# Patient Record
Sex: FEMALE | Race: BLACK OR AFRICAN AMERICAN | NOT HISPANIC OR LATINO | Employment: OTHER | URBAN - METROPOLITAN AREA
[De-identification: names, ages, dates, MRNs, and addresses within clinical notes are randomized per-mention and may not be internally consistent; named-entity substitution may affect disease eponyms.]

---

## 2017-01-16 RX ORDER — METOPROLOL SUCCINATE 50 MG/1
50 TABLET, EXTENDED RELEASE ORAL DAILY
Qty: 90 TABLET | Refills: 1 | Status: SHIPPED | OUTPATIENT
Start: 2017-01-16 | End: 2017-07-07 | Stop reason: SDUPTHER

## 2017-01-27 ENCOUNTER — OFFICE VISIT (OUTPATIENT)
Dept: PSYCHIATRY | Facility: CLINIC | Age: 62
End: 2017-01-27
Payer: COMMERCIAL

## 2017-01-27 VITALS
DIASTOLIC BLOOD PRESSURE: 74 MMHG | SYSTOLIC BLOOD PRESSURE: 142 MMHG | HEART RATE: 84 BPM | HEIGHT: 67 IN | BODY MASS INDEX: 37.98 KG/M2 | WEIGHT: 242 LBS

## 2017-01-27 DIAGNOSIS — F41.1 GENERALIZED ANXIETY DISORDER: ICD-10-CM

## 2017-01-27 DIAGNOSIS — F43.21 COMPLICATED BEREAVEMENT: ICD-10-CM

## 2017-01-27 DIAGNOSIS — F33.1 MAJOR DEPRESSIVE DISORDER, RECURRENT, MODERATE: Primary | ICD-10-CM

## 2017-01-27 PROCEDURE — 99499 UNLISTED E&M SERVICE: CPT | Mod: S$GLB,,, | Performed by: PSYCHIATRY & NEUROLOGY

## 2017-01-27 PROCEDURE — 99999 PR PBB SHADOW E&M-EST. PATIENT-LVL III: CPT | Mod: PBBFAC,,, | Performed by: PSYCHIATRY & NEUROLOGY

## 2017-01-27 PROCEDURE — 3078F DIAST BP <80 MM HG: CPT | Mod: S$GLB,,, | Performed by: PSYCHIATRY & NEUROLOGY

## 2017-01-27 PROCEDURE — 99205 OFFICE O/P NEW HI 60 MIN: CPT | Mod: S$GLB,,, | Performed by: PSYCHIATRY & NEUROLOGY

## 2017-01-27 PROCEDURE — 3077F SYST BP >= 140 MM HG: CPT | Mod: S$GLB,,, | Performed by: PSYCHIATRY & NEUROLOGY

## 2017-01-27 PROCEDURE — 1159F MED LIST DOCD IN RCRD: CPT | Mod: S$GLB,,, | Performed by: PSYCHIATRY & NEUROLOGY

## 2017-01-27 RX ORDER — TRAZODONE HYDROCHLORIDE 100 MG/1
TABLET ORAL
Qty: 90 TABLET | Refills: 1 | OUTPATIENT
Start: 2017-01-27

## 2017-01-27 RX ORDER — TRAZODONE HYDROCHLORIDE 100 MG/1
50-100 TABLET ORAL NIGHTLY PRN
Qty: 30 TABLET | Refills: 1 | Status: SHIPPED | OUTPATIENT
Start: 2017-01-27 | End: 2017-02-24 | Stop reason: SDUPTHER

## 2017-01-27 RX ORDER — BUPROPION HYDROCHLORIDE 150 MG/1
150 TABLET, EXTENDED RELEASE ORAL DAILY
Qty: 30 TABLET | Refills: 1 | Status: SHIPPED | OUTPATIENT
Start: 2017-01-27 | End: 2017-02-23 | Stop reason: SDUPTHER

## 2017-01-27 RX ORDER — BUPROPION HYDROCHLORIDE 150 MG/1
TABLET, EXTENDED RELEASE ORAL
Qty: 90 TABLET | Refills: 1 | OUTPATIENT
Start: 2017-01-27

## 2017-01-27 RX ORDER — DULOXETIN HYDROCHLORIDE 60 MG/1
60 CAPSULE, DELAYED RELEASE ORAL DAILY
Qty: 90 CAPSULE | Refills: 1 | Status: SHIPPED | OUTPATIENT
Start: 2017-01-27 | End: 2017-03-16 | Stop reason: SDUPTHER

## 2017-01-27 NOTE — PATIENT INSTRUCTIONS
[facility] - DEPARTMENT OF PSYCHIATRY   NEW PATIENT ORIENTATION INFORMATION  OUTPATIENT SERVICES COUNSELING CONTRACT    We appreciate the opportunity to participate in your medical care and hope the following protocols will make it easier for you to receive quality treatment in our department.    1. PUNCTUALITY: Your appointment is scheduled for a fixed amount of time reserved especially for you.  To get the benefit of your appointment, please arrive early enough to allow time for parking and registration.  If you are late for your appointment, your clinician is not able to offer additional time.  Please make every effort to be on time.      2. PAYMENT FOR SERVICES:   Payments are expected at the time of service.  Please contact Lizzette Bernal at [phone] if you need to resolve issues involving your account at [facility] or to set up a payment plan.    3. CANCELLATION / MISSED APPOINTMENTS:   In order to receive quality care, all appointments must be kept.  Appointment may be cancelled, ONLY by talking with an  at phone number [phone], between 8:00 a.m. and 5:00 p.m., Monday through Friday, at least 24 hours before your appointment time.  Your clinician reserves this time specifically for you, and if you will be unable to use it, it is necessary that you cancel in a timely manner.  If you do not give at least 24-hour notice of cancellation a full fee will be assessed.  Please note that insurance does not cover no-show charges, so you will be billed directly.  If you are consistently late, cancel, or do not show for your appointments, our department reserves the right to terminate treatment    4. CALLING THE DEPARTMENT:   MESSAGES, SCHEDULE OR CANCEL APPOINTMENTS- In general you can reach the department by calling [phone], between 8:00 a.m. and 5:00 p.m., Monday through Friday, to schedule or cancel appointments or leave a message for your clinician.  It is advisable  to schedule your visits far in advance to obtain the most convenient times for your appointments.   AFTER HOURS, WEEKEND OR HOLIDAYS- For urgent questions after hours, weekends and holidays, calling the department number [phone] will connect you to the nursing staff on the Psychiatry Inpatient Unit.  The nursing staff will speak with you and contact the On Call psychiatrist if necessary.   EMERGENCY-  In case of a crisis when there is a concern of harm to self or others, call 911 or the office [phone] between 8:00 a.m. and 5:00 p.m., Monday through Friday.  After hours, weekends or holidays, please call 911 or go to the Emergency Department where you can be thoroughly evaluated by the On Call psychiatrist.  If possible, contact the psychiatrist on call at [phone] prior to leaving for the [facility] Department.    5. TEAM APPROACH:  Most patients receive therapy through our team system.  In the team system, your primary therapist will be a , psychologist, psychiatry resident or psychiatrist.  If your therapist is a , psychologist or psychiatry resident, please contact your primary therapist first in matters other than medications or acute medical problems.    6. PRESCRIPTION REFILLS:  Prescription refills must be done at your physician office visit.  You will be given a sufficient number of refills to last one extra month beyond your next appointment.  No additional refills will be approved beyond the original treatment plan.  After hours, sufficient medication may be approved to last until the next scheduled appointment. After hours requests for refills on controlled substances may be declined by the psychiatrist on call, as he or she may not be familiar with your case  Please work closely with your doctor so that you have sufficient medication until your next appointment.  Again, please note that no additional prescriptions will be approved per patient  request over the phone.   No additional refills will be approved beyond the original treatment plan.   In certain exceptional situations, a phone consult appointment may be arranged, with appropriate charge, for the review and approval of prescription refills to last until the next scheduled appointment.    7. FOLLOW UP APPOINTMENTS:  Follow-up appointments can be made in person at the Psychiatry Appointment Desk, [place] [address], or by calling [phone], from 8am to 5pm, between Monday and Friday.  It is advisable to schedule your office visits far enough in advance to obtain the most convenient times for your appointments.    8. PAYMENT FOR SERVICES:   Payments are expected at the time of service.  Please contact Lizzette Bernal at [phone] if you need to resolve issues involving your account at [facility] or to set up a payment plan.    Revised Feb. 23, 2010 - F/OA1/Newpatient                            You have been provided with a certain amount of medication with a specified number of refills.  Please follow up within an adequate time before you run out of medications.  If you run out of medication before your next appointment you may be charged a refill fee.  REFILLS FOR CONTROLLED SUBSTANCES WILL NOT BE GIVEN WITHOUT AN APPOINTMENT.  I will not honor or fill automated refill requests from pharmacies.    Please book your next appointment today by phone: [phone].  Call 8am and 10:30am to speak with my assistant.    PLEASE BE AT LEAST 15 MINUTES EARLY FOR YOUR NEXT APPOINTMENT.  PLEASE, DO NOT BE LATE OR YOU WILL BE TURNED AWAY AND ASKED TO RESCHEDULE.  YOU MUST ALLOW TIME FOR CHECK-IN AS WELL AS GET YOUR VITAL SIGNS AND GO OVER YOUR MEDICATIONS.  Tardiness can affect and is not fair to the patients who present after you and are on time for their appointments.  It causes a delay in the appointments for patients and staff.  IF YOU ARE LATE FOR YOUR APPOINTMENT TIME, YOU WILL BE SEEN FOR A SHORTENED  "AMOUNT OF TIME OR ASKED TO RESCHEDULE.  THERE IS A POSSIBILITY THAT YOU WILL BE CHARGED FOR THE APPOINTMENT TIME PERSONALLY AND IT WILL NOT GO TO YOUR INSURANCE.  YOU MAY ALSO BE DISCHARGED FROM CLINIC with multiple "No Show" appointments.     -----------------------------------------------------------------------------------------------------------------  IF YOU FEEL SUICIDAL OR HAVING THOUGHTS OR PLANS TO HURT YOURSELF OR OTHERS, CALL 911 OR REPORT TO THE NEAREST EMERGENCY ROOM.  YOU CAN ALSO ACCESS ONE OF THE FOLLOWING HOTLINES:    [facility]  [phone]    [facility] Crisis Line   [phone]     [place]  24 hours / 7 days   [phone]       "

## 2017-01-27 NOTE — MR AVS SNAPSHOT
[facility] Psychiatry  [address] [facility] [address]  [address]  [place] [address]  Phone: [phone]  Fax: [phone]                  Gwendolyn Fournier   2017 10:30 AM   Office Visit    Description:  Female : 1955   Provider:  Wei Jiménez MD   Department:  [facility]                To Do List           Goals (5 Years of Data)     None      Follow-Up and Disposition     Return in about 6 weeks (around 3/10/2017), or if symptoms worsen or fail to improve.       These Medications        Disp Refills Start End    duloxetine (CYMBALTA) 60 MG capsule 90 capsule 1 2017     Take 1 capsule (60 mg total) by mouth once daily. - Oral    Pharmacy: [facility] [address] [facility] Ph #: [phone]       buPROPion (WELLBUTRIN SR) 150 MG TBSR 12 hr tablet 30 tablet 2017     Take 1 tablet (150 mg total) by mouth once daily. - Oral    Pharmacy: [facility] [address] [facility] Ph #: [phone]       trazodone (DESYREL) 100 MG tablet 30 tablet 1 2017     Take 0.5-1 tablets ( mg total) by mouth nightly as needed for Insomnia. - Oral    Pharmacy: [facility] [address] [facility] Ph #: [phone]         [facility]     [facility] On Call Nurse Care Line -  Assistance  Registered nurses in the [facility] On Call Center provide clinical advisement, health education, appointment booking, and other advisory services.  Call for this free service at [phone].             Medications           Message regarding Medications     Verify the changes and/or additions to your medication regime listed below are the same as discussed with your clinician today.  If any of these changes or additions are incorrect, please notify your healthcare provider.        START taking these NEW medications         Refills    buPROPion (WELLBUTRIN SR) 150 MG TBSR 12 hr tablet 1    Sig: Take 1 tablet (150 mg total) by mouth once daily.    Class: Normal    Route: Oral    trazodone (DESYREL) 100 MG tablet 1    Sig: Take 0.5-1 tablets ( mg total) by mouth nightly as needed for Insomnia.    Class: Normal    Route: Oral      CHANGE how you are taking these medications     Start Taking Instead of    duloxetine (CYMBALTA) 60 MG capsule duloxetine (CYMBALTA) 60 MG capsule    Dosage:  Take 1 capsule (60 mg total) by mouth once daily. Dosage:  Take 2 capsules (120 mg total) by mouth once daily.    Reason for Change:  Reorder            Verify that the below list of medications is an accurate representation of the medications you are currently taking.  If none reported, the list may be blank. If incorrect, please contact your healthcare provider. Carry this list with you in case of emergency.           Current Medications     allopurinol (ZYLOPRIM) 100 MG tablet Take 1 tablet (100 mg total) by mouth 2 (two) times daily.    amlodipine (NORVASC) 5 MG tablet TAKE 1 TABLET(5 MG) BY MOUTH EVERY DAY    ammonium lactate (LAC-HYDRIN) 12 % lotion 2 (two) times daily as needed.     buPROPion (WELLBUTRIN SR) 150 MG TBSR 12 hr tablet Take 1 tablet (150 mg total) by mouth once daily.    colchicine 0.6 mg tablet Take 2 tabs PO at onset of gout pain, then 1 tab PO one hour later if not resolved.  Repeat in 24 hours until resolved.    diazePAM (VALIUM) 5 MG tablet Take 1 tablet (5 mg total) by mouth as needed.    duloxetine (CYMBALTA) 60 MG capsule Take 1 capsule (60 mg total) by mouth once daily.    fluticasone (FLONASE) 50 mcg/actuation nasal spray 1 spray by Each Nare route once daily.    hydrochlorothiazide (HYDRODIURIL) 25 MG tablet Take 25 mg by mouth once daily.    loratadine (CLARITIN) 10 mg tablet Take 1 tablet (10 mg total) by mouth once daily.    meloxicam (MOBIC) 15 MG tablet Take 1 tablet (15 mg total) by mouth as needed.    metoprolol  "succinate (TOPROL-XL) 50 MG 24 hr tablet Take 1 tablet (50 mg total) by mouth once daily.    oxycodone-acetaminophen (PERCOCET) 5-325 mg per tablet TK 1 T PO Q 6 TO 8 H PRN P    polyethylene glycol (COLYTE) 240-22.72-6.72 -5.84 gram SolR Take 4,000 mLs (4 L total) by mouth as directed.    tizanidine (ZANAFLEX) 4 MG tablet Take 4 mg by mouth as needed.     trazodone (DESYREL) 100 MG tablet Take 0.5-1 tablets ( mg total) by mouth nightly as needed for Insomnia.           Clinical Reference Information           Vital Signs - Last Recorded  Most recent update: 1/27/2017 10:48 AM by Eva Coronado MA    BP Pulse Ht Wt BMI    (!) 142/74 84 5' 7" (1.702 m) 109.8 kg (242 lb) 37.9 kg/m2      Blood Pressure          Most Recent Value    BP  (!)  142/74      Allergies as of 1/27/2017     No Known Allergies      Immunizations Administered on Date of Encounter - 1/27/2017     None      Instructions              [facility] - DEPARTMENT OF PSYCHIATRY   NEW PATIENT ORIENTATION INFORMATION  OUTPATIENT SERVICES COUNSELING CONTRACT    We appreciate the opportunity to participate in your medical care and hope the following protocols will make it easier for you to receive quality treatment in our department.    1. PUNCTUALITY: Your appointment is scheduled for a fixed amount of time reserved especially for you.  To get the benefit of your appointment, please arrive early enough to allow time for parking and registration.  If you are late for your appointment, your clinician is not able to offer additional time.  Please make every effort to be on time.      2. PAYMENT FOR SERVICES:   Payments are expected at the time of service.  Please contact Lizzette Bernal at [phone] if you need to resolve issues involving your account at [facility] or to set up a payment plan.    3. CANCELLATION / MISSED APPOINTMENTS:   In order to receive quality care, all appointments must be kept.  Appointment may be cancelled, ONLY by talking " with an  at phone number [phone], between 8:00 a.m. and 5:00 p.m., Monday through Friday, at least 24 hours before your appointment time.  Your clinician reserves this time specifically for you, and if you will be unable to use it, it is necessary that you cancel in a timely manner.  If you do not give at least 24-hour notice of cancellation a full fee will be assessed.  Please note that insurance does not cover no-show charges, so you will be billed directly.  If you are consistently late, cancel, or do not show for your appointments, our department reserves the right to terminate treatment    4. CALLING THE DEPARTMENT:   MESSAGES, SCHEDULE OR CANCEL APPOINTMENTS- In general you can reach the department by calling [phone], between 8:00 a.m. and 5:00 p.m., Monday through Friday, to schedule or cancel appointments or leave a message for your clinician.  It is advisable to schedule your visits far in advance to obtain the most convenient times for your appointments.   AFTER HOURS, WEEKEND OR HOLIDAYS- For urgent questions after hours, weekends and holidays, calling the department number [phone] will connect you to the nursing staff on the Psychiatry Inpatient Unit.  The nursing staff will speak with you and contact the On Call psychiatrist if necessary.   EMERGENCY-  In case of a crisis when there is a concern of harm to self or others, call 911 or the office [phone] between 8:00 a.m. and 5:00 p.m., Monday through Friday.  After hours, weekends or holidays, please call 911 or go to the Emergency Department where you can be thoroughly evaluated by the On Call psychiatrist.  If possible, contact the psychiatrist on call at [phone] prior to leaving for the [facility] Emergency Department.    5. TEAM APPROACH:  Most patients receive therapy through our team system.  In the team system, your primary therapist will be a , psychologist, psychiatry resident or  psychiatrist.  If your therapist is a , psychologist or psychiatry resident, please contact your primary therapist first in matters other than medications or acute medical problems.    6. PRESCRIPTION REFILLS:  Prescription refills must be done at your physician office visit.  You will be given a sufficient number of refills to last one extra month beyond your next appointment.  No additional refills will be approved beyond the original treatment plan.  After hours, sufficient medication may be approved to last until the next scheduled appointment. After hours requests for refills on controlled substances may be declined by the psychiatrist on call, as he or she may not be familiar with your case  Please work closely with your doctor so that you have sufficient medication until your next appointment.  Again, please note that no additional prescriptions will be approved per patient request over the phone.   No additional refills will be approved beyond the original treatment plan.   In certain exceptional situations, a phone consult appointment may be arranged, with appropriate charge, for the review and approval of prescription refills to last until the next scheduled appointment.    7. FOLLOW UP APPOINTMENTS:  Follow-up appointments can be made in person at the Psychiatry Appointment Desk, [place] [address], or by calling [phone], from 8am to 5pm, between Monday and Friday.  It is advisable to schedule your office visits far enough in advance to obtain the most convenient times for your appointments.    8. PAYMENT FOR SERVICES:   Payments are expected at the time of service.  Please contact Lizzette Bernal at [phone] if you need to resolve issues involving your account at [facility] or to set up a payment plan.    Revised Feb. 23, 2010 - F/OA1/Newpatient                            You have been provided with a certain amount of medication with a specified number of refills.  Please follow up within  "an adequate time before you run out of medications.  If you run out of medication before your next appointment you may be charged a refill fee.  REFILLS FOR CONTROLLED SUBSTANCES WILL NOT BE GIVEN WITHOUT AN APPOINTMENT.  I will not honor or fill automated refill requests from pharmacies.    Please book your next appointment today by phone: [phone].  Call 8am and 10:30am to speak with my assistant.    PLEASE BE AT LEAST 15 MINUTES EARLY FOR YOUR NEXT APPOINTMENT.  PLEASE, DO NOT BE LATE OR YOU WILL BE TURNED AWAY AND ASKED TO RESCHEDULE.  YOU MUST ALLOW TIME FOR CHECK-IN AS WELL AS GET YOUR VITAL SIGNS AND GO OVER YOUR MEDICATIONS.  Tardiness can affect and is not fair to the patients who present after you and are on time for their appointments.  It causes a delay in the appointments for patients and staff.  IF YOU ARE LATE FOR YOUR APPOINTMENT TIME, YOU WILL BE SEEN FOR A SHORTENED AMOUNT OF TIME OR ASKED TO RESCHEDULE.  THERE IS A POSSIBILITY THAT YOU WILL BE CHARGED FOR THE APPOINTMENT TIME PERSONALLY AND IT WILL NOT GO TO YOUR INSURANCE.  YOU MAY ALSO BE DISCHARGED FROM CLINIC with multiple "No Show" appointments.     -----------------------------------------------------------------------------------------------------------------  IF YOU FEEL SUICIDAL OR HAVING THOUGHTS OR PLANS TO HURT YOURSELF OR OTHERS, CALL 911 OR REPORT TO THE NEAREST EMERGENCY ROOM.  YOU CAN ALSO ACCESS ONE OF THE FOLLOWING HOTLINES:    [facility]  [facility]  [phone]    [facility]   [facility] Crisis Line   [phone]     [place]  24 hours / 7 days   [phone]            "

## 2017-01-27 NOTE — PROGRESS NOTES
Outpatient Psychiatry Initial Visit (MD/NP)    1/27/2017    Gwendolyn Fournier, a 62 y.o. female, presenting for initial evaluation visit. Met with patient and daughter.    Reason for Encounter: Referral from pcp. Patient complains of No chief complaint on file.  .    History of Present Illness: Patient Gwendolyn Fournier presents to clinic for her initial psychiatric evaluation.  She was evaluated for depression by her PCP a few months ago.  In November, she felt stress build up from the death of her daughter in Jan 2015.  She was anticipating the year anniversary of her death coming up.  Despite being on medications already, she had started taking her daughter's mirtazapine nightly for a week or so.  She stopped when she realized that she shouldn't take other's medications.  She has been depressed with sadness.  She has lost interest in things that she would like to do, especially her Religious ([employer]).  She has no motivation and is up/down at night with poor sleep.  She stays in bed all day.  Appetite is good.  No suicidality but didn't want to survive her back surgery.  No energy.  She is also a worry wart in that she has worried about everything since the death of her mother at age 6.  She has had 3 panic attacks in her life.  The last was in Nov and during this time, she takes a Valium, which helps.  She describes her panic attacks as shaky, increased heart rate, blood pressure fluctuations, and tightness in her throat.  She has no history of skyler or psychosis.  She is asking for help with depression and anxiety.    No history of seizures.  Currently taking Cymbalta 60mg daily.  Previously tried Prozac, trazodone, mirtazapine, and Ambien.      Review Of Systems:     GENERAL:  No weight gain or loss  HEAD:  No headaches  CHEST:  No shortness of breath, hyperventilation or cough  CARDIOVASCULAR:  No tachycardia or chest pain  ABDOMEN:  No nausea, vomiting, pain, constipation or  "diarrhea  MUSCULOSKELETAL:  No pain or stiffness of the joints  NEUROLOGIC:  No weakness, sensory changes, seizures, confusion, memory loss, tremor or other abnormal movements  Pertinent items are noted in HPI.    Current Evaluation:     Nutritional Screening: Considering the patient's height and weight, medications, medical history and preferences, should a referral be made to the dietitian? no    Constitutional  Vitals:  Most recent vital signs, dated less than 90 days prior to this appointment, were reviewed.    Vitals:    01/27/17 1047   BP: (!) 142/74   Pulse: 84   Weight: 109.8 kg (242 lb)   Height: 5' 7" (1.702 m)        General:  age appropriate, obese     Musculoskeletal  Muscle Strength/Tone:  no tremor, no tic   Gait & Station:  non-ataxic     Psychiatric  Speech:  no latency; no press   Mood & Affect:  depressed, dysthymic  blunted   Thought Process:  normal and logical   Associations:  intact   Thought Content:  normal, no suicidality, no homicidality, delusions, or paranoia   Insight:  has awareness of illness   Judgement: behavior is adequate to circumstances   Orientation:  person, place, situation   Memory: intact for content of interview   Language: able to name, able to repeat   Attention Span & Concentration:  able to focus   Fund of Knowledge:  intact and appropriate to age and level of education       Relevant Elements of Neurological Exam: normal gait    Functioning in Relationships:  Spouse/partner:   Peers: limited  Employers: N/A    Laboratory Data  No visits with results within 1 Month(s) from this visit.  Latest known visit with results is:    Lab Visit on 09/24/2015   Component Date Value Ref Range Status    Hepatitis C Ab 09/24/2015 Negative   Final    Cholesterol 09/24/2015 224* 120 - 199 mg/dL Final    Triglycerides 09/24/2015 176* 30 - 150 mg/dL Final    HDL 09/24/2015 46  40 - 75 mg/dL Final    LDL Cholesterol 09/24/2015 142.8  63.0 - 159.0 mg/dL Final    HDL/Chol Ratio " 09/24/2015 20.5  20.0 - 50.0 % Final    Total Cholesterol/HDL Ratio 09/24/2015 4.9  2.0 - 5.0 Final    Non-HDL Cholesterol 09/24/2015 178  mg/dL Final    WBC 09/24/2015 9.18  3.90 - 12.70 K/uL Final    RBC 09/24/2015 3.96* 4.00 - 5.40 M/uL Final    Hemoglobin 09/24/2015 11.5* 12.0 - 16.0 g/dL Final    Hematocrit 09/24/2015 34.7* 37.0 - 48.5 % Final    MCV 09/24/2015 88  82 - 98 fL Final    MCH 09/24/2015 29.0  27.0 - 31.0 pg Final    MCHC 09/24/2015 33.1  32.0 - 36.0 % Final    RDW 09/24/2015 13.1  11.5 - 14.5 % Final    Platelets 09/24/2015 282  150 - 350 K/uL Final    MPV 09/24/2015 10.3  9.2 - 12.9 fL Final    Gran # 09/24/2015 5.2  1.8 - 7.7 K/uL Final    Lymph # 09/24/2015 3.3  1.0 - 4.8 K/uL Final    Mono # 09/24/2015 0.5  0.3 - 1.0 K/uL Final    Eos # 09/24/2015 0.2  0.0 - 0.5 K/uL Final    Baso # 09/24/2015 0.03  0.00 - 0.20 K/uL Final    Gran% 09/24/2015 56.4  38.0 - 73.0 % Final    Lymph% 09/24/2015 35.5  18.0 - 48.0 % Final    Mono% 09/24/2015 5.4  4.0 - 15.0 % Final    Eosinophil% 09/24/2015 2.3  0.0 - 8.0 % Final    Basophil% 09/24/2015 0.3  0.0 - 1.9 % Final    Differential Method 09/24/2015 Automated   Final    Sodium 09/24/2015 143  136 - 145 mmol/L Final    Potassium 09/24/2015 3.8  3.5 - 5.1 mmol/L Final    Chloride 09/24/2015 103  95 - 110 mmol/L Final    CO2 09/24/2015 28  23 - 29 mmol/L Final    Glucose 09/24/2015 83  70 - 110 mg/dL Final    BUN, Bld 09/24/2015 15  8 - 23 mg/dL Final    Creatinine 09/24/2015 1.1  0.5 - 1.4 mg/dL Final    Calcium 09/24/2015 10.1  8.7 - 10.5 mg/dL Final    Total Protein 09/24/2015 7.0  6.0 - 8.4 g/dL Final    Albumin 09/24/2015 3.2* 3.5 - 5.2 g/dL Final    Total Bilirubin 09/24/2015 0.3  0.1 - 1.0 mg/dL Final    Alkaline Phosphatase 09/24/2015 100  55 - 135 U/L Final    AST 09/24/2015 17  10 - 40 U/L Final    ALT 09/24/2015 14  10 - 44 U/L Final    Anion Gap 09/24/2015 12  8 - 16 mmol/L Final    eGFR if   09/24/2015 >60.0  >60 mL/min/1.73 m^2 Final    eGFR if non African American 09/24/2015 54.3* >60 mL/min/1.73 m^2 Final    TSH 09/24/2015 2.038  0.400 - 4.000 uIU/mL Final    Free T4 09/24/2015 1.19  0.71 - 1.51 ng/dL Final         Medications  Outpatient Encounter Prescriptions as of 1/27/2017   Medication Sig Dispense Refill    allopurinol (ZYLOPRIM) 100 MG tablet Take 1 tablet (100 mg total) by mouth 2 (two) times daily. 180 tablet 1    amlodipine (NORVASC) 5 MG tablet TAKE 1 TABLET(5 MG) BY MOUTH EVERY DAY 90 tablet 0    ammonium lactate (LAC-HYDRIN) 12 % lotion 2 (two) times daily as needed.   5    colchicine 0.6 mg tablet Take 2 tabs PO at onset of gout pain, then 1 tab PO one hour later if not resolved.  Repeat in 24 hours until resolved. 90 tablet 0    diazePAM (VALIUM) 5 MG tablet Take 1 tablet (5 mg total) by mouth as needed. 20 tablet 0    duloxetine (CYMBALTA) 60 MG capsule Take 2 capsules (120 mg total) by mouth once daily. 180 capsule 0    fluticasone (FLONASE) 50 mcg/actuation nasal spray 1 spray by Each Nare route once daily. 16 g 0    hydrochlorothiazide (HYDRODIURIL) 25 MG tablet Take 25 mg by mouth once daily.  1    loratadine (CLARITIN) 10 mg tablet Take 1 tablet (10 mg total) by mouth once daily. 30 tablet 0    meloxicam (MOBIC) 15 MG tablet Take 1 tablet (15 mg total) by mouth as needed. 90 tablet 3    metoprolol succinate (TOPROL-XL) 50 MG 24 hr tablet Take 1 tablet (50 mg total) by mouth once daily. 90 tablet 1    oxycodone-acetaminophen (PERCOCET) 5-325 mg per tablet TK 1 T PO Q 6 TO 8 H PRN P  0    polyethylene glycol (COLYTE) 240-22.72-6.72 -5.84 gram SolR Take 4,000 mLs (4 L total) by mouth as directed. 1 Bottle 0    tizanidine (ZANAFLEX) 4 MG tablet Take 4 mg by mouth as needed.   0     Facility-Administered Encounter Medications as of 1/27/2017   Medication Dose Route Frequency Provider Last Rate Last Dose    lidocaine (PF) 10 mg/ml (1%) injection 40 mg  4 mL  Intramuscular 1 time in Clinic/HOD Azikiwe K. Lombard, MD        triamcinolone acetonide injection 40 mg  40 mg Intramuscular 1 time in Clinic/HOD Azikiwe K. Lombard, MD               Assessment - Diagnosis - Goals:     Impression: We will continue pharmacological intervention and adjunctive therapy.       ICD-10-CM ICD-9-CM   1. Major depressive disorder, recurrent, moderate F33.1 296.32   2. Generalized anxiety disorder F41.1 300.02   3. 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    Treatment Goals:  Specify outcomes written in observable, behavioral terms:   Anxiety: reducing negative automatic thoughts and reducing physical symptoms of anxiety  Depression: increasing energy, increasing interest in usual activities, increasing motivation, reducing excessive guilt, reducing fatigue and reducing negative automatic thoughts    Treatment Plan/Recommendations:   · Medication Management: Continue current medications. The risks and benefits of medication were discussed with the patient.  · The treatment plan and follow up plan were reviewed with the patient.  1.  Continue Cymbalta 60mg daily targeting depression/anxiety.  Warned of risk of skyler, suicidality, serotonin syndrome.  2.  Augment with Wellbutrin SR 150mg daily targeting depression.  Warned of risk of skyler, suicidality, serotonin syndrome.  3.  Trial of trazodone 50-100mg nightly for sleep and depression.  Warned of risk of skyler, suicidality, serotonin syndrome, oversedation, weight gain.  4.  Recommend to start with individual therapy for better coping skills and grief.  She has an appointment next month.    Return to Clinic: 6 weeks, as needed    Counseling time: 35 minutes  Total time: 70 minutes  Consulting clinician was informed of the encounter and consult note.

## 2017-02-08 DIAGNOSIS — M1A.0790 CHRONIC GOUT OF FOOT, UNSPECIFIED CAUSE, UNSPECIFIED LATERALITY: ICD-10-CM

## 2017-02-09 RX ORDER — ALLOPURINOL 100 MG/1
TABLET ORAL
Qty: 90 TABLET | Refills: 0 | Status: SHIPPED | OUTPATIENT
Start: 2017-02-09 | End: 2018-10-09 | Stop reason: SDUPTHER

## 2017-02-24 RX ORDER — TRAZODONE HYDROCHLORIDE 100 MG/1
TABLET ORAL
Qty: 90 TABLET | Refills: 0 | Status: SHIPPED | OUTPATIENT
Start: 2017-02-24 | End: 2017-08-24

## 2017-02-24 RX ORDER — BUPROPION HYDROCHLORIDE 150 MG/1
TABLET, EXTENDED RELEASE ORAL
Qty: 90 TABLET | Refills: 0 | Status: SHIPPED | OUTPATIENT
Start: 2017-02-24 | End: 2017-03-16 | Stop reason: SDUPTHER

## 2017-03-16 ENCOUNTER — OFFICE VISIT (OUTPATIENT)
Dept: PSYCHIATRY | Facility: CLINIC | Age: 62
End: 2017-03-16
Payer: MEDICARE

## 2017-03-16 VITALS
HEIGHT: 67 IN | HEART RATE: 78 BPM | SYSTOLIC BLOOD PRESSURE: 126 MMHG | BODY MASS INDEX: 38.37 KG/M2 | WEIGHT: 244.5 LBS | DIASTOLIC BLOOD PRESSURE: 72 MMHG

## 2017-03-16 DIAGNOSIS — F41.1 GENERALIZED ANXIETY DISORDER: ICD-10-CM

## 2017-03-16 DIAGNOSIS — F43.21 COMPLICATED BEREAVEMENT: ICD-10-CM

## 2017-03-16 DIAGNOSIS — F33.1 MAJOR DEPRESSIVE DISORDER, RECURRENT, MODERATE: Primary | ICD-10-CM

## 2017-03-16 PROCEDURE — 99999 PR PBB SHADOW E&M-EST. PATIENT-LVL III: CPT | Mod: PBBFAC,,, | Performed by: PSYCHIATRY & NEUROLOGY

## 2017-03-16 PROCEDURE — 3078F DIAST BP <80 MM HG: CPT | Mod: S$GLB,,, | Performed by: PSYCHIATRY & NEUROLOGY

## 2017-03-16 PROCEDURE — 1160F RVW MEDS BY RX/DR IN RCRD: CPT | Mod: S$GLB,,, | Performed by: PSYCHIATRY & NEUROLOGY

## 2017-03-16 PROCEDURE — 99499 UNLISTED E&M SERVICE: CPT | Mod: S$GLB,,, | Performed by: PSYCHIATRY & NEUROLOGY

## 2017-03-16 PROCEDURE — 99214 OFFICE O/P EST MOD 30 MIN: CPT | Mod: S$GLB,,, | Performed by: PSYCHIATRY & NEUROLOGY

## 2017-03-16 PROCEDURE — 3074F SYST BP LT 130 MM HG: CPT | Mod: S$GLB,,, | Performed by: PSYCHIATRY & NEUROLOGY

## 2017-03-16 RX ORDER — BUPROPION HYDROCHLORIDE 150 MG/1
150 TABLET, EXTENDED RELEASE ORAL 2 TIMES DAILY
Qty: 180 TABLET | Refills: 1 | Status: SHIPPED | OUTPATIENT
Start: 2017-03-16 | End: 2019-01-03

## 2017-03-16 RX ORDER — DULOXETIN HYDROCHLORIDE 60 MG/1
60 CAPSULE, DELAYED RELEASE ORAL DAILY
Qty: 90 CAPSULE | Refills: 1 | Status: SHIPPED | OUTPATIENT
Start: 2017-03-16 | End: 2017-11-07 | Stop reason: SDUPTHER

## 2017-03-16 NOTE — PROGRESS NOTES
Outpatient Psychiatry Follow-Up Visit (MD/NP)    3/16/2017    Clinical Status of Patient:  Outpatient (Ambulatory)    Chief Complaint:  Gwendolyn Fournier is a 62 y.o. female who presents today for follow-up of depression and anxiety.  Met with patient.      Interval History and Content of Current Session:  Interim Events/Subjective Report/Content of Current Session: Patient Gwendolyn Fournier presents to clinic for follow up after her initial psychiatric evaluation.  She appears on edge and anxious today.  She says that she tried to be early but could not get her daughter up and moving around.  Daughter has mental health issues and stays in bed all day.  Patient is worried because other daughter had similar symptoms and committed suicide last year.  Patient is afraid that this daughter will also.  Has to take daughter to her mental health appointment later today.  Patient tells me that trazodone made her feel sick and didn't like it.  She stopped taking it a while back.  She has been taking the Wellbutrin and feels that it has caused an increase in motivation.  She likes the way it makes her feel.  She is not sleeping well because she says that she wakes a few times a night worrying.  She is keeping busy bringing her daughter places but not doing things for herself.  Asking if medication changes are warranted.    Psychotherapy:  · Target symptoms: depression, anxiety , grief  · Why chosen therapy is appropriate versus another modality: relevant to diagnosis  · Outcome monitoring methods: self-report, observation  · Therapeutic intervention type: supportive psychotherapy  · Topics discussed/themes: building skills sets for symptom management, symptom recognition  · The patient's response to the intervention is accepting. The patient's progress toward treatment goals is fair.   · Duration of intervention: 15 minutes.    Review of Systems   · PSYCHIATRIC: Pertinant items are noted in the  "narrative.  · CONSTITUTIONAL: No weight gain or loss.   · MUSCULOSKELETAL: No pain or stiffness of the joints.  · NEUROLOGIC: No weakness, sensory changes, seizures, confusion, memory loss, tremor or other abnormal movements.  · RESPIRATORY: No shortness of breath.  · CARDIOVASCULAR: No tachycardia or chest pain.  · GASTROINTESTINAL: No nausea, vomiting, pain, constipation or diarrhea.    Past Medical, Family and Social History: The patient's past medical, family and social history have been reviewed and updated as appropriate within the electronic medical record - see encounter notes.    Compliance: yes    Side effects: None    Risk Parameters:  Patient reports no suicidal ideation  Patient reports no homicidal ideation  Patient reports no self-injurious behavior  Patient reports no violent behavior    Exam (detailed: at least 9 elements; comprehensive: all 15 elements)   Constitutional  Vitals:  Most recent vital signs, dated less than 90 days prior to this appointment, were reviewed.   Vitals:    03/16/17 0935   BP: 126/72   Pulse: 78   Weight: 110.9 kg (244 lb 7.8 oz)   Height: 5' 7" (1.702 m)        General:  unremarkable, age appropriate     Musculoskeletal  Muscle Strength/Tone:  no tremor, no tic   Gait & Station:  non-ataxic     Psychiatric  Speech:  no latency; no press   Mood & Affect:  anxious, dysthymic  blunted   Thought Process:  normal and logical   Associations:  intact   Thought Content:  normal, no suicidality, no homicidality, delusions, or paranoia   Insight:  has awareness of illness   Judgement: behavior is adequate to circumstances   Orientation:  person, place, situation, time/date   Memory: intact for content of interview   Language: able to name, able to repeat   Attention Span & Concentration:  able to focus   Fund of Knowledge:  intact and appropriate to age and level of education     Assessment and Diagnosis   Status/Progress: Based on the examination today, the patient's problem(s) " is/are adequately but not ideally controlled.  New problems have been presented today.   Co-morbidities are complicating management of the primary condition.  There are no active rule-out diagnoses for this patient at this time.     General Impression: We will continue pharmacological intervention and adjunctive therapy.       ICD-10-CM ICD-9-CM   1. Major depressive disorder, recurrent, moderate F33.1 296.32   2. Generalized anxiety disorder F41.1 300.02   3. 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 The risks and benefits of medication were discussed with the patient.  · Counseling provided with patient as follows: importance of compliance with chosen treatment options was emphasized, risks and benefits of treatment options, including medications, were discussed with the patient, risk factor reduction, prognosis, patient education, instructions for  management, treatment and follow-up were reviewed  1. Continue Cymbalta 60mg daily targeting depression/anxiety. Warned of risk of skyler, suicidality, serotonin syndrome.  2. Increase Wellbutrin SR 150mg to BID targeting depression. Warned of risk of skyler, suicidality, serotonin syndrome.  3. Stop trazodone as she has already done.  4. Recommend to start with individual therapy for better coping skills and grief outside of counseling with her daughter.    Return to Clinic: 3 months, as needed

## 2017-03-16 NOTE — PATIENT INSTRUCTIONS
"        You have been provided with a certain amount of medication with a specified number of refills.  Please follow up within an adequate time before you run out of medications.  If you run out of medication before your next appointment you may be charged a refill fee.  REFILLS FOR CONTROLLED SUBSTANCES WILL NOT BE GIVEN WITHOUT AN APPOINTMENT.  I will not honor or fill automated refill requests from pharmacies.    Please book your next appointment today by phone: [phone].  Call 8am and 10:30am to speak with my assistant.    PLEASE BE AT LEAST 15 MINUTES EARLY FOR YOUR NEXT APPOINTMENT.  PLEASE, DO NOT BE LATE OR YOU WILL BE TURNED AWAY AND ASKED TO RESCHEDULE.  YOU MUST ALLOW TIME FOR CHECK-IN AS WELL AS GET YOUR VITAL SIGNS AND GO OVER YOUR MEDICATIONS.  [employer]iness can affect and is not fair to the patients who present after you and are on time for their appointments.  It causes a delay in the appointments for patients and staff.  IF YOU ARE LATE FOR YOUR APPOINTMENT TIME, YOU WILL BE SEEN FOR A SHORTENED AMOUNT OF TIME OR ASKED TO RESCHEDULE.  THERE IS A POSSIBILITY THAT YOU WILL BE CHARGED FOR THE APPOINTMENT TIME PERSONALLY AND IT WILL NOT GO TO YOUR INSURANCE.  YOU MAY ALSO BE DISCHARGED FROM CLINIC with multiple "No Show" appointments.     -----------------------------------------------------------------------------------------------------------------  IF YOU FEEL SUICIDAL OR HAVING THOUGHTS OR PLANS TO HURT YOURSELF OR OTHERS, CALL 911 OR REPORT TO THE NEAREST EMERGENCY ROOM.  YOU CAN ALSO ACCESS ONE OF THE FOLLOWING HOTLINES:    [facility]  [facility]  [phone]    [facility]   [facility] Crisis Line   [phone]     [facility]  24 hours / 7 days   [phone])       "

## 2017-03-31 DIAGNOSIS — I10 ESSENTIAL HYPERTENSION: ICD-10-CM

## 2017-04-03 RX ORDER — AMLODIPINE BESYLATE 5 MG/1
TABLET ORAL
Qty: 90 TABLET | Refills: 0 | Status: SHIPPED | OUTPATIENT
Start: 2017-04-03 | End: 2017-07-07 | Stop reason: SDUPTHER

## 2017-04-06 ENCOUNTER — OFFICE VISIT (OUTPATIENT)
Dept: FAMILY MEDICINE | Facility: CLINIC | Age: 62
End: 2017-04-06
Payer: MEDICARE

## 2017-04-06 VITALS
HEIGHT: 67 IN | SYSTOLIC BLOOD PRESSURE: 120 MMHG | HEART RATE: 84 BPM | WEIGHT: 240.31 LBS | BODY MASS INDEX: 37.72 KG/M2 | TEMPERATURE: 99 F | OXYGEN SATURATION: 96 % | RESPIRATION RATE: 20 BRPM | DIASTOLIC BLOOD PRESSURE: 78 MMHG

## 2017-04-06 DIAGNOSIS — Z00.00 WELL ADULT EXAM: Primary | ICD-10-CM

## 2017-04-06 DIAGNOSIS — M15.9 PRIMARY OSTEOARTHRITIS INVOLVING MULTIPLE JOINTS: ICD-10-CM

## 2017-04-06 DIAGNOSIS — I10 ESSENTIAL HYPERTENSION: ICD-10-CM

## 2017-04-06 DIAGNOSIS — F33.1 MAJOR DEPRESSIVE DISORDER, RECURRENT EPISODE, MODERATE: ICD-10-CM

## 2017-04-06 DIAGNOSIS — M1A.09X0 IDIOPATHIC CHRONIC GOUT OF MULTIPLE SITES WITHOUT TOPHUS: ICD-10-CM

## 2017-04-06 PROCEDURE — 99396 PREV VISIT EST AGE 40-64: CPT | Mod: S$GLB,,, | Performed by: FAMILY MEDICINE

## 2017-04-06 PROCEDURE — 99999 PR PBB SHADOW E&M-EST. PATIENT-LVL III: CPT | Mod: PBBFAC,,, | Performed by: FAMILY MEDICINE

## 2017-04-06 PROCEDURE — 3074F SYST BP LT 130 MM HG: CPT | Mod: S$GLB,,, | Performed by: FAMILY MEDICINE

## 2017-04-06 PROCEDURE — 3078F DIAST BP <80 MM HG: CPT | Mod: S$GLB,,, | Performed by: FAMILY MEDICINE

## 2017-04-06 PROCEDURE — 99499 UNLISTED E&M SERVICE: CPT | Mod: S$GLB,,, | Performed by: FAMILY MEDICINE

## 2017-04-06 RX ORDER — DICLOFENAC SODIUM 75 MG/1
75 TABLET, DELAYED RELEASE ORAL 2 TIMES DAILY
Qty: 60 TABLET | Refills: 2 | Status: SHIPPED | OUTPATIENT
Start: 2017-04-06 | End: 2017-07-10 | Stop reason: SDUPTHER

## 2017-04-06 NOTE — PROGRESS NOTES
Chief Complaint   Patient presents with    Annual Exam     Well adult check-up        Gwendolyn Fournier is a 62 y.o. female who presents per the Chief Complaint.  Pt is known to me and was last seen by me on 9/27/2016.  All known chronic medical issues have been documented.       Hypertension   This is a chronic problem. The current episode started more than 1 year ago. The problem is unchanged. The problem is controlled. Associated symptoms include anxiety. Pertinent negatives include no blurred vision, chest pain, headaches, malaise/fatigue, neck pain, orthopnea, palpitations, peripheral edema, PND, shortness of breath or sweats. Agents associated with hypertension include NSAIDs. Risk factors for coronary artery disease include obesity and post-menopausal state. Past treatments include ACE inhibitors. The current treatment provides moderate improvement. There is no history of angina, kidney disease, CAD/MI, CVA, heart failure, left ventricular hypertrophy, PVD, renovascular disease, retinopathy or a thyroid problem. There is no history of chronic renal disease, coarctation of the aorta, hyperaldosteronism, hypercortisolism, hyperparathyroidism, a hypertension causing med, pheochromocytoma or sleep apnea.        ROS  Review of Systems   Constitutional: Negative.  Negative for activity change, appetite change, chills, fatigue, fever and malaise/fatigue.   HENT: Negative for congestion, ear pain, hearing loss, postnasal drip, rhinorrhea, sinus pressure, sore throat and trouble swallowing.    Eyes: Negative.  Negative for blurred vision, pain and visual disturbance.   Respiratory: Negative for cough, chest tightness and shortness of breath.    Cardiovascular: Negative for chest pain, palpitations, orthopnea, leg swelling and PND.   Gastrointestinal: Negative for abdominal pain, constipation, diarrhea, nausea and vomiting.   Endocrine: Negative.    Genitourinary: Negative.  Negative for difficulty urinating,  "dysuria, flank pain, menstrual problem, pelvic pain and urgency.   Musculoskeletal: Negative.  Negative for arthralgias, gait problem, myalgias, neck pain and neck stiffness.   Skin: Negative.  Negative for rash.   Allergic/Immunologic: Negative.  Negative for environmental allergies, food allergies and immunocompromised state.   Neurological: Negative.  Negative for weakness, light-headedness and headaches.   Hematological: Negative.    Psychiatric/Behavioral: Negative.  Negative for decreased concentration, dysphoric mood and sleep disturbance. The patient is not nervous/anxious.        Physical Exam  Vitals:    04/06/17 1307   BP: 120/78   Pulse: 84   Resp: 20   Temp: 98.9 °F (37.2 °C)    Body mass index is 37.64 kg/(m^2).  Weight: 109 kg (240 lb 4.8 oz)   Height: 5' 7" (170.2 cm)     Physical Exam   Constitutional: She is oriented to person, place, and time. She appears well-developed and well-nourished. She is active and cooperative.  Non-toxic appearance. She does not have a sickly appearance. She does not appear ill. No distress.   HENT:   Head: Normocephalic and atraumatic.   Right Ear: Hearing, tympanic membrane, external ear and ear canal normal. No tenderness. No foreign bodies. Tympanic membrane is not injected, not scarred, not perforated, not erythematous, not retracted and not bulging. No decreased hearing is noted.   Left Ear: Hearing, tympanic membrane, external ear and ear canal normal. No tenderness. No foreign bodies. Tympanic membrane is not injected, not scarred, not perforated, not erythematous, not retracted and not bulging. No decreased hearing is noted.   Nose: Nose normal. No rhinorrhea or nasal deformity.   Mouth/Throat: Uvula is midline, oropharynx is clear and moist and mucous membranes are normal. She does not have dentures. No dental caries.   Eyes: Conjunctivae, EOM and lids are normal. Pupils are equal, round, and reactive to light. Right eye exhibits no chemosis, no discharge and " no exudate. No foreign body present in the right eye. Left eye exhibits no chemosis, no discharge and no exudate. No foreign body present in the left eye. No scleral icterus.   Neck: Normal range of motion and full passive range of motion without pain. Neck supple. No thyroid mass and no thyromegaly present.   Cardiovascular: Normal rate, regular rhythm, S1 normal, S2 normal and normal heart sounds.  Exam reveals no gallop and no friction rub.    No murmur heard.  Pulmonary/Chest: Effort normal. She has no decreased breath sounds. She has no wheezes. She has no rhonchi. She has no rales. She exhibits no mass, no tenderness and no deformity.   Abdominal: Soft. Normal appearance and bowel sounds are normal. She exhibits no distension, no ascites and no mass. There is no hepatosplenomegaly. There is no tenderness. There is no rigidity, no rebound and no guarding.   Musculoskeletal: Normal range of motion.        Lumbar back: She exhibits tenderness, pain and spasm. She exhibits normal range of motion, no bony tenderness, no swelling, no edema, no deformity, no laceration and normal pulse.        Back:    Lymphadenopathy:        Head (right side): No submental, no submandibular, no tonsillar, no preauricular and no posterior auricular adenopathy present.        Head (left side): No submental, no submandibular, no tonsillar, no preauricular and no posterior auricular adenopathy present.     She has no cervical adenopathy.   Neurological: She is alert and oriented to person, place, and time. She has normal strength. No cranial nerve deficit or sensory deficit. She exhibits normal muscle tone. She displays no seizure activity.   Skin: Skin is warm, dry and intact. No rash noted. She is not diaphoretic. No pallor.   Psychiatric: She has a normal mood and affect. Her speech is normal and behavior is normal. Judgment and thought content normal. Cognition and memory are normal. She is attentive.   Vitals  reviewed.      Assessment & Plan    1. Well adult exam  Discussed age appropriate screenings at this visit and encouraged a healthy diet with low saturated fats, and increased physical activity.  Screening test will be ordered and once completed, patient will be notified of results when available.  If necessary, will follow up to discuss and manage further.     - LIPID PANEL; Future  - Comprehensive metabolic panel; Future  - CBC auto differential; Future  - TSH; Future  - T4, free; Future  - Hemoglobin A1c; Future    2. Essential hypertension  Patient was counseled and encouraged to maintain a low sodium diet, as well as increasing physical activity.  Recommend random BP checks at home on a regular basis.  Will continue medication at this time, and follow up as recommended, or sooner if blood pressure is not well controlled.     3. Major depressive disorder, recurrent episode, moderate  Managed by Psychiatry; symptoms have improved.    4. Primary osteoarthritis involving multiple joints  Patient is advised that arthritis is a result of regular daily use, and is caused by inflammation in the joint.  Patient was encouraged to exercise as tolerated, and attempt weight loss with sensible diet and lifestyle changes.  Patient was recommended to continue NSAID therapy to reduce inflammation, and to incorporate stretching into a daily routine.  Pain medication will be prescribed as necessary.  Patient should follow up if pain is not well controlled.   - diclofenac (VOLTAREN) 75 MG EC tablet; Take 1 tablet (75 mg total) by mouth 2 (two) times daily.  Dispense: 60 tablet; Refill: 2    5. Idiopathic chronic gout of multiple sites without tophus  Mild; no recent acute attacks.  Continue prophylactic therapy and use colchicine as needed.      Follow up documented    ACTIVE MEDICAL ISSUES:  Documented in Problem List    PAST MEDICAL HISTORY  Documented    PAST SURGICAL HISTORY:  Documented    SOCIAL  HISTORY:  Documented    FAMILY HISTORY:  Documented    ALLERGIES AND MEDICATIONS: updated and reviewed.  Documented    Health Maintenance       Date Due Completion Date    TETANUS VACCINE 1/24/1972 ---    Colonoscopy 1/24/2004 ---    Mammogram 9/15/2017 9/15/2015 (Done)    Override on 9/15/2015: Done (future)    Pap Smear 5/19/2018 5/19/2015 (Not Clinical)    Override on 5/19/2015: Not Clinically Appropriate (5/19/15 pt stated not request to get pap smear per patient)    Lipid Panel 9/24/2020 9/24/2015    Override on 9/15/2015: Done (future)

## 2017-04-07 ENCOUNTER — LAB VISIT (OUTPATIENT)
Dept: LAB | Facility: HOSPITAL | Age: 62
End: 2017-04-07
Attending: FAMILY MEDICINE
Payer: MEDICARE

## 2017-04-07 DIAGNOSIS — Z00.00 WELL ADULT EXAM: ICD-10-CM

## 2017-04-07 LAB
ALBUMIN SERPL BCP-MCNC: 3.4 G/DL
ALP SERPL-CCNC: 118 U/L
ALT SERPL W/O P-5'-P-CCNC: 8 U/L
ANION GAP SERPL CALC-SCNC: 9 MMOL/L
AST SERPL-CCNC: 13 U/L
BASOPHILS # BLD AUTO: 0.04 K/UL
BASOPHILS NFR BLD: 0.5 %
BILIRUB SERPL-MCNC: 0.3 MG/DL
BUN SERPL-MCNC: 17 MG/DL
CALCIUM SERPL-MCNC: 9.7 MG/DL
CHLORIDE SERPL-SCNC: 112 MMOL/L
CHOLEST/HDLC SERPL: 6.4 {RATIO}
CO2 SERPL-SCNC: 26 MMOL/L
CREAT SERPL-MCNC: 1.5 MG/DL
DIFFERENTIAL METHOD: ABNORMAL
EOSINOPHIL # BLD AUTO: 0.3 K/UL
EOSINOPHIL NFR BLD: 3.9 %
ERYTHROCYTE [DISTWIDTH] IN BLOOD BY AUTOMATED COUNT: 13.1 %
EST. GFR  (AFRICAN AMERICAN): 42.4 ML/MIN/1.73 M^2
EST. GFR  (NON AFRICAN AMERICAN): 36.8 ML/MIN/1.73 M^2
GLUCOSE SERPL-MCNC: 101 MG/DL
HCT VFR BLD AUTO: 36.6 %
HDL/CHOLESTEROL RATIO: 15.7 %
HDLC SERPL-MCNC: 249 MG/DL
HDLC SERPL-MCNC: 39 MG/DL
HGB BLD-MCNC: 11.8 G/DL
LDLC SERPL CALC-MCNC: 174.6 MG/DL
LYMPHOCYTES # BLD AUTO: 2.8 K/UL
LYMPHOCYTES NFR BLD: 36 %
MCH RBC QN AUTO: 29.2 PG
MCHC RBC AUTO-ENTMCNC: 32.2 %
MCV RBC AUTO: 91 FL
MONOCYTES # BLD AUTO: 0.6 K/UL
MONOCYTES NFR BLD: 7.1 %
NEUTROPHILS # BLD AUTO: 4.1 K/UL
NEUTROPHILS NFR BLD: 52.4 %
NONHDLC SERPL-MCNC: 210 MG/DL
PLATELET # BLD AUTO: 317 K/UL
PMV BLD AUTO: 10.4 FL
POTASSIUM SERPL-SCNC: 5.2 MMOL/L
PROT SERPL-MCNC: 7.3 G/DL
RBC # BLD AUTO: 4.04 M/UL
SODIUM SERPL-SCNC: 147 MMOL/L
T4 FREE SERPL-MCNC: 1.13 NG/DL
TRIGL SERPL-MCNC: 177 MG/DL
TSH SERPL DL<=0.005 MIU/L-ACNC: 2.09 UIU/ML
WBC # BLD AUTO: 7.75 K/UL

## 2017-04-07 PROCEDURE — 84439 ASSAY OF FREE THYROXINE: CPT

## 2017-04-07 PROCEDURE — 80061 LIPID PANEL: CPT

## 2017-04-07 PROCEDURE — 84443 ASSAY THYROID STIM HORMONE: CPT

## 2017-04-07 PROCEDURE — 80053 COMPREHEN METABOLIC PANEL: CPT

## 2017-04-07 PROCEDURE — 85025 COMPLETE CBC W/AUTO DIFF WBC: CPT

## 2017-04-07 PROCEDURE — 36415 COLL VENOUS BLD VENIPUNCTURE: CPT | Mod: PO

## 2017-04-07 PROCEDURE — 83036 HEMOGLOBIN GLYCOSYLATED A1C: CPT

## 2017-04-08 LAB
ESTIMATED AVG GLUCOSE: 105 MG/DL
HBA1C MFR BLD HPLC: 5.3 %

## 2017-05-16 ENCOUNTER — DOCUMENTATION ONLY (OUTPATIENT)
Dept: RESEARCH | Facility: HOSPITAL | Age: 62
End: 2017-05-16

## 2017-05-16 NOTE — RESEARCH
Documentation of Informed Consent Obtained for Research by Non-[facility] Personnel    Study: PROmoting Successful Weight Loss in Primary CarE in [place] (PROP)  IRB Number: #[id number]; [facility] IRB ID: [id number]  : Nixon Phelps, PhD.  Coordinating Site: [facility] Co-Investigators: Sarah Fuentes MD and Momo Mills MD  [facility] Approval Date: 11/6/15  This study is reviewed and acknowledged by the [facility] IRB. The IRB of Record is the [facility] ([facility]) IRB.    Is the volunteer currently enrolled in any other research trials (per Epic)? [ ] Yes  [X] No  [facility] staff administering informed consent: Valarie Branch  Date:   3/28/2017  Does the volunteer agree to participate in the trial? [X] Yes  [ ] No  If no, document the reason here:       [X] I acknowledge that I have reviewed the informed consent form and the volunteer signed and dated the signature section of the consent forms.    Name: ([facility] personnel reviewing consent form):           Paul Polo  Review and Scan Date (if different than date of note): 5/16/17  Comments: See  for Consent Forms    [facility]-trained recruiters will obtain informed consent form all participants as well as HIPAA authorization.  Study protocol states all questions and concerns are clarified before any forms are signed. The following elements of the informed consent document were to be discussed:  · What you should know about a research study (e.g. purpose of the consent form; right to refuse or agree and change your mind later; participation is voluntary)?  · Who is doing the study?    · Where is the study being conducted?    · What is the purpose of this study?  · Who is eligible to participate in the study?     · What will happen to you if you take part in the study (e.g. Screening, Measurement visits, Lifestyle change meetings activities)?  · What are the  possible risks and discomforts? Benefits?  · If you do not want to take part in the study, are there other choices?  · If you have any questions or problems, whom can you call?  · What information will be kept private?  · Can your taking part in the study end early?  · What if information becomes available that might affect your decision to stay in the study?  · What charges will you have to pay? What payment will you receive?  · Will you be compensated for a study-related injury or medical illness?

## 2017-07-07 DIAGNOSIS — I10 ESSENTIAL HYPERTENSION: ICD-10-CM

## 2017-07-10 DIAGNOSIS — M15.9 PRIMARY OSTEOARTHRITIS INVOLVING MULTIPLE JOINTS: ICD-10-CM

## 2017-07-10 RX ORDER — DICLOFENAC SODIUM 75 MG/1
TABLET, DELAYED RELEASE ORAL
Qty: 60 TABLET | Refills: 0 | Status: SHIPPED | OUTPATIENT
Start: 2017-07-10 | End: 2017-07-24

## 2017-07-10 RX ORDER — METOPROLOL SUCCINATE 50 MG/1
TABLET, EXTENDED RELEASE ORAL
Qty: 90 TABLET | Refills: 0 | Status: SHIPPED | OUTPATIENT
Start: 2017-07-10 | End: 2017-08-24

## 2017-07-10 RX ORDER — AMLODIPINE BESYLATE 5 MG/1
TABLET ORAL
Qty: 90 TABLET | Refills: 0 | Status: SHIPPED | OUTPATIENT
Start: 2017-07-10 | End: 2017-11-07 | Stop reason: SDUPTHER

## 2017-07-24 ENCOUNTER — OFFICE VISIT (OUTPATIENT)
Dept: FAMILY MEDICINE | Facility: CLINIC | Age: 62
End: 2017-07-24
Payer: MEDICARE

## 2017-07-24 ENCOUNTER — HOSPITAL ENCOUNTER (EMERGENCY)
Facility: OTHER | Age: 62
Discharge: HOME | End: 2017-07-24
Attending: EMERGENCY MEDICINE
Payer: MEDICARE

## 2017-07-24 VITALS
BODY MASS INDEX: 38.11 KG/M2 | WEIGHT: 242.81 LBS | HEART RATE: 112 BPM | OXYGEN SATURATION: 97 % | SYSTOLIC BLOOD PRESSURE: 137 MMHG | HEIGHT: 67 IN | DIASTOLIC BLOOD PRESSURE: 89 MMHG | TEMPERATURE: 98 F

## 2017-07-24 VITALS
WEIGHT: 240 LBS | BODY MASS INDEX: 37.67 KG/M2 | HEART RATE: 96 BPM | OXYGEN SATURATION: 97 % | DIASTOLIC BLOOD PRESSURE: 98 MMHG | RESPIRATION RATE: 18 BRPM | SYSTOLIC BLOOD PRESSURE: 191 MMHG | HEIGHT: 67 IN | TEMPERATURE: 98 F

## 2017-07-24 DIAGNOSIS — R00.0 ELEVATED PULSE RATE: ICD-10-CM

## 2017-07-24 DIAGNOSIS — R00.0 TACHYCARDIA: Primary | ICD-10-CM

## 2017-07-24 DIAGNOSIS — N28.9 KIDNEY DISEASE: ICD-10-CM

## 2017-07-24 DIAGNOSIS — K52.1 DIARRHEA DUE TO DRUG: Primary | ICD-10-CM

## 2017-07-24 DIAGNOSIS — E86.0 DEHYDRATION: ICD-10-CM

## 2017-07-24 LAB
ALBUMIN SERPL-MCNC: 3.8 G/DL (ref 3.3–5.5)
ALP SERPL-CCNC: 106 U/L (ref 42–141)
BILIRUB SERPL-MCNC: 0.6 MG/DL (ref 0.2–1.6)
BUN SERPL-MCNC: 15 MG/DL (ref 7–22)
CALCIUM SERPL-MCNC: 10.1 MG/DL (ref 8–10.3)
CHLORIDE SERPL-SCNC: 96 MMOL/L (ref 98–108)
CREAT SERPL-MCNC: 1.3 MG/DL (ref 0.6–1.2)
GLUCOSE SERPL-MCNC: 95 MG/DL (ref 73–118)
POC ALT (SGPT): 14 U/L (ref 10–47)
POC AST (SGOT): 21 U/L (ref 11–38)
POC TCO2: 30 MMOL/L (ref 18–33)
POTASSIUM BLD-SCNC: 3.4 MMOL/L (ref 3.6–5.1)
PROTEIN, POC: 7.6 G/DL (ref 6.4–8.1)
SODIUM BLD-SCNC: 137 MMOL/L (ref 128–145)

## 2017-07-24 PROCEDURE — 99213 OFFICE O/P EST LOW 20 MIN: CPT | Mod: S$GLB,,, | Performed by: NURSE PRACTITIONER

## 2017-07-24 PROCEDURE — 99999 PR PBB SHADOW E&M-EST. PATIENT-LVL IV: CPT | Mod: PBBFAC,,, | Performed by: NURSE PRACTITIONER

## 2017-07-24 PROCEDURE — 99284 EMERGENCY DEPT VISIT MOD MDM: CPT | Mod: 25

## 2017-07-24 PROCEDURE — 93005 ELECTROCARDIOGRAM TRACING: CPT

## 2017-07-24 PROCEDURE — 93005 ELECTROCARDIOGRAM TRACING: CPT | Mod: S$GLB,,, | Performed by: NURSE PRACTITIONER

## 2017-07-24 PROCEDURE — 96360 HYDRATION IV INFUSION INIT: CPT

## 2017-07-24 PROCEDURE — 99499 UNLISTED E&M SERVICE: CPT | Mod: S$GLB,,, | Performed by: NURSE PRACTITIONER

## 2017-07-24 PROCEDURE — 85025 COMPLETE CBC W/AUTO DIFF WBC: CPT

## 2017-07-24 PROCEDURE — 93010 ELECTROCARDIOGRAM REPORT: CPT | Mod: ,,, | Performed by: INTERNAL MEDICINE

## 2017-07-24 PROCEDURE — 25000003 PHARM REV CODE 250: Performed by: EMERGENCY MEDICINE

## 2017-07-24 PROCEDURE — 80053 COMPREHEN METABOLIC PANEL: CPT

## 2017-07-24 PROCEDURE — 93010 ELECTROCARDIOGRAM REPORT: CPT | Mod: S$GLB,,, | Performed by: INTERNAL MEDICINE

## 2017-07-24 RX ORDER — SODIUM CHLORIDE 9 MG/ML
1000 INJECTION, SOLUTION INTRAVENOUS
Status: COMPLETED | OUTPATIENT
Start: 2017-07-24 | End: 2017-07-24

## 2017-07-24 RX ORDER — INDOMETHACIN 25 MG/1
25 CAPSULE ORAL
Qty: 15 CAPSULE | Refills: 0 | Status: SHIPPED | OUTPATIENT
Start: 2017-07-24 | End: 2017-08-24

## 2017-07-24 RX ORDER — SUCRALFATE 1 G/1
1 TABLET ORAL 4 TIMES DAILY
Qty: 20 TABLET | Refills: 0 | Status: SHIPPED | OUTPATIENT
Start: 2017-07-24 | End: 2017-07-29

## 2017-07-24 RX ADMIN — SODIUM CHLORIDE 1000 ML: 0.9 INJECTION, SOLUTION INTRAVENOUS at 07:07

## 2017-07-24 NOTE — PROGRESS NOTES
Patient Name: Gwendolyn Fournier    : 1955  MRN: [id number]    Subjective:  Gwendolyn is a 62 y.o. female who presents today for     1. Sluggishness, palpitations, headache, right ankle pain secondary to gout flareup. She was given colchicine and developed diarrhea. Watery and frequent stool x 2 weeks but has improved. Drinking water, tolerating fluid, food but still feels dehydrated.      Past Medical History  Past Medical History:   Diagnosis Date    Arthritis     Gout attack     Hx of psychiatric care     Hypertension     Psychiatric problem     Renal disorder     Sciatic nerve pain     Therapy     used to follow with psychiatrist but doesn't recall whom,     Tuberculosis        Past Surgical History  Past Surgical History:   Procedure Laterality Date    KNEE SURGERY  2014    left knee surgery        Family History  Family History   Problem Relation Age of Onset    Tuberculosis Mother     Stroke Father     Bipolar disorder Daughter     Suicide Daughter     Depression Daughter     Bipolar disorder Daughter     Depression Daughter        Social History  Social History     Social History    Marital status:      Spouse name: N/A    Number of children: 5    Years of education: N/A     Occupational History    homemaker      Social History Main Topics    Smoking status: Former Smoker     Quit date: 1976    Smokeless tobacco: Never Used    Alcohol use 0.0 oz/week      Comment: red wine    Drug use: No    Sexual activity: Not Currently     Partners: Male     Other Topics Concern    Not on file     Social History Narrative    No narrative on file       Allergies  Review of patient's allergies indicates:  No Known Allergies -reviewed and updated      Medications  Reviewed and updated.   Current Outpatient Prescriptions   Medication Sig Dispense Refill    allopurinol (ZYLOPRIM) 100 MG tablet TAKE 1 TABLET(100 MG) BY MOUTH TWICE DAILY 90 tablet 0    amlodipine (NORVASC) 5 MG  tablet TAKE 1 TABLET(5 MG) BY MOUTH EVERY DAY 90 tablet 0    buPROPion (WELLBUTRIN SR) 150 MG TBSR 12 hr tablet Take 1 tablet (150 mg total) by mouth 2 (two) times daily. 180 tablet 1    colchicine 0.6 mg tablet Take 2 tabs PO at onset of gout pain, then 1 tab PO one hour later if not resolved.  Repeat in 24 hours until resolved. 90 tablet 0    duloxetine (CYMBALTA) 60 MG capsule Take 1 capsule (60 mg total) by mouth once daily. 90 capsule 1    metoprolol succinate (TOPROL-XL) 50 MG 24 hr tablet TAKE 1 TABLET(50 MG) BY MOUTH EVERY DAY 90 tablet 0    oxycodone-acetaminophen (PERCOCET) 5-325 mg per tablet TK 1 T PO Q 6 TO 8 H PRN P  0    polyethylene glycol (COLYTE) 240-22.72-6.72 -5.84 gram SolR Take 4,000 mLs (4 L total) by mouth as directed. 1 Bottle 0    trazodone (DESYREL) 100 MG tablet TAKE 1/2 TO 1 TABLET(50  MG) BY MOUTH EVERY NIGHT AS NEEDED FOR INSOMNIA 90 tablet 0    indomethacin (INDOCIN) 25 MG capsule Take 1 capsule (25 mg total) by mouth 3 (three) times daily with meals. 15 capsule 0    sucralfate (CARAFATE) 1 gram tablet Take 1 tablet (1 g total) by mouth 4 (four) times daily. 20 tablet 0     Current Facility-Administered Medications   Medication Dose Route Frequency Provider Last Rate Last Dose    lidocaine (PF) 10 mg/ml (1%) injection 40 mg  4 mL Intramuscular 1 time in Clinic/HOD Azikiwe K. Lombard, MD        triamcinolone acetonide injection 40 mg  40 mg Intramuscular 1 time in Clinic/HOD Azikiwe K. Lombard, MD             Review of Systems   Constitutional: Positive for malaise/fatigue.   Respiratory: Negative for shortness of breath.    Cardiovascular: Positive for palpitations. Negative for chest pain.   Gastrointestinal: Positive for diarrhea and nausea. Negative for abdominal pain and vomiting.        Watery 2 weeks ago several times a day, past week soft,     Neurological: Positive for dizziness, weakness and headaches.         Physical Exam  /89 (BP Location: Right arm,  "Patient Position: Sitting, BP Method: Manual)   Pulse (!) 112   Temp 98.2 °F (36.8 °C) (Oral)   Ht 5' 7" (1.702 m)   Wt 110.2 kg (242 lb 13.4 oz)   SpO2 97%   BMI 38.03 kg/m²   Physical Exam   Constitutional: She appears well-developed. No distress.   Cardiovascular:   tachycardic   Pulmonary/Chest: Effort normal and breath sounds normal.   Musculoskeletal:   Right ankle edematous and tender   Skin: She is not diaphoretic.         Assessment/Plan:  Gwendolyn Fournier is a 62 y.o. female who presents today for :    Tachycardia  EKG reveal sinus tachycardic possibly secondary to dehydration, given that pt has kidney disease GFR 42, advise pt to go to ER for workup as it is after hours concern acute kidney injury and will need fluids and treatment base on renal function  -     IN OFFICE EKG 12-LEAD (to [place])    Dehydration    Kidney disease        Return go to ER.      "

## 2017-07-25 ENCOUNTER — TELEPHONE (OUTPATIENT)
Dept: FAMILY MEDICINE | Facility: CLINIC | Age: 62
End: 2017-07-25

## 2017-07-25 NOTE — ED PROVIDER NOTES
Encounter Date: 7/24/2017       History     Chief Complaint   Patient presents with    Gout     pt presented to PCP office for side effects of gout medication including diarrhea, reports feeling dehydrated and heart rate elevated, EKG from [facility] shows sinus tach, pt denies any chest pain, reports SOB on exertion, still c/o pain to right heel and reports soft stools    Tachycardia     The patient has been on culture seen for a few days for a gout flareup.  She is now developed diarrhea and feels that she is dehydrated.  She was sent to the emergency department secondary to some sinus tachycardia.      The history is provided by the patient.   Diarrhea    This is a new problem. The current episode started today. The problem occurs 5 to 10 times per day. Progression since onset: The gout is getting better but the gastrointestinal discomfort has worsened. The stool consistency is described as watery. The patient states that diarrhea does not awaken her from sleep. Associated symptoms include bloating. Pertinent negatives include no abdominal pain or vomiting. Nothing aggravates the symptoms. Risk factors: Colchicine. She has tried nothing for the symptoms.     Review of patient's allergies indicates:  No Known Allergies  Past Medical History:   Diagnosis Date    Arthritis     Gout attack     Hx of psychiatric care     Hypertension     Psychiatric problem     Renal disorder     Sciatic nerve pain 2013    Therapy     used to follow with psychiatrist but doesn't recall whom, 2012    Tuberculosis      Past Surgical History:   Procedure Laterality Date    KNEE SURGERY  1/2014    left knee surgery      Family History   Problem Relation Age of Onset    Tuberculosis Mother     Stroke Father     Bipolar disorder Daughter     Suicide Daughter     Depression Daughter     Bipolar disorder Daughter     Depression Daughter      Social History   Substance Use Topics    Smoking status: Former Smoker      Quit date: 12/28/1976    Smokeless tobacco: Never Used    Alcohol use 0.0 oz/week      Comment: red wine     Review of Systems   Constitutional: Negative.    HENT: Negative.    Eyes: Negative.    Respiratory: Negative.    Cardiovascular: Negative.    Gastrointestinal: Positive for bloating and diarrhea. Negative for abdominal pain and vomiting.   Endocrine: Negative.    Genitourinary: Negative.    Musculoskeletal: Negative.    Skin: Negative.    Allergic/Immunologic: Negative.    Neurological: Negative.    Hematological: Negative.    Psychiatric/Behavioral: Negative.    All other systems reviewed and are negative.      Physical Exam     Initial Vitals   BP Pulse Resp Temp SpO2   07/24/17 1853 07/24/17 1846 07/24/17 1846 07/24/17 1846 07/24/17 1846   (!) 163/91 (!) 113 18 98.3 °F (36.8 °C) 95 %      MAP       07/24/17 1853       115         Physical Exam    Nursing note and vitals reviewed.  Constitutional: Vital signs are normal. She appears well-developed. She is active and cooperative.   HENT:   Head: Normocephalic and atraumatic.   Eyes: Conjunctivae, EOM and lids are normal. Pupils are equal, round, and reactive to light.   Neck: Trachea normal and full passive range of motion without pain. Neck supple. No thyroid mass present.   Cardiovascular: Normal rate, regular rhythm, S1 normal, S2 normal, normal heart sounds, intact distal pulses and normal pulses.   Abdominal: Soft. Normal appearance, normal aorta and bowel sounds are normal.   Musculoskeletal: Normal range of motion.   Lymphadenopathy:     She has no axillary adenopathy.   Neurological: She is alert and oriented to person, place, and time.   Skin: Skin is warm, dry and intact.   Psychiatric: She has a normal mood and affect. Her speech is normal and behavior is normal. Judgment and thought content normal. Cognition and memory are normal.         ED Course   Procedures  Labs Reviewed - No data to display          Medical Decision Making:   Clinical  Tests:   Lab Tests: Ordered and Reviewed  The following lab test(s) were unremarkable: CBC and CMP       <> Summary of Lab: CBC was normal  ED Management:  Patient feels markedly improved after IV fluids.           Results for orders placed or performed during the hospital encounter of 07/24/17   POCT CMP   Result Value Ref Range    Albumin, POC 3.8 3.3 - 5.5 g/dL    Alkaline Phosphatase,  42 - 141 U/L    ALT (SGPT), POC 14 10 - 47 U/L    AST (SGOT), POC 21 11 - 38 U/L    POC BUN 15 7 - 22 mg/dL    Calcium, POC 10.1 8.0 - 10.3 mg/dL    POC Chloride 96 (L) 98 - 108 mmol/L    POC Creatinine 1.3 (H) 0.6 - 1.2 mg/dL    POC Glucose 95 73 - 118 mg/dL    POC Potassium 3.4 (L) 3.6 - 5.1 mmol/L    POC Sodium 137 128 - 145 mmol/L    Bilirubin 0.6 0.2 - 1.6 mg/dL    POC TCO2 30 18 - 33 mmol/L    Protein 7.6 6.4 - 8.1 g/dL                ED Course     Clinical Impression:   The primary encounter diagnosis was Diarrhea due to drug. A diagnosis of Elevated pulse rate was also pertinent to this visit.                           Ben Machado MD  07/24/17 2030

## 2017-07-25 NOTE — ED TRIAGE NOTES
Pt presents to ER with c/o having diarrhea and tachycardia while at her pcp office.  Has been taking colchicine for gout and having diarrhea, no vomiting, fever or other symptoms.

## 2017-08-24 ENCOUNTER — OFFICE VISIT (OUTPATIENT)
Dept: FAMILY MEDICINE | Facility: CLINIC | Age: 62
End: 2017-08-24
Payer: MEDICARE

## 2017-08-24 ENCOUNTER — LAB VISIT (OUTPATIENT)
Dept: LAB | Facility: HOSPITAL | Age: 62
End: 2017-08-24
Attending: FAMILY MEDICINE
Payer: MEDICARE

## 2017-08-24 VITALS
WEIGHT: 246.06 LBS | HEART RATE: 111 BPM | SYSTOLIC BLOOD PRESSURE: 166 MMHG | DIASTOLIC BLOOD PRESSURE: 80 MMHG | OXYGEN SATURATION: 96 % | RESPIRATION RATE: 20 BRPM | HEIGHT: 67 IN | BODY MASS INDEX: 38.62 KG/M2 | TEMPERATURE: 99 F

## 2017-08-24 DIAGNOSIS — J06.9 UPPER RESPIRATORY TRACT INFECTION, UNSPECIFIED TYPE: Primary | ICD-10-CM

## 2017-08-24 DIAGNOSIS — R21 MALAR RASH: ICD-10-CM

## 2017-08-24 DIAGNOSIS — N18.30 CKD (CHRONIC KIDNEY DISEASE) STAGE 3, GFR 30-59 ML/MIN: ICD-10-CM

## 2017-08-24 DIAGNOSIS — Z12.31 ENCOUNTER FOR SCREENING MAMMOGRAM FOR BREAST CANCER: ICD-10-CM

## 2017-08-24 LAB
CRP SERPL-MCNC: 43.2 MG/L
ERYTHROCYTE [SEDIMENTATION RATE] IN BLOOD BY WESTERGREN METHOD: 46 MM/HR

## 2017-08-24 PROCEDURE — 3077F SYST BP >= 140 MM HG: CPT | Mod: S$GLB,,, | Performed by: PHYSICIAN ASSISTANT

## 2017-08-24 PROCEDURE — 86225 DNA ANTIBODY NATIVE: CPT

## 2017-08-24 PROCEDURE — 86038 ANTINUCLEAR ANTIBODIES: CPT

## 2017-08-24 PROCEDURE — 85651 RBC SED RATE NONAUTOMATED: CPT

## 2017-08-24 PROCEDURE — 86235 NUCLEAR ANTIGEN ANTIBODY: CPT

## 2017-08-24 PROCEDURE — 3079F DIAST BP 80-89 MM HG: CPT | Mod: S$GLB,,, | Performed by: PHYSICIAN ASSISTANT

## 2017-08-24 PROCEDURE — 99499 UNLISTED E&M SERVICE: CPT | Mod: S$GLB,,, | Performed by: PHYSICIAN ASSISTANT

## 2017-08-24 PROCEDURE — 99214 OFFICE O/P EST MOD 30 MIN: CPT | Mod: S$GLB,,, | Performed by: PHYSICIAN ASSISTANT

## 2017-08-24 PROCEDURE — 36415 COLL VENOUS BLD VENIPUNCTURE: CPT | Mod: PO

## 2017-08-24 PROCEDURE — 86140 C-REACTIVE PROTEIN: CPT

## 2017-08-24 PROCEDURE — 3008F BODY MASS INDEX DOCD: CPT | Mod: S$GLB,,, | Performed by: PHYSICIAN ASSISTANT

## 2017-08-24 PROCEDURE — 99999 PR PBB SHADOW E&M-EST. PATIENT-LVL IV: CPT | Mod: PBBFAC,,, | Performed by: PHYSICIAN ASSISTANT

## 2017-08-24 RX ORDER — FLUTICASONE PROPIONATE 50 MCG
2 SPRAY, SUSPENSION (ML) NASAL DAILY
Qty: 16 G | Refills: 0 | Status: SHIPPED | OUTPATIENT
Start: 2017-08-24 | End: 2017-09-14 | Stop reason: SDUPTHER

## 2017-08-24 RX ORDER — LEVOCETIRIZINE DIHYDROCHLORIDE 5 MG/1
5 TABLET, FILM COATED ORAL NIGHTLY
Qty: 30 TABLET | Refills: 0 | Status: SHIPPED | OUTPATIENT
Start: 2017-08-24 | End: 2017-09-14 | Stop reason: SDUPTHER

## 2017-08-24 RX ORDER — PROMETHAZINE HYDROCHLORIDE AND DEXTROMETHORPHAN HYDROBROMIDE 6.25; 15 MG/5ML; MG/5ML
5 SYRUP ORAL NIGHTLY PRN
Qty: 118 ML | Refills: 0 | Status: SHIPPED | OUTPATIENT
Start: 2017-08-24 | End: 2017-09-03

## 2017-08-24 RX ORDER — HYDROCHLOROTHIAZIDE 25 MG/1
25 TABLET ORAL DAILY
COMMUNITY
End: 2017-11-07 | Stop reason: SDUPTHER

## 2017-08-24 NOTE — PROGRESS NOTES
Subjective:       Patient ID: Gwendolyn Fournier is a 62 y.o. female.    Chief Complaint: Sinus Problem (x6 days nasal congestion, post nasal, cough ; otc alkaseltzer )    Sinus Problem   This is a new problem. The current episode started in the past 7 days (6 days). The problem has been gradually worsening since onset. There has been no fever. Associated symptoms include congestion, diaphoresis and headaches. Pertinent negatives include no ear pain, sinus pressure or sore throat. Treatments tried: alkaselzter plus. The treatment provided mild relief.   Malar rash: occurred over 1 year ago and intermittently. Occurs under the eyes and on the bridge of the nose. Erythematous, non itchy. Has a history of depression, multiple arthralgias, CKD. No FH of lupus.     Review of Systems   Constitutional: Positive for diaphoresis. Negative for fever.   HENT: Positive for congestion and postnasal drip. Negative for ear pain, rhinorrhea, sinus pressure and sore throat.    Neurological: Positive for headaches.       Objective:      Physical Exam   Constitutional: She appears well-developed and well-nourished. No distress.   HENT:   Head: Normocephalic and atraumatic.   Right Ear: Tympanic membrane and ear canal normal.   Left Ear: Tympanic membrane and ear canal normal.   Nose: Mucosal edema and rhinorrhea present. Right sinus exhibits no maxillary sinus tenderness and no frontal sinus tenderness. Left sinus exhibits no maxillary sinus tenderness and no frontal sinus tenderness.   Mouth/Throat: No oropharyngeal exudate, posterior oropharyngeal edema or posterior oropharyngeal erythema.   Skin: She is not diaphoretic.   Psychiatric: She has a normal mood and affect. Her behavior is normal.   Vitals reviewed.      Assessment:       1. Upper respiratory tract infection, unspecified type    2. Malar rash    3. Encounter for screening mammogram for breast cancer    4. CKD (chronic kidney disease) stage 3, GFR 30-59 ml/min        Plan:          Gwendolyn was seen today for sinus problem.    Diagnoses and all orders for this visit:    Upper respiratory tract infection, unspecified type  -     promethazine-dextromethorphan (PROMETHAZINE-DM) 6.25-15 mg/5 mL Syrp; Take 5 mLs by mouth nightly as needed.  -     levocetirizine (XYZAL) 5 MG tablet; Take 1 tablet (5 mg total) by mouth every evening.  -     fluticasone (FLONASE) 50 mcg/actuation nasal spray; 2 sprays by Each Nare route once daily.    Malar rash  -     C-reactive protein; Future  -     ANTI-SMITH ANTIBODY; Future  -     ANTI-DNA ANTIBODY, DOUBLE-STRANDED; Future  -     ADELA; Future  -     Sedimentation rate, manual; Future    Encounter for screening mammogram for breast cancer  -     Mammo Digital Screening Bilat with CAD; Future    CKD (chronic kidney disease) stage 3, GFR 30-59 ml/min  -     C-reactive protein; Future  -     ANTI-SMITH ANTIBODY; Future  -     ANTI-DNA ANTIBODY, DOUBLE-STRANDED; Future  -     ADELA; Future  -     Sedimentation rate, manual; Future

## 2017-08-25 LAB
ANA SER QL IF: NORMAL
DSDNA AB SER-ACNC: NORMAL [IU]/ML

## 2017-08-26 LAB
ANTI SM ANTIBODY: 1.15 EU
ANTI-SM INTERPRETATION: NEGATIVE

## 2017-08-30 ENCOUNTER — TELEPHONE (OUTPATIENT)
Dept: FAMILY MEDICINE | Facility: CLINIC | Age: 62
End: 2017-08-30

## 2017-08-30 DIAGNOSIS — M15.9 OSTEOARTHRITIS OF MULTIPLE JOINTS, UNSPECIFIED OSTEOARTHRITIS TYPE: Primary | ICD-10-CM

## 2017-08-30 NOTE — TELEPHONE ENCOUNTER
----- Message from Merari Carpenter PA-C sent at 8/30/2017  1:54 PM CDT -----  Two of the labs tests are elevated, Spoke with Dr. Lombard, recommends patient be referred to rheumatology

## 2017-09-14 DIAGNOSIS — J06.9 UPPER RESPIRATORY TRACT INFECTION, UNSPECIFIED TYPE: ICD-10-CM

## 2017-09-15 RX ORDER — LEVOCETIRIZINE DIHYDROCHLORIDE 5 MG/1
5 TABLET, FILM COATED ORAL NIGHTLY
Qty: 30 TABLET | Refills: 1 | Status: SHIPPED | OUTPATIENT
Start: 2017-09-15 | End: 2019-01-03

## 2017-09-15 RX ORDER — FLUTICASONE PROPIONATE 50 MCG
2 SPRAY, SUSPENSION (ML) NASAL DAILY
Qty: 16 G | Refills: 1 | Status: SHIPPED | OUTPATIENT
Start: 2017-09-15 | End: 2018-10-09 | Stop reason: SDUPTHER

## 2017-11-07 ENCOUNTER — OFFICE VISIT (OUTPATIENT)
Dept: FAMILY MEDICINE | Facility: CLINIC | Age: 62
End: 2017-11-07
Payer: MEDICARE

## 2017-11-07 VITALS
WEIGHT: 243.81 LBS | OXYGEN SATURATION: 97 % | BODY MASS INDEX: 38.27 KG/M2 | DIASTOLIC BLOOD PRESSURE: 94 MMHG | SYSTOLIC BLOOD PRESSURE: 172 MMHG | RESPIRATION RATE: 17 BRPM | TEMPERATURE: 99 F | HEART RATE: 106 BPM | HEIGHT: 67 IN

## 2017-11-07 DIAGNOSIS — I10 ESSENTIAL HYPERTENSION: Primary | ICD-10-CM

## 2017-11-07 DIAGNOSIS — Z63.4 GRIEF AT LOSS OF CHILD: ICD-10-CM

## 2017-11-07 DIAGNOSIS — F51.04 PSYCHOPHYSIOLOGICAL INSOMNIA: ICD-10-CM

## 2017-11-07 DIAGNOSIS — F43.21 GRIEF AT LOSS OF CHILD: ICD-10-CM

## 2017-11-07 PROCEDURE — 99499 UNLISTED E&M SERVICE: CPT | Mod: S$GLB,,, | Performed by: FAMILY MEDICINE

## 2017-11-07 PROCEDURE — 99214 OFFICE O/P EST MOD 30 MIN: CPT | Mod: S$GLB,,, | Performed by: FAMILY MEDICINE

## 2017-11-07 PROCEDURE — 99999 PR PBB SHADOW E&M-EST. PATIENT-LVL III: CPT | Mod: PBBFAC,,, | Performed by: FAMILY MEDICINE

## 2017-11-07 RX ORDER — HYDROCHLOROTHIAZIDE 25 MG/1
25 TABLET ORAL DAILY
Qty: 90 TABLET | Refills: 1 | Status: SHIPPED | OUTPATIENT
Start: 2017-11-07 | End: 2018-05-04 | Stop reason: SDUPTHER

## 2017-11-07 RX ORDER — AMLODIPINE BESYLATE 10 MG/1
TABLET ORAL
Qty: 90 TABLET | Refills: 1 | Status: SHIPPED | OUTPATIENT
Start: 2017-11-07 | End: 2019-01-03

## 2017-11-07 RX ORDER — DULOXETIN HYDROCHLORIDE 60 MG/1
60 CAPSULE, DELAYED RELEASE ORAL DAILY
Qty: 90 CAPSULE | Refills: 1 | Status: SHIPPED | OUTPATIENT
Start: 2017-11-07 | End: 2019-01-02 | Stop reason: SDUPTHER

## 2017-11-07 RX ORDER — PNEUMOCOCCAL 13-VALENT CONJUGATE VACCINE 2.2; 2.2; 2.2; 2.2; 2.2; 4.4; 2.2; 2.2; 2.2; 2.2; 2.2; 2.2; 2.2 UG/.5ML; UG/.5ML; UG/.5ML; UG/.5ML; UG/.5ML; UG/.5ML; UG/.5ML; UG/.5ML; UG/.5ML; UG/.5ML; UG/.5ML; UG/.5ML; UG/.5ML
INJECTION, SUSPENSION INTRAMUSCULAR
COMMUNITY
Start: 2017-11-06 | End: 2024-03-05

## 2017-11-07 RX ORDER — ESZOPICLONE 2 MG/1
2 TABLET, FILM COATED ORAL NIGHTLY
Qty: 30 TABLET | Refills: 1 | Status: SHIPPED | OUTPATIENT
Start: 2017-11-07 | End: 2017-12-14 | Stop reason: SDUPTHER

## 2017-11-07 SDOH — SOCIAL DETERMINANTS OF HEALTH (SDOH): DISSAPEARANCE AND DEATH OF FAMILY MEMBER: Z63.4

## 2017-11-07 NOTE — PROGRESS NOTES
Chief Complaint   Patient presents with    Back Pain    Hypertension     patient staid that she's stop taking Amlodipine long time ago     Medication Refill    Anxiety       Gwendolyn Fournier is a 62 y.o. female who presents per the Chief Complaint.  Pt is known to me and was last seen by me on 4/6/2017.  All known chronic medical issues have been documented.       Hypertension   This is a chronic problem. The current episode started more than 1 year ago. The problem is unchanged. The problem is controlled. Associated symptoms include anxiety. Pertinent negatives include no blurred vision, chest pain, headaches, malaise/fatigue, neck pain, orthopnea, palpitations, peripheral edema, PND, shortness of breath or sweats. Agents associated with hypertension include NSAIDs. Risk factors for coronary artery disease include obesity and post-menopausal state. Past treatments include ACE inhibitors. The current treatment provides moderate improvement. There is no history of angina, kidney disease, CAD/MI, CVA, heart failure, left ventricular hypertrophy, PVD, renovascular disease, retinopathy or a thyroid problem. There is no history of chronic renal disease, coarctation of the aorta, hyperaldosteronism, hypercortisolism, hyperparathyroidism, a hypertension causing med, pheochromocytoma or sleep apnea.   Anxiety   Presents for follow-up visit. Symptoms include depressed mood, excessive worry, insomnia, nervous/anxious behavior and restlessness. Patient reports no chest pain, compulsions, confusion, decreased concentration, dizziness, dry mouth, feeling of choking, hyperventilation, impotence, irritability, malaise, muscle tension, nausea, obsessions, palpitations, panic, shortness of breath or suicidal ideas. Symptoms occur most days. The severity of symptoms is causing significant distress. The quality of sleep is poor. Nighttime awakenings: occasional.            ROS  Review of Systems   Constitutional: Negative.   "Negative for activity change, appetite change, chills, diaphoresis, fatigue, fever, irritability, malaise/fatigue and unexpected weight change.   HENT: Negative.  Negative for congestion, ear pain, hearing loss, nosebleeds, postnasal drip, rhinorrhea, sinus pressure, sneezing, sore throat and trouble swallowing.    Eyes: Negative for blurred vision, pain and visual disturbance.   Respiratory: Negative for cough, choking and shortness of breath.    Cardiovascular: Negative for chest pain, palpitations, orthopnea, leg swelling and PND.   Gastrointestinal: Negative for abdominal pain, constipation, diarrhea, nausea and vomiting.   Genitourinary: Negative for difficulty urinating, dysuria, frequency, impotence and urgency.   Musculoskeletal: Positive for back pain. Negative for arthralgias, gait problem, joint swelling, myalgias and neck pain.   Skin: Negative.    Allergic/Immunologic: Negative for environmental allergies and food allergies.   Neurological: Negative.  Negative for dizziness, seizures, syncope, weakness, light-headedness and headaches.   Psychiatric/Behavioral: Positive for agitation and dysphoric mood. Negative for confusion, decreased concentration, sleep disturbance and suicidal ideas. The patient is nervous/anxious and has insomnia.        Physical Exam  Vitals:    11/07/17 1409   BP: (!) 172/94   Pulse: 106   Resp: 17   Temp: 98.5 °F (36.9 °C)    Body mass index is 38.19 kg/m².  Weight: 110.6 kg (243 lb 13.3 oz)   Height: 5' 7" (170.2 cm)     Physical Exam   Constitutional: She is oriented to person, place, and time. She appears well-developed and well-nourished. She is active and cooperative.  Non-toxic appearance. She does not have a sickly appearance. She does not appear ill. No distress.   HENT:   Head: Normocephalic and atraumatic.   Right Ear: Hearing and external ear normal. No decreased hearing is noted.   Left Ear: Hearing and external ear normal. No decreased hearing is noted.   Nose: Nose " normal. No rhinorrhea or nasal deformity.   Mouth/Throat: Uvula is midline and oropharynx is clear and moist. She does not have dentures. Normal dentition.   Eyes: Conjunctivae, EOM and lids are normal. Pupils are equal, round, and reactive to light. Right eye exhibits no chemosis, no discharge and no exudate. No foreign body present in the right eye. Left eye exhibits no chemosis, no discharge and no exudate. No foreign body present in the left eye. No scleral icterus.   Neck: Normal range of motion and full passive range of motion without pain. Neck supple.   Cardiovascular: Normal rate, regular rhythm, S1 normal, S2 normal and normal heart sounds.  Exam reveals no gallop and no friction rub.    No murmur heard.  Pulmonary/Chest: Effort normal and breath sounds normal. No accessory muscle usage. No respiratory distress. She has no decreased breath sounds. She has no wheezes. She has no rhonchi. She has no rales.   Abdominal: Soft. Normal appearance. She exhibits no distension. There is no hepatosplenomegaly. There is no tenderness. There is no rigidity, no rebound and no guarding.   Musculoskeletal: Normal range of motion.   Neurological: She is alert and oriented to person, place, and time. She has normal strength. No cranial nerve deficit or sensory deficit. She exhibits normal muscle tone. She displays no seizure activity. Coordination and gait normal.   Skin: Skin is warm, dry and intact. No rash noted. She is not diaphoretic.   Psychiatric: Her speech is normal and behavior is normal. Judgment and thought content normal. Her mood appears not anxious. Her affect is labile. Her affect is not angry, not blunt and not inappropriate. She is not actively hallucinating. Cognition and memory are normal. She exhibits a depressed mood. She is attentive.       Assessment & Plan    1. Essential hypertension  Patient was counseled and encouraged to maintain a low sodium diet, as well as increasing physical activity.   Recommend random BP checks at home on a regular basis. Will continue medication at this time, and follow up in 6 weeks, or sooner if blood pressure is not well controlled.     - hydroCHLOROthiazide (HYDRODIURIL) 25 MG tablet; Take 1 tablet (25 mg total) by mouth once daily.  Dispense: 90 tablet; Refill: 1  - amLODIPine (NORVASC) 10 MG tablet; TAKE 1 TABLET(5 MG) BY MOUTH EVERY DAY  Dispense: 90 tablet; Refill: 1    2. Grief at loss of child  Will continue SNRI; patient states she may schedule appointment with Psychologist to discuss possible PTSD related to suicide of her daughter in 2016.  Recommended she join a support group to discuss her feelings with; information given from website.  - DULoxetine (CYMBALTA) 60 MG capsule; Take 1 capsule (60 mg total) by mouth once daily.  Dispense: 90 capsule; Refill: 1    3. Psychophysiological insomnia  Patient was advised to practice good sleep hygiene, which includes decreasing stimulation at least one hour before bedtime.  This includes no television, no stimulation reading or games.  Natural sleep aids were recommended as well including lavender and chamomile.  The problem of recurrent insomnia is discussed. Avoidance of caffeine sources is strongly encouraged. Sleep hygiene issues are reviewed.  Medication given to assist in sleep onset.  - eszopiclone (LUNESTA) 2 MG Tab; Take 1 tablet (2 mg total) by mouth every evening.  Dispense: 30 tablet; Refill: 1      Follow up documented    ACTIVE MEDICAL ISSUES:  Documented in Problem List    PAST MEDICAL HISTORY  Documented    PAST SURGICAL HISTORY:  Documented    SOCIAL HISTORY:  Documented    FAMILY HISTORY:  Documented    ALLERGIES AND MEDICATIONS: updated and reviewed.  Documented    Health Maintenance       Date Due Completion Date    TETANUS VACCINE 01/24/1972 ---    Colonoscopy 01/24/2004 ---    Influenza Vaccine 08/01/2017 9/27/2016    Override on 9/15/2015: Done (today in [facility])    Mammogram 09/15/2017 9/15/2015 (Done)     Override on 9/15/2015: Done (future)    Lipid Panel 04/07/2022 4/7/2017    Override on 9/15/2015: Done (future)

## 2017-11-09 RX ORDER — DIAZEPAM 5 MG/1
TABLET ORAL
Qty: 20 TABLET | Refills: 0 | OUTPATIENT
Start: 2017-11-09

## 2017-11-16 NOTE — PROGRESS NOTES
Patient, Gwendolyn Fournier (MRN #[id number]), presented with a recorded BMI of 38.19 kg/m^2 and a documented comorbidity(s):  - Hypertension  to which the severe obesity is a contributing factor. This is consistent with the definition of severe obesity (BMI 35.0-35.9) with comorbidity (ICD-10 E66.01, Z68.35). The patient's severe obesity was monitored, evaluated, addressed and/or treated. This addendum to the medical record is made on 11/16/2017.

## 2017-12-12 ENCOUNTER — TELEPHONE (OUTPATIENT)
Dept: FAMILY MEDICINE | Facility: CLINIC | Age: 62
End: 2017-12-12

## 2017-12-12 DIAGNOSIS — F51.04 PSYCHOPHYSIOLOGICAL INSOMNIA: ICD-10-CM

## 2017-12-12 NOTE — TELEPHONE ENCOUNTER
Pt was on Lunesta 2mg in the past . PT is requesting Lunesta 4mg to be sent to [facility]    ----- Message from Suzie Arenas sent at 12/12/2017 10:50 AM CST -----  Contact: self  Requesting an increase on--- sleep meds--- [facility] [address]. Pt call back [phone].

## 2017-12-14 RX ORDER — ESZOPICLONE 2 MG/1
4 TABLET, FILM COATED ORAL NIGHTLY PRN
Qty: 60 TABLET | Refills: 1 | Status: SHIPPED | OUTPATIENT
Start: 2017-12-14 | End: 2018-02-26 | Stop reason: SDUPTHER

## 2018-01-18 ENCOUNTER — TELEPHONE (OUTPATIENT)
Dept: FAMILY MEDICINE | Facility: CLINIC | Age: 63
End: 2018-01-18

## 2018-01-18 NOTE — TELEPHONE ENCOUNTER
----- Message from Donis Hanson sent at 1/18/2018  2:40 PM CST -----  Contact: [phone]/PT  Calling to see if she can get some pain medication called in prior to appt next week

## 2018-01-18 NOTE — TELEPHONE ENCOUNTER
LOV 11/7/2017  Spoke with patient.Had an appointment tomorrow but she has a broken leg and due to family helping her she is unable to get transportation until Monday. States she is running out of pain medication. Would like a refill. Please advise.

## 2018-01-19 NOTE — TELEPHONE ENCOUNTER
Attempt to contact patient. No answer. Left message notifying patient waiting on provider's response.

## 2018-01-19 NOTE — TELEPHONE ENCOUNTER
----- Message from Carina Abebe sent at 1/19/2018  1:08 PM CST -----  Contact: self  Pt calling to get an update on getting something called in for pain for leg. Please call [phone].

## 2018-02-26 ENCOUNTER — HOSPITAL ENCOUNTER (OUTPATIENT)
Dept: RADIOLOGY | Facility: HOSPITAL | Age: 63
Discharge: HOME | End: 2018-02-26
Attending: FAMILY MEDICINE
Payer: MEDICARE

## 2018-02-26 ENCOUNTER — OFFICE VISIT (OUTPATIENT)
Dept: FAMILY MEDICINE | Facility: CLINIC | Age: 63
End: 2018-02-26
Payer: MEDICARE

## 2018-02-26 VITALS
OXYGEN SATURATION: 97 % | HEART RATE: 94 BPM | TEMPERATURE: 98 F | RESPIRATION RATE: 17 BRPM | SYSTOLIC BLOOD PRESSURE: 104 MMHG | HEIGHT: 67 IN | DIASTOLIC BLOOD PRESSURE: 76 MMHG

## 2018-02-26 DIAGNOSIS — Z98.890 S/P ORIF (OPEN REDUCTION INTERNAL FIXATION) FRACTURE: ICD-10-CM

## 2018-02-26 DIAGNOSIS — Z87.81 S/P ORIF (OPEN REDUCTION INTERNAL FIXATION) FRACTURE: ICD-10-CM

## 2018-02-26 DIAGNOSIS — F51.04 PSYCHOPHYSIOLOGICAL INSOMNIA: ICD-10-CM

## 2018-02-26 DIAGNOSIS — I10 ESSENTIAL HYPERTENSION: Primary | ICD-10-CM

## 2018-02-26 PROCEDURE — 73552 X-RAY EXAM OF FEMUR 2/>: CPT | Mod: TC,FY,PO,LT

## 2018-02-26 PROCEDURE — 73562 X-RAY EXAM OF KNEE 3: CPT | Mod: TC,FY,PO,LT

## 2018-02-26 PROCEDURE — 3008F BODY MASS INDEX DOCD: CPT | Mod: S$GLB,,, | Performed by: FAMILY MEDICINE

## 2018-02-26 PROCEDURE — 99214 OFFICE O/P EST MOD 30 MIN: CPT | Mod: S$GLB,,, | Performed by: FAMILY MEDICINE

## 2018-02-26 PROCEDURE — 73552 X-RAY EXAM OF FEMUR 2/>: CPT | Mod: 26,LT,, | Performed by: RADIOLOGY

## 2018-02-26 PROCEDURE — 99999 PR PBB SHADOW E&M-EST. PATIENT-LVL III: CPT | Mod: PBBFAC,,, | Performed by: FAMILY MEDICINE

## 2018-02-26 PROCEDURE — 73562 X-RAY EXAM OF KNEE 3: CPT | Mod: 26,LT,, | Performed by: RADIOLOGY

## 2018-02-26 RX ORDER — ESZOPICLONE 2 MG/1
4 TABLET, FILM COATED ORAL NIGHTLY PRN
Qty: 60 TABLET | Refills: 2 | Status: SHIPPED | OUTPATIENT
Start: 2018-02-26 | End: 2018-09-12 | Stop reason: SDUPTHER

## 2018-02-26 RX ORDER — HYDROCHLOROTHIAZIDE 12.5 MG/1
CAPSULE ORAL
Refills: 0 | COMMUNITY
Start: 2018-01-12 | End: 2018-02-26 | Stop reason: SDUPTHER

## 2018-02-26 RX ORDER — FERROUS SULFATE 325(65) MG
TABLET ORAL
Refills: 0 | COMMUNITY
Start: 2018-01-11 | End: 2020-07-30 | Stop reason: SDUPTHER

## 2018-02-26 RX ORDER — METOPROLOL SUCCINATE 50 MG/1
TABLET, EXTENDED RELEASE ORAL
Refills: 0 | COMMUNITY
Start: 2018-01-12 | End: 2018-05-28

## 2018-02-26 RX ORDER — OXYCODONE AND ACETAMINOPHEN 10; 325 MG/1; MG/1
TABLET ORAL
Refills: 0 | COMMUNITY
Start: 2018-01-12 | End: 2018-11-16

## 2018-02-26 RX ORDER — OXYCODONE AND ACETAMINOPHEN 7.5; 325 MG/1; MG/1
TABLET ORAL
COMMUNITY
Start: 2018-02-06 | End: 2018-11-16

## 2018-02-26 NOTE — PROGRESS NOTES
"Chief Complaint   Patient presents with    Leg Pain     lt leg had a fall last year 12/24/17 had Broken leg        Gwendolyn Fournier is a 63 y.o. female who presents per the Chief Complaint.  Pt is known to me and was last seen by me on 11/7/2017.  All known chronic medical issues have been documented.       HPI     ROS  Review of Systems   Constitutional: Negative.  Negative for activity change, appetite change, chills, diaphoresis, fatigue, fever and unexpected weight change.   HENT: Negative.  Negative for congestion, ear pain, hearing loss, nosebleeds, postnasal drip, rhinorrhea, sinus pressure, sneezing, sore throat and trouble swallowing.    Eyes: Negative for pain and visual disturbance.   Respiratory: Negative for cough, choking and shortness of breath.    Cardiovascular: Negative for chest pain, palpitations and leg swelling.   Gastrointestinal: Negative for abdominal pain, constipation, diarrhea, nausea and vomiting.   Genitourinary: Negative for difficulty urinating, dysuria, frequency and urgency.   Musculoskeletal: Positive for arthralgias (left knee; left thigh), back pain and gait problem (non-weight bearing). Negative for joint swelling, myalgias and neck pain.   Skin: Negative.    Allergic/Immunologic: Negative for environmental allergies and food allergies.   Neurological: Negative for dizziness, seizures, syncope, weakness, light-headedness and headaches.   Psychiatric/Behavioral: Positive for agitation, dysphoric mood and sleep disturbance. Negative for confusion, decreased concentration and suicidal ideas. The patient is nervous/anxious.        Physical Exam  Vitals:    02/26/18 1026   BP: 104/76   Pulse: 94   Resp: 17   Temp: 97.9 °F (36.6 °C)    There is no height or weight on file to calculate BMI.      Height: 5' 7" (170.2 cm)     Physical Exam   Constitutional: She is oriented to person, place, and time. She appears well-developed and well-nourished. She is active and cooperative.  " Non-toxic appearance. She does not have a sickly appearance. She does not appear ill. No distress.   HENT:   Head: Normocephalic and atraumatic.   Right Ear: Hearing and external ear normal. No decreased hearing is noted.   Left Ear: Hearing and external ear normal. No decreased hearing is noted.   Nose: Nose normal. No rhinorrhea or nasal deformity.   Mouth/Throat: Uvula is midline and oropharynx is clear and moist. She does not have dentures. Normal dentition.   Eyes: Conjunctivae, EOM and lids are normal. Pupils are equal, round, and reactive to light. Right eye exhibits no chemosis, no discharge and no exudate. No foreign body present in the right eye. Left eye exhibits no chemosis, no discharge and no exudate. No foreign body present in the left eye. No scleral icterus.   Neck: Normal range of motion and full passive range of motion without pain. Neck supple.   Cardiovascular: Normal rate, regular rhythm, S1 normal, S2 normal and normal heart sounds.  Exam reveals no gallop and no friction rub.    No murmur heard.  Pulmonary/Chest: Effort normal and breath sounds normal. No accessory muscle usage. No respiratory distress. She has no decreased breath sounds. She has no wheezes. She has no rhonchi. She has no rales.   Abdominal: Soft. Normal appearance. She exhibits no distension. There is no hepatosplenomegaly. There is no tenderness. There is no rigidity, no rebound and no guarding.   Musculoskeletal:        Left knee: She exhibits decreased range of motion and bony tenderness. She exhibits no swelling, no effusion, no ecchymosis, no deformity, no laceration, no erythema, normal alignment, no LCL laxity, normal meniscus and no MCL laxity. No tenderness found.        Left upper leg: She exhibits tenderness. She exhibits no bony tenderness, no swelling, no edema, no deformity and no laceration.        Legs:  Neurological: She is alert and oriented to person, place, and time. She has normal strength. No cranial  nerve deficit or sensory deficit. She exhibits normal muscle tone. She displays no seizure activity. Coordination and gait normal.   Skin: Skin is warm, dry and intact. No rash noted. She is not diaphoretic.   Psychiatric: Her speech is normal and behavior is normal. Judgment and thought content normal. Her mood appears not anxious. Her affect is not angry, not blunt, not labile and not inappropriate. She is not actively hallucinating. Cognition and memory are normal. She does not exhibit a depressed mood. She is attentive.       Assessment & Plan    1. Essential hypertension  Patient was counseled and encouraged to maintain a low sodium diet, as well as increasing physical activity.  Recommend random BP checks at home on a regular basis.  Repeat BP at end of visit was not necessary. Will continue medication at this time, and follow up in 3-6 months, or sooner if blood pressure begins to increase.  Patient is not taking CCB.  Will monitor BP in 6 weeks.    2. S/P ORIF (open reduction internal fixation) fracture  Surgery in [place] with rehabilitation [facility].  Will order imaging to further evaluate and manage accordingly.  Has appointment today for follow up; patient has not been weight bearing since surgery but denies pain.  - X-Ray Knee 3 View Left; Future  - X-Ray Femur 2 View Left; Future    3. Psychophysiological insomnia  The current medical regimen is effective at this time and no changes to present plan or medications will be made at this visit.     - eszopiclone (LUNESTA) 2 MG Tab; Take 2 tablets (4 mg total) by mouth nightly as needed.  Dispense: 60 tablet; Refill: 2      Follow up documented    ACTIVE MEDICAL ISSUES:  Documented in Problem List    PAST MEDICAL HISTORY  Documented    PAST SURGICAL HISTORY:  Documented    SOCIAL HISTORY:  Documented    FAMILY HISTORY:  Documented    ALLERGIES AND MEDICATIONS: updated and reviewed.  Documented    Health Maintenance       Date Due  Completion Date    TETANUS VACCINE 01/24/1972 ---    Colonoscopy 01/24/2004 ---    Mammogram 09/15/2017 9/15/2015 (Done)    Override on 9/15/2015: Done (future)    Lipid Panel 04/07/2022 4/7/2017    Override on 9/15/2015: Done (future)

## 2018-03-12 ENCOUNTER — OFFICE VISIT (OUTPATIENT)
Dept: FAMILY MEDICINE | Facility: CLINIC | Age: 63
End: 2018-03-12
Payer: MEDICARE

## 2018-03-12 VITALS
OXYGEN SATURATION: 99 % | BODY MASS INDEX: 38.24 KG/M2 | RESPIRATION RATE: 20 BRPM | DIASTOLIC BLOOD PRESSURE: 88 MMHG | SYSTOLIC BLOOD PRESSURE: 138 MMHG | WEIGHT: 243.63 LBS | HEART RATE: 95 BPM | TEMPERATURE: 99 F | HEIGHT: 67 IN

## 2018-03-12 DIAGNOSIS — M1A.09X0 IDIOPATHIC CHRONIC GOUT OF MULTIPLE SITES WITHOUT TOPHUS: ICD-10-CM

## 2018-03-12 DIAGNOSIS — I10 ESSENTIAL HYPERTENSION: Primary | ICD-10-CM

## 2018-03-12 DIAGNOSIS — Z98.890 S/P ORIF (OPEN REDUCTION INTERNAL FIXATION) FRACTURE: ICD-10-CM

## 2018-03-12 DIAGNOSIS — Z87.81 S/P ORIF (OPEN REDUCTION INTERNAL FIXATION) FRACTURE: ICD-10-CM

## 2018-03-12 DIAGNOSIS — F51.04 PSYCHOPHYSIOLOGICAL INSOMNIA: ICD-10-CM

## 2018-03-12 PROCEDURE — 3075F SYST BP GE 130 - 139MM HG: CPT | Mod: CPTII,S$GLB,, | Performed by: FAMILY MEDICINE

## 2018-03-12 PROCEDURE — 99999 PR PBB SHADOW E&M-EST. PATIENT-LVL III: CPT | Mod: PBBFAC,,, | Performed by: FAMILY MEDICINE

## 2018-03-12 PROCEDURE — 3079F DIAST BP 80-89 MM HG: CPT | Mod: CPTII,S$GLB,, | Performed by: FAMILY MEDICINE

## 2018-03-12 PROCEDURE — 99499 UNLISTED E&M SERVICE: CPT | Mod: S$GLB,,, | Performed by: FAMILY MEDICINE

## 2018-03-12 PROCEDURE — 99214 OFFICE O/P EST MOD 30 MIN: CPT | Mod: S$GLB,,, | Performed by: FAMILY MEDICINE

## 2018-03-12 RX ORDER — QUETIAPINE FUMARATE 100 MG/1
100 TABLET, FILM COATED ORAL NIGHTLY PRN
Qty: 90 TABLET | Refills: 1 | Status: SHIPPED | OUTPATIENT
Start: 2018-03-12 | End: 2018-09-04 | Stop reason: SDUPTHER

## 2018-03-12 NOTE — PROGRESS NOTES
Chief Complaint   Patient presents with    Follow-up    Hip Injury       Gwendolyn Fournier is a 63 y.o. female who presents per the Chief Complaint.  Pt is known to me and was last seen by me on 2/26/2018.  All known chronic medical issues have been documented.       Hypertension   This is a chronic problem. The current episode started more than 1 year ago. The problem is unchanged. The problem is controlled. Associated symptoms include anxiety. Pertinent negatives include no blurred vision, chest pain, headaches, malaise/fatigue, neck pain, orthopnea, palpitations, peripheral edema, PND, shortness of breath or sweats. Agents associated with hypertension include NSAIDs. Risk factors for coronary artery disease include obesity and post-menopausal state. Past treatments include ACE inhibitors. The current treatment provides moderate improvement. There is no history of angina, kidney disease, CAD/MI, CVA, heart failure, left ventricular hypertrophy, PVD, renovascular disease or retinopathy. There is no history of chronic renal disease, coarctation of the aorta, hyperaldosteronism, hypercortisolism, hyperparathyroidism, a hypertension causing med, pheochromocytoma, sleep apnea or a thyroid problem.        ROS  Review of Systems   Constitutional: Negative.  Negative for activity change, appetite change, chills, diaphoresis, fatigue, fever, malaise/fatigue and unexpected weight change.   HENT: Negative.  Negative for congestion, ear pain, hearing loss, nosebleeds, postnasal drip, rhinorrhea, sinus pressure, sneezing, sore throat and trouble swallowing.    Eyes: Negative for blurred vision, pain and visual disturbance.   Respiratory: Negative for cough, choking and shortness of breath.    Cardiovascular: Negative for chest pain, palpitations, orthopnea, leg swelling and PND.   Gastrointestinal: Negative for abdominal pain, constipation, diarrhea, nausea and vomiting.   Genitourinary: Negative for difficulty urinating,  "dysuria, frequency and urgency.   Musculoskeletal: Positive for arthralgias (left knee; left thigh), back pain and gait problem. Negative for joint swelling, myalgias and neck pain.   Skin: Negative.    Allergic/Immunologic: Negative for environmental allergies and food allergies.   Neurological: Negative for dizziness, seizures, syncope, weakness, light-headedness and headaches.   Psychiatric/Behavioral: Positive for dysphoric mood and sleep disturbance. Negative for agitation, confusion, decreased concentration, hallucinations and suicidal ideas. The patient is not nervous/anxious and is not hyperactive.        Physical Exam  Vitals:    03/12/18 0753   BP: 138/88   Pulse: 95   Resp: 20   Temp: 98.6 °F (37 °C)    Body mass index is 38.15 kg/m².  Weight: 110.5 kg (243 lb 9.7 oz)   Height: 5' 7" (170.2 cm)     Physical Exam   Constitutional: She is oriented to person, place, and time. She appears well-developed and well-nourished. She is active and cooperative.  Non-toxic appearance. She does not have a sickly appearance. She does not appear ill. No distress.   HENT:   Head: Normocephalic and atraumatic.   Right Ear: Hearing and external ear normal. No decreased hearing is noted.   Left Ear: Hearing and external ear normal. No decreased hearing is noted.   Nose: Nose normal. No rhinorrhea or nasal deformity.   Mouth/Throat: Uvula is midline and oropharynx is clear and moist. She does not have dentures. Normal dentition.   Eyes: Conjunctivae, EOM and lids are normal. Pupils are equal, round, and reactive to light. Right eye exhibits no chemosis, no discharge and no exudate. No foreign body present in the right eye. Left eye exhibits no chemosis, no discharge and no exudate. No foreign body present in the left eye. No scleral icterus.   Neck: Normal range of motion and full passive range of motion without pain. Neck supple.   Cardiovascular: Normal rate, regular rhythm, S1 normal, S2 normal and normal heart sounds.  " Exam reveals no gallop and no friction rub.    No murmur heard.  Pulmonary/Chest: Effort normal and breath sounds normal. No accessory muscle usage. No respiratory distress. She has no decreased breath sounds. She has no wheezes. She has no rhonchi. She has no rales.   Abdominal: Soft. Normal appearance. She exhibits no distension. There is no hepatosplenomegaly. There is no tenderness. There is no rigidity, no rebound and no guarding.   Musculoskeletal:        Left knee: She exhibits decreased range of motion and bony tenderness. She exhibits no swelling, no effusion, no ecchymosis, no deformity, no laceration, no erythema, normal alignment, no LCL laxity, normal meniscus and no MCL laxity. No tenderness found.        Left upper leg: She exhibits tenderness. She exhibits no bony tenderness, no swelling, no edema, no deformity and no laceration.        Legs:  Neurological: She is alert and oriented to person, place, and time. She has normal strength. No cranial nerve deficit or sensory deficit. She exhibits normal muscle tone. She displays no seizure activity. Coordination and gait normal.   Skin: Skin is warm, dry and intact. No rash noted. She is not diaphoretic.   Psychiatric: Her speech is normal and behavior is normal. Judgment and thought content normal. Her mood appears not anxious. Her affect is not angry, not blunt, not labile and not inappropriate. She is not actively hallucinating. Cognition and memory are normal. She does not exhibit a depressed mood. She is attentive.       Assessment & Plan    1. Essential hypertension  Patient was counseled and encouraged to maintain a low sodium diet, as well as increasing physical activity.  Recommend random BP checks at home on a regular basis.  Repeat BP at end of visit was not necessary. Will continue medication at this time, and follow up in 3-6 months, or sooner if blood pressure begins to increase.  Patient stopped HCTZ because of concern of gout flare up.         2. S/P ORIF (open reduction internal fixation) fracture  Managed by Orthopedic Surgery; scheduled for follow up tomorrow to discuss further treatment and physical therapy.  Discussed with patient that pain is expected with healing and use of opioid pain medication is best only for severe pain.    3. Psychophysiological insomnia  Patient was advised to practice good sleep hygiene, which includes decreasing stimulation at least one hour before bedtime.  This includes no television, no stimulation reading or games.  Natural sleep aids were recommended as well including lavender and chamomile.  The problem of recurrent insomnia is discussed. Avoidance of caffeine sources is strongly encouraged. Sleep hygiene issues are reviewed.  Will start alternate therapy based on insurance formulary.  - QUEtiapine (SEROQUEL) 100 MG Tab; Take 1 tablet (100 mg total) by mouth nightly as needed.  Dispense: 90 tablet; Refill: 1    4. Idiopathic chronic gout of multiple sites without tophus  Stable; no therapeutic changes at this time. Advised to hold diuretic at this time and will restart if BP begins to increase.      Follow up documented    ACTIVE MEDICAL ISSUES:  Documented in Problem List    PAST MEDICAL HISTORY  Documented    PAST SURGICAL HISTORY:  Documented    SOCIAL HISTORY:  Documented    FAMILY HISTORY:  Documented    ALLERGIES AND MEDICATIONS: updated and reviewed.  Documented    Health Maintenance       Date Due Completion Date    TETANUS VACCINE 01/24/1972 ---    Colonoscopy 01/24/2004 ---    Mammogram 09/15/2017 9/15/2015 (Done)    Override on 9/15/2015: Done (future)    Lipid Panel 04/07/2022 4/7/2017    Override on 9/15/2015: Done (future)

## 2018-03-13 ENCOUNTER — TELEPHONE (OUTPATIENT)
Dept: FAMILY MEDICINE | Facility: CLINIC | Age: 63
End: 2018-03-13

## 2018-03-13 NOTE — TELEPHONE ENCOUNTER
----- Message from Dominga Haskins sent at 3/13/2018  9:37 AM CDT -----  Contact: [facility] [phone]  Pharmacy is requesting a prescription change for the eszopiclone (LUNESTA) 2 MG Tab because the insurance is not covering it Please call  at your earliest convenience.  Thanks !

## 2018-03-13 NOTE — TELEPHONE ENCOUNTER
Spoke with pharmacy. States that Lunesta is not covered she does not know what alternative could be prescribed.Please advise

## 2018-04-05 ENCOUNTER — TELEPHONE (OUTPATIENT)
Dept: FAMILY MEDICINE | Facility: CLINIC | Age: 63
End: 2018-04-05

## 2018-04-05 DIAGNOSIS — M54.31 SCIATICA OF RIGHT SIDE: Primary | ICD-10-CM

## 2018-04-05 NOTE — TELEPHONE ENCOUNTER
Spoke with patient requesting to be referral to go to Physical therapy , patient complaint with back pain , lt thigh and gait problem , patient staid that still persistent with SX . Please reply .

## 2018-04-05 NOTE — TELEPHONE ENCOUNTER
----- Message from Donis Hanson sent at 4/5/2018  3:30 PM CDT -----  Contact: [phone]/PT  Calling TO set up/or get  clearance

## 2018-04-06 ENCOUNTER — TELEPHONE (OUTPATIENT)
Dept: FAMILY MEDICINE | Facility: CLINIC | Age: 63
End: 2018-04-06

## 2018-04-06 DIAGNOSIS — Z98.890 S/P ORIF (OPEN REDUCTION INTERNAL FIXATION) FRACTURE: Primary | ICD-10-CM

## 2018-04-06 DIAGNOSIS — Z87.81 S/P ORIF (OPEN REDUCTION INTERNAL FIXATION) FRACTURE: Primary | ICD-10-CM

## 2018-04-06 NOTE — TELEPHONE ENCOUNTER
Spoke with patient requesting physical therapy referral to be correct , patient need lt side , please reply .

## 2018-04-06 NOTE — TELEPHONE ENCOUNTER
Was notify patient referral is being place , they will call patient to schedule an appoint , patient verbalized understand .

## 2018-04-06 NOTE — PROGRESS NOTES
Patient, Gwendolyn Fournier (MRN #[id number]), presented with a recorded BMI of 38.15 kg/m^2 and a documented comorbidity(s):  - Hypertension  to which the severe obesity is a contributing factor. This is consistent with the definition of severe obesity (BMI 35.0-35.9) with comorbidity (ICD-10 E66.01, Z68.35). The patient's severe obesity was monitored, evaluated, addressed and/or treated. This addendum to the medical record is made on 04/06/2018.

## 2018-04-06 NOTE — TELEPHONE ENCOUNTER
----- Message from [facility] sent at 4/6/2018  3:31 PM CDT -----  Contact: self  Pt states referral for physical therapy is sciatica; should be be for broken left femur. Contact  Pt at [phone].    Thanks-

## 2018-05-04 DIAGNOSIS — I10 ESSENTIAL HYPERTENSION: ICD-10-CM

## 2018-05-10 RX ORDER — HYDROCHLOROTHIAZIDE 25 MG/1
TABLET ORAL
Qty: 90 TABLET | Refills: 1 | Status: SHIPPED | OUTPATIENT
Start: 2018-05-10 | End: 2018-09-21 | Stop reason: SDUPTHER

## 2018-05-28 RX ORDER — METOPROLOL SUCCINATE 50 MG/1
TABLET, EXTENDED RELEASE ORAL
Qty: 90 TABLET | Refills: 0 | Status: SHIPPED | OUTPATIENT
Start: 2018-05-28 | End: 2018-08-29 | Stop reason: SDUPTHER

## 2018-08-24 ENCOUNTER — TELEPHONE (OUTPATIENT)
Dept: FAMILY MEDICINE | Facility: CLINIC | Age: 63
End: 2018-08-24

## 2018-08-24 RX ORDER — METOPROLOL SUCCINATE 50 MG/1
TABLET, EXTENDED RELEASE ORAL
Qty: 90 TABLET | Refills: 0 | Status: CANCELLED | OUTPATIENT
Start: 2018-08-24

## 2018-08-29 RX ORDER — METOPROLOL SUCCINATE 50 MG/1
TABLET, EXTENDED RELEASE ORAL
Qty: 90 TABLET | Refills: 0 | Status: SHIPPED | OUTPATIENT
Start: 2018-08-29 | End: 2018-11-16 | Stop reason: SDUPTHER

## 2018-09-04 DIAGNOSIS — F51.04 PSYCHOPHYSIOLOGICAL INSOMNIA: ICD-10-CM

## 2018-09-06 RX ORDER — QUETIAPINE FUMARATE 100 MG/1
TABLET, FILM COATED ORAL
Qty: 90 TABLET | Refills: 0 | Status: SHIPPED | OUTPATIENT
Start: 2018-09-06 | End: 2018-11-01 | Stop reason: SDUPTHER

## 2018-09-12 DIAGNOSIS — F51.04 PSYCHOPHYSIOLOGICAL INSOMNIA: ICD-10-CM

## 2018-09-12 RX ORDER — ESZOPICLONE 2 MG/1
4 TABLET, FILM COATED ORAL NIGHTLY PRN
Qty: 60 TABLET | Refills: 2 | Status: SHIPPED | OUTPATIENT
Start: 2018-09-12 | End: 2019-01-03

## 2018-09-12 NOTE — TELEPHONE ENCOUNTER
Was trying to notify patient medication was approved , no one answer , left message to check with pharmacy .

## 2018-09-21 ENCOUNTER — CLINICAL SUPPORT (OUTPATIENT)
Dept: FAMILY MEDICINE | Facility: CLINIC | Age: 63
End: 2018-09-21
Payer: MEDICARE

## 2018-09-21 DIAGNOSIS — I10 ESSENTIAL HYPERTENSION: ICD-10-CM

## 2018-09-21 DIAGNOSIS — M1A.09X0 IDIOPATHIC CHRONIC GOUT OF MULTIPLE SITES WITHOUT TOPHUS: ICD-10-CM

## 2018-09-21 DIAGNOSIS — Z23 NEED FOR INFLUENZA VACCINATION: Primary | ICD-10-CM

## 2018-09-21 PROCEDURE — 90686 IIV4 VACC NO PRSV 0.5 ML IM: CPT | Mod: PBBFAC,PO

## 2018-09-21 PROCEDURE — 99211 OFF/OP EST MAY X REQ PHY/QHP: CPT | Mod: PBBFAC,PO,25

## 2018-09-21 PROCEDURE — 99999 PR PBB SHADOW E&M-EST. PATIENT-LVL I: CPT | Mod: PBBFAC,,,

## 2018-09-21 RX ORDER — HYDROCHLOROTHIAZIDE 25 MG/1
TABLET ORAL
Qty: 90 TABLET | Refills: 1 | Status: SHIPPED | OUTPATIENT
Start: 2018-09-21 | End: 2019-03-14 | Stop reason: SDUPTHER

## 2018-09-21 RX ORDER — COLCHICINE 0.6 MG/1
TABLET ORAL
Qty: 90 TABLET | Refills: 0 | Status: SHIPPED | OUTPATIENT
Start: 2018-09-21 | End: 2019-01-03

## 2018-09-21 NOTE — PROGRESS NOTES
Patient given influenza vaccine right deltoid, tolerated well, no complaints, no reaction noted  Offered patient to set up mammogram and discussed colonoscopy, patient declined both.

## 2018-10-09 DIAGNOSIS — M1A.0790 CHRONIC GOUT OF FOOT, UNSPECIFIED CAUSE, UNSPECIFIED LATERALITY: ICD-10-CM

## 2018-10-09 DIAGNOSIS — J06.9 UPPER RESPIRATORY TRACT INFECTION, UNSPECIFIED TYPE: ICD-10-CM

## 2018-10-09 RX ORDER — ALLOPURINOL 100 MG/1
TABLET ORAL
Qty: 90 TABLET | Refills: 0 | Status: CANCELLED | OUTPATIENT
Start: 2018-10-09

## 2018-10-10 ENCOUNTER — TELEPHONE (OUTPATIENT)
Dept: PSYCHIATRY | Facility: CLINIC | Age: 63
End: 2018-10-10

## 2018-10-11 DIAGNOSIS — M1A.0790 CHRONIC GOUT OF FOOT, UNSPECIFIED CAUSE, UNSPECIFIED LATERALITY: ICD-10-CM

## 2018-10-11 DIAGNOSIS — J06.9 UPPER RESPIRATORY TRACT INFECTION, UNSPECIFIED TYPE: ICD-10-CM

## 2018-10-11 RX ORDER — FLUTICASONE PROPIONATE 50 MCG
SPRAY, SUSPENSION (ML) NASAL
Qty: 48 ML | Refills: 1 | Status: SHIPPED | OUTPATIENT
Start: 2018-10-11 | End: 2024-03-05

## 2018-10-11 RX ORDER — FLUTICASONE PROPIONATE 50 MCG
2 SPRAY, SUSPENSION (ML) NASAL DAILY
Qty: 16 G | Refills: 1 | Status: SHIPPED | OUTPATIENT
Start: 2018-10-11 | End: 2018-10-11 | Stop reason: SDUPTHER

## 2018-10-11 RX ORDER — ALLOPURINOL 100 MG/1
TABLET ORAL
Qty: 90 TABLET | Refills: 0 | Status: SHIPPED | OUTPATIENT
Start: 2018-10-11 | End: 2018-10-11 | Stop reason: SDUPTHER

## 2018-10-11 RX ORDER — ALLOPURINOL 100 MG/1
TABLET ORAL
Qty: 180 TABLET | Refills: 0 | Status: SHIPPED | OUTPATIENT
Start: 2018-10-11 | End: 2019-01-10 | Stop reason: SDUPTHER

## 2018-10-31 DIAGNOSIS — Z12.39 BREAST CANCER SCREENING: ICD-10-CM

## 2018-11-01 ENCOUNTER — TELEPHONE (OUTPATIENT)
Dept: FAMILY MEDICINE | Facility: CLINIC | Age: 63
End: 2018-11-01

## 2018-11-01 DIAGNOSIS — F51.04 PSYCHOPHYSIOLOGICAL INSOMNIA: ICD-10-CM

## 2018-11-01 RX ORDER — ENOXAPARIN SODIUM 100 MG/ML
40 INJECTION SUBCUTANEOUS
COMMUNITY
Start: 2017-12-29 | End: 2019-01-03

## 2018-11-01 RX ORDER — DIPHENHYDRAMINE HCL 25 MG
25 CAPSULE ORAL EVERY 6 HOURS PRN
COMMUNITY

## 2018-11-01 NOTE — TELEPHONE ENCOUNTER
Spoke with patient requesting medication increase Seroquel to 200 mg patient is taking 100 mg , please reply , last refill was 09/06/18 90 tab no refill . Schedule an appoint at this time follow up 11/16/18 @2:20 pm

## 2018-11-01 NOTE — TELEPHONE ENCOUNTER
----- Message from Sophia Pathak sent at 11/1/2018  4:31 PM CDT -----  Contact: self  Pt called regarding QUEtiapine (SEROQUEL) 100 MG Tab. States medication should be increased to 200 mg. Pt [phone]

## 2018-11-05 ENCOUNTER — TELEPHONE (OUTPATIENT)
Dept: FAMILY MEDICINE | Facility: CLINIC | Age: 63
End: 2018-11-05

## 2018-11-05 DIAGNOSIS — M54.31 SCIATICA OF RIGHT SIDE: ICD-10-CM

## 2018-11-05 DIAGNOSIS — M1A.09X0 IDIOPATHIC CHRONIC GOUT OF MULTIPLE SITES WITHOUT TOPHUS: ICD-10-CM

## 2018-11-05 DIAGNOSIS — M15.9 PRIMARY OSTEOARTHRITIS INVOLVING MULTIPLE JOINTS: Primary | ICD-10-CM

## 2018-11-05 NOTE — TELEPHONE ENCOUNTER
Pt states her commode that sits over the toilet is rusting and PHN informed her to get new orders from PCP;    Pt also states seroquel 100mg is not working and she is needing to take 2 tablets to go to sleep; states she mentioned this to you when her daughter had appointment and she was in the room also; she would like new prescription sent to [facility]

## 2018-11-05 NOTE — TELEPHONE ENCOUNTER
----- Message from Suzie Arenas sent at 11/5/2018  3:22 PM CST -----  Contact: self - [phone]  Pt states her bathroom equip is rusting. She states PHN advised her to contact office regarding.       Pt is also asking for an increase on dosage on  --- QUEtiapine (SEROQUEL) 100 MG Tab

## 2018-11-07 NOTE — TELEPHONE ENCOUNTER
----- Message from Wei Lu sent at 11/7/2018  3:44 PM CST -----  Contact: Self/[phone]  The patient would like to speak to the staff.        Thank you

## 2018-11-07 NOTE — TELEPHONE ENCOUNTER
Spoke with patient was told that previous message  Was sent to provider as soon he's reply will let her know . Patient verbalized understand .

## 2018-11-08 RX ORDER — QUETIAPINE FUMARATE 200 MG/1
200 TABLET, FILM COATED ORAL NIGHTLY PRN
Qty: 30 TABLET | Refills: 2 | Status: SHIPPED | OUTPATIENT
Start: 2018-11-08 | End: 2019-01-03

## 2018-11-08 NOTE — TELEPHONE ENCOUNTER
Was notify patient medication was increase and ready to go to the pharmacy , patient verbalized understand .

## 2018-11-09 ENCOUNTER — TELEPHONE (OUTPATIENT)
Dept: FAMILY MEDICINE | Facility: CLINIC | Age: 63
End: 2018-11-09

## 2018-11-09 NOTE — TELEPHONE ENCOUNTER
Spoke with Mrs. Law was trying to find up reason why patient need a Bedside Commode according with previous nurse note patient staid  that  The one she had is Rusting and old , with Medicare guide line is ok for new one after 5 years . They will fax over decision on this case .

## 2018-11-09 NOTE — TELEPHONE ENCOUNTER
----- Message from Aide Fernandes sent at 11/9/2018  2:06 PM CST -----  [facility][phone]  [id number]  PH have received orders, but patient has had a bedside commode within the last 5 years so their wanting to know what's the reason for this one.  Thank you

## 2018-11-16 ENCOUNTER — OFFICE VISIT (OUTPATIENT)
Dept: FAMILY MEDICINE | Facility: CLINIC | Age: 63
End: 2018-11-16
Payer: MEDICARE

## 2018-11-16 VITALS
WEIGHT: 252.19 LBS | BODY MASS INDEX: 39.58 KG/M2 | OXYGEN SATURATION: 98 % | HEART RATE: 82 BPM | HEIGHT: 67 IN | DIASTOLIC BLOOD PRESSURE: 96 MMHG | RESPIRATION RATE: 17 BRPM | TEMPERATURE: 99 F | SYSTOLIC BLOOD PRESSURE: 144 MMHG

## 2018-11-16 DIAGNOSIS — R00.0 TACHYCARDIA WITH HYPERTENSION: ICD-10-CM

## 2018-11-16 DIAGNOSIS — N18.30 CKD (CHRONIC KIDNEY DISEASE) STAGE 3, GFR 30-59 ML/MIN: ICD-10-CM

## 2018-11-16 DIAGNOSIS — M1A.09X0 IDIOPATHIC CHRONIC GOUT OF MULTIPLE SITES WITHOUT TOPHUS: ICD-10-CM

## 2018-11-16 DIAGNOSIS — Z86.39 PERSONAL HISTORY OF OTHER ENDOCRINE, NUTRITIONAL AND METABOLIC DISEASE: ICD-10-CM

## 2018-11-16 DIAGNOSIS — Z12.11 COLON CANCER SCREENING: ICD-10-CM

## 2018-11-16 DIAGNOSIS — M15.9 PRIMARY OSTEOARTHRITIS INVOLVING MULTIPLE JOINTS: ICD-10-CM

## 2018-11-16 DIAGNOSIS — I10 ESSENTIAL HYPERTENSION: Primary | ICD-10-CM

## 2018-11-16 DIAGNOSIS — I10 TACHYCARDIA WITH HYPERTENSION: ICD-10-CM

## 2018-11-16 DIAGNOSIS — M54.31 SCIATICA OF RIGHT SIDE: ICD-10-CM

## 2018-11-16 DIAGNOSIS — Z98.890 HISTORY OF OPEN REDUCTION AND INTERNAL FIXATION (ORIF) PROCEDURE: ICD-10-CM

## 2018-11-16 PROCEDURE — 99215 OFFICE O/P EST HI 40 MIN: CPT | Mod: S$GLB,,, | Performed by: FAMILY MEDICINE

## 2018-11-16 PROCEDURE — 3080F DIAST BP >= 90 MM HG: CPT | Mod: CPTII,S$GLB,, | Performed by: FAMILY MEDICINE

## 2018-11-16 PROCEDURE — 99499 UNLISTED E&M SERVICE: CPT | Mod: S$GLB,,, | Performed by: FAMILY MEDICINE

## 2018-11-16 PROCEDURE — 3008F BODY MASS INDEX DOCD: CPT | Mod: CPTII,S$GLB,, | Performed by: FAMILY MEDICINE

## 2018-11-16 PROCEDURE — 3077F SYST BP >= 140 MM HG: CPT | Mod: CPTII,S$GLB,, | Performed by: FAMILY MEDICINE

## 2018-11-16 PROCEDURE — 99999 PR PBB SHADOW E&M-EST. PATIENT-LVL V: CPT | Mod: PBBFAC,,, | Performed by: FAMILY MEDICINE

## 2018-11-16 RX ORDER — AMLODIPINE BESYLATE 10 MG/1
10 TABLET ORAL
COMMUNITY
Start: 2017-12-30 | End: 2019-01-03

## 2018-11-16 RX ORDER — DOCUSATE SODIUM 100 MG/1
100 CAPSULE, LIQUID FILLED ORAL
COMMUNITY
Start: 2017-12-28 | End: 2021-09-09 | Stop reason: SDUPTHER

## 2018-11-16 RX ORDER — FERROUS SULFATE 325(65) MG
325 TABLET ORAL
COMMUNITY
Start: 2017-12-28

## 2018-11-16 RX ORDER — OXYCODONE AND ACETAMINOPHEN 7.5; 325 MG/1; MG/1
1 TABLET ORAL EVERY 8 HOURS PRN
Qty: 90 TABLET | Refills: 0 | Status: SHIPPED | OUTPATIENT
Start: 2018-11-16 | End: 2019-01-03

## 2018-11-16 RX ORDER — METOPROLOL SUCCINATE 50 MG/1
TABLET, EXTENDED RELEASE ORAL
Qty: 90 TABLET | Refills: 0 | Status: SHIPPED | OUTPATIENT
Start: 2018-11-16 | End: 2019-02-12 | Stop reason: SDUPTHER

## 2018-11-16 RX ORDER — ALLOPURINOL 100 MG/1
100 TABLET ORAL
COMMUNITY
End: 2018-11-16

## 2018-11-16 RX ORDER — METOPROLOL TARTRATE 50 MG/1
50 TABLET ORAL
COMMUNITY
End: 2018-11-16

## 2018-11-16 NOTE — PROGRESS NOTES
Chief Complaint   Patient presents with    Hypertension    discuss about medication       Gwendolyn Fournier is a 63 y.o. female who presents per the Chief Complaint.  Pt is known to me and was last seen by me on 3/12/2018.  All known chronic medical issues have been documented.       Hypertension   This is a chronic problem. The current episode started more than 1 year ago. The problem is unchanged. The problem is controlled. Associated symptoms include anxiety. Pertinent negatives include no blurred vision, chest pain, headaches, malaise/fatigue, neck pain, orthopnea, palpitations, peripheral edema, PND, shortness of breath or sweats. Agents associated with hypertension include NSAIDs. Risk factors for coronary artery disease include obesity and post-menopausal state. Past treatments include ACE inhibitors. The current treatment provides moderate improvement. There is no history of angina, kidney disease, CAD/MI, CVA, heart failure, left ventricular hypertrophy, PVD or retinopathy. There is no history of chronic renal disease, coarctation of the aorta, hyperaldosteronism, hypercortisolism, hyperparathyroidism, a hypertension causing med, pheochromocytoma, renovascular disease, sleep apnea or a thyroid problem.   Anxiety   Presents for follow-up visit. Symptoms include depressed mood, excessive worry, insomnia, nervous/anxious behavior and restlessness. Patient reports no chest pain, compulsions, confusion, decreased concentration, dizziness, dry mouth, feeling of choking, hyperventilation, impotence, irritability, malaise, muscle tension, nausea, obsessions, palpitations, panic, shortness of breath or suicidal ideas. Symptoms occur most days. The severity of symptoms is causing significant distress. The quality of sleep is poor. Nighttime awakenings: occasional.       ROS  Review of Systems   Constitutional: Negative.  Negative for activity change, appetite change, chills, diaphoresis, fatigue, fever,  "irritability, malaise/fatigue and unexpected weight change.   HENT: Negative.  Negative for congestion, ear pain, hearing loss, nosebleeds, postnasal drip, rhinorrhea, sinus pressure, sneezing, sore throat and trouble swallowing.    Eyes: Negative for blurred vision, pain and visual disturbance.   Respiratory: Negative for cough, choking and shortness of breath.    Cardiovascular: Negative for chest pain, palpitations, orthopnea, leg swelling and PND.   Gastrointestinal: Negative for abdominal pain, constipation, diarrhea, nausea and vomiting.   Genitourinary: Negative for difficulty urinating, dysuria, frequency, impotence and urgency.   Musculoskeletal: Positive for arthralgias (left knee; left thigh), back pain, gait problem and joint swelling. Negative for myalgias and neck pain.   Skin: Negative.    Allergic/Immunologic: Negative for environmental allergies and food allergies.   Neurological: Negative for dizziness, seizures, syncope, weakness, light-headedness and headaches.   Psychiatric/Behavioral: Positive for dysphoric mood and sleep disturbance. Negative for agitation, confusion, decreased concentration, hallucinations, self-injury and suicidal ideas. The patient is nervous/anxious and has insomnia. The patient is not hyperactive.        Physical Exam  Vitals:    11/16/18 1451   BP: (!) 144/96   Pulse: 82   Resp: 17   Temp: 98.6 °F (37 °C)    Body mass index is 39.5 kg/m².  Weight: 114.4 kg (252 lb 3.3 oz)   Height: 5' 7" (170.2 cm)     Physical Exam   Constitutional: She is oriented to person, place, and time. She appears well-developed and well-nourished. She is active and cooperative.  Non-toxic appearance. She does not have a sickly appearance. She does not appear ill. No distress.   HENT:   Head: Normocephalic and atraumatic.   Right Ear: Hearing and external ear normal. No decreased hearing is noted.   Left Ear: Hearing and external ear normal. No decreased hearing is noted.   Nose: Nose normal. No " rhinorrhea or nasal deformity.   Mouth/Throat: Uvula is midline and oropharynx is clear and moist. She does not have dentures. Normal dentition.   Eyes: Conjunctivae, EOM and lids are normal. Pupils are equal, round, and reactive to light. Right eye exhibits no chemosis, no discharge and no exudate. No foreign body present in the right eye. Left eye exhibits no chemosis, no discharge and no exudate. No foreign body present in the left eye. No scleral icterus.   Neck: Normal range of motion and full passive range of motion without pain. Neck supple.   Cardiovascular: Normal rate, regular rhythm, S1 normal, S2 normal and normal heart sounds. Exam reveals no gallop and no friction rub.   No murmur heard.  Pulmonary/Chest: Effort normal and breath sounds normal. No accessory muscle usage. No respiratory distress. She has no decreased breath sounds. She has no wheezes. She has no rhonchi. She has no rales.   Abdominal: Soft. Normal appearance. She exhibits no distension. There is no hepatosplenomegaly. There is no tenderness. There is no rigidity, no rebound and no guarding.   Musculoskeletal:        Left knee: She exhibits bony tenderness. She exhibits normal range of motion, no swelling, no effusion, no ecchymosis, no deformity, no laceration, no erythema, normal alignment, no LCL laxity, normal patellar mobility, normal meniscus and no MCL laxity. Tenderness found. Lateral joint line tenderness noted. No medial joint line, no MCL, no LCL and no patellar tendon tenderness noted.        Left upper leg: She exhibits tenderness. She exhibits no bony tenderness, no swelling, no edema, no deformity and no laceration.        Legs:  Neurological: She is alert and oriented to person, place, and time. She has normal strength. No cranial nerve deficit or sensory deficit. She exhibits normal muscle tone. She displays no seizure activity. Coordination and gait normal.   Skin: Skin is warm, dry and intact. No rash noted. She is not  diaphoretic.   Psychiatric: Her speech is normal and behavior is normal. Judgment and thought content normal. Her mood appears not anxious. Her affect is not angry, not blunt, not labile and not inappropriate. She is not actively hallucinating. Cognition and memory are normal. She does not exhibit a depressed mood. She is attentive.       Assessment & Plan    1. Essential hypertension  Patient was counseled and encouraged to maintain a low sodium diet, as well as increasing physical activity.  Recommend random BP checks at home on a regular basis.  Repeat BP at end of visit was not necessary. Will continue medication at this time, and follow up in 3-6 months, or sooner if blood pressure begins to increase.     - Lipid panel; Future  - Hemoglobin A1c; Future  - CBC auto differential; Future    2. Sciatica of right side  Will manage acute pain as needed; DME ordered to assist with ADL's.  - oxyCODONE-acetaminophen (PERCOCET) 7.5-325 mg per tablet; Take 1 tablet by mouth every 8 (eight) hours as needed for Pain.  Dispense: 90 tablet; Refill: 0  - Back/Cervical Brace For Home Use  - COMMODE FOR HOME USE    3. Primary osteoarthritis involving multiple joints  Will manage pain acutely.  - oxyCODONE-acetaminophen (PERCOCET) 7.5-325 mg per tablet; Take 1 tablet by mouth every 8 (eight) hours as needed for Pain.  Dispense: 90 tablet; Refill: 0  - Back/Cervical Brace For Home Use  - COMMODE FOR HOME USE    4. CKD (chronic kidney disease) stage 3, GFR 30-59 ml/min  Screening test will be ordered and once results available patient will be notified of results and managed accordingly.    - Comprehensive metabolic panel; Future    5. Tachycardia with hypertension  The current medical regimen is effective at this time and no changes to present plan or medications will be made at this visit.     - metoprolol succinate (TOPROL-XL) 50 MG 24 hr tablet; TAKE 1 TABLET(50 MG) BY MOUTH EVERY DAY  Dispense: 90 tablet; Refill: 0  - TSH;  Future  - T4, free; Future    6. History of open reduction and internal fixation (ORIF) procedure  Referral to appropriate specialist for evaluation and management placed in [facility].   - Ambulatory Referral to Sports Medicine  - oxyCODONE-acetaminophen (PERCOCET) 7.5-325 mg per tablet; Take 1 tablet by mouth every 8 (eight) hours as needed for Pain.  Dispense: 90 tablet; Refill: 0    7. Idiopathic chronic gout of multiple sites without tophus  Will review kidney function and consider changing to Uloric if necessary.    8. Colon cancer screening  Patient is due for colonoscopy.  Order placed in [facility] and patient will be contacted to schedule appointment.  I will notify patient of results once available.    - Case request GI: COLONOSCOPY    9. Personal history of other endocrine, nutritional and metabolic disease   Screening test will be ordered and once results available patient will be notified of results and managed accordingly.    - Hemoglobin A1c; Future      Follow up documented    ACTIVE MEDICAL ISSUES:  Documented in Problem List    PAST MEDICAL HISTORY  Documented    PAST SURGICAL HISTORY:  Documented    SOCIAL HISTORY:  Documented    FAMILY HISTORY:  Documented    ALLERGIES AND MEDICATIONS: updated and reviewed.  Documented    Health Maintenance       Date Due Completion Date    TETANUS VACCINE 01/24/1972 ---    Colonoscopy 01/24/2004 ---    Mammogram 09/15/2017 9/15/2015 (Done)    Override on 9/15/2015: Done (future)    Lipid Panel 04/07/2022 4/7/2017    Override on 9/15/2015: Done (future)

## 2018-11-19 ENCOUNTER — LAB VISIT (OUTPATIENT)
Dept: LAB | Facility: HOSPITAL | Age: 63
End: 2018-11-19
Payer: MEDICARE

## 2018-11-19 DIAGNOSIS — I10 ESSENTIAL HYPERTENSION: ICD-10-CM

## 2018-11-19 DIAGNOSIS — Z86.39 PERSONAL HISTORY OF OTHER ENDOCRINE, NUTRITIONAL AND METABOLIC DISEASE: ICD-10-CM

## 2018-11-19 DIAGNOSIS — R00.0 TACHYCARDIA WITH HYPERTENSION: ICD-10-CM

## 2018-11-19 DIAGNOSIS — N18.30 CKD (CHRONIC KIDNEY DISEASE) STAGE 3, GFR 30-59 ML/MIN: ICD-10-CM

## 2018-11-19 DIAGNOSIS — I10 TACHYCARDIA WITH HYPERTENSION: ICD-10-CM

## 2018-11-19 LAB
ALBUMIN SERPL BCP-MCNC: 3.3 G/DL
ALP SERPL-CCNC: 116 U/L
ALT SERPL W/O P-5'-P-CCNC: 9 U/L
ANION GAP SERPL CALC-SCNC: 9 MMOL/L
AST SERPL-CCNC: 12 U/L
BASOPHILS # BLD AUTO: 0.07 K/UL
BASOPHILS NFR BLD: 0.6 %
BILIRUB SERPL-MCNC: 0.3 MG/DL
BUN SERPL-MCNC: 18 MG/DL
CALCIUM SERPL-MCNC: 10.1 MG/DL
CHLORIDE SERPL-SCNC: 107 MMOL/L
CHOLEST SERPL-MCNC: 253 MG/DL
CHOLEST/HDLC SERPL: 6.3 {RATIO}
CO2 SERPL-SCNC: 26 MMOL/L
CREAT SERPL-MCNC: 1.3 MG/DL
DIFFERENTIAL METHOD: ABNORMAL
EOSINOPHIL # BLD AUTO: 0.4 K/UL
EOSINOPHIL NFR BLD: 3.8 %
ERYTHROCYTE [DISTWIDTH] IN BLOOD BY AUTOMATED COUNT: 13.4 %
EST. GFR  (AFRICAN AMERICAN): 50.1 ML/MIN/1.73 M^2
EST. GFR  (NON AFRICAN AMERICAN): 43.5 ML/MIN/1.73 M^2
ESTIMATED AVG GLUCOSE: 117 MG/DL
GLUCOSE SERPL-MCNC: 101 MG/DL
HBA1C MFR BLD HPLC: 5.7 %
HCT VFR BLD AUTO: 35.9 %
HDLC SERPL-MCNC: 40 MG/DL
HDLC SERPL: 15.8 %
HGB BLD-MCNC: 11.2 G/DL
IMM GRANULOCYTES # BLD AUTO: 0.03 K/UL
IMM GRANULOCYTES NFR BLD AUTO: 0.3 %
LDLC SERPL CALC-MCNC: 165.6 MG/DL
LYMPHOCYTES # BLD AUTO: 3.8 K/UL
LYMPHOCYTES NFR BLD: 34.3 %
MCH RBC QN AUTO: 28.4 PG
MCHC RBC AUTO-ENTMCNC: 31.2 G/DL
MCV RBC AUTO: 91 FL
MONOCYTES # BLD AUTO: 0.7 K/UL
MONOCYTES NFR BLD: 6.3 %
NEUTROPHILS # BLD AUTO: 6 K/UL
NEUTROPHILS NFR BLD: 54.7 %
NONHDLC SERPL-MCNC: 213 MG/DL
NRBC BLD-RTO: 0 /100 WBC
PLATELET # BLD AUTO: 353 K/UL
PMV BLD AUTO: 10.7 FL
POTASSIUM SERPL-SCNC: 3.9 MMOL/L
PROT SERPL-MCNC: 7.5 G/DL
RBC # BLD AUTO: 3.95 M/UL
SODIUM SERPL-SCNC: 142 MMOL/L
T4 FREE SERPL-MCNC: 1 NG/DL
TRIGL SERPL-MCNC: 237 MG/DL
TSH SERPL DL<=0.005 MIU/L-ACNC: 2.12 UIU/ML
WBC # BLD AUTO: 10.95 K/UL

## 2018-11-19 PROCEDURE — 84443 ASSAY THYROID STIM HORMONE: CPT

## 2018-11-19 PROCEDURE — 83036 HEMOGLOBIN GLYCOSYLATED A1C: CPT

## 2018-11-19 PROCEDURE — 84439 ASSAY OF FREE THYROXINE: CPT

## 2018-11-19 PROCEDURE — 36415 COLL VENOUS BLD VENIPUNCTURE: CPT | Mod: PO

## 2018-11-19 PROCEDURE — 80053 COMPREHEN METABOLIC PANEL: CPT

## 2018-11-19 PROCEDURE — 80061 LIPID PANEL: CPT

## 2018-11-19 PROCEDURE — 85025 COMPLETE CBC W/AUTO DIFF WBC: CPT

## 2018-11-23 ENCOUNTER — TELEPHONE (OUTPATIENT)
Dept: ORTHOPEDICS | Facility: CLINIC | Age: 63
End: 2018-11-23

## 2018-11-23 ENCOUNTER — PATIENT MESSAGE (OUTPATIENT)
Dept: ORTHOPEDICS | Facility: CLINIC | Age: 63
End: 2018-11-23

## 2018-11-26 DIAGNOSIS — Z63.4 GRIEF AT LOSS OF CHILD: ICD-10-CM

## 2018-11-26 DIAGNOSIS — F43.21 GRIEF AT LOSS OF CHILD: ICD-10-CM

## 2018-11-26 SDOH — SOCIAL DETERMINANTS OF HEALTH (SDOH): DISSAPEARANCE AND DEATH OF FAMILY MEMBER: Z63.4

## 2018-11-28 DIAGNOSIS — Z98.890 HISTORY OF OPEN REDUCTION AND INTERNAL FIXATION (ORIF) PROCEDURE: Primary | ICD-10-CM

## 2018-11-29 RX ORDER — DULOXETIN HYDROCHLORIDE 60 MG/1
CAPSULE, DELAYED RELEASE ORAL
Qty: 90 CAPSULE | Refills: 0 | OUTPATIENT
Start: 2018-11-29

## 2018-12-07 DIAGNOSIS — F51.04 PSYCHOPHYSIOLOGICAL INSOMNIA: ICD-10-CM

## 2018-12-13 RX ORDER — QUETIAPINE FUMARATE 100 MG/1
TABLET, FILM COATED ORAL
Qty: 90 TABLET | Refills: 0 | Status: SHIPPED | OUTPATIENT
Start: 2018-12-13 | End: 2019-03-01 | Stop reason: SDUPTHER

## 2018-12-20 ENCOUNTER — OFFICE VISIT (OUTPATIENT)
Dept: FAMILY MEDICINE | Facility: CLINIC | Age: 63
End: 2018-12-20
Payer: MEDICARE

## 2018-12-20 VITALS
SYSTOLIC BLOOD PRESSURE: 142 MMHG | HEIGHT: 67 IN | RESPIRATION RATE: 16 BRPM | DIASTOLIC BLOOD PRESSURE: 86 MMHG | HEART RATE: 82 BPM | TEMPERATURE: 99 F | OXYGEN SATURATION: 95 % | WEIGHT: 249.81 LBS | BODY MASS INDEX: 39.21 KG/M2

## 2018-12-20 DIAGNOSIS — M25.552 LEFT HIP PAIN: ICD-10-CM

## 2018-12-20 DIAGNOSIS — R10.9 LEFT FLANK PAIN: Primary | ICD-10-CM

## 2018-12-20 LAB
BILIRUB SERPL-MCNC: NEGATIVE MG/DL
BLOOD URINE, POC: NEGATIVE
COLOR, POC UA: YELLOW
GLUCOSE UR QL STRIP: NORMAL
KETONES UR QL STRIP: NEGATIVE
LEUKOCYTE ESTERASE URINE, POC: ABNORMAL
NITRITE, POC UA: NEGATIVE
PH, POC UA: 5
PROTEIN, POC: ABNORMAL
SPECIFIC GRAVITY, POC UA: 1.01
UROBILINOGEN, POC UA: NORMAL

## 2018-12-20 PROCEDURE — 3008F BODY MASS INDEX DOCD: CPT | Mod: CPTII,S$GLB,, | Performed by: PHYSICIAN ASSISTANT

## 2018-12-20 PROCEDURE — 99999 PR PBB SHADOW E&M-EST. PATIENT-LVL V: CPT | Mod: PBBFAC,,, | Performed by: PHYSICIAN ASSISTANT

## 2018-12-20 PROCEDURE — 96372 THER/PROPH/DIAG INJ SC/IM: CPT | Mod: S$GLB,,, | Performed by: PHYSICIAN ASSISTANT

## 2018-12-20 PROCEDURE — 87086 URINE CULTURE/COLONY COUNT: CPT

## 2018-12-20 PROCEDURE — 3079F DIAST BP 80-89 MM HG: CPT | Mod: CPTII,S$GLB,, | Performed by: PHYSICIAN ASSISTANT

## 2018-12-20 PROCEDURE — 3077F SYST BP >= 140 MM HG: CPT | Mod: CPTII,S$GLB,, | Performed by: PHYSICIAN ASSISTANT

## 2018-12-20 PROCEDURE — 99213 OFFICE O/P EST LOW 20 MIN: CPT | Mod: 25,S$GLB,, | Performed by: PHYSICIAN ASSISTANT

## 2018-12-20 PROCEDURE — 81002 URINALYSIS NONAUTO W/O SCOPE: CPT | Mod: S$GLB,,, | Performed by: PHYSICIAN ASSISTANT

## 2018-12-20 RX ORDER — METHYLPREDNISOLONE SODIUM SUCCINATE 125 MG/2ML
125 INJECTION INTRAMUSCULAR; INTRAVENOUS ONCE
Status: COMPLETED | OUTPATIENT
Start: 2018-12-20 | End: 2018-12-20

## 2018-12-20 RX ADMIN — METHYLPREDNISOLONE SODIUM SUCCINATE 125 MG: 125 INJECTION INTRAMUSCULAR; INTRAVENOUS at 10:12

## 2018-12-20 NOTE — PROGRESS NOTES
Subjective:       Patient ID: Gwendolyn Fournier is a 63 y.o. female.    Chief Complaint: Flank Pain (left lower side pain level 9)    Hip Pain    The incident occurred more than 1 week ago. There was no injury mechanism. The pain is present in the left leg (left flank). The quality of the pain is described as aching. The pain is moderate. The pain has been fluctuating since onset. Pertinent negatives include no inability to bear weight or numbness. The symptoms are aggravated by movement and weight bearing. Treatments tried: hydrocodone. The treatment provided mild relief.     Review of Systems   Constitutional: Negative for fever.   Gastrointestinal: Negative for nausea and vomiting.   Genitourinary: Negative for dysuria and frequency.   Musculoskeletal: Positive for arthralgias and myalgias.   Neurological: Negative for numbness.       Objective:      Physical Exam   Constitutional: She is oriented to person, place, and time. She appears well-developed and well-nourished.   HENT:   Head: Normocephalic and atraumatic.   Cardiovascular: Normal rate and regular rhythm.   Pulmonary/Chest: Effort normal and breath sounds normal.   Abdominal: Normal appearance and bowel sounds are normal. There is no tenderness. There is no rigidity and no guarding.   Musculoskeletal:        Left hip: She exhibits decreased range of motion and tenderness. She exhibits normal strength and no bony tenderness.   Mild pain with internal external rotation of left hip.    Neurological: She is alert and oriented to person, place, and time.   Skin: Skin is warm and dry.   Psychiatric: She has a normal mood and affect. Her behavior is normal.   Vitals reviewed.      Assessment:       1. Left flank pain    2. Left hip pain        Plan:         Gwendolyn was seen today for flank pain.    Diagnoses and all orders for this visit:    Left flank pain  -     POCT URINE DIPSTICK WITHOUT MICROSCOPE  -     Urine culture  -     methylPREDNISolone sodium  succinate injection 125 mg  -     No blood in urine. Injection today, continue meds PRN. F/u with PCP in 2 week    Left hip pain  -     methylPREDNISolone sodium succinate injection 125 mg

## 2018-12-20 NOTE — PROGRESS NOTES
Pt tolerated injection of solumedrol 125mg to left ventrogluteal without difficulty; no adverse reaction noted

## 2018-12-22 LAB — BACTERIA UR CULT: NORMAL

## 2019-01-02 ENCOUNTER — TELEPHONE (OUTPATIENT)
Dept: FAMILY MEDICINE | Facility: CLINIC | Age: 64
End: 2019-01-02

## 2019-01-02 ENCOUNTER — HOSPITAL ENCOUNTER (EMERGENCY)
Facility: HOSPITAL | Age: 64
Discharge: HOME | End: 2019-01-02
Attending: EMERGENCY MEDICINE
Payer: MEDICARE

## 2019-01-02 VITALS
DIASTOLIC BLOOD PRESSURE: 75 MMHG | TEMPERATURE: 98 F | RESPIRATION RATE: 16 BRPM | HEART RATE: 86 BPM | WEIGHT: 250 LBS | OXYGEN SATURATION: 97 % | HEIGHT: 67 IN | BODY MASS INDEX: 39.24 KG/M2 | SYSTOLIC BLOOD PRESSURE: 156 MMHG

## 2019-01-02 DIAGNOSIS — B97.89 VIRAL SORE THROAT: Primary | ICD-10-CM

## 2019-01-02 DIAGNOSIS — N30.00 ACUTE CYSTITIS WITHOUT HEMATURIA: ICD-10-CM

## 2019-01-02 DIAGNOSIS — J02.8 VIRAL SORE THROAT: Primary | ICD-10-CM

## 2019-01-02 DIAGNOSIS — N13.30 HYDRONEPHROSIS, UNSPECIFIED HYDRONEPHROSIS TYPE: ICD-10-CM

## 2019-01-02 DIAGNOSIS — Z63.4 GRIEF AT LOSS OF CHILD: ICD-10-CM

## 2019-01-02 DIAGNOSIS — F43.21 GRIEF AT LOSS OF CHILD: ICD-10-CM

## 2019-01-02 LAB
BILIRUBIN, POC UA: NEGATIVE
BLOOD, POC UA: NEGATIVE
CLARITY, POC UA: CLEAR
COLOR, POC UA: YELLOW
CTP QC/QA: YES
GLUCOSE, POC UA: NEGATIVE
KETONES, POC UA: NEGATIVE
LEUKOCYTE EST, POC UA: ABNORMAL
NITRITE, POC UA: NEGATIVE
PH UR STRIP: 6 [PH]
PROTEIN, POC UA: ABNORMAL
S PYO RRNA THROAT QL PROBE: NEGATIVE
SPECIFIC GRAVITY, POC UA: 1.01
UROBILINOGEN, POC UA: 1 E.U./DL

## 2019-01-02 PROCEDURE — 25000003 PHARM REV CODE 250: Mod: ER | Performed by: NURSE PRACTITIONER

## 2019-01-02 PROCEDURE — 87804 INFLUENZA ASSAY W/OPTIC: CPT | Mod: ER

## 2019-01-02 PROCEDURE — 87081 CULTURE SCREEN ONLY: CPT | Mod: 59

## 2019-01-02 PROCEDURE — 87086 URINE CULTURE/COLONY COUNT: CPT

## 2019-01-02 PROCEDURE — 81003 URINALYSIS AUTO W/O SCOPE: CPT | Mod: ER

## 2019-01-02 PROCEDURE — 99284 EMERGENCY DEPT VISIT MOD MDM: CPT | Mod: 25,ER

## 2019-01-02 RX ORDER — TRAMADOL HYDROCHLORIDE 50 MG/1
50 TABLET ORAL
Status: COMPLETED | OUTPATIENT
Start: 2019-01-02 | End: 2019-01-02

## 2019-01-02 RX ORDER — CIPROFLOXACIN 500 MG/1
500 TABLET ORAL EVERY 12 HOURS
Qty: 20 TABLET | Refills: 0 | Status: SHIPPED | OUTPATIENT
Start: 2019-01-02 | End: 2019-01-09

## 2019-01-02 RX ADMIN — TRAMADOL HYDROCHLORIDE 50 MG: 50 TABLET, FILM COATED ORAL at 06:01

## 2019-01-02 SDOH — SOCIAL DETERMINANTS OF HEALTH (SDOH): DISSAPEARANCE AND DEATH OF FAMILY MEMBER: Z63.4

## 2019-01-02 NOTE — TELEPHONE ENCOUNTER
Spoke with patient requesting to have an appoint today patient staid that she's having pain on her side is worse , advise to patient if getting worse to go to the ER or Urgent care , patient do have an appoint tomorrow @ 3 pm . Patient verbalized understand .

## 2019-01-02 NOTE — ED PROVIDER NOTES
Encounter Date: 1/2/2019    SCRIBE #1 NOTE: I, Pepper Angelo, am scribing for, and in the presence of,  Toussaint Battley, FNP. I have scribed the following portions of the note - Other sections scribed: HPI, ROS, PE.       History     Chief Complaint   Patient presents with    Flank Pain     Left flank pain; Pt reports Dec 20th having steriod shot but pain has worsen.  Pt reports taking oxycodone but no relief. Pt has appt with Dr. Cerda in morning    Sore Throat     onset x 1 day.  Denies taking anything for symptoms     Patient reports has an appointment with PCP tomorrow morning.       The history is provided by the patient. No  was used.   Back Pain    This is a new problem. The current episode started several weeks ago (12/10/18). The problem occurs constantly. The problem has been gradually worsening. The pain is associated with no known injury. Pain location: left flank. The pain radiates to the left thigh. Exacerbated by: movment. Pertinent negatives include no chest pain, no fever, no numbness, no abdominal pain, no bowel incontinence, no dysuria and no tingling. Treatments tried: steroids and oxycodone. The treatment provided no relief.   Sore Throat    This is a new problem. The current episode started yesterday. The problem has been unchanged. There has been no fever. Pertinent negatives include no abdominal pain, congestion, coughing, diarrhea, ear discharge, neck pain, shortness of breath, trouble swallowing or vomiting. She has tried nothing for the symptoms.     Review of patient's allergies indicates:   Allergen Reactions    Ondansetron hcl (pf) Hives and Itching    Ketorolac Itching     Past Medical History:   Diagnosis Date    Arthritis     Gout attack     Hx of psychiatric care     Hypertension     Psychiatric problem     Renal disorder     Sciatic nerve pain 2013    Therapy     used to follow with psychiatrist but doesn't recall whom, 2012    Tuberculosis       Past Surgical History:   Procedure Laterality Date    KNEE SURGERY  2014    left knee surgery      Family History   Problem Relation Age of Onset    Tuberculosis Mother     Stroke Father     Bipolar disorder Daughter     Suicide Daughter     Depression Daughter     Bipolar disorder Daughter     Depression Daughter      Social History     Tobacco Use    Smoking status: Former Smoker     Last attempt to quit: 1976     Years since quittin.0    Smokeless tobacco: Never Used   Substance Use Topics    Alcohol use: Yes     Alcohol/week: 0.0 oz     Comment: red wine    Drug use: No     Review of Systems   Constitutional: Negative.  Negative for appetite change and fever.   HENT: Positive for sore throat. Negative for congestion, dental problem, ear discharge, hearing loss, mouth sores, rhinorrhea and trouble swallowing.    Eyes: Negative.  Negative for pain and discharge.   Respiratory: Negative.  Negative for cough and shortness of breath.    Cardiovascular: Negative.  Negative for chest pain.   Gastrointestinal: Negative.  Negative for abdominal distention, abdominal pain, bowel incontinence, constipation, diarrhea, nausea, rectal pain and vomiting.   Endocrine: Negative.    Genitourinary: Negative.  Negative for dyspareunia, dysuria, hematuria, vaginal bleeding, vaginal discharge and vaginal pain.   Musculoskeletal: Positive for back pain. Negative for neck pain.   Skin: Negative.  Negative for rash.   Allergic/Immunologic: Negative.    Neurological: Negative.  Negative for tingling, facial asymmetry, speech difficulty, light-headedness and numbness.   Hematological: Negative.    Psychiatric/Behavioral: Negative.  Negative for agitation, dysphoric mood and sleep disturbance.   All other systems reviewed and are negative.      Physical Exam     Initial Vitals [19 1607]   BP Pulse Resp Temp SpO2   (!) 150/90 88 18 98 °F (36.7 °C) 96 %      MAP       --         Physical Exam    Nursing  note and vitals reviewed.  Constitutional: She appears well-developed and well-nourished.   HENT:   Head: Normocephalic and atraumatic.   Mouth/Throat: Oropharynx is clear and moist and mucous membranes are normal. No oropharyngeal exudate, posterior oropharyngeal edema or posterior oropharyngeal erythema.   Eyes: EOM are normal. Pupils are equal, round, and reactive to light.   Neck: Normal range of motion. Neck supple.   Cardiovascular: Normal rate, regular rhythm and normal heart sounds. Exam reveals no gallop and no friction rub.    No murmur heard.  Pulmonary/Chest: Breath sounds normal. No respiratory distress. She has no wheezes. She has no rhonchi. She has no rales.   Abdominal: Soft. Bowel sounds are normal. There is no tenderness.   Musculoskeletal: Normal range of motion. She exhibits no tenderness.   Left flank tenderness   Neurological: She is alert and oriented to person, place, and time. She has normal strength.   Skin: Skin is warm and dry. Capillary refill takes less than 2 seconds. No rash noted.   Psychiatric: She has a normal mood and affect. Her behavior is normal. Judgment and thought content normal.         ED Course   Procedures  Labs Reviewed   POCT URINALYSIS W/O SCOPE - Abnormal; Notable for the following components:       Result Value    Glucose, UA Negative (*)     Bilirubin, UA Negative (*)     Ketones, UA Negative (*)     Blood, UA Negative (*)     Protein, UA 2+ (*)     Nitrite, UA Negative (*)     Leukocytes, UA 1+ (*)     All other components within normal limits   CULTURE, STREP A,  THROAT   CULTURE, URINE   POCT URINALYSIS W/O SCOPE   POCT RAPID STREP A          Imaging Results          CT Renal Stone Study ABD Pelvis WO (Final result)  Result time 01/02/19 17:52:15    Final result by Hawa Boogie MD (01/02/19 17:52:15)                 Impression:      1. No acute intra-abdominal abnormalities identified.  2. Small nonobstructing left renal stone.  3. Bilateral renal  cysts.  4. Mild colonic diverticulosis without evidence of acute diverticulitis.  5. Additional findings as detailed above.      Electronically signed by: Hawa Boogie MD  Date:    01/02/2019  Time:    17:52             Narrative:    EXAMINATION:  CT RENAL STONE STUDY ABD PELVIS WO    CLINICAL HISTORY:  Flank pain, stone disease suspected;left flank pain;    TECHNIQUE:  Low dose axial images, sagittal and coronal reformations were obtained from the lung bases to the pubic symphysis.  Contrast was not administered.    COMPARISON:  None    FINDINGS:  The visualized portion of the heart is unremarkable.  The lung bases are clear.    No significant hepatic abnormality seen on this noncontrast exam.  Liver is enlarged measuring 21.5 cm.  There is no intra-or extrahepatic biliary ductal dilatation.  The gallbladder is unremarkable.  The stomach, pancreas, spleen, and adrenal glands are unremarkable.    Single small nonobstructing stone is seen within the left kidney, better appreciated on coronal images.  No additional stones or hydronephrosis seen kidneys are lobular in contour with multiple suspected hypodense renal cysts seen.  Ureters are unremarkable along their courses.  Several small pelvic phleboliths are identified along the mid to distal left ureteral course with no definite ureteral stones seen.  Urinary bladder is nondistended.  Uterus is small or has been removed.    Appendix is visualized and is unremarkable.  The visualized loops of small and large bowel show no evidence of obstruction or inflammation.  Mild diverticulosis is seen within the descending and sigmoid colon without evidence of acute diverticulitis.  No free air or free fluid.    Aorta tapers normally.    No acute osseous abnormality identified.  Postsurgical changes of posterior instrumented lumbar fusion are visualized at the L4-5 level with intervertebral disc spacer.  Degenerative changes seen involving the bilateral hips and SI joints.   Subcutaneous soft tissue structures are unremarkable.                                 Medical Decision Making:   Initial Assessment:   Viral sore throat, acute cystitis, hydronephrosis  Differential Diagnosis:   Pyelonephritis, back spasm  Clinical Tests:   Lab Tests: Reviewed and Ordered  The following lab test(s) were unremarkable: Urinalysis  Radiological Study: Ordered and Reviewed  ED Management:  The patient will be given tramadol p.o. in the ER.  Patient will be discharged home with instructions to take her prescription oxycodone as well as rest, refrain from strenuous activity and follow up with her primary care provider tomorrow as scheduled.  Patient is instructed to also drink 6-8 glasses of water daily and take over-the-counter sore throat lozenges as needed.  Patient is instructed that her sore throat is likely viral and should subside on its own.  Patient is instructed to return to the ER as needed if symptoms worsen or fail to improve.  Patient verbalized understanding of discharge instruction and treatment plan.                       Clinical Impression:     1. Viral sore throat    2. Acute cystitis without hematuria    3. Hydronephrosis, unspecified hydronephrosis type           This document was produced by a scribe under my direction and in my presence. I agree with the content of the note and have made any necessary edits.        Toussaint Battley III, DENNYS  01/02/19 2657

## 2019-01-02 NOTE — TELEPHONE ENCOUNTER
----- Message from Lucia Aguayo sent at 1/2/2019  2:21 PM CST -----  Contact: Gwendolyn [phone]  The patient is requesting to be seen today if possible. She reports that she is in excruciating pain. Please call at your earliest convenience.

## 2019-01-03 ENCOUNTER — OFFICE VISIT (OUTPATIENT)
Dept: FAMILY MEDICINE | Facility: CLINIC | Age: 64
End: 2019-01-03
Payer: MEDICARE

## 2019-01-03 VITALS
DIASTOLIC BLOOD PRESSURE: 80 MMHG | HEART RATE: 88 BPM | SYSTOLIC BLOOD PRESSURE: 128 MMHG | WEIGHT: 246.25 LBS | BODY MASS INDEX: 38.65 KG/M2 | RESPIRATION RATE: 17 BRPM | TEMPERATURE: 99 F | HEIGHT: 67 IN | OXYGEN SATURATION: 97 %

## 2019-01-03 DIAGNOSIS — M54.32 SCIATICA OF LEFT SIDE: ICD-10-CM

## 2019-01-03 DIAGNOSIS — Z12.11 COLON CANCER SCREENING: ICD-10-CM

## 2019-01-03 DIAGNOSIS — N18.30 STAGE 3 CHRONIC KIDNEY DISEASE: ICD-10-CM

## 2019-01-03 DIAGNOSIS — I10 ESSENTIAL HYPERTENSION: Primary | ICD-10-CM

## 2019-01-03 PROCEDURE — 99999 PR PBB SHADOW E&M-EST. PATIENT-LVL V: ICD-10-PCS | Mod: PBBFAC,,, | Performed by: FAMILY MEDICINE

## 2019-01-03 PROCEDURE — 3008F PR BODY MASS INDEX (BMI) DOCUMENTED: ICD-10-PCS | Mod: CPTII,S$GLB,, | Performed by: FAMILY MEDICINE

## 2019-01-03 PROCEDURE — 99215 OFFICE O/P EST HI 40 MIN: CPT | Mod: S$GLB,,, | Performed by: FAMILY MEDICINE

## 2019-01-03 PROCEDURE — 99999 PR PBB SHADOW E&M-EST. PATIENT-LVL V: CPT | Mod: PBBFAC,,, | Performed by: FAMILY MEDICINE

## 2019-01-03 PROCEDURE — 3074F SYST BP LT 130 MM HG: CPT | Mod: CPTII,S$GLB,, | Performed by: FAMILY MEDICINE

## 2019-01-03 PROCEDURE — 3079F DIAST BP 80-89 MM HG: CPT | Mod: CPTII,S$GLB,, | Performed by: FAMILY MEDICINE

## 2019-01-03 PROCEDURE — 3079F PR MOST RECENT DIASTOLIC BLOOD PRESSURE 80-89 MM HG: ICD-10-PCS | Mod: CPTII,S$GLB,, | Performed by: FAMILY MEDICINE

## 2019-01-03 PROCEDURE — 99499 RISK ADDL DX/OHS AUDIT: ICD-10-PCS | Mod: S$GLB,,, | Performed by: FAMILY MEDICINE

## 2019-01-03 PROCEDURE — 99499 UNLISTED E&M SERVICE: CPT | Mod: S$GLB,,, | Performed by: FAMILY MEDICINE

## 2019-01-03 PROCEDURE — 3074F PR MOST RECENT SYSTOLIC BLOOD PRESSURE < 130 MM HG: ICD-10-PCS | Mod: CPTII,S$GLB,, | Performed by: FAMILY MEDICINE

## 2019-01-03 PROCEDURE — 3008F BODY MASS INDEX DOCD: CPT | Mod: CPTII,S$GLB,, | Performed by: FAMILY MEDICINE

## 2019-01-03 PROCEDURE — 99215 PR OFFICE/OUTPT VISIT, EST, LEVL V, 40-54 MIN: ICD-10-PCS | Mod: S$GLB,,, | Performed by: FAMILY MEDICINE

## 2019-01-03 RX ORDER — DULOXETIN HYDROCHLORIDE 60 MG/1
60 CAPSULE, DELAYED RELEASE ORAL DAILY
Qty: 90 CAPSULE | Refills: 1 | OUTPATIENT
Start: 2019-01-03

## 2019-01-03 RX ORDER — DULOXETIN HYDROCHLORIDE 60 MG/1
CAPSULE, DELAYED RELEASE ORAL
Qty: 90 CAPSULE | Refills: 0 | Status: SHIPPED | OUTPATIENT
Start: 2019-01-03 | End: 2019-03-30 | Stop reason: SDUPTHER

## 2019-01-03 RX ORDER — TRAMADOL HYDROCHLORIDE 50 MG/1
50 TABLET ORAL EVERY 8 HOURS PRN
Qty: 90 TABLET | Refills: 1 | Status: SHIPPED | OUTPATIENT
Start: 2019-01-03 | End: 2019-03-19 | Stop reason: SDUPTHER

## 2019-01-03 NOTE — PROGRESS NOTES
Patient refused to schedule an appoint at this time for mammogram . Colonoscopy agree today for future .

## 2019-01-03 NOTE — PROGRESS NOTES
Chief Complaint   Patient presents with    lt side pain    ER follow up    Colon Cancer Screening       Gwendolyn Fournier is a 63 y.o. female who presents per the Chief Complaint.  Pt is known to me and was last seen by me on 11/16/2018.  All known chronic medical issues have been documented.       Hypertension   This is a chronic problem. The current episode started more than 1 year ago. The problem is unchanged. The problem is controlled. Associated symptoms include anxiety. Pertinent negatives include no blurred vision, chest pain, headaches, malaise/fatigue, neck pain, orthopnea, palpitations, peripheral edema, PND, shortness of breath or sweats. Agents associated with hypertension include NSAIDs. Risk factors for coronary artery disease include obesity and post-menopausal state. Past treatments include ACE inhibitors. The current treatment provides moderate improvement. There is no history of angina, kidney disease, CAD/MI, CVA, heart failure, left ventricular hypertrophy, PVD or retinopathy. There is no history of chronic renal disease, coarctation of the aorta, hyperaldosteronism, hypercortisolism, hyperparathyroidism, a hypertension causing med, pheochromocytoma, renovascular disease, sleep apnea or a thyroid problem.        ROS  Review of Systems   Constitutional: Negative.  Negative for activity change, appetite change, chills, diaphoresis, fatigue, fever, malaise/fatigue and unexpected weight change.   HENT: Negative.  Negative for congestion, ear pain, hearing loss, nosebleeds, postnasal drip, rhinorrhea, sinus pressure, sneezing, sore throat and trouble swallowing.    Eyes: Negative for blurred vision, pain and visual disturbance.   Respiratory: Negative for cough, choking and shortness of breath.    Cardiovascular: Negative for chest pain, palpitations, orthopnea, leg swelling and PND.   Gastrointestinal: Negative for abdominal pain, constipation, diarrhea, nausea and vomiting.   Genitourinary:  "Positive for flank pain. Negative for difficulty urinating, dysuria, frequency and urgency.   Musculoskeletal: Positive for arthralgias, back pain, gait problem and joint swelling. Negative for myalgias and neck pain.   Skin: Negative.    Allergic/Immunologic: Negative for environmental allergies and food allergies.   Neurological: Negative for dizziness, seizures, syncope, weakness, light-headedness and headaches.   Psychiatric/Behavioral: Positive for dysphoric mood and sleep disturbance. Negative for agitation, confusion, decreased concentration, hallucinations, self-injury and suicidal ideas. The patient is nervous/anxious. The patient is not hyperactive.        Physical Exam  Vitals:    01/03/19 1510   BP: 128/80   Pulse: 88   Resp: 17   Temp: 98.5 °F (36.9 °C)    Body mass index is 38.57 kg/m².  Weight: 111.7 kg (246 lb 4.1 oz)   Height: 5' 7" (170.2 cm)     Physical Exam   Constitutional: She is oriented to person, place, and time. She appears well-developed and well-nourished. She is active and cooperative.  Non-toxic appearance. She does not have a sickly appearance. She does not appear ill. No distress.   HENT:   Head: Normocephalic and atraumatic.   Right Ear: Hearing and external ear normal. No decreased hearing is noted.   Left Ear: Hearing and external ear normal. No decreased hearing is noted.   Nose: Nose normal. No rhinorrhea or nasal deformity.   Mouth/Throat: Uvula is midline and oropharynx is clear and moist. She does not have dentures. Normal dentition.   Eyes: Conjunctivae, EOM and lids are normal. Pupils are equal, round, and reactive to light. Right eye exhibits no chemosis, no discharge and no exudate. No foreign body present in the right eye. Left eye exhibits no chemosis, no discharge and no exudate. No foreign body present in the left eye. No scleral icterus.   Neck: Normal range of motion and full passive range of motion without pain. Neck supple.   Cardiovascular: Normal rate, regular " rhythm, S1 normal, S2 normal and normal heart sounds. Exam reveals no gallop and no friction rub.   No murmur heard.  Pulmonary/Chest: Effort normal and breath sounds normal. No accessory muscle usage. No respiratory distress. She has no decreased breath sounds. She has no wheezes. She has no rhonchi. She has no rales.   Abdominal: Soft. Normal appearance. She exhibits no distension. There is no hepatosplenomegaly. There is no tenderness. There is no rigidity, no rebound and no guarding.   Musculoskeletal:        Left knee: She exhibits bony tenderness. She exhibits normal range of motion, no swelling, no effusion, no ecchymosis, no deformity, no laceration, no erythema, normal alignment, no LCL laxity, normal patellar mobility, normal meniscus and no MCL laxity. Tenderness found. Lateral joint line tenderness noted. No medial joint line, no MCL, no LCL and no patellar tendon tenderness noted.        Left upper leg: She exhibits tenderness. She exhibits no bony tenderness, no swelling, no edema, no deformity and no laceration.        Legs:  Neurological: She is alert and oriented to person, place, and time. She has normal strength. No cranial nerve deficit or sensory deficit. She exhibits normal muscle tone. She displays no seizure activity. Coordination and gait normal.   Skin: Skin is warm, dry and intact. No rash noted. She is not diaphoretic.   Psychiatric: Her speech is normal and behavior is normal. Judgment and thought content normal. Her mood appears not anxious. Her affect is not angry, not blunt, not labile and not inappropriate. She is not actively hallucinating. Cognition and memory are normal. She does not exhibit a depressed mood. She is attentive.       Assessment & Plan    1. Essential hypertension  Patient was counseled and encouraged to maintain a low sodium diet, as well as increasing physical activity.  Recommend random BP checks at home on a regular basis.  Repeat BP at end of visit was not  necessary. Will continue medication at this time, and follow up in 3-6 months, or sooner if blood pressure begins to increase.       2. Sciatica of left side  Patient was advised that pain is most likely sciatica, which is caused by nerve entrapment in the lower back.  Advised to stretch the hip and leg as much as tolerated, and to continue NSAID therapy.  In addition, medication for neuropathic pain was ordered and patient was advised to use medication up to three times daily for relief.  Patient was advised that pain may last several days, but should resolve.  If pain persists, patient is encouraged to follow up for further evaluation.   - traMADol (ULTRAM) 50 mg tablet; Take 1 tablet (50 mg total) by mouth every 8 (eight) hours as needed for Pain.  Dispense: 90 tablet; Refill: 1    3. Stage 3 chronic kidney disease  Stable; encouraged patient to avoid NSAID medications and to increase water and clear liquid hydration.  Will continue to monitor for decline and refer as necessary.     4. Colon cancer screening  Patient is due for colonoscopy.  Order placed in [facility] and patient will be contacted to schedule appointment.  I will notify patient of results once available.    - Case request GI: COLONOSCOPY      Follow up documented    ACTIVE MEDICAL ISSUES:  Documented in Problem List    PAST MEDICAL HISTORY  Documented    PAST SURGICAL HISTORY:  Documented    SOCIAL HISTORY:  Documented    FAMILY HISTORY:  Documented    ALLERGIES AND MEDICATIONS: updated and reviewed.  Documented    Health Maintenance       Date Due Completion Date    TETANUS VACCINE 01/24/1972 ---    Colonoscopy 01/24/2004 ---    Mammogram 09/15/2017 9/15/2015 (Done)    Override on 9/15/2015: Done (future)    Lipid Panel 11/19/2023 11/19/2018    Override on 9/15/2015: Done (future)

## 2019-01-04 LAB
BACTERIA THROAT CULT: NORMAL
BACTERIA UR CULT: NORMAL

## 2019-01-09 ENCOUNTER — TELEPHONE (OUTPATIENT)
Dept: FAMILY MEDICINE | Facility: CLINIC | Age: 64
End: 2019-01-09

## 2019-01-09 NOTE — TELEPHONE ENCOUNTER
Patient given number to endoscopy to call to inquire on scheduling ordered colonoscopy, patient verbalized understanding

## 2019-01-09 NOTE — TELEPHONE ENCOUNTER
----- Message from Bel Tyler sent at 1/9/2019 12:27 PM CST -----  Contact: pt  Name of Who is Calling: pt      What is the request in detail: pt requesting to get a colonoscopy. Call pt      Can the clinic reply by [facility]: no      What Number to Call Back if not in [facility]: [phone]

## 2019-01-10 DIAGNOSIS — M1A.0790 CHRONIC GOUT OF FOOT, UNSPECIFIED CAUSE, UNSPECIFIED LATERALITY: ICD-10-CM

## 2019-01-13 NOTE — PROGRESS NOTES
Patient, Gwendolyn Fournier (MRN #[id number]), presented with a recorded BMI of 38.57 kg/m^2 and a documented comorbidity(s):  - Hypertension  to which the severe obesity is a contributing factor. This is consistent with the definition of severe obesity (BMI 35.0-39.9) with comorbidity (ICD-10 E66.01, Z68.35). The patient's severe obesity was monitored, evaluated, addressed and/or treated. This addendum to the medical record is made on 01/12/2019.

## 2019-01-16 DIAGNOSIS — M54.31 SCIATICA OF RIGHT SIDE: ICD-10-CM

## 2019-01-16 DIAGNOSIS — Z98.890 HISTORY OF OPEN REDUCTION AND INTERNAL FIXATION (ORIF) PROCEDURE: ICD-10-CM

## 2019-01-16 DIAGNOSIS — M15.9 PRIMARY OSTEOARTHRITIS INVOLVING MULTIPLE JOINTS: ICD-10-CM

## 2019-01-16 RX ORDER — OXYCODONE AND ACETAMINOPHEN 7.5; 325 MG/1; MG/1
1 TABLET ORAL EVERY 8 HOURS PRN
Qty: 90 TABLET | Refills: 0 | Status: CANCELLED | OUTPATIENT
Start: 2019-01-16

## 2019-01-16 NOTE — TELEPHONE ENCOUNTER
----- Message from Lucia Aguayo sent at 1/16/2019  4:26 PM CST -----  Contact: Gwendolyn [phone]  REFILL: OXYCODONE    PHARMACY: [id number] - [address]  [facility] [place] [facility]

## 2019-01-17 DIAGNOSIS — M1A.0790 CHRONIC GOUT OF FOOT, UNSPECIFIED CAUSE, UNSPECIFIED LATERALITY: ICD-10-CM

## 2019-01-17 RX ORDER — ALLOPURINOL 100 MG/1
TABLET ORAL
Qty: 180 TABLET | Refills: 0 | Status: SHIPPED | OUTPATIENT
Start: 2019-01-17 | End: 2019-01-18

## 2019-01-18 RX ORDER — ALLOPURINOL 100 MG/1
TABLET ORAL
Qty: 180 TABLET | Refills: 0 | Status: SHIPPED | OUTPATIENT
Start: 2019-01-18 | End: 2019-10-13 | Stop reason: SDUPTHER

## 2019-01-18 NOTE — TELEPHONE ENCOUNTER
I spoke to the pt and advised that her Norco had been discontinued by Dr. Lombard and she was given Tramadol to use for pain. Pt states that Tramadol doesn't help her pain. Requested appointment to be seen. Scheduled an appointment for 2/1/2019 and added pt to the wait list.

## 2019-01-18 NOTE — TELEPHONE ENCOUNTER
----- Message from Andie Ramachandran sent at 1/18/2019  8:43 AM CST -----  Contact: self [phone]  Pt is requesting an increase & a refill on her oxycodone-acetaminophen 5-325 mg per tablet 2 tablet    .  [facility] [address] [place] [address] [place] [address] [place] [address] [place] [address]  Phone: [phone] Fax: [phone]

## 2019-01-21 ENCOUNTER — ANESTHESIA (OUTPATIENT)
Dept: ENDOSCOPY | Facility: HOSPITAL | Age: 64
End: 2019-01-21
Payer: MEDICARE

## 2019-01-21 ENCOUNTER — HOSPITAL ENCOUNTER (OUTPATIENT)
Facility: HOSPITAL | Age: 64
Discharge: HOME | End: 2019-01-21
Attending: INTERNAL MEDICINE | Admitting: INTERNAL MEDICINE
Payer: MEDICARE

## 2019-01-21 ENCOUNTER — ANESTHESIA EVENT (OUTPATIENT)
Dept: ENDOSCOPY | Facility: HOSPITAL | Age: 64
End: 2019-01-21
Payer: MEDICARE

## 2019-01-21 VITALS
OXYGEN SATURATION: 98 % | HEART RATE: 68 BPM | BODY MASS INDEX: 39.24 KG/M2 | TEMPERATURE: 98 F | SYSTOLIC BLOOD PRESSURE: 156 MMHG | WEIGHT: 250 LBS | DIASTOLIC BLOOD PRESSURE: 85 MMHG | RESPIRATION RATE: 18 BRPM | HEIGHT: 67 IN

## 2019-01-21 DIAGNOSIS — Z12.11 COLON CANCER SCREENING: ICD-10-CM

## 2019-01-21 PROCEDURE — G0105 COLORECTAL SCRN; HI RISK IND: HCPCS | Mod: ,,, | Performed by: INTERNAL MEDICINE

## 2019-01-21 PROCEDURE — D9220A PRA ANESTHESIA: Mod: ANES,,, | Performed by: ANESTHESIOLOGY

## 2019-01-21 PROCEDURE — 37000009 HC ANESTHESIA EA ADD 15 MINS: Performed by: INTERNAL MEDICINE

## 2019-01-21 PROCEDURE — G0121 COLON CA SCRN NOT HI RSK IND: HCPCS | Performed by: INTERNAL MEDICINE

## 2019-01-21 PROCEDURE — 63600175 PHARM REV CODE 636 W HCPCS: Performed by: NURSE ANESTHETIST, CERTIFIED REGISTERED

## 2019-01-21 PROCEDURE — D9220A PRA ANESTHESIA: ICD-10-PCS | Mod: CRNA,,, | Performed by: NURSE ANESTHETIST, CERTIFIED REGISTERED

## 2019-01-21 PROCEDURE — G0105 COLORECTAL SCRN; HI RISK IND: ICD-10-PCS | Mod: ,,, | Performed by: INTERNAL MEDICINE

## 2019-01-21 PROCEDURE — G0105 COLORECTAL SCRN; HI RISK IND: HCPCS | Performed by: INTERNAL MEDICINE

## 2019-01-21 PROCEDURE — 37000008 HC ANESTHESIA 1ST 15 MINUTES: Performed by: INTERNAL MEDICINE

## 2019-01-21 PROCEDURE — D9220A PRA ANESTHESIA: Mod: CRNA,,, | Performed by: NURSE ANESTHETIST, CERTIFIED REGISTERED

## 2019-01-21 PROCEDURE — 25000003 PHARM REV CODE 250: Performed by: NURSE ANESTHETIST, CERTIFIED REGISTERED

## 2019-01-21 PROCEDURE — D9220A PRA ANESTHESIA: ICD-10-PCS | Mod: ANES,,, | Performed by: ANESTHESIOLOGY

## 2019-01-21 RX ORDER — PROPOFOL 10 MG/ML
VIAL (ML) INTRAVENOUS
Status: DISCONTINUED | OUTPATIENT
Start: 2019-01-21 | End: 2019-01-21

## 2019-01-21 RX ORDER — LIDOCAINE HYDROCHLORIDE 20 MG/ML
INJECTION, SOLUTION EPIDURAL; INFILTRATION; INTRACAUDAL; PERINEURAL
Status: DISCONTINUED
Start: 2019-01-21 | End: 2019-01-21 | Stop reason: HOSPADM

## 2019-01-21 RX ORDER — LIDOCAINE HCL/PF 100 MG/5ML
SYRINGE (ML) INTRAVENOUS
Status: DISCONTINUED | OUTPATIENT
Start: 2019-01-21 | End: 2019-01-21

## 2019-01-21 RX ORDER — SODIUM CHLORIDE 0.9 G/100ML
3000 IRRIGANT IRRIGATION CONTINUOUS
Status: DISCONTINUED | OUTPATIENT
Start: 2019-01-21 | End: 2019-01-21 | Stop reason: HOSPADM

## 2019-01-21 RX ORDER — PROPOFOL 10 MG/ML
VIAL (ML) INTRAVENOUS
Status: DISCONTINUED
Start: 2019-01-21 | End: 2019-01-21 | Stop reason: HOSPADM

## 2019-01-21 RX ORDER — SODIUM CHLORIDE 9 MG/ML
INJECTION, SOLUTION INTRAVENOUS CONTINUOUS PRN
Status: DISCONTINUED | OUTPATIENT
Start: 2019-01-21 | End: 2019-01-21

## 2019-01-21 RX ADMIN — LIDOCAINE HYDROCHLORIDE 100 MG: 20 INJECTION, SOLUTION INTRAVENOUS at 10:01

## 2019-01-21 RX ADMIN — PROPOFOL 20 MG: 10 INJECTION, EMULSION INTRAVENOUS at 10:01

## 2019-01-21 RX ADMIN — SODIUM CHLORIDE: 0.9 INJECTION, SOLUTION INTRAVENOUS at 10:01

## 2019-01-21 RX ADMIN — PROPOFOL 100 MG: 10 INJECTION, EMULSION INTRAVENOUS at 10:01

## 2019-01-21 RX ADMIN — PROPOFOL 40 MG: 10 INJECTION, EMULSION INTRAVENOUS at 10:01

## 2019-01-21 NOTE — [employer] PATIENT INSTRUCTIONS
Discharge Summary/Instructions after an Endoscopic Procedure  Patient Name: Gwendolyn Fournier  Patient MRN: [id number]  Patient YOB: 1955 Monday, January 21, 2019  Shanna Jose MD  RESTRICTIONS:  During your procedure today, you received medications for sedation.  These   medications may affect your judgment, balance and coordination.  Therefore,   for 24 hours, you have the following restrictions:   - DO NOT drive a car, operate machinery, make legal/financial decisions,   sign important papers or drink alcohol.    ACTIVITY:  Today: no heavy lifting, straining or running due to procedural   sedation/anesthesia.  The following day: return to full activity including work.  DIET:  Eat and drink normally unless instructed otherwise.     TREATMENT FOR COMMON SIDE EFFECTS:  - Mild abdominal pain, nausea, belching, bloating or excessive gas:  rest,   eat lightly and use a heating pad.  - Sore Throat: treat with throat lozenges and/or gargle with warm salt   water.  - Because air was used during the procedure, expelling large amounts of air   from your rectum or belching is normal.  - If a bowel prep was taken, you may not have a bowel movement for 1-3 days.    This is normal.  SYMPTOMS TO WATCH FOR AND REPORT TO YOUR PHYSICIAN:  1. Abdominal pain or bloating, other than gas cramps.  2. Chest pain.  3. Back pain.  4. Signs of infection such as: chills or fever occurring within 24 hours   after the procedure.  5. Rectal bleeding, which would show as bright red, maroon, or black stools.   (A tablespoon of blood from the rectum is not serious, especially if   hemorrhoids are present.)  6. Vomiting.  7. Weakness or dizziness.  GO DIRECTLY TO THE NEAREST EMERGENCY ROOM IF YOU HAVE ANY OF THE FOLLOWING:      Difficulty breathing              Chills and/or fever over 101 F   Persistent vomiting and/or vomiting blood   Severe abdominal pain   Severe chest pain   Black, tarry stools   Bleeding- more than one  tablespoon   Any other symptom or condition that you feel may need urgent attention  Your doctor recommends these additional instructions:  If any biopsies were taken, your doctors clinic will contact you in 1 to 2   weeks with any results.  - Patient has a contact number available for emergencies.  The signs and   symptoms of potential delayed complications were discussed with the   patient.  Return to normal activities tomorrow.  Written discharge   instructions were provided to the patient.   - Discharge patient to home.   - Resume previous diet.   - Continue present medications.   - Repeat colonoscopy in 10 years for surveillance.   - Return to primary care physician in 1 month.   - The findings and recommendations were discussed with the patient and their   family.  For questions, problems or results please call your physician - Shanna Jose MD at Work:  [phone].  [facility] can be reached at [phone]     IF A COMPLICATION OR EMERGENCY SITUATION ARISES AND YOU ARE UNABLE TO REACH   YOUR PHYSICIAN - GO DIRECTLY TO THE EMERGENCY ROOM.  Shanna Jose MD  1/21/2019 10:59:45 AM  This report has been verified and signed electronically.  [employer]

## 2019-01-21 NOTE — TRANSFER OF CARE
"Anesthesia Transfer of Care Note    Patient: Gwendolyn Fournier    Procedure(s) Performed: Procedure(s) (LRB):  COLONOSCOPY (N/A)    Patient location: GI    Anesthesia Type: MAC    Transport from OR: Transported from OR on room air with adequate spontaneous ventilation    Post pain: adequate analgesia    Post vital signs: stable    Level of consciousness: awake    Nausea/Vomiting: no nausea/vomiting    Complications: none    Transfer of care protocol was followed      Last vitals:   Visit Vitals  BP (!) 150/79 (BP Location: Left arm, Patient Position: Lying)   Pulse 82   Temp 36.7 °C (98 °F) (Oral)   Resp 16   Ht 5' 7" (1.702 m)   Wt 113.4 kg (250 lb)   SpO2 97%   BMI 39.16 kg/m²     "

## 2019-01-21 NOTE — DISCHARGE INSTRUCTIONS
Hemorrhoids    Hemorrhoids are swollen and inflamed veins inside the rectum and near the anus. The rectum is the last several inches of the colon. The anus is the passage between the rectum and the outside of the body.  Causes  The veins can become swollen due to increased pressure in them. This is most often caused by:  · Chronic constipation or diarrhea  · Straining when having a bowel movement  · Sitting too long on the toilet  · A low-fiber diet  · Pregnancy  Symptoms  · Bleeding from the rectum (this may be noticeable after bowel movements)  · Lump near the anus  · Itching around the anus  · Pain around the anus  There are different types of hemorrhoids. Depending on the type you have and the severity, you may be able to treat yourself at home. In some cases, a procedure may be the best treatment option. Your healthcare provider can tell you more about this, if needed.  Home care  General care  · To get relief from pain or itching, try:  ¨ Topical products. Your healthcare provider may prescribe or recommend creams, ointments, or pads that can be applied to the hemorrhoid. Use these exactly as directed.  ¨ Medicines. Your healthcare provider may recommend stool softeners, suppositories, or laxatives to help manage constipation. Use these exactly as directed.  ¨ Sitz baths. A sitz bath involves sitting in a few inches of warm bath water. Be careful not to make the water so hot that you burn yourself--test it before sitting in it. Soak for about 10 to 15 minutes a few times a day. This may help relieve pain.  Tips to help prevent hemorrhoids  · Eat more fiber. Fiber adds bulk to stool and absorbs water as it moves through your colon. This makes stool softer and easier to pass.  ¨ Increase the fiber in your diet with more fiber-rich foods. These include fresh fruit, vegetables, and whole grains.  ¨ Take a fiber supplement or bulking agent, if advised to by your provider. These include products such as psyllium  or methylcellulose.  · Drink plenty of water, if directed to by your provider. This can help keep stool soft.  · Be more active. Frequent exercise aids digestion and helps prevent constipation. It may also help make bowel movements more regular.  · Dont strain during bowel movements. This can make hemorrhoids more likely. Also, dont sit on the toilet for long periods of time.  Follow-up care  Follow up with your healthcare provider, or as advised. If a culture or imaging tests were done, you will be notified of the results when they are ready. This may take a few days or longer.  When to seek medical advice  Call your healthcare provider right away if any of these occur:  · Increased bleeding from the rectum  · Increased pain around the rectum or anus  · Weakness or dizziness  Call 911  Call 911 or return to the emergency department right away if any of these occur:  · Trouble breathing or swallowing  · Fainting or loss of consciousness  · Unusually fast heart rate  · Vomiting blood  · Large amounts of blood in stool  Date Last Reviewed: 6/22/2015 © 2000-2017 [employer]. [address] [place] [address]. All rights reserved. This information is not intended as a substitute for professional medical care. Always follow your healthcare professional's instructions.        Understanding Diverticulosis and Diverticulitis     Pouches or diverticula usually occur in the lower part of the colon called the sigmoid.     The colon (large intestine) is the last part of the digestive tract. It absorbs water from stool and changes it from a liquid to a solid. In certain cases, small pouches called diverticula can form in the colon wall. This condition is called diverticulosis. The pouches can become infected. If this happens, it becomes a more serious problem called diverticulitis. These problems can be painful. But they can be managed.  Managing your condition  Diet changes or medicines may be  prescribed.   If you have diverticulosis  Recommendations include:  · Diet changes are often enough to control symptoms. The main changes are adding fiber (roughage) and drinking more water. Fiber absorbs water as it travels through your colon. This helps your stool stay soft and move smoothly. Water helps this process.  · If needed, you may be told to take over-the-counter stool softeners.  · To help relieve pain, antispasmodic medicines may be prescribed.  · Watch for changes in your bowel movements. Tell the healthcare provider if you notice any changes.  · Begin an exercise program. Ask your healthcare provider how to get started.  · Get plenty of rest and sleep.   If you have diverticulitis  Treatment depends on how bad your symptoms are.  · For mild symptoms. You may be put on a liquid diet for a short time. Antibiotics are usually prescribed. If these two steps relieve your symptoms, you may then be prescribed a high-fiber diet. If you still have symptoms, your healthcare provider will discuss more treatment choices with you.  · For severe symptoms. You may need to be admitted to the hospital. There, you can be given IV antibiotics and fluids. You will also be put on a low-fiber or liquid diet. Although not common, surgery is needed in some people with severe symptoms.  [place] to colon health     Diverticulitis occurs when the pouches become infected or inflamed.     Help keep your colon healthy with a diet that includes plenty of high-fiber fruits, vegetables, and whole grains. Drink plenty of liquids like water and juice. Maintain a healthy lifestyle including regular exercise, stress management, and adequate rest and sleep.   Date Last Reviewed: 7/1/2016  © 6457-9362 [employer]. [address] [place] [address], [place] [address]. All rights reserved. This information is not intended as a substitute for professional medical care. Always follow your healthcare professional's instructions.

## 2019-01-21 NOTE — H&P
Endoscopy Pre-Procedure H&P    Reason for Procedure: surveillance colonoscopy    HPI:  Pt is a 63 y.o. female who presents for surveillance colonoscopy.  No family history and no GI symptoms.  Hx polyps on last colonoscopy 5 years ago.  Unknown histology.    Past Medical History:   Diagnosis Date    Arthritis     Gout attack     Hx of psychiatric care     Hypertension     Psychiatric problem     Renal disorder     Sciatic nerve pain     Therapy     used to follow with psychiatrist but doesn't recall whom,     Tuberculosis        Past Surgical History:   Procedure Laterality Date    KNEE SURGERY  2014    left knee surgery        Family History   Problem Relation Age of Onset    Tuberculosis Mother     Stroke Father     Bipolar disorder Daughter     Suicide Daughter     Depression Daughter     Bipolar disorder Daughter     Depression Daughter        Social History     Socioeconomic History    Marital status:      Spouse name: Not on file    Number of children: 5    Years of education: Not on file    Highest education level: Not on file   Social Needs    Financial resource strain: Not on file    Food insecurity - worry: Not on file    Food insecurity - inability: Not on file    Transportation needs - medical: Not on file    Transportation needs - non-medical: Not on file   Occupational History    Occupation: homemaker   Tobacco Use    Smoking status: Former Smoker     Last attempt to quit: 1976     Years since quittin.0    Smokeless tobacco: Never Used   Substance and Sexual Activity    Alcohol use: Yes     Alcohol/week: 0.0 oz     Comment: red wine    Drug use: No    Sexual activity: Not Currently     Partners: Male   Other Topics Concern    Patient feels they ought to cut down on drinking/drug use Not Asked    Patient annoyed by others criticizing their drinking/drug use Not Asked    Patient has felt bad or guilty about drinking/drug use Not Asked     "Patient has had a drink/used drugs as an eye opener in the AM Not Asked   Social History Narrative    Not on file       Review of patient's allergies indicates:   Allergen Reactions    Ondansetron hcl (pf) Hives and Itching    Ketorolac Itching       No current facility-administered medications on file prior to encounter.      Current Outpatient Medications on File Prior to Encounter   Medication Sig Dispense Refill    diphenhydrAMINE (BENADRYL) 25 mg capsule Take 25 mg by mouth every 6 (six) hours as needed.      docusate sodium (COLACE) 100 MG capsule Take 100 mg by mouth.      DULoxetine (CYMBALTA) 60 MG capsule TAKE 1 CAPSULE(60 MG) BY MOUTH EVERY DAY 90 capsule 0    ferrous sulfate (FEOSOL) 325 mg (65 mg iron) Tab tablet Take 325 mg by mouth.      ferrous sulfate 325 mg (65 mg iron) Tab tablet TK 1 T PO  BID WITH MEALS  0    FLUCELVAX QUAD 5771-4652, PF, 60 mcg (15 mcg x 4)/0.5 mL Syrg vaccine       fluticasone (FLONASE) 50 mcg/actuation nasal spray SHAKE LIQUID AND USE 2 SPRAYS(100 MCG) IN EACH NOSTRIL EVERY DAY 48 mL 1    hydroCHLOROthiazide (HYDRODIURIL) 25 MG tablet TAKE 1 TABLET(25 MG) BY MOUTH EVERY DAY 90 tablet 1    metoprolol succinate (TOPROL-XL) 50 MG 24 hr tablet TAKE 1 TABLET(50 MG) BY MOUTH EVERY DAY 90 tablet 0    polyethylene glycol (COLYTE) 240-22.72-6.72 -5.84 gram SolR Take 4,000 mLs (4 L total) by mouth as directed. 1 Bottle 0    PREVNAR 13, PF, 0.5 mL Syrg       QUEtiapine (SEROQUEL) 100 MG Tab TAKE 1 TABLET(100 MG) BY MOUTH EVERY NIGHT AS NEEDED 90 tablet 0    traMADol (ULTRAM) 50 mg tablet Take 1 tablet (50 mg total) by mouth every 8 (eight) hours as needed for Pain. 90 tablet 1       ROS: Negative x 10    Patient Vitals for the past 24 hrs:   BP Temp Pulse Resp SpO2 Height Weight   01/21/19 1014 (!) 187/90 98.7 °F (37.1 °C) 76 18 98 % 5' 7" (1.702 m) 113.4 kg (250 lb)     Gen: Well developed, well nourished, no apparent distress  HEENT: Anicteric, PERRL, MMM  CV: Regular " rate and rhythm, no murmurs   Lungs: CTAB, no increased work of breathing  Abd: Soft, NT, ND, normal BS, no HSM  Ext: No C/C/E  Neuro: Alert and oriented to person, place, time; no weakness  Skin: No rashes/lesions  Psych: Normal mood and affect    Assessment:  Gwendolyn Fournier is a 63 y.o. female who presents for surveillance colonoscopy for history of polyps, unknown histology.    Recommendations:  1. Colonoscopy  2. F/U with PCP     Shanna Jose MD

## 2019-01-23 NOTE — TELEPHONE ENCOUNTER
Medication was denial per provider , patient schedule an appoint follow up to discuss .02/01/2019 @ 2:40 pm .

## 2019-01-24 NOTE — ANESTHESIA POSTPROCEDURE EVALUATION
"Anesthesia Post Evaluation    Patient: Gwendolyn Fournier    Procedure(s) Performed: Procedure(s) (LRB):  COLONOSCOPY (N/A)    Final Anesthesia Type: MAC  Patient location during evaluation: GI PACU  Patient participation: Yes- Able to Participate  Level of consciousness: awake and alert, oriented and awake  Post-procedure vital signs: reviewed and stable  Pain management: adequate  Airway patency: patent  PONV status at discharge: No PONV  Anesthetic complications: no      Cardiovascular status: blood pressure returned to baseline and hemodynamically stable  Respiratory status: unassisted, spontaneous ventilation and room air  Hydration status: euvolemic  Follow-up not needed.        Visit Vitals  BP (!) 156/85   Pulse 68   Temp 36.7 °C (98 °F) (Oral)   Resp 18   Ht 5' 7" (1.702 m)   Wt 113.4 kg (250 lb)   SpO2 98%   BMI 39.16 kg/m²       Pain/William Score: No Data Recorded      "

## 2019-01-24 NOTE — ANESTHESIA PREPROCEDURE EVALUATION
01/24/2019  Gwendolyn Fournier is a 64 y.o., female.    Anesthesia Evaluation    I have reviewed the Patient Summary Reports.    I have reviewed the Nursing Notes.   I have reviewed the Medications.     Review of Systems  Anesthesia Hx:  No problems with previous Anesthesia  History of prior surgery of interest to airway management or planning: Denies Family Hx of Anesthesia complications.   Denies Personal Hx of Anesthesia complications.     Pre-operative evaluation for Procedure(s) (LRB):  COLONOSCOPY (N/A)    Gwendolyn Fournier is a 64 y.o. female     Patient Active Problem List   Diagnosis    Essential hypertension    Sciatica of left side    Primary osteoarthritis involving multiple joints    Idiopathic chronic gout of multiple sites without tophus    History of open reduction and internal fixation (ORIF) procedure    Stage 3 chronic kidney disease       Review of patient's allergies indicates:   Allergen Reactions    Ondansetron hcl (pf) Hives and Itching    Ketorolac Itching       Current Facility-Administered Medications on File Prior to Encounter   Medication Dose Route Frequency Provider Last Rate Last Dose    lidocaine (PF) 10 mg/ml (1%) injection 40 mg  4 mL Intramuscular 1 time in Clinic/HOD Azikiwe K. Lombard, MD         Current Outpatient Medications on File Prior to Encounter   Medication Sig Dispense Refill    diphenhydrAMINE (BENADRYL) 25 mg capsule Take 25 mg by mouth every 6 (six) hours as needed.      docusate sodium (COLACE) 100 MG capsule Take 100 mg by mouth.      DULoxetine (CYMBALTA) 60 MG capsule TAKE 1 CAPSULE(60 MG) BY MOUTH EVERY DAY 90 capsule 0    ferrous sulfate (FEOSOL) 325 mg (65 mg iron) Tab tablet Take 325 mg by mouth.      ferrous sulfate 325 mg (65 mg iron) Tab tablet TK 1 T PO  BID WITH MEALS  0    FLUCELVAX QUAD 2091-4938, PF, 60 mcg (15 mcg x 4)/0.5 mL Syrg  vaccine       fluticasone (FLONASE) 50 mcg/actuation nasal spray SHAKE LIQUID AND USE 2 SPRAYS(100 MCG) IN EACH NOSTRIL EVERY DAY 48 mL 1    hydroCHLOROthiazide (HYDRODIURIL) 25 MG tablet TAKE 1 TABLET(25 MG) BY MOUTH EVERY DAY 90 tablet 1    metoprolol succinate (TOPROL-XL) 50 MG 24 hr tablet TAKE 1 TABLET(50 MG) BY MOUTH EVERY DAY 90 tablet 0    PREVNAR 13, PF, 0.5 mL Syrg       QUEtiapine (SEROQUEL) 100 MG Tab TAKE 1 TABLET(100 MG) BY MOUTH EVERY NIGHT AS NEEDED 90 tablet 0    traMADol (ULTRAM) 50 mg tablet Take 1 tablet (50 mg total) by mouth every 8 (eight) hours as needed for Pain. 90 tablet 1       Past Surgical History:   Procedure Laterality Date    COLONOSCOPY N/A 2019    [facility]    KNEE SURGERY  2014    left knee surgery        Social History     Socioeconomic History    Marital status:      Spouse name: Not on file    Number of children: 5    Years of education: Not on file    Highest education level: Not on file   Social Needs    Financial resource strain: Not on file    Food insecurity - worry: Not on file    Food insecurity - inability: Not on file    Transportation needs - medical: Not on file    Transportation needs - non-medical: Not on file   Occupational History    Occupation: homemaker   Tobacco Use    Smoking status: Former Smoker     Last attempt to quit: 1976     Years since quittin.1    Smokeless tobacco: Never Used   Substance and Sexual Activity    Alcohol use: Yes     Alcohol/week: 0.0 oz     Comment: red wine    Drug use: No    Sexual activity: Not Currently     Partners: Male   Other Topics Concern    Patient feels they ought to cut down on drinking/drug use Not Asked    Patient annoyed by others criticizing their drinking/drug use Not Asked    Patient has felt bad or guilty about drinking/drug use Not Asked    Patient has had a drink/used drugs as an eye opener in the AM Not Asked   Social History  Narrative    Not on file           Physical Exam  General:  Well nourished    Airway/Jaw/Neck:  Airway Findings: Mouth Opening: Normal Tongue: Normal  General Airway Assessment: Adult  Mallampati: II     Eyes/Ears/Nose:  Eyes/Ears/Nose Findings:          Mental Status:  Mental Status Findings:  Cooperative, Alert and Oriented         Anesthesia Plan  Type of Anesthesia, risks & benefits discussed:  Anesthesia Type:  MAC, general  Patient's Preference:   Intra-op Monitoring Plan: standard ASA monitors  Intra-op Monitoring Plan Comments:   Post Op Pain Control Plan: multimodal analgesia, IV/PO Opioids PRN and per primary service following discharge from PACU  Post Op Pain Control Plan Comments:   Induction:   IV  Beta Blocker:  Patient is on a Beta-Blocker and has received one dose within the past 24 hours (No further documentation required).       Informed Consent: Patient understands risks and agrees with Anesthesia plan.  Questions answered. Anesthesia consent signed with patient.  ASA Score: 2     Day of Surgery Review of History & Physical:     H&P completed by Anesthesiologist.       Ready For Surgery From Anesthesia Perspective.

## 2019-01-30 DIAGNOSIS — F51.04 PSYCHOPHYSIOLOGICAL INSOMNIA: ICD-10-CM

## 2019-02-01 RX ORDER — QUETIAPINE FUMARATE 200 MG/1
TABLET, FILM COATED ORAL
Qty: 30 TABLET | Refills: 0 | OUTPATIENT
Start: 2019-02-01

## 2019-02-08 ENCOUNTER — TELEPHONE (OUTPATIENT)
Dept: PAIN MEDICINE | Facility: CLINIC | Age: 64
End: 2019-02-08

## 2019-02-08 NOTE — TELEPHONE ENCOUNTER
Staff contacted the patient to confirm her New Patient appointment on 2/11/19 for 3 pm with Dr. Coreas. Staff reminded the patient of IPM and to bring in any necessary records in to the appointment with her. Patient can call our office at [phone] if she needs to cancel or reschedule.     Patient verbalized understanding and expressed thanks.

## 2019-02-11 ENCOUNTER — OFFICE VISIT (OUTPATIENT)
Dept: SPINE | Facility: CLINIC | Age: 64
End: 2019-02-11
Attending: ANESTHESIOLOGY
Payer: MEDICARE

## 2019-02-11 VITALS
SYSTOLIC BLOOD PRESSURE: 142 MMHG | HEIGHT: 67 IN | TEMPERATURE: 99 F | DIASTOLIC BLOOD PRESSURE: 77 MMHG | WEIGHT: 250 LBS | BODY MASS INDEX: 39.24 KG/M2 | HEART RATE: 86 BPM

## 2019-02-11 DIAGNOSIS — M25.561 CHRONIC PAIN OF BOTH KNEES: ICD-10-CM

## 2019-02-11 DIAGNOSIS — M25.562 CHRONIC PAIN OF BOTH KNEES: ICD-10-CM

## 2019-02-11 DIAGNOSIS — G89.4 CHRONIC PAIN SYNDROME: Primary | ICD-10-CM

## 2019-02-11 DIAGNOSIS — M96.1 LUMBAR POST-LAMINECTOMY SYNDROME: ICD-10-CM

## 2019-02-11 DIAGNOSIS — M47.819 SPONDYLOSIS WITHOUT MYELOPATHY: ICD-10-CM

## 2019-02-11 DIAGNOSIS — M25.551 PAIN OF RIGHT HIP JOINT: ICD-10-CM

## 2019-02-11 DIAGNOSIS — G89.29 CHRONIC PAIN OF BOTH KNEES: ICD-10-CM

## 2019-02-11 PROCEDURE — 3077F SYST BP >= 140 MM HG: CPT | Mod: CPTII,S$GLB,, | Performed by: ANESTHESIOLOGY

## 2019-02-11 PROCEDURE — 99204 PR OFFICE/OUTPT VISIT, NEW, LEVL IV, 45-59 MIN: ICD-10-PCS | Mod: GC,S$GLB,, | Performed by: ANESTHESIOLOGY

## 2019-02-11 PROCEDURE — 3008F BODY MASS INDEX DOCD: CPT | Mod: CPTII,S$GLB,, | Performed by: ANESTHESIOLOGY

## 2019-02-11 PROCEDURE — 3078F PR MOST RECENT DIASTOLIC BLOOD PRESSURE < 80 MM HG: ICD-10-PCS | Mod: CPTII,S$GLB,, | Performed by: ANESTHESIOLOGY

## 2019-02-11 PROCEDURE — 3078F DIAST BP <80 MM HG: CPT | Mod: CPTII,S$GLB,, | Performed by: ANESTHESIOLOGY

## 2019-02-11 PROCEDURE — 1101F PR PT FALLS ASSESS DOC 0-1 FALLS W/OUT INJ PAST YR: ICD-10-PCS | Mod: CPTII,S$GLB,, | Performed by: ANESTHESIOLOGY

## 2019-02-11 PROCEDURE — 99204 OFFICE O/P NEW MOD 45 MIN: CPT | Mod: GC,S$GLB,, | Performed by: ANESTHESIOLOGY

## 2019-02-11 PROCEDURE — 99999 PR PBB SHADOW E&M-EST. PATIENT-LVL IV: ICD-10-PCS | Mod: PBBFAC,,, | Performed by: ANESTHESIOLOGY

## 2019-02-11 PROCEDURE — 1101F PT FALLS ASSESS-DOCD LE1/YR: CPT | Mod: CPTII,S$GLB,, | Performed by: ANESTHESIOLOGY

## 2019-02-11 PROCEDURE — 3077F PR MOST RECENT SYSTOLIC BLOOD PRESSURE >= 140 MM HG: ICD-10-PCS | Mod: CPTII,S$GLB,, | Performed by: ANESTHESIOLOGY

## 2019-02-11 PROCEDURE — 99999 PR PBB SHADOW E&M-EST. PATIENT-LVL IV: CPT | Mod: PBBFAC,,, | Performed by: ANESTHESIOLOGY

## 2019-02-11 PROCEDURE — 3008F PR BODY MASS INDEX (BMI) DOCUMENTED: ICD-10-PCS | Mod: CPTII,S$GLB,, | Performed by: ANESTHESIOLOGY

## 2019-02-11 NOTE — PROGRESS NOTES
Chronic Pain - New Consult    Referring Physician: Jabier, Uvaldo    Chief Complaint:   Chief Complaint   Patient presents with    Leg Pain     left leg     Groin Pain     right         SUBJECTIVE:    Gwendolyn Fournier presents to the clinic for the evaluation of left leg and right groin pain. The pain started 2008 following unknown reason and symptoms have been worsening.The pain is located in the right leg area and does not radiate.  The pain is described as numbing, shooting, throbbing and constant and is rated as 9/10. The pain is rated with a score of  9/10 on the BEST day and a score of 10/10 on the WORST day.  Symptoms interfere with daily activity and sleeping. The pain is exacerbated by Sitting, Bending, Coughing/Sneezing, Walking, Night Time and Getting out of bed/chair.  The pain is mitigated by laying down, medications, physical therapy and rest. She reports spending 8 hours per day reclining. The patient reports 3 hours of uninterrupted sleep per night.    Patient is a 64 year old female who presents as new patient to clinic complaining of primarily right groin and left knee pain. She has long history of chronic pain dating back to around 2008 when she began having knee pain. She had Left TKR in 2014. She then had back surgery (unsure what type) in 2016 for sciatica. Then she had a distal femur fracture in 2017 and had surgery to repair that. She says the pain in her groin is always there. It's a dull pain at rest and becomes worse with movement. It does not shoot down her leg at all. It's focused in her groin. Her knee pain is a sharp pain localized around her knee which she thinks is due to all the hardware that has been surgically placed in her knee. She takes tramadol once or twice a day for her pain. She also takes Cymbalta 60 mg once per day. Her pain keeps her up at night. She last did PT after surgery for her femur but has not gone back since. She's also had injections in her knees and  shoulders in the past. And she's been told she needs a TKR on right knee. CT and XRay lumbar spine ordered. XRay bilateral knees and XRay bilateral hips ordered. Since patient's hip/groin pain is the worst, we will focus on that. Plan is to f/u in 2 weeks to look at imaging and come up with plan for her hip pain.     Physical Therapy/Home Exercise: yes, limited benefit did not complete      Pain Disability Index Review:  Last 3 PDI Scores 2019   Pain Disability Index (PDI) 45       Pain Medications:    - Opioids: Ultram (Tramadol HCL)  - Adjuvant Medications: Cymbalta ( Duloxetine)  - Anti-Coagulants: n/a  - Others: see medication list     report:  Reviewed and consistent with medication use as prescribed.    Pain Procedures:  Steroid injections in both knees  Steroid injection in Left shoulder     Imagin18 xray Femur  Narrative     Left femur radiographs    Comparison: None    Results: 2 views PA and status post open reduction and internal fixation of a comminuted distal femur fracture with a long metallic plate and several screws, some fracture lines are still visible.  Total left knee replacement.  The hardware appears intact and in the soft tissues appear normal.      Impression      Healing comminuted distal femur fracture.         18 Left knee xray    Narrative     Left knee radiographs    Results: AP, and lateral view.  Status post total left knee replacement, the hardware appears intact.  Partially seen open reduction and internal fixation of a comminuted distal femur fracture with metallic plate and screws, some fracture lines are still visible.  The alignment appears normal. No osseous lesions.  Small joint effusion.  The soft tissues appear normal.            3/4/13 Xray lumbar spine  Narrative     The alignment is normal , the vertebral body heights are well maintained as well as the disk space.  No fracture or osseous lesions.  The soft tissues appear normal.      Impression       No definite abnormality seen.         Past Medical History:   Diagnosis Date    Arthritis     Gout attack     Hx of psychiatric care     Hypertension     Psychiatric problem     Renal disorder     Sciatic nerve pain     Therapy     used to follow with psychiatrist but doesn't recall whom,     Tuberculosis      Past Surgical History:   Procedure Laterality Date    COLONOSCOPY N/A 2019    [facility]    KNEE SURGERY  2014    left knee surgery      Social History     Socioeconomic History    Marital status:      Spouse name: Not on file    Number of children: 5    Years of education: Not on file    Highest education level: Not on file   Social Needs    Financial resource strain: Not on file    Food insecurity - worry: Not on file    Food insecurity - inability: Not on file    Transportation needs - medical: Not on file    Transportation needs - non-medical: Not on file   Occupational History    Occupation: homemaker   Tobacco Use    Smoking status: Former Smoker     Last attempt to quit: 1976     Years since quittin.1    Smokeless tobacco: Never Used   Substance and Sexual Activity    Alcohol use: Yes     Alcohol/week: 0.0 oz     Comment: red wine    Drug use: No    Sexual activity: Not Currently     Partners: Male   Other Topics Concern    Patient feels they ought to cut down on drinking/drug use Not Asked    Patient annoyed by others criticizing their drinking/drug use Not Asked    Patient has felt bad or guilty about drinking/drug use Not Asked    Patient has had a drink/used drugs as an eye opener in the AM Not Asked   Social History Narrative    Not on file     Family History   Problem Relation Age of Onset    Tuberculosis Mother     Stroke Father     Bipolar disorder Daughter     Suicide Daughter     Depression Daughter     Bipolar disorder Daughter     Depression Daughter        Review of patient's allergies  indicates:   Allergen Reactions    Ondansetron hcl (pf) Hives and Itching    Ketorolac Itching       Current Outpatient Medications   Medication Sig    allopurinol (ZYLOPRIM) 100 MG tablet TAKE 1 TABLET(100 MG) BY MOUTH TWICE DAILY    diphenhydrAMINE (BENADRYL) 25 mg capsule Take 25 mg by mouth every 6 (six) hours as needed.    docusate sodium (COLACE) 100 MG capsule Take 100 mg by mouth.    DULoxetine (CYMBALTA) 60 MG capsule TAKE 1 CAPSULE(60 MG) BY MOUTH EVERY DAY    ferrous sulfate (FEOSOL) 325 mg (65 mg iron) Tab tablet Take 325 mg by mouth.    ferrous sulfate 325 mg (65 mg iron) Tab tablet TK 1 T PO  BID WITH MEALS    FLUCELVAX QUAD 2572-2865, PF, 60 mcg (15 mcg x 4)/0.5 mL Syrg vaccine     fluticasone (FLONASE) 50 mcg/actuation nasal spray SHAKE LIQUID AND USE 2 SPRAYS(100 MCG) IN EACH NOSTRIL EVERY DAY    hydroCHLOROthiazide (HYDRODIURIL) 25 MG tablet TAKE 1 TABLET(25 MG) BY MOUTH EVERY DAY    metoprolol succinate (TOPROL-XL) 50 MG 24 hr tablet TAKE 1 TABLET(50 MG) BY MOUTH EVERY DAY    PREVNAR 13, PF, 0.5 mL Syrg     QUEtiapine (SEROQUEL) 100 MG Tab TAKE 1 TABLET(100 MG) BY MOUTH EVERY NIGHT AS NEEDED    traMADol (ULTRAM) 50 mg tablet Take 1 tablet (50 mg total) by mouth every 8 (eight) hours as needed for Pain.     Current Facility-Administered Medications   Medication    lidocaine (PF) 10 mg/ml (1%) injection 40 mg       REVIEW OF SYSTEMS:    GENERAL:  No weight loss, malaise or fevers.  HEENT:  Negative for frequent or significant headaches.  NECK:  Negative for lumps, goiter, pain and significant neck swelling.  RESPIRATORY:  Negative for cough, wheezing or shortness of breath.  CARDIOVASCULAR:  Negative for chest pain, leg swelling or palpitations.  GI:  Negative for abdominal discomfort, blood in stools or black stools or change in bowel habits.  MUSCULOSKELETAL:  See HPI.  SKIN:  Negative for lesions, rash, and itching.  PSYCH:  + for sleep disturbance, mood disorder and recent  "psychosocial stressors.  HEMATOLOGY/LYMPHOLOGY:  Negative for prolonged bleeding, bruising easily or swollen nodes.  NEURO:   No history of headaches, syncope, paralysis, seizures or tremors.  All other reviewed and negative other than HPI.    OBJECTIVE:    BP (!) 142/77   Pulse 86   Temp 98.9 °F (37.2 °C) (Oral)   Ht 5' 7" (1.702 m)   Wt 113.4 kg (250 lb)   BMI 39.16 kg/m²     PHYSICAL EXAMINATION:    General appearance: Well appearing, in no acute distress, alert and oriented x3.  Psych:  Mood and affect appropriate.  Skin: Skin color, texture, turgor normal, no rashes or lesions, in both upper and lower body.  Head/face:  Normocephalic, atraumatic. No palpable lymph nodes.  Neck: No pain to palpation over the cervical paraspinous muscles. Spurling Negative. No pain with neck flexion, extension, or lateral flexion.   Cor: RRR  Pulm: CTA  GI:  Soft and non-tender.  Back: Straight leg raising in the sitting and supine positions is negative to radicular pain. mild pain to palpation over the spine or costovertebral angles. Normal range of motion without pain reproduction.Positive axial loading test bilateral. -ve FABERE,Ganselin and Yeoman's test on the both side.negative FADIR  Extremities:positive for bilateral knee limited ROM with tenderness on palpation and crepitus on movement, negative ant and post drawer sign. No deformities, edema, or skin discoloration. Good capillary refill.  Musculoskeletal: Positive for tenderness to palpation of right hip,  Bilateral upper and lower extremity strength is normal and symmetric.  No atrophy or tone abnormalities are noted.  Neuro: Bilateral upper and lower extremity coordination and muscle stretch reflexes are physiologic and symmetric.  Plantar response are downgoing. No loss of sensation is noted.  Gait: normal.    ASSESSMENT:Patient is a 64 year old female who presents as new patient to clinic complaining of primarily right groin and left knee pain. She has long " history of chronic pain dating back to around 2008 when she began having knee pain. She had Left TKR in 2014. She then had back surgery (unsure what type) in 2016 for sciatica. Then she had a distal femur fracture in 2017 and had surgery to repair that. She says the pain in her groin is always there. It's a dull pain at rest and becomes worse with movement. It does not shoot down her leg at all. It's focused in her groin. Her knee pain is a sharp pain localized around her knee which she thinks is due to all the hardware that has been surgically placed in her knee. She takes tramadol once or twice a day for her pain. She also takes Cymbalta 60 mg once per day. Her pain keeps her up at night. She last did PT after surgery for her femur but has not gone back since. She's also had injections in her knees and shoulders in the past. And she's been told she needs a TKR on right knee. CT and XRay lumbar spine ordered. XRay bilateral knees and XRay bilateral hips ordered. Since patient's hip/groin pain is the worst, we will focus on that. Plan is to f/u in 2 weeks to look at imaging and come up with plan for her hip pain.     1. Chronic pain syndrome  X-Ray Hips Bilateral 2 View Incl AP Pelvis    X-Ray Knee 3 View Bilateral    CT Lumbar Spine Without Contrast    X-Ray Lumbar Spine Complete 5 View   2. Lumbar post-laminectomy syndrome  X-Ray Hips Bilateral 2 View Incl AP Pelvis    X-Ray Knee 3 View Bilateral    CT Lumbar Spine Without Contrast    X-Ray Lumbar Spine Complete 5 View   3. Spondylosis without myelopathy  X-Ray Hips Bilateral 2 View Incl AP Pelvis    X-Ray Knee 3 View Bilateral    CT Lumbar Spine Without Contrast    X-Ray Lumbar Spine Complete 5 View   4. Pain of right hip joint  X-Ray Hips Bilateral 2 View Incl AP Pelvis    X-Ray Knee 3 View Bilateral    CT Lumbar Spine Without Contrast    X-Ray Lumbar Spine Complete 5 View   5. Chronic pain of both knees  X-Ray Hips Bilateral 2 View Incl AP Pelvis    X-Ray Knee  3 View Bilateral    CT Lumbar Spine Without Contrast    X-Ray Lumbar Spine Complete 5 View         PLAN:     - I have stressed the importance of physical activity and a home exercise plan to help with pain and improve health.  - Patient can continue with medications for now per her providers since they are providing benefits, using them appropriately, and without side effects.. I explained to the patient that I do not recommend long term opioid therapy. Pt counseled about the side effects of long term use of opioids including dependence, tolerance, addiction, respiratory depression, somnelence , immune and endocrine dysfunction.  - Xray lumbar spine, bilateral knees, bilateral hips; CT scan lumbar spine  - RTC 2 weeks to f/u imaging  - Counseled patient regarding the importance of physical therapy.    The above plan and management options were discussed at length with patient. Patient is in agreement with the above and verbalized understanding. It will be communicated with the referring physician via electronic record, fax, or mail.    Humza Mendes MD  [facility]   I have personally reviewed the history and exam of this patient and agree with the resident/fellow/NPs note as stated above.    Evan Coreas MD

## 2019-02-12 ENCOUNTER — OFFICE VISIT (OUTPATIENT)
Dept: FAMILY MEDICINE | Facility: CLINIC | Age: 64
End: 2019-02-12
Payer: MEDICARE

## 2019-02-12 VITALS
WEIGHT: 241.19 LBS | HEIGHT: 67 IN | RESPIRATION RATE: 18 BRPM | TEMPERATURE: 99 F | SYSTOLIC BLOOD PRESSURE: 136 MMHG | HEART RATE: 88 BPM | DIASTOLIC BLOOD PRESSURE: 90 MMHG | BODY MASS INDEX: 37.85 KG/M2 | OXYGEN SATURATION: 97 %

## 2019-02-12 DIAGNOSIS — M15.9 PRIMARY OSTEOARTHRITIS INVOLVING MULTIPLE JOINTS: ICD-10-CM

## 2019-02-12 DIAGNOSIS — R00.0 TACHYCARDIA WITH HYPERTENSION: ICD-10-CM

## 2019-02-12 DIAGNOSIS — Z23 NEED FOR PNEUMOCOCCAL VACCINATION: ICD-10-CM

## 2019-02-12 DIAGNOSIS — M94.9 DISORDER OF BONE AND ARTICULAR CARTILAGE: ICD-10-CM

## 2019-02-12 DIAGNOSIS — N20.0 RENAL STONE: ICD-10-CM

## 2019-02-12 DIAGNOSIS — I10 ESSENTIAL HYPERTENSION: Primary | ICD-10-CM

## 2019-02-12 DIAGNOSIS — N18.30 STAGE 3 CHRONIC KIDNEY DISEASE: ICD-10-CM

## 2019-02-12 DIAGNOSIS — M89.9 DISORDER OF BONE AND ARTICULAR CARTILAGE: ICD-10-CM

## 2019-02-12 DIAGNOSIS — I10 TACHYCARDIA WITH HYPERTENSION: ICD-10-CM

## 2019-02-12 PROCEDURE — 3080F DIAST BP >= 90 MM HG: CPT | Mod: CPTII,S$GLB,, | Performed by: FAMILY MEDICINE

## 2019-02-12 PROCEDURE — 3008F PR BODY MASS INDEX (BMI) DOCUMENTED: ICD-10-PCS | Mod: CPTII,S$GLB,, | Performed by: FAMILY MEDICINE

## 2019-02-12 PROCEDURE — 90732 PPSV23 VACC 2 YRS+ SUBQ/IM: CPT | Mod: S$GLB,,, | Performed by: FAMILY MEDICINE

## 2019-02-12 PROCEDURE — 3008F BODY MASS INDEX DOCD: CPT | Mod: CPTII,S$GLB,, | Performed by: FAMILY MEDICINE

## 2019-02-12 PROCEDURE — 99214 PR OFFICE/OUTPT VISIT, EST, LEVL IV, 30-39 MIN: ICD-10-PCS | Mod: S$GLB,,, | Performed by: FAMILY MEDICINE

## 2019-02-12 PROCEDURE — 1101F PT FALLS ASSESS-DOCD LE1/YR: CPT | Mod: CPTII,S$GLB,, | Performed by: FAMILY MEDICINE

## 2019-02-12 PROCEDURE — 99999 PR PBB SHADOW E&M-EST. PATIENT-LVL III: CPT | Mod: PBBFAC,,, | Performed by: FAMILY MEDICINE

## 2019-02-12 PROCEDURE — 1101F PR PT FALLS ASSESS DOC 0-1 FALLS W/OUT INJ PAST YR: ICD-10-PCS | Mod: CPTII,S$GLB,, | Performed by: FAMILY MEDICINE

## 2019-02-12 PROCEDURE — 3075F SYST BP GE 130 - 139MM HG: CPT | Mod: CPTII,S$GLB,, | Performed by: FAMILY MEDICINE

## 2019-02-12 PROCEDURE — G0009 PNEUMOCOCCAL POLYSACCHARIDE VACCINE 23-VALENT =>2YO SQ IM: ICD-10-PCS | Mod: S$GLB,,, | Performed by: FAMILY MEDICINE

## 2019-02-12 PROCEDURE — 90732 PNEUMOCOCCAL POLYSACCHARIDE VACCINE 23-VALENT =>2YO SQ IM: ICD-10-PCS | Mod: S$GLB,,, | Performed by: FAMILY MEDICINE

## 2019-02-12 PROCEDURE — 3080F PR MOST RECENT DIASTOLIC BLOOD PRESSURE >= 90 MM HG: ICD-10-PCS | Mod: CPTII,S$GLB,, | Performed by: FAMILY MEDICINE

## 2019-02-12 PROCEDURE — 3075F PR MOST RECENT SYSTOLIC BLOOD PRESS GE 130-139MM HG: ICD-10-PCS | Mod: CPTII,S$GLB,, | Performed by: FAMILY MEDICINE

## 2019-02-12 PROCEDURE — G0009 ADMIN PNEUMOCOCCAL VACCINE: HCPCS | Mod: S$GLB,,, | Performed by: FAMILY MEDICINE

## 2019-02-12 PROCEDURE — 99214 OFFICE O/P EST MOD 30 MIN: CPT | Mod: S$GLB,,, | Performed by: FAMILY MEDICINE

## 2019-02-12 PROCEDURE — 99999 PR PBB SHADOW E&M-EST. PATIENT-LVL III: ICD-10-PCS | Mod: PBBFAC,,, | Performed by: FAMILY MEDICINE

## 2019-02-12 NOTE — PROGRESS NOTES
Chief Complaint   Patient presents with    lt side pain    medication discuss       Gwendolyn Fournier is a 64 y.o. female who presents per the Chief Complaint.  Pt is known to me and was last seen by me on 1/3/2019.  All known chronic medical issues have been documented.       Hypertension   This is a chronic problem. The current episode started more than 1 year ago. The problem is unchanged. The problem is controlled. Associated symptoms include anxiety. Pertinent negatives include no blurred vision, chest pain, headaches, malaise/fatigue, neck pain, orthopnea, palpitations, peripheral edema, PND, shortness of breath or sweats. Agents associated with hypertension include NSAIDs. Risk factors for coronary artery disease include obesity and post-menopausal state. Past treatments include ACE inhibitors. The current treatment provides moderate improvement. There is no history of angina, kidney disease, CAD/MI, CVA, heart failure, left ventricular hypertrophy, PVD or retinopathy. There is no history of chronic renal disease, coarctation of the aorta, hyperaldosteronism, hypercortisolism, hyperparathyroidism, a hypertension causing med, pheochromocytoma, renovascular disease, sleep apnea or a thyroid problem.        ROS  Review of Systems   Constitutional: Negative.  Negative for activity change, appetite change, chills, diaphoresis, fatigue, fever, malaise/fatigue and unexpected weight change.   HENT: Negative.  Negative for congestion, ear pain, hearing loss, nosebleeds, postnasal drip, rhinorrhea, sinus pressure, sneezing, sore throat and trouble swallowing.    Eyes: Negative for blurred vision, pain and visual disturbance.   Respiratory: Negative for cough, choking and shortness of breath.    Cardiovascular: Negative for chest pain, palpitations, orthopnea, leg swelling and PND.   Gastrointestinal: Negative for abdominal pain, constipation, diarrhea, nausea and vomiting.   Genitourinary: Positive for flank pain.  "Negative for difficulty urinating, dysuria, frequency and urgency.   Musculoskeletal: Positive for arthralgias, back pain, gait problem and joint swelling. Negative for myalgias and neck pain.   Skin: Negative.    Allergic/Immunologic: Negative for environmental allergies and food allergies.   Neurological: Negative for dizziness, seizures, syncope, weakness, light-headedness and headaches.   Psychiatric/Behavioral: Positive for dysphoric mood and sleep disturbance. Negative for agitation, confusion, decreased concentration, hallucinations, self-injury and suicidal ideas. The patient is nervous/anxious. The patient is not hyperactive.        Physical Exam  Vitals:    02/12/19 1452   BP: (!) 136/90   Pulse: 88   Resp: 18   Temp: 98.7 °F (37.1 °C)    Body mass index is 37.77 kg/m².  Weight: 109.4 kg (241 lb 2.9 oz)   Height: 5' 7" (170.2 cm)     Physical Exam   Constitutional: She is oriented to person, place, and time. She appears well-developed and well-nourished. She is active and cooperative.  Non-toxic appearance. She does not have a sickly appearance. She does not appear ill. No distress.   HENT:   Head: Normocephalic and atraumatic.   Right Ear: Hearing and external ear normal. No decreased hearing is noted.   Left Ear: Hearing and external ear normal. No decreased hearing is noted.   Nose: Nose normal. No rhinorrhea or nasal deformity.   Mouth/Throat: Uvula is midline and oropharynx is clear and moist. She does not have dentures. Normal dentition.   Eyes: Conjunctivae, EOM and lids are normal. Pupils are equal, round, and reactive to light. Right eye exhibits no chemosis, no discharge and no exudate. No foreign body present in the right eye. Left eye exhibits no chemosis, no discharge and no exudate. No foreign body present in the left eye. No scleral icterus.   Neck: Normal range of motion and full passive range of motion without pain. Neck supple.   Cardiovascular: Normal rate, regular rhythm, S1 normal, S2 " normal and normal heart sounds. Exam reveals no gallop and no friction rub.   No murmur heard.  Pulmonary/Chest: Effort normal and breath sounds normal. No accessory muscle usage. No respiratory distress. She has no decreased breath sounds. She has no wheezes. She has no rhonchi. She has no rales.   Abdominal: Soft. Normal appearance. She exhibits no distension. There is no hepatosplenomegaly. There is no tenderness. There is no rigidity, no rebound and no guarding.   Musculoskeletal:        Left knee: She exhibits bony tenderness. She exhibits normal range of motion, no swelling, no effusion, no ecchymosis, no deformity, no laceration, no erythema, normal alignment, no LCL laxity, normal patellar mobility, normal meniscus and no MCL laxity. Tenderness found. Lateral joint line tenderness noted. No medial joint line, no MCL, no LCL and no patellar tendon tenderness noted.        Left upper leg: She exhibits tenderness. She exhibits no bony tenderness, no swelling, no edema, no deformity and no laceration.        Legs:  Neurological: She is alert and oriented to person, place, and time. She has normal strength. No cranial nerve deficit or sensory deficit. She exhibits normal muscle tone. She displays no seizure activity. Coordination and gait normal.   Skin: Skin is warm, dry and intact. No rash noted. She is not diaphoretic.   Psychiatric: Her speech is normal and behavior is normal. Judgment and thought content normal. Her mood appears not anxious. Her affect is not angry, not blunt, not labile and not inappropriate. She is not actively hallucinating. Cognition and memory are normal. She does not exhibit a depressed mood. She is attentive.       Assessment & Plan    1. Essential hypertension  Patient was counseled and encouraged to maintain a low sodium diet, as well as increasing physical activity.  Recommend random BP checks at home on a regular basis.  Repeat BP at end of visit was not necessary. Will continue  medication at this time, and follow up in 3-6 months, or sooner if blood pressure begins to increase.       2. Primary osteoarthritis involving multiple joints  Patient evaluated by Pain Specialist; imaging ordered to evaluate.    3. Renal stone  Stable; no therapeutic changes at this time.     4. Stage 3 chronic kidney disease  Referral to appropriate specialist for evaluation and management placed in Epic.   - Ambulatory referral to Nephrology    5. Disorder of bone and articular cartilage  Screening test will be ordered and once results available patient will be notified of results and managed accordingly.    - DXA Bone Density Spine And Hip; Future    6. Need for pneumococcal vaccination  Patient due for pneumonia vaccine; Patient agreed and vaccine was administered in clinic today without complications.   - Pneumococcal Polysaccharide Vaccine (23 Valent) (SQ/IM)      Follow up documented    ACTIVE MEDICAL ISSUES:  Documented in Problem List    PAST MEDICAL HISTORY  Documented    PAST SURGICAL HISTORY:  Documented    SOCIAL HISTORY:  Documented    FAMILY HISTORY:  Documented    ALLERGIES AND MEDICATIONS: updated and reviewed.  Documented    Health Maintenance       Date Due Completion Date    TETANUS VACCINE 01/24/1972 ---    DEXA SCAN 01/24/1994 ---    Mammogram 09/15/2017 9/15/2015 (Done)    Override on 9/15/2015: Done (future)    Pneumococcal Vaccine (65+ Low/Medium Risk) (2 of 2 - PPSV23) 01/24/2019 11/6/2017    Lipid Panel 11/19/2023 11/19/2018    Override on 9/15/2015: Done (future)    Colonoscopy 01/21/2029 1/21/2019

## 2019-02-12 NOTE — PROGRESS NOTES
Pneumococcal 23 vaccine administered IM to right deltoid.VIS form notified. Tolerated well. No adverse reaction noted.

## 2019-02-13 RX ORDER — METOPROLOL SUCCINATE 50 MG/1
TABLET, EXTENDED RELEASE ORAL
Qty: 90 TABLET | Refills: 0 | Status: SHIPPED | OUTPATIENT
Start: 2019-02-13 | End: 2019-03-26 | Stop reason: SDUPTHER

## 2019-02-20 ENCOUNTER — HOSPITAL ENCOUNTER (OUTPATIENT)
Dept: RADIOLOGY | Facility: HOSPITAL | Age: 64
Discharge: HOME | End: 2019-02-20
Attending: ANESTHESIOLOGY
Payer: MEDICARE

## 2019-02-20 ENCOUNTER — HOSPITAL ENCOUNTER (OUTPATIENT)
Dept: RADIOLOGY | Facility: HOSPITAL | Age: 64
Discharge: HOME | End: 2019-02-20
Attending: FAMILY MEDICINE
Payer: MEDICARE

## 2019-02-20 DIAGNOSIS — G89.4 CHRONIC PAIN SYNDROME: ICD-10-CM

## 2019-02-20 DIAGNOSIS — M25.561 CHRONIC PAIN OF BOTH KNEES: ICD-10-CM

## 2019-02-20 DIAGNOSIS — M25.562 CHRONIC PAIN OF BOTH KNEES: ICD-10-CM

## 2019-02-20 DIAGNOSIS — G89.29 CHRONIC PAIN OF BOTH KNEES: ICD-10-CM

## 2019-02-20 DIAGNOSIS — M96.1 LUMBAR POST-LAMINECTOMY SYNDROME: ICD-10-CM

## 2019-02-20 DIAGNOSIS — M89.9 DISORDER OF BONE AND ARTICULAR CARTILAGE: ICD-10-CM

## 2019-02-20 DIAGNOSIS — M25.551 PAIN OF RIGHT HIP JOINT: ICD-10-CM

## 2019-02-20 DIAGNOSIS — F51.04 PSYCHOPHYSIOLOGICAL INSOMNIA: ICD-10-CM

## 2019-02-20 DIAGNOSIS — M47.819 SPONDYLOSIS WITHOUT MYELOPATHY: ICD-10-CM

## 2019-02-20 DIAGNOSIS — M94.9 DISORDER OF BONE AND ARTICULAR CARTILAGE: ICD-10-CM

## 2019-02-20 DIAGNOSIS — Z12.39 BREAST CANCER SCREENING: ICD-10-CM

## 2019-02-20 PROCEDURE — 72131 CT LUMBAR SPINE W/O DYE: CPT | Mod: TC

## 2019-02-20 PROCEDURE — 72131 CT LUMBAR SPINE W/O DYE: CPT | Mod: 26,,, | Performed by: RADIOLOGY

## 2019-02-20 PROCEDURE — 77080 DXA BONE DENSITY AXIAL: CPT | Mod: 26,,, | Performed by: RADIOLOGY

## 2019-02-20 PROCEDURE — 73521 X-RAY EXAM HIPS BI 2 VIEWS: CPT | Mod: TC,FY

## 2019-02-20 PROCEDURE — 77080 DEXA BONE DENSITY SPINE HIP: ICD-10-PCS | Mod: 26,,, | Performed by: RADIOLOGY

## 2019-02-20 PROCEDURE — 73521 X-RAY EXAM HIPS BI 2 VIEWS: CPT | Mod: 26,,, | Performed by: RADIOLOGY

## 2019-02-20 PROCEDURE — 73562 X-RAY EXAM OF KNEE 3: CPT | Mod: 26,59,LT, | Performed by: RADIOLOGY

## 2019-02-20 PROCEDURE — 73521 XR HIPS BILATERAL 2 VIEW INCL AP PELVIS: ICD-10-PCS | Mod: 26,,, | Performed by: RADIOLOGY

## 2019-02-20 PROCEDURE — 72110 X-RAY EXAM L-2 SPINE 4/>VWS: CPT | Mod: 26,,, | Performed by: RADIOLOGY

## 2019-02-20 PROCEDURE — 73562 X-RAY EXAM OF KNEE 3: CPT | Mod: 26,RT,, | Performed by: RADIOLOGY

## 2019-02-20 PROCEDURE — 72131 CT LUMBAR SPINE WITHOUT CONTRAST: ICD-10-PCS | Mod: 26,,, | Performed by: RADIOLOGY

## 2019-02-20 PROCEDURE — 72110 XR LUMBAR SPINE COMPLETE 5 VIEW: ICD-10-PCS | Mod: 26,,, | Performed by: RADIOLOGY

## 2019-02-20 PROCEDURE — 72110 X-RAY EXAM L-2 SPINE 4/>VWS: CPT | Mod: TC,FY

## 2019-02-20 PROCEDURE — 73562 PR  X-RAY KNEE 3 VIEW: ICD-10-PCS | Mod: 26,59,LT, | Performed by: RADIOLOGY

## 2019-02-20 PROCEDURE — 77080 DXA BONE DENSITY AXIAL: CPT | Mod: TC

## 2019-02-20 PROCEDURE — 73562 X-RAY EXAM OF KNEE 3: CPT | Mod: 50,TC,FY

## 2019-02-23 DIAGNOSIS — Z12.31 ENCOUNTER FOR SCREENING MAMMOGRAM FOR MALIGNANT NEOPLASM OF BREAST: Primary | ICD-10-CM

## 2019-02-25 ENCOUNTER — OFFICE VISIT (OUTPATIENT)
Dept: PAIN MEDICINE | Facility: CLINIC | Age: 64
End: 2019-02-25
Payer: MEDICARE

## 2019-02-25 ENCOUNTER — TELEPHONE (OUTPATIENT)
Dept: FAMILY MEDICINE | Facility: CLINIC | Age: 64
End: 2019-02-25

## 2019-02-25 VITALS
HEIGHT: 67 IN | WEIGHT: 243.19 LBS | BODY MASS INDEX: 38.17 KG/M2 | DIASTOLIC BLOOD PRESSURE: 81 MMHG | HEART RATE: 81 BPM | TEMPERATURE: 98 F | SYSTOLIC BLOOD PRESSURE: 156 MMHG

## 2019-02-25 DIAGNOSIS — M53.3 SACROILIAC JOINT PAIN: Primary | ICD-10-CM

## 2019-02-25 DIAGNOSIS — M16.0 PRIMARY OSTEOARTHRITIS OF BOTH HIPS: ICD-10-CM

## 2019-02-25 DIAGNOSIS — M47.816 LUMBAR SPONDYLOSIS: ICD-10-CM

## 2019-02-25 DIAGNOSIS — M47.816 FACET ARTHRITIS OF LUMBAR REGION: ICD-10-CM

## 2019-02-25 DIAGNOSIS — M51.36 DDD (DEGENERATIVE DISC DISEASE), LUMBAR: ICD-10-CM

## 2019-02-25 PROCEDURE — 3008F PR BODY MASS INDEX (BMI) DOCUMENTED: ICD-10-PCS | Mod: CPTII,S$GLB,, | Performed by: NURSE PRACTITIONER

## 2019-02-25 PROCEDURE — 1101F PT FALLS ASSESS-DOCD LE1/YR: CPT | Mod: CPTII,S$GLB,, | Performed by: NURSE PRACTITIONER

## 2019-02-25 PROCEDURE — 1101F PR PT FALLS ASSESS DOC 0-1 FALLS W/OUT INJ PAST YR: ICD-10-PCS | Mod: CPTII,S$GLB,, | Performed by: NURSE PRACTITIONER

## 2019-02-25 PROCEDURE — 3077F PR MOST RECENT SYSTOLIC BLOOD PRESSURE >= 140 MM HG: ICD-10-PCS | Mod: CPTII,S$GLB,, | Performed by: NURSE PRACTITIONER

## 2019-02-25 PROCEDURE — 3079F PR MOST RECENT DIASTOLIC BLOOD PRESSURE 80-89 MM HG: ICD-10-PCS | Mod: CPTII,S$GLB,, | Performed by: NURSE PRACTITIONER

## 2019-02-25 PROCEDURE — 99999 PR PBB SHADOW E&M-EST. PATIENT-LVL III: CPT | Mod: PBBFAC,,, | Performed by: NURSE PRACTITIONER

## 2019-02-25 PROCEDURE — 3079F DIAST BP 80-89 MM HG: CPT | Mod: CPTII,S$GLB,, | Performed by: NURSE PRACTITIONER

## 2019-02-25 PROCEDURE — 3077F SYST BP >= 140 MM HG: CPT | Mod: CPTII,S$GLB,, | Performed by: NURSE PRACTITIONER

## 2019-02-25 PROCEDURE — 99213 PR OFFICE/OUTPT VISIT, EST, LEVL III, 20-29 MIN: ICD-10-PCS | Mod: S$GLB,,, | Performed by: NURSE PRACTITIONER

## 2019-02-25 PROCEDURE — 99213 OFFICE O/P EST LOW 20 MIN: CPT | Mod: S$GLB,,, | Performed by: NURSE PRACTITIONER

## 2019-02-25 PROCEDURE — 3008F BODY MASS INDEX DOCD: CPT | Mod: CPTII,S$GLB,, | Performed by: NURSE PRACTITIONER

## 2019-02-25 PROCEDURE — 99999 PR PBB SHADOW E&M-EST. PATIENT-LVL III: ICD-10-PCS | Mod: PBBFAC,,, | Performed by: NURSE PRACTITIONER

## 2019-02-25 NOTE — LETTER
February 25, 2019    Gwendolyn Fournier  [address] [place] [address]             [facility]  [address] [place] [address]  Phone: [phone]  Fax: [phone] Dear Mrs. Fournier:    Rey we were unable to contact you to schedule your Nephrology appointment. Please give the referral department a call at [phone].        If you have any questions or concerns, please don't hesitate to call.    Sincerely,        Nola Brooks MA

## 2019-02-25 NOTE — PROGRESS NOTES
"Chronic patient Established Note (Follow up visit)      SUBJECTIVE:    Gwendolyn Fournier presents to the clinic for a follow-up appointment for right groin pain. She continues to report right groin pain that is tight, pulling, and aching in nature. She describes this feeling as a "jose horse". She does report low back and bilateral buttock pain. She denies any radiating leg pain. Her pain is worse with prolonged sitting. She continues to take Cymbalta with benefit. She also takes Tramadol per her PCP with benefit. She denies any other health changes. Her pain today is 8/10.     Pain Disability Index Review:  Last 3 PDI Scores 2/25/2019 2/11/2019   Pain Disability Index (PDI) 8 45       Pain Medications:  Cymbalta   Tramadol     Opioid Contract: no     report:  Reviewed and consistent with medication use as prescribed.    Pain Procedures:   none    Physical Therapy/Home Exercise: No    Imaging:   CT Lumbar Spine 2/20/2019:  FINDINGS:  Postop posterior fusion L4-5 and disc implant at the L4-5 level. Hardware appears intact.  There is some osseous fusion the posterior elements at L4-5.  Vertebral body heights fairly well maintained. There is disc space narrowing at multiple levels. Disc osteophyte complex T12-L1 and L1-L2 noted. No significant subluxation.  Facet arthropathy at the lumbosacral level. No fractures.  There is some vacuum phenomena at the lumbosacral level and degenerative change at the SI joints.  There are multiple scattered punctate lucencies in the osseous structures though no convincing lytic lesion.  This may be related to osteopenia.    T12-L1: No significant neural foraminal narrowing.  Disc osteophyte complex though no canal narrowing.    L1-2: There may be mild bilateral neural foraminal narrowing.  Mild facet arthropathy noted.  Disc osteophyte complex though again no significant canal narrowing seen.    L2-3: Facet arthropathy noted.  There is moderate bilateral neural foraminal " narrowing.  Mild broad-based disc bulge ligamentous hypertrophy and facet arthropathy creates a mild degree of canal stenosis.    L3-4: There is some facet arthropathy.  There is mild bilateral neural foraminal narrowing.  Mild broad-based disc bulge and ligamentous hypertrophy creates at most mild canal narrowing.    L4-5: Significant beam hardening artifact.  Mild bilateral neural foraminal narrowing.  Significant facet arthropathy and ligamentous hypertrophy creates at least moderate canal stenosis though again there is significant beam hardening artifact.    L5-S1 mild bilateral neural foraminal narrowing.  Facet arthropathy and ligamentous hypertrophy though no significant canal narrowing.    Elsewhere partially visualized hypodensities lateral aspect of the right kidney as well as inferiorly.  No adrenal masses.      Impression       Postop change L4-5 as above.  There is multilevel canal and neural foraminal narrowing as discussed in detail above.  Correlation with clinical findings would be helpful.    There are multiple punctate lucencies in the osseous structures though no convincing cortical lytic lesion.  This may be related to osteopenia.  Correlation with clinical findings and additional evaluation if indicated.     Xray Hips Bilateral 2/20/2019:  FINDINGS:  There are hypertrophic degenerative changes right greater than left.  Degenerative change at the SI joints.  No acute fracture.  Partially visualized postop change lower lumbar spine and left femur.      Impression       Significant hypertrophic degenerative change about the right hip.     Xray Knees 2/20/2019:  FINDINGS:  Postop change left knee replacement and ORIF of the left femur.  Components are in satisfactory position.  Advanced degenerative change about the right knee.  Degenerative changes at the right patellofemoral joint.  No significant effusions.      Impression       Postop change left femur and left knee replacement.  Advanced  degenerative change right knee.         Allergies:   Review of patient's allergies indicates:   Allergen Reactions    Ondansetron hcl (pf) Hives and Itching    Ketorolac Itching       Current Medications:   Current Outpatient Medications   Medication Sig Dispense Refill    allopurinol (ZYLOPRIM) 100 MG tablet TAKE 1 TABLET(100 MG) BY MOUTH TWICE DAILY 180 tablet 0    diphenhydrAMINE (BENADRYL) 25 mg capsule Take 25 mg by mouth every 6 (six) hours as needed.      docusate sodium (COLACE) 100 MG capsule Take 100 mg by mouth.      DULoxetine (CYMBALTA) 60 MG capsule TAKE 1 CAPSULE(60 MG) BY MOUTH EVERY DAY 90 capsule 0    ferrous sulfate (FEOSOL) 325 mg (65 mg iron) Tab tablet Take 325 mg by mouth.      ferrous sulfate 325 mg (65 mg iron) Tab tablet TK 1 T PO  BID WITH MEALS  0    FLUCELVAX QUAD 8409-3539, PF, 60 mcg (15 mcg x 4)/0.5 mL Syrg vaccine       fluticasone (FLONASE) 50 mcg/actuation nasal spray SHAKE LIQUID AND USE 2 SPRAYS(100 MCG) IN EACH NOSTRIL EVERY DAY 48 mL 1    hydroCHLOROthiazide (HYDRODIURIL) 25 MG tablet TAKE 1 TABLET(25 MG) BY MOUTH EVERY DAY 90 tablet 1    metoprolol succinate (TOPROL-XL) 50 MG 24 hr tablet TAKE 1 TABLET(50 MG) BY MOUTH EVERY DAY 90 tablet 0    PREVNAR 13, PF, 0.5 mL Syrg       QUEtiapine (SEROQUEL) 100 MG Tab TAKE 1 TABLET(100 MG) BY MOUTH EVERY NIGHT AS NEEDED 90 tablet 0    traMADol (ULTRAM) 50 mg tablet Take 1 tablet (50 mg total) by mouth every 8 (eight) hours as needed for Pain. 90 tablet 1     Current Facility-Administered Medications   Medication Dose Route Frequency Provider Last Rate Last Dose    lidocaine (PF) 10 mg/ml (1%) injection 40 mg  4 mL Intramuscular 1 time in Clinic/HOD Azikiwe K. Lombard, MD           REVIEW OF SYSTEMS:    GENERAL:  No weight loss, malaise or fevers.  HEENT:  Negative for frequent or significant headaches.  NECK:  Negative for lumps, goiter, pain and significant neck swelling.  RESPIRATORY:  Negative for cough, wheezing or  shortness of breath.  CARDIOVASCULAR:  Negative for chest pain, leg swelling or palpitations. HTN.  GI:  Negative for abdominal discomfort, blood in stools or black stools or change in bowel habits.  MUSCULOSKELETAL:  See HPI.  SKIN:  Negative for lesions, rash, and itching.  PSYCH:  Negative for sleep disturbance, mood disorder and recent psychosocial stressors.  HEMATOLOGY/LYMPHOLOGY:  Negative for prolonged bleeding, bruising easily or swollen nodes.  NEURO:   No history of headaches, syncope, paralysis, seizures or tremors.  All other reviewed and negative other than HPI.    Past Medical History:  Past Medical History:   Diagnosis Date    Arthritis     Gout attack     Hx of psychiatric care     Hypertension     Psychiatric problem     Renal disorder     Sciatic nerve pain     Therapy     used to follow with psychiatrist but doesn't recall whom,     Tuberculosis        Past Surgical History:  Past Surgical History:   Procedure Laterality Date    COLONOSCOPY N/A 2019    [facility]    KNEE SURGERY  2014    left knee surgery        Family History:  Family History   Problem Relation Age of Onset    Tuberculosis Mother     Stroke Father     Bipolar disorder Daughter     Suicide Daughter     Depression Daughter     Bipolar disorder Daughter     Depression Daughter        Social History:  Social History     Socioeconomic History    Marital status:      Spouse name: None    Number of children: 5    Years of education: None    Highest education level: None   Social Needs    Financial resource strain: None    Food insecurity - worry: None    Food insecurity - inability: None    Transportation needs - medical: None    Transportation needs - non-medical: None   Occupational History    Occupation: homemaker   Tobacco Use    Smoking status: Former Smoker     Last attempt to quit: 1976     Years since quittin.1    Smokeless tobacco:  "Never Used   Substance and Sexual Activity    Alcohol use: Yes     Alcohol/week: 0.0 oz     Comment: red wine    Drug use: No    Sexual activity: Not Currently     Partners: Male   Other Topics Concern    Patient feels they ought to cut down on drinking/drug use Not Asked    Patient annoyed by others criticizing their drinking/drug use Not Asked    Patient has felt bad or guilty about drinking/drug use Not Asked    Patient has had a drink/used drugs as an eye opener in the AM Not Asked   Social History Narrative    None       OBJECTIVE:    BP (!) 156/81   Pulse 81   Temp 98.3 °F (36.8 °C)   Ht 5' 7" (1.702 m)   Wt 110.3 kg (243 lb 2.7 oz)   BMI 38.09 kg/m²     PHYSICAL EXAMINATION:    General appearance: Well appearing, in no acute distress, alert and oriented x3.  Psych:  Mood and affect appropriate.  Skin: Skin color, texture, turgor normal, no rashes or lesions, in both upper and lower body.  Head/face:  Atraumatic, normocephalic. No palpable lymph nodes  Cor: RRR  Pulm: CTA  GI: Abdomen soft and non-tender.  Back: Straight leg raising in the sitting position is negative to radicular pain bilaterally. There is pain with palpation over lumbar spine. Limited ROM with pain on extension. Positive facet loading bilaterally.   Extremities: Peripheral joint ROM is full and pain free without obvious instability or laxity in all four extremities. No deformities, edema, or skin discoloration. Good capillary refill.  Musculoskeletal: Shoulder provocative maneuvers are negative. There is pain with palpation over bilateral SI joints. FABERs is positive bilaterally. There is pain with internal rotation of both hips. Bilateral lower extremity strength is normal and symmetric.  No atrophy or tone abnormalities are noted.  Neuro: Bilateral lower extremity coordination and muscle stretch reflexes are physiologic and symmetric.  Plantar response are downgoing. No loss of sensation is noted.  Gait: Antalgic- ambulates " without assistance.     ASSESSMENT: 64 y.o. year old female with low back and hip pain, consistent with the followin. Sacroiliac joint pain     2. Lumbar spondylosis     3. Facet arthritis of lumbar region     4. DDD (degenerative disc disease), lumbar     5. Primary osteoarthritis of both hips           PLAN:     - Previous imaging was reviewed and discussed with the patient today.    - Schedule for bilateral SI joint injections.   The procedure, risks, benefits and options were discussed with patient. There are no contraindications to the procedure. The patient expressed understanding and agreed to proceed.      - If limited relief, we can consider bilateral hip joint injections.     - She may also benefit from lumbar MBB in the future.     - I have stressed the importance of physical activity and a home exercise plan to help with pain and improve health.    - Patient can continue with medications for now since they are providing benefits, using them appropriately, and without side effects.    - RTC 2 weeks after above procedure.     - Counseled patient regarding the importance of activity modification and constant sleeping habits.    - Dr. Coreas was consulted on the patient and agrees with this plan.    The above plan and management options were discussed at length with patient. Patient is in agreement with the above and verbalized understanding.    Merari Tony NP  2019

## 2019-02-28 RX ORDER — QUETIAPINE FUMARATE 200 MG/1
TABLET, FILM COATED ORAL
Qty: 30 TABLET | Refills: 0 | OUTPATIENT
Start: 2019-02-28

## 2019-03-01 DIAGNOSIS — F51.04 PSYCHOPHYSIOLOGICAL INSOMNIA: ICD-10-CM

## 2019-03-01 RX ORDER — QUETIAPINE FUMARATE 100 MG/1
TABLET, FILM COATED ORAL
Qty: 90 TABLET | Refills: 1 | Status: SHIPPED | OUTPATIENT
Start: 2019-03-01 | End: 2019-08-27 | Stop reason: SDUPTHER

## 2019-03-01 RX ORDER — QUETIAPINE FUMARATE 100 MG/1
TABLET, FILM COATED ORAL
Qty: 90 TABLET | Refills: 0 | OUTPATIENT
Start: 2019-03-01

## 2019-03-13 ENCOUNTER — HOSPITAL ENCOUNTER (OUTPATIENT)
Facility: OTHER | Age: 64
Discharge: HOME | End: 2019-03-13
Attending: ANESTHESIOLOGY | Admitting: ANESTHESIOLOGY
Payer: MEDICARE

## 2019-03-13 VITALS
HEIGHT: 67 IN | OXYGEN SATURATION: 97 % | WEIGHT: 240 LBS | RESPIRATION RATE: 18 BRPM | SYSTOLIC BLOOD PRESSURE: 146 MMHG | DIASTOLIC BLOOD PRESSURE: 70 MMHG | TEMPERATURE: 99 F | HEART RATE: 83 BPM | BODY MASS INDEX: 37.67 KG/M2

## 2019-03-13 DIAGNOSIS — G89.29 CHRONIC PAIN: ICD-10-CM

## 2019-03-13 DIAGNOSIS — M46.1 SACROILIITIS, NOT ELSEWHERE CLASSIFIED: Primary | ICD-10-CM

## 2019-03-13 PROCEDURE — 63600175 PHARM REV CODE 636 W HCPCS: Performed by: ANESTHESIOLOGY

## 2019-03-13 PROCEDURE — 27096 INJECT SACROILIAC JOINT: CPT | Mod: 50 | Performed by: ANESTHESIOLOGY

## 2019-03-13 PROCEDURE — 27096 INJECT SACROILIAC JOINT: CPT | Mod: 50,,, | Performed by: ANESTHESIOLOGY

## 2019-03-13 PROCEDURE — 27096 PR INJECTION,SACROILIAC JOINT: ICD-10-PCS | Mod: 50,,, | Performed by: ANESTHESIOLOGY

## 2019-03-13 PROCEDURE — 25000003 PHARM REV CODE 250: Performed by: ANESTHESIOLOGY

## 2019-03-13 PROCEDURE — 25500020 PHARM REV CODE 255: Performed by: ANESTHESIOLOGY

## 2019-03-13 RX ORDER — BUPIVACAINE HYDROCHLORIDE 2.5 MG/ML
INJECTION, SOLUTION EPIDURAL; INFILTRATION; INTRACAUDAL
Status: DISCONTINUED | OUTPATIENT
Start: 2019-03-13 | End: 2019-03-13 | Stop reason: HOSPADM

## 2019-03-13 RX ORDER — TRIAMCINOLONE ACETONIDE 40 MG/ML
INJECTION, SUSPENSION INTRA-ARTICULAR; INTRAMUSCULAR
Status: DISCONTINUED | OUTPATIENT
Start: 2019-03-13 | End: 2019-03-13 | Stop reason: HOSPADM

## 2019-03-13 RX ORDER — LIDOCAINE HYDROCHLORIDE 10 MG/ML
INJECTION INFILTRATION; PERINEURAL
Status: DISCONTINUED | OUTPATIENT
Start: 2019-03-13 | End: 2019-03-13 | Stop reason: HOSPADM

## 2019-03-13 RX ORDER — SODIUM CHLORIDE 9 MG/ML
500 INJECTION, SOLUTION INTRAVENOUS CONTINUOUS
Status: DISCONTINUED | OUTPATIENT
Start: 2019-03-13 | End: 2019-03-13 | Stop reason: HOSPADM

## 2019-03-13 RX ORDER — ALPRAZOLAM 0.5 MG/1
1 TABLET, ORALLY DISINTEGRATING ORAL ONCE
Status: COMPLETED | OUTPATIENT
Start: 2019-03-13 | End: 2019-03-13

## 2019-03-13 NOTE — DISCHARGE INSTRUCTIONS
Thank you for allowing us to care for you today. You may receive a survey about the care we provided. Your feedback is valuable and helps us provide excellent care throughout the community.     Home Care Instructions for Pain Management:    1. DIET:   You may resume your normal diet today.   2. BATHING:   You may shower with luke warm water. No tub baths or anything that will soak injection sites under water for the next 24 hours.  3. DRESSING:   You may remove your bandage today.   4. ACTIVITY LEVEL:   You may resume your normal activities 24 hrs after your procedure. Nothing strenuous today.  5. MEDICATIONS:   You may resume your normal medications today. To restart blood thinners, ask your doctor.  6. DRIVING    If you have received any sedatives by mouth today, you may not drive for 12 hours.    If you have received any sedation through your IV, you may not drive for 24 hrs.   7. SPECIAL INSTRUCTIONS:   No heat to the injection site for 24 hrs including, hot bath or shower, heating pad, moist heat, or hot tubs.    Use ice pack to injection site for any pain or discomfort.  Apply ice packs for 20 minute intervals as needed.    IF you have diabetes, be sure to monitor your blood sugar more closely. IF your injection contained steroids your blood sugar levels may become higher than normal.    If you are still having pain upon discharge:  Your pain may improve over the next 48 hours. The anesthetic (numbing medication) works immediately to 48 hours. IF your injection contained a steroid (anti-inflammatory medication), it takes approximately 3 days to start feeling relief and 7-10 days to see your greatest results from the medication. It is possible you may need subsequent injections. This would be discussed at your follow up appointment with pain management or your referring doctor.      PLEASE CALL YOUR DOCTOR IF:  1. Redness or swelling around the injection site.  2. Fever of 101 degrees or more  3. Drainage  (pus) from the injection site.  4. For any continuous bleeding (some dried blood over the incision is normal.)    FOR EMERGENCIES:   If any unusual problems or difficulties occur during clinic hours, call [phone] or 529.

## 2019-03-13 NOTE — OP NOTE
Patient Name: Gwendolyn Fournier  MRN: [id number]    INFORMED CONSENT: The procedure, risks, benefits and options were discussed with patient. There are no contraindications to the procedure. The patient expressed understanding and agreed to proceed. The personnel performing the procedure was discussed. I verify that I personally obtained Gwendolyn's consent prior to the start of the procedure and the signed consent can be found on the patient's chart.    Procedure Date: 03/13/2019    Anesthesia: Topical    Pre Procedure diagnosis: Bilateral sacroiliitis [M46.1]  1. Sacroiliitis, not elsewhere classified    2. Chronic pain      Post-Procedure diagnosis: SAME      Sedation: None    PROCEDURE: BILATERAL SACROILIAC JOINT INJECTION  DESCRIPTION OF PROCEDURE: The patient was brought to the fluoroscopy suite. After performing time out  IV access was obtained prior to the procedure. The patient was positioned prone on the fluoroscopy table. Continuous hemodynamic monitoring was initiated including blood pressure, EKG, and pulse oximetry.  The lumbosacral area was prepped with chlorhexidine three times and draped into a sterile field. The skin overlying the RIGHT side sacroiliac joint was anesthetized using 3cc of lidocaine 1%. The inferior pole of the sacroiliac joint was identified by cephalo-oblique fluoroscopy. A 22 gauge, 3 1/2 inch spinal needle was slowly advanced through the sacroiliac joint capsule under fluoroscopic guidance. The needle position was confirmed using oblique, AP and lateral fluoroscopic imaging.  Negative aspiration was confirmed. 0.5 cc of Omnipaque 300 was injected confirming intra-articular contrast spread. A combination of 2 cc of Bupivacaine 0.25% and 40 mg kenalog was easily injected. The needle was removed and bleeding was nil.  A sterile dressing was applied. Procedure was repeated on the contralateral side. PATIENT was taken to the Post-block Recovery Area for further observation.      Blood Loss:  Nill  Specimen: None    Brigid Levin MD     Staff Physician was present during the critical and key portions of the procedure.

## 2019-03-14 DIAGNOSIS — I10 ESSENTIAL HYPERTENSION: ICD-10-CM

## 2019-03-15 RX ORDER — HYDROCHLOROTHIAZIDE 25 MG/1
TABLET ORAL
Qty: 90 TABLET | Refills: 0 | Status: SHIPPED | OUTPATIENT
Start: 2019-03-15 | End: 2019-06-11 | Stop reason: SDUPTHER

## 2019-03-19 DIAGNOSIS — R00.0 TACHYCARDIA WITH HYPERTENSION: ICD-10-CM

## 2019-03-19 DIAGNOSIS — M54.32 SCIATICA OF LEFT SIDE: ICD-10-CM

## 2019-03-19 DIAGNOSIS — I10 TACHYCARDIA WITH HYPERTENSION: ICD-10-CM

## 2019-03-28 RX ORDER — METOPROLOL SUCCINATE 50 MG/1
TABLET, EXTENDED RELEASE ORAL
Qty: 90 TABLET | Refills: 0 | Status: SHIPPED | OUTPATIENT
Start: 2019-03-28 | End: 2019-06-27 | Stop reason: SDUPTHER

## 2019-03-28 RX ORDER — TRAMADOL HYDROCHLORIDE 50 MG/1
50 TABLET ORAL EVERY 8 HOURS PRN
Qty: 90 TABLET | Refills: 1 | Status: SHIPPED | OUTPATIENT
Start: 2019-03-28 | End: 2019-05-03

## 2019-03-30 DIAGNOSIS — Z63.4 GRIEF AT LOSS OF CHILD: ICD-10-CM

## 2019-03-30 DIAGNOSIS — F43.21 GRIEF AT LOSS OF CHILD: ICD-10-CM

## 2019-03-30 SDOH — SOCIAL DETERMINANTS OF HEALTH (SDOH): DISSAPEARANCE AND DEATH OF FAMILY MEMBER: Z63.4

## 2019-03-31 RX ORDER — DULOXETIN HYDROCHLORIDE 60 MG/1
CAPSULE, DELAYED RELEASE ORAL
Qty: 90 CAPSULE | Refills: 0 | Status: SHIPPED | OUTPATIENT
Start: 2019-03-31 | End: 2019-06-27 | Stop reason: SDUPTHER

## 2019-04-01 ENCOUNTER — TELEPHONE (OUTPATIENT)
Dept: NEPHROLOGY | Facility: CLINIC | Age: 64
End: 2019-04-01

## 2019-04-01 NOTE — TELEPHONE ENCOUNTER
----- Message from Lizette Yeaddiss sent at 4/1/2019  9:39 AM CDT -----  Contact: Self  Patient Requesting Sooner Appointment.     Reason for sooner appt.:     Currently has stage 3 chronic kidney disease.  Ms. Lu mentioned that she currently feels dehydrated with L flank pain. She was dx 12/2018 with kidney stones and she also mentioned that she has a headache in the back of her head near the base of her neck she feels is related to the kidney stone pain.    When is the first available appointment? She currently is scheduled for the 1st available appointment on 04/12/18 with Dr. Bailon.    Ms. Lu is not on dialysis, nor is she diabetic, but she is hypertensive.    Communication Preference: [phone] (c)     Additional Information:    I reached out to FST Ms. Fournier as she stated that she felt the need to be seen today.  It was suggested that I send this message to see if she could be worked in sooner.    Please advise as soon as possible.      Spoke to pt complaint of feeling bad ,headache, and L  sided pain/informed her to go to urgent care or PCP in order to have eval of these sx due to Dr. Bailon not having any appts until 4/12/2019 which she is already scheduled /pt verbalized understanding

## 2019-04-12 ENCOUNTER — OFFICE VISIT (OUTPATIENT)
Dept: NEPHROLOGY | Facility: CLINIC | Age: 64
End: 2019-04-12
Payer: MEDICARE

## 2019-04-12 VITALS
HEART RATE: 82 BPM | DIASTOLIC BLOOD PRESSURE: 98 MMHG | BODY MASS INDEX: 38.2 KG/M2 | WEIGHT: 243.38 LBS | HEIGHT: 67 IN | OXYGEN SATURATION: 96 % | SYSTOLIC BLOOD PRESSURE: 170 MMHG

## 2019-04-12 DIAGNOSIS — N18.2 CHRONIC KIDNEY DISEASE, STAGE II (MILD): ICD-10-CM

## 2019-04-12 DIAGNOSIS — N20.0 NEPHROLITHIASIS: Primary | ICD-10-CM

## 2019-04-12 DIAGNOSIS — N18.30 CHRONIC KIDNEY DISEASE, STAGE III (MODERATE): Primary | ICD-10-CM

## 2019-04-12 PROCEDURE — 99999 PR PBB SHADOW E&M-EST. PATIENT-LVL III: ICD-10-PCS | Mod: PBBFAC,,, | Performed by: INTERNAL MEDICINE

## 2019-04-12 PROCEDURE — 99499 RISK ADDL DX/OHS AUDIT: ICD-10-PCS | Mod: S$GLB,,, | Performed by: INTERNAL MEDICINE

## 2019-04-12 PROCEDURE — 99204 PR OFFICE/OUTPT VISIT, NEW, LEVL IV, 45-59 MIN: ICD-10-PCS | Mod: S$GLB,,, | Performed by: INTERNAL MEDICINE

## 2019-04-12 PROCEDURE — 99499 UNLISTED E&M SERVICE: CPT | Mod: S$GLB,,, | Performed by: INTERNAL MEDICINE

## 2019-04-12 PROCEDURE — 99204 OFFICE O/P NEW MOD 45 MIN: CPT | Mod: S$GLB,,, | Performed by: INTERNAL MEDICINE

## 2019-04-12 PROCEDURE — 99999 PR PBB SHADOW E&M-EST. PATIENT-LVL III: CPT | Mod: PBBFAC,,, | Performed by: INTERNAL MEDICINE

## 2019-04-12 NOTE — LETTER
April 24, 2019      Azikiwe K. Lombard, MD  [address] [facility] [place] [address]           [facility] - Nephrology  [address]  [facility] [address]  Phone: [phone]  Fax: [phone]          Patient: Gwendolyn Fournier   MR Number: [id number]   YOB: 1955   Date of Visit: 4/12/2019       Dear Dr. Azikiwe K. Lombard:    Thank you for referring Gwendolyn Fournier to me for evaluation. Attached you will find relevant portions of my assessment and plan of care.    If you have questions, please do not hesitate to call me. I look forward to following Gwendolyn Fournier along with you.    Sincerely,    Olivier Bailon MD    Enclosure  CC:  No Recipients    If you would like to receive this communication electronically, please contact [email or web address] or [phone] to request more information on [employer] [employer] access.    For providers and/or their staff who would like to refer a patient to [facility], please contact us through our one-stop-shop provider referral line, [facility], at [phone].    If you feel you have received this communication in error or would no longer like to receive these types of communications, please e-mail [email or web address]

## 2019-04-24 NOTE — PROGRESS NOTES
Subjective:       Patient ID: Gwendolyn Fournier is a 64 y.o. Black or  female who presents for new evaluation of Nephrolithiasis    HPI  Ms. Fournier is a 64 year old woman with medical history of hypertension presenting for evaluation of nephrolithiasis and chronic kidney disease.  Patient noted to have single small left kidney stone during workup for lower back/flank pain.  Patient denies prior history of stone passage.  She reports moderate daily fluid intake, reports sodium restriction.  She reports blood pressure usually well-controlled, elevated today due to anxiety.  She otherwise denies any fever, chest pain, shortness of breath, abdominal pain, diarrhea, dysuria/hematuria.     Review of Systems   Constitutional: Negative for appetite change, fatigue and fever.   Respiratory: Negative for chest tightness and shortness of breath.    Cardiovascular: Negative for chest pain and leg swelling.   Gastrointestinal: Negative for abdominal pain, constipation, diarrhea, nausea and vomiting.   Genitourinary: Negative for difficulty urinating, dysuria, flank pain, frequency, hematuria and urgency.   Musculoskeletal: Positive for arthralgias and back pain. Negative for joint swelling and myalgias.   Skin: Negative for rash and wound.   Neurological: Negative for dizziness, weakness and light-headedness.   All other systems reviewed and are negative.      Objective:      Physical Exam   Constitutional: She appears well-developed and well-nourished.   Cardiovascular: Normal rate, regular rhythm and normal heart sounds. Exam reveals no gallop and no friction rub.   No murmur heard.  Pulmonary/Chest: Effort normal and breath sounds normal. No respiratory distress. She has no wheezes. She has no rales.   Abdominal: Soft. Bowel sounds are normal. There is no tenderness.   Musculoskeletal: She exhibits no edema.   Neurological: She is alert.   Skin: Skin is warm and dry. No rash noted. No erythema.   Psychiatric:  She has a normal mood and affect.   Vitals reviewed.      Assessment:       1. Nephrolithiasis    2. Chronic kidney disease, stage II (mild)        Plan:      Ms. Fournier is a 64 year old woman with medical history of hypertension presenting for evaluation of nephrolithiasis and chronic kidney disease.  Patient with small left stone, advised to drink at least 2L/day, along with sodium restriction; do not suspect pain symptoms due to stone disease, will continue to monitor for now.  Patient creatinine has been variable the past few years, consistent with CKD stage II/IIIa, will repeat renal panel to trend.  Will obtain urine studies to rule out infectious/glomerular etiology, will consider renal US with doppler to rule out obstructive/vascular etiology.  Stressed importance of blood pressure control, along with weight loss and avoidance of NSAID's, to prevent any further progression of kidney disease, patient voiced understanding.  Further recommendations pending results of above.      Return to clinic in 6 months pending urine studies and renal panel, then with renal/heme panel, iron/TIBC/ferritin, urinalysis/culture, urine protein/creatinine ratio prior to next visit

## 2019-05-01 DIAGNOSIS — M54.32 SCIATICA OF LEFT SIDE: ICD-10-CM

## 2019-05-03 RX ORDER — TRAMADOL HYDROCHLORIDE 50 MG/1
TABLET ORAL
Qty: 90 TABLET | Refills: 0 | Status: SHIPPED | OUTPATIENT
Start: 2019-05-03 | End: 2020-04-23 | Stop reason: SDUPTHER

## 2019-05-12 NOTE — ADDENDUM NOTE
Addended by: WANDY MICHAUD on: 5/12/2019 01:22 PM     Modules accepted: Orders, Level of Service

## 2019-06-11 DIAGNOSIS — I10 ESSENTIAL HYPERTENSION: ICD-10-CM

## 2019-06-11 RX ORDER — HYDROCHLOROTHIAZIDE 25 MG/1
TABLET ORAL
Qty: 90 TABLET | Refills: 0 | Status: SHIPPED | OUTPATIENT
Start: 2019-06-11 | End: 2020-01-12

## 2019-06-27 DIAGNOSIS — R00.0 TACHYCARDIA WITH HYPERTENSION: ICD-10-CM

## 2019-06-27 DIAGNOSIS — F43.21 GRIEF AT LOSS OF CHILD: ICD-10-CM

## 2019-06-27 DIAGNOSIS — Z63.4 GRIEF AT LOSS OF CHILD: ICD-10-CM

## 2019-06-27 DIAGNOSIS — I10 TACHYCARDIA WITH HYPERTENSION: ICD-10-CM

## 2019-06-27 SDOH — SOCIAL DETERMINANTS OF HEALTH (SDOH): DISSAPEARANCE AND DEATH OF FAMILY MEMBER: Z63.4

## 2019-07-02 ENCOUNTER — TELEPHONE (OUTPATIENT)
Dept: FAMILY MEDICINE | Facility: CLINIC | Age: 64
End: 2019-07-02

## 2019-07-02 RX ORDER — DULOXETIN HYDROCHLORIDE 60 MG/1
CAPSULE, DELAYED RELEASE ORAL
Qty: 90 CAPSULE | Refills: 0 | Status: SHIPPED | OUTPATIENT
Start: 2019-07-02 | End: 2019-10-13 | Stop reason: SDUPTHER

## 2019-07-02 RX ORDER — METOPROLOL SUCCINATE 50 MG/1
TABLET, EXTENDED RELEASE ORAL
Qty: 90 TABLET | Refills: 0 | Status: SHIPPED | OUTPATIENT
Start: 2019-07-02 | End: 2020-01-12

## 2019-07-02 NOTE — TELEPHONE ENCOUNTER
Spoke with patient was told medication still pending another request will sent to provider , patient verbalized understand .

## 2019-07-02 NOTE — TELEPHONE ENCOUNTER
----- Message from Andie aMdie sent at 7/2/2019  3:01 PM CDT -----  Contact: self [phone]  .Type: RX Refill Request    Who Called:  Self     Refill or New Rx: refill    RX Name and Strength: metoprolol succinate (TOPROL-XL) 50 MG 24 hr tablet, DULoxetine (CYMBALTA) 60 MG capsule    Is this a 30 day or 90 day RX: 90 day     Preferred Pharmacy with phone number: .  [facility] [address] [place] [address] AT [address] [address]  Phone: [phone] Fax: [phone]    Local or Mail Order: local     Would the patient rather a call back or a response via [facility]?  Call back     Best Call Back Number: [phone]

## 2019-07-17 DIAGNOSIS — I10 ESSENTIAL HYPERTENSION: ICD-10-CM

## 2019-07-17 RX ORDER — HYDROCHLOROTHIAZIDE 25 MG/1
TABLET ORAL
Qty: 90 TABLET | Refills: 0 | Status: SHIPPED | OUTPATIENT
Start: 2019-07-17 | End: 2020-03-16

## 2019-08-27 DIAGNOSIS — F51.04 PSYCHOPHYSIOLOGICAL INSOMNIA: ICD-10-CM

## 2019-08-29 RX ORDER — QUETIAPINE FUMARATE 100 MG/1
TABLET, FILM COATED ORAL
Qty: 90 TABLET | Refills: 0 | Status: SHIPPED | OUTPATIENT
Start: 2019-08-29 | End: 2019-11-21 | Stop reason: SDUPTHER

## 2019-09-23 ENCOUNTER — TELEPHONE (OUTPATIENT)
Dept: FAMILY MEDICINE | Facility: CLINIC | Age: 64
End: 2019-09-23

## 2019-09-23 NOTE — TELEPHONE ENCOUNTER
----- Message from Demetra Willson sent at 9/23/2019  3:35 PM CDT -----  Contact: KAMALA DELA CRUZ   Name of Who is Calling: KAMALA DELA CRUZ       What is the request in detail: Patient is requesting a call back. She states that she would like to know when she had her las flu and Pneumonia shot. Please advise.       Can the clinic reply by [facility]: No      What Number to Call Back if not in [facility]:  [phone]

## 2019-09-25 NOTE — TELEPHONE ENCOUNTER
Called patient notified due for mammogram. Patient denied to schedule. Note to call back to schedule. Patient requesting to get immunization schedule. Schedule for flu. Notified not due for pneumonia shot yet.

## 2019-10-02 ENCOUNTER — TELEPHONE (OUTPATIENT)
Dept: FAMILY MEDICINE | Facility: CLINIC | Age: 64
End: 2019-10-02

## 2019-10-02 NOTE — TELEPHONE ENCOUNTER
----- Message from Lisa Keller sent at 10/1/2019  5:16 PM CDT -----  Contact: [phone]/self  Patient would like to be seen sooner than the next available appointment for a rash on her neck. Please advise.

## 2019-10-13 DIAGNOSIS — F43.21 GRIEF AT LOSS OF CHILD: ICD-10-CM

## 2019-10-13 DIAGNOSIS — M1A.0790 CHRONIC GOUT OF FOOT, UNSPECIFIED CAUSE, UNSPECIFIED LATERALITY: ICD-10-CM

## 2019-10-13 DIAGNOSIS — Z63.4 GRIEF AT LOSS OF CHILD: ICD-10-CM

## 2019-10-13 RX ORDER — DULOXETIN HYDROCHLORIDE 60 MG/1
CAPSULE, DELAYED RELEASE ORAL
Qty: 90 CAPSULE | Refills: 0 | Status: SHIPPED | OUTPATIENT
Start: 2019-10-13 | End: 2020-01-12

## 2019-10-13 SDOH — SOCIAL DETERMINANTS OF HEALTH (SDOH): DISSAPEARANCE AND DEATH OF FAMILY MEMBER: Z63.4

## 2019-10-14 RX ORDER — ALLOPURINOL 100 MG/1
TABLET ORAL
Qty: 180 TABLET | Refills: 0 | Status: SHIPPED | OUTPATIENT
Start: 2019-10-14 | End: 2020-01-13

## 2019-11-19 ENCOUNTER — PATIENT OUTREACH (OUTPATIENT)
Dept: ADMINISTRATIVE | Facility: HOSPITAL | Age: 64
End: 2019-11-19

## 2019-11-19 DIAGNOSIS — Z12.31 ENCOUNTER FOR SCREENING MAMMOGRAM FOR BREAST CANCER: Primary | ICD-10-CM

## 2019-11-21 DIAGNOSIS — F51.04 PSYCHOPHYSIOLOGICAL INSOMNIA: ICD-10-CM

## 2019-11-21 RX ORDER — QUETIAPINE FUMARATE 100 MG/1
TABLET, FILM COATED ORAL
Qty: 90 TABLET | Refills: 0 | Status: SHIPPED | OUTPATIENT
Start: 2019-11-21 | End: 2020-01-13

## 2019-12-03 ENCOUNTER — OFFICE VISIT (OUTPATIENT)
Dept: FAMILY MEDICINE | Facility: CLINIC | Age: 64
End: 2019-12-03
Payer: MEDICARE

## 2019-12-03 VITALS
HEIGHT: 67 IN | RESPIRATION RATE: 20 BRPM | DIASTOLIC BLOOD PRESSURE: 78 MMHG | TEMPERATURE: 99 F | HEART RATE: 84 BPM | SYSTOLIC BLOOD PRESSURE: 140 MMHG | BODY MASS INDEX: 40.31 KG/M2 | WEIGHT: 256.81 LBS | OXYGEN SATURATION: 96 %

## 2019-12-03 DIAGNOSIS — E66.01 MORBID OBESITY WITH BMI OF 40.0-44.9, ADULT: ICD-10-CM

## 2019-12-03 DIAGNOSIS — M15.9 PRIMARY OSTEOARTHRITIS INVOLVING MULTIPLE JOINTS: ICD-10-CM

## 2019-12-03 DIAGNOSIS — M54.50 ACUTE LEFT-SIDED LOW BACK PAIN WITHOUT SCIATICA: Primary | ICD-10-CM

## 2019-12-03 DIAGNOSIS — M96.1 LUMBAR POST-LAMINECTOMY SYNDROME: ICD-10-CM

## 2019-12-03 PROCEDURE — 3077F PR MOST RECENT SYSTOLIC BLOOD PRESSURE >= 140 MM HG: ICD-10-PCS | Mod: CPTII,S$GLB,, | Performed by: PHYSICIAN ASSISTANT

## 2019-12-03 PROCEDURE — 3078F PR MOST RECENT DIASTOLIC BLOOD PRESSURE < 80 MM HG: ICD-10-PCS | Mod: CPTII,S$GLB,, | Performed by: PHYSICIAN ASSISTANT

## 2019-12-03 PROCEDURE — 99999 PR PBB SHADOW E&M-EST. PATIENT-LVL V: ICD-10-PCS | Mod: PBBFAC,,, | Performed by: PHYSICIAN ASSISTANT

## 2019-12-03 PROCEDURE — 1101F PT FALLS ASSESS-DOCD LE1/YR: CPT | Mod: CPTII,S$GLB,, | Performed by: PHYSICIAN ASSISTANT

## 2019-12-03 PROCEDURE — 3008F PR BODY MASS INDEX (BMI) DOCUMENTED: ICD-10-PCS | Mod: CPTII,S$GLB,, | Performed by: PHYSICIAN ASSISTANT

## 2019-12-03 PROCEDURE — 99214 PR OFFICE/OUTPT VISIT, EST, LEVL IV, 30-39 MIN: ICD-10-PCS | Mod: S$GLB,,, | Performed by: PHYSICIAN ASSISTANT

## 2019-12-03 PROCEDURE — 3077F SYST BP >= 140 MM HG: CPT | Mod: CPTII,S$GLB,, | Performed by: PHYSICIAN ASSISTANT

## 2019-12-03 PROCEDURE — 99499 RISK ADDL DX/OHS AUDIT: ICD-10-PCS | Mod: S$GLB,,, | Performed by: PHYSICIAN ASSISTANT

## 2019-12-03 PROCEDURE — 99999 PR PBB SHADOW E&M-EST. PATIENT-LVL V: CPT | Mod: PBBFAC,,, | Performed by: PHYSICIAN ASSISTANT

## 2019-12-03 PROCEDURE — 3008F BODY MASS INDEX DOCD: CPT | Mod: CPTII,S$GLB,, | Performed by: PHYSICIAN ASSISTANT

## 2019-12-03 PROCEDURE — 99499 UNLISTED E&M SERVICE: CPT | Mod: S$GLB,,, | Performed by: PHYSICIAN ASSISTANT

## 2019-12-03 PROCEDURE — 99214 OFFICE O/P EST MOD 30 MIN: CPT | Mod: S$GLB,,, | Performed by: PHYSICIAN ASSISTANT

## 2019-12-03 PROCEDURE — 3078F DIAST BP <80 MM HG: CPT | Mod: CPTII,S$GLB,, | Performed by: PHYSICIAN ASSISTANT

## 2019-12-03 PROCEDURE — 1101F PR PT FALLS ASSESS DOC 0-1 FALLS W/OUT INJ PAST YR: ICD-10-PCS | Mod: CPTII,S$GLB,, | Performed by: PHYSICIAN ASSISTANT

## 2019-12-03 RX ORDER — TIZANIDINE 4 MG/1
4 TABLET ORAL 3 TIMES DAILY PRN
Qty: 60 TABLET | Refills: 1 | Status: SHIPPED | OUTPATIENT
Start: 2019-12-03 | End: 2023-11-08 | Stop reason: SDUPTHER

## 2019-12-03 NOTE — PROGRESS NOTES
Health Maintenance Due   Topic Date Due    Mammogram  Pt decline    Lipid Panel  Need orders

## 2019-12-04 NOTE — PROGRESS NOTES
Subjective:       Patient ID: Gwendolyn Fournier is a 64 y.o. female.    Chief Complaint: Back Pain    HPI:  64-year-old female with chronic back pain presents for back pain. Patient has lumbar postlaminectomy syndrome from previous back surgery.  In March of this year she had MARY BETH injections with no relief.  Patient states nothing she takes helps with her back pain.  She has tried tramadol with no relief she current lead takes goodies with minimal relief however she states she has been told not to take it due to her kidney function.  She has not done physical therapy in many years.  She states her pain is affecting her depression and causing her to be more depressed.  She states the Cymbalta does help as she try getting off of it and felt that her pain and depression worsened.  She also suffers with arthritis in her hips and knees confirmed on x-rays done this year.  She states over the past couple of weeks she has noted new pain on the left side of her low back that is tender to palpation and causing spasms.  She denies new injuries.  Social History     Socioeconomic History    Marital status:      Spouse name: Not on file    Number of children: 5    Years of education: Not on file    Highest education level: Not on file   Occupational History    Occupation: homemaker   Social Needs    Financial resource strain: Not on file    Food insecurity:     Worry: Not on file     Inability: Not on file    Transportation needs:     Medical: Not on file     Non-medical: Not on file   Tobacco Use    Smoking status: Former Smoker     Last attempt to quit: 1976     Years since quittin.9    Smokeless tobacco: Never Used   Substance and Sexual Activity    Alcohol use: Yes     Alcohol/week: 0.0 standard drinks     Comment: red wine    Drug use: No    Sexual activity: Not Currently     Partners: Male   Lifestyle    Physical activity:     Days per week: Not on file     Minutes per session: Not on file     Stress: Not on file   Relationships    Social connections:     Talks on phone: Not on file     Gets together: Not on file     Attends Hindu service: Not on file     Active member of club or organization: Not on file     Attends meetings of clubs or organizations: Not on file     Relationship status: Not on file   Other Topics Concern    Patient feels they ought to cut down on drinking/drug use Not Asked    Patient annoyed by others criticizing their drinking/drug use Not Asked    Patient has felt bad or guilty about drinking/drug use Not Asked    Patient has had a drink/used drugs as an eye opener in the AM Not Asked   Social History Narrative    Not on file       Review of Systems   Musculoskeletal: Positive for arthralgias, back pain and myalgias. Negative for joint swelling.   Psychiatric/Behavioral: Positive for dysphoric mood.       Objective:      Physical Exam   Constitutional: She appears well-developed and well-nourished.   HENT:   Head: Normocephalic and atraumatic.   Musculoskeletal:        Lumbar back: She exhibits decreased range of motion, tenderness and bony tenderness.        Back:    Negative straight leg raise   Vitals reviewed.      Assessment:       1. Acute left-sided low back pain without sciatica    2. Morbid obesity with BMI of 40.0-44.9, adult    3. Primary osteoarthritis involving multiple joints    4. Other chronic pain        Plan:         Gwendolyn was seen today for back pain.    Diagnoses and all orders for this visit:    Acute left-sided low back pain without sciatica  -     tiZANidine (ZANAFLEX) 4 MG tablet; Take 1 tablet (4 mg total) by mouth 3 (three) times daily as needed (for pain).  -     will do trial with muscle relaxers.  Patient was also advised to take Tylenol extra-strength.  And she was advised to contact the [facility] to discuss her next options.    Lumbar post-laminectomy syndrome  -   patient will call back and [facility] to discuss her options.    Morbid  obesity with BMI of 40.0-44.9, adult  -   increase exercise and dash diet    Primary osteoarthritis involving multiple joints  -   follow up with pain clinic

## 2019-12-27 ENCOUNTER — TELEPHONE (OUTPATIENT)
Dept: FAMILY MEDICINE | Facility: CLINIC | Age: 64
End: 2019-12-27

## 2019-12-27 DIAGNOSIS — G89.29 OTHER CHRONIC PAIN: ICD-10-CM

## 2019-12-27 DIAGNOSIS — M54.32 SCIATICA OF LEFT SIDE: ICD-10-CM

## 2019-12-27 DIAGNOSIS — M15.9 PRIMARY OSTEOARTHRITIS INVOLVING MULTIPLE JOINTS: Primary | ICD-10-CM

## 2019-12-27 NOTE — TELEPHONE ENCOUNTER
----- Message from Alba Parnell sent at 12/27/2019 12:24 PM CST -----  Contact: NADINE @ [facility]  Type: Patient Call Back    Who calledNadine    What is the request in detail:pt is requesting a order for a rolator    Can the clinic reply by [facility]?no    Would the patient rather a call back or a response via [facility]?call    Best call back number:  [phone]    Additional Information: should be faxed over with last office note and a diagnosis code

## 2020-01-11 DIAGNOSIS — R00.0 TACHYCARDIA WITH HYPERTENSION: ICD-10-CM

## 2020-01-11 DIAGNOSIS — M1A.0790 CHRONIC GOUT OF FOOT, UNSPECIFIED CAUSE, UNSPECIFIED LATERALITY: ICD-10-CM

## 2020-01-11 DIAGNOSIS — I10 TACHYCARDIA WITH HYPERTENSION: ICD-10-CM

## 2020-01-11 DIAGNOSIS — I10 ESSENTIAL HYPERTENSION: ICD-10-CM

## 2020-01-11 DIAGNOSIS — F51.04 PSYCHOPHYSIOLOGICAL INSOMNIA: ICD-10-CM

## 2020-01-11 DIAGNOSIS — Z63.4 GRIEF AT LOSS OF CHILD: ICD-10-CM

## 2020-01-11 DIAGNOSIS — F43.21 GRIEF AT LOSS OF CHILD: ICD-10-CM

## 2020-01-11 SDOH — SOCIAL DETERMINANTS OF HEALTH (SDOH): DISSAPEARANCE AND DEATH OF FAMILY MEMBER: Z63.4

## 2020-01-12 RX ORDER — HYDROCHLOROTHIAZIDE 25 MG/1
TABLET ORAL
Qty: 90 TABLET | Refills: 0 | Status: SHIPPED | OUTPATIENT
Start: 2020-01-12 | End: 2020-04-13

## 2020-01-12 RX ORDER — DULOXETIN HYDROCHLORIDE 60 MG/1
CAPSULE, DELAYED RELEASE ORAL
Qty: 90 CAPSULE | Refills: 0 | Status: SHIPPED | OUTPATIENT
Start: 2020-01-12 | End: 2020-04-13

## 2020-01-12 RX ORDER — METOPROLOL SUCCINATE 50 MG/1
TABLET, EXTENDED RELEASE ORAL
Qty: 90 TABLET | Refills: 0 | Status: SHIPPED | OUTPATIENT
Start: 2020-01-12 | End: 2020-03-13

## 2020-01-13 RX ORDER — ALLOPURINOL 100 MG/1
TABLET ORAL
Qty: 180 TABLET | Refills: 0 | Status: SHIPPED | OUTPATIENT
Start: 2020-01-13 | End: 2020-03-16

## 2020-01-13 RX ORDER — QUETIAPINE FUMARATE 100 MG/1
TABLET, FILM COATED ORAL
Qty: 90 TABLET | Refills: 0 | Status: SHIPPED | OUTPATIENT
Start: 2020-01-13 | End: 2020-05-22

## 2020-01-20 ENCOUNTER — TELEPHONE (OUTPATIENT)
Dept: FAMILY MEDICINE | Facility: CLINIC | Age: 65
End: 2020-01-20

## 2020-02-04 ENCOUNTER — PATIENT OUTREACH (OUTPATIENT)
Dept: ADMINISTRATIVE | Facility: OTHER | Age: 65
End: 2020-02-04

## 2020-02-07 ENCOUNTER — OFFICE VISIT (OUTPATIENT)
Dept: NEPHROLOGY | Facility: CLINIC | Age: 65
End: 2020-02-07
Payer: MEDICARE

## 2020-02-07 VITALS
DIASTOLIC BLOOD PRESSURE: 76 MMHG | HEART RATE: 101 BPM | BODY MASS INDEX: 40.48 KG/M2 | OXYGEN SATURATION: 96 % | WEIGHT: 257.94 LBS | HEIGHT: 67 IN | SYSTOLIC BLOOD PRESSURE: 144 MMHG

## 2020-02-07 DIAGNOSIS — N18.2 CHRONIC KIDNEY DISEASE, STAGE II (MILD): ICD-10-CM

## 2020-02-07 DIAGNOSIS — N20.0 NEPHROLITHIASIS: Primary | ICD-10-CM

## 2020-02-07 PROCEDURE — 99214 OFFICE O/P EST MOD 30 MIN: CPT | Mod: S$GLB,,, | Performed by: INTERNAL MEDICINE

## 2020-02-07 PROCEDURE — 1125F PR PAIN SEVERITY QUANTIFIED, PAIN PRESENT: ICD-10-PCS | Mod: S$GLB,,, | Performed by: INTERNAL MEDICINE

## 2020-02-07 PROCEDURE — 3078F PR MOST RECENT DIASTOLIC BLOOD PRESSURE < 80 MM HG: ICD-10-PCS | Mod: CPTII,S$GLB,, | Performed by: INTERNAL MEDICINE

## 2020-02-07 PROCEDURE — 1101F PT FALLS ASSESS-DOCD LE1/YR: CPT | Mod: CPTII,S$GLB,, | Performed by: INTERNAL MEDICINE

## 2020-02-07 PROCEDURE — 1159F PR MEDICATION LIST DOCUMENTED IN MEDICAL RECORD: ICD-10-PCS | Mod: S$GLB,,, | Performed by: INTERNAL MEDICINE

## 2020-02-07 PROCEDURE — 1101F PR PT FALLS ASSESS DOC 0-1 FALLS W/OUT INJ PAST YR: ICD-10-PCS | Mod: CPTII,S$GLB,, | Performed by: INTERNAL MEDICINE

## 2020-02-07 PROCEDURE — 99214 PR OFFICE/OUTPT VISIT, EST, LEVL IV, 30-39 MIN: ICD-10-PCS | Mod: S$GLB,,, | Performed by: INTERNAL MEDICINE

## 2020-02-07 PROCEDURE — 3077F SYST BP >= 140 MM HG: CPT | Mod: CPTII,S$GLB,, | Performed by: INTERNAL MEDICINE

## 2020-02-07 PROCEDURE — 1125F AMNT PAIN NOTED PAIN PRSNT: CPT | Mod: S$GLB,,, | Performed by: INTERNAL MEDICINE

## 2020-02-07 PROCEDURE — 1159F MED LIST DOCD IN RCRD: CPT | Mod: S$GLB,,, | Performed by: INTERNAL MEDICINE

## 2020-02-07 PROCEDURE — 3077F PR MOST RECENT SYSTOLIC BLOOD PRESSURE >= 140 MM HG: ICD-10-PCS | Mod: CPTII,S$GLB,, | Performed by: INTERNAL MEDICINE

## 2020-02-07 PROCEDURE — 99999 PR PBB SHADOW E&M-EST. PATIENT-LVL III: CPT | Mod: PBBFAC,,, | Performed by: INTERNAL MEDICINE

## 2020-02-07 PROCEDURE — 3078F DIAST BP <80 MM HG: CPT | Mod: CPTII,S$GLB,, | Performed by: INTERNAL MEDICINE

## 2020-02-07 PROCEDURE — 99999 PR PBB SHADOW E&M-EST. PATIENT-LVL III: ICD-10-PCS | Mod: PBBFAC,,, | Performed by: INTERNAL MEDICINE

## 2020-02-07 RX ORDER — AMOXICILLIN AND CLAVULANATE POTASSIUM 500; 125 MG/1; MG/1
1 TABLET, FILM COATED ORAL 2 TIMES DAILY
Qty: 14 TABLET | Refills: 1 | Status: SHIPPED | OUTPATIENT
Start: 2020-02-07 | End: 2020-02-14

## 2020-02-07 NOTE — PROGRESS NOTES
Subjective:       Patient ID: Gwendolyn Fournier is a 65 y.o. Black or  female who presents for follow up of Nephrolithiasis    HPI    Ms. Fournier is a 65 year old woman with medical history of hypertension presenting for follow up of nephrolithiasis and chronic kidney disease.  Patient noted to have single small left kidney stone during workup for lower back/flank pain.  Patient denies prior history of stone passage.  She reports moderate daily fluid intake, reports sodium restriction.  She reports blood pressure usually well-controlled.  She reports foul smelling urine, along with occasional dysuria, otherwise denies any fever, chest pain, shortness of breath, abdominal pain, diarrhea, hematuria.     Review of Systems   Constitutional: Negative for appetite change, fatigue and fever.   Respiratory: Negative for chest tightness and shortness of breath.    Cardiovascular: Negative for chest pain and leg swelling.   Gastrointestinal: Negative for abdominal pain, constipation, diarrhea, nausea and vomiting.   Genitourinary: Positive for dysuria. Negative for difficulty urinating, flank pain, frequency, hematuria and urgency.   Musculoskeletal: Positive for arthralgias and back pain. Negative for joint swelling and myalgias.   Skin: Negative for rash and wound.   Neurological: Negative for dizziness, weakness and light-headedness.   All other systems reviewed and are negative.      Objective:      Physical Exam   Constitutional: She appears well-developed and well-nourished.   Cardiovascular: Normal rate, regular rhythm and normal heart sounds. Exam reveals no gallop and no friction rub.   No murmur heard.  Pulmonary/Chest: Effort normal and breath sounds normal. No respiratory distress. She has no wheezes. She has no rales.   Abdominal: Soft. Bowel sounds are normal. There is no tenderness.   Musculoskeletal: She exhibits no edema.   Neurological: She is alert.   Skin: Skin is warm and dry. No rash  noted. No erythema.   Psychiatric: She has a normal mood and affect.   Vitals reviewed.      Assessment:       1. Nephrolithiasis    2. Chronic kidney disease, stage II (mild)        Plan:      Ms. Fournier is a 65 year old woman with medical history of hypertension presenting for follow up of nephrolithiasis and chronic kidney disease.  Patient with small left stone, advised to drink at least 2L/day, along with sodium restriction; do not suspect pain symptoms due to stone disease, will continue to monitor for now.  Patient creatinine has been variable the past few years, consistent with CKD stage II/IIIa, will repeat renal panel to trend.  Will obtain urine studies to rule out infectious/glomerular etiology, will consider renal US with doppler to rule out obstructive/vascular etiology.  Stressed importance of blood pressure control, along with weight loss and avoidance of NSAID's, to prevent any further progression of kidney disease, patient voiced understanding.  Further recommendations pending results of above.    - Dysuria: suspect UTI, will give Augmentin pending urine studies    Return to clinic in 6 months pending urine studies, then with renal/heme panel, iron/TIBC/ferritin, urinalysis/culture, urine protein/creatinine ratio prior to next visit

## 2020-02-08 ENCOUNTER — LAB VISIT (OUTPATIENT)
Dept: LAB | Facility: HOSPITAL | Age: 65
End: 2020-02-08
Attending: INTERNAL MEDICINE
Payer: MEDICARE

## 2020-02-08 DIAGNOSIS — N20.0 NEPHROLITHIASIS: ICD-10-CM

## 2020-02-08 LAB
BACTERIA #/AREA URNS AUTO: ABNORMAL /HPF
BILIRUB UR QL STRIP: NEGATIVE
CLARITY UR REFRACT.AUTO: ABNORMAL
COLOR UR AUTO: YELLOW
CREAT UR-MCNC: 213 MG/DL (ref 15–325)
GLUCOSE UR QL STRIP: NEGATIVE
HGB UR QL STRIP: NEGATIVE
HYALINE CASTS UR QL AUTO: 1 /LPF
KETONES UR QL STRIP: NEGATIVE
LEUKOCYTE ESTERASE UR QL STRIP: ABNORMAL
MICROSCOPIC COMMENT: ABNORMAL
NITRITE UR QL STRIP: NEGATIVE
NON-SQ EPI CELLS #/AREA URNS AUTO: 1 /HPF
PH UR STRIP: 5 [PH] (ref 5–8)
PROT UR QL STRIP: ABNORMAL
PROT UR-MCNC: 130 MG/DL (ref 0–15)
PROT/CREAT UR: 0.61 MG/G{CREAT} (ref 0–0.2)
RBC #/AREA URNS AUTO: 2 /HPF (ref 0–4)
SP GR UR STRIP: 1.01 (ref 1–1.03)
SQUAMOUS #/AREA URNS AUTO: 37 /HPF
URN SPEC COLLECT METH UR: ABNORMAL
WBC #/AREA URNS AUTO: 37 /HPF (ref 0–5)

## 2020-02-08 PROCEDURE — 82570 ASSAY OF URINE CREATININE: CPT

## 2020-02-08 PROCEDURE — 81001 URINALYSIS AUTO W/SCOPE: CPT

## 2020-02-14 DIAGNOSIS — I10 ESSENTIAL HYPERTENSION: ICD-10-CM

## 2020-02-26 ENCOUNTER — PATIENT MESSAGE (OUTPATIENT)
Dept: ADMINISTRATIVE | Facility: HOSPITAL | Age: 65
End: 2020-02-26

## 2020-02-26 ENCOUNTER — PATIENT OUTREACH (OUTPATIENT)
Dept: ADMINISTRATIVE | Facility: HOSPITAL | Age: 65
End: 2020-02-26

## 2020-02-28 ENCOUNTER — TELEPHONE (OUTPATIENT)
Dept: FAMILY MEDICINE | Facility: CLINIC | Age: 65
End: 2020-02-28

## 2020-02-28 DIAGNOSIS — Z12.31 ENCOUNTER FOR SCREENING MAMMOGRAM FOR BREAST CANCER: Primary | ICD-10-CM

## 2020-03-05 NOTE — TELEPHONE ENCOUNTER
Due for mammogram. Left message for patient to return call to schedule. Sent letter and [facility] message.

## 2020-03-12 DIAGNOSIS — I10 ESSENTIAL HYPERTENSION: ICD-10-CM

## 2020-03-12 DIAGNOSIS — R00.0 TACHYCARDIA WITH HYPERTENSION: ICD-10-CM

## 2020-03-12 DIAGNOSIS — I10 TACHYCARDIA WITH HYPERTENSION: ICD-10-CM

## 2020-03-12 DIAGNOSIS — M1A.0790 CHRONIC GOUT OF FOOT, UNSPECIFIED CAUSE, UNSPECIFIED LATERALITY: ICD-10-CM

## 2020-03-13 RX ORDER — METOPROLOL SUCCINATE 50 MG/1
TABLET, EXTENDED RELEASE ORAL
Qty: 90 TABLET | Refills: 0 | Status: SHIPPED | OUTPATIENT
Start: 2020-03-13 | End: 2020-07-09

## 2020-03-16 RX ORDER — ALLOPURINOL 100 MG/1
TABLET ORAL
Qty: 180 TABLET | Refills: 0 | Status: SHIPPED | OUTPATIENT
Start: 2020-03-16 | End: 2020-04-13

## 2020-03-16 RX ORDER — HYDROCHLOROTHIAZIDE 25 MG/1
TABLET ORAL
Qty: 90 TABLET | Refills: 0 | Status: SHIPPED | OUTPATIENT
Start: 2020-03-16 | End: 2020-04-13

## 2020-04-10 DIAGNOSIS — Z63.4 GRIEF AT LOSS OF CHILD: ICD-10-CM

## 2020-04-10 DIAGNOSIS — F43.21 GRIEF AT LOSS OF CHILD: ICD-10-CM

## 2020-04-10 DIAGNOSIS — I10 ESSENTIAL HYPERTENSION: ICD-10-CM

## 2020-04-10 DIAGNOSIS — M1A.0790 CHRONIC GOUT OF FOOT, UNSPECIFIED CAUSE, UNSPECIFIED LATERALITY: ICD-10-CM

## 2020-04-10 SDOH — SOCIAL DETERMINANTS OF HEALTH (SDOH): DISSAPEARANCE AND DEATH OF FAMILY MEMBER: Z63.4

## 2020-04-13 RX ORDER — HYDROCHLOROTHIAZIDE 25 MG/1
TABLET ORAL
Qty: 90 TABLET | Refills: 0 | Status: SHIPPED | OUTPATIENT
Start: 2020-04-13 | End: 2020-07-09

## 2020-04-13 RX ORDER — DULOXETIN HYDROCHLORIDE 60 MG/1
CAPSULE, DELAYED RELEASE ORAL
Qty: 90 CAPSULE | Refills: 0 | Status: SHIPPED | OUTPATIENT
Start: 2020-04-13 | End: 2020-07-09

## 2020-04-13 RX ORDER — ALLOPURINOL 100 MG/1
TABLET ORAL
Qty: 180 TABLET | Refills: 0 | Status: SHIPPED | OUTPATIENT
Start: 2020-04-13 | End: 2020-07-09

## 2020-04-23 ENCOUNTER — OFFICE VISIT (OUTPATIENT)
Dept: FAMILY MEDICINE | Facility: CLINIC | Age: 65
End: 2020-04-23
Payer: MEDICARE

## 2020-04-23 DIAGNOSIS — I10 ESSENTIAL HYPERTENSION: Primary | ICD-10-CM

## 2020-04-23 DIAGNOSIS — M96.1 LUMBAR POST-LAMINECTOMY SYNDROME: ICD-10-CM

## 2020-04-23 DIAGNOSIS — M15.9 PRIMARY OSTEOARTHRITIS INVOLVING MULTIPLE JOINTS: ICD-10-CM

## 2020-04-23 PROCEDURE — 99499 UNLISTED E&M SERVICE: CPT | Mod: 95,,, | Performed by: FAMILY MEDICINE

## 2020-04-23 PROCEDURE — 1101F PT FALLS ASSESS-DOCD LE1/YR: CPT | Mod: CPTII,95,, | Performed by: FAMILY MEDICINE

## 2020-04-23 PROCEDURE — 1159F MED LIST DOCD IN RCRD: CPT | Mod: 95,,, | Performed by: FAMILY MEDICINE

## 2020-04-23 PROCEDURE — 99214 OFFICE O/P EST MOD 30 MIN: CPT | Mod: 95,,, | Performed by: FAMILY MEDICINE

## 2020-04-23 PROCEDURE — 1101F PR PT FALLS ASSESS DOC 0-1 FALLS W/OUT INJ PAST YR: ICD-10-PCS | Mod: CPTII,95,, | Performed by: FAMILY MEDICINE

## 2020-04-23 PROCEDURE — 1159F PR MEDICATION LIST DOCUMENTED IN MEDICAL RECORD: ICD-10-PCS | Mod: 95,,, | Performed by: FAMILY MEDICINE

## 2020-04-23 PROCEDURE — 99214 PR OFFICE/OUTPT VISIT, EST, LEVL IV, 30-39 MIN: ICD-10-PCS | Mod: 95,,, | Performed by: FAMILY MEDICINE

## 2020-04-23 PROCEDURE — 99499 RISK ADDL DX/OHS AUDIT: ICD-10-PCS | Mod: 95,,, | Performed by: FAMILY MEDICINE

## 2020-04-23 RX ORDER — TRAMADOL HYDROCHLORIDE 50 MG/1
TABLET ORAL
Qty: 90 TABLET | Refills: 2 | Status: SHIPPED | OUTPATIENT
Start: 2020-04-23

## 2020-04-23 NOTE — PROGRESS NOTES
Gwendolyn Fournier is a 65 y.o. female.      Visit type: Virtual visit with synchronous audio and video  The patient is located outside of this physical clinic but within the State of [place], and a thorough physical exam was not performed.  The chief complaint was presented by patient and discussed during visit and is documented below.    100% of visit was face to face counseling, and total visit lasted 20 minutes.     Each patient provided medical services by telemedicine is:  (1) informed of the relationship between the physician and patient and the respective role of any other health care provider with respect to management of the patient; and (2) notified that he or she may decline to receive medical services by telemedicine and may withdraw from such care at any time.    Notes:    Back Pain   This is a chronic problem. The current episode started more than 1 year ago. The problem occurs constantly. The problem has been rapidly worsening since onset. The pain is present in the gluteal and lumbar spine. The quality of the pain is described as stabbing. The pain radiates to the right knee and right thigh. The pain is at a severity of 10/10. The pain is severe. The pain is the same all the time. The symptoms are aggravated by standing. Stiffness is present all day. Associated symptoms include bladder incontinence, headaches, leg pain, paresthesias and tingling. Pertinent negatives include no abdominal pain, bowel incontinence, chest pain, dysuria, fever, numbness, paresis, pelvic pain, perianal numbness, weakness or weight loss. Risk factors include obesity and sedentary lifestyle. She has tried analgesics for the symptoms. The treatment provided moderate relief.   Hypertension   This is a chronic problem. The current episode started more than 1 year ago. The problem has been gradually worsening since onset. The problem is uncontrolled. Associated symptoms include anxiety and headaches. Pertinent negatives include  no blurred vision, chest pain, malaise/fatigue, neck pain, orthopnea, palpitations, peripheral edema, PND, shortness of breath or sweats. Agents associated with hypertension include NSAIDs. Risk factors for coronary artery disease include obesity and post-menopausal state. Past treatments include ACE inhibitors. The current treatment provides moderate improvement. Compliance problems include exercise and diet.  There is no history of angina, kidney disease, CAD/MI, CVA, heart failure, left ventricular hypertrophy, PVD or retinopathy. There is no history of chronic renal disease, coarctation of the aorta, hyperaldosteronism, hypercortisolism, hyperparathyroidism, a hypertension causing med, pheochromocytoma, renovascular disease, sleep apnea or a thyroid problem.        ROS  Review of Systems   Constitutional: Negative.  Negative for activity change, appetite change, chills, diaphoresis, fatigue, fever, malaise/fatigue, unexpected weight change and weight loss.   HENT: Positive for congestion, postnasal drip and sinus pressure. Negative for ear pain, hearing loss, nosebleeds, rhinorrhea, sneezing, sore throat and trouble swallowing.    Eyes: Negative for blurred vision, pain and visual disturbance.   Respiratory: Negative for cough, choking and shortness of breath.    Cardiovascular: Negative for chest pain, palpitations, orthopnea, leg swelling and PND.   Gastrointestinal: Negative for abdominal pain, bowel incontinence, constipation, diarrhea, nausea and vomiting.   Genitourinary: Positive for bladder incontinence. Negative for difficulty urinating, dysuria, flank pain, frequency, hematuria, pelvic pain and urgency.   Musculoskeletal: Positive for arthralgias, back pain, gait problem and joint swelling. Negative for myalgias and neck pain.   Skin: Negative.    Allergic/Immunologic: Negative for environmental allergies and food allergies.   Neurological: Positive for tingling, headaches and paresthesias. Negative  for dizziness, seizures, syncope, weakness, light-headedness and numbness.   Psychiatric/Behavioral: Positive for sleep disturbance. Negative for agitation, confusion, decreased concentration, dysphoric mood, hallucinations, self-injury and suicidal ideas. The patient is nervous/anxious. The patient is not hyperactive.        Assessment & Plan    Discussion of plan of care including treatment options regarding health and wellness were reviewed and discussed with patient.  Any changes to medication or treatment plan, as well as any screening blood test, imaging, or referrals to specialist, are documented.  Follow up as indicated.     1. Essential hypertension  Patient was counseled and encouraged to maintain a low sodium diet, as well as increasing physical activity.  Recommend random BP checks at home on a regular basis.  Will continue medication at this time; patient states her pressure started increasing following daily use of Brynn-Breckenridge for sinus congestion.  Advised to discontinue use and report if BP does not improve.    2. Lumbar post-laminectomy syndrome  Will manage pain acutely.    - traMADoL (ULTRAM) 50 mg tablet; TAKE 1 TABLET(50 MG) BY MOUTH EVERY 8 HOURS AS NEEDED FOR PAIN  Dispense: 90 tablet; Refill: 2    3. Primary osteoarthritis involving multiple joints  Patient is advised that arthritis is a result of regular daily use, and is caused by inflammation in the joint.  Patient was encouraged to exercise as tolerated, and attempt weight loss with sensible diet and lifestyle changes.  Patient was recommended to continue NSAID therapy to reduce inflammation, and to incorporate stretching into a daily routine.  Pain medication will be prescribed as necessary.  Patient should follow up if pain is not well controlled.   - traMADoL (ULTRAM) 50 mg tablet; TAKE 1 TABLET(50 MG) BY MOUTH EVERY 8 HOURS AS NEEDED FOR PAIN  Dispense: 90 tablet; Refill: 2      Follow up in about 2 months (around 6/23/2020), or if  symptoms worsen or fail to improve.        ACTIVE MEDICAL ISSUES:  Documented in Problem List    PAST MEDICAL HISTORY  Documented    PAST SURGICAL HISTORY:  Documented    SOCIAL HISTORY:  Documented    FAMILY HISTORY:  Documented    ALLERGIES AND MEDICATIONS: updated and reviewed.  Documented    Health Maintenance       Date Due Completion Date    TETANUS VACCINE 01/24/1972 ---    Shingles Vaccine (1 of 2) 01/24/2004 ---    Mammogram 09/15/2017 9/15/2015 (Done)    Override on 9/15/2015: Done (future)    Lipid Panel 11/19/2019 11/19/2018    Override on 9/15/2015: Done (future)    DEXA SCAN 02/20/2022 2/20/2019    Colonoscopy 01/21/2029 1/21/2019        Answers for HPI/ROS submitted by the patient on 4/22/2020   Back pain  genital pain: Yes

## 2020-05-22 DIAGNOSIS — F51.04 PSYCHOPHYSIOLOGICAL INSOMNIA: ICD-10-CM

## 2020-05-22 RX ORDER — QUETIAPINE FUMARATE 100 MG/1
TABLET, FILM COATED ORAL
Qty: 90 TABLET | Refills: 0 | Status: SHIPPED | OUTPATIENT
Start: 2020-05-22 | End: 2020-07-09

## 2020-06-25 ENCOUNTER — TELEPHONE (OUTPATIENT)
Dept: FAMILY MEDICINE | Facility: CLINIC | Age: 65
End: 2020-06-25

## 2020-07-16 ENCOUNTER — PATIENT OUTREACH (OUTPATIENT)
Dept: ADMINISTRATIVE | Facility: HOSPITAL | Age: 65
End: 2020-07-16

## 2020-07-21 ENCOUNTER — HOSPITAL ENCOUNTER (EMERGENCY)
Facility: HOSPITAL | Age: 65
Discharge: HOME | End: 2020-07-21
Attending: EMERGENCY MEDICINE
Payer: MEDICARE

## 2020-07-21 VITALS
HEART RATE: 97 BPM | DIASTOLIC BLOOD PRESSURE: 80 MMHG | WEIGHT: 250 LBS | BODY MASS INDEX: 39.24 KG/M2 | OXYGEN SATURATION: 96 % | SYSTOLIC BLOOD PRESSURE: 136 MMHG | HEIGHT: 67 IN | TEMPERATURE: 99 F | RESPIRATION RATE: 17 BRPM

## 2020-07-21 DIAGNOSIS — R04.0 EPISTAXIS: Primary | ICD-10-CM

## 2020-07-21 PROCEDURE — 99283 EMERGENCY DEPT VISIT LOW MDM: CPT

## 2020-07-21 PROCEDURE — 25000003 PHARM REV CODE 250: Performed by: EMERGENCY MEDICINE

## 2020-07-21 RX ADMIN — Medication 2 SPRAY: at 01:07

## 2020-07-21 NOTE — ED TRIAGE NOTES
Pt presents to the ED with c/o nose bleed starting at noon, pt denies dizziness, nausea and vomiting. Pt denies taking meds PTA. NAD noted

## 2020-07-21 NOTE — ED PROVIDER NOTES
Encounter Date: 7/21/2020    SCRIBE #1 NOTE: I, Ananth Dennis, am scribing for, and in the presence of,  Alessandro Garcias MD. I have scribed the entire note.       History     Chief Complaint   Patient presents with    Epistaxis     EMS reports nose bleed starting at 12pm today. unable to stop it at home. emesis x 1 with large clots.       1:28 PM  This is a 65 year old female with a history of hypertension who presents to the ED via EMS with complaints of epistaxis in her right nare for approximately 1.5 hours.  The patient reports she woke up this morning and felt what she thought was an unusual amount of spit in her throat.  She then realized it was blood, so she blew her nose. EMS states bleeding stopped briefly in route, approximately 15 minutes prior to arrival to the ED.  Patient states she usually uses CPAP at night, but claims she stopped using it approximately one month ago because she fears not cleaning it properly.       The history is provided by the patient.     Review of patient's allergies indicates:   Allergen Reactions    Ondansetron hcl (pf) Hives and Itching    Ketorolac Itching     Past Medical History:   Diagnosis Date    Arthritis     Gout attack     Hx of psychiatric care     Hypertension     Psychiatric problem     Renal disorder     Sciatic nerve pain 2013    Therapy     used to follow with psychiatrist but doesn't recall whom, 2012    Tuberculosis      Past Surgical History:   Procedure Laterality Date    COLONOSCOPY N/A 1/21/2019    Procedure: COLONOSCOPY;  Surgeon: Shanna Jose MD;  Location: [facility] ENDO;  Service: Endoscopy;  Laterality: N/A;    INJECTION OF JOINT Bilateral 3/13/2019    Procedure: INJECTION, JOINT BILATERAL SI JOINT;  Surgeon: Evan Coreas MD;  Location: [facility] PAIN MGT;  Service: Pain Management;  Laterality: Bilateral;  BILATERAL SI JOINT INJECTION    KNEE SURGERY  1/2014    left knee surgery      Family History   Problem Relation Age of Onset     Tuberculosis Mother     Stroke Father     Bipolar disorder Daughter     Suicide Daughter     Depression Daughter     Bipolar disorder Daughter     Depression Daughter      Social History     Tobacco Use    Smoking status: Former Smoker     Quit date: 1976     Years since quittin.5    Smokeless tobacco: Never Used   Substance Use Topics    Alcohol use: Not Currently     Alcohol/week: 0.0 standard drinks     Frequency: Never     Binge frequency: Never     Comment: red wine    Drug use: No     Review of Systems   Constitutional: Negative for fever.   HENT: Positive for nosebleeds. Negative for sore throat.    Eyes: Negative for visual disturbance.   Respiratory: Negative for shortness of breath.    Gastrointestinal: Negative for abdominal pain.   Genitourinary: Negative for dysuria.   Musculoskeletal: Negative for back pain.   Skin: Negative for rash.   Neurological: Negative for headaches.       Physical Exam     Initial Vitals   BP Pulse Resp Temp SpO2   20 1324 20 1324 20 1324 20 1408 20 1324   (!) 190/90 95 20 98.5 °F (36.9 °C) 95 %      MAP       --                Physical Exam    Nursing note and vitals reviewed.  Constitutional: She appears well-developed and well-nourished.   HENT:   Head: Atraumatic.   Nose: Epistaxis is observed.   Right anterior epistaxis. No evidence of infection.    Eyes: EOM are normal. Pupils are equal, round, and reactive to light.   Neck: Normal range of motion. Neck supple. No thyromegaly present. No JVD present.   Cardiovascular: Normal rate and regular rhythm. Exam reveals no gallop and no friction rub.    No murmur heard.  Pulmonary/Chest: Breath sounds normal. No respiratory distress.   Abdominal: Soft. Bowel sounds are normal. There is no abdominal tenderness.   Musculoskeletal: Normal range of motion. No tenderness or edema.   Neurological: She is alert and oriented to person, place, and time. She has normal strength. GCS score  is 15. GCS eye subscore is 4. GCS verbal subscore is 5. GCS motor subscore is 6.   Skin: Skin is warm and dry. Capillary refill takes less than 2 seconds.         ED Course   Procedures  Labs Reviewed - No data to display       Imaging Results    None          Medical Decision Making:   History:   Old Medical Records: I decided to obtain old medical records.  Initial Assessment:   65 year old female presents to the ED complaining of epistaxis in right nare. The patient was seen and examined. The history and physical exam was obtained. The nursing notes and vital signs were reviewed. Secondary to symptoms and exam findings, I did not order any labs or tests.       bleeding controlled with 2 sprays of Anatoly-Synephrine and pressure.  No packing needed this time.  Stable for discharge.  Vital signs are stable other than elevated blood pressure.  Patient has an appointment with her primary care doctor in 9 days.           Scribe Attestation:   Scribe #1: I performed the above scribed service and the documentation accurately describes the services I performed. I attest to the accuracy of the note.                          Clinical Impression:       ICD-10-CM ICD-9-CM   1. Epistaxis  R04.0 784.7             ED Disposition Condition    Discharge Stable        ED Prescriptions     None        Follow-up Information     Follow up With Specialties Details Why Contact Info    Azikiwe K. Lombard, MD Family Medicine  in 9 days as scheduled for bp recheck. [address] [place] [address] [phone]      [facility] Emergency Medicine  As needed, If symptoms return or worsen or unable to stop the bleeding. [address] [facility] [address] [phone]    Jimenze Jaeger II, MD Otolaryngology Schedule an appointment as soon as possible for a visit  if nose bleeds are recurrent. [address] [place] [address] [address]  [id number]  [facility] [address]  [phone]                        Alessandro LOU,  personally performed the services described in this documentation. All medical record entries made by the scribe were at my direction and in my presence. I have reviewed the chart and agree that the record reflects my personal performance and is accurate and complete.                 Alessandro Garcias MD  07/21/20 [id number]

## 2020-07-21 NOTE — ED PROVIDER NOTES
Encounter Date: 2020       History     Chief Complaint   Patient presents with    Epistaxis     EMS reports nose bleed starting at 12pm today. unable to stop it at home. emesis x 1 with large clots.       HPI  Review of patient's allergies indicates:   Allergen Reactions    Ondansetron hcl (pf) Hives and Itching    Ketorolac Itching     Past Medical History:   Diagnosis Date    Arthritis     Gout attack     Hx of psychiatric care     Hypertension     Psychiatric problem     Renal disorder     Sciatic nerve pain     Therapy     used to follow with psychiatrist but doesn't recall whom,     Tuberculosis      Past Surgical History:   Procedure Laterality Date    COLONOSCOPY N/A 2019    Procedure: COLONOSCOPY;  Surgeon: Shanna Jose MD;  Location: [facility] ENDO;  Service: Endoscopy;  Laterality: N/A;    INJECTION OF JOINT Bilateral 3/13/2019    Procedure: INJECTION, JOINT BILATERAL SI JOINT;  Surgeon: Evan Coreas MD;  Location: [facility] PAIN MGT;  Service: Pain Management;  Laterality: Bilateral;  BILATERAL SI JOINT INJECTION    KNEE SURGERY  2014    left knee surgery      Family History   Problem Relation Age of Onset    Tuberculosis Mother     Stroke Father     Bipolar disorder Daughter     Suicide Daughter     Depression Daughter     Bipolar disorder Daughter     Depression Daughter      Social History     Tobacco Use    Smoking status: Former Smoker     Quit date: 1976     Years since quittin.5    Smokeless tobacco: Never Used   Substance Use Topics    Alcohol use: Not Currently     Alcohol/week: 0.0 standard drinks     Frequency: Never     Binge frequency: Never     Comment: red wine    Drug use: No     Review of Systems    Physical Exam     Initial Vitals   BP Pulse Resp Temp SpO2   20 1324 20 1324 20 1324 20 1408 20 1324   (!) 190/90 95 20 98.5 °F (36.9 °C) 95 %      MAP       --                Physical Exam    ED Course    Procedures  Labs Reviewed - No data to display       Imaging Results    None                                          Clinical Impression:   {Add your Clinical Impression here. If you haven't documented one yet, please pend the note, finalize a Clinical Impression, and refresh your note before signing.:[id number]}      Disposition:   Disposition: Discharged  Condition: Stable     ED Disposition Condition    Discharge Stable        ED Prescriptions     None        Follow-up Information     Follow up With Specialties Details Why Contact Info    Azikiwe K. Lombard, MD Family Medicine  in 9 days as scheduled for bp recheck. [address] [place] [id number] [phone]      [facility]  As needed, If symptoms return or worsen or unable to stop the bleeding. [address] [facility] [address] [phone]    Jimenez Jaeger II, MD Otolaryngology Schedule an appointment as soon as possible for a visit  if nose bleeds are recurrent. [address] [place] [address]  [id number]  [facility] [address]  [phone]

## 2020-07-23 ENCOUNTER — OFFICE VISIT (OUTPATIENT)
Dept: FAMILY MEDICINE | Facility: CLINIC | Age: 65
End: 2020-07-23
Payer: MEDICARE

## 2020-07-23 ENCOUNTER — LAB VISIT (OUTPATIENT)
Dept: LAB | Facility: HOSPITAL | Age: 65
End: 2020-07-23
Attending: INTERNAL MEDICINE
Payer: MEDICARE

## 2020-07-23 VITALS
WEIGHT: 264.31 LBS | HEART RATE: 89 BPM | TEMPERATURE: 98 F | OXYGEN SATURATION: 98 % | BODY MASS INDEX: 41.48 KG/M2 | HEIGHT: 67 IN | RESPIRATION RATE: 16 BRPM | SYSTOLIC BLOOD PRESSURE: 150 MMHG | DIASTOLIC BLOOD PRESSURE: 88 MMHG

## 2020-07-23 DIAGNOSIS — R35.1 NOCTURIA: ICD-10-CM

## 2020-07-23 DIAGNOSIS — N18.30 CKD (CHRONIC KIDNEY DISEASE) STAGE 3, GFR 30-59 ML/MIN: ICD-10-CM

## 2020-07-23 DIAGNOSIS — I10 ESSENTIAL HYPERTENSION: ICD-10-CM

## 2020-07-23 DIAGNOSIS — N18.30 TYPE 2 DIABETES MELLITUS WITH STAGE 3 CHRONIC KIDNEY DISEASE, WITHOUT LONG-TERM CURRENT USE OF INSULIN: ICD-10-CM

## 2020-07-23 DIAGNOSIS — E11.22 TYPE 2 DIABETES MELLITUS WITH STAGE 3 CHRONIC KIDNEY DISEASE, WITHOUT LONG-TERM CURRENT USE OF INSULIN: ICD-10-CM

## 2020-07-23 DIAGNOSIS — R04.0 EPISTAXIS: Primary | ICD-10-CM

## 2020-07-23 DIAGNOSIS — E78.2 MIXED HYPERLIPIDEMIA: ICD-10-CM

## 2020-07-23 DIAGNOSIS — E66.01 MORBID OBESITY WITH BMI OF 40.0-44.9, ADULT: ICD-10-CM

## 2020-07-23 DIAGNOSIS — R73.03 PREDIABETES: ICD-10-CM

## 2020-07-23 LAB
BASOPHILS # BLD AUTO: 0.05 K/UL (ref 0–0.2)
BASOPHILS NFR BLD: 0.4 % (ref 0–1.9)
BILIRUB SERPL-MCNC: NEGATIVE MG/DL
BLOOD URINE, POC: NORMAL
CLARITY, POC UA: NORMAL
COLOR, POC UA: YELLOW
DIFFERENTIAL METHOD: ABNORMAL
EOSINOPHIL # BLD AUTO: 0.3 K/UL (ref 0–0.5)
EOSINOPHIL NFR BLD: 2.2 % (ref 0–8)
ERYTHROCYTE [DISTWIDTH] IN BLOOD BY AUTOMATED COUNT: 13.2 % (ref 11.5–14.5)
GLUCOSE UR QL STRIP: NORMAL
HCT VFR BLD AUTO: 34.7 % (ref 37–48.5)
HGB BLD-MCNC: 11.2 G/DL (ref 12–16)
IMM GRANULOCYTES # BLD AUTO: 0.06 K/UL (ref 0–0.04)
IMM GRANULOCYTES NFR BLD AUTO: 0.5 % (ref 0–0.5)
KETONES UR QL STRIP: NEGATIVE
LEUKOCYTE ESTERASE URINE, POC: NEGATIVE
LYMPHOCYTES # BLD AUTO: 3.9 K/UL (ref 1–4.8)
LYMPHOCYTES NFR BLD: 31.6 % (ref 18–48)
MCH RBC QN AUTO: 29.9 PG (ref 27–31)
MCHC RBC AUTO-ENTMCNC: 32.3 G/DL (ref 32–36)
MCV RBC AUTO: 93 FL (ref 82–98)
MONOCYTES # BLD AUTO: 0.8 K/UL (ref 0.3–1)
MONOCYTES NFR BLD: 6.1 % (ref 4–15)
NEUTROPHILS # BLD AUTO: 7.3 K/UL (ref 1.8–7.7)
NEUTROPHILS NFR BLD: 59.2 % (ref 38–73)
NITRITE, POC UA: NEGATIVE
NRBC BLD-RTO: 0 /100 WBC
PH, POC UA: 5
PLATELET # BLD AUTO: 344 K/UL (ref 150–350)
PMV BLD AUTO: 11 FL (ref 9.2–12.9)
PROTEIN, POC: NORMAL
RBC # BLD AUTO: 3.75 M/UL (ref 4–5.4)
SPECIFIC GRAVITY, POC UA: 1.01
UROBILINOGEN, POC UA: NORMAL
WBC # BLD AUTO: 12.36 K/UL (ref 3.9–12.7)

## 2020-07-23 PROCEDURE — 3051F HG A1C>EQUAL 7.0%<8.0%: CPT | Mod: CPTII,S$GLB,, | Performed by: INTERNAL MEDICINE

## 2020-07-23 PROCEDURE — 3008F PR BODY MASS INDEX (BMI) DOCUMENTED: ICD-10-PCS | Mod: CPTII,S$GLB,, | Performed by: INTERNAL MEDICINE

## 2020-07-23 PROCEDURE — 99499 RISK ADDL DX/OHS AUDIT: ICD-10-PCS | Mod: S$GLB,,, | Performed by: INTERNAL MEDICINE

## 2020-07-23 PROCEDURE — 81002 URINALYSIS NONAUTO W/O SCOPE: CPT | Mod: S$GLB,,, | Performed by: INTERNAL MEDICINE

## 2020-07-23 PROCEDURE — 85025 COMPLETE CBC W/AUTO DIFF WBC: CPT

## 2020-07-23 PROCEDURE — 83036 HEMOGLOBIN GLYCOSYLATED A1C: CPT

## 2020-07-23 PROCEDURE — 3077F SYST BP >= 140 MM HG: CPT | Mod: CPTII,S$GLB,, | Performed by: INTERNAL MEDICINE

## 2020-07-23 PROCEDURE — 99999 PR PBB SHADOW E&M-EST. PATIENT-LVL IV: CPT | Mod: PBBFAC,,, | Performed by: INTERNAL MEDICINE

## 2020-07-23 PROCEDURE — 81002 POCT URINE DIPSTICK WITHOUT MICROSCOPE: ICD-10-PCS | Mod: S$GLB,,, | Performed by: INTERNAL MEDICINE

## 2020-07-23 PROCEDURE — 80053 COMPREHEN METABOLIC PANEL: CPT

## 2020-07-23 PROCEDURE — 3008F BODY MASS INDEX DOCD: CPT | Mod: CPTII,S$GLB,, | Performed by: INTERNAL MEDICINE

## 2020-07-23 PROCEDURE — 3079F DIAST BP 80-89 MM HG: CPT | Mod: CPTII,S$GLB,, | Performed by: INTERNAL MEDICINE

## 2020-07-23 PROCEDURE — 3079F PR MOST RECENT DIASTOLIC BLOOD PRESSURE 80-89 MM HG: ICD-10-PCS | Mod: CPTII,S$GLB,, | Performed by: INTERNAL MEDICINE

## 2020-07-23 PROCEDURE — 80061 LIPID PANEL: CPT

## 2020-07-23 PROCEDURE — 1101F PT FALLS ASSESS-DOCD LE1/YR: CPT | Mod: CPTII,S$GLB,, | Performed by: INTERNAL MEDICINE

## 2020-07-23 PROCEDURE — 1125F PR PAIN SEVERITY QUANTIFIED, PAIN PRESENT: ICD-10-PCS | Mod: S$GLB,,, | Performed by: INTERNAL MEDICINE

## 2020-07-23 PROCEDURE — 36415 COLL VENOUS BLD VENIPUNCTURE: CPT | Mod: PO

## 2020-07-23 PROCEDURE — 84443 ASSAY THYROID STIM HORMONE: CPT

## 2020-07-23 PROCEDURE — 1101F PR PT FALLS ASSESS DOC 0-1 FALLS W/OUT INJ PAST YR: ICD-10-PCS | Mod: CPTII,S$GLB,, | Performed by: INTERNAL MEDICINE

## 2020-07-23 PROCEDURE — 1125F AMNT PAIN NOTED PAIN PRSNT: CPT | Mod: S$GLB,,, | Performed by: INTERNAL MEDICINE

## 2020-07-23 PROCEDURE — 99214 PR OFFICE/OUTPT VISIT, EST, LEVL IV, 30-39 MIN: ICD-10-PCS | Mod: 25,S$GLB,, | Performed by: INTERNAL MEDICINE

## 2020-07-23 PROCEDURE — 82570 ASSAY OF URINE CREATININE: CPT

## 2020-07-23 PROCEDURE — 3051F PR MOST RECENT HEMOGLOBIN A1C LEVEL 7.0 - < 8.0%: ICD-10-PCS | Mod: CPTII,S$GLB,, | Performed by: INTERNAL MEDICINE

## 2020-07-23 PROCEDURE — 99499 UNLISTED E&M SERVICE: CPT | Mod: S$GLB,,, | Performed by: INTERNAL MEDICINE

## 2020-07-23 PROCEDURE — 1159F MED LIST DOCD IN RCRD: CPT | Mod: S$GLB,,, | Performed by: INTERNAL MEDICINE

## 2020-07-23 PROCEDURE — 99999 PR PBB SHADOW E&M-EST. PATIENT-LVL IV: ICD-10-PCS | Mod: PBBFAC,,, | Performed by: INTERNAL MEDICINE

## 2020-07-23 PROCEDURE — 99214 OFFICE O/P EST MOD 30 MIN: CPT | Mod: 25,S$GLB,, | Performed by: INTERNAL MEDICINE

## 2020-07-23 PROCEDURE — 3077F PR MOST RECENT SYSTOLIC BLOOD PRESSURE >= 140 MM HG: ICD-10-PCS | Mod: CPTII,S$GLB,, | Performed by: INTERNAL MEDICINE

## 2020-07-23 PROCEDURE — 1159F PR MEDICATION LIST DOCUMENTED IN MEDICAL RECORD: ICD-10-PCS | Mod: S$GLB,,, | Performed by: INTERNAL MEDICINE

## 2020-07-23 NOTE — PROGRESS NOTES
Health Maintenance Due   Topic     Shingles Vaccine (1 of 2) No hx chickenpox ; inform pt can get vaccine at pharmacy.    Mammogram  Decline    Lipid Panel  Consult with Dr Bruner

## 2020-07-24 ENCOUNTER — TELEPHONE (OUTPATIENT)
Dept: FAMILY MEDICINE | Facility: CLINIC | Age: 65
End: 2020-07-24

## 2020-07-24 LAB
ALBUMIN SERPL BCP-MCNC: 3.3 G/DL (ref 3.5–5.2)
ALP SERPL-CCNC: 96 U/L (ref 55–135)
ALT SERPL W/O P-5'-P-CCNC: 17 U/L (ref 10–44)
ANION GAP SERPL CALC-SCNC: 10 MMOL/L (ref 8–16)
AST SERPL-CCNC: 28 U/L (ref 10–40)
BILIRUB SERPL-MCNC: 0.3 MG/DL (ref 0.1–1)
BUN SERPL-MCNC: 20 MG/DL (ref 8–23)
CALCIUM SERPL-MCNC: 10.1 MG/DL (ref 8.7–10.5)
CHLORIDE SERPL-SCNC: 104 MMOL/L (ref 95–110)
CHOLEST SERPL-MCNC: 265 MG/DL (ref 120–199)
CHOLEST/HDLC SERPL: 7 {RATIO} (ref 2–5)
CO2 SERPL-SCNC: 27 MMOL/L (ref 23–29)
CREAT SERPL-MCNC: 1.4 MG/DL (ref 0.5–1.4)
CREAT UR-MCNC: 152 MG/DL (ref 15–325)
EST. GFR  (AFRICAN AMERICAN): 45.2 ML/MIN/1.73 M^2
EST. GFR  (NON AFRICAN AMERICAN): 39.2 ML/MIN/1.73 M^2
ESTIMATED AVG GLUCOSE: 157 MG/DL (ref 68–131)
GLUCOSE SERPL-MCNC: 141 MG/DL (ref 70–110)
HBA1C MFR BLD HPLC: 7.1 % (ref 4–5.6)
HDLC SERPL-MCNC: 38 MG/DL (ref 40–75)
HDLC SERPL: 14.3 % (ref 20–50)
LDLC SERPL CALC-MCNC: 175.8 MG/DL (ref 63–159)
NONHDLC SERPL-MCNC: 227 MG/DL
POTASSIUM SERPL-SCNC: 3.9 MMOL/L (ref 3.5–5.1)
PROT SERPL-MCNC: 7.3 G/DL (ref 6–8.4)
PROT UR-MCNC: 216 MG/DL (ref 0–15)
PROT/CREAT UR: 1.42 MG/G{CREAT} (ref 0–0.2)
SODIUM SERPL-SCNC: 141 MMOL/L (ref 136–145)
TRIGL SERPL-MCNC: 256 MG/DL (ref 30–150)
TSH SERPL DL<=0.005 MIU/L-ACNC: 2.37 UIU/ML (ref 0.4–4)

## 2020-07-24 NOTE — TELEPHONE ENCOUNTER
Patient informed of lab results and Dr. Bruner's recommendations.  Patient verbalized understanding.

## 2020-07-24 NOTE — TELEPHONE ENCOUNTER
----- Message from Merlyn Bruner MD sent at 7/24/2020 12:01 PM CDT -----  Regarding: lab work results  Can you tell Ms. Fournier that her blood work does not show that she has worsening kidney issues, however she has proteins in her urine and needs to discuss this with Dr. Lombard when she sees him next week,  Also, she has now mild diabetes and high cholesterol, and will need to address this with Dr. Lombard too. Meanwhile, I suggest for her to work on her lifestyle.Thanks!

## 2020-07-30 ENCOUNTER — OFFICE VISIT (OUTPATIENT)
Dept: FAMILY MEDICINE | Facility: CLINIC | Age: 65
End: 2020-07-30
Payer: MEDICARE

## 2020-07-30 VITALS
HEART RATE: 96 BPM | BODY MASS INDEX: 38.62 KG/M2 | HEIGHT: 67 IN | RESPIRATION RATE: 20 BRPM | DIASTOLIC BLOOD PRESSURE: 84 MMHG | WEIGHT: 246.06 LBS | TEMPERATURE: 100 F | SYSTOLIC BLOOD PRESSURE: 136 MMHG | OXYGEN SATURATION: 95 %

## 2020-07-30 DIAGNOSIS — I10 ESSENTIAL HYPERTENSION: ICD-10-CM

## 2020-07-30 DIAGNOSIS — I10 TACHYCARDIA WITH HYPERTENSION: ICD-10-CM

## 2020-07-30 DIAGNOSIS — E11.22 TYPE 2 DIABETES MELLITUS WITH STAGE 3 CHRONIC KIDNEY DISEASE, WITHOUT LONG-TERM CURRENT USE OF INSULIN: Primary | ICD-10-CM

## 2020-07-30 DIAGNOSIS — E78.5 HYPERLIPIDEMIA, ACQUIRED: ICD-10-CM

## 2020-07-30 DIAGNOSIS — R00.0 TACHYCARDIA WITH HYPERTENSION: ICD-10-CM

## 2020-07-30 DIAGNOSIS — N18.30 TYPE 2 DIABETES MELLITUS WITH STAGE 3 CHRONIC KIDNEY DISEASE, WITHOUT LONG-TERM CURRENT USE OF INSULIN: Primary | ICD-10-CM

## 2020-07-30 LAB — HM FOOT EXAM: NORMAL

## 2020-07-30 PROCEDURE — 3079F DIAST BP 80-89 MM HG: CPT | Mod: CPTII,S$GLB,, | Performed by: FAMILY MEDICINE

## 2020-07-30 PROCEDURE — 3008F BODY MASS INDEX DOCD: CPT | Mod: CPTII,S$GLB,, | Performed by: FAMILY MEDICINE

## 2020-07-30 PROCEDURE — 3075F SYST BP GE 130 - 139MM HG: CPT | Mod: CPTII,S$GLB,, | Performed by: FAMILY MEDICINE

## 2020-07-30 PROCEDURE — 1126F PR PAIN SEVERITY QUANTIFIED, NO PAIN PRESENT: ICD-10-PCS | Mod: S$GLB,,, | Performed by: FAMILY MEDICINE

## 2020-07-30 PROCEDURE — 3051F HG A1C>EQUAL 7.0%<8.0%: CPT | Mod: CPTII,S$GLB,, | Performed by: FAMILY MEDICINE

## 2020-07-30 PROCEDURE — 1126F AMNT PAIN NOTED NONE PRSNT: CPT | Mod: S$GLB,,, | Performed by: FAMILY MEDICINE

## 2020-07-30 PROCEDURE — 99214 OFFICE O/P EST MOD 30 MIN: CPT | Mod: S$GLB,,, | Performed by: FAMILY MEDICINE

## 2020-07-30 PROCEDURE — 3075F PR MOST RECENT SYSTOLIC BLOOD PRESS GE 130-139MM HG: ICD-10-PCS | Mod: CPTII,S$GLB,, | Performed by: FAMILY MEDICINE

## 2020-07-30 PROCEDURE — 99999 PR PBB SHADOW E&M-EST. PATIENT-LVL IV: CPT | Mod: PBBFAC,,, | Performed by: FAMILY MEDICINE

## 2020-07-30 PROCEDURE — 3079F PR MOST RECENT DIASTOLIC BLOOD PRESSURE 80-89 MM HG: ICD-10-PCS | Mod: CPTII,S$GLB,, | Performed by: FAMILY MEDICINE

## 2020-07-30 PROCEDURE — 3051F PR MOST RECENT HEMOGLOBIN A1C LEVEL 7.0 - < 8.0%: ICD-10-PCS | Mod: CPTII,S$GLB,, | Performed by: FAMILY MEDICINE

## 2020-07-30 PROCEDURE — 1159F MED LIST DOCD IN RCRD: CPT | Mod: S$GLB,,, | Performed by: FAMILY MEDICINE

## 2020-07-30 PROCEDURE — 1101F PR PT FALLS ASSESS DOC 0-1 FALLS W/OUT INJ PAST YR: ICD-10-PCS | Mod: CPTII,S$GLB,, | Performed by: FAMILY MEDICINE

## 2020-07-30 PROCEDURE — 99214 PR OFFICE/OUTPT VISIT, EST, LEVL IV, 30-39 MIN: ICD-10-PCS | Mod: S$GLB,,, | Performed by: FAMILY MEDICINE

## 2020-07-30 PROCEDURE — 99999 PR PBB SHADOW E&M-EST. PATIENT-LVL IV: ICD-10-PCS | Mod: PBBFAC,,, | Performed by: FAMILY MEDICINE

## 2020-07-30 PROCEDURE — 1101F PT FALLS ASSESS-DOCD LE1/YR: CPT | Mod: CPTII,S$GLB,, | Performed by: FAMILY MEDICINE

## 2020-07-30 PROCEDURE — 1159F PR MEDICATION LIST DOCUMENTED IN MEDICAL RECORD: ICD-10-PCS | Mod: S$GLB,,, | Performed by: FAMILY MEDICINE

## 2020-07-30 PROCEDURE — 3008F PR BODY MASS INDEX (BMI) DOCUMENTED: ICD-10-PCS | Mod: CPTII,S$GLB,, | Performed by: FAMILY MEDICINE

## 2020-07-30 RX ORDER — HYDROCHLOROTHIAZIDE 25 MG/1
TABLET ORAL
Qty: 90 TABLET | Refills: 1 | Status: SHIPPED | OUTPATIENT
Start: 2020-07-30 | End: 2020-12-18 | Stop reason: SDUPTHER

## 2020-07-30 RX ORDER — GLIPIZIDE 10 MG/1
10 TABLET, FILM COATED, EXTENDED RELEASE ORAL
Qty: 90 TABLET | Refills: 1 | Status: SHIPPED | OUTPATIENT
Start: 2020-07-30 | End: 2021-02-22 | Stop reason: SDUPTHER

## 2020-07-30 RX ORDER — METOPROLOL SUCCINATE 50 MG/1
50 TABLET, EXTENDED RELEASE ORAL 2 TIMES DAILY
Qty: 180 TABLET | Refills: 1 | Status: SHIPPED | OUTPATIENT
Start: 2020-07-30 | End: 2021-01-25 | Stop reason: SDUPTHER

## 2020-07-30 NOTE — PROGRESS NOTES
Health Maintenance Due   Topic     Foot Exam  Foot prep to be examine today if PCP have time       Low Dose Statin  Consult with pcp    Shingles Vaccine (1 of 2) No hx chickenpox ; inform pt can get vaccine at pharmacy.      Mammogram  Scheduled

## 2020-07-30 NOTE — PROGRESS NOTES
Chief Complaint   Patient presents with    Hypertension     follow up     Diabetic Foot Exam       Gwendolyn Fournier is a 65 y.o. female who presents per the Chief Complaint.  Pt is known to me and was last seen by me on 4/23/2020.  All known chronic medical issues have been documented.    Hypertension  This is a chronic problem. The current episode started more than 1 year ago. The problem has been gradually improving since onset. The problem is controlled. Associated symptoms include anxiety and headaches. Pertinent negatives include no blurred vision, chest pain, malaise/fatigue, neck pain, orthopnea, palpitations, peripheral edema, PND, shortness of breath or sweats. Agents associated with hypertension include NSAIDs. Risk factors for coronary artery disease include obesity and post-menopausal state. Past treatments include ACE inhibitors. The current treatment provides moderate improvement. Compliance problems include exercise and diet.  There is no history of angina, kidney disease, CAD/MI, CVA, heart failure, left ventricular hypertrophy, PVD or retinopathy. There is no history of chronic renal disease, coarctation of the aorta, hyperaldosteronism, hypercortisolism, hyperparathyroidism, a hypertension causing med, pheochromocytoma, renovascular disease, sleep apnea or a thyroid problem.   Back Pain  This is a chronic problem. The current episode started more than 1 year ago. The problem occurs constantly. The problem has been rapidly worsening since onset. The pain is present in the gluteal and lumbar spine. The quality of the pain is described as stabbing. The pain radiates to the right knee and right thigh. The pain is at a severity of 10/10. The pain is severe. The pain is the same all the time. The symptoms are aggravated by standing. Stiffness is present all day. Associated symptoms include bladder incontinence, headaches, leg pain, paresthesias and tingling. Pertinent negatives include no abdominal  pain, bowel incontinence, chest pain, dysuria, fever, numbness, paresis, pelvic pain, perianal numbness, weakness or weight loss. Risk factors include obesity and sedentary lifestyle. She has tried analgesics for the symptoms. The treatment provided moderate relief.     ROS  Review of Systems   Constitutional: Negative.  Negative for activity change, appetite change, chills, diaphoresis, fatigue, fever, malaise/fatigue, unexpected weight change and weight loss.   HENT: Positive for nosebleeds (resolved). Negative for congestion, ear pain, hearing loss, postnasal drip, rhinorrhea, sinus pressure, sneezing, sore throat and trouble swallowing.    Eyes: Negative for blurred vision, pain, discharge, redness and visual disturbance.   Respiratory: Negative for cough, choking, chest tightness and shortness of breath.    Cardiovascular: Negative for chest pain, palpitations, orthopnea, leg swelling and PND.   Gastrointestinal: Negative for abdominal pain, bowel incontinence, constipation, diarrhea, nausea and vomiting.   Endocrine: Negative for cold intolerance and heat intolerance.   Genitourinary: Positive for bladder incontinence. Negative for difficulty urinating, dysuria, flank pain, frequency, menstrual problem, pelvic pain, urgency and vaginal discharge.   Musculoskeletal: Positive for back pain. Negative for arthralgias, gait problem, joint swelling, myalgias and neck pain.   Skin: Negative.  Negative for color change and rash.   Allergic/Immunologic: Negative for environmental allergies and food allergies.   Neurological: Positive for tingling, headaches and paresthesias. Negative for dizziness, seizures, syncope, weakness, light-headedness and numbness.   Hematological: Negative for adenopathy.   Psychiatric/Behavioral: Negative.  Negative for behavioral problems, confusion, decreased concentration, dysphoric mood, hallucinations, sleep disturbance and suicidal ideas. The patient is not nervous/anxious.   "      Physical Exam  Vitals:    07/30/20 0817   BP: 136/84   Pulse: 96   Resp: 20   Temp: 99.6 °F (37.6 °C)    Body mass index is 38.53 kg/m².  Weight: 111.6 kg (246 lb 0.5 oz)   Height: 5' 7" (170.2 cm)     Physical Exam  Constitutional:       General: She is not in acute distress.     Appearance: Normal appearance. She is well-developed. She is not ill-appearing, toxic-appearing or diaphoretic.   HENT:      Head: Normocephalic and atraumatic.      Right Ear: Hearing and external ear normal. No decreased hearing noted.      Left Ear: Hearing and external ear normal. No decreased hearing noted.      Nose: Nose normal. No nasal deformity or rhinorrhea.      Mouth/Throat:      Dentition: Normal dentition. Does not have dentures.      Pharynx: Uvula midline.   Eyes:      General: Lids are normal. No scleral icterus.        Right eye: No foreign body or discharge.         Left eye: No foreign body or discharge.      Conjunctiva/sclera: Conjunctivae normal.      Right eye: No chemosis or exudate.     Left eye: No chemosis or exudate.     Pupils: Pupils are equal, round, and reactive to light.   Neck:      Musculoskeletal: Full passive range of motion without pain, normal range of motion and neck supple.   Cardiovascular:      Rate and Rhythm: Normal rate and regular rhythm.      Pulses:           Dorsalis pedis pulses are 1+ on the right side and 1+ on the left side.      Heart sounds: Normal heart sounds, S1 normal and S2 normal. No murmur. No friction rub. No gallop.    Pulmonary:      Effort: Pulmonary effort is normal. No accessory muscle usage or respiratory distress.      Breath sounds: Normal breath sounds. No decreased breath sounds, wheezing, rhonchi or rales.   Abdominal:      General: There is no distension.      Palpations: Abdomen is soft. Abdomen is not rigid.      Tenderness: There is no abdominal tenderness. There is no guarding or rebound.   Musculoskeletal: Normal range of motion.      Right foot: " Normal range of motion. No deformity, bunion, Charcot foot, foot drop or prominent metatarsal heads.      Left foot: Normal range of motion. No deformity, bunion, Charcot foot, foot drop or prominent metatarsal heads.        Feet:    Feet:      Right foot:      Protective Sensation: 5 sites tested. 4 sites sensed.      Skin integrity: Dry skin present. No ulcer, blister, skin breakdown, erythema, warmth, callus or fissure.      Toenail Condition: Right toenails are normal.      Left foot:      Protective Sensation: 5 sites tested. 4 sites sensed.      Skin integrity: Callus and dry skin present. No ulcer, blister, skin breakdown, erythema, warmth or fissure.      Toenail Condition: Left toenails are normal.   Skin:     General: Skin is warm and dry.      Findings: No rash.   Neurological:      Mental Status: She is alert and oriented to person, place, and time.      Cranial Nerves: No cranial nerve deficit.      Sensory: No sensory deficit.      Motor: No abnormal muscle tone or seizure activity.      Coordination: Coordination normal.      Gait: Gait normal.   Psychiatric:         Attention and Perception: She is attentive.         Speech: Speech normal.         Behavior: Behavior normal. Behavior is cooperative.         Thought Content: Thought content normal.         Judgment: Judgment normal.       Assessment & Plan    Discussion of plan of care including treatment options regarding health and wellness were reviewed and discussed with patient.  Any changes to medication or treatment plan, as well as any screening blood test, imaging, or referrals to specialist, are documented.  Follow up as indicated.     1. Type 2 diabetes mellitus with stage 3 chronic kidney disease, without long-term current use of insulin  Patient is encouraged to follow a diet low in carbohydrates and simple sugars.  Discussed simple vs. complex carbohydrates as well as eating times of certain meals. Advised to focus on good food choices  and increased physical activity and encouraged to adhere to medication regimen and/or lifestyle adjustments, and to check glucose level as recommended.  Contact office if glucose levels are not improving over time.  Will monitor HbA1c appropriately.  Will start oral medication at this visit.  Sensory exam of the foot is normal, tested with the monofilament. Good pulses, no lesions or ulcers, good peripheral pulses.   - glipiZIDE (GLUCOTROL) 10 MG TR24; Take 1 tablet (10 mg total) by mouth daily with breakfast.  Dispense: 90 tablet; Refill: 1  - Ambulatory referral/consult to Podiatry; Future  - Hemoglobin A1C; Future  - Basic metabolic panel; Future    2. Essential hypertension  Patient was counseled and encouraged to maintain a low sodium diet, as well as increasing physical activity.  Recommend random BP checks at home on a regular basis.  Repeat BP at end of visit was not necessary. Will continue medication at this time, and follow up in 3-6 months, or sooner if blood pressure begins to increase.     - hydroCHLOROthiazide (HYDRODIURIL) 25 MG tablet; TAKE 1 TABLET(25 MG) BY MOUTH EVERY DAY  Dispense: 90 tablet; Refill: 1    3. Hyperlipidemia, acquired  We discussed ways to manage cholesterol levels, including increasing whole grain foods and decreasing fried and fatty foods.  I also recommended OTC Omega 3 and Omega 6 supplements to improve overall cholesterol levels.  Will continue current therapy at this visit and will monitor lipids appropriately.   - Lipid Panel; Future    4. Tachycardia with hypertension  The current medical regimen will be continued at this time as discussed.  Will increase dose to twice daily.  - metoprolol succinate (TOPROL-XL) 50 MG 24 hr tablet; Take 1 tablet (50 mg total) by mouth 2 (two) times daily.  Dispense: 180 tablet; Refill: 1       Follow up in about 3 months (around 10/30/2020) for Chronic Disease Management.      ACTIVE MEDICAL ISSUES:  Documented in Problem List    PAST  MEDICAL HISTORY  Documented  .  PAST SURGICAL HISTORY:  Documented    SOCIAL HISTORY:  Documented    FAMILY HISTORY:  Documented    ALLERGIES AND MEDICATIONS: updated and reviewed.  Documented    Health Maintenance       Date Due Completion Date    Foot Exam 01/24/1964 ---    TETANUS VACCINE 01/24/1972 ---    Low Dose Statin 01/24/1975 ---    Shingles Vaccine (1 of 2) 01/24/2004 ---    Mammogram 04/30/2015 4/30/2013    Influenza Vaccine (1) 09/01/2020 11/25/2019 (Done)    Override on 11/25/2019: Done    Eye Exam 12/09/2020 12/9/2019    Hemoglobin A1c 01/23/2021 7/23/2020    Lipid Panel 07/23/2021 7/23/2020    Override on 9/15/2015: Done (future)    DEXA SCAN 02/20/2022 2/20/2019    Colorectal Cancer Screening 01/21/2029 1/21/2019

## 2020-08-06 DIAGNOSIS — N18.30 TYPE 2 DIABETES MELLITUS WITH STAGE 3 CHRONIC KIDNEY DISEASE, WITHOUT LONG-TERM CURRENT USE OF INSULIN: Primary | ICD-10-CM

## 2020-08-06 DIAGNOSIS — E11.22 TYPE 2 DIABETES MELLITUS WITH STAGE 3 CHRONIC KIDNEY DISEASE, WITHOUT LONG-TERM CURRENT USE OF INSULIN: Primary | ICD-10-CM

## 2020-08-06 NOTE — TELEPHONE ENCOUNTER
----- Message from Negar Jarad sent at 8/5/2020 11:27 AM CDT -----  Regarding: [facility]  Contact: Leoncio  Type: Patient Call Back    Who called:Leoncio    What is the request in detail:Leoncio called because patient sated that she was diagnosed with diabetes and is requesting orders for a glucose meter , lancets, and strips to be faxed to [phone]. Please call to discuss    Can the clinic reply by [facility]?    Would the patient rather a call back or a response via [facility]? Call    Best call back number:[phone]

## 2020-08-07 ENCOUNTER — TELEPHONE (OUTPATIENT)
Dept: FAMILY MEDICINE | Facility: CLINIC | Age: 65
End: 2020-08-07

## 2020-08-07 RX ORDER — LANCETS
EACH MISCELLANEOUS
Qty: 200 EACH | Refills: 3 | Status: SHIPPED | OUTPATIENT
Start: 2020-08-07

## 2020-08-07 RX ORDER — INSULIN PUMP SYRINGE, 3 ML
EACH MISCELLANEOUS
Qty: 1 EACH | Refills: 0 | Status: SHIPPED | OUTPATIENT
Start: 2020-08-07 | End: 2020-09-03 | Stop reason: SDUPTHER

## 2020-08-07 NOTE — TELEPHONE ENCOUNTER
----- Message from Chirag Mar sent at 8/7/2020 10:19 AM CDT -----  Regarding: [facility]  Type: Patient Call Back    Who calledHaleyLincoln with [facility]    What is the request in detail:Patient requesting diabetic pen, [facility] needs medical notes    Can the clinic reply by [facility]? no    Would the patient rather a call back or a response via [facility]? Callback    Best call back number:[phone]

## 2020-08-07 NOTE — TELEPHONE ENCOUNTER
----- Message from Brodie Mcfarland sent at 8/7/2020  3:30 PM CDT -----  Regarding: Pre Auth  Contact: KAMALA DELA CRUZ [[id number]]  Name of Who is Calling: KAMALA DELA CRUZ [[id number]]      What is the request in detail: Would like to speak with staff in regards to needing a pre authorization for diabetic supplies.       Can the clinic reply by [facility]: no      What Number to Call Back if not in [facility]: [phone]

## 2020-08-07 NOTE — TELEPHONE ENCOUNTER
----- Message from Stacy Garcia sent at 8/7/2020  2:09 PM CDT -----  Type: Patient Call Back    Who called: Self     What is the request in detail: calling in regards to status on prior authorization for diabetic machine    Can the clinic reply by [facility]? Call back     Would the patient rather a call back or a response via [facility]? Call back     Best call back number: [phone]

## 2020-08-07 NOTE — TELEPHONE ENCOUNTER
Informed pt that everything that was requested from [facility] was faxed over to them today. Verbalizes understanding.

## 2020-08-10 ENCOUNTER — TELEPHONE (OUTPATIENT)
Dept: FAMILY MEDICINE | Facility: CLINIC | Age: 65
End: 2020-08-10

## 2020-08-10 NOTE — TELEPHONE ENCOUNTER
----- Message from Suzie chanceangel luis sent at 8/7/2020  3:24 PM CDT -----  Type: Patient Call Back    Who called: Lamar with [facility]    What is the request in detail: received fax of MNF and clinical notes but still needs orders for diabetic meter and supplies. Please fax over signed orders to  fax# [phone]    Can the clinic reply by [facility]? NO    Would the patient rather a call back or a response via [facility]? Call back     Best call back number: [phone]    Additional Information:

## 2020-08-27 DIAGNOSIS — N18.30 TYPE 2 DIABETES MELLITUS WITH STAGE 3 CHRONIC KIDNEY DISEASE, WITHOUT LONG-TERM CURRENT USE OF INSULIN: Primary | ICD-10-CM

## 2020-08-27 DIAGNOSIS — E11.22 TYPE 2 DIABETES MELLITUS WITH STAGE 3 CHRONIC KIDNEY DISEASE, WITHOUT LONG-TERM CURRENT USE OF INSULIN: Primary | ICD-10-CM

## 2020-08-27 NOTE — TELEPHONE ENCOUNTER
----- Message from Jaylene Dhillon sent at 8/27/2020  2:26 PM CDT -----  Contact: [facility] [phone]  Type: Patient Call Back    Who called:[facility]    What is the request in detail: Need signed orders and clinical notes sent to [facility] for a Freestyle Margaret with supplies, which the pt is requesting. Please fax to [phone]    Would the patient rather a call back or a response via [facility]? Call back    Best call back number: [phone]

## 2020-08-27 NOTE — TELEPHONE ENCOUNTER
----- Message from Jaylene Dhillon sent at 8/27/2020  2:26 PM CDT -----  Contact: [facility] [phone]  Type: Patient Call Back    Who called:[facility]    What is the request in detail: Need signed orders and clinical notes sent to [facility] for a Freestyle Margaret with supplies, which the pt is requesting. Please fax to [phone]    Would the patient rather a call back or a response via [facility]? Call back    Best call back number: [phone]

## 2020-08-28 ENCOUNTER — TELEPHONE (OUTPATIENT)
Dept: FAMILY MEDICINE | Facility: CLINIC | Age: 65
End: 2020-08-28

## 2020-08-28 DIAGNOSIS — E11.22 TYPE 2 DIABETES MELLITUS WITH STAGE 3 CHRONIC KIDNEY DISEASE, WITHOUT LONG-TERM CURRENT USE OF INSULIN: ICD-10-CM

## 2020-08-28 DIAGNOSIS — N18.30 TYPE 2 DIABETES MELLITUS WITH STAGE 3 CHRONIC KIDNEY DISEASE, WITHOUT LONG-TERM CURRENT USE OF INSULIN: ICD-10-CM

## 2020-08-28 NOTE — TELEPHONE ENCOUNTER
----- Message from Tyson Gorman sent at 8/28/2020 11:51 AM CDT -----  Name of Who is Calling: pt    What is the request in detail: states per loida the rx order for the free style meter is needing to be sent to [facility].  Please contact to further discuss and advise      Can the clinic reply by [facility]: no    What Number to Call Back [facility]: [phone]

## 2020-08-31 ENCOUNTER — TELEPHONE (OUTPATIENT)
Dept: FAMILY MEDICINE | Facility: CLINIC | Age: 65
End: 2020-08-31

## 2020-08-31 NOTE — TELEPHONE ENCOUNTER
----- Message from Alba Parnell sent at 8/31/2020  4:35 PM CDT -----  Type: Patient Call Back    Who called:Janis Navagis    What is the request in detail:pt need a new Diabetic meter. Her's is broken    Can the clinic reply by [facility]?no    Would the patient rather a call back or a response via [facility]?call    Best call back number:

## 2020-08-31 NOTE — TELEPHONE ENCOUNTER
Returned [facility] ph call spoke with Mrs Hoffman was told per patient request was sent to provider new Glucose meter ans sensor , orders still pending , we will faxed over to [facility] as soon provider sign . Verbalized understand .

## 2020-09-03 DIAGNOSIS — E11.22 TYPE 2 DIABETES MELLITUS WITH STAGE 3 CHRONIC KIDNEY DISEASE, WITHOUT LONG-TERM CURRENT USE OF INSULIN: ICD-10-CM

## 2020-09-03 DIAGNOSIS — N18.30 TYPE 2 DIABETES MELLITUS WITH STAGE 3 CHRONIC KIDNEY DISEASE, WITHOUT LONG-TERM CURRENT USE OF INSULIN: ICD-10-CM

## 2020-09-03 RX ORDER — INSULIN PUMP SYRINGE, 3 ML
EACH MISCELLANEOUS
Qty: 1 EACH | Refills: 0 | Status: SHIPPED | OUTPATIENT
Start: 2020-09-03 | End: 2020-09-04 | Stop reason: SDUPTHER

## 2020-09-03 NOTE — TELEPHONE ENCOUNTER
----- Message from Andie Madie sent at 9/2/2020 12:58 PM CDT -----  Regarding: self [phone]  .Type: Patient Call Back    Who called: self     What is the request in detail: Pt stated that she needs the glucose meter to be call in to .  [id number] - [place]  [place] [address]  Phone: [phone] Fax: [phone]    Can the clinic reply by [facility]? Call back     Would the patient rather a call back or a response via [facility]?  Call back     Best call back number: [phone]

## 2020-09-04 ENCOUNTER — TELEPHONE (OUTPATIENT)
Dept: FAMILY MEDICINE | Facility: CLINIC | Age: 65
End: 2020-09-04

## 2020-09-04 DIAGNOSIS — N18.30 TYPE 2 DIABETES MELLITUS WITH STAGE 3 CHRONIC KIDNEY DISEASE, WITHOUT LONG-TERM CURRENT USE OF INSULIN: ICD-10-CM

## 2020-09-04 DIAGNOSIS — E11.22 TYPE 2 DIABETES MELLITUS WITH STAGE 3 CHRONIC KIDNEY DISEASE, WITHOUT LONG-TERM CURRENT USE OF INSULIN: ICD-10-CM

## 2020-09-04 NOTE — TELEPHONE ENCOUNTER
----- Message from Amalia Aamir sent at 9/4/2020  9:02 AM CDT -----  Contact: Self [phone]  Type: Patient Call Back    Who called: Self     What is the request in detail: Pt is calling because she states that the incorrect blood-glucose meter kit was sent to the pharmacy. True Style Lite was supposed to be sent    Can the clinic reply by [facility]? Call back    Would the patient rather a call back or a response via [facility]? Call back    Best call back number: [phone]

## 2020-09-04 NOTE — TELEPHONE ENCOUNTER
----- Message from Dana Branch sent at 9/3/2020 10:44 AM CDT -----  Type: Patient Call Back    Who called:Self     What is the request in detail: patient states she need a order sent to [facility] for her freestyle Meter. Please call     Can the clinic reply by [facility]? No     Would the patient rather a call back or a response via [facility]? Call     Best call back number:[phone] (home)

## 2020-09-04 NOTE — TELEPHONE ENCOUNTER
Pt states that her true metrix meter is off by about 50 points because she compared it using a friends machine; she states she spoke to [facility] and was told we can send orders for a new machine since hers is not working properly; orders pended

## 2020-09-07 RX ORDER — INSULIN PUMP SYRINGE, 3 ML
EACH MISCELLANEOUS
Qty: 1 EACH | Refills: 0 | OUTPATIENT
Start: 2020-09-07 | End: 2020-09-09

## 2020-09-09 ENCOUNTER — TELEPHONE (OUTPATIENT)
Dept: FAMILY MEDICINE | Facility: CLINIC | Age: 65
End: 2020-09-09

## 2020-09-09 ENCOUNTER — PATIENT OUTREACH (OUTPATIENT)
Dept: ADMINISTRATIVE | Facility: OTHER | Age: 65
End: 2020-09-09

## 2020-09-09 RX ORDER — INSULIN PUMP SYRINGE, 3 ML
EACH MISCELLANEOUS
Qty: 1 EACH | Refills: 0 | Status: SHIPPED | OUTPATIENT
Start: 2020-09-09 | End: 2024-02-29

## 2020-09-09 NOTE — TELEPHONE ENCOUNTER
----- Message from Dana Branch sent at 9/8/2020  2:29 PM CDT -----  Type: Patient Call Back    Who called: self    What is the request in detail: patient states [facility] never received her order for Glucose Meter . Please resend . She says she was told they received the Freestyle Margaret but she does not qualify for that one she need the True Style Lite.     Can the clinic reply by [facility]? No     Would the patient rather a call back or a response via [facility]?  Call     Best call back number:[phone] (home)

## 2020-09-09 NOTE — PROGRESS NOTES
Care Everywhere:   Immunization: updated  Health Maintenance: updated  Media Review: review for outside mammogram report  Legacy Review:   Order placed:   Upcoming appts:  Mammogram scheduling ticket sent to patient's portal

## 2020-09-09 NOTE — TELEPHONE ENCOUNTER
Pt scheduled to see dr torres today. Will discuss when she gets here. Order was placed just need to fax

## 2020-09-15 ENCOUNTER — OFFICE VISIT (OUTPATIENT)
Dept: FAMILY MEDICINE | Facility: CLINIC | Age: 65
End: 2020-09-15
Payer: MEDICARE

## 2020-09-15 VITALS
BODY MASS INDEX: 39.1 KG/M2 | HEART RATE: 71 BPM | TEMPERATURE: 99 F | HEIGHT: 67 IN | WEIGHT: 249.13 LBS | SYSTOLIC BLOOD PRESSURE: 148 MMHG | DIASTOLIC BLOOD PRESSURE: 86 MMHG | RESPIRATION RATE: 16 BRPM | OXYGEN SATURATION: 96 %

## 2020-09-15 DIAGNOSIS — I10 ESSENTIAL HYPERTENSION: Primary | ICD-10-CM

## 2020-09-15 DIAGNOSIS — M54.32 SCIATICA OF LEFT SIDE: ICD-10-CM

## 2020-09-15 PROCEDURE — 1101F PT FALLS ASSESS-DOCD LE1/YR: CPT | Mod: CPTII,S$GLB,, | Performed by: FAMILY MEDICINE

## 2020-09-15 PROCEDURE — 1125F AMNT PAIN NOTED PAIN PRSNT: CPT | Mod: S$GLB,,, | Performed by: FAMILY MEDICINE

## 2020-09-15 PROCEDURE — 1125F PR PAIN SEVERITY QUANTIFIED, PAIN PRESENT: ICD-10-PCS | Mod: S$GLB,,, | Performed by: FAMILY MEDICINE

## 2020-09-15 PROCEDURE — 1159F MED LIST DOCD IN RCRD: CPT | Mod: S$GLB,,, | Performed by: FAMILY MEDICINE

## 2020-09-15 PROCEDURE — 3079F DIAST BP 80-89 MM HG: CPT | Mod: CPTII,S$GLB,, | Performed by: FAMILY MEDICINE

## 2020-09-15 PROCEDURE — 3079F PR MOST RECENT DIASTOLIC BLOOD PRESSURE 80-89 MM HG: ICD-10-PCS | Mod: CPTII,S$GLB,, | Performed by: FAMILY MEDICINE

## 2020-09-15 PROCEDURE — 1101F PR PT FALLS ASSESS DOC 0-1 FALLS W/OUT INJ PAST YR: ICD-10-PCS | Mod: CPTII,S$GLB,, | Performed by: FAMILY MEDICINE

## 2020-09-15 PROCEDURE — 1159F PR MEDICATION LIST DOCUMENTED IN MEDICAL RECORD: ICD-10-PCS | Mod: S$GLB,,, | Performed by: FAMILY MEDICINE

## 2020-09-15 PROCEDURE — 3008F PR BODY MASS INDEX (BMI) DOCUMENTED: ICD-10-PCS | Mod: CPTII,S$GLB,, | Performed by: FAMILY MEDICINE

## 2020-09-15 PROCEDURE — 3077F SYST BP >= 140 MM HG: CPT | Mod: CPTII,S$GLB,, | Performed by: FAMILY MEDICINE

## 2020-09-15 PROCEDURE — 3077F PR MOST RECENT SYSTOLIC BLOOD PRESSURE >= 140 MM HG: ICD-10-PCS | Mod: CPTII,S$GLB,, | Performed by: FAMILY MEDICINE

## 2020-09-15 PROCEDURE — 3008F BODY MASS INDEX DOCD: CPT | Mod: CPTII,S$GLB,, | Performed by: FAMILY MEDICINE

## 2020-09-15 PROCEDURE — 99999 PR PBB SHADOW E&M-EST. PATIENT-LVL IV: ICD-10-PCS | Mod: PBBFAC,,, | Performed by: FAMILY MEDICINE

## 2020-09-15 PROCEDURE — 99214 PR OFFICE/OUTPT VISIT, EST, LEVL IV, 30-39 MIN: ICD-10-PCS | Mod: S$GLB,,, | Performed by: FAMILY MEDICINE

## 2020-09-15 PROCEDURE — 99214 OFFICE O/P EST MOD 30 MIN: CPT | Mod: S$GLB,,, | Performed by: FAMILY MEDICINE

## 2020-09-15 PROCEDURE — 99999 PR PBB SHADOW E&M-EST. PATIENT-LVL IV: CPT | Mod: PBBFAC,,, | Performed by: FAMILY MEDICINE

## 2020-09-15 RX ORDER — METOPROLOL SUCCINATE 100 MG/1
100 TABLET, EXTENDED RELEASE ORAL 2 TIMES DAILY
COMMUNITY
End: 2020-09-15 | Stop reason: DRUGHIGH

## 2020-09-15 RX ORDER — GABAPENTIN 300 MG/1
300 CAPSULE ORAL 3 TIMES DAILY
Qty: 90 CAPSULE | Refills: 2 | Status: SHIPPED | OUTPATIENT
Start: 2020-09-15 | End: 2021-02-22 | Stop reason: SDUPTHER

## 2020-09-15 RX ORDER — TIZANIDINE 4 MG/1
TABLET ORAL
COMMUNITY
Start: 2020-09-09 | End: 2020-11-17 | Stop reason: SDUPTHER

## 2020-09-15 RX ORDER — ALBUTEROL SULFATE 0.63 MG/3ML
1 SOLUTION RESPIRATORY (INHALATION)
COMMUNITY

## 2020-09-15 NOTE — PROGRESS NOTES
Health Maintenance Due   Topic     Low Dose Statin  Consult with PCP     Shingles Vaccine (1 of 2) No hx chickenpox ; inform pt can get vaccine at pharmacy.    Mammogram  Pt will call back when ready to schedule

## 2020-09-15 NOTE — PROGRESS NOTES
Chief Complaint   Patient presents with    Flank Pain     left flank pain radiate to left leg and buttock past 2 weeks        Gwendolyn Fournier is a 65 y.o. female who presents per the Chief Complaint.  Pt is known to me and was last seen by me on 7/30/2020.  All known chronic medical issues have been documented.    Hypertension  This is a chronic problem. The current episode started more than 1 year ago. The problem has been gradually improving since onset. The problem is controlled. Associated symptoms include anxiety and headaches. Pertinent negatives include no blurred vision, chest pain, malaise/fatigue, neck pain, orthopnea, palpitations, peripheral edema, PND, shortness of breath or sweats. Agents associated with hypertension include NSAIDs. Risk factors for coronary artery disease include obesity and post-menopausal state. Past treatments include ACE inhibitors. The current treatment provides moderate improvement. Compliance problems include exercise and diet.  There is no history of angina, kidney disease, CAD/MI, CVA, heart failure, left ventricular hypertrophy, PVD or retinopathy. There is no history of chronic renal disease, coarctation of the aorta, hyperaldosteronism, hypercortisolism, hyperparathyroidism, a hypertension causing med, pheochromocytoma, renovascular disease, sleep apnea or a thyroid problem.   Back Pain  This is a chronic problem. The current episode started more than 1 year ago. The problem occurs constantly. The problem has been rapidly worsening since onset. The pain is present in the gluteal and lumbar spine. The quality of the pain is described as stabbing. The pain radiates to the right knee and right thigh. The pain is at a severity of 10/10. The pain is severe. The pain is the same all the time. The symptoms are aggravated by standing. Stiffness is present all day. Associated symptoms include bladder incontinence, headaches, leg pain, paresthesias and tingling. Pertinent  negatives include no abdominal pain, bowel incontinence, chest pain, dysuria, fever, numbness, paresis, pelvic pain, perianal numbness, weakness or weight loss. Risk factors include obesity and sedentary lifestyle. She has tried analgesics for the symptoms. The treatment provided moderate relief.       ROS  Review of Systems   Constitutional: Negative.  Negative for activity change, appetite change, chills, diaphoresis, fatigue, fever, malaise/fatigue, unexpected weight change and weight loss.   HENT: Negative.  Negative for congestion, ear pain, hearing loss, nosebleeds, postnasal drip, rhinorrhea, sinus pressure, sneezing, sore throat and trouble swallowing.    Eyes: Negative for blurred vision, pain, discharge, redness and visual disturbance.   Respiratory: Negative for cough, choking, chest tightness and shortness of breath.    Cardiovascular: Negative for chest pain, palpitations, orthopnea, leg swelling and PND.   Gastrointestinal: Negative for abdominal pain, bowel incontinence, constipation, diarrhea, nausea and vomiting.   Endocrine: Negative for cold intolerance and heat intolerance.   Genitourinary: Positive for bladder incontinence and flank pain. Negative for difficulty urinating, dysuria, frequency, menstrual problem, pelvic pain, urgency and vaginal discharge.   Musculoskeletal: Positive for back pain and myalgias (left buttock, left thigh). Negative for arthralgias, gait problem, joint swelling and neck pain.   Skin: Negative.  Negative for color change and rash.   Allergic/Immunologic: Negative for environmental allergies and food allergies.   Neurological: Positive for tingling, headaches and paresthesias. Negative for dizziness, seizures, syncope, weakness, light-headedness and numbness.   Hematological: Negative for adenopathy.   Psychiatric/Behavioral: Negative.  Negative for behavioral problems, confusion, decreased concentration, dysphoric mood, hallucinations, sleep disturbance and suicidal  "ideas. The patient is not nervous/anxious.        Physical Exam  Vitals:    09/15/20 1027   BP: (!) 148/86   Pulse: 71   Resp: 16   Temp: 99 °F (37.2 °C)    Body mass index is 39.02 kg/m².  Weight: 113 kg (249 lb 1.9 oz)   Height: 5' 7" (170.2 cm)     Physical Exam  Constitutional:       General: She is not in acute distress.     Appearance: Normal appearance. She is well-developed. She is not ill-appearing, toxic-appearing or diaphoretic.   HENT:      Head: Normocephalic and atraumatic.      Right Ear: Hearing and external ear normal. No decreased hearing noted.      Left Ear: Hearing and external ear normal. No decreased hearing noted.      Nose: Nose normal. No nasal deformity or rhinorrhea.      Mouth/Throat:      Dentition: Normal dentition. Does not have dentures.      Pharynx: Uvula midline.   Eyes:      General: Lids are normal. No scleral icterus.        Right eye: No foreign body or discharge.         Left eye: No foreign body or discharge.      Conjunctiva/sclera: Conjunctivae normal.      Right eye: No chemosis or exudate.     Left eye: No chemosis or exudate.     Pupils: Pupils are equal, round, and reactive to light.   Neck:      Musculoskeletal: Full passive range of motion without pain, normal range of motion and neck supple.   Cardiovascular:      Rate and Rhythm: Normal rate and regular rhythm.      Heart sounds: Normal heart sounds, S1 normal and S2 normal. No murmur. No friction rub. No gallop.    Pulmonary:      Effort: Pulmonary effort is normal. No accessory muscle usage or respiratory distress.      Breath sounds: Normal breath sounds. No decreased breath sounds, wheezing, rhonchi or rales.   Abdominal:      General: There is no distension.      Palpations: Abdomen is soft. Abdomen is not rigid.      Tenderness: There is no abdominal tenderness. There is no guarding or rebound.   Musculoskeletal: Normal range of motion.      Lumbar back: She exhibits tenderness and pain. She exhibits normal " range of motion, no bony tenderness, no swelling, no edema, no deformity, no laceration, no spasm and normal pulse.      Left upper leg: She exhibits tenderness. She exhibits no bony tenderness, no swelling, no edema, no deformity and no laceration.        Legs:    Skin:     General: Skin is warm and dry.      Findings: No rash.   Neurological:      Mental Status: She is alert and oriented to person, place, and time.      Cranial Nerves: No cranial nerve deficit.      Sensory: No sensory deficit.      Motor: No abnormal muscle tone or seizure activity.      Coordination: Coordination normal.      Gait: Gait normal.   Psychiatric:         Attention and Perception: She is attentive.         Speech: Speech normal.         Behavior: Behavior normal. Behavior is cooperative.         Thought Content: Thought content normal.         Judgment: Judgment normal.         Assessment & Plan    Discussion of plan of care including treatment options regarding health and wellness were reviewed and discussed with patient.  Any changes to medication or treatment plan, as well as any screening blood test, imaging, or referrals to specialist, are documented.  Follow up as indicated.     1. Essential hypertension  Patient was counseled and encouraged to maintain a low sodium diet, as well as increasing physical activity.  Recommend random BP checks at home on a regular basis.  Repeat BP at end of visit was not improved. Will continue medication at this time, and follow up in 4-6 weeks, or sooner if blood pressure does not improve over time.  Patient advised to return for nurse BP check in two weeks.     2. Sciatica of left side  Patient was advised that pain is most likely sciatica, which is caused by nerve entrapment in the lower back.  Advised to stretch the hip and leg as much as tolerated, and to continue NSAID therapy.  In addition, medication for neuropathic pain was ordered and patient was advised to use medication up to three  times daily for relief.  Patient was advised that pain may last several days, but should resolve.  If pain persists, patient is encouraged to follow up for further evaluation.   - gabapentin (NEURONTIN) 300 MG capsule; Take 1 capsule (300 mg total) by mouth 3 (three) times daily.  Dispense: 90 capsule; Refill: 2       Follow up in about 3 months (around 12/15/2020) for Chronic Disease Management.      ACTIVE MEDICAL ISSUES:  Documented in Problem List    PAST MEDICAL HISTORY  Documented  .  PAST SURGICAL HISTORY:  Documented    SOCIAL HISTORY:  Documented    FAMILY HISTORY:  Documented    ALLERGIES AND MEDICATIONS: updated and reviewed.  Documented    Health Maintenance       Date Due Completion Date    TETANUS VACCINE 01/24/1972 ---    Low Dose Statin 01/24/1975 ---    Shingles Vaccine (1 of 2) 01/24/2004 ---    Mammogram 04/30/2015 4/30/2013    Eye Exam 12/09/2020 12/9/2019    Hemoglobin A1c 01/23/2021 7/23/2020    Lipid Panel 07/23/2021 7/23/2020    Override on 9/15/2015: Done (future)    Foot Exam 07/30/2021 7/30/2020    DEXA SCAN 02/20/2022 2/20/2019    Colorectal Cancer Screening 01/21/2029 1/21/2019

## 2020-09-22 DIAGNOSIS — M51.26 HERNIATED NUCLEUS PULPOSUS, LUMBAR: Primary | ICD-10-CM

## 2020-09-24 ENCOUNTER — HOSPITAL ENCOUNTER (OUTPATIENT)
Dept: RADIOLOGY | Facility: HOSPITAL | Age: 65
Discharge: HOME | End: 2020-09-24
Attending: PHYSICAL MEDICINE & REHABILITATION
Payer: MEDICARE

## 2020-09-24 DIAGNOSIS — M51.26 HERNIATED NUCLEUS PULPOSUS, LUMBAR: ICD-10-CM

## 2020-09-24 PROCEDURE — 72148 MRI LUMBAR SPINE W/O DYE: CPT | Mod: 26,,, | Performed by: RADIOLOGY

## 2020-09-24 PROCEDURE — 72148 MRI LUMBAR SPINE W/O DYE: CPT | Mod: TC

## 2020-09-24 PROCEDURE — 72148 MRI LUMBAR SPINE WITHOUT CONTRAST: ICD-10-PCS | Mod: 26,,, | Performed by: RADIOLOGY

## 2020-10-05 ENCOUNTER — PATIENT MESSAGE (OUTPATIENT)
Dept: ADMINISTRATIVE | Facility: HOSPITAL | Age: 65
End: 2020-10-05

## 2020-10-06 ENCOUNTER — TELEPHONE (OUTPATIENT)
Dept: FAMILY MEDICINE | Facility: CLINIC | Age: 65
End: 2020-10-06

## 2020-10-06 NOTE — TELEPHONE ENCOUNTER
----- Message from Brodie Mcfarland sent at 10/6/2020  9:55 AM CDT -----  Regarding: Appointment/Results  Contact: KAMALA DELA CRUZ [[id number]]  Name of Who is Calling: KAMALA DELA CRUZ [[id number]]      What is the request in detail: Would like to speak with staff in regards to confirming MRI results were received. Patient would like to be seen sooner then 10/26, states her reason for coming in is getting worse, no other information provided. Please advise      Can the clinic reply by [facility]: no      [facility]: [phone]

## 2020-10-06 NOTE — TELEPHONE ENCOUNTER
----- Message from Bel Scott sent at 10/5/2020 12:46 PM CDT -----  Regarding: mri  Type: Patient Call Back    Who called:pt    What is the request in detail:pt wants to discuss mri results. Call pt    Can the clinic reply by [facility]?    Would the patient rather a call back or a response via [facility]? call    Best call back number:[phone] (home)       Additional Information:

## 2020-10-06 NOTE — TELEPHONE ENCOUNTER
Notified we have results but to keep appointment for tomorrow with Mer[facility]. Patient put on wait list with Dr Lombard.  Pt wanted to persistent because she is worry it might burst. Notified if sx worsen to report to ED. Pt verbalized understanding.

## 2020-10-13 NOTE — PROGRESS NOTES
"  Subjective:       Gwendolyn Fournier is a 65 y.o. female who is a new patient who was referred by Dr Lombard for evaluation of renal cyst.      She had MRI done 9/20 for back pain that showed incidental finding of numerous b/l renal cysts, largest on R 2.9cm in RMP and on L 3cm LMP. Cysts were also seen on CT RSS in 1/19. Also punctate L renal stone noted on CT 1/19. No further recent imaging.     Reports LLQ pain that she was concerned was related to kidney. Known CKD 3/4.       The following portions of the patient's history were reviewed and updated as appropriate: allergies, current medications, past family history, past medical history, past social history, past surgical history and problem list.    Review of Systems  Constitutional: no fever or chills  ENT: no nasal congestion or sore throat  Respiratory: no cough or shortness of breath  Cardiovascular: no chest pain or palpitations  Gastrointestinal: no nausea or vomiting, tolerating diet  Genitourinary: as per HPI  Hematologic/Lymphatic: no easy bruising or lymphadenopathy  Musculoskeletal: no arthralgias or myalgias  Skin: no rashes or lesions  Neurological: no seizures or tremors  Behavioral/Psych: no auditory or visual hallucinations        Objective:    Vitals: Ht 5' 7" (1.702 m)   Wt 112.9 kg (249 lb)   BMI 39.00 kg/m²     Physical Exam   General: well developed, well nourished in no acute distress  Head: normocephalic, atraumatic  Neck: supple, trachea midline, no obvious enlargement of thyroid  HEENT: EOMI, mucus membranes moist, sclera anicteric, no hearing impairment  Lungs: symmetric expansion, non-labored breathing  Cardiovascular: regular rate and rhythm, normal pulses  Abdomen: soft, non tender, non distended, no palpable masses, no hepatosplenomegaly, no hernias, no CVA tenderness  Musculoskeletal: no peripheral edema, normal ROM in bilateral upper and lower extremities  Lymphatics: no cervical or inguinal lymphadenopathy  Skin: no rashes or " lesions  Neuro: alert and oriented x 3, no gross deficits  Psych: normal judgment and insight, normal mood/affect and non-anxious  Genitourinary:   patient declined exam      Lab Review   Urine analysis today in clinic shows positive for protein 500    Lab Results   Component Value Date    WBC 12.36 07/23/2020    HGB 11.2 (L) 07/23/2020    HCT 34.7 (L) 07/23/2020    MCV 93 07/23/2020     07/23/2020     Lab Results   Component Value Date    CREATININE 1.4 07/23/2020    BUN 20 07/23/2020       Imaging  Images and reports were personally reviewed by me and discussed with patient       Assessment/Plan:      1. Renal cysts, acquired, bilateral    - Discussed benign nature of simple cysts   - Unlikely need any intervention/surveillance of cysts   - Will get CHLOE to fully evaluate cystic nature of lesions   - Reassurance provided     2. Nephrolithiasis    - Tiny, punctate stone noted on CT 1/19   - CHLOE now     Discussed importance of DM2 and HTN control for kidney health.       Follow up in 6 weeks

## 2020-10-14 ENCOUNTER — PATIENT OUTREACH (OUTPATIENT)
Dept: ADMINISTRATIVE | Facility: OTHER | Age: 65
End: 2020-10-14

## 2020-10-14 NOTE — PROGRESS NOTES
Health Maintenance Due   Topic Date Due    TETANUS VACCINE  01/24/1972    Low Dose Statin  01/24/1975    Shingles Vaccine (1 of 2) 01/24/2004    Mammogram  04/30/2015     Updates were requested from care everywhere.  Chart was reviewed for overdue Proactive [facility] Encounters (LONG) topics (CRS, Breast Cancer Screening, Eye exam)  Health Maintenance has been updated.  LINKS immunization registry triggered.  Immunizations were reconciled.    Need NICOLAS last eye exam.

## 2020-10-15 ENCOUNTER — OFFICE VISIT (OUTPATIENT)
Dept: UROLOGY | Facility: CLINIC | Age: 65
End: 2020-10-15
Payer: MEDICARE

## 2020-10-15 VITALS — HEIGHT: 67 IN | WEIGHT: 249 LBS | BODY MASS INDEX: 39.08 KG/M2

## 2020-10-15 DIAGNOSIS — N28.1 RENAL CYSTS, ACQUIRED, BILATERAL: Primary | ICD-10-CM

## 2020-10-15 DIAGNOSIS — N20.0 NEPHROLITHIASIS: ICD-10-CM

## 2020-10-15 PROCEDURE — 81002 POCT URINE DIPSTICK WITHOUT MICROSCOPE: ICD-10-PCS | Mod: S$GLB,,, | Performed by: UROLOGY

## 2020-10-15 PROCEDURE — 1126F AMNT PAIN NOTED NONE PRSNT: CPT | Mod: S$GLB,,, | Performed by: UROLOGY

## 2020-10-15 PROCEDURE — 3008F PR BODY MASS INDEX (BMI) DOCUMENTED: ICD-10-PCS | Mod: CPTII,S$GLB,, | Performed by: UROLOGY

## 2020-10-15 PROCEDURE — 3008F BODY MASS INDEX DOCD: CPT | Mod: CPTII,S$GLB,, | Performed by: UROLOGY

## 2020-10-15 PROCEDURE — 1126F PR PAIN SEVERITY QUANTIFIED, NO PAIN PRESENT: ICD-10-PCS | Mod: S$GLB,,, | Performed by: UROLOGY

## 2020-10-15 PROCEDURE — 99999 PR PBB SHADOW E&M-EST. PATIENT-LVL IV: ICD-10-PCS | Mod: PBBFAC,,, | Performed by: UROLOGY

## 2020-10-15 PROCEDURE — 99204 PR OFFICE/OUTPT VISIT, NEW, LEVL IV, 45-59 MIN: ICD-10-PCS | Mod: S$GLB,,, | Performed by: UROLOGY

## 2020-10-15 PROCEDURE — 1159F MED LIST DOCD IN RCRD: CPT | Mod: S$GLB,,, | Performed by: UROLOGY

## 2020-10-15 PROCEDURE — 99999 PR PBB SHADOW E&M-EST. PATIENT-LVL IV: CPT | Mod: PBBFAC,,, | Performed by: UROLOGY

## 2020-10-15 PROCEDURE — 99499 RISK ADDL DX/OHS AUDIT: ICD-10-PCS | Mod: S$GLB,,, | Performed by: UROLOGY

## 2020-10-15 PROCEDURE — 1101F PT FALLS ASSESS-DOCD LE1/YR: CPT | Mod: CPTII,S$GLB,, | Performed by: UROLOGY

## 2020-10-15 PROCEDURE — 81002 URINALYSIS NONAUTO W/O SCOPE: CPT | Mod: S$GLB,,, | Performed by: UROLOGY

## 2020-10-15 PROCEDURE — 99499 UNLISTED E&M SERVICE: CPT | Mod: S$GLB,,, | Performed by: UROLOGY

## 2020-10-15 PROCEDURE — 99204 OFFICE O/P NEW MOD 45 MIN: CPT | Mod: S$GLB,,, | Performed by: UROLOGY

## 2020-10-15 PROCEDURE — 1159F PR MEDICATION LIST DOCUMENTED IN MEDICAL RECORD: ICD-10-PCS | Mod: S$GLB,,, | Performed by: UROLOGY

## 2020-10-15 PROCEDURE — 1101F PR PT FALLS ASSESS DOC 0-1 FALLS W/OUT INJ PAST YR: ICD-10-PCS | Mod: CPTII,S$GLB,, | Performed by: UROLOGY

## 2020-10-19 ENCOUNTER — PATIENT OUTREACH (OUTPATIENT)
Dept: ADMINISTRATIVE | Facility: HOSPITAL | Age: 65
End: 2020-10-19

## 2020-10-19 LAB
BILIRUB SERPL-MCNC: ABNORMAL MG/DL
BLOOD URINE, POC: ABNORMAL
CLARITY, POC UA: ABNORMAL
COLOR, POC UA: YELLOW
GLUCOSE UR QL STRIP: ABNORMAL
KETONES UR QL STRIP: ABNORMAL
LEUKOCYTE ESTERASE URINE, POC: ABNORMAL
NITRITE, POC UA: ABNORMAL
PH, POC UA: 5
PROTEIN, POC: 500
SPECIFIC GRAVITY, POC UA: 1020
UROBILINOGEN, POC UA: ABNORMAL

## 2020-10-22 ENCOUNTER — LAB VISIT (OUTPATIENT)
Dept: LAB | Facility: HOSPITAL | Age: 65
End: 2020-10-22
Attending: FAMILY MEDICINE
Payer: MEDICARE

## 2020-10-22 DIAGNOSIS — E78.5 HYPERLIPIDEMIA, ACQUIRED: ICD-10-CM

## 2020-10-22 DIAGNOSIS — E11.22 TYPE 2 DIABETES MELLITUS WITH STAGE 3 CHRONIC KIDNEY DISEASE, WITHOUT LONG-TERM CURRENT USE OF INSULIN: ICD-10-CM

## 2020-10-22 DIAGNOSIS — N18.30 TYPE 2 DIABETES MELLITUS WITH STAGE 3 CHRONIC KIDNEY DISEASE, WITHOUT LONG-TERM CURRENT USE OF INSULIN: ICD-10-CM

## 2020-10-22 LAB
ANION GAP SERPL CALC-SCNC: 9 MMOL/L (ref 8–16)
BUN SERPL-MCNC: 33 MG/DL (ref 8–23)
CALCIUM SERPL-MCNC: 10.1 MG/DL (ref 8.7–10.5)
CHLORIDE SERPL-SCNC: 103 MMOL/L (ref 95–110)
CHOLEST SERPL-MCNC: 269 MG/DL (ref 120–199)
CHOLEST/HDLC SERPL: 5.3 {RATIO} (ref 2–5)
CO2 SERPL-SCNC: 32 MMOL/L (ref 23–29)
CREAT SERPL-MCNC: 1.5 MG/DL (ref 0.5–1.4)
EST. GFR  (AFRICAN AMERICAN): 41.6 ML/MIN/1.73 M^2
EST. GFR  (NON AFRICAN AMERICAN): 36 ML/MIN/1.73 M^2
ESTIMATED AVG GLUCOSE: 120 MG/DL (ref 68–131)
GLUCOSE SERPL-MCNC: 92 MG/DL (ref 70–110)
HBA1C MFR BLD HPLC: 5.8 % (ref 4–5.6)
HDLC SERPL-MCNC: 51 MG/DL (ref 40–75)
HDLC SERPL: 19 % (ref 20–50)
LDLC SERPL CALC-MCNC: 184.2 MG/DL (ref 63–159)
NONHDLC SERPL-MCNC: 218 MG/DL
POTASSIUM SERPL-SCNC: 4.3 MMOL/L (ref 3.5–5.1)
SODIUM SERPL-SCNC: 144 MMOL/L (ref 136–145)
TRIGL SERPL-MCNC: 169 MG/DL (ref 30–150)

## 2020-10-22 PROCEDURE — 80048 BASIC METABOLIC PNL TOTAL CA: CPT

## 2020-10-22 PROCEDURE — 83036 HEMOGLOBIN GLYCOSYLATED A1C: CPT

## 2020-10-22 PROCEDURE — 80061 LIPID PANEL: CPT

## 2020-10-22 PROCEDURE — 36415 COLL VENOUS BLD VENIPUNCTURE: CPT | Mod: PO

## 2020-10-23 ENCOUNTER — OFFICE VISIT (OUTPATIENT)
Dept: FAMILY MEDICINE | Facility: CLINIC | Age: 65
End: 2020-10-23
Payer: MEDICARE

## 2020-10-23 VITALS
WEIGHT: 250.69 LBS | TEMPERATURE: 98 F | RESPIRATION RATE: 20 BRPM | SYSTOLIC BLOOD PRESSURE: 136 MMHG | BODY MASS INDEX: 39.35 KG/M2 | HEART RATE: 76 BPM | HEIGHT: 67 IN | OXYGEN SATURATION: 96 % | DIASTOLIC BLOOD PRESSURE: 88 MMHG

## 2020-10-23 DIAGNOSIS — E66.01 SEVERE OBESITY (BMI 35.0-39.9) WITH COMORBIDITY: ICD-10-CM

## 2020-10-23 DIAGNOSIS — G47.33 OBSTRUCTIVE SLEEP APNEA: ICD-10-CM

## 2020-10-23 DIAGNOSIS — D57.3 SICKLE CELL TRAIT: ICD-10-CM

## 2020-10-23 DIAGNOSIS — I10 ESSENTIAL HYPERTENSION: ICD-10-CM

## 2020-10-23 DIAGNOSIS — N18.32 TYPE 2 DIABETES MELLITUS WITH STAGE 3B CHRONIC KIDNEY DISEASE, WITHOUT LONG-TERM CURRENT USE OF INSULIN: Primary | ICD-10-CM

## 2020-10-23 DIAGNOSIS — E11.22 TYPE 2 DIABETES MELLITUS WITH STAGE 3B CHRONIC KIDNEY DISEASE, WITHOUT LONG-TERM CURRENT USE OF INSULIN: Primary | ICD-10-CM

## 2020-10-23 PROCEDURE — 99214 OFFICE O/P EST MOD 30 MIN: CPT | Mod: S$GLB,,, | Performed by: FAMILY MEDICINE

## 2020-10-23 PROCEDURE — 3008F BODY MASS INDEX DOCD: CPT | Mod: CPTII,S$GLB,, | Performed by: FAMILY MEDICINE

## 2020-10-23 PROCEDURE — 99999 PR PBB SHADOW E&M-EST. PATIENT-LVL V: CPT | Mod: PBBFAC,,, | Performed by: FAMILY MEDICINE

## 2020-10-23 PROCEDURE — 1126F PR PAIN SEVERITY QUANTIFIED, NO PAIN PRESENT: ICD-10-PCS | Mod: S$GLB,,, | Performed by: FAMILY MEDICINE

## 2020-10-23 PROCEDURE — 1101F PT FALLS ASSESS-DOCD LE1/YR: CPT | Mod: CPTII,S$GLB,, | Performed by: FAMILY MEDICINE

## 2020-10-23 PROCEDURE — 1159F MED LIST DOCD IN RCRD: CPT | Mod: S$GLB,,, | Performed by: FAMILY MEDICINE

## 2020-10-23 PROCEDURE — 3075F PR MOST RECENT SYSTOLIC BLOOD PRESS GE 130-139MM HG: ICD-10-PCS | Mod: CPTII,S$GLB,, | Performed by: FAMILY MEDICINE

## 2020-10-23 PROCEDURE — 3044F PR MOST RECENT HEMOGLOBIN A1C LEVEL <7.0%: ICD-10-PCS | Mod: CPTII,S$GLB,, | Performed by: FAMILY MEDICINE

## 2020-10-23 PROCEDURE — 99499 RISK ADDL DX/OHS AUDIT: ICD-10-PCS | Mod: S$GLB,,, | Performed by: FAMILY MEDICINE

## 2020-10-23 PROCEDURE — 3079F PR MOST RECENT DIASTOLIC BLOOD PRESSURE 80-89 MM HG: ICD-10-PCS | Mod: CPTII,S$GLB,, | Performed by: FAMILY MEDICINE

## 2020-10-23 PROCEDURE — 99999 PR PBB SHADOW E&M-EST. PATIENT-LVL V: ICD-10-PCS | Mod: PBBFAC,,, | Performed by: FAMILY MEDICINE

## 2020-10-23 PROCEDURE — 1159F PR MEDICATION LIST DOCUMENTED IN MEDICAL RECORD: ICD-10-PCS | Mod: S$GLB,,, | Performed by: FAMILY MEDICINE

## 2020-10-23 PROCEDURE — 3008F PR BODY MASS INDEX (BMI) DOCUMENTED: ICD-10-PCS | Mod: CPTII,S$GLB,, | Performed by: FAMILY MEDICINE

## 2020-10-23 PROCEDURE — 3044F HG A1C LEVEL LT 7.0%: CPT | Mod: CPTII,S$GLB,, | Performed by: FAMILY MEDICINE

## 2020-10-23 PROCEDURE — 1126F AMNT PAIN NOTED NONE PRSNT: CPT | Mod: S$GLB,,, | Performed by: FAMILY MEDICINE

## 2020-10-23 PROCEDURE — 99214 PR OFFICE/OUTPT VISIT, EST, LEVL IV, 30-39 MIN: ICD-10-PCS | Mod: S$GLB,,, | Performed by: FAMILY MEDICINE

## 2020-10-23 PROCEDURE — 3075F SYST BP GE 130 - 139MM HG: CPT | Mod: CPTII,S$GLB,, | Performed by: FAMILY MEDICINE

## 2020-10-23 PROCEDURE — 99499 UNLISTED E&M SERVICE: CPT | Mod: S$GLB,,, | Performed by: FAMILY MEDICINE

## 2020-10-23 PROCEDURE — 1101F PR PT FALLS ASSESS DOC 0-1 FALLS W/OUT INJ PAST YR: ICD-10-PCS | Mod: CPTII,S$GLB,, | Performed by: FAMILY MEDICINE

## 2020-10-23 PROCEDURE — 3079F DIAST BP 80-89 MM HG: CPT | Mod: CPTII,S$GLB,, | Performed by: FAMILY MEDICINE

## 2020-10-23 RX ORDER — OLMESARTAN MEDOXOMIL 20 MG/1
20 TABLET ORAL DAILY
Qty: 90 TABLET | Refills: 0 | Status: SHIPPED | OUTPATIENT
Start: 2020-10-23 | End: 2020-12-18

## 2020-10-23 NOTE — PROGRESS NOTES
Chief Complaint   Patient presents with    Hypertension    Diabetes       Gwendolyn Fournier is a 65 y.o. female who presents per the Chief Complaint.  Pt is known to me and was last seen by me on 9/15/2020.  All known chronic medical issues have been documented.    Hypertension  This is a chronic problem. The current episode started more than 1 year ago. The problem has been gradually improving since onset. The problem is controlled. Associated symptoms include anxiety and headaches. Pertinent negatives include no blurred vision, chest pain, malaise/fatigue, neck pain, orthopnea, palpitations, peripheral edema, PND, shortness of breath or sweats. Agents associated with hypertension include NSAIDs. Risk factors for coronary artery disease include obesity and post-menopausal state. Past treatments include ACE inhibitors. The current treatment provides moderate improvement. Compliance problems include exercise and diet.  There is no history of angina, kidney disease, CAD/MI, CVA, heart failure, left ventricular hypertrophy, PVD or retinopathy. There is no history of chronic renal disease, coarctation of the aorta, hyperaldosteronism, hypercortisolism, hyperparathyroidism, a hypertension causing med, pheochromocytoma, renovascular disease, sleep apnea or a thyroid problem.   Diabetes  Hypoglycemia symptoms include headaches. Pertinent negatives for hypoglycemia include no confusion, dizziness, nervousness/anxiousness, seizures or sweats. Pertinent negatives for diabetes include no blurred vision, no chest pain, no fatigue, no weakness and no weight loss. Pertinent negatives for diabetic complications include no CVA, PVD or retinopathy.   Back Pain  This is a chronic problem. The current episode started more than 1 year ago. The problem occurs constantly. The problem has been rapidly worsening since onset. The pain is present in the gluteal and lumbar spine. The quality of the pain is described as stabbing. The pain  radiates to the right knee and right thigh. The pain is at a severity of 10/10. The pain is severe. The pain is the same all the time. The symptoms are aggravated by standing. Stiffness is present all day. Associated symptoms include bladder incontinence, headaches, leg pain, paresthesias and tingling. Pertinent negatives include no abdominal pain, bowel incontinence, chest pain, dysuria, fever, numbness, paresis, pelvic pain, perianal numbness, weakness or weight loss. Risk factors include obesity and sedentary lifestyle. She has tried analgesics for the symptoms. The treatment provided moderate relief.       ROS  Review of Systems   Constitutional: Negative.  Negative for activity change, appetite change, chills, diaphoresis, fatigue, fever, malaise/fatigue, unexpected weight change and weight loss.   HENT: Negative.  Negative for congestion, ear pain, hearing loss, nosebleeds, postnasal drip, rhinorrhea, sinus pressure, sneezing, sore throat and trouble swallowing.    Eyes: Negative for blurred vision, pain, discharge, redness and visual disturbance.   Respiratory: Negative for cough, choking, chest tightness and shortness of breath.    Cardiovascular: Negative for chest pain, palpitations, orthopnea, leg swelling and PND.   Gastrointestinal: Negative for abdominal pain, bowel incontinence, constipation, diarrhea, nausea and vomiting.   Endocrine: Negative for cold intolerance and heat intolerance.   Genitourinary: Positive for bladder incontinence and flank pain. Negative for difficulty urinating, dysuria, frequency, menstrual problem, pelvic pain, urgency and vaginal discharge.   Musculoskeletal: Positive for back pain and myalgias (left buttock, left thigh). Negative for arthralgias, gait problem, joint swelling and neck pain.   Skin: Negative.  Negative for color change and rash.   Allergic/Immunologic: Negative for environmental allergies and food allergies.   Neurological: Positive for tingling, headaches  "and paresthesias. Negative for dizziness, seizures, syncope, weakness, light-headedness and numbness.   Hematological: Negative for adenopathy.   Psychiatric/Behavioral: Negative.  Negative for behavioral problems, confusion, decreased concentration, dysphoric mood, hallucinations, sleep disturbance and suicidal ideas. The patient is not nervous/anxious.        Physical Exam  Vitals:    10/23/20 1030   BP: 136/88   Pulse: 76   Resp: 20   Temp: 98.4 °F (36.9 °C)    Body mass index is 39.26 kg/m².  Weight: 113.7 kg (250 lb 10.6 oz)   Height: 5' 7" (170.2 cm)     Physical Exam  Constitutional:       General: She is not in acute distress.     Appearance: Normal appearance. She is well-developed. She is not ill-appearing, toxic-appearing or diaphoretic.   HENT:      Head: Normocephalic and atraumatic.      Right Ear: Hearing and external ear normal. No decreased hearing noted.      Left Ear: Hearing and external ear normal. No decreased hearing noted.      Nose: Nose normal. No nasal deformity or rhinorrhea.      Mouth/Throat:      Dentition: Normal dentition. Does not have dentures.      Pharynx: Uvula midline.   Eyes:      General: Lids are normal. No scleral icterus.        Right eye: No foreign body or discharge.         Left eye: No foreign body or discharge.      Conjunctiva/sclera: Conjunctivae normal.      Right eye: No chemosis or exudate.     Left eye: No chemosis or exudate.     Pupils: Pupils are equal, round, and reactive to light.   Neck:      Musculoskeletal: Full passive range of motion without pain, normal range of motion and neck supple.   Cardiovascular:      Rate and Rhythm: Normal rate and regular rhythm.      Heart sounds: Normal heart sounds, S1 normal and S2 normal. No murmur. No friction rub. No gallop.    Pulmonary:      Effort: Pulmonary effort is normal. No accessory muscle usage or respiratory distress.      Breath sounds: Normal breath sounds. No decreased breath sounds, wheezing, rhonchi or " rales.   Abdominal:      General: There is no distension.      Palpations: Abdomen is soft. Abdomen is not rigid.      Tenderness: There is no abdominal tenderness. There is no guarding or rebound.   Musculoskeletal: Normal range of motion.      Lumbar back: She exhibits tenderness and pain. She exhibits normal range of motion, no bony tenderness, no swelling, no edema, no deformity, no laceration, no spasm and normal pulse.      Left upper leg: She exhibits tenderness. She exhibits no bony tenderness, no swelling, no edema, no deformity and no laceration.        Legs:    Skin:     General: Skin is warm and dry.      Findings: No rash.   Neurological:      Mental Status: She is alert and oriented to person, place, and time.      Cranial Nerves: No cranial nerve deficit.      Sensory: No sensory deficit.      Motor: No abnormal muscle tone or seizure activity.      Coordination: Coordination normal.      Gait: Gait normal.   Psychiatric:         Attention and Perception: She is attentive.         Speech: Speech normal.         Behavior: Behavior normal. Behavior is cooperative.         Thought Content: Thought content normal.         Judgment: Judgment normal.       Assessment & Plan    Discussion of plan of care including treatment options regarding health and wellness were reviewed and discussed with patient.  Any changes to medication or treatment plan, as well as any screening blood test, imaging, or referrals to specialist, are documented.  Follow up as indicated.     1. Type 2 diabetes mellitus with stage 3b chronic kidney disease, without long-term current use of insulin  Patient is encouraged to follow a diet low in carbohydrates and simple sugars.  Discussed simple vs. complex carbohydrates as well as eating times of certain meals. Advised to focus on good food choices and increased physical activity and encouraged to adhere to medication regimen and/or lifestyle adjustments, and to check glucose level as  recommended.  Contact office if glucose levels are not improving over time.  Will monitor HbA1c appropriately.   - Hemoglobin A1C; Future    2. Essential hypertension  Patient was counseled and encouraged to maintain a low sodium diet, as well as increasing physical activity.  Recommend random BP checks at home on a regular basis.  Repeat BP at end of visit was not necessary. Will continue medication at this time, and follow up in 3-6 months, or sooner if blood pressure begins to increase.  Start ARB at this time.  - olmesartan (BENICAR) 20 MG tablet; Take 1 tablet (20 mg total) by mouth once daily.  Dispense: 90 tablet; Refill: 0    3. Obstructive sleep apnea  Patient interested in Inspire sleep device.  Referral to appropriate specialist for evaluation and management placed in Epic.   - Ambulatory referral/consult to Pulmonology; Future    4. Severe obesity (BMI 35.0-39.9) with comorbidity  We discussed weight and safe, effective ways of losing pounds, including low carbohydrate, low fat diet and increasing physical activity to improve cardiovascular performance and increase metabolism.  Patient was encouraged to set realistic attainable goals for weight loss, and we will follow up periodically.     5. Sickle cell trait  Stable; no therapeutic changes at this time.        Follow up in about 3 months (around 1/23/2021).      ACTIVE MEDICAL ISSUES:  Documented in Problem List    PAST MEDICAL HISTORY  Documented  .  PAST SURGICAL HISTORY:  Documented    SOCIAL HISTORY:  Documented    FAMILY HISTORY:  Documented    ALLERGIES AND MEDICATIONS: updated and reviewed.  Documented    Health Maintenance       Date Due Completion Date    TETANUS VACCINE 01/24/1972 ---    Low Dose Statin 01/24/1975 ---    Shingles Vaccine (1 of 2) 01/24/2004 ---    Mammogram 04/30/2015 4/30/2013    Eye Exam 12/09/2020 12/9/2019    Hemoglobin A1c 04/22/2021 10/22/2020    Foot Exam 07/30/2021 7/30/2020    Lipid Panel 10/22/2021 10/22/2020     Override on 9/15/2015: Done (future)    DEXA SCAN 02/20/2022 2/20/2019    Colorectal Cancer Screening 01/21/2029 1/21/2019

## 2020-10-23 NOTE — PROGRESS NOTES
Health Maintenance Due   Topic     Low Dose Statin  Consult with PCP     Shingles Vaccine (1 of 2) hx chickenpox ; inform pt can get vaccine at pharmacy.    Mammogram  Scheduled

## 2020-10-26 ENCOUNTER — NURSE TRIAGE (OUTPATIENT)
Dept: ADMINISTRATIVE | Facility: CLINIC | Age: 65
End: 2020-10-26

## 2020-10-26 NOTE — TELEPHONE ENCOUNTER
"Ate breakfast and took medicine, pt then felt "tingly and dizzy" with lightheadedness, checked BP and it was 97/60 (@ 1200), pt then drank sports drink, Dr. Pepper and ate some salty chili cheese chips and repeat BP was 125/66 (@1300). Pt still feels dizzy and faint but symptoms are improving. Pt recently prescribed (10/23) olmesartan 20mg daily. Pt also takes metoprolol 50mg BID, hctz, and glipizide. Current repeat BG is 303 but she just drank powerade and soft drink, but BG prior to eating was 137 (@1211). Repeat BG with nurse on the line was 171. Pt states BP had been trending high (160/90), prior to starting olmesartan. Pt's daughter is currently with pt but she is not edie to drive pt to ED/C.     @ 1325 Unable to reach Dr. Lombard's office. Updated pt. Pt states feeling 80% better but continues to have some numbness/tingling to toes and feeling lightheadedness. Advised pt to call 911 and go to closest ED due to continued symptoms. VU bu unsure what pt will do.     Reason for Disposition   Fall in systolic BP > 20 mm Hg from normal and feeling dizzy, lightheaded, or weak    Additional Information   Negative: Systolic BP < 90 and feeling dizzy, lightheaded, or weak   Negative: Started suddenly after an allergic medicine, an allergic food, or bee sting   Negative: Shock suspected (e.g., cold/pale/clammy skin, too weak to stand, low BP, rapid pulse)   Negative: Difficult to awaken or acting confused  (e.g., disoriented, slurred speech)   Negative: Fainted   Negative: Chest pain   Negative: Bleeding (e.g., vomiting blood, rectal bleeding or tarry stools, severe vaginal bleeding)   Negative: Extra heart beats or heart is beating fast  (i.e., "palpitations")   Negative: Sounds like a life-threatening emergency to the triager   Negative: Systolic BP < 80 and NOT dizzy, lightheaded or weak (feels normal)   Negative: Abdominal pain   Negative: Major surgery in the past month   Negative: Fever > 100.4 F " (38.0 C)   Negative: Drinking very little and has signs of dehydration (e.g., no urine > 12 hours, very dry mouth, very lightheaded)    Protocols used: LOW BLOOD PRESSURE-A-OH

## 2020-10-26 NOTE — TELEPHONE ENCOUNTER
"Called patient regarding information below. Patient stated she is 99% better denied of "dizziness and tingly." Last BP chest was 132/62 with . Notified if sx worsen to report to ED or call 911. Verbalized understanding. Pt scheduled for next available and placed on wait list. Patient want to talk to PCP regarding recent medication.   "

## 2020-11-02 ENCOUNTER — OFFICE VISIT (OUTPATIENT)
Dept: FAMILY MEDICINE | Facility: CLINIC | Age: 65
End: 2020-11-02
Payer: MEDICARE

## 2020-11-02 VITALS
BODY MASS INDEX: 39.79 KG/M2 | HEIGHT: 67 IN | SYSTOLIC BLOOD PRESSURE: 126 MMHG | TEMPERATURE: 99 F | OXYGEN SATURATION: 96 % | DIASTOLIC BLOOD PRESSURE: 86 MMHG | WEIGHT: 253.5 LBS | HEART RATE: 79 BPM | RESPIRATION RATE: 16 BRPM

## 2020-11-02 DIAGNOSIS — I10 ESSENTIAL HYPERTENSION: Primary | ICD-10-CM

## 2020-11-02 DIAGNOSIS — N18.32 TYPE 2 DIABETES MELLITUS WITH STAGE 3B CHRONIC KIDNEY DISEASE, WITHOUT LONG-TERM CURRENT USE OF INSULIN: ICD-10-CM

## 2020-11-02 DIAGNOSIS — E11.22 TYPE 2 DIABETES MELLITUS WITH STAGE 3B CHRONIC KIDNEY DISEASE, WITHOUT LONG-TERM CURRENT USE OF INSULIN: ICD-10-CM

## 2020-11-02 PROCEDURE — 3008F PR BODY MASS INDEX (BMI) DOCUMENTED: ICD-10-PCS | Mod: CPTII,S$GLB,, | Performed by: FAMILY MEDICINE

## 2020-11-02 PROCEDURE — 1126F AMNT PAIN NOTED NONE PRSNT: CPT | Mod: S$GLB,,, | Performed by: FAMILY MEDICINE

## 2020-11-02 PROCEDURE — 3044F HG A1C LEVEL LT 7.0%: CPT | Mod: CPTII,S$GLB,, | Performed by: FAMILY MEDICINE

## 2020-11-02 PROCEDURE — 1159F MED LIST DOCD IN RCRD: CPT | Mod: S$GLB,,, | Performed by: FAMILY MEDICINE

## 2020-11-02 PROCEDURE — 3079F DIAST BP 80-89 MM HG: CPT | Mod: CPTII,S$GLB,, | Performed by: FAMILY MEDICINE

## 2020-11-02 PROCEDURE — 1101F PR PT FALLS ASSESS DOC 0-1 FALLS W/OUT INJ PAST YR: ICD-10-PCS | Mod: CPTII,S$GLB,, | Performed by: FAMILY MEDICINE

## 2020-11-02 PROCEDURE — 1101F PT FALLS ASSESS-DOCD LE1/YR: CPT | Mod: CPTII,S$GLB,, | Performed by: FAMILY MEDICINE

## 2020-11-02 PROCEDURE — 99214 PR OFFICE/OUTPT VISIT, EST, LEVL IV, 30-39 MIN: ICD-10-PCS | Mod: S$GLB,,, | Performed by: FAMILY MEDICINE

## 2020-11-02 PROCEDURE — 3074F SYST BP LT 130 MM HG: CPT | Mod: CPTII,S$GLB,, | Performed by: FAMILY MEDICINE

## 2020-11-02 PROCEDURE — 3044F PR MOST RECENT HEMOGLOBIN A1C LEVEL <7.0%: ICD-10-PCS | Mod: CPTII,S$GLB,, | Performed by: FAMILY MEDICINE

## 2020-11-02 PROCEDURE — 99214 OFFICE O/P EST MOD 30 MIN: CPT | Mod: S$GLB,,, | Performed by: FAMILY MEDICINE

## 2020-11-02 PROCEDURE — 1126F PR PAIN SEVERITY QUANTIFIED, NO PAIN PRESENT: ICD-10-PCS | Mod: S$GLB,,, | Performed by: FAMILY MEDICINE

## 2020-11-02 PROCEDURE — 1159F PR MEDICATION LIST DOCUMENTED IN MEDICAL RECORD: ICD-10-PCS | Mod: S$GLB,,, | Performed by: FAMILY MEDICINE

## 2020-11-02 PROCEDURE — 99999 PR PBB SHADOW E&M-EST. PATIENT-LVL V: ICD-10-PCS | Mod: PBBFAC,,, | Performed by: FAMILY MEDICINE

## 2020-11-02 PROCEDURE — 3074F PR MOST RECENT SYSTOLIC BLOOD PRESSURE < 130 MM HG: ICD-10-PCS | Mod: CPTII,S$GLB,, | Performed by: FAMILY MEDICINE

## 2020-11-02 PROCEDURE — 3008F BODY MASS INDEX DOCD: CPT | Mod: CPTII,S$GLB,, | Performed by: FAMILY MEDICINE

## 2020-11-02 PROCEDURE — 3079F PR MOST RECENT DIASTOLIC BLOOD PRESSURE 80-89 MM HG: ICD-10-PCS | Mod: CPTII,S$GLB,, | Performed by: FAMILY MEDICINE

## 2020-11-02 PROCEDURE — 99999 PR PBB SHADOW E&M-EST. PATIENT-LVL V: CPT | Mod: PBBFAC,,, | Performed by: FAMILY MEDICINE

## 2020-11-02 NOTE — PROGRESS NOTES
Chief Complaint   Patient presents with    Diabetes     medication management        Gwendolyn Fournier is a 65 y.o. female who presents per the Chief Complaint.  Pt is known to me and was last seen by me on 10/23/2020.  All known chronic medical issues have been documented.    Diabetes  She presents for her follow-up diabetic visit. She has type 2 diabetes mellitus. Her disease course has been stable. Hypoglycemia symptoms include dizziness (resolved; related to hypotension) and headaches. Pertinent negatives for hypoglycemia include no confusion, hunger, mood changes, nervousness/anxiousness, pallor, seizures, sleepiness, speech difficulty, sweats or tremors. There are no diabetic associated symptoms. Pertinent negatives for diabetes include no blurred vision, no chest pain, no fatigue, no weakness and no weight loss. There are no hypoglycemic complications. There are no diabetic complications. Pertinent negatives for diabetic complications include no CVA, PVD or retinopathy. Risk factors for coronary artery disease include hypertension, post-menopausal, sedentary lifestyle and diabetes mellitus.   Hypertension  This is a chronic problem. The current episode started more than 1 year ago. The problem has been gradually improving since onset. The problem is controlled. Associated symptoms include anxiety and headaches. Pertinent negatives include no blurred vision, chest pain, malaise/fatigue, neck pain, orthopnea, palpitations, peripheral edema, PND, shortness of breath or sweats. Agents associated with hypertension include NSAIDs. Risk factors for coronary artery disease include obesity and post-menopausal state. Past treatments include ACE inhibitors. The current treatment provides moderate improvement. Compliance problems include exercise and diet.  There is no history of angina, kidney disease, CAD/MI, CVA, heart failure, left ventricular hypertrophy, PVD or retinopathy. There is no history of chronic renal  "disease, coarctation of the aorta, hyperaldosteronism, hypercortisolism, hyperparathyroidism, a hypertension causing med, pheochromocytoma, renovascular disease, sleep apnea or a thyroid problem.     ROS  Review of Systems   Constitutional: Negative.  Negative for activity change, appetite change, chills, diaphoresis, fatigue, fever, malaise/fatigue, unexpected weight change and weight loss.   HENT: Negative.  Negative for congestion, ear pain, hearing loss, nosebleeds, postnasal drip, rhinorrhea, sinus pressure, sneezing, sore throat and trouble swallowing.    Eyes: Negative for blurred vision, pain and visual disturbance.   Respiratory: Negative for cough, choking and shortness of breath.    Cardiovascular: Negative for chest pain, palpitations, orthopnea, leg swelling and PND.   Gastrointestinal: Negative for abdominal pain, bowel incontinence, constipation, diarrhea, nausea and vomiting.   Genitourinary: Positive for bladder incontinence. Negative for difficulty urinating, dysuria, frequency, pelvic pain and urgency.   Musculoskeletal: Positive for back pain. Negative for arthralgias, gait problem, joint swelling, myalgias and neck pain.   Skin: Negative.  Negative for pallor.   Allergic/Immunologic: Negative for environmental allergies and food allergies.   Neurological: Positive for dizziness (resolved; related to hypotension), tingling, headaches and paresthesias. Negative for tremors, seizures, syncope, facial asymmetry, speech difficulty, weakness, light-headedness and numbness.   Psychiatric/Behavioral: Negative.  Negative for confusion, decreased concentration, dysphoric mood and sleep disturbance. The patient is not nervous/anxious.        Physical Exam  Vitals:    11/02/20 0950   BP: 126/86   Pulse: 79   Resp: 16   Temp: 98.8 °F (37.1 °C)    Body mass index is 39.71 kg/m².  Weight: 115 kg (253 lb 8.5 oz)   Height: 5' 7" (170.2 cm)     Physical Exam  Constitutional:       General: She is not in acute " distress.     Appearance: Normal appearance. She is well-developed. She is not ill-appearing, toxic-appearing or diaphoretic.   HENT:      Head: Normocephalic and atraumatic.      Right Ear: Hearing and external ear normal. No decreased hearing noted.      Left Ear: Hearing and external ear normal. No decreased hearing noted.      Nose: Nose normal. No nasal deformity or rhinorrhea.      Mouth/Throat:      Dentition: Normal dentition. Does not have dentures.      Pharynx: Uvula midline.   Eyes:      General: Lids are normal. No scleral icterus.        Right eye: No foreign body or discharge.         Left eye: No foreign body or discharge.      Conjunctiva/sclera: Conjunctivae normal.      Right eye: No chemosis or exudate.     Left eye: No chemosis or exudate.     Pupils: Pupils are equal, round, and reactive to light.   Neck:      Musculoskeletal: Full passive range of motion without pain, normal range of motion and neck supple.   Cardiovascular:      Rate and Rhythm: Normal rate and regular rhythm.      Heart sounds: Normal heart sounds, S1 normal and S2 normal. No murmur. No friction rub. No gallop.    Pulmonary:      Effort: Pulmonary effort is normal. No accessory muscle usage or respiratory distress.      Breath sounds: Normal breath sounds. No decreased breath sounds, wheezing, rhonchi or rales.   Abdominal:      General: There is no distension.      Palpations: Abdomen is soft. Abdomen is not rigid.      Tenderness: There is no abdominal tenderness. There is no guarding or rebound.   Musculoskeletal: Normal range of motion.   Skin:     General: Skin is warm and dry.      Findings: No rash.   Neurological:      Mental Status: She is alert and oriented to person, place, and time.      Cranial Nerves: No cranial nerve deficit.      Sensory: No sensory deficit.      Motor: No abnormal muscle tone or seizure activity.      Coordination: Coordination normal.      Gait: Gait normal.   Psychiatric:         Attention  and Perception: She is attentive.         Speech: Speech normal.         Behavior: Behavior normal. Behavior is cooperative.         Thought Content: Thought content normal.         Judgment: Judgment normal.         Assessment & Plan    Discussion of plan of care including treatment options regarding health and wellness were reviewed and discussed with patient.  Any changes to medication or treatment plan, as well as any screening blood test, imaging, or referrals to specialist, are documented.  Follow up as indicated.     1. Essential hypertension  Patient was counseled and encouraged to maintain a low sodium diet, as well as increasing physical activity.  Recommend random BP checks at home on a regular basis.  Repeat BP at end of visit was not necessary. Will continue medication at this time, and follow up in 3-6 months, or sooner if blood pressure begins to increase.  Patient advised to hold olmesartan if BP less than 150/90; patient with much lower BP at home.  Also recommended to take medication in the evening as BP seems to be lowest in the AM.    2. Type 2 diabetes mellitus with stage 3b chronic kidney disease, without long-term current use of insulin  Patient is encouraged to follow a diet low in carbohydrates and simple sugars.  Discussed simple vs. complex carbohydrates as well as eating times of certain meals. Advised to focus on good food choices and increased physical activity and encouraged to adhere to medication regimen and/or lifestyle adjustments, and to check glucose level as recommended.  Contact office if glucose levels are not improving over time.  Will monitor HbA1c appropriately.        Follow up in about 12 weeks (around 1/25/2021).      ACTIVE MEDICAL ISSUES:  Documented in Problem List    PAST MEDICAL HISTORY  Documented  .  PAST SURGICAL HISTORY:  Documented    SOCIAL HISTORY:  Documented    FAMILY HISTORY:  Documented    ALLERGIES AND MEDICATIONS: updated and  reviewed.  Documented    Health Maintenance       Date Due Completion Date    TETANUS VACCINE 01/24/1972 ---    Low Dose Statin 01/24/1975 ---    Shingles Vaccine (1 of 2) 01/24/2004 ---    Mammogram 04/30/2015 4/30/2013    Eye Exam 12/09/2020 12/9/2019    Hemoglobin A1c 04/22/2021 10/22/2020    Foot Exam 07/30/2021 7/30/2020    Lipid Panel 10/22/2021 10/22/2020    Override on 9/15/2015: Done (future)    DEXA SCAN 02/20/2022 2/20/2019    Colorectal Cancer Screening 01/21/2029 1/21/2019

## 2020-11-02 NOTE — PROGRESS NOTES
Health Maintenance Due   Topic     Low Dose Statin  Consult with PCP     Shingles Vaccine (1 of 2) No hx chickenpox ; inform pt can get vaccine at pharmacy.      Mammogram  Scheduled

## 2020-11-04 NOTE — TELEPHONE ENCOUNTER
Catheter removed intact.   Order for commode was faxed over to [facility] also was notify to patient , patient verbalized understand .

## 2020-11-17 RX ORDER — TIZANIDINE 4 MG/1
TABLET ORAL
Qty: 60 TABLET | Refills: 2 | Status: SHIPPED | OUTPATIENT
Start: 2020-11-17 | End: 2021-12-29 | Stop reason: SDUPTHER

## 2020-11-17 NOTE — TELEPHONE ENCOUNTER
----- Message from Aide Fernandes sent at 11/17/2020  2:35 PM CST -----  Regarding: Zehra/ Daughter/  [phone]  Type: RX Refill Request    Who Called:  Daughter    Refill or New Rx:  Refill    RX Name and Strength:   tiZANidine (ZANAFLEX) 4 MG tablet     Preferred Pharm[facility] with phone number:  [id number] - [address] [facility]    Local or Mail Order:   Local    Ordering Provider:  Lombard, A    Would the patient rather a call back or a response via My [facility]?  Call back    Best Call Back Number:   [phone]

## 2020-11-17 NOTE — TELEPHONE ENCOUNTER
----- Message from Aide Fernandes sent at 11/17/2020  2:35 PM CST -----  Regarding: Zehra/ Daughter/  [phone]  Type: RX Refill Request    Who Called:  Daughter    Refill or New Rx:  Refill    RX Name and Strength:   tiZANidine (ZANAFLEX) 4 MG tablet     Preferred Pharmacy with phone number:  [id number] - [address] AT [facility]    Local or Mail Order:   Local    Ordering Provider:  Lombard, A    Would the patient rather a call back or a response via My [facility]?  Call back    Best Call Back Number:   [phone]

## 2020-12-14 ENCOUNTER — TELEPHONE (OUTPATIENT)
Dept: PULMONOLOGY | Facility: CLINIC | Age: 65
End: 2020-12-14

## 2020-12-18 ENCOUNTER — OFFICE VISIT (OUTPATIENT)
Dept: FAMILY MEDICINE | Facility: CLINIC | Age: 65
End: 2020-12-18
Payer: MEDICARE

## 2020-12-18 VITALS
DIASTOLIC BLOOD PRESSURE: 86 MMHG | HEIGHT: 67 IN | TEMPERATURE: 98 F | WEIGHT: 255.75 LBS | RESPIRATION RATE: 16 BRPM | SYSTOLIC BLOOD PRESSURE: 138 MMHG | BODY MASS INDEX: 40.14 KG/M2

## 2020-12-18 DIAGNOSIS — E11.9 TYPE 2 DIABETES MELLITUS WITHOUT COMPLICATION, UNSPECIFIED WHETHER LONG TERM INSULIN USE: ICD-10-CM

## 2020-12-18 DIAGNOSIS — I10 ESSENTIAL HYPERTENSION: ICD-10-CM

## 2020-12-18 DIAGNOSIS — M46.1 SACROILIITIS, NOT ELSEWHERE CLASSIFIED: ICD-10-CM

## 2020-12-18 PROCEDURE — 99999 PR PBB SHADOW E&M-EST. PATIENT-LVL IV: ICD-10-PCS | Mod: PBBFAC,,, | Performed by: FAMILY MEDICINE

## 2020-12-18 PROCEDURE — 1126F PR PAIN SEVERITY QUANTIFIED, NO PAIN PRESENT: ICD-10-PCS | Mod: S$GLB,,, | Performed by: FAMILY MEDICINE

## 2020-12-18 PROCEDURE — 3288F FALL RISK ASSESSMENT DOCD: CPT | Mod: CPTII,S$GLB,, | Performed by: FAMILY MEDICINE

## 2020-12-18 PROCEDURE — 1159F PR MEDICATION LIST DOCUMENTED IN MEDICAL RECORD: ICD-10-PCS | Mod: S$GLB,,, | Performed by: FAMILY MEDICINE

## 2020-12-18 PROCEDURE — 1157F PR ADVANCE CARE PLAN OR EQUIV PRESENT IN MEDICAL RECORD: ICD-10-PCS | Mod: S$GLB,,, | Performed by: FAMILY MEDICINE

## 2020-12-18 PROCEDURE — 3008F BODY MASS INDEX DOCD: CPT | Mod: CPTII,S$GLB,, | Performed by: FAMILY MEDICINE

## 2020-12-18 PROCEDURE — 1159F MED LIST DOCD IN RCRD: CPT | Mod: S$GLB,,, | Performed by: FAMILY MEDICINE

## 2020-12-18 PROCEDURE — 1101F PT FALLS ASSESS-DOCD LE1/YR: CPT | Mod: CPTII,S$GLB,, | Performed by: FAMILY MEDICINE

## 2020-12-18 PROCEDURE — 99214 PR OFFICE/OUTPT VISIT, EST, LEVL IV, 30-39 MIN: ICD-10-PCS | Mod: S$GLB,,, | Performed by: FAMILY MEDICINE

## 2020-12-18 PROCEDURE — 99499 RISK ADDL DX/OHS AUDIT: ICD-10-PCS | Mod: S$GLB,,, | Performed by: FAMILY MEDICINE

## 2020-12-18 PROCEDURE — 3079F DIAST BP 80-89 MM HG: CPT | Mod: CPTII,S$GLB,, | Performed by: FAMILY MEDICINE

## 2020-12-18 PROCEDURE — 1101F PR PT FALLS ASSESS DOC 0-1 FALLS W/OUT INJ PAST YR: ICD-10-PCS | Mod: CPTII,S$GLB,, | Performed by: FAMILY MEDICINE

## 2020-12-18 PROCEDURE — 3288F PR FALLS RISK ASSESSMENT DOCUMENTED: ICD-10-PCS | Mod: CPTII,S$GLB,, | Performed by: FAMILY MEDICINE

## 2020-12-18 PROCEDURE — 3075F SYST BP GE 130 - 139MM HG: CPT | Mod: CPTII,S$GLB,, | Performed by: FAMILY MEDICINE

## 2020-12-18 PROCEDURE — 1157F ADVNC CARE PLAN IN RCRD: CPT | Mod: S$GLB,,, | Performed by: FAMILY MEDICINE

## 2020-12-18 PROCEDURE — 3008F PR BODY MASS INDEX (BMI) DOCUMENTED: ICD-10-PCS | Mod: CPTII,S$GLB,, | Performed by: FAMILY MEDICINE

## 2020-12-18 PROCEDURE — 1126F AMNT PAIN NOTED NONE PRSNT: CPT | Mod: S$GLB,,, | Performed by: FAMILY MEDICINE

## 2020-12-18 PROCEDURE — 3075F PR MOST RECENT SYSTOLIC BLOOD PRESS GE 130-139MM HG: ICD-10-PCS | Mod: CPTII,S$GLB,, | Performed by: FAMILY MEDICINE

## 2020-12-18 PROCEDURE — 99999 PR PBB SHADOW E&M-EST. PATIENT-LVL IV: CPT | Mod: PBBFAC,,, | Performed by: FAMILY MEDICINE

## 2020-12-18 PROCEDURE — 3079F PR MOST RECENT DIASTOLIC BLOOD PRESSURE 80-89 MM HG: ICD-10-PCS | Mod: CPTII,S$GLB,, | Performed by: FAMILY MEDICINE

## 2020-12-18 PROCEDURE — 99499 UNLISTED E&M SERVICE: CPT | Mod: S$GLB,,, | Performed by: FAMILY MEDICINE

## 2020-12-18 PROCEDURE — 99214 OFFICE O/P EST MOD 30 MIN: CPT | Mod: S$GLB,,, | Performed by: FAMILY MEDICINE

## 2020-12-18 RX ORDER — HYDROCHLOROTHIAZIDE 25 MG/1
TABLET ORAL
Qty: 90 TABLET | Refills: 1 | Status: SHIPPED | OUTPATIENT
Start: 2020-12-18 | End: 2021-02-22 | Stop reason: SDUPTHER

## 2020-12-18 RX ORDER — HYDROCODONE BITARTRATE AND ACETAMINOPHEN 5; 325 MG/1; MG/1
TABLET ORAL
COMMUNITY
Start: 2020-11-24

## 2020-12-18 RX ORDER — AMLODIPINE BESYLATE 10 MG/1
10 TABLET ORAL DAILY
Qty: 90 TABLET | Refills: 1 | Status: SHIPPED | OUTPATIENT
Start: 2020-12-18 | End: 2021-02-22 | Stop reason: SDUPTHER

## 2020-12-18 NOTE — PROGRESS NOTES
Health Maintenance Due   Topic     Low Dose Statin  Consult with pcp     Mammogram  Pt will call when ready     Eye Exam  Pt will call when ready

## 2020-12-18 NOTE — PROGRESS NOTES
Chief Complaint   Patient presents with    Hypertension     elevated bp - medication management        Gwendolyn Fournier is a 65 y.o. female who presents per the Chief Complaint.  Pt is known to me and was last seen by me on 11/2/2020.  All known chronic medical issues have been documented.    Hypertension  This is a chronic problem. The current episode started more than 1 year ago. The problem has been gradually improving since onset. The problem is controlled. Associated symptoms include anxiety and headaches. Pertinent negatives include no blurred vision, chest pain, neck pain, palpitations, peripheral edema, shortness of breath or sweats. Agents associated with hypertension include NSAIDs. Risk factors for coronary artery disease include obesity and post-menopausal state. Past treatments include ACE inhibitors. The current treatment provides moderate improvement. Compliance problems include exercise and diet.  There is no history of angina, kidney disease, CAD/MI, CVA, heart failure, left ventricular hypertrophy, PVD or retinopathy. There is no history of chronic renal disease, coarctation of the aorta, hyperaldosteronism, hypercortisolism, hyperparathyroidism, a hypertension causing med, pheochromocytoma, renovascular disease, sleep apnea or a thyroid problem.   Diabetes  She presents for her follow-up diabetic visit. She has type 2 diabetes mellitus. Her disease course has been stable. Hypoglycemia symptoms include dizziness (resolved; related to hypotension) and headaches. Pertinent negatives for hypoglycemia include no confusion, hunger, mood changes, nervousness/anxiousness, pallor, seizures, sleepiness, speech difficulty, sweats or tremors. There are no diabetic associated symptoms. Pertinent negatives for diabetes include no blurred vision, no chest pain, no fatigue and no weakness. There are no hypoglycemic complications. There are no diabetic complications. Pertinent negatives for diabetic complications  "include no CVA, PVD or retinopathy. Risk factors for coronary artery disease include hypertension, post-menopausal, sedentary lifestyle and diabetes mellitus.       ROS  Review of Systems   Constitutional: Negative.  Negative for activity change, appetite change, chills, diaphoresis, fatigue, fever and unexpected weight change.   HENT: Negative.  Negative for congestion, ear pain, hearing loss, nosebleeds, postnasal drip, rhinorrhea, sinus pressure, sneezing, sore throat and trouble swallowing.    Eyes: Negative for blurred vision, pain and visual disturbance.   Respiratory: Negative for cough, choking and shortness of breath.    Cardiovascular: Negative for chest pain, palpitations and leg swelling.   Gastrointestinal: Negative for abdominal pain, constipation, diarrhea, nausea and vomiting.   Genitourinary: Negative for difficulty urinating, dysuria, frequency, pelvic pain and urgency.   Musculoskeletal: Positive for back pain and gait problem. Negative for arthralgias, joint swelling, myalgias and neck pain.   Skin: Negative.  Negative for pallor.   Allergic/Immunologic: Negative for environmental allergies and food allergies.   Neurological: Positive for dizziness (resolved; related to hypotension) and headaches. Negative for tremors, seizures, syncope, facial asymmetry, speech difficulty, weakness, light-headedness and numbness.   Psychiatric/Behavioral: Negative.  Negative for confusion, decreased concentration, dysphoric mood and sleep disturbance. The patient is not nervous/anxious.      Physical Exam  Vitals:    12/18/20 0929   BP: 138/86   Resp: 16   Temp: 98.3 °F (36.8 °C)    Body mass index is 40.05 kg/m².  Weight: 116 kg (255 lb 11.7 oz)   Height: 5' 7" (170.2 cm)     Physical Exam  Constitutional:       General: She is not in acute distress.     Appearance: Normal appearance. She is well-developed. She is not ill-appearing, toxic-appearing or diaphoretic.   HENT:      Head: Normocephalic and " atraumatic.      Right Ear: Hearing and external ear normal. No decreased hearing noted.      Left Ear: Hearing and external ear normal. No decreased hearing noted.      Nose: Nose normal. No nasal deformity or rhinorrhea.      Mouth/Throat:      Dentition: Normal dentition. Does not have dentures.      Pharynx: Uvula midline.   Eyes:      General: Lids are normal. No scleral icterus.        Right eye: No foreign body or discharge.         Left eye: No foreign body or discharge.      Conjunctiva/sclera: Conjunctivae normal.      Right eye: No chemosis or exudate.     Left eye: No chemosis or exudate.     Pupils: Pupils are equal, round, and reactive to light.   Neck:      Musculoskeletal: Full passive range of motion without pain, normal range of motion and neck supple.   Cardiovascular:      Rate and Rhythm: Normal rate and regular rhythm.      Heart sounds: Normal heart sounds, S1 normal and S2 normal. No murmur. No friction rub. No gallop.    Pulmonary:      Effort: Pulmonary effort is normal. No accessory muscle usage or respiratory distress.      Breath sounds: Normal breath sounds. No decreased breath sounds, wheezing, rhonchi or rales.   Abdominal:      General: There is no distension.      Palpations: Abdomen is soft. Abdomen is not rigid.      Tenderness: There is no abdominal tenderness. There is no guarding or rebound.   Musculoskeletal: Normal range of motion.      Lumbar back: She exhibits tenderness and pain. She exhibits normal range of motion, no bony tenderness, no swelling, no edema, no deformity, no laceration, no spasm and normal pulse.   Skin:     General: Skin is warm and dry.      Findings: No rash.   Neurological:      Mental Status: She is alert and oriented to person, place, and time.      Cranial Nerves: No cranial nerve deficit.      Sensory: No sensory deficit.      Motor: No abnormal muscle tone or seizure activity.      Coordination: Coordination normal.      Gait: Gait normal.    Psychiatric:         Attention and Perception: She is attentive.         Speech: Speech normal.         Behavior: Behavior normal. Behavior is cooperative.         Thought Content: Thought content normal.         Judgment: Judgment normal.     Assessment & Plan    Discussion of plan of care including treatment options regarding health and wellness were reviewed and discussed with patient.  Any changes to medication or treatment plan, as well as any screening blood test, imaging, or referrals to specialist, are documented.  Follow up as indicated.     1. Essential hypertension  Patient was counseled and encouraged to maintain a low sodium diet, as well as increasing physical activity.  Recommend random BP checks at home on a regular basis.  Repeat BP at end of visit was not necessary. Will continue medication at this time, and follow up in 3-6 months, or sooner if blood pressure begins to increase.     - hydroCHLOROthiazide (HYDRODIURIL) 25 MG tablet; TAKE 1 TABLET(25 MG) BY MOUTH EVERY DAY  Dispense: 90 tablet; Refill: 1  - amLODIPine (NORVASC) 10 MG tablet; Take 1 tablet (10 mg total) by mouth once daily.  Dispense: 90 tablet; Refill: 1  - Basic Metabolic Panel; Future    2. Sacroiliitis, not elsewhere classified  Managed by Pain Specialist.  Received epidural injection with some relief.       Follow up in about 3 months (around 3/18/2021) for Chronic Disease Management.      ACTIVE MEDICAL ISSUES:  Documented in Problem List    PAST MEDICAL HISTORY  Documented    PAST SURGICAL HISTORY:  Documented    SOCIAL HISTORY:  Documented    FAMILY HISTORY:  Documented    ALLERGIES AND MEDICATIONS: updated and reviewed.  Documented    Health Maintenance       Date Due Completion Date    TETANUS VACCINE 01/24/1972 ---    Low Dose Statin 01/24/1975 ---    Mammogram 04/30/2015 4/30/2013    Eye Exam 12/09/2020 12/9/2019    Hemoglobin A1c 04/22/2021 10/22/2020    Foot Exam 07/30/2021 7/30/2020    Lipid Panel 10/22/2021  10/22/2020    Override on 9/15/2015: Done (future)    DEXA SCAN 02/20/2022 2/20/2019    Colorectal Cancer Screening 01/21/2029 1/21/2019

## 2021-01-04 ENCOUNTER — PATIENT MESSAGE (OUTPATIENT)
Dept: ADMINISTRATIVE | Facility: HOSPITAL | Age: 66
End: 2021-01-04

## 2021-01-11 ENCOUNTER — PATIENT OUTREACH (OUTPATIENT)
Dept: ADMINISTRATIVE | Facility: HOSPITAL | Age: 66
End: 2021-01-11

## 2021-01-22 ENCOUNTER — LAB VISIT (OUTPATIENT)
Dept: LAB | Facility: HOSPITAL | Age: 66
End: 2021-01-22
Attending: FAMILY MEDICINE
Payer: MEDICARE

## 2021-01-22 DIAGNOSIS — E11.22 TYPE 2 DIABETES MELLITUS WITH STAGE 3B CHRONIC KIDNEY DISEASE, WITHOUT LONG-TERM CURRENT USE OF INSULIN: ICD-10-CM

## 2021-01-22 DIAGNOSIS — N18.32 TYPE 2 DIABETES MELLITUS WITH STAGE 3B CHRONIC KIDNEY DISEASE, WITHOUT LONG-TERM CURRENT USE OF INSULIN: ICD-10-CM

## 2021-01-22 LAB
ESTIMATED AVG GLUCOSE: 120 MG/DL (ref 68–131)
HBA1C MFR BLD HPLC: 5.8 % (ref 4–5.6)

## 2021-01-22 PROCEDURE — 36415 COLL VENOUS BLD VENIPUNCTURE: CPT | Mod: PO

## 2021-01-22 PROCEDURE — 83036 HEMOGLOBIN GLYCOSYLATED A1C: CPT

## 2021-01-25 ENCOUNTER — OFFICE VISIT (OUTPATIENT)
Dept: FAMILY MEDICINE | Facility: CLINIC | Age: 66
End: 2021-01-25
Payer: MEDICARE

## 2021-01-25 VITALS
TEMPERATURE: 98 F | SYSTOLIC BLOOD PRESSURE: 128 MMHG | WEIGHT: 261 LBS | HEIGHT: 67 IN | OXYGEN SATURATION: 96 % | BODY MASS INDEX: 40.97 KG/M2 | RESPIRATION RATE: 16 BRPM | HEART RATE: 97 BPM | DIASTOLIC BLOOD PRESSURE: 80 MMHG

## 2021-01-25 DIAGNOSIS — M1A.09X0 IDIOPATHIC CHRONIC GOUT OF MULTIPLE SITES WITHOUT TOPHUS: ICD-10-CM

## 2021-01-25 DIAGNOSIS — I10 ESSENTIAL HYPERTENSION: ICD-10-CM

## 2021-01-25 DIAGNOSIS — N18.32 TYPE 2 DIABETES MELLITUS WITH STAGE 3B CHRONIC KIDNEY DISEASE, WITHOUT LONG-TERM CURRENT USE OF INSULIN: Primary | ICD-10-CM

## 2021-01-25 DIAGNOSIS — D57.3 SICKLE CELL TRAIT: ICD-10-CM

## 2021-01-25 DIAGNOSIS — E11.22 TYPE 2 DIABETES MELLITUS WITH STAGE 3B CHRONIC KIDNEY DISEASE, WITHOUT LONG-TERM CURRENT USE OF INSULIN: Primary | ICD-10-CM

## 2021-01-25 DIAGNOSIS — I10 TACHYCARDIA WITH HYPERTENSION: ICD-10-CM

## 2021-01-25 DIAGNOSIS — R00.0 TACHYCARDIA WITH HYPERTENSION: ICD-10-CM

## 2021-01-25 DIAGNOSIS — M46.1 SACROILIITIS, NOT ELSEWHERE CLASSIFIED: ICD-10-CM

## 2021-01-25 DIAGNOSIS — E66.01 SEVERE OBESITY (BMI 35.0-39.9) WITH COMORBIDITY: ICD-10-CM

## 2021-01-25 PROCEDURE — 99214 OFFICE O/P EST MOD 30 MIN: CPT | Mod: S$GLB,,, | Performed by: FAMILY MEDICINE

## 2021-01-25 PROCEDURE — 3008F PR BODY MASS INDEX (BMI) DOCUMENTED: ICD-10-PCS | Mod: CPTII,S$GLB,, | Performed by: FAMILY MEDICINE

## 2021-01-25 PROCEDURE — 1157F ADVNC CARE PLAN IN RCRD: CPT | Mod: S$GLB,,, | Performed by: FAMILY MEDICINE

## 2021-01-25 PROCEDURE — 3079F PR MOST RECENT DIASTOLIC BLOOD PRESSURE 80-89 MM HG: ICD-10-PCS | Mod: CPTII,S$GLB,, | Performed by: FAMILY MEDICINE

## 2021-01-25 PROCEDURE — 3008F BODY MASS INDEX DOCD: CPT | Mod: CPTII,S$GLB,, | Performed by: FAMILY MEDICINE

## 2021-01-25 PROCEDURE — 99214 PR OFFICE/OUTPT VISIT, EST, LEVL IV, 30-39 MIN: ICD-10-PCS | Mod: S$GLB,,, | Performed by: FAMILY MEDICINE

## 2021-01-25 PROCEDURE — 1157F PR ADVANCE CARE PLAN OR EQUIV PRESENT IN MEDICAL RECORD: ICD-10-PCS | Mod: S$GLB,,, | Performed by: FAMILY MEDICINE

## 2021-01-25 PROCEDURE — 1101F PR PT FALLS ASSESS DOC 0-1 FALLS W/OUT INJ PAST YR: ICD-10-PCS | Mod: CPTII,S$GLB,, | Performed by: FAMILY MEDICINE

## 2021-01-25 PROCEDURE — 1125F AMNT PAIN NOTED PAIN PRSNT: CPT | Mod: S$GLB,,, | Performed by: FAMILY MEDICINE

## 2021-01-25 PROCEDURE — 3044F PR MOST RECENT HEMOGLOBIN A1C LEVEL <7.0%: ICD-10-PCS | Mod: CPTII,S$GLB,, | Performed by: FAMILY MEDICINE

## 2021-01-25 PROCEDURE — 1159F PR MEDICATION LIST DOCUMENTED IN MEDICAL RECORD: ICD-10-PCS | Mod: S$GLB,,, | Performed by: FAMILY MEDICINE

## 2021-01-25 PROCEDURE — 3074F SYST BP LT 130 MM HG: CPT | Mod: CPTII,S$GLB,, | Performed by: FAMILY MEDICINE

## 2021-01-25 PROCEDURE — 3079F DIAST BP 80-89 MM HG: CPT | Mod: CPTII,S$GLB,, | Performed by: FAMILY MEDICINE

## 2021-01-25 PROCEDURE — 1125F PR PAIN SEVERITY QUANTIFIED, PAIN PRESENT: ICD-10-PCS | Mod: S$GLB,,, | Performed by: FAMILY MEDICINE

## 2021-01-25 PROCEDURE — 1159F MED LIST DOCD IN RCRD: CPT | Mod: S$GLB,,, | Performed by: FAMILY MEDICINE

## 2021-01-25 PROCEDURE — 3288F FALL RISK ASSESSMENT DOCD: CPT | Mod: CPTII,S$GLB,, | Performed by: FAMILY MEDICINE

## 2021-01-25 PROCEDURE — 3288F PR FALLS RISK ASSESSMENT DOCUMENTED: ICD-10-PCS | Mod: CPTII,S$GLB,, | Performed by: FAMILY MEDICINE

## 2021-01-25 PROCEDURE — 99499 RISK ADDL DX/OHS AUDIT: ICD-10-PCS | Mod: S$GLB,,, | Performed by: FAMILY MEDICINE

## 2021-01-25 PROCEDURE — 3074F PR MOST RECENT SYSTOLIC BLOOD PRESSURE < 130 MM HG: ICD-10-PCS | Mod: CPTII,S$GLB,, | Performed by: FAMILY MEDICINE

## 2021-01-25 PROCEDURE — 3044F HG A1C LEVEL LT 7.0%: CPT | Mod: CPTII,S$GLB,, | Performed by: FAMILY MEDICINE

## 2021-01-25 PROCEDURE — 99999 PR PBB SHADOW E&M-EST. PATIENT-LVL V: CPT | Mod: PBBFAC,,, | Performed by: FAMILY MEDICINE

## 2021-01-25 PROCEDURE — 99499 UNLISTED E&M SERVICE: CPT | Mod: S$GLB,,, | Performed by: FAMILY MEDICINE

## 2021-01-25 PROCEDURE — 99999 PR PBB SHADOW E&M-EST. PATIENT-LVL V: ICD-10-PCS | Mod: PBBFAC,,, | Performed by: FAMILY MEDICINE

## 2021-01-25 PROCEDURE — 1101F PT FALLS ASSESS-DOCD LE1/YR: CPT | Mod: CPTII,S$GLB,, | Performed by: FAMILY MEDICINE

## 2021-01-25 RX ORDER — METOPROLOL SUCCINATE 50 MG/1
50 TABLET, EXTENDED RELEASE ORAL 2 TIMES DAILY
Qty: 180 TABLET | Refills: 1 | Status: SHIPPED | OUTPATIENT
Start: 2021-01-25 | End: 2021-02-22 | Stop reason: SDUPTHER

## 2021-01-25 RX ORDER — DULOXETIN HYDROCHLORIDE 60 MG/1
CAPSULE, DELAYED RELEASE ORAL
Qty: 90 CAPSULE | Refills: 0 | Status: SHIPPED | OUTPATIENT
Start: 2021-01-25 | End: 2021-02-22 | Stop reason: SDUPTHER

## 2021-01-25 RX ORDER — INDOMETHACIN 50 MG/1
50 CAPSULE ORAL 3 TIMES DAILY PRN
Qty: 30 CAPSULE | Refills: 2 | Status: SHIPPED | OUTPATIENT
Start: 2021-01-25 | End: 2021-03-17

## 2021-02-09 ENCOUNTER — PATIENT OUTREACH (OUTPATIENT)
Dept: ADMINISTRATIVE | Facility: OTHER | Age: 66
End: 2021-02-09

## 2021-02-22 ENCOUNTER — TELEPHONE (OUTPATIENT)
Dept: FAMILY MEDICINE | Facility: CLINIC | Age: 66
End: 2021-02-22

## 2021-02-22 DIAGNOSIS — E11.22 TYPE 2 DIABETES MELLITUS WITH STAGE 3 CHRONIC KIDNEY DISEASE, WITHOUT LONG-TERM CURRENT USE OF INSULIN: ICD-10-CM

## 2021-02-22 DIAGNOSIS — R00.0 TACHYCARDIA WITH HYPERTENSION: ICD-10-CM

## 2021-02-22 DIAGNOSIS — I10 ESSENTIAL HYPERTENSION: ICD-10-CM

## 2021-02-22 DIAGNOSIS — N18.30 TYPE 2 DIABETES MELLITUS WITH STAGE 3 CHRONIC KIDNEY DISEASE, WITHOUT LONG-TERM CURRENT USE OF INSULIN: ICD-10-CM

## 2021-02-22 DIAGNOSIS — M54.32 SCIATICA OF LEFT SIDE: ICD-10-CM

## 2021-02-22 DIAGNOSIS — M46.1 SACROILIITIS, NOT ELSEWHERE CLASSIFIED: ICD-10-CM

## 2021-02-22 DIAGNOSIS — I10 TACHYCARDIA WITH HYPERTENSION: ICD-10-CM

## 2021-02-22 DIAGNOSIS — F51.04 PSYCHOPHYSIOLOGICAL INSOMNIA: ICD-10-CM

## 2021-02-22 RX ORDER — METOPROLOL SUCCINATE 50 MG/1
50 TABLET, EXTENDED RELEASE ORAL 2 TIMES DAILY
Qty: 180 TABLET | Refills: 1 | Status: SHIPPED | OUTPATIENT
Start: 2021-02-22 | End: 2021-03-19 | Stop reason: SDUPTHER

## 2021-02-22 RX ORDER — HYDROCHLOROTHIAZIDE 25 MG/1
TABLET ORAL
Qty: 90 TABLET | Refills: 1 | Status: SHIPPED | OUTPATIENT
Start: 2021-02-22 | End: 2021-03-19 | Stop reason: SDUPTHER

## 2021-02-22 RX ORDER — GABAPENTIN 300 MG/1
300 CAPSULE ORAL 3 TIMES DAILY
Qty: 270 CAPSULE | Refills: 1 | Status: SHIPPED | OUTPATIENT
Start: 2021-02-22 | End: 2021-03-19 | Stop reason: SDUPTHER

## 2021-02-22 RX ORDER — DULOXETIN HYDROCHLORIDE 60 MG/1
CAPSULE, DELAYED RELEASE ORAL
Qty: 90 CAPSULE | Refills: 1 | Status: SHIPPED | OUTPATIENT
Start: 2021-02-22 | End: 2021-03-19 | Stop reason: SDUPTHER

## 2021-02-22 RX ORDER — AMLODIPINE BESYLATE 10 MG/1
10 TABLET ORAL DAILY
Qty: 90 TABLET | Refills: 1 | Status: SHIPPED | OUTPATIENT
Start: 2021-02-22 | End: 2021-03-19 | Stop reason: SDUPTHER

## 2021-02-22 RX ORDER — QUETIAPINE FUMARATE 100 MG/1
TABLET, FILM COATED ORAL
Qty: 90 TABLET | Refills: 1 | Status: SHIPPED | OUTPATIENT
Start: 2021-02-22 | End: 2021-03-19 | Stop reason: SDUPTHER

## 2021-02-22 RX ORDER — GLIPIZIDE 10 MG/1
10 TABLET, FILM COATED, EXTENDED RELEASE ORAL
Qty: 90 TABLET | Refills: 1 | Status: SHIPPED | OUTPATIENT
Start: 2021-02-22 | End: 2021-03-19 | Stop reason: SDUPTHER

## 2021-03-05 ENCOUNTER — PATIENT OUTREACH (OUTPATIENT)
Dept: ADMINISTRATIVE | Facility: HOSPITAL | Age: 66
End: 2021-03-05

## 2021-03-17 ENCOUNTER — TELEPHONE (OUTPATIENT)
Dept: FAMILY MEDICINE | Facility: CLINIC | Age: 66
End: 2021-03-17

## 2021-03-19 ENCOUNTER — OFFICE VISIT (OUTPATIENT)
Dept: FAMILY MEDICINE | Facility: CLINIC | Age: 66
End: 2021-03-19
Payer: MEDICARE

## 2021-03-19 VITALS
BODY MASS INDEX: 38.65 KG/M2 | SYSTOLIC BLOOD PRESSURE: 124 MMHG | TEMPERATURE: 99 F | HEART RATE: 96 BPM | HEIGHT: 67 IN | DIASTOLIC BLOOD PRESSURE: 78 MMHG | RESPIRATION RATE: 20 BRPM | OXYGEN SATURATION: 96 % | WEIGHT: 246.25 LBS

## 2021-03-19 DIAGNOSIS — F33.1 MAJOR DEPRESSIVE DISORDER, RECURRENT, MODERATE: ICD-10-CM

## 2021-03-19 DIAGNOSIS — R06.02 SHORTNESS OF BREATH: ICD-10-CM

## 2021-03-19 DIAGNOSIS — F51.04 PSYCHOPHYSIOLOGICAL INSOMNIA: ICD-10-CM

## 2021-03-19 DIAGNOSIS — R00.0 TACHYCARDIA WITH HYPERTENSION: ICD-10-CM

## 2021-03-19 DIAGNOSIS — M46.1 SACROILIITIS, NOT ELSEWHERE CLASSIFIED: ICD-10-CM

## 2021-03-19 DIAGNOSIS — E11.22 TYPE 2 DIABETES MELLITUS WITH STAGE 3B CHRONIC KIDNEY DISEASE, WITHOUT LONG-TERM CURRENT USE OF INSULIN: ICD-10-CM

## 2021-03-19 DIAGNOSIS — N18.32 TYPE 2 DIABETES MELLITUS WITH STAGE 3B CHRONIC KIDNEY DISEASE, WITHOUT LONG-TERM CURRENT USE OF INSULIN: ICD-10-CM

## 2021-03-19 DIAGNOSIS — I10 ESSENTIAL HYPERTENSION: Primary | ICD-10-CM

## 2021-03-19 DIAGNOSIS — M1A.0790 CHRONIC GOUT OF FOOT, UNSPECIFIED CAUSE, UNSPECIFIED LATERALITY: ICD-10-CM

## 2021-03-19 DIAGNOSIS — M54.32 SCIATICA OF LEFT SIDE: ICD-10-CM

## 2021-03-19 DIAGNOSIS — I10 TACHYCARDIA WITH HYPERTENSION: ICD-10-CM

## 2021-03-19 PROCEDURE — 1159F MED LIST DOCD IN RCRD: CPT | Mod: S$GLB,,, | Performed by: FAMILY MEDICINE

## 2021-03-19 PROCEDURE — 3008F BODY MASS INDEX DOCD: CPT | Mod: CPTII,S$GLB,, | Performed by: FAMILY MEDICINE

## 2021-03-19 PROCEDURE — 1157F PR ADVANCE CARE PLAN OR EQUIV PRESENT IN MEDICAL RECORD: ICD-10-PCS | Mod: S$GLB,,, | Performed by: FAMILY MEDICINE

## 2021-03-19 PROCEDURE — 3044F PR MOST RECENT HEMOGLOBIN A1C LEVEL <7.0%: ICD-10-PCS | Mod: CPTII,S$GLB,, | Performed by: FAMILY MEDICINE

## 2021-03-19 PROCEDURE — 99214 OFFICE O/P EST MOD 30 MIN: CPT | Mod: S$GLB,,, | Performed by: FAMILY MEDICINE

## 2021-03-19 PROCEDURE — 99499 RISK ADDL DX/OHS AUDIT: ICD-10-PCS | Mod: S$GLB,,, | Performed by: FAMILY MEDICINE

## 2021-03-19 PROCEDURE — 3074F PR MOST RECENT SYSTOLIC BLOOD PRESSURE < 130 MM HG: ICD-10-PCS | Mod: CPTII,S$GLB,, | Performed by: FAMILY MEDICINE

## 2021-03-19 PROCEDURE — 99214 PR OFFICE/OUTPT VISIT, EST, LEVL IV, 30-39 MIN: ICD-10-PCS | Mod: S$GLB,,, | Performed by: FAMILY MEDICINE

## 2021-03-19 PROCEDURE — 3078F DIAST BP <80 MM HG: CPT | Mod: CPTII,S$GLB,, | Performed by: FAMILY MEDICINE

## 2021-03-19 PROCEDURE — 3044F HG A1C LEVEL LT 7.0%: CPT | Mod: CPTII,S$GLB,, | Performed by: FAMILY MEDICINE

## 2021-03-19 PROCEDURE — 3288F FALL RISK ASSESSMENT DOCD: CPT | Mod: CPTII,S$GLB,, | Performed by: FAMILY MEDICINE

## 2021-03-19 PROCEDURE — 99999 PR PBB SHADOW E&M-EST. PATIENT-LVL IV: ICD-10-PCS | Mod: PBBFAC,,, | Performed by: FAMILY MEDICINE

## 2021-03-19 PROCEDURE — 3008F PR BODY MASS INDEX (BMI) DOCUMENTED: ICD-10-PCS | Mod: CPTII,S$GLB,, | Performed by: FAMILY MEDICINE

## 2021-03-19 PROCEDURE — 99999 PR PBB SHADOW E&M-EST. PATIENT-LVL IV: CPT | Mod: PBBFAC,,, | Performed by: FAMILY MEDICINE

## 2021-03-19 PROCEDURE — 3288F PR FALLS RISK ASSESSMENT DOCUMENTED: ICD-10-PCS | Mod: CPTII,S$GLB,, | Performed by: FAMILY MEDICINE

## 2021-03-19 PROCEDURE — 3078F PR MOST RECENT DIASTOLIC BLOOD PRESSURE < 80 MM HG: ICD-10-PCS | Mod: CPTII,S$GLB,, | Performed by: FAMILY MEDICINE

## 2021-03-19 PROCEDURE — 1101F PT FALLS ASSESS-DOCD LE1/YR: CPT | Mod: CPTII,S$GLB,, | Performed by: FAMILY MEDICINE

## 2021-03-19 PROCEDURE — 99499 UNLISTED E&M SERVICE: CPT | Mod: S$GLB,,, | Performed by: FAMILY MEDICINE

## 2021-03-19 PROCEDURE — 1126F PR PAIN SEVERITY QUANTIFIED, NO PAIN PRESENT: ICD-10-PCS | Mod: S$GLB,,, | Performed by: FAMILY MEDICINE

## 2021-03-19 PROCEDURE — 1159F PR MEDICATION LIST DOCUMENTED IN MEDICAL RECORD: ICD-10-PCS | Mod: S$GLB,,, | Performed by: FAMILY MEDICINE

## 2021-03-19 PROCEDURE — 1157F ADVNC CARE PLAN IN RCRD: CPT | Mod: S$GLB,,, | Performed by: FAMILY MEDICINE

## 2021-03-19 PROCEDURE — 1101F PR PT FALLS ASSESS DOC 0-1 FALLS W/OUT INJ PAST YR: ICD-10-PCS | Mod: CPTII,S$GLB,, | Performed by: FAMILY MEDICINE

## 2021-03-19 PROCEDURE — 3074F SYST BP LT 130 MM HG: CPT | Mod: CPTII,S$GLB,, | Performed by: FAMILY MEDICINE

## 2021-03-19 PROCEDURE — 1126F AMNT PAIN NOTED NONE PRSNT: CPT | Mod: S$GLB,,, | Performed by: FAMILY MEDICINE

## 2021-03-19 RX ORDER — GABAPENTIN 300 MG/1
300 CAPSULE ORAL 3 TIMES DAILY
Qty: 270 CAPSULE | Refills: 1 | Status: SHIPPED | OUTPATIENT
Start: 2021-03-19 | End: 2021-12-29 | Stop reason: SDUPTHER

## 2021-03-19 RX ORDER — ALLOPURINOL 100 MG/1
100 TABLET ORAL 2 TIMES DAILY
Qty: 180 TABLET | Refills: 1 | Status: SHIPPED | OUTPATIENT
Start: 2021-03-19 | End: 2021-09-20

## 2021-03-19 RX ORDER — QUETIAPINE FUMARATE 100 MG/1
TABLET, FILM COATED ORAL
Qty: 90 TABLET | Refills: 1 | Status: SHIPPED | OUTPATIENT
Start: 2021-03-19 | End: 2021-05-14 | Stop reason: SDUPTHER

## 2021-03-19 RX ORDER — DULOXETIN HYDROCHLORIDE 60 MG/1
CAPSULE, DELAYED RELEASE ORAL
Qty: 90 CAPSULE | Refills: 1 | Status: SHIPPED | OUTPATIENT
Start: 2021-03-19 | End: 2021-05-21 | Stop reason: SDUPTHER

## 2021-03-19 RX ORDER — GLIPIZIDE 10 MG/1
10 TABLET, FILM COATED, EXTENDED RELEASE ORAL
Qty: 90 TABLET | Refills: 1 | Status: SHIPPED | OUTPATIENT
Start: 2021-03-19 | End: 2021-09-09 | Stop reason: SDUPTHER

## 2021-03-19 RX ORDER — HYDROCHLOROTHIAZIDE 25 MG/1
TABLET ORAL
Qty: 90 TABLET | Refills: 1 | Status: SHIPPED | OUTPATIENT
Start: 2021-03-19 | End: 2021-09-09 | Stop reason: SDUPTHER

## 2021-03-19 RX ORDER — METOPROLOL SUCCINATE 50 MG/1
50 TABLET, EXTENDED RELEASE ORAL 2 TIMES DAILY
Qty: 180 TABLET | Refills: 1 | Status: SHIPPED | OUTPATIENT
Start: 2021-03-19 | End: 2023-10-09

## 2021-03-19 RX ORDER — AMLODIPINE BESYLATE 10 MG/1
10 TABLET ORAL DAILY
Qty: 90 TABLET | Refills: 1 | Status: SHIPPED | OUTPATIENT
Start: 2021-03-19 | End: 2021-12-29 | Stop reason: SDUPTHER

## 2021-03-22 ENCOUNTER — TELEPHONE (OUTPATIENT)
Dept: FAMILY MEDICINE | Facility: CLINIC | Age: 66
End: 2021-03-22

## 2021-03-22 RX ORDER — ORAL SEMAGLUTIDE 3 MG/1
3 TABLET ORAL DAILY
Qty: 90 TABLET | Refills: 0 | Status: SHIPPED | OUTPATIENT
Start: 2021-03-22 | End: 2021-06-27

## 2021-03-25 ENCOUNTER — TELEPHONE (OUTPATIENT)
Dept: FAMILY MEDICINE | Facility: CLINIC | Age: 66
End: 2021-03-25

## 2021-03-29 ENCOUNTER — TELEPHONE (OUTPATIENT)
Dept: FAMILY MEDICINE | Facility: CLINIC | Age: 66
End: 2021-03-29

## 2021-03-31 ENCOUNTER — TELEPHONE (OUTPATIENT)
Dept: FAMILY MEDICINE | Facility: CLINIC | Age: 66
End: 2021-03-31

## 2021-04-01 ENCOUNTER — TELEPHONE (OUTPATIENT)
Dept: FAMILY MEDICINE | Facility: CLINIC | Age: 66
End: 2021-04-01

## 2021-04-05 ENCOUNTER — PATIENT MESSAGE (OUTPATIENT)
Dept: ADMINISTRATIVE | Facility: HOSPITAL | Age: 66
End: 2021-04-05

## 2021-04-06 ENCOUNTER — TELEPHONE (OUTPATIENT)
Dept: FAMILY MEDICINE | Facility: CLINIC | Age: 66
End: 2021-04-06

## 2021-04-14 ENCOUNTER — PATIENT OUTREACH (OUTPATIENT)
Dept: ADMINISTRATIVE | Facility: HOSPITAL | Age: 66
End: 2021-04-14

## 2021-04-28 ENCOUNTER — OFFICE VISIT (OUTPATIENT)
Dept: FAMILY MEDICINE | Facility: CLINIC | Age: 66
End: 2021-04-28
Payer: MEDICARE

## 2021-04-28 ENCOUNTER — LAB VISIT (OUTPATIENT)
Dept: LAB | Facility: HOSPITAL | Age: 66
End: 2021-04-28
Attending: FAMILY MEDICINE
Payer: MEDICARE

## 2021-04-28 VITALS
OXYGEN SATURATION: 97 % | TEMPERATURE: 98 F | BODY MASS INDEX: 41.52 KG/M2 | RESPIRATION RATE: 20 BRPM | SYSTOLIC BLOOD PRESSURE: 138 MMHG | DIASTOLIC BLOOD PRESSURE: 74 MMHG | HEIGHT: 67 IN | WEIGHT: 264.56 LBS | HEART RATE: 94 BPM

## 2021-04-28 DIAGNOSIS — R06.02 SHORTNESS OF BREATH: ICD-10-CM

## 2021-04-28 DIAGNOSIS — E11.22 TYPE 2 DIABETES MELLITUS WITH STAGE 3B CHRONIC KIDNEY DISEASE, WITHOUT LONG-TERM CURRENT USE OF INSULIN: ICD-10-CM

## 2021-04-28 DIAGNOSIS — D57.3 SICKLE CELL TRAIT: ICD-10-CM

## 2021-04-28 DIAGNOSIS — I10 ESSENTIAL HYPERTENSION: ICD-10-CM

## 2021-04-28 DIAGNOSIS — N18.32 TYPE 2 DIABETES MELLITUS WITH STAGE 3B CHRONIC KIDNEY DISEASE, WITHOUT LONG-TERM CURRENT USE OF INSULIN: ICD-10-CM

## 2021-04-28 DIAGNOSIS — N18.32 TYPE 2 DIABETES MELLITUS WITH STAGE 3B CHRONIC KIDNEY DISEASE, WITHOUT LONG-TERM CURRENT USE OF INSULIN: Primary | ICD-10-CM

## 2021-04-28 DIAGNOSIS — E11.22 TYPE 2 DIABETES MELLITUS WITH STAGE 3B CHRONIC KIDNEY DISEASE, WITHOUT LONG-TERM CURRENT USE OF INSULIN: Primary | ICD-10-CM

## 2021-04-28 PROCEDURE — 99499 RISK ADDL DX/OHS AUDIT: ICD-10-PCS | Mod: S$GLB,,, | Performed by: FAMILY MEDICINE

## 2021-04-28 PROCEDURE — 1126F PR PAIN SEVERITY QUANTIFIED, NO PAIN PRESENT: ICD-10-PCS | Mod: S$GLB,,, | Performed by: FAMILY MEDICINE

## 2021-04-28 PROCEDURE — 1101F PT FALLS ASSESS-DOCD LE1/YR: CPT | Mod: CPTII,S$GLB,, | Performed by: FAMILY MEDICINE

## 2021-04-28 PROCEDURE — 3051F PR MOST RECENT HEMOGLOBIN A1C LEVEL 7.0 - < 8.0%: ICD-10-PCS | Mod: CPTII,S$GLB,, | Performed by: FAMILY MEDICINE

## 2021-04-28 PROCEDURE — 3288F FALL RISK ASSESSMENT DOCD: CPT | Mod: CPTII,S$GLB,, | Performed by: FAMILY MEDICINE

## 2021-04-28 PROCEDURE — 3051F HG A1C>EQUAL 7.0%<8.0%: CPT | Mod: CPTII,S$GLB,, | Performed by: FAMILY MEDICINE

## 2021-04-28 PROCEDURE — 3008F BODY MASS INDEX DOCD: CPT | Mod: CPTII,S$GLB,, | Performed by: FAMILY MEDICINE

## 2021-04-28 PROCEDURE — 99999 PR PBB SHADOW E&M-EST. PATIENT-LVL V: ICD-10-PCS | Mod: PBBFAC,,, | Performed by: FAMILY MEDICINE

## 2021-04-28 PROCEDURE — 3288F PR FALLS RISK ASSESSMENT DOCUMENTED: ICD-10-PCS | Mod: CPTII,S$GLB,, | Performed by: FAMILY MEDICINE

## 2021-04-28 PROCEDURE — 1101F PR PT FALLS ASSESS DOC 0-1 FALLS W/OUT INJ PAST YR: ICD-10-PCS | Mod: CPTII,S$GLB,, | Performed by: FAMILY MEDICINE

## 2021-04-28 PROCEDURE — 1159F PR MEDICATION LIST DOCUMENTED IN MEDICAL RECORD: ICD-10-PCS | Mod: S$GLB,,, | Performed by: FAMILY MEDICINE

## 2021-04-28 PROCEDURE — 3008F PR BODY MASS INDEX (BMI) DOCUMENTED: ICD-10-PCS | Mod: CPTII,S$GLB,, | Performed by: FAMILY MEDICINE

## 2021-04-28 PROCEDURE — 1157F ADVNC CARE PLAN IN RCRD: CPT | Mod: S$GLB,,, | Performed by: FAMILY MEDICINE

## 2021-04-28 PROCEDURE — 99499 UNLISTED E&M SERVICE: CPT | Mod: S$GLB,,, | Performed by: FAMILY MEDICINE

## 2021-04-28 PROCEDURE — 85660 RBC SICKLE CELL TEST: CPT | Performed by: FAMILY MEDICINE

## 2021-04-28 PROCEDURE — 99999 PR PBB SHADOW E&M-EST. PATIENT-LVL V: CPT | Mod: PBBFAC,,, | Performed by: FAMILY MEDICINE

## 2021-04-28 PROCEDURE — 1157F PR ADVANCE CARE PLAN OR EQUIV PRESENT IN MEDICAL RECORD: ICD-10-PCS | Mod: S$GLB,,, | Performed by: FAMILY MEDICINE

## 2021-04-28 PROCEDURE — 83036 HEMOGLOBIN GLYCOSYLATED A1C: CPT | Performed by: FAMILY MEDICINE

## 2021-04-28 PROCEDURE — 80048 BASIC METABOLIC PNL TOTAL CA: CPT | Performed by: FAMILY MEDICINE

## 2021-04-28 PROCEDURE — 99214 PR OFFICE/OUTPT VISIT, EST, LEVL IV, 30-39 MIN: ICD-10-PCS | Mod: S$GLB,,, | Performed by: FAMILY MEDICINE

## 2021-04-28 PROCEDURE — 1159F MED LIST DOCD IN RCRD: CPT | Mod: S$GLB,,, | Performed by: FAMILY MEDICINE

## 2021-04-28 PROCEDURE — 99214 OFFICE O/P EST MOD 30 MIN: CPT | Mod: S$GLB,,, | Performed by: FAMILY MEDICINE

## 2021-04-28 PROCEDURE — 36415 COLL VENOUS BLD VENIPUNCTURE: CPT | Mod: PO | Performed by: FAMILY MEDICINE

## 2021-04-28 PROCEDURE — 1126F AMNT PAIN NOTED NONE PRSNT: CPT | Mod: S$GLB,,, | Performed by: FAMILY MEDICINE

## 2021-04-28 PROCEDURE — 85025 COMPLETE CBC W/AUTO DIFF WBC: CPT | Performed by: FAMILY MEDICINE

## 2021-04-29 LAB
ANION GAP SERPL CALC-SCNC: 9 MMOL/L (ref 8–16)
BASOPHILS # BLD AUTO: 0.04 K/UL (ref 0–0.2)
BASOPHILS NFR BLD: 0.4 % (ref 0–1.9)
BUN SERPL-MCNC: 13 MG/DL (ref 8–23)
CALCIUM SERPL-MCNC: 9.9 MG/DL (ref 8.7–10.5)
CHLORIDE SERPL-SCNC: 107 MMOL/L (ref 95–110)
CO2 SERPL-SCNC: 27 MMOL/L (ref 23–29)
CREAT SERPL-MCNC: 1.5 MG/DL (ref 0.5–1.4)
DIFFERENTIAL METHOD: ABNORMAL
EOSINOPHIL # BLD AUTO: 0.4 K/UL (ref 0–0.5)
EOSINOPHIL NFR BLD: 3.3 % (ref 0–8)
ERYTHROCYTE [DISTWIDTH] IN BLOOD BY AUTOMATED COUNT: 13.9 % (ref 11.5–14.5)
EST. GFR  (AFRICAN AMERICAN): 41.3 ML/MIN/1.73 M^2
EST. GFR  (NON AFRICAN AMERICAN): 35.8 ML/MIN/1.73 M^2
ESTIMATED AVG GLUCOSE: 157 MG/DL (ref 68–131)
GLUCOSE SERPL-MCNC: 168 MG/DL (ref 70–110)
HBA1C MFR BLD: 7.1 % (ref 4–5.6)
HCT VFR BLD AUTO: 36.2 % (ref 37–48.5)
HGB BLD-MCNC: 11.3 G/DL (ref 12–16)
HGB S BLD QL SOLY: POSITIVE
IMM GRANULOCYTES # BLD AUTO: 0.04 K/UL (ref 0–0.04)
IMM GRANULOCYTES NFR BLD AUTO: 0.4 % (ref 0–0.5)
LYMPHOCYTES # BLD AUTO: 3.6 K/UL (ref 1–4.8)
LYMPHOCYTES NFR BLD: 32.2 % (ref 18–48)
MCH RBC QN AUTO: 28.7 PG (ref 27–31)
MCHC RBC AUTO-ENTMCNC: 31.2 G/DL (ref 32–36)
MCV RBC AUTO: 92 FL (ref 82–98)
MONOCYTES # BLD AUTO: 0.7 K/UL (ref 0.3–1)
MONOCYTES NFR BLD: 5.8 % (ref 4–15)
NEUTROPHILS # BLD AUTO: 6.5 K/UL (ref 1.8–7.7)
NEUTROPHILS NFR BLD: 57.9 % (ref 38–73)
NRBC BLD-RTO: 0 /100 WBC
PLATELET # BLD AUTO: 372 K/UL (ref 150–450)
PMV BLD AUTO: 10.2 FL (ref 9.2–12.9)
POTASSIUM SERPL-SCNC: 4.1 MMOL/L (ref 3.5–5.1)
RBC # BLD AUTO: 3.94 M/UL (ref 4–5.4)
SODIUM SERPL-SCNC: 143 MMOL/L (ref 136–145)
WBC # BLD AUTO: 11.27 K/UL (ref 3.9–12.7)

## 2021-05-07 ENCOUNTER — TELEPHONE (OUTPATIENT)
Dept: FAMILY MEDICINE | Facility: CLINIC | Age: 66
End: 2021-05-07

## 2021-05-07 DIAGNOSIS — E78.5 HYPERLIPIDEMIA, ACQUIRED: Primary | ICD-10-CM

## 2021-05-07 DIAGNOSIS — E11.22 TYPE 2 DIABETES MELLITUS WITH STAGE 3B CHRONIC KIDNEY DISEASE, WITHOUT LONG-TERM CURRENT USE OF INSULIN: ICD-10-CM

## 2021-05-07 DIAGNOSIS — N18.32 TYPE 2 DIABETES MELLITUS WITH STAGE 3B CHRONIC KIDNEY DISEASE, WITHOUT LONG-TERM CURRENT USE OF INSULIN: ICD-10-CM

## 2021-05-10 RX ORDER — ATORVASTATIN CALCIUM 40 MG/1
40 TABLET, FILM COATED ORAL DAILY
Qty: 90 TABLET | Refills: 1 | Status: SHIPPED | OUTPATIENT
Start: 2021-05-10 | End: 2021-10-07

## 2021-05-14 DIAGNOSIS — F51.04 PSYCHOPHYSIOLOGICAL INSOMNIA: ICD-10-CM

## 2021-05-17 DIAGNOSIS — M1A.09X0 IDIOPATHIC CHRONIC GOUT OF MULTIPLE SITES WITHOUT TOPHUS: ICD-10-CM

## 2021-05-17 DIAGNOSIS — M46.1 SACROILIITIS, NOT ELSEWHERE CLASSIFIED: ICD-10-CM

## 2021-05-17 DIAGNOSIS — I10 ESSENTIAL HYPERTENSION: ICD-10-CM

## 2021-05-18 RX ORDER — QUETIAPINE FUMARATE 100 MG/1
TABLET, FILM COATED ORAL
Qty: 90 TABLET | Refills: 1 | Status: SHIPPED | OUTPATIENT
Start: 2021-05-18 | End: 2021-09-09 | Stop reason: SDUPTHER

## 2021-05-19 RX ORDER — AMLODIPINE BESYLATE 10 MG/1
TABLET ORAL
Qty: 30 TABLET | Refills: 0 | OUTPATIENT
Start: 2021-05-19

## 2021-05-19 RX ORDER — DULOXETIN HYDROCHLORIDE 60 MG/1
CAPSULE, DELAYED RELEASE ORAL
Qty: 30 CAPSULE | Refills: 0 | OUTPATIENT
Start: 2021-05-19

## 2021-05-19 RX ORDER — INDOMETHACIN 50 MG/1
CAPSULE ORAL
Qty: 30 CAPSULE | Refills: 1 | OUTPATIENT
Start: 2021-05-19

## 2021-05-21 DIAGNOSIS — M46.1 SACROILIITIS, NOT ELSEWHERE CLASSIFIED: ICD-10-CM

## 2021-05-21 DIAGNOSIS — M1A.09X0 IDIOPATHIC CHRONIC GOUT OF MULTIPLE SITES WITHOUT TOPHUS: ICD-10-CM

## 2021-05-22 RX ORDER — INDOMETHACIN 50 MG/1
50 CAPSULE ORAL 3 TIMES DAILY PRN
Qty: 30 CAPSULE | Refills: 11 | Status: SHIPPED | OUTPATIENT
Start: 2021-05-22 | End: 2024-02-29

## 2021-05-22 RX ORDER — DULOXETIN HYDROCHLORIDE 60 MG/1
CAPSULE, DELAYED RELEASE ORAL
Qty: 90 CAPSULE | Refills: 3 | Status: SHIPPED | OUTPATIENT
Start: 2021-05-22 | End: 2022-03-24

## 2021-08-19 DIAGNOSIS — N18.30 TYPE 2 DIABETES MELLITUS WITH STAGE 3 CHRONIC KIDNEY DISEASE, WITHOUT LONG-TERM CURRENT USE OF INSULIN: ICD-10-CM

## 2021-08-19 DIAGNOSIS — E11.22 TYPE 2 DIABETES MELLITUS WITH STAGE 3 CHRONIC KIDNEY DISEASE, WITHOUT LONG-TERM CURRENT USE OF INSULIN: ICD-10-CM

## 2021-08-19 RX ORDER — FLASH GLUCOSE SENSOR
1 KIT MISCELLANEOUS
Qty: 1 KIT | Refills: 11 | Status: SHIPPED | OUTPATIENT
Start: 2021-08-19 | End: 2021-10-22 | Stop reason: SDUPTHER

## 2021-08-19 RX ORDER — FLASH GLUCOSE SCANNING READER
1 EACH MISCELLANEOUS
Qty: 1 EACH | Refills: 0 | Status: SHIPPED | OUTPATIENT
Start: 2021-08-19 | End: 2021-10-22 | Stop reason: SDUPTHER

## 2021-08-19 RX ORDER — DULAGLUTIDE 0.75 MG/.5ML
0.75 INJECTION, SOLUTION SUBCUTANEOUS
Qty: 4 PEN | Refills: 2 | Status: SHIPPED | OUTPATIENT
Start: 2021-08-19 | End: 2021-09-09 | Stop reason: SDUPTHER

## 2021-08-23 ENCOUNTER — TELEPHONE (OUTPATIENT)
Dept: FAMILY MEDICINE | Facility: CLINIC | Age: 66
End: 2021-08-23

## 2021-09-09 ENCOUNTER — OFFICE VISIT (OUTPATIENT)
Dept: FAMILY MEDICINE | Facility: CLINIC | Age: 66
End: 2021-09-09
Payer: MEDICARE

## 2021-09-09 VITALS
SYSTOLIC BLOOD PRESSURE: 130 MMHG | HEART RATE: 108 BPM | RESPIRATION RATE: 16 BRPM | BODY MASS INDEX: 41.43 KG/M2 | TEMPERATURE: 99 F | HEIGHT: 67 IN | DIASTOLIC BLOOD PRESSURE: 88 MMHG | OXYGEN SATURATION: 97 %

## 2021-09-09 DIAGNOSIS — K59.04 CHRONIC IDIOPATHIC CONSTIPATION: ICD-10-CM

## 2021-09-09 DIAGNOSIS — F51.04 PSYCHOPHYSIOLOGICAL INSOMNIA: ICD-10-CM

## 2021-09-09 DIAGNOSIS — F33.1 MAJOR DEPRESSIVE DISORDER, RECURRENT, MODERATE: ICD-10-CM

## 2021-09-09 DIAGNOSIS — E11.22 TYPE 2 DIABETES MELLITUS WITH STAGE 3B CHRONIC KIDNEY DISEASE, WITHOUT LONG-TERM CURRENT USE OF INSULIN: Primary | ICD-10-CM

## 2021-09-09 DIAGNOSIS — N18.32 TYPE 2 DIABETES MELLITUS WITH STAGE 3B CHRONIC KIDNEY DISEASE, WITHOUT LONG-TERM CURRENT USE OF INSULIN: Primary | ICD-10-CM

## 2021-09-09 DIAGNOSIS — R06.02 SHORTNESS OF BREATH: ICD-10-CM

## 2021-09-09 DIAGNOSIS — I10 ESSENTIAL HYPERTENSION: ICD-10-CM

## 2021-09-09 DIAGNOSIS — D57.3 SICKLE CELL TRAIT: ICD-10-CM

## 2021-09-09 PROCEDURE — 1101F PT FALLS ASSESS-DOCD LE1/YR: CPT | Mod: CPTII,S$GLB,, | Performed by: FAMILY MEDICINE

## 2021-09-09 PROCEDURE — 1160F RVW MEDS BY RX/DR IN RCRD: CPT | Mod: CPTII,S$GLB,, | Performed by: FAMILY MEDICINE

## 2021-09-09 PROCEDURE — 1157F ADVNC CARE PLAN IN RCRD: CPT | Mod: CPTII,S$GLB,, | Performed by: FAMILY MEDICINE

## 2021-09-09 PROCEDURE — 3008F PR BODY MASS INDEX (BMI) DOCUMENTED: ICD-10-PCS | Mod: CPTII,S$GLB,, | Performed by: FAMILY MEDICINE

## 2021-09-09 PROCEDURE — 3044F PR MOST RECENT HEMOGLOBIN A1C LEVEL <7.0%: ICD-10-PCS | Mod: CPTII,S$GLB,, | Performed by: FAMILY MEDICINE

## 2021-09-09 PROCEDURE — 99499 UNLISTED E&M SERVICE: CPT | Mod: S$GLB,,, | Performed by: FAMILY MEDICINE

## 2021-09-09 PROCEDURE — 3044F HG A1C LEVEL LT 7.0%: CPT | Mod: CPTII,S$GLB,, | Performed by: FAMILY MEDICINE

## 2021-09-09 PROCEDURE — 1125F AMNT PAIN NOTED PAIN PRSNT: CPT | Mod: CPTII,S$GLB,, | Performed by: FAMILY MEDICINE

## 2021-09-09 PROCEDURE — 1159F MED LIST DOCD IN RCRD: CPT | Mod: CPTII,S$GLB,, | Performed by: FAMILY MEDICINE

## 2021-09-09 PROCEDURE — 99499 RISK ADDL DX/OHS AUDIT: ICD-10-PCS | Mod: S$GLB,,, | Performed by: FAMILY MEDICINE

## 2021-09-09 PROCEDURE — 1125F PR PAIN SEVERITY QUANTIFIED, PAIN PRESENT: ICD-10-PCS | Mod: CPTII,S$GLB,, | Performed by: FAMILY MEDICINE

## 2021-09-09 PROCEDURE — 99999 PR PBB SHADOW E&M-EST. PATIENT-LVL V: ICD-10-PCS | Mod: PBBFAC,,, | Performed by: FAMILY MEDICINE

## 2021-09-09 PROCEDURE — 99214 PR OFFICE/OUTPT VISIT, EST, LEVL IV, 30-39 MIN: ICD-10-PCS | Mod: S$GLB,,, | Performed by: FAMILY MEDICINE

## 2021-09-09 PROCEDURE — 1159F PR MEDICATION LIST DOCUMENTED IN MEDICAL RECORD: ICD-10-PCS | Mod: CPTII,S$GLB,, | Performed by: FAMILY MEDICINE

## 2021-09-09 PROCEDURE — 3079F DIAST BP 80-89 MM HG: CPT | Mod: CPTII,S$GLB,, | Performed by: FAMILY MEDICINE

## 2021-09-09 PROCEDURE — 99214 OFFICE O/P EST MOD 30 MIN: CPT | Mod: S$GLB,,, | Performed by: FAMILY MEDICINE

## 2021-09-09 PROCEDURE — 3008F BODY MASS INDEX DOCD: CPT | Mod: CPTII,S$GLB,, | Performed by: FAMILY MEDICINE

## 2021-09-09 PROCEDURE — 3075F PR MOST RECENT SYSTOLIC BLOOD PRESS GE 130-139MM HG: ICD-10-PCS | Mod: CPTII,S$GLB,, | Performed by: FAMILY MEDICINE

## 2021-09-09 PROCEDURE — 3051F HG A1C>EQUAL 7.0%<8.0%: CPT | Mod: CPTII,S$GLB,, | Performed by: FAMILY MEDICINE

## 2021-09-09 PROCEDURE — 1160F PR REVIEW ALL MEDS BY PRESCRIBER/CLIN PHARMACIST DOCUMENTED: ICD-10-PCS | Mod: CPTII,S$GLB,, | Performed by: FAMILY MEDICINE

## 2021-09-09 PROCEDURE — 3288F FALL RISK ASSESSMENT DOCD: CPT | Mod: CPTII,S$GLB,, | Performed by: FAMILY MEDICINE

## 2021-09-09 PROCEDURE — 3288F PR FALLS RISK ASSESSMENT DOCUMENTED: ICD-10-PCS | Mod: CPTII,S$GLB,, | Performed by: FAMILY MEDICINE

## 2021-09-09 PROCEDURE — 3051F PR MOST RECENT HEMOGLOBIN A1C LEVEL 7.0 - < 8.0%: ICD-10-PCS | Mod: CPTII,S$GLB,, | Performed by: FAMILY MEDICINE

## 2021-09-09 PROCEDURE — 1157F PR ADVANCE CARE PLAN OR EQUIV PRESENT IN MEDICAL RECORD: ICD-10-PCS | Mod: CPTII,S$GLB,, | Performed by: FAMILY MEDICINE

## 2021-09-09 PROCEDURE — 1101F PR PT FALLS ASSESS DOC 0-1 FALLS W/OUT INJ PAST YR: ICD-10-PCS | Mod: CPTII,S$GLB,, | Performed by: FAMILY MEDICINE

## 2021-09-09 PROCEDURE — 99999 PR PBB SHADOW E&M-EST. PATIENT-LVL V: CPT | Mod: PBBFAC,,, | Performed by: FAMILY MEDICINE

## 2021-09-09 PROCEDURE — 3075F SYST BP GE 130 - 139MM HG: CPT | Mod: CPTII,S$GLB,, | Performed by: FAMILY MEDICINE

## 2021-09-09 PROCEDURE — 3079F PR MOST RECENT DIASTOLIC BLOOD PRESSURE 80-89 MM HG: ICD-10-PCS | Mod: CPTII,S$GLB,, | Performed by: FAMILY MEDICINE

## 2021-09-09 RX ORDER — DOCUSATE SODIUM 100 MG/1
100 CAPSULE, LIQUID FILLED ORAL
Qty: 90 CAPSULE | Refills: 1 | Status: SHIPPED | OUTPATIENT
Start: 2021-09-09 | End: 2021-10-22 | Stop reason: SDUPTHER

## 2021-09-09 RX ORDER — GLIPIZIDE 10 MG/1
10 TABLET, FILM COATED, EXTENDED RELEASE ORAL
Qty: 90 TABLET | Refills: 1 | Status: SHIPPED | OUTPATIENT
Start: 2021-09-09 | End: 2021-12-29 | Stop reason: SDUPTHER

## 2021-09-09 RX ORDER — QUETIAPINE FUMARATE 100 MG/1
TABLET, FILM COATED ORAL
Qty: 90 TABLET | Refills: 1 | Status: SHIPPED | OUTPATIENT
Start: 2021-09-09 | End: 2022-03-24 | Stop reason: SDUPTHER

## 2021-09-09 RX ORDER — DULAGLUTIDE 0.75 MG/.5ML
0.75 INJECTION, SOLUTION SUBCUTANEOUS
Qty: 4 PEN | Refills: 2 | Status: SHIPPED | OUTPATIENT
Start: 2021-09-09 | End: 2021-12-07

## 2021-09-09 RX ORDER — HYDROCHLOROTHIAZIDE 25 MG/1
TABLET ORAL
Qty: 90 TABLET | Refills: 1 | Status: SHIPPED | OUTPATIENT
Start: 2021-09-09 | End: 2021-12-29 | Stop reason: SDUPTHER

## 2021-09-09 RX ORDER — ESCITALOPRAM OXALATE 10 MG/1
10 TABLET ORAL DAILY
Qty: 90 TABLET | Refills: 3 | Status: SHIPPED | OUTPATIENT
Start: 2021-09-09 | End: 2022-11-14 | Stop reason: SDUPTHER

## 2021-09-15 ENCOUNTER — LAB VISIT (OUTPATIENT)
Dept: LAB | Facility: HOSPITAL | Age: 66
End: 2021-09-15
Attending: FAMILY MEDICINE
Payer: MEDICARE

## 2021-09-15 DIAGNOSIS — N18.32 TYPE 2 DIABETES MELLITUS WITH STAGE 3B CHRONIC KIDNEY DISEASE, WITHOUT LONG-TERM CURRENT USE OF INSULIN: ICD-10-CM

## 2021-09-15 DIAGNOSIS — E11.22 TYPE 2 DIABETES MELLITUS WITH STAGE 3B CHRONIC KIDNEY DISEASE, WITHOUT LONG-TERM CURRENT USE OF INSULIN: ICD-10-CM

## 2021-09-15 DIAGNOSIS — R06.02 SHORTNESS OF BREATH: ICD-10-CM

## 2021-09-15 LAB
ANION GAP SERPL CALC-SCNC: 12 MMOL/L (ref 8–16)
BASOPHILS # BLD AUTO: 0.06 K/UL (ref 0–0.2)
BASOPHILS NFR BLD: 0.5 % (ref 0–1.9)
BNP SERPL-MCNC: 182 PG/ML (ref 0–99)
BUN SERPL-MCNC: 18 MG/DL (ref 8–23)
CALCIUM SERPL-MCNC: 10.5 MG/DL (ref 8.7–10.5)
CHLORIDE SERPL-SCNC: 102 MMOL/L (ref 95–110)
CO2 SERPL-SCNC: 26 MMOL/L (ref 23–29)
CREAT SERPL-MCNC: 1.6 MG/DL (ref 0.5–1.4)
DIFFERENTIAL METHOD: NORMAL
EOSINOPHIL # BLD AUTO: 0.3 K/UL (ref 0–0.5)
EOSINOPHIL NFR BLD: 2.1 % (ref 0–8)
ERYTHROCYTE [DISTWIDTH] IN BLOOD BY AUTOMATED COUNT: 13.7 % (ref 11.5–14.5)
EST. GFR  (AFRICAN AMERICAN): 38.2 ML/MIN/1.73 M^2
EST. GFR  (NON AFRICAN AMERICAN): 33.1 ML/MIN/1.73 M^2
ESTIMATED AVG GLUCOSE: 146 MG/DL (ref 68–131)
GLUCOSE SERPL-MCNC: 151 MG/DL (ref 70–110)
HBA1C MFR BLD: 6.7 % (ref 4–5.6)
HCT VFR BLD AUTO: 40.8 % (ref 37–48.5)
HGB BLD-MCNC: 13.1 G/DL (ref 12–16)
IMM GRANULOCYTES # BLD AUTO: 0.04 K/UL (ref 0–0.04)
IMM GRANULOCYTES NFR BLD AUTO: 0.3 % (ref 0–0.5)
LYMPHOCYTES # BLD AUTO: 4.7 K/UL (ref 1–4.8)
LYMPHOCYTES NFR BLD: 37.3 % (ref 18–48)
MCH RBC QN AUTO: 29.2 PG (ref 27–31)
MCHC RBC AUTO-ENTMCNC: 32.1 G/DL (ref 32–36)
MCV RBC AUTO: 91 FL (ref 82–98)
MONOCYTES # BLD AUTO: 0.8 K/UL (ref 0.3–1)
MONOCYTES NFR BLD: 6.5 % (ref 4–15)
NEUTROPHILS # BLD AUTO: 6.8 K/UL (ref 1.8–7.7)
NEUTROPHILS NFR BLD: 53.3 % (ref 38–73)
NRBC BLD-RTO: 0 /100 WBC
PLATELET # BLD AUTO: 420 K/UL (ref 150–450)
PMV BLD AUTO: 10.7 FL (ref 9.2–12.9)
POTASSIUM SERPL-SCNC: 3.6 MMOL/L (ref 3.5–5.1)
RBC # BLD AUTO: 4.48 M/UL (ref 4–5.4)
SODIUM SERPL-SCNC: 140 MMOL/L (ref 136–145)
WBC # BLD AUTO: 12.64 K/UL (ref 3.9–12.7)

## 2021-09-15 PROCEDURE — 83036 HEMOGLOBIN GLYCOSYLATED A1C: CPT | Performed by: FAMILY MEDICINE

## 2021-09-15 PROCEDURE — 85025 COMPLETE CBC W/AUTO DIFF WBC: CPT | Performed by: FAMILY MEDICINE

## 2021-09-15 PROCEDURE — 80048 BASIC METABOLIC PNL TOTAL CA: CPT | Performed by: FAMILY MEDICINE

## 2021-09-15 PROCEDURE — 36415 COLL VENOUS BLD VENIPUNCTURE: CPT | Mod: PO | Performed by: FAMILY MEDICINE

## 2021-09-15 PROCEDURE — 83880 ASSAY OF NATRIURETIC PEPTIDE: CPT | Performed by: FAMILY MEDICINE

## 2021-09-20 ENCOUNTER — TELEPHONE (OUTPATIENT)
Dept: FAMILY MEDICINE | Facility: CLINIC | Age: 66
End: 2021-09-20

## 2021-09-29 ENCOUNTER — TELEPHONE (OUTPATIENT)
Dept: FAMILY MEDICINE | Facility: CLINIC | Age: 66
End: 2021-09-29

## 2021-09-29 DIAGNOSIS — R79.89 ELEVATED BRAIN NATRIURETIC PEPTIDE (BNP) LEVEL: Primary | ICD-10-CM

## 2021-09-29 DIAGNOSIS — R06.02 SHORTNESS OF BREATH: ICD-10-CM

## 2021-09-30 ENCOUNTER — TELEPHONE (OUTPATIENT)
Dept: FAMILY MEDICINE | Facility: CLINIC | Age: 66
End: 2021-09-30

## 2021-10-01 ENCOUNTER — TELEPHONE (OUTPATIENT)
Dept: FAMILY MEDICINE | Facility: CLINIC | Age: 66
End: 2021-10-01

## 2021-10-02 DIAGNOSIS — M46.1 SACROILIITIS, NOT ELSEWHERE CLASSIFIED: ICD-10-CM

## 2021-10-04 ENCOUNTER — PATIENT MESSAGE (OUTPATIENT)
Dept: ADMINISTRATIVE | Facility: HOSPITAL | Age: 66
End: 2021-10-04

## 2021-10-04 RX ORDER — DULOXETIN HYDROCHLORIDE 60 MG/1
CAPSULE, DELAYED RELEASE ORAL
Qty: 90 CAPSULE | Refills: 3 | OUTPATIENT
Start: 2021-10-04

## 2021-10-07 ENCOUNTER — OFFICE VISIT (OUTPATIENT)
Dept: CARDIOLOGY | Facility: CLINIC | Age: 66
End: 2021-10-07
Payer: MEDICARE

## 2021-10-07 ENCOUNTER — HOSPITAL ENCOUNTER (OUTPATIENT)
Dept: CARDIOLOGY | Facility: HOSPITAL | Age: 66
Discharge: HOME | End: 2021-10-07
Attending: FAMILY MEDICINE
Payer: MEDICARE

## 2021-10-07 VITALS
HEIGHT: 67 IN | OXYGEN SATURATION: 96 % | BODY MASS INDEX: 41.44 KG/M2 | SYSTOLIC BLOOD PRESSURE: 146 MMHG | WEIGHT: 264 LBS | DIASTOLIC BLOOD PRESSURE: 84 MMHG | HEIGHT: 67 IN | BODY MASS INDEX: 41.44 KG/M2 | HEART RATE: 104 BPM | WEIGHT: 264 LBS

## 2021-10-07 DIAGNOSIS — I10 ESSENTIAL HYPERTENSION: ICD-10-CM

## 2021-10-07 DIAGNOSIS — N18.32 TYPE 2 DIABETES MELLITUS WITH STAGE 3B CHRONIC KIDNEY DISEASE, WITHOUT LONG-TERM CURRENT USE OF INSULIN: ICD-10-CM

## 2021-10-07 DIAGNOSIS — E11.22 TYPE 2 DIABETES MELLITUS WITH STAGE 3B CHRONIC KIDNEY DISEASE, WITHOUT LONG-TERM CURRENT USE OF INSULIN: ICD-10-CM

## 2021-10-07 DIAGNOSIS — E66.01 SEVERE OBESITY (BMI 35.0-39.9) WITH COMORBIDITY: ICD-10-CM

## 2021-10-07 DIAGNOSIS — R06.02 SHORTNESS OF BREATH: ICD-10-CM

## 2021-10-07 DIAGNOSIS — M1A.09X0 IDIOPATHIC CHRONIC GOUT OF MULTIPLE SITES WITHOUT TOPHUS: ICD-10-CM

## 2021-10-07 DIAGNOSIS — G47.33 OBSTRUCTIVE SLEEP APNEA: ICD-10-CM

## 2021-10-07 DIAGNOSIS — R79.89 ELEVATED BRAIN NATRIURETIC PEPTIDE (BNP) LEVEL: ICD-10-CM

## 2021-10-07 DIAGNOSIS — F33.1 MAJOR DEPRESSIVE DISORDER, RECURRENT, MODERATE: ICD-10-CM

## 2021-10-07 DIAGNOSIS — R06.02 SOB (SHORTNESS OF BREATH): Primary | ICD-10-CM

## 2021-10-07 DIAGNOSIS — M15.9 PRIMARY OSTEOARTHRITIS INVOLVING MULTIPLE JOINTS: ICD-10-CM

## 2021-10-07 LAB
AV INDEX (PROSTH): 0.63
AV MEAN GRADIENT: 8 MMHG
AV PEAK GRADIENT: 11 MMHG
AV VALVE AREA: 2.24 CM2
AV VELOCITY RATIO: 0.72
BSA FOR ECHO PROCEDURE: 2.38 M2
CV ECHO LV RWT: 0.42 CM
DOP CALC AO PEAK VEL: 1.63 M/S
DOP CALC AO VTI: 29.59 CM
DOP CALC LVOT AREA: 3.6 CM2
DOP CALC LVOT DIAMETER: 2.13 CM
DOP CALC LVOT PEAK VEL: 1.17 M/S
DOP CALC LVOT STROKE VOLUME: 66.24 CM3
DOP CALCLVOT PEAK VEL VTI: 18.6 CM
E WAVE DECELERATION TIME: 184.52 MSEC
E/A RATIO: 0.82
E/E' RATIO: 12.86 M/S
ECHO LV POSTERIOR WALL: 1.01 CM (ref 0.6–1.1)
EJECTION FRACTION: 75 %
FRACTIONAL SHORTENING: 35 % (ref 28–44)
INTERVENTRICULAR SEPTUM: 1.33 CM (ref 0.6–1.1)
IVRT: 124.57 MSEC
LEFT INTERNAL DIMENSION IN SYSTOLE: 3.13 CM (ref 2.1–4)
LEFT VENTRICLE DIASTOLIC VOLUME INDEX: 48.02 ML/M2
LEFT VENTRICLE DIASTOLIC VOLUME: 109.49 ML
LEFT VENTRICLE MASS INDEX: 94 G/M2
LEFT VENTRICLE SYSTOLIC VOLUME INDEX: 17 ML/M2
LEFT VENTRICLE SYSTOLIC VOLUME: 38.68 ML
LEFT VENTRICULAR INTERNAL DIMENSION IN DIASTOLE: 4.84 CM (ref 3.5–6)
LEFT VENTRICULAR MASS: 214.23 G
LV LATERAL E/E' RATIO: 12.86 M/S
LV SEPTAL E/E' RATIO: 12.86 M/S
MV PEAK A VEL: 1.1 M/S
MV PEAK E VEL: 0.9 M/S
MV STENOSIS PRESSURE HALF TIME: 53.51 MS
MV VALVE AREA P 1/2 METHOD: 4.11 CM2
PV PEAK VELOCITY: 1.21 CM/S
RA PRESSURE: 3 MMHG
RV TISSUE DOPPLER FREE WALL SYSTOLIC VELOCITY 1 (APICAL 4 CHAMBER VIEW): 18.93 CM/S
SINUS: 3.46 CM
STJ: 2.88 CM
TDI LATERAL: 0.07 M/S
TDI SEPTAL: 0.07 M/S
TDI: 0.07 M/S
TRICUSPID ANNULAR PLANE SYSTOLIC EXCURSION: 2.51 CM

## 2021-10-07 PROCEDURE — 93306 TTE W/DOPPLER COMPLETE: CPT | Mod: 26,,, | Performed by: INTERNAL MEDICINE

## 2021-10-07 PROCEDURE — 99499 RISK ADDL DX/OHS AUDIT: ICD-10-PCS | Mod: S$GLB,,, | Performed by: INTERNAL MEDICINE

## 2021-10-07 PROCEDURE — 3288F FALL RISK ASSESSMENT DOCD: CPT | Mod: CPTII,S$GLB,, | Performed by: INTERNAL MEDICINE

## 2021-10-07 PROCEDURE — 93306 TTE W/DOPPLER COMPLETE: CPT

## 2021-10-07 PROCEDURE — 99204 PR OFFICE/OUTPT VISIT, NEW, LEVL IV, 45-59 MIN: ICD-10-PCS | Mod: S$GLB,,, | Performed by: INTERNAL MEDICINE

## 2021-10-07 PROCEDURE — 1125F AMNT PAIN NOTED PAIN PRSNT: CPT | Mod: CPTII,S$GLB,, | Performed by: INTERNAL MEDICINE

## 2021-10-07 PROCEDURE — 1101F PR PT FALLS ASSESS DOC 0-1 FALLS W/OUT INJ PAST YR: ICD-10-PCS | Mod: CPTII,S$GLB,, | Performed by: INTERNAL MEDICINE

## 2021-10-07 PROCEDURE — 1160F RVW MEDS BY RX/DR IN RCRD: CPT | Mod: CPTII,S$GLB,, | Performed by: INTERNAL MEDICINE

## 2021-10-07 PROCEDURE — 1160F PR REVIEW ALL MEDS BY PRESCRIBER/CLIN PHARMACIST DOCUMENTED: ICD-10-PCS | Mod: CPTII,S$GLB,, | Performed by: INTERNAL MEDICINE

## 2021-10-07 PROCEDURE — 3079F DIAST BP 80-89 MM HG: CPT | Mod: CPTII,S$GLB,, | Performed by: INTERNAL MEDICINE

## 2021-10-07 PROCEDURE — 99204 OFFICE O/P NEW MOD 45 MIN: CPT | Mod: S$GLB,,, | Performed by: INTERNAL MEDICINE

## 2021-10-07 PROCEDURE — 3008F PR BODY MASS INDEX (BMI) DOCUMENTED: ICD-10-PCS | Mod: CPTII,S$GLB,, | Performed by: INTERNAL MEDICINE

## 2021-10-07 PROCEDURE — 1157F PR ADVANCE CARE PLAN OR EQUIV PRESENT IN MEDICAL RECORD: ICD-10-PCS | Mod: CPTII,S$GLB,, | Performed by: INTERNAL MEDICINE

## 2021-10-07 PROCEDURE — 3077F PR MOST RECENT SYSTOLIC BLOOD PRESSURE >= 140 MM HG: ICD-10-PCS | Mod: CPTII,S$GLB,, | Performed by: INTERNAL MEDICINE

## 2021-10-07 PROCEDURE — 3044F PR MOST RECENT HEMOGLOBIN A1C LEVEL <7.0%: ICD-10-PCS | Mod: CPTII,S$GLB,, | Performed by: INTERNAL MEDICINE

## 2021-10-07 PROCEDURE — 3288F PR FALLS RISK ASSESSMENT DOCUMENTED: ICD-10-PCS | Mod: CPTII,S$GLB,, | Performed by: INTERNAL MEDICINE

## 2021-10-07 PROCEDURE — 93306 ECHO (CUPID ONLY): ICD-10-PCS | Mod: 26,,, | Performed by: INTERNAL MEDICINE

## 2021-10-07 PROCEDURE — 3077F SYST BP >= 140 MM HG: CPT | Mod: CPTII,S$GLB,, | Performed by: INTERNAL MEDICINE

## 2021-10-07 PROCEDURE — 1125F PR PAIN SEVERITY QUANTIFIED, PAIN PRESENT: ICD-10-PCS | Mod: CPTII,S$GLB,, | Performed by: INTERNAL MEDICINE

## 2021-10-07 PROCEDURE — 99999 PR PBB SHADOW E&M-EST. PATIENT-LVL V: ICD-10-PCS | Mod: PBBFAC,,, | Performed by: INTERNAL MEDICINE

## 2021-10-07 PROCEDURE — 3044F HG A1C LEVEL LT 7.0%: CPT | Mod: CPTII,S$GLB,, | Performed by: INTERNAL MEDICINE

## 2021-10-07 PROCEDURE — 1159F PR MEDICATION LIST DOCUMENTED IN MEDICAL RECORD: ICD-10-PCS | Mod: CPTII,S$GLB,, | Performed by: INTERNAL MEDICINE

## 2021-10-07 PROCEDURE — 1101F PT FALLS ASSESS-DOCD LE1/YR: CPT | Mod: CPTII,S$GLB,, | Performed by: INTERNAL MEDICINE

## 2021-10-07 PROCEDURE — 99499 UNLISTED E&M SERVICE: CPT | Mod: S$GLB,,, | Performed by: INTERNAL MEDICINE

## 2021-10-07 PROCEDURE — 99999 PR PBB SHADOW E&M-EST. PATIENT-LVL V: CPT | Mod: PBBFAC,,, | Performed by: INTERNAL MEDICINE

## 2021-10-07 PROCEDURE — 3008F BODY MASS INDEX DOCD: CPT | Mod: CPTII,S$GLB,, | Performed by: INTERNAL MEDICINE

## 2021-10-07 PROCEDURE — 3079F PR MOST RECENT DIASTOLIC BLOOD PRESSURE 80-89 MM HG: ICD-10-PCS | Mod: CPTII,S$GLB,, | Performed by: INTERNAL MEDICINE

## 2021-10-07 PROCEDURE — 93000 ELECTROCARDIOGRAM COMPLETE: CPT | Mod: S$GLB,,, | Performed by: INTERNAL MEDICINE

## 2021-10-07 PROCEDURE — 93000 EKG 12-LEAD: ICD-10-PCS | Mod: S$GLB,,, | Performed by: INTERNAL MEDICINE

## 2021-10-07 PROCEDURE — 1159F MED LIST DOCD IN RCRD: CPT | Mod: CPTII,S$GLB,, | Performed by: INTERNAL MEDICINE

## 2021-10-07 PROCEDURE — 1157F ADVNC CARE PLAN IN RCRD: CPT | Mod: CPTII,S$GLB,, | Performed by: INTERNAL MEDICINE

## 2021-10-07 RX ORDER — ATORVASTATIN CALCIUM 40 MG/1
40 TABLET, FILM COATED ORAL NIGHTLY
Qty: 90 TABLET | Refills: 3 | Status: SHIPPED | OUTPATIENT
Start: 2021-10-07 | End: 2022-10-17 | Stop reason: SDUPTHER

## 2021-10-07 RX ORDER — FUROSEMIDE 40 MG/1
40 TABLET ORAL DAILY
Qty: 90 TABLET | Refills: 0 | Status: SHIPPED | OUTPATIENT
Start: 2021-10-07 | End: 2021-12-20

## 2021-10-20 ENCOUNTER — HOSPITAL ENCOUNTER (OUTPATIENT)
Dept: RADIOLOGY | Facility: HOSPITAL | Age: 66
Discharge: HOME | End: 2021-10-20
Attending: INTERNAL MEDICINE
Payer: MEDICARE

## 2021-10-20 ENCOUNTER — HOSPITAL ENCOUNTER (OUTPATIENT)
Dept: CARDIOLOGY | Facility: HOSPITAL | Age: 66
Discharge: HOME | End: 2021-10-20
Attending: INTERNAL MEDICINE
Payer: MEDICARE

## 2021-10-20 DIAGNOSIS — R79.89 ELEVATED BRAIN NATRIURETIC PEPTIDE (BNP) LEVEL: ICD-10-CM

## 2021-10-20 DIAGNOSIS — R06.02 SHORTNESS OF BREATH: ICD-10-CM

## 2021-10-20 DIAGNOSIS — R06.02 SOB (SHORTNESS OF BREATH): ICD-10-CM

## 2021-10-20 LAB
CV STRESS BASE HR: 96 BPM
DIASTOLIC BLOOD PRESSURE: 82 MMHG
NUC REST EJECTION FRACTION: 84
OHS CV CPX 85 PERCENT MAX PREDICTED HEART RATE MALE: 125
OHS CV CPX MAX PREDICTED HEART RATE: 147
OHS CV CPX PATIENT IS FEMALE: 1
OHS CV CPX PATIENT IS MALE: 0
OHS CV CPX PEAK DIASTOLIC BLOOD PRESSURE: 50 MMHG
OHS CV CPX PEAK HEAR RATE: 104 BPM
OHS CV CPX PEAK RATE PRESSURE PRODUCT: 9880
OHS CV CPX PEAK SYSTOLIC BLOOD PRESSURE: 95 MMHG
OHS CV CPX PERCENT MAX PREDICTED HEART RATE ACHIEVED: 71
OHS CV CPX RATE PRESSURE PRODUCT PRESENTING: NORMAL
SYSTOLIC BLOOD PRESSURE: 128 MMHG

## 2021-10-20 PROCEDURE — 78452 HT MUSCLE IMAGE SPECT MULT: CPT

## 2021-10-20 PROCEDURE — 93017 CV STRESS TEST TRACING ONLY: CPT

## 2021-10-20 PROCEDURE — 93018 STRESS TEST WITH MYOCARDIAL PERFUSION (CUPID ONLY): ICD-10-PCS | Mod: ,,, | Performed by: INTERNAL MEDICINE

## 2021-10-20 PROCEDURE — 78452 STRESS TEST WITH MYOCARDIAL PERFUSION (CUPID ONLY): ICD-10-PCS | Mod: 26,,, | Performed by: INTERNAL MEDICINE

## 2021-10-20 PROCEDURE — 78452 HT MUSCLE IMAGE SPECT MULT: CPT | Mod: 26,,, | Performed by: INTERNAL MEDICINE

## 2021-10-20 PROCEDURE — 63600175 PHARM REV CODE 636 W HCPCS: Performed by: INTERNAL MEDICINE

## 2021-10-20 PROCEDURE — 93016 CV STRESS TEST SUPVJ ONLY: CPT | Mod: ,,, | Performed by: INTERNAL MEDICINE

## 2021-10-20 PROCEDURE — 93225 XTRNL ECG REC<48 HRS REC: CPT

## 2021-10-20 PROCEDURE — A9502 TC99M TETROFOSMIN: HCPCS

## 2021-10-20 PROCEDURE — 93018 CV STRESS TEST I&R ONLY: CPT | Mod: ,,, | Performed by: INTERNAL MEDICINE

## 2021-10-20 PROCEDURE — 93227 XTRNL ECG REC<48 HR R&I: CPT | Mod: ,,, | Performed by: INTERNAL MEDICINE

## 2021-10-20 PROCEDURE — 93016 STRESS TEST WITH MYOCARDIAL PERFUSION (CUPID ONLY): ICD-10-PCS | Mod: ,,, | Performed by: INTERNAL MEDICINE

## 2021-10-20 PROCEDURE — 93227 HOLTER MONITOR - 48 HOUR (CUPID ONLY): ICD-10-PCS | Mod: ,,, | Performed by: INTERNAL MEDICINE

## 2021-10-20 RX ORDER — REGADENOSON 0.08 MG/ML
0.4 INJECTION, SOLUTION INTRAVENOUS ONCE
Status: COMPLETED | OUTPATIENT
Start: 2021-10-20 | End: 2021-10-20

## 2021-10-20 RX ADMIN — REGADENOSON 0.4 MG: 0.08 INJECTION, SOLUTION INTRAVENOUS at 10:10

## 2021-10-22 ENCOUNTER — OFFICE VISIT (OUTPATIENT)
Dept: FAMILY MEDICINE | Facility: CLINIC | Age: 66
End: 2021-10-22
Payer: MEDICARE

## 2021-10-22 ENCOUNTER — TELEPHONE (OUTPATIENT)
Dept: FAMILY MEDICINE | Facility: CLINIC | Age: 66
End: 2021-10-22

## 2021-10-22 VITALS
SYSTOLIC BLOOD PRESSURE: 130 MMHG | BODY MASS INDEX: 41.03 KG/M2 | DIASTOLIC BLOOD PRESSURE: 86 MMHG | RESPIRATION RATE: 20 BRPM | OXYGEN SATURATION: 96 % | HEIGHT: 67 IN | TEMPERATURE: 98 F | WEIGHT: 261.44 LBS | HEART RATE: 108 BPM

## 2021-10-22 DIAGNOSIS — I51.7 LVH (LEFT VENTRICULAR HYPERTROPHY): Primary | ICD-10-CM

## 2021-10-22 DIAGNOSIS — Z12.31 ENCOUNTER FOR SCREENING MAMMOGRAM FOR BREAST CANCER: ICD-10-CM

## 2021-10-22 DIAGNOSIS — E11.22 TYPE 2 DIABETES MELLITUS WITH STAGE 3B CHRONIC KIDNEY DISEASE, WITHOUT LONG-TERM CURRENT USE OF INSULIN: ICD-10-CM

## 2021-10-22 DIAGNOSIS — I10 ESSENTIAL HYPERTENSION: ICD-10-CM

## 2021-10-22 DIAGNOSIS — E78.5 HYPERLIPIDEMIA, ACQUIRED: ICD-10-CM

## 2021-10-22 DIAGNOSIS — K59.04 CHRONIC IDIOPATHIC CONSTIPATION: ICD-10-CM

## 2021-10-22 DIAGNOSIS — N18.32 TYPE 2 DIABETES MELLITUS WITH STAGE 3B CHRONIC KIDNEY DISEASE, WITHOUT LONG-TERM CURRENT USE OF INSULIN: ICD-10-CM

## 2021-10-22 LAB
OHS CV EVENT MONITOR DAY: 0
OHS CV HOLTER LENGTH DECIMAL HOURS: 47.98
OHS CV HOLTER LENGTH HOURS: 47
OHS CV HOLTER LENGTH MINUTES: 59
OHS CV HOLTER SINUS AVERAGE HR: 101
OHS CV HOLTER SINUS MAX HR: 197
OHS CV HOLTER SINUS MIN HR: 82

## 2021-10-22 PROCEDURE — 3288F FALL RISK ASSESSMENT DOCD: CPT | Mod: CPTII,S$GLB,, | Performed by: FAMILY MEDICINE

## 2021-10-22 PROCEDURE — 3044F HG A1C LEVEL LT 7.0%: CPT | Mod: CPTII,S$GLB,, | Performed by: FAMILY MEDICINE

## 2021-10-22 PROCEDURE — 3079F PR MOST RECENT DIASTOLIC BLOOD PRESSURE 80-89 MM HG: ICD-10-PCS | Mod: CPTII,S$GLB,, | Performed by: FAMILY MEDICINE

## 2021-10-22 PROCEDURE — 3079F DIAST BP 80-89 MM HG: CPT | Mod: CPTII,S$GLB,, | Performed by: FAMILY MEDICINE

## 2021-10-22 PROCEDURE — 1125F PR PAIN SEVERITY QUANTIFIED, PAIN PRESENT: ICD-10-PCS | Mod: CPTII,S$GLB,, | Performed by: FAMILY MEDICINE

## 2021-10-22 PROCEDURE — 99214 PR OFFICE/OUTPT VISIT, EST, LEVL IV, 30-39 MIN: ICD-10-PCS | Mod: S$GLB,,, | Performed by: FAMILY MEDICINE

## 2021-10-22 PROCEDURE — 99999 PR PBB SHADOW E&M-EST. PATIENT-LVL V: CPT | Mod: PBBFAC,,, | Performed by: FAMILY MEDICINE

## 2021-10-22 PROCEDURE — 1157F ADVNC CARE PLAN IN RCRD: CPT | Mod: CPTII,S$GLB,, | Performed by: FAMILY MEDICINE

## 2021-10-22 PROCEDURE — 1157F PR ADVANCE CARE PLAN OR EQUIV PRESENT IN MEDICAL RECORD: ICD-10-PCS | Mod: CPTII,S$GLB,, | Performed by: FAMILY MEDICINE

## 2021-10-22 PROCEDURE — 3075F PR MOST RECENT SYSTOLIC BLOOD PRESS GE 130-139MM HG: ICD-10-PCS | Mod: CPTII,S$GLB,, | Performed by: FAMILY MEDICINE

## 2021-10-22 PROCEDURE — 99499 RISK ADDL DX/OHS AUDIT: ICD-10-PCS | Mod: S$GLB,,, | Performed by: FAMILY MEDICINE

## 2021-10-22 PROCEDURE — 99214 OFFICE O/P EST MOD 30 MIN: CPT | Mod: S$GLB,,, | Performed by: FAMILY MEDICINE

## 2021-10-22 PROCEDURE — 3288F PR FALLS RISK ASSESSMENT DOCUMENTED: ICD-10-PCS | Mod: CPTII,S$GLB,, | Performed by: FAMILY MEDICINE

## 2021-10-22 PROCEDURE — 1101F PT FALLS ASSESS-DOCD LE1/YR: CPT | Mod: CPTII,S$GLB,, | Performed by: FAMILY MEDICINE

## 2021-10-22 PROCEDURE — 1101F PR PT FALLS ASSESS DOC 0-1 FALLS W/OUT INJ PAST YR: ICD-10-PCS | Mod: CPTII,S$GLB,, | Performed by: FAMILY MEDICINE

## 2021-10-22 PROCEDURE — 3008F BODY MASS INDEX DOCD: CPT | Mod: CPTII,S$GLB,, | Performed by: FAMILY MEDICINE

## 2021-10-22 PROCEDURE — 99499 UNLISTED E&M SERVICE: CPT | Mod: S$GLB,,, | Performed by: FAMILY MEDICINE

## 2021-10-22 PROCEDURE — 3044F PR MOST RECENT HEMOGLOBIN A1C LEVEL <7.0%: ICD-10-PCS | Mod: CPTII,S$GLB,, | Performed by: FAMILY MEDICINE

## 2021-10-22 PROCEDURE — 3075F SYST BP GE 130 - 139MM HG: CPT | Mod: CPTII,S$GLB,, | Performed by: FAMILY MEDICINE

## 2021-10-22 PROCEDURE — 99999 PR PBB SHADOW E&M-EST. PATIENT-LVL V: ICD-10-PCS | Mod: PBBFAC,,, | Performed by: FAMILY MEDICINE

## 2021-10-22 PROCEDURE — 1125F AMNT PAIN NOTED PAIN PRSNT: CPT | Mod: CPTII,S$GLB,, | Performed by: FAMILY MEDICINE

## 2021-10-22 PROCEDURE — 3008F PR BODY MASS INDEX (BMI) DOCUMENTED: ICD-10-PCS | Mod: CPTII,S$GLB,, | Performed by: FAMILY MEDICINE

## 2021-10-22 RX ORDER — DOCUSATE SODIUM 100 MG/1
100 CAPSULE, LIQUID FILLED ORAL
Qty: 90 CAPSULE | Refills: 3 | Status: SHIPPED | OUTPATIENT
Start: 2021-10-22

## 2021-10-22 RX ORDER — FLASH GLUCOSE SENSOR
1 KIT MISCELLANEOUS
Qty: 1 KIT | Refills: 11 | Status: SHIPPED | OUTPATIENT
Start: 2021-10-22 | End: 2022-11-07 | Stop reason: SDUPTHER

## 2021-10-22 RX ORDER — FLASH GLUCOSE SCANNING READER
1 EACH MISCELLANEOUS
Qty: 1 EACH | Refills: 0 | Status: SHIPPED | OUTPATIENT
Start: 2021-10-22 | End: 2022-10-20 | Stop reason: SDUPTHER

## 2021-10-27 NOTE — PROGRESS NOTES
COLONOSCOPY- will pend order            MAMMOGRAM - victor manuel pend order        Recently sent to local pharmacy. Needs resent to mail order. Pt. States she does not have any swelling in her legs     Please review lab recommendations from 10-.

## 2021-10-29 ENCOUNTER — LAB VISIT (OUTPATIENT)
Dept: LAB | Facility: HOSPITAL | Age: 66
End: 2021-10-29
Attending: INTERNAL MEDICINE
Payer: MEDICARE

## 2021-10-29 DIAGNOSIS — R06.02 SOB (SHORTNESS OF BREATH): ICD-10-CM

## 2021-10-29 DIAGNOSIS — E78.5 HYPERLIPIDEMIA, ACQUIRED: ICD-10-CM

## 2021-10-29 DIAGNOSIS — R79.89 ELEVATED BRAIN NATRIURETIC PEPTIDE (BNP) LEVEL: ICD-10-CM

## 2021-10-29 DIAGNOSIS — R06.02 SHORTNESS OF BREATH: ICD-10-CM

## 2021-10-29 LAB
ALBUMIN SERPL BCP-MCNC: 3.1 G/DL (ref 3.5–5.2)
ALP SERPL-CCNC: 106 U/L (ref 55–135)
ALT SERPL W/O P-5'-P-CCNC: 13 U/L (ref 10–44)
ANION GAP SERPL CALC-SCNC: 11 MMOL/L (ref 8–16)
AST SERPL-CCNC: 12 U/L (ref 10–40)
BILIRUB DIRECT SERPL-MCNC: 0.1 MG/DL (ref 0.1–0.3)
BILIRUB SERPL-MCNC: 0.3 MG/DL (ref 0.1–1)
BUN SERPL-MCNC: 24 MG/DL (ref 8–23)
CALCIUM SERPL-MCNC: 10.9 MG/DL (ref 8.7–10.5)
CHLORIDE SERPL-SCNC: 97 MMOL/L (ref 95–110)
CHOLEST SERPL-MCNC: 160 MG/DL (ref 120–199)
CHOLEST/HDLC SERPL: 4 {RATIO} (ref 2–5)
CO2 SERPL-SCNC: 34 MMOL/L (ref 23–29)
CREAT SERPL-MCNC: 1.8 MG/DL (ref 0.5–1.4)
EST. GFR  (AFRICAN AMERICAN): 33.1 ML/MIN/1.73 M^2
EST. GFR  (NON AFRICAN AMERICAN): 28.7 ML/MIN/1.73 M^2
GLUCOSE SERPL-MCNC: 120 MG/DL (ref 70–110)
HDLC SERPL-MCNC: 40 MG/DL (ref 40–75)
HDLC SERPL: 25 % (ref 20–50)
LDLC SERPL CALC-MCNC: 87.4 MG/DL (ref 63–159)
NONHDLC SERPL-MCNC: 120 MG/DL
POTASSIUM SERPL-SCNC: 3.2 MMOL/L (ref 3.5–5.1)
PROT SERPL-MCNC: 7 G/DL (ref 6–8.4)
SODIUM SERPL-SCNC: 142 MMOL/L (ref 136–145)
TRIGL SERPL-MCNC: 163 MG/DL (ref 30–150)

## 2021-10-29 PROCEDURE — 80048 BASIC METABOLIC PNL TOTAL CA: CPT | Performed by: INTERNAL MEDICINE

## 2021-10-29 PROCEDURE — 80076 HEPATIC FUNCTION PANEL: CPT | Performed by: INTERNAL MEDICINE

## 2021-10-29 PROCEDURE — 36415 COLL VENOUS BLD VENIPUNCTURE: CPT | Mod: PO | Performed by: INTERNAL MEDICINE

## 2021-10-29 PROCEDURE — 80061 LIPID PANEL: CPT | Performed by: FAMILY MEDICINE

## 2021-11-05 ENCOUNTER — OFFICE VISIT (OUTPATIENT)
Dept: CARDIOLOGY | Facility: CLINIC | Age: 66
End: 2021-11-05
Payer: MEDICARE

## 2021-11-05 VITALS
HEART RATE: 107 BPM | BODY MASS INDEX: 40.97 KG/M2 | HEIGHT: 67 IN | OXYGEN SATURATION: 95 % | WEIGHT: 261 LBS | DIASTOLIC BLOOD PRESSURE: 65 MMHG | SYSTOLIC BLOOD PRESSURE: 138 MMHG

## 2021-11-05 DIAGNOSIS — F33.1 MAJOR DEPRESSIVE DISORDER, RECURRENT, MODERATE: ICD-10-CM

## 2021-11-05 DIAGNOSIS — M54.32 SCIATICA OF LEFT SIDE: ICD-10-CM

## 2021-11-05 DIAGNOSIS — G47.33 OBSTRUCTIVE SLEEP APNEA: ICD-10-CM

## 2021-11-05 DIAGNOSIS — N18.30 STAGE 3 CHRONIC KIDNEY DISEASE, UNSPECIFIED WHETHER STAGE 3A OR 3B CKD: ICD-10-CM

## 2021-11-05 DIAGNOSIS — E66.01 SEVERE OBESITY (BMI 35.0-39.9) WITH COMORBIDITY: ICD-10-CM

## 2021-11-05 DIAGNOSIS — N18.32 TYPE 2 DIABETES MELLITUS WITH STAGE 3B CHRONIC KIDNEY DISEASE, WITHOUT LONG-TERM CURRENT USE OF INSULIN: ICD-10-CM

## 2021-11-05 DIAGNOSIS — I47.10 SVT (SUPRAVENTRICULAR TACHYCARDIA): Primary | ICD-10-CM

## 2021-11-05 DIAGNOSIS — I47.10 SVT (SUPRAVENTRICULAR TACHYCARDIA): ICD-10-CM

## 2021-11-05 DIAGNOSIS — Z98.890 HISTORY OF OPEN REDUCTION AND INTERNAL FIXATION (ORIF) PROCEDURE: ICD-10-CM

## 2021-11-05 DIAGNOSIS — I10 ESSENTIAL HYPERTENSION: ICD-10-CM

## 2021-11-05 DIAGNOSIS — I10 HYPERTENSION, UNSPECIFIED TYPE: Primary | ICD-10-CM

## 2021-11-05 DIAGNOSIS — E11.22 TYPE 2 DIABETES MELLITUS WITH STAGE 3B CHRONIC KIDNEY DISEASE, WITHOUT LONG-TERM CURRENT USE OF INSULIN: ICD-10-CM

## 2021-11-05 PROCEDURE — 99214 PR OFFICE/OUTPT VISIT, EST, LEVL IV, 30-39 MIN: ICD-10-PCS | Mod: S$GLB,,, | Performed by: INTERNAL MEDICINE

## 2021-11-05 PROCEDURE — 3288F PR FALLS RISK ASSESSMENT DOCUMENTED: ICD-10-PCS | Mod: CPTII,S$GLB,, | Performed by: INTERNAL MEDICINE

## 2021-11-05 PROCEDURE — 99999 PR PBB SHADOW E&M-EST. PATIENT-LVL III: CPT | Mod: PBBFAC,,, | Performed by: INTERNAL MEDICINE

## 2021-11-05 PROCEDURE — 99499 UNLISTED E&M SERVICE: CPT | Mod: S$GLB,,, | Performed by: INTERNAL MEDICINE

## 2021-11-05 PROCEDURE — 3078F PR MOST RECENT DIASTOLIC BLOOD PRESSURE < 80 MM HG: ICD-10-PCS | Mod: CPTII,S$GLB,, | Performed by: INTERNAL MEDICINE

## 2021-11-05 PROCEDURE — 1125F AMNT PAIN NOTED PAIN PRSNT: CPT | Mod: CPTII,S$GLB,, | Performed by: INTERNAL MEDICINE

## 2021-11-05 PROCEDURE — 99214 OFFICE O/P EST MOD 30 MIN: CPT | Mod: S$GLB,,, | Performed by: INTERNAL MEDICINE

## 2021-11-05 PROCEDURE — 1125F PR PAIN SEVERITY QUANTIFIED, PAIN PRESENT: ICD-10-PCS | Mod: CPTII,S$GLB,, | Performed by: INTERNAL MEDICINE

## 2021-11-05 PROCEDURE — 3008F BODY MASS INDEX DOCD: CPT | Mod: CPTII,S$GLB,, | Performed by: INTERNAL MEDICINE

## 2021-11-05 PROCEDURE — 1159F MED LIST DOCD IN RCRD: CPT | Mod: CPTII,S$GLB,, | Performed by: INTERNAL MEDICINE

## 2021-11-05 PROCEDURE — 3075F PR MOST RECENT SYSTOLIC BLOOD PRESS GE 130-139MM HG: ICD-10-PCS | Mod: CPTII,S$GLB,, | Performed by: INTERNAL MEDICINE

## 2021-11-05 PROCEDURE — 3008F PR BODY MASS INDEX (BMI) DOCUMENTED: ICD-10-PCS | Mod: CPTII,S$GLB,, | Performed by: INTERNAL MEDICINE

## 2021-11-05 PROCEDURE — 99499 RISK ADDL DX/OHS AUDIT: ICD-10-PCS | Mod: S$GLB,,, | Performed by: INTERNAL MEDICINE

## 2021-11-05 PROCEDURE — 1101F PR PT FALLS ASSESS DOC 0-1 FALLS W/OUT INJ PAST YR: ICD-10-PCS | Mod: CPTII,S$GLB,, | Performed by: INTERNAL MEDICINE

## 2021-11-05 PROCEDURE — 1157F PR ADVANCE CARE PLAN OR EQUIV PRESENT IN MEDICAL RECORD: ICD-10-PCS | Mod: CPTII,S$GLB,, | Performed by: INTERNAL MEDICINE

## 2021-11-05 PROCEDURE — 3075F SYST BP GE 130 - 139MM HG: CPT | Mod: CPTII,S$GLB,, | Performed by: INTERNAL MEDICINE

## 2021-11-05 PROCEDURE — 1159F PR MEDICATION LIST DOCUMENTED IN MEDICAL RECORD: ICD-10-PCS | Mod: CPTII,S$GLB,, | Performed by: INTERNAL MEDICINE

## 2021-11-05 PROCEDURE — 1101F PT FALLS ASSESS-DOCD LE1/YR: CPT | Mod: CPTII,S$GLB,, | Performed by: INTERNAL MEDICINE

## 2021-11-05 PROCEDURE — 3288F FALL RISK ASSESSMENT DOCD: CPT | Mod: CPTII,S$GLB,, | Performed by: INTERNAL MEDICINE

## 2021-11-05 PROCEDURE — 3078F DIAST BP <80 MM HG: CPT | Mod: CPTII,S$GLB,, | Performed by: INTERNAL MEDICINE

## 2021-11-05 PROCEDURE — 3044F PR MOST RECENT HEMOGLOBIN A1C LEVEL <7.0%: ICD-10-PCS | Mod: CPTII,S$GLB,, | Performed by: INTERNAL MEDICINE

## 2021-11-05 PROCEDURE — 3044F HG A1C LEVEL LT 7.0%: CPT | Mod: CPTII,S$GLB,, | Performed by: INTERNAL MEDICINE

## 2021-11-05 PROCEDURE — 1157F ADVNC CARE PLAN IN RCRD: CPT | Mod: CPTII,S$GLB,, | Performed by: INTERNAL MEDICINE

## 2021-11-05 PROCEDURE — 99999 PR PBB SHADOW E&M-EST. PATIENT-LVL III: ICD-10-PCS | Mod: PBBFAC,,, | Performed by: INTERNAL MEDICINE

## 2021-11-10 ENCOUNTER — PATIENT MESSAGE (OUTPATIENT)
Dept: ELECTROPHYSIOLOGY | Facility: CLINIC | Age: 66
End: 2021-11-10
Payer: MEDICARE

## 2021-11-10 ENCOUNTER — TELEPHONE (OUTPATIENT)
Dept: ELECTROPHYSIOLOGY | Facility: CLINIC | Age: 66
End: 2021-11-10
Payer: MEDICARE

## 2021-11-23 ENCOUNTER — PATIENT OUTREACH (OUTPATIENT)
Dept: ADMINISTRATIVE | Facility: OTHER | Age: 66
End: 2021-11-23
Payer: MEDICARE

## 2021-11-29 ENCOUNTER — PES CALL (OUTPATIENT)
Dept: ADMINISTRATIVE | Facility: CLINIC | Age: 66
End: 2021-11-29
Payer: MEDICARE

## 2021-11-30 ENCOUNTER — TELEPHONE (OUTPATIENT)
Dept: FAMILY MEDICINE | Facility: CLINIC | Age: 66
End: 2021-11-30
Payer: MEDICARE

## 2021-11-30 ENCOUNTER — TELEPHONE (OUTPATIENT)
Dept: ADMINISTRATIVE | Facility: CLINIC | Age: 66
End: 2021-11-30
Payer: MEDICARE

## 2021-12-08 ENCOUNTER — PATIENT MESSAGE (OUTPATIENT)
Dept: ADMINISTRATIVE | Facility: HOSPITAL | Age: 66
End: 2021-12-08
Payer: MEDICARE

## 2021-12-15 ENCOUNTER — HOSPITAL ENCOUNTER (EMERGENCY)
Facility: HOSPITAL | Age: 66
Discharge: HOME | End: 2021-12-15
Attending: EMERGENCY MEDICINE
Payer: MEDICARE

## 2021-12-15 VITALS
HEART RATE: 90 BPM | WEIGHT: 246 LBS | TEMPERATURE: 98 F | BODY MASS INDEX: 38.53 KG/M2 | DIASTOLIC BLOOD PRESSURE: 66 MMHG | OXYGEN SATURATION: 94 % | SYSTOLIC BLOOD PRESSURE: 145 MMHG | RESPIRATION RATE: 20 BRPM

## 2021-12-15 DIAGNOSIS — R06.02 SHORTNESS OF BREATH: ICD-10-CM

## 2021-12-15 DIAGNOSIS — M54.32 SCIATICA OF LEFT SIDE: ICD-10-CM

## 2021-12-15 DIAGNOSIS — R06.09 DYSPNEA ON EXERTION: ICD-10-CM

## 2021-12-15 DIAGNOSIS — R06.02 SOB (SHORTNESS OF BREATH): Primary | ICD-10-CM

## 2021-12-15 LAB
ALBUMIN SERPL BCP-MCNC: 3.2 G/DL (ref 3.5–5.2)
ALP SERPL-CCNC: 115 U/L (ref 55–135)
ALT SERPL W/O P-5'-P-CCNC: 15 U/L (ref 10–44)
ANION GAP SERPL CALC-SCNC: 14 MMOL/L (ref 8–16)
AST SERPL-CCNC: 13 U/L (ref 10–40)
BASOPHILS # BLD AUTO: 0.05 K/UL (ref 0–0.2)
BASOPHILS NFR BLD: 0.4 % (ref 0–1.9)
BILIRUB SERPL-MCNC: 0.4 MG/DL (ref 0.1–1)
BNP SERPL-MCNC: 25 PG/ML (ref 0–99)
BUN SERPL-MCNC: 23 MG/DL (ref 8–23)
CALCIUM SERPL-MCNC: 10.5 MG/DL (ref 8.7–10.5)
CHLORIDE SERPL-SCNC: 98 MMOL/L (ref 95–110)
CO2 SERPL-SCNC: 29 MMOL/L (ref 23–29)
CREAT SERPL-MCNC: 1.7 MG/DL (ref 0.5–1.4)
CTP QC/QA: YES
D DIMER PPP IA.FEU-MCNC: 0.59 MG/L FEU
DIFFERENTIAL METHOD: ABNORMAL
EOSINOPHIL # BLD AUTO: 0.2 K/UL (ref 0–0.5)
EOSINOPHIL NFR BLD: 2 % (ref 0–8)
ERYTHROCYTE [DISTWIDTH] IN BLOOD BY AUTOMATED COUNT: 13.1 % (ref 11.5–14.5)
EST. GFR  (AFRICAN AMERICAN): 35 ML/MIN/1.73 M^2
EST. GFR  (NON AFRICAN AMERICAN): 31 ML/MIN/1.73 M^2
GLUCOSE SERPL-MCNC: 138 MG/DL (ref 70–110)
HCT VFR BLD AUTO: 34.8 % (ref 37–48.5)
HGB BLD-MCNC: 11.4 G/DL (ref 12–16)
IMM GRANULOCYTES # BLD AUTO: 0.05 K/UL (ref 0–0.04)
IMM GRANULOCYTES NFR BLD AUTO: 0.4 % (ref 0–0.5)
LYMPHOCYTES # BLD AUTO: 3.3 K/UL (ref 1–4.8)
LYMPHOCYTES NFR BLD: 27.1 % (ref 18–48)
MCH RBC QN AUTO: 29.2 PG (ref 27–31)
MCHC RBC AUTO-ENTMCNC: 32.8 G/DL (ref 32–36)
MCV RBC AUTO: 89 FL (ref 82–98)
MONOCYTES # BLD AUTO: 0.7 K/UL (ref 0.3–1)
MONOCYTES NFR BLD: 5.9 % (ref 4–15)
NEUTROPHILS # BLD AUTO: 7.8 K/UL (ref 1.8–7.7)
NEUTROPHILS NFR BLD: 64.2 % (ref 38–73)
NRBC BLD-RTO: 0 /100 WBC
PLATELET # BLD AUTO: 338 K/UL (ref 150–450)
PMV BLD AUTO: 9.7 FL (ref 9.2–12.9)
POTASSIUM SERPL-SCNC: 3 MMOL/L (ref 3.5–5.1)
PROT SERPL-MCNC: 7.1 G/DL (ref 6–8.4)
RBC # BLD AUTO: 3.91 M/UL (ref 4–5.4)
SARS-COV-2 RDRP RESP QL NAA+PROBE: NEGATIVE
SODIUM SERPL-SCNC: 141 MMOL/L (ref 136–145)
TROPONIN I SERPL DL<=0.01 NG/ML-MCNC: 0.01 NG/ML (ref 0–0.03)
WBC # BLD AUTO: 12.16 K/UL (ref 3.9–12.7)

## 2021-12-15 PROCEDURE — 93005 ELECTROCARDIOGRAM TRACING: CPT

## 2021-12-15 PROCEDURE — U0002 COVID-19 LAB TEST NON-CDC: HCPCS | Performed by: EMERGENCY MEDICINE

## 2021-12-15 PROCEDURE — 83880 ASSAY OF NATRIURETIC PEPTIDE: CPT | Performed by: EMERGENCY MEDICINE

## 2021-12-15 PROCEDURE — 63600175 PHARM REV CODE 636 W HCPCS: Performed by: EMERGENCY MEDICINE

## 2021-12-15 PROCEDURE — 94640 AIRWAY INHALATION TREATMENT: CPT

## 2021-12-15 PROCEDURE — 80053 COMPREHEN METABOLIC PANEL: CPT | Performed by: EMERGENCY MEDICINE

## 2021-12-15 PROCEDURE — 96374 THER/PROPH/DIAG INJ IV PUSH: CPT

## 2021-12-15 PROCEDURE — 93010 ELECTROCARDIOGRAM REPORT: CPT | Mod: 76,,, | Performed by: INTERNAL MEDICINE

## 2021-12-15 PROCEDURE — 25500020 PHARM REV CODE 255: Performed by: EMERGENCY MEDICINE

## 2021-12-15 PROCEDURE — 99285 EMERGENCY DEPT VISIT HI MDM: CPT | Mod: 25

## 2021-12-15 PROCEDURE — 25000242 PHARM REV CODE 250 ALT 637 W/ HCPCS: Performed by: EMERGENCY MEDICINE

## 2021-12-15 PROCEDURE — 93010 EKG 12-LEAD: ICD-10-PCS | Mod: ,,, | Performed by: INTERNAL MEDICINE

## 2021-12-15 PROCEDURE — 25000003 PHARM REV CODE 250: Performed by: EMERGENCY MEDICINE

## 2021-12-15 PROCEDURE — 85379 FIBRIN DEGRADATION QUANT: CPT | Performed by: EMERGENCY MEDICINE

## 2021-12-15 PROCEDURE — 84484 ASSAY OF TROPONIN QUANT: CPT | Performed by: EMERGENCY MEDICINE

## 2021-12-15 PROCEDURE — 85025 COMPLETE CBC W/AUTO DIFF WBC: CPT | Performed by: EMERGENCY MEDICINE

## 2021-12-15 PROCEDURE — 93010 ELECTROCARDIOGRAM REPORT: CPT | Mod: ,,, | Performed by: INTERNAL MEDICINE

## 2021-12-15 RX ORDER — FUROSEMIDE 10 MG/ML
40 INJECTION INTRAMUSCULAR; INTRAVENOUS
Status: COMPLETED | OUTPATIENT
Start: 2021-12-15 | End: 2021-12-15

## 2021-12-15 RX ORDER — IPRATROPIUM BROMIDE AND ALBUTEROL SULFATE 2.5; .5 MG/3ML; MG/3ML
3 SOLUTION RESPIRATORY (INHALATION)
Status: COMPLETED | OUTPATIENT
Start: 2021-12-15 | End: 2021-12-15

## 2021-12-15 RX ORDER — POTASSIUM CHLORIDE 20 MEQ/1
40 TABLET, EXTENDED RELEASE ORAL
Status: COMPLETED | OUTPATIENT
Start: 2021-12-15 | End: 2021-12-15

## 2021-12-15 RX ADMIN — IPRATROPIUM BROMIDE AND ALBUTEROL SULFATE 3 ML: .5; 3 SOLUTION RESPIRATORY (INHALATION) at 08:12

## 2021-12-15 RX ADMIN — IOHEXOL 70 ML: 350 INJECTION, SOLUTION INTRAVENOUS at 08:12

## 2021-12-15 RX ADMIN — FUROSEMIDE 40 MG: 10 INJECTION, SOLUTION INTRAMUSCULAR; INTRAVENOUS at 09:12

## 2021-12-15 RX ADMIN — POTASSIUM CHLORIDE 40 MEQ: 1500 TABLET, EXTENDED RELEASE ORAL at 09:12

## 2021-12-29 ENCOUNTER — OFFICE VISIT (OUTPATIENT)
Dept: FAMILY MEDICINE | Facility: CLINIC | Age: 66
End: 2021-12-29
Payer: MEDICARE

## 2021-12-29 VITALS
RESPIRATION RATE: 20 BRPM | BODY MASS INDEX: 39.1 KG/M2 | HEIGHT: 67 IN | OXYGEN SATURATION: 98 % | WEIGHT: 249.13 LBS | SYSTOLIC BLOOD PRESSURE: 138 MMHG | DIASTOLIC BLOOD PRESSURE: 84 MMHG | TEMPERATURE: 98 F | HEART RATE: 103 BPM

## 2021-12-29 DIAGNOSIS — F33.1 MAJOR DEPRESSIVE DISORDER, RECURRENT, MODERATE: ICD-10-CM

## 2021-12-29 DIAGNOSIS — M54.32 SCIATICA OF LEFT SIDE: ICD-10-CM

## 2021-12-29 DIAGNOSIS — Z20.1 HISTORY OF TUBERCULOSIS EXPOSURE: ICD-10-CM

## 2021-12-29 DIAGNOSIS — E11.22 TYPE 2 DIABETES MELLITUS WITH STAGE 3B CHRONIC KIDNEY DISEASE, WITHOUT LONG-TERM CURRENT USE OF INSULIN: Primary | ICD-10-CM

## 2021-12-29 DIAGNOSIS — N18.32 TYPE 2 DIABETES MELLITUS WITH STAGE 3B CHRONIC KIDNEY DISEASE, WITHOUT LONG-TERM CURRENT USE OF INSULIN: Primary | ICD-10-CM

## 2021-12-29 DIAGNOSIS — M96.1 LUMBAR POST-LAMINECTOMY SYNDROME: ICD-10-CM

## 2021-12-29 DIAGNOSIS — I10 ESSENTIAL HYPERTENSION: ICD-10-CM

## 2021-12-29 PROCEDURE — 99999 PR PBB SHADOW E&M-EST. PATIENT-LVL III: ICD-10-PCS | Mod: PBBFAC,,, | Performed by: FAMILY MEDICINE

## 2021-12-29 PROCEDURE — 3288F FALL RISK ASSESSMENT DOCD: CPT | Mod: CPTII,S$GLB,, | Performed by: FAMILY MEDICINE

## 2021-12-29 PROCEDURE — 3079F DIAST BP 80-89 MM HG: CPT | Mod: CPTII,S$GLB,, | Performed by: FAMILY MEDICINE

## 2021-12-29 PROCEDURE — 3008F BODY MASS INDEX DOCD: CPT | Mod: CPTII,S$GLB,, | Performed by: FAMILY MEDICINE

## 2021-12-29 PROCEDURE — 3075F PR MOST RECENT SYSTOLIC BLOOD PRESS GE 130-139MM HG: ICD-10-PCS | Mod: CPTII,S$GLB,, | Performed by: FAMILY MEDICINE

## 2021-12-29 PROCEDURE — 1125F PR PAIN SEVERITY QUANTIFIED, PAIN PRESENT: ICD-10-PCS | Mod: CPTII,S$GLB,, | Performed by: FAMILY MEDICINE

## 2021-12-29 PROCEDURE — 99999 PR PBB SHADOW E&M-EST. PATIENT-LVL III: CPT | Mod: PBBFAC,,, | Performed by: FAMILY MEDICINE

## 2021-12-29 PROCEDURE — 99499 RISK ADDL DX/OHS AUDIT: ICD-10-PCS | Mod: S$GLB,,, | Performed by: FAMILY MEDICINE

## 2021-12-29 PROCEDURE — 99499 UNLISTED E&M SERVICE: CPT | Mod: S$GLB,,, | Performed by: FAMILY MEDICINE

## 2021-12-29 PROCEDURE — 3008F PR BODY MASS INDEX (BMI) DOCUMENTED: ICD-10-PCS | Mod: CPTII,S$GLB,, | Performed by: FAMILY MEDICINE

## 2021-12-29 PROCEDURE — 99214 PR OFFICE/OUTPT VISIT, EST, LEVL IV, 30-39 MIN: ICD-10-PCS | Mod: S$GLB,,, | Performed by: FAMILY MEDICINE

## 2021-12-29 PROCEDURE — 3079F PR MOST RECENT DIASTOLIC BLOOD PRESSURE 80-89 MM HG: ICD-10-PCS | Mod: CPTII,S$GLB,, | Performed by: FAMILY MEDICINE

## 2021-12-29 PROCEDURE — 3044F PR MOST RECENT HEMOGLOBIN A1C LEVEL <7.0%: ICD-10-PCS | Mod: CPTII,S$GLB,, | Performed by: FAMILY MEDICINE

## 2021-12-29 PROCEDURE — 1157F ADVNC CARE PLAN IN RCRD: CPT | Mod: CPTII,S$GLB,, | Performed by: FAMILY MEDICINE

## 2021-12-29 PROCEDURE — 1101F PR PT FALLS ASSESS DOC 0-1 FALLS W/OUT INJ PAST YR: ICD-10-PCS | Mod: CPTII,S$GLB,, | Performed by: FAMILY MEDICINE

## 2021-12-29 PROCEDURE — 1159F PR MEDICATION LIST DOCUMENTED IN MEDICAL RECORD: ICD-10-PCS | Mod: CPTII,S$GLB,, | Performed by: FAMILY MEDICINE

## 2021-12-29 PROCEDURE — 1160F RVW MEDS BY RX/DR IN RCRD: CPT | Mod: CPTII,S$GLB,, | Performed by: FAMILY MEDICINE

## 2021-12-29 PROCEDURE — 1125F AMNT PAIN NOTED PAIN PRSNT: CPT | Mod: CPTII,S$GLB,, | Performed by: FAMILY MEDICINE

## 2021-12-29 PROCEDURE — 1157F PR ADVANCE CARE PLAN OR EQUIV PRESENT IN MEDICAL RECORD: ICD-10-PCS | Mod: CPTII,S$GLB,, | Performed by: FAMILY MEDICINE

## 2021-12-29 PROCEDURE — 1159F MED LIST DOCD IN RCRD: CPT | Mod: CPTII,S$GLB,, | Performed by: FAMILY MEDICINE

## 2021-12-29 PROCEDURE — 3044F HG A1C LEVEL LT 7.0%: CPT | Mod: CPTII,S$GLB,, | Performed by: FAMILY MEDICINE

## 2021-12-29 PROCEDURE — 99214 OFFICE O/P EST MOD 30 MIN: CPT | Mod: S$GLB,,, | Performed by: FAMILY MEDICINE

## 2021-12-29 PROCEDURE — 1101F PT FALLS ASSESS-DOCD LE1/YR: CPT | Mod: CPTII,S$GLB,, | Performed by: FAMILY MEDICINE

## 2021-12-29 PROCEDURE — 3075F SYST BP GE 130 - 139MM HG: CPT | Mod: CPTII,S$GLB,, | Performed by: FAMILY MEDICINE

## 2021-12-29 PROCEDURE — 3288F PR FALLS RISK ASSESSMENT DOCUMENTED: ICD-10-PCS | Mod: CPTII,S$GLB,, | Performed by: FAMILY MEDICINE

## 2021-12-29 PROCEDURE — 1160F PR REVIEW ALL MEDS BY PRESCRIBER/CLIN PHARMACIST DOCUMENTED: ICD-10-PCS | Mod: CPTII,S$GLB,, | Performed by: FAMILY MEDICINE

## 2021-12-29 RX ORDER — HYDROCHLOROTHIAZIDE 25 MG/1
TABLET ORAL
Qty: 90 TABLET | Refills: 1 | Status: SHIPPED | OUTPATIENT
Start: 2021-12-29 | End: 2022-03-24 | Stop reason: SDUPTHER

## 2021-12-29 RX ORDER — DIAZEPAM 5 MG/1
5 TABLET ORAL EVERY 12 HOURS PRN
Qty: 60 TABLET | Refills: 2 | Status: SHIPPED | OUTPATIENT
Start: 2021-12-29 | End: 2022-07-11

## 2021-12-29 RX ORDER — GABAPENTIN 300 MG/1
300 CAPSULE ORAL 3 TIMES DAILY
Qty: 90 CAPSULE | Refills: 5 | Status: SHIPPED | OUTPATIENT
Start: 2021-12-29 | End: 2023-01-27 | Stop reason: SDUPTHER

## 2021-12-29 RX ORDER — GLIPIZIDE 10 MG/1
10 TABLET, FILM COATED, EXTENDED RELEASE ORAL
Qty: 90 TABLET | Refills: 1 | Status: SHIPPED | OUTPATIENT
Start: 2021-12-29 | End: 2022-03-24 | Stop reason: SDUPTHER

## 2021-12-29 RX ORDER — AMLODIPINE BESYLATE 10 MG/1
10 TABLET ORAL DAILY
Qty: 90 TABLET | Refills: 1 | Status: SHIPPED | OUTPATIENT
Start: 2021-12-29 | End: 2022-03-24 | Stop reason: SDUPTHER

## 2021-12-29 RX ORDER — TIZANIDINE 4 MG/1
TABLET ORAL
Qty: 60 TABLET | Refills: 5 | Status: SHIPPED | OUTPATIENT
Start: 2021-12-29 | End: 2022-06-20

## 2022-02-09 ENCOUNTER — PATIENT OUTREACH (OUTPATIENT)
Dept: ADMINISTRATIVE | Facility: HOSPITAL | Age: 67
End: 2022-02-09
Payer: MEDICARE

## 2022-02-09 NOTE — LETTER
AUTHORIZATION FOR RELEASE OF   CONFIDENTIAL INFORMATION    Dear Dr Cruz,    We are seeing Gwendolyn Fournier, date of birth 1955, in the clinic at [facility]. Azikiwe K Lombard, MD is the patient's PCP. Gwendolyn Fournier has an outstanding lab/procedure at the time we reviewed her chart. In order to help keep her health information updated, she has authorized us to request the following medical record(s):        (  )  MAMMOGRAM                                      (  )  COLONOSCOPY      (  )  PAP SMEAR                                          (  )  OUTSIDE LAB RESULTS     (  )  DEXA SCAN                                          ( X )  EYE EXAM            (  )  FOOT EXAM                                          (  )  ENTIRE RECORD     (  )  OUTSIDE IMMUNIZATIONS                 (  )  _______________         Please fax records to [facility], Azikiwe K Lombard, MD at [phone]    Thanks so much and have a great day!!    Jaci Miranda LPN [address] [address]  - [phone]   [phone]           Patient Name: Gwendolyn Fournier  : 1955  Patient Phone #: [phone]

## 2022-03-16 ENCOUNTER — PATIENT MESSAGE (OUTPATIENT)
Dept: ADMINISTRATIVE | Facility: OTHER | Age: 67
End: 2022-03-16
Payer: MEDICARE

## 2022-03-16 ENCOUNTER — PATIENT OUTREACH (OUTPATIENT)
Dept: ADMINISTRATIVE | Facility: OTHER | Age: 67
End: 2022-03-16
Payer: MEDICARE

## 2022-03-16 DIAGNOSIS — E11.9 TYPE 2 DIABETES MELLITUS WITHOUT COMPLICATION, UNSPECIFIED WHETHER LONG TERM INSULIN USE: Primary | ICD-10-CM

## 2022-03-16 NOTE — PROGRESS NOTES
Health Maintenance Due   Topic Date Due    Diabetes Urine Screening  Never done    TETANUS VACCINE  Never done    Mammogram  04/30/2014    Foot Exam  07/30/2021    DEXA Scan  02/20/2022    Hemoglobin A1c  03/15/2022     Updates were requested from care everywhere.  Chart was reviewed for overdue Proactive [facility] Encounters (LONG) topics (CRS, Breast Cancer Screening, Eye exam)  Health Maintenance has been updated.  LINKS immunization registry triggered.  Immunizations were reconciled.  Mammogram task ticket sent   A1c ordered.

## 2022-03-24 ENCOUNTER — OFFICE VISIT (OUTPATIENT)
Dept: FAMILY MEDICINE | Facility: CLINIC | Age: 67
End: 2022-03-24
Payer: MEDICARE

## 2022-03-24 ENCOUNTER — LAB VISIT (OUTPATIENT)
Dept: LAB | Facility: HOSPITAL | Age: 67
End: 2022-03-24
Attending: FAMILY MEDICINE
Payer: MEDICARE

## 2022-03-24 VITALS
OXYGEN SATURATION: 96 % | SYSTOLIC BLOOD PRESSURE: 138 MMHG | RESPIRATION RATE: 16 BRPM | BODY MASS INDEX: 39.55 KG/M2 | TEMPERATURE: 99 F | WEIGHT: 252 LBS | DIASTOLIC BLOOD PRESSURE: 64 MMHG | HEART RATE: 89 BPM | HEIGHT: 67 IN

## 2022-03-24 DIAGNOSIS — Z12.31 ENCOUNTER FOR SCREENING MAMMOGRAM FOR BREAST CANCER: ICD-10-CM

## 2022-03-24 DIAGNOSIS — L60.3 DYSTROPHIC NAIL: ICD-10-CM

## 2022-03-24 DIAGNOSIS — N18.32 TYPE 2 DIABETES MELLITUS WITH STAGE 3B CHRONIC KIDNEY DISEASE, WITHOUT LONG-TERM CURRENT USE OF INSULIN: ICD-10-CM

## 2022-03-24 DIAGNOSIS — E11.22 TYPE 2 DIABETES MELLITUS WITH STAGE 3B CHRONIC KIDNEY DISEASE, WITHOUT LONG-TERM CURRENT USE OF INSULIN: Primary | ICD-10-CM

## 2022-03-24 DIAGNOSIS — E11.22 TYPE 2 DIABETES MELLITUS WITH STAGE 3B CHRONIC KIDNEY DISEASE, WITHOUT LONG-TERM CURRENT USE OF INSULIN: ICD-10-CM

## 2022-03-24 DIAGNOSIS — N18.32 TYPE 2 DIABETES MELLITUS WITH STAGE 3B CHRONIC KIDNEY DISEASE, WITHOUT LONG-TERM CURRENT USE OF INSULIN: Primary | ICD-10-CM

## 2022-03-24 DIAGNOSIS — I10 ESSENTIAL HYPERTENSION: ICD-10-CM

## 2022-03-24 DIAGNOSIS — D57.3 SICKLE CELL TRAIT: ICD-10-CM

## 2022-03-24 DIAGNOSIS — M46.1 SACROILIITIS, NOT ELSEWHERE CLASSIFIED: ICD-10-CM

## 2022-03-24 DIAGNOSIS — F51.04 PSYCHOPHYSIOLOGICAL INSOMNIA: ICD-10-CM

## 2022-03-24 DIAGNOSIS — M15.9 PRIMARY OSTEOARTHRITIS INVOLVING MULTIPLE JOINTS: ICD-10-CM

## 2022-03-24 DIAGNOSIS — I47.10 SVT (SUPRAVENTRICULAR TACHYCARDIA): ICD-10-CM

## 2022-03-24 DIAGNOSIS — Z78.0 POST-MENOPAUSE: ICD-10-CM

## 2022-03-24 LAB
ANION GAP SERPL CALC-SCNC: 11 MMOL/L (ref 8–16)
BUN SERPL-MCNC: 34 MG/DL (ref 8–23)
CALCIUM SERPL-MCNC: 10.9 MG/DL (ref 8.7–10.5)
CHLORIDE SERPL-SCNC: 97 MMOL/L (ref 95–110)
CO2 SERPL-SCNC: 36 MMOL/L (ref 23–29)
CREAT SERPL-MCNC: 2 MG/DL (ref 0.5–1.4)
EST. GFR  (AFRICAN AMERICAN): 28.9 ML/MIN/1.73 M^2
EST. GFR  (NON AFRICAN AMERICAN): 25.1 ML/MIN/1.73 M^2
GLUCOSE SERPL-MCNC: 164 MG/DL (ref 70–110)
POTASSIUM SERPL-SCNC: 3 MMOL/L (ref 3.5–5.1)
SODIUM SERPL-SCNC: 144 MMOL/L (ref 136–145)

## 2022-03-24 PROCEDURE — 3008F PR BODY MASS INDEX (BMI) DOCUMENTED: ICD-10-PCS | Mod: CPTII,S$GLB,, | Performed by: FAMILY MEDICINE

## 2022-03-24 PROCEDURE — 1125F PR PAIN SEVERITY QUANTIFIED, PAIN PRESENT: ICD-10-PCS | Mod: CPTII,S$GLB,, | Performed by: FAMILY MEDICINE

## 2022-03-24 PROCEDURE — 99214 OFFICE O/P EST MOD 30 MIN: CPT | Mod: S$GLB,,, | Performed by: FAMILY MEDICINE

## 2022-03-24 PROCEDURE — 1100F PR PT FALLS ASSESS DOC 2+ FALLS/FALL W/INJURY/YR: ICD-10-PCS | Mod: CPTII,S$GLB,, | Performed by: FAMILY MEDICINE

## 2022-03-24 PROCEDURE — 3075F PR MOST RECENT SYSTOLIC BLOOD PRESS GE 130-139MM HG: ICD-10-PCS | Mod: CPTII,S$GLB,, | Performed by: FAMILY MEDICINE

## 2022-03-24 PROCEDURE — 36415 COLL VENOUS BLD VENIPUNCTURE: CPT | Mod: PO | Performed by: FAMILY MEDICINE

## 2022-03-24 PROCEDURE — 1160F RVW MEDS BY RX/DR IN RCRD: CPT | Mod: CPTII,S$GLB,, | Performed by: FAMILY MEDICINE

## 2022-03-24 PROCEDURE — 1100F PTFALLS ASSESS-DOCD GE2>/YR: CPT | Mod: CPTII,S$GLB,, | Performed by: FAMILY MEDICINE

## 2022-03-24 PROCEDURE — 1125F AMNT PAIN NOTED PAIN PRSNT: CPT | Mod: CPTII,S$GLB,, | Performed by: FAMILY MEDICINE

## 2022-03-24 PROCEDURE — 3078F DIAST BP <80 MM HG: CPT | Mod: CPTII,S$GLB,, | Performed by: FAMILY MEDICINE

## 2022-03-24 PROCEDURE — 3008F BODY MASS INDEX DOCD: CPT | Mod: CPTII,S$GLB,, | Performed by: FAMILY MEDICINE

## 2022-03-24 PROCEDURE — 3075F SYST BP GE 130 - 139MM HG: CPT | Mod: CPTII,S$GLB,, | Performed by: FAMILY MEDICINE

## 2022-03-24 PROCEDURE — 99499 RISK ADDL DX/OHS AUDIT: ICD-10-PCS | Mod: S$GLB,,, | Performed by: FAMILY MEDICINE

## 2022-03-24 PROCEDURE — 1159F PR MEDICATION LIST DOCUMENTED IN MEDICAL RECORD: ICD-10-PCS | Mod: CPTII,S$GLB,, | Performed by: FAMILY MEDICINE

## 2022-03-24 PROCEDURE — 99999 PR PBB SHADOW E&M-EST. PATIENT-LVL V: ICD-10-PCS | Mod: PBBFAC,,, | Performed by: FAMILY MEDICINE

## 2022-03-24 PROCEDURE — 99499 UNLISTED E&M SERVICE: CPT | Mod: S$GLB,,, | Performed by: FAMILY MEDICINE

## 2022-03-24 PROCEDURE — 99999 PR PBB SHADOW E&M-EST. PATIENT-LVL V: CPT | Mod: PBBFAC,,, | Performed by: FAMILY MEDICINE

## 2022-03-24 PROCEDURE — 99214 PR OFFICE/OUTPT VISIT, EST, LEVL IV, 30-39 MIN: ICD-10-PCS | Mod: S$GLB,,, | Performed by: FAMILY MEDICINE

## 2022-03-24 PROCEDURE — 80048 BASIC METABOLIC PNL TOTAL CA: CPT | Performed by: FAMILY MEDICINE

## 2022-03-24 PROCEDURE — 1159F MED LIST DOCD IN RCRD: CPT | Mod: CPTII,S$GLB,, | Performed by: FAMILY MEDICINE

## 2022-03-24 PROCEDURE — 1157F ADVNC CARE PLAN IN RCRD: CPT | Mod: CPTII,S$GLB,, | Performed by: FAMILY MEDICINE

## 2022-03-24 PROCEDURE — 1157F PR ADVANCE CARE PLAN OR EQUIV PRESENT IN MEDICAL RECORD: ICD-10-PCS | Mod: CPTII,S$GLB,, | Performed by: FAMILY MEDICINE

## 2022-03-24 PROCEDURE — 1160F PR REVIEW ALL MEDS BY PRESCRIBER/CLIN PHARMACIST DOCUMENTED: ICD-10-PCS | Mod: CPTII,S$GLB,, | Performed by: FAMILY MEDICINE

## 2022-03-24 PROCEDURE — 3078F PR MOST RECENT DIASTOLIC BLOOD PRESSURE < 80 MM HG: ICD-10-PCS | Mod: CPTII,S$GLB,, | Performed by: FAMILY MEDICINE

## 2022-03-24 PROCEDURE — 3288F FALL RISK ASSESSMENT DOCD: CPT | Mod: CPTII,S$GLB,, | Performed by: FAMILY MEDICINE

## 2022-03-24 PROCEDURE — 3288F PR FALLS RISK ASSESSMENT DOCUMENTED: ICD-10-PCS | Mod: CPTII,S$GLB,, | Performed by: FAMILY MEDICINE

## 2022-03-24 RX ORDER — AMLODIPINE BESYLATE 10 MG/1
10 TABLET ORAL DAILY
Qty: 90 TABLET | Refills: 1 | Status: SHIPPED | OUTPATIENT
Start: 2022-03-24 | End: 2022-09-19

## 2022-03-24 RX ORDER — HYDROCHLOROTHIAZIDE 25 MG/1
TABLET ORAL
Qty: 90 TABLET | Refills: 1 | Status: SHIPPED | OUTPATIENT
Start: 2022-03-24 | End: 2022-09-19

## 2022-03-24 RX ORDER — QUETIAPINE FUMARATE 100 MG/1
TABLET, FILM COATED ORAL
Qty: 90 TABLET | Refills: 1 | Status: SHIPPED | OUTPATIENT
Start: 2022-03-24 | End: 2022-11-14

## 2022-03-24 RX ORDER — GLIPIZIDE 10 MG/1
10 TABLET, FILM COATED, EXTENDED RELEASE ORAL
Qty: 90 TABLET | Refills: 1 | Status: SHIPPED | OUTPATIENT
Start: 2022-03-24 | End: 2022-12-22

## 2022-03-24 NOTE — PROGRESS NOTES
Health Maintenance Due   Topic     Diabetes Urine Screening  Consult pcp    TETANUS VACCINE      Mammogram  Will schedule    Foot Exam  Pt agree if pcp have time    DEXA Scan  Will schedule    Hemoglobin A1c  Consult pcp

## 2022-03-24 NOTE — PROGRESS NOTES
Chief Complaint   Patient presents with    Diabetes     Follow up    Tremors     One month       Gwendolyn Fournier is a 67 y.o. female who presents per chief complain.  Chronic medical issues, if present, have been documented.  Acute medical issues, if present have been documented in the Chief Complaint.     Diabetes  She presents for her follow-up diabetic visit. She has type 2 diabetes mellitus. Her disease course has been stable. Hypoglycemia symptoms include nervousness/anxiousness and tremors (both hands with prolonged exertion). Pertinent negatives for hypoglycemia include no confusion, dizziness, headaches, hunger, mood changes, pallor, seizures, sleepiness, speech difficulty or sweats. Associated symptoms include fatigue and weakness. Pertinent negatives for diabetes include no blurred vision, no chest pain and no visual change. There are no hypoglycemic complications. There are no diabetic complications. Pertinent negatives for diabetic complications include no CVA, PVD or retinopathy. Risk factors for coronary artery disease include hypertension, post-menopausal, sedentary lifestyle and diabetes mellitus.   Shortness of Breath  This is a recurrent problem. The current episode started more than 1 month ago. The problem occurs daily. The problem has been waxing and waning. Pertinent negatives include no abdominal pain, chest pain, claudication, coryza, ear pain, fever, headaches, hemoptysis, leg pain, leg swelling, neck pain, orthopnea, PND, rash, rhinorrhea, sore throat, sputum production, swollen glands, syncope, vomiting or wheezing. There is no history of a heart failure.   Hypertension  This is a chronic problem. The current episode started more than 1 year ago. The problem has been gradually improving since onset. The problem is controlled. Associated symptoms include anxiety and shortness of breath (with exertion). Pertinent negatives include no blurred vision, chest pain, headaches, malaise/fatigue,  neck pain, orthopnea, palpitations, peripheral edema, PND or sweats. Agents associated with hypertension include NSAIDs. Risk factors for coronary artery disease include obesity and post-menopausal state. Past treatments include ACE inhibitors. The current treatment provides moderate improvement. Compliance problems include exercise and diet.  There is no history of angina, kidney disease, CAD/MI, CVA, heart failure, left ventricular hypertrophy, PVD or retinopathy. There is no history of chronic renal disease, coarctation of the aorta, hyperaldosteronism, hypercortisolism, hyperparathyroidism, a hypertension causing med, pheochromocytoma, renovascular disease, sleep apnea or a thyroid problem.     ROS  Review of Systems   Constitutional: Positive for fatigue. Negative for activity change, appetite change, chills, diaphoresis, fever, malaise/fatigue and unexpected weight change.   HENT: Negative.  Negative for congestion, ear pain, hearing loss, nosebleeds, postnasal drip, rhinorrhea, sinus pressure, sneezing, sore throat and trouble swallowing.    Eyes: Negative for blurred vision, pain and visual disturbance.   Respiratory: Positive for shortness of breath (with exertion). Negative for cough, hemoptysis, sputum production, choking and wheezing.    Cardiovascular: Negative for chest pain, palpitations, orthopnea, claudication, leg swelling, syncope and PND.   Gastrointestinal: Negative for abdominal pain, constipation, diarrhea, nausea and vomiting.   Genitourinary: Negative for difficulty urinating, dysuria, frequency and urgency.   Musculoskeletal: Negative.  Negative for arthralgias, back pain, gait problem, joint swelling, myalgias and neck pain.   Skin: Negative.  Negative for pallor and rash.   Allergic/Immunologic: Negative for environmental allergies and food allergies.   Neurological: Positive for tremors (both hands with prolonged exertion) and weakness. Negative for dizziness, seizures, syncope, speech  "difficulty, light-headedness and headaches.   Psychiatric/Behavioral: Negative for confusion, decreased concentration, dysphoric mood and sleep disturbance. The patient is nervous/anxious.      Physical Exam  Vitals:    03/24/22 1000   BP: 138/64   Pulse: 89   Resp: 16   Temp: 98.5 °F (36.9 °C)    Body mass index is 39.47 kg/m².  Weight: 114.3 kg (251 lb 15.8 oz)   Height: 5' 7" (170.2 cm)     Physical Exam  Constitutional:       General: She is not in acute distress.     Appearance: Normal appearance. She is well-developed. She is not ill-appearing, toxic-appearing or diaphoretic.   HENT:      Head: Normocephalic and atraumatic.      Right Ear: Hearing and external ear normal. No decreased hearing noted.      Left Ear: Hearing and external ear normal. No decreased hearing noted.      Nose: Nose normal. No nasal deformity or rhinorrhea.      Mouth/Throat:      Dentition: Normal dentition. Does not have dentures.      Pharynx: Uvula midline.   Eyes:      General: Lids are normal. No scleral icterus.        Right eye: No foreign body or discharge.         Left eye: No foreign body or discharge.      Conjunctiva/sclera: Conjunctivae normal.      Right eye: No chemosis or exudate.     Left eye: No chemosis or exudate.     Pupils: Pupils are equal, round, and reactive to light.   Cardiovascular:      Rate and Rhythm: Normal rate and regular rhythm.      Heart sounds: Normal heart sounds, S1 normal and S2 normal. No murmur heard.    No friction rub. No gallop.   Pulmonary:      Effort: Pulmonary effort is normal. No accessory muscle usage or respiratory distress.      Breath sounds: Normal breath sounds. No decreased breath sounds, wheezing, rhonchi or rales.   Abdominal:      General: There is no distension.      Palpations: Abdomen is soft. Abdomen is not rigid.      Tenderness: There is no abdominal tenderness. There is no guarding or rebound.   Musculoskeletal:         General: Normal range of motion.      Cervical " back: Full passive range of motion without pain, normal range of motion and neck supple.      Lumbar back: Tenderness present. No swelling, edema, deformity, lacerations, spasms or bony tenderness. Normal range of motion.      Left lower leg: Tenderness present. No swelling, deformity, lacerations or bony tenderness. No edema.        Legs:    Skin:     General: Skin is warm and dry.      Findings: No rash.   Neurological:      Mental Status: She is alert and oriented to person, place, and time.      Cranial Nerves: No cranial nerve deficit.      Sensory: No sensory deficit.      Motor: No abnormal muscle tone or seizure activity.      Coordination: Coordination normal.      Gait: Gait normal.   Psychiatric:         Attention and Perception: She is attentive.         Speech: Speech normal.         Behavior: Behavior normal. Behavior is cooperative.         Thought Content: Thought content normal.         Judgment: Judgment normal.       Assessment & Plan    Discussion of plan of care including treatment options regarding health and wellness were reviewed and discussed with patient.  Any changes to medication or treatment plan, as well as any screening blood test, imaging, or referrals to specialist, are documented.  Follow up as indicated.     1. Type 2 diabetes mellitus with stage 3b chronic kidney disease, without long-term current use of insulin  Patient is encouraged to follow a diet low in carbohydrates and simple sugars.  Discussed simple vs. complex carbohydrates as well as eating times of certain meals. Advised to focus on good food choices and increased physical activity and encouraged to adhere to medication regimen and/or lifestyle adjustments, and to check glucose level as recommended.  Contact office if glucose levels are not improving over time.  Will monitor HbA1c appropriately.   - Ambulatory referral/consult to Podiatry; Future  - Basic Metabolic Panel; Future  - glipiZIDE (GLUCOTROL) 10 MG TR24;  Take 1 tablet (10 mg total) by mouth daily with breakfast.  Dispense: 90 tablet; Refill: 1    2. SVT (supraventricular tachycardia)  Stable; no therapeutic changes at this time.     3. Essential hypertension  Patient was counseled and encouraged to maintain a low sodium diet, as well as increasing physical activity.  Recommend random BP checks at home on a regular basis.  Repeat BP at end of visit was not necessary. Will continue medication at this time, and follow up in 3-6 months, or sooner if blood pressure begins to increase.     - amLODIPine (NORVASC) 10 MG tablet; Take 1 tablet (10 mg total) by mouth once daily.  Dispense: 90 tablet; Refill: 1  - hydroCHLOROthiazide (HYDRODIURIL) 25 MG tablet; TAKE 1 TABLET(25 MG) BY MOUTH EVERY DAY  Dispense: 90 tablet; Refill: 1    4. Dystrophic nail  Referral to appropriate specialist for evaluation and management placed in [facility].   - Ambulatory referral/consult to Podiatry; Future    5. Primary osteoarthritis involving multiple joints  Patient is advised that arthritis is a result of regular daily use, and is caused by inflammation in the joint.  Patient was encouraged to exercise as tolerated, and attempt weight loss with sensible diet and lifestyle changes.  Patient was recommended to continue NSAID therapy to reduce inflammation, and to incorporate stretching into a daily routine.  Pain medication will be prescribed as necessary.  Patient should follow up if pain is not well controlled.     6. Psychophysiological insomnia  The current medical regimen will be continued at this time as discussed.   - QUEtiapine (SEROQUEL) 100 MG Tab; TAKE 1 TABLET(100 MG) BY MOUTH EVERY NIGHT AS NEEDED  Dispense: 90 tablet; Refill: 1    7. Encounter for screening mammogram for breast cancer  Patient is due for her annual mammogram.  Order placed in [facility] and patient will be notified of results once available.   - Mammo Digital Screening Bilat w/ Davi; Future    8. Post-menopause  Screening  test will be ordered and once results available patient will be notified of results and managed accordingly.   - DXA Bone Density Spine And Hip; Future    9. Sickle cell trait  Chronic condition; will continue to document and monitor.      10. Sacroiliitis, not elsewhere classified  Chronic condition; will continue to document and monitor.         Follow up in about 3 months (around 6/24/2022).      ACTIVE MEDICAL ISSUES:  Documented in Problem List    PAST MEDICAL HISTORY  Documented    PAST SURGICAL HISTORY:  Documented    SOCIAL HISTORY:  Documented    FAMILY HISTORY:  Documented    ALLERGIES AND MEDICATIONS: updated and reviewed.  Documented    Health Maintenance       Date Due Completion Date    Diabetes Urine Screening Never done ---    TETANUS VACCINE Never done ---    Mammogram 04/30/2014 4/30/2013    Foot Exam 07/30/2021 7/30/2020    DEXA Scan 02/20/2022 2/20/2019    Hemoglobin A1c 03/15/2022 9/15/2021    Lipid Panel 10/29/2022 10/29/2021    Override on 9/15/2015: Done (future)    Eye Exam 12/20/2022 12/20/2021    Low Dose Statin 12/29/2022 12/29/2021    Colorectal Cancer Screening 01/21/2029 1/21/2019

## 2022-04-25 ENCOUNTER — PATIENT OUTREACH (OUTPATIENT)
Dept: ADMINISTRATIVE | Facility: OTHER | Age: 67
End: 2022-04-25
Payer: MEDICARE

## 2022-04-25 NOTE — PROGRESS NOTES
Patient, Gwendolyn Fournier (MRN #[id number]), presented with a recorded BMI of 39.47 kg/m^2 and a documented comorbidity(s):  - Diabetes Mellitus Type 2  - Hypertension  to which the severe obesity is a contributing factor. This is consistent with the definition of severe obesity (BMI 35.0-39.9) with comorbidity (ICD-10 E66.01, Z68.35). The patient's severe obesity was monitored, evaluated, addressed and/or treated. This addendum to the medical record is made on 04/24/2022.

## 2022-04-25 NOTE — LETTER
AUTHORIZATION FOR RELEASE OF   CONFIDENTIAL INFORMATION    Dear Dr Cruz,    We are seeing Gwendolyn Fournier, date of birth 1955, in the clinic at [facility]. Azikiwe K Lombard, MD is the patient's PCP. Gwendolyn Fournier has an outstanding lab/procedure at the time we reviewed her chart. In order to help keep her health information updated, she has authorized us to request the following medical record(s):        (  )  MAMMOGRAM                                      (  )  COLONOSCOPY      (  )  PAP SMEAR                                          (  )  OUTSIDE LAB RESULTS     (  )  DEXA SCAN                                          ( x )  DM EYE EXAM            (  )  FOOT EXAM                                          (  )  ENTIRE RECORD     (  )  OUTSIDE IMMUNIZATIONS                 (  )  _______________         Please fax records to [facility], Azikiwe K Lombard, MD, FAX # [phone]  Any questions please contact Va Espinoza at [phone]        Patient Name: Gwendolyn Fournier  : 1955  Patient Phone #: [phone]

## 2022-04-25 NOTE — PROGRESS NOTES
Health Maintenance Due   Topic Date Due    Diabetes Urine Screening  Never done    TETANUS VACCINE  Never done    Mammogram  04/30/2014    Foot Exam  07/30/2021    DEXA Scan  02/20/2022    Hemoglobin A1c  03/15/2022     Updates were requested from care everywhere.  Chart was reviewed for overdue Proactive [facility] Encounters (LONG) topics (CRS, Breast Cancer Screening, Eye exam)  Health Maintenance has been updated.  LINKS immunization registry triggered.  Immunizations were reconciled.

## 2022-05-02 ENCOUNTER — TELEPHONE (OUTPATIENT)
Dept: PAIN MEDICINE | Facility: CLINIC | Age: 67
End: 2022-05-02
Payer: MEDICARE

## 2022-05-02 NOTE — TELEPHONE ENCOUNTER
LVM informing pt that the appt scheduled for this afternoon does need to be r/s to a consult appt slot so that she can be fully evaluated by Dr. Alamo.  Phone number included for pt to return call and msg sent via [employer].

## 2022-05-02 NOTE — TELEPHONE ENCOUNTER
I left a message for the pt. Marcella and I were looking into her chart to scrub the schedule and it seems like the pt needs to be seen as a new pt rather than an established pt. I left her a message asking her to return our office call.

## 2022-05-11 DIAGNOSIS — E11.9 TYPE 2 DIABETES MELLITUS WITHOUT COMPLICATION: ICD-10-CM

## 2022-05-30 ENCOUNTER — PATIENT MESSAGE (OUTPATIENT)
Dept: ADMINISTRATIVE | Facility: HOSPITAL | Age: 67
End: 2022-05-30
Payer: MEDICARE

## 2022-06-08 DIAGNOSIS — M54.32 SCIATICA OF LEFT SIDE: ICD-10-CM

## 2022-06-09 DIAGNOSIS — M1A.09X0 IDIOPATHIC CHRONIC GOUT OF MULTIPLE SITES WITHOUT TOPHUS: ICD-10-CM

## 2022-06-09 NOTE — TELEPHONE ENCOUNTER
No new care gaps identified.  [facility] Embedded Care Gaps. Reference number: [id number]. 6/08/2022   7:00:56 PM CDT

## 2022-06-10 RX ORDER — TIZANIDINE 4 MG/1
TABLET ORAL
Qty: 60 TABLET | Refills: 5 | OUTPATIENT
Start: 2022-06-10

## 2022-06-10 RX ORDER — INDOMETHACIN 50 MG/1
CAPSULE ORAL
Qty: 30 CAPSULE | Refills: 5 | OUTPATIENT
Start: 2022-06-10

## 2022-07-01 ENCOUNTER — PATIENT MESSAGE (OUTPATIENT)
Dept: ADMINISTRATIVE | Facility: HOSPITAL | Age: 67
End: 2022-07-01
Payer: MEDICARE

## 2022-07-07 DIAGNOSIS — F33.1 MAJOR DEPRESSIVE DISORDER, RECURRENT, MODERATE: ICD-10-CM

## 2022-07-07 NOTE — TELEPHONE ENCOUNTER
No new care gaps identified.  [facility] Embedded Care Gaps. Reference number: [id number]. 7/07/2022   6:07:04 PM CDT

## 2022-07-08 NOTE — TELEPHONE ENCOUNTER
Refill Routing Note   Medication(s) are not appropriate for processing by [facility] for the following reason(s):      - Outside of protocol  - Required laboratory values are abnormal    [facility] action(s):  Defer  Route          Medication reconciliation completed: No     Appointments  past 12m or future 3m with PCP    Date Provider   Last Visit   3/24/2022 Azikiwe K. Lombard, MD   Next Visit   10/24/2022 Azikiwe K. Lombard, MD   ED visits in past 90 days: 0        Note composed:9:09 PM 07/07/2022

## 2022-07-11 RX ORDER — DIAZEPAM 5 MG/1
TABLET ORAL
Qty: 60 TABLET | Refills: 2 | Status: SHIPPED | OUTPATIENT
Start: 2022-07-11

## 2022-07-11 RX ORDER — DULOXETIN HYDROCHLORIDE 60 MG/1
CAPSULE, DELAYED RELEASE ORAL
Qty: 90 CAPSULE | Refills: 3 | Status: SHIPPED | OUTPATIENT
Start: 2022-07-11 | End: 2023-01-27 | Stop reason: SDUPTHER

## 2022-07-13 ENCOUNTER — TELEPHONE (OUTPATIENT)
Dept: FAMILY MEDICINE | Facility: CLINIC | Age: 67
End: 2022-07-13
Payer: MEDICARE

## 2022-07-13 NOTE — TELEPHONE ENCOUNTER
Cancel 10/24- sent [facility] message. Left message for patient to return call if needed to reschedule.

## 2022-07-18 ENCOUNTER — PATIENT OUTREACH (OUTPATIENT)
Dept: ADMINISTRATIVE | Facility: HOSPITAL | Age: 67
End: 2022-07-18
Payer: MEDICARE

## 2022-07-25 ENCOUNTER — PATIENT MESSAGE (OUTPATIENT)
Dept: ADMINISTRATIVE | Facility: HOSPITAL | Age: 67
End: 2022-07-25
Payer: MEDICARE

## 2022-07-27 DIAGNOSIS — N18.32 TYPE 2 DIABETES MELLITUS WITH STAGE 3B CHRONIC KIDNEY DISEASE, WITHOUT LONG-TERM CURRENT USE OF INSULIN: ICD-10-CM

## 2022-07-27 DIAGNOSIS — E11.22 TYPE 2 DIABETES MELLITUS WITH STAGE 3B CHRONIC KIDNEY DISEASE, WITHOUT LONG-TERM CURRENT USE OF INSULIN: ICD-10-CM

## 2022-07-28 RX ORDER — DULAGLUTIDE 0.75 MG/.5ML
INJECTION, SOLUTION SUBCUTANEOUS
Qty: 2 PEN | Refills: 0 | Status: SHIPPED | OUTPATIENT
Start: 2022-07-28 | End: 2022-08-26

## 2022-08-03 ENCOUNTER — PATIENT OUTREACH (OUTPATIENT)
Dept: ADMINISTRATIVE | Facility: HOSPITAL | Age: 67
End: 2022-08-03
Payer: MEDICARE

## 2022-08-22 ENCOUNTER — OFFICE VISIT (OUTPATIENT)
Dept: FAMILY MEDICINE | Facility: CLINIC | Age: 67
End: 2022-08-22
Payer: MEDICARE

## 2022-08-22 VITALS
WEIGHT: 242.06 LBS | HEART RATE: 96 BPM | DIASTOLIC BLOOD PRESSURE: 86 MMHG | HEIGHT: 67 IN | SYSTOLIC BLOOD PRESSURE: 130 MMHG | RESPIRATION RATE: 16 BRPM | OXYGEN SATURATION: 96 % | TEMPERATURE: 98 F | BODY MASS INDEX: 37.99 KG/M2

## 2022-08-22 DIAGNOSIS — R25.1 TREMOR OF BOTH HANDS: Primary | ICD-10-CM

## 2022-08-22 DIAGNOSIS — R41.3 MEMORY DEFICIT: ICD-10-CM

## 2022-08-22 PROCEDURE — 99213 OFFICE O/P EST LOW 20 MIN: CPT | Mod: S$GLB,,, | Performed by: PHYSICIAN ASSISTANT

## 2022-08-22 PROCEDURE — 3079F PR MOST RECENT DIASTOLIC BLOOD PRESSURE 80-89 MM HG: ICD-10-PCS | Mod: CPTII,S$GLB,, | Performed by: PHYSICIAN ASSISTANT

## 2022-08-22 PROCEDURE — 3008F PR BODY MASS INDEX (BMI) DOCUMENTED: ICD-10-PCS | Mod: CPTII,S$GLB,, | Performed by: PHYSICIAN ASSISTANT

## 2022-08-22 PROCEDURE — 3075F SYST BP GE 130 - 139MM HG: CPT | Mod: CPTII,S$GLB,, | Performed by: PHYSICIAN ASSISTANT

## 2022-08-22 PROCEDURE — 1157F PR ADVANCE CARE PLAN OR EQUIV PRESENT IN MEDICAL RECORD: ICD-10-PCS | Mod: CPTII,S$GLB,, | Performed by: PHYSICIAN ASSISTANT

## 2022-08-22 PROCEDURE — 1159F MED LIST DOCD IN RCRD: CPT | Mod: CPTII,S$GLB,, | Performed by: PHYSICIAN ASSISTANT

## 2022-08-22 PROCEDURE — 1126F PR PAIN SEVERITY QUANTIFIED, NO PAIN PRESENT: ICD-10-PCS | Mod: CPTII,S$GLB,, | Performed by: PHYSICIAN ASSISTANT

## 2022-08-22 PROCEDURE — 3079F DIAST BP 80-89 MM HG: CPT | Mod: CPTII,S$GLB,, | Performed by: PHYSICIAN ASSISTANT

## 2022-08-22 PROCEDURE — 99999 PR PBB SHADOW E&M-EST. PATIENT-LVL V: ICD-10-PCS | Mod: PBBFAC,,, | Performed by: PHYSICIAN ASSISTANT

## 2022-08-22 PROCEDURE — 1159F PR MEDICATION LIST DOCUMENTED IN MEDICAL RECORD: ICD-10-PCS | Mod: CPTII,S$GLB,, | Performed by: PHYSICIAN ASSISTANT

## 2022-08-22 PROCEDURE — 1126F AMNT PAIN NOTED NONE PRSNT: CPT | Mod: CPTII,S$GLB,, | Performed by: PHYSICIAN ASSISTANT

## 2022-08-22 PROCEDURE — 1160F RVW MEDS BY RX/DR IN RCRD: CPT | Mod: CPTII,S$GLB,, | Performed by: PHYSICIAN ASSISTANT

## 2022-08-22 PROCEDURE — 1160F PR REVIEW ALL MEDS BY PRESCRIBER/CLIN PHARMACIST DOCUMENTED: ICD-10-PCS | Mod: CPTII,S$GLB,, | Performed by: PHYSICIAN ASSISTANT

## 2022-08-22 PROCEDURE — 99999 PR PBB SHADOW E&M-EST. PATIENT-LVL V: CPT | Mod: PBBFAC,,, | Performed by: PHYSICIAN ASSISTANT

## 2022-08-22 PROCEDURE — 3075F PR MOST RECENT SYSTOLIC BLOOD PRESS GE 130-139MM HG: ICD-10-PCS | Mod: CPTII,S$GLB,, | Performed by: PHYSICIAN ASSISTANT

## 2022-08-22 PROCEDURE — 3008F BODY MASS INDEX DOCD: CPT | Mod: CPTII,S$GLB,, | Performed by: PHYSICIAN ASSISTANT

## 2022-08-22 PROCEDURE — 99213 PR OFFICE/OUTPT VISIT, EST, LEVL III, 20-29 MIN: ICD-10-PCS | Mod: S$GLB,,, | Performed by: PHYSICIAN ASSISTANT

## 2022-08-22 PROCEDURE — 1157F ADVNC CARE PLAN IN RCRD: CPT | Mod: CPTII,S$GLB,, | Performed by: PHYSICIAN ASSISTANT

## 2022-08-22 NOTE — PROGRESS NOTES
Subjective:       Patient ID: Gwendolyn Fournier is a 67 y.o. female.    Chief Complaint: Shaking (Hand shaking - unable to use pencil and phone for past 2 months )    HPI: 66 yo female presents for bilateral hand tremor. Occurring past 2 months. Getting worse. Worse with using cell phone and writing her name. Has chronic issues with memory, contributes that to her daughter comitting suicide in 2016 and mental toll it has taken on her. Daughter denies any concerns about pts memory. She denies issues with gripping and grabbing.  Denies HA, slurred speech, gait changes.    Review of Systems   Neurological: Positive for tremors and memory loss. Negative for speech difficulty, weakness, light-headedness and headaches.         Objective:      Physical Exam  Constitutional:       Appearance: Normal appearance.   HENT:      Head: Normocephalic and atraumatic.   Neurological:      General: No focal deficit present.      Mental Status: She is alert and oriented to person, place, and time.      Comments: Slowed finger to nose and rapid hand movements. Tongue midline.  Tremor noted when hands outstretched and with writing   Psychiatric:         Mood and Affect: Mood normal.         Behavior: Behavior normal.           3 word recall: 2/3 words recalled   Clock test 1/2  Mini score 3/5  Assessment:       Problem List Items Addressed This Visit    None     Visit Diagnoses     Tremor of both hands    -  Primary    Relevant Orders    Ambulatory referral/consult to Neurology    TSH    Memory deficit        Relevant Orders    Ambulatory referral/consult to Neurology    Vitamin B12    RPR    TSH          Plan:         Gwendolyn was seen today for shaking.    Diagnoses and all orders for this visit:    Tremor of both hands  -     Ambulatory referral/consult to Neurology; Future  -     TSH; Future    Memory deficit  -     Ambulatory referral/consult to Neurology; Future  -     Vitamin B12; Future  -     RPR; Future  -     TSH;  Future

## 2022-08-24 ENCOUNTER — PATIENT MESSAGE (OUTPATIENT)
Dept: FAMILY MEDICINE | Facility: CLINIC | Age: 67
End: 2022-08-24
Payer: MEDICARE

## 2022-08-24 ENCOUNTER — OFFICE VISIT (OUTPATIENT)
Dept: NEUROLOGY | Facility: CLINIC | Age: 67
End: 2022-08-24
Payer: MEDICARE

## 2022-08-24 VITALS
HEART RATE: 93 BPM | DIASTOLIC BLOOD PRESSURE: 80 MMHG | WEIGHT: 243.38 LBS | BODY MASS INDEX: 38.2 KG/M2 | HEIGHT: 67 IN | OXYGEN SATURATION: 97 % | SYSTOLIC BLOOD PRESSURE: 147 MMHG

## 2022-08-24 DIAGNOSIS — R25.1 TREMOR OF BOTH HANDS: Primary | ICD-10-CM

## 2022-08-24 DIAGNOSIS — R41.3 MEMORY DEFICIT: ICD-10-CM

## 2022-08-24 PROCEDURE — 3079F DIAST BP 80-89 MM HG: CPT | Mod: CPTII,S$GLB,,

## 2022-08-24 PROCEDURE — 1157F ADVNC CARE PLAN IN RCRD: CPT | Mod: CPTII,S$GLB,,

## 2022-08-24 PROCEDURE — 1159F PR MEDICATION LIST DOCUMENTED IN MEDICAL RECORD: ICD-10-PCS | Mod: CPTII,S$GLB,,

## 2022-08-24 PROCEDURE — 1125F PR PAIN SEVERITY QUANTIFIED, PAIN PRESENT: ICD-10-PCS | Mod: CPTII,S$GLB,,

## 2022-08-24 PROCEDURE — 1160F RVW MEDS BY RX/DR IN RCRD: CPT | Mod: CPTII,S$GLB,,

## 2022-08-24 PROCEDURE — 99999 PR PBB SHADOW E&M-EST. PATIENT-LVL V: ICD-10-PCS | Mod: PBBFAC,,,

## 2022-08-24 PROCEDURE — 1159F MED LIST DOCD IN RCRD: CPT | Mod: CPTII,S$GLB,,

## 2022-08-24 PROCEDURE — 3077F PR MOST RECENT SYSTOLIC BLOOD PRESSURE >= 140 MM HG: ICD-10-PCS | Mod: CPTII,S$GLB,,

## 2022-08-24 PROCEDURE — 3044F PR MOST RECENT HEMOGLOBIN A1C LEVEL <7.0%: ICD-10-PCS | Mod: CPTII,S$GLB,,

## 2022-08-24 PROCEDURE — 3288F FALL RISK ASSESSMENT DOCD: CPT | Mod: CPTII,S$GLB,,

## 2022-08-24 PROCEDURE — 1160F PR REVIEW ALL MEDS BY PRESCRIBER/CLIN PHARMACIST DOCUMENTED: ICD-10-PCS | Mod: CPTII,S$GLB,,

## 2022-08-24 PROCEDURE — 3044F HG A1C LEVEL LT 7.0%: CPT | Mod: CPTII,S$GLB,,

## 2022-08-24 PROCEDURE — 1101F PR PT FALLS ASSESS DOC 0-1 FALLS W/OUT INJ PAST YR: ICD-10-PCS | Mod: CPTII,S$GLB,,

## 2022-08-24 PROCEDURE — 1157F PR ADVANCE CARE PLAN OR EQUIV PRESENT IN MEDICAL RECORD: ICD-10-PCS | Mod: CPTII,S$GLB,,

## 2022-08-24 PROCEDURE — 1101F PT FALLS ASSESS-DOCD LE1/YR: CPT | Mod: CPTII,S$GLB,,

## 2022-08-24 PROCEDURE — 3079F PR MOST RECENT DIASTOLIC BLOOD PRESSURE 80-89 MM HG: ICD-10-PCS | Mod: CPTII,S$GLB,,

## 2022-08-24 PROCEDURE — 99204 OFFICE O/P NEW MOD 45 MIN: CPT | Mod: S$GLB,,,

## 2022-08-24 PROCEDURE — 1125F AMNT PAIN NOTED PAIN PRSNT: CPT | Mod: CPTII,S$GLB,,

## 2022-08-24 PROCEDURE — 3288F PR FALLS RISK ASSESSMENT DOCUMENTED: ICD-10-PCS | Mod: CPTII,S$GLB,,

## 2022-08-24 PROCEDURE — 99999 PR PBB SHADOW E&M-EST. PATIENT-LVL V: CPT | Mod: PBBFAC,,,

## 2022-08-24 PROCEDURE — 3077F SYST BP >= 140 MM HG: CPT | Mod: CPTII,S$GLB,,

## 2022-08-24 PROCEDURE — 99204 PR OFFICE/OUTPT VISIT, NEW, LEVL IV, 45-59 MIN: ICD-10-PCS | Mod: S$GLB,,,

## 2022-08-24 RX ORDER — PRIMIDONE 50 MG/1
50 TABLET ORAL 3 TIMES DAILY
Qty: 90 TABLET | Refills: 1 | Status: SHIPPED | OUTPATIENT
Start: 2022-08-24 | End: 2022-10-18

## 2022-09-07 ENCOUNTER — HOSPITAL ENCOUNTER (OUTPATIENT)
Dept: RADIOLOGY | Facility: HOSPITAL | Age: 67
Discharge: HOME | End: 2022-09-07
Payer: MEDICARE

## 2022-09-07 DIAGNOSIS — R41.3 MEMORY DEFICIT: ICD-10-CM

## 2022-09-07 PROCEDURE — 70551 MRI BRAIN WITHOUT CONTRAST: ICD-10-PCS | Mod: 26,,, | Performed by: RADIOLOGY

## 2022-09-07 PROCEDURE — 70551 MRI BRAIN STEM W/O DYE: CPT | Mod: 26,,, | Performed by: RADIOLOGY

## 2022-09-07 PROCEDURE — 70551 MRI BRAIN STEM W/O DYE: CPT | Mod: TC

## 2022-09-20 ENCOUNTER — PATIENT OUTREACH (OUTPATIENT)
Dept: ADMINISTRATIVE | Facility: HOSPITAL | Age: 67
End: 2022-09-20
Payer: MEDICARE

## 2022-10-04 ENCOUNTER — PATIENT OUTREACH (OUTPATIENT)
Dept: ADMINISTRATIVE | Facility: HOSPITAL | Age: 67
End: 2022-10-04
Payer: MEDICARE

## 2022-10-05 ENCOUNTER — LAB VISIT (OUTPATIENT)
Dept: LAB | Facility: HOSPITAL | Age: 67
End: 2022-10-05
Attending: PHYSICIAN ASSISTANT
Payer: MEDICARE

## 2022-10-05 DIAGNOSIS — N18.32 TYPE 2 DIABETES MELLITUS WITH STAGE 3B CHRONIC KIDNEY DISEASE, WITHOUT LONG-TERM CURRENT USE OF INSULIN: ICD-10-CM

## 2022-10-05 DIAGNOSIS — R41.3 MEMORY DEFICIT: ICD-10-CM

## 2022-10-05 DIAGNOSIS — R25.1 TREMOR OF BOTH HANDS: ICD-10-CM

## 2022-10-05 DIAGNOSIS — E11.22 TYPE 2 DIABETES MELLITUS WITH STAGE 3B CHRONIC KIDNEY DISEASE, WITHOUT LONG-TERM CURRENT USE OF INSULIN: ICD-10-CM

## 2022-10-05 LAB
ALBUMIN SERPL BCP-MCNC: 3.6 G/DL (ref 3.5–5.2)
ALP SERPL-CCNC: 114 U/L (ref 55–135)
ALT SERPL W/O P-5'-P-CCNC: 15 U/L (ref 10–44)
ANION GAP SERPL CALC-SCNC: 10 MMOL/L (ref 8–16)
AST SERPL-CCNC: 10 U/L (ref 10–40)
BASOPHILS # BLD AUTO: 0.06 K/UL (ref 0–0.2)
BASOPHILS NFR BLD: 0.5 % (ref 0–1.9)
BILIRUB SERPL-MCNC: 0.3 MG/DL (ref 0.1–1)
BUN SERPL-MCNC: 26 MG/DL (ref 8–23)
CALCIUM SERPL-MCNC: 10.6 MG/DL (ref 8.7–10.5)
CHLORIDE SERPL-SCNC: 93 MMOL/L (ref 95–110)
CHOLEST SERPL-MCNC: 198 MG/DL (ref 120–199)
CHOLEST/HDLC SERPL: 5.5 {RATIO} (ref 2–5)
CO2 SERPL-SCNC: 35 MMOL/L (ref 23–29)
CREAT SERPL-MCNC: 1.5 MG/DL (ref 0.5–1.4)
DIFFERENTIAL METHOD: NORMAL
EOSINOPHIL # BLD AUTO: 0.3 K/UL (ref 0–0.5)
EOSINOPHIL NFR BLD: 2.4 % (ref 0–8)
ERYTHROCYTE [DISTWIDTH] IN BLOOD BY AUTOMATED COUNT: 13.9 % (ref 11.5–14.5)
EST. GFR  (NO RACE VARIABLE): 37.7 ML/MIN/1.73 M^2
ESTIMATED AVG GLUCOSE: 114 MG/DL (ref 68–131)
GLUCOSE SERPL-MCNC: 77 MG/DL (ref 70–110)
HBA1C MFR BLD: 5.6 % (ref 4–5.6)
HCT VFR BLD AUTO: 37.1 % (ref 37–48.5)
HDLC SERPL-MCNC: 36 MG/DL (ref 40–75)
HDLC SERPL: 18.2 % (ref 20–50)
HGB BLD-MCNC: 12.2 G/DL (ref 12–16)
IMM GRANULOCYTES # BLD AUTO: 0.04 K/UL (ref 0–0.04)
IMM GRANULOCYTES NFR BLD AUTO: 0.3 % (ref 0–0.5)
LDLC SERPL CALC-MCNC: 113 MG/DL (ref 63–159)
LYMPHOCYTES # BLD AUTO: 4 K/UL (ref 1–4.8)
LYMPHOCYTES NFR BLD: 32.1 % (ref 18–48)
MAGNESIUM SERPL-MCNC: 1.9 MG/DL (ref 1.6–2.6)
MCH RBC QN AUTO: 30.4 PG (ref 27–31)
MCHC RBC AUTO-ENTMCNC: 32.9 G/DL (ref 32–36)
MCV RBC AUTO: 93 FL (ref 82–98)
MONOCYTES # BLD AUTO: 0.6 K/UL (ref 0.3–1)
MONOCYTES NFR BLD: 5 % (ref 4–15)
NEUTROPHILS # BLD AUTO: 7.3 K/UL (ref 1.8–7.7)
NEUTROPHILS NFR BLD: 59.7 % (ref 38–73)
NONHDLC SERPL-MCNC: 162 MG/DL
NRBC BLD-RTO: 0 /100 WBC
PLATELET # BLD AUTO: 413 K/UL (ref 150–450)
PMV BLD AUTO: 10.4 FL (ref 9.2–12.9)
POTASSIUM SERPL-SCNC: 3 MMOL/L (ref 3.5–5.1)
PROT SERPL-MCNC: 7.6 G/DL (ref 6–8.4)
PTH-INTACT SERPL-MCNC: 307.5 PG/ML (ref 9–77)
RBC # BLD AUTO: 4.01 M/UL (ref 4–5.4)
SODIUM SERPL-SCNC: 138 MMOL/L (ref 136–145)
T4 FREE SERPL-MCNC: 0.94 NG/DL (ref 0.71–1.51)
TRIGL SERPL-MCNC: 245 MG/DL (ref 30–150)
TSH SERPL DL<=0.005 MIU/L-ACNC: 4.83 UIU/ML (ref 0.4–4)
VIT B12 SERPL-MCNC: 260 PG/ML (ref 210–950)
WBC # BLD AUTO: 12.3 K/UL (ref 3.9–12.7)

## 2022-10-05 PROCEDURE — 82607 VITAMIN B-12: CPT | Performed by: PHYSICIAN ASSISTANT

## 2022-10-05 PROCEDURE — 80053 COMPREHEN METABOLIC PANEL: CPT | Performed by: PHYSICIAN ASSISTANT

## 2022-10-05 PROCEDURE — 85025 COMPLETE CBC W/AUTO DIFF WBC: CPT | Performed by: PHYSICIAN ASSISTANT

## 2022-10-05 PROCEDURE — 86592 SYPHILIS TEST NON-TREP QUAL: CPT | Performed by: PHYSICIAN ASSISTANT

## 2022-10-05 PROCEDURE — 82652 VIT D 1 25-DIHYDROXY: CPT | Performed by: PHYSICIAN ASSISTANT

## 2022-10-05 PROCEDURE — 83036 HEMOGLOBIN GLYCOSYLATED A1C: CPT | Performed by: PHYSICIAN ASSISTANT

## 2022-10-05 PROCEDURE — 83735 ASSAY OF MAGNESIUM: CPT

## 2022-10-05 PROCEDURE — 80061 LIPID PANEL: CPT | Performed by: PHYSICIAN ASSISTANT

## 2022-10-05 PROCEDURE — 84443 ASSAY THYROID STIM HORMONE: CPT | Performed by: PHYSICIAN ASSISTANT

## 2022-10-05 PROCEDURE — 83970 ASSAY OF PARATHORMONE: CPT | Performed by: PHYSICIAN ASSISTANT

## 2022-10-05 PROCEDURE — 84439 ASSAY OF FREE THYROXINE: CPT | Performed by: PHYSICIAN ASSISTANT

## 2022-10-06 ENCOUNTER — PATIENT MESSAGE (OUTPATIENT)
Dept: FAMILY MEDICINE | Facility: CLINIC | Age: 67
End: 2022-10-06
Payer: MEDICARE

## 2022-10-06 LAB — RPR SER QL: NORMAL

## 2022-10-10 LAB — 1,25(OH)2D3 SERPL-MCNC: 19 PG/ML (ref 20–79)

## 2022-10-12 ENCOUNTER — OFFICE VISIT (OUTPATIENT)
Dept: FAMILY MEDICINE | Facility: CLINIC | Age: 67
End: 2022-10-12
Payer: MEDICARE

## 2022-10-12 VITALS
HEART RATE: 108 BPM | WEIGHT: 240.31 LBS | BODY MASS INDEX: 37.72 KG/M2 | OXYGEN SATURATION: 96 % | RESPIRATION RATE: 16 BRPM | DIASTOLIC BLOOD PRESSURE: 60 MMHG | SYSTOLIC BLOOD PRESSURE: 118 MMHG | TEMPERATURE: 99 F | HEIGHT: 67 IN

## 2022-10-12 DIAGNOSIS — N18.32 TYPE 2 DIABETES MELLITUS WITH STAGE 3B CHRONIC KIDNEY DISEASE, WITHOUT LONG-TERM CURRENT USE OF INSULIN: ICD-10-CM

## 2022-10-12 DIAGNOSIS — I10 ESSENTIAL HYPERTENSION: ICD-10-CM

## 2022-10-12 DIAGNOSIS — E66.01 SEVERE OBESITY (BMI 35.0-39.9) WITH COMORBIDITY: ICD-10-CM

## 2022-10-12 DIAGNOSIS — E55.9 VITAMIN D DEFICIENCY: ICD-10-CM

## 2022-10-12 DIAGNOSIS — E11.22 TYPE 2 DIABETES MELLITUS WITH STAGE 3B CHRONIC KIDNEY DISEASE, WITHOUT LONG-TERM CURRENT USE OF INSULIN: ICD-10-CM

## 2022-10-12 DIAGNOSIS — R26.89 IMBALANCE: ICD-10-CM

## 2022-10-12 DIAGNOSIS — F51.04 PSYCHOPHYSIOLOGICAL INSOMNIA: ICD-10-CM

## 2022-10-12 DIAGNOSIS — R79.89 HIGH SERUM PARATHYROID HORMONE (PTH): ICD-10-CM

## 2022-10-12 DIAGNOSIS — N18.31 STAGE 3A CHRONIC KIDNEY DISEASE: ICD-10-CM

## 2022-10-12 DIAGNOSIS — Z76.89 ESTABLISHING CARE WITH NEW DOCTOR, ENCOUNTER FOR: Primary | ICD-10-CM

## 2022-10-12 DIAGNOSIS — D57.3 SICKLE CELL TRAIT: ICD-10-CM

## 2022-10-12 DIAGNOSIS — R25.1 TREMOR OF BOTH HANDS: ICD-10-CM

## 2022-10-12 DIAGNOSIS — R41.3 MEMORY DEFICIT: ICD-10-CM

## 2022-10-12 PROCEDURE — 1100F PR PT FALLS ASSESS DOC 2+ FALLS/FALL W/INJURY/YR: ICD-10-PCS | Mod: CPTII,S$GLB,, | Performed by: INTERNAL MEDICINE

## 2022-10-12 PROCEDURE — 1157F PR ADVANCE CARE PLAN OR EQUIV PRESENT IN MEDICAL RECORD: ICD-10-PCS | Mod: CPTII,S$GLB,, | Performed by: INTERNAL MEDICINE

## 2022-10-12 PROCEDURE — 3078F PR MOST RECENT DIASTOLIC BLOOD PRESSURE < 80 MM HG: ICD-10-PCS | Mod: CPTII,S$GLB,, | Performed by: INTERNAL MEDICINE

## 2022-10-12 PROCEDURE — 3044F PR MOST RECENT HEMOGLOBIN A1C LEVEL <7.0%: ICD-10-PCS | Mod: CPTII,S$GLB,, | Performed by: INTERNAL MEDICINE

## 2022-10-12 PROCEDURE — 3044F HG A1C LEVEL LT 7.0%: CPT | Mod: CPTII,S$GLB,, | Performed by: INTERNAL MEDICINE

## 2022-10-12 PROCEDURE — 3078F DIAST BP <80 MM HG: CPT | Mod: CPTII,S$GLB,, | Performed by: INTERNAL MEDICINE

## 2022-10-12 PROCEDURE — 1157F ADVNC CARE PLAN IN RCRD: CPT | Mod: CPTII,S$GLB,, | Performed by: INTERNAL MEDICINE

## 2022-10-12 PROCEDURE — 3074F SYST BP LT 130 MM HG: CPT | Mod: CPTII,S$GLB,, | Performed by: INTERNAL MEDICINE

## 2022-10-12 PROCEDURE — 3288F PR FALLS RISK ASSESSMENT DOCUMENTED: ICD-10-PCS | Mod: CPTII,S$GLB,, | Performed by: INTERNAL MEDICINE

## 2022-10-12 PROCEDURE — 1160F RVW MEDS BY RX/DR IN RCRD: CPT | Mod: CPTII,S$GLB,, | Performed by: INTERNAL MEDICINE

## 2022-10-12 PROCEDURE — 99499 UNLISTED E&M SERVICE: CPT | Mod: S$GLB,,, | Performed by: INTERNAL MEDICINE

## 2022-10-12 PROCEDURE — 99499 RISK ADDL DX/OHS AUDIT: ICD-10-PCS | Mod: S$GLB,,, | Performed by: INTERNAL MEDICINE

## 2022-10-12 PROCEDURE — 99999 PR PBB SHADOW E&M-EST. PATIENT-LVL V: ICD-10-PCS | Mod: PBBFAC,,, | Performed by: INTERNAL MEDICINE

## 2022-10-12 PROCEDURE — 1159F MED LIST DOCD IN RCRD: CPT | Mod: CPTII,S$GLB,, | Performed by: INTERNAL MEDICINE

## 2022-10-12 PROCEDURE — 3074F PR MOST RECENT SYSTOLIC BLOOD PRESSURE < 130 MM HG: ICD-10-PCS | Mod: CPTII,S$GLB,, | Performed by: INTERNAL MEDICINE

## 2022-10-12 PROCEDURE — 1160F PR REVIEW ALL MEDS BY PRESCRIBER/CLIN PHARMACIST DOCUMENTED: ICD-10-PCS | Mod: CPTII,S$GLB,, | Performed by: INTERNAL MEDICINE

## 2022-10-12 PROCEDURE — 1100F PTFALLS ASSESS-DOCD GE2>/YR: CPT | Mod: CPTII,S$GLB,, | Performed by: INTERNAL MEDICINE

## 2022-10-12 PROCEDURE — 1159F PR MEDICATION LIST DOCUMENTED IN MEDICAL RECORD: ICD-10-PCS | Mod: CPTII,S$GLB,, | Performed by: INTERNAL MEDICINE

## 2022-10-12 PROCEDURE — 99214 PR OFFICE/OUTPT VISIT, EST, LEVL IV, 30-39 MIN: ICD-10-PCS | Mod: S$GLB,,, | Performed by: INTERNAL MEDICINE

## 2022-10-12 PROCEDURE — 99214 OFFICE O/P EST MOD 30 MIN: CPT | Mod: S$GLB,,, | Performed by: INTERNAL MEDICINE

## 2022-10-12 PROCEDURE — 3288F FALL RISK ASSESSMENT DOCD: CPT | Mod: CPTII,S$GLB,, | Performed by: INTERNAL MEDICINE

## 2022-10-12 PROCEDURE — 99999 PR PBB SHADOW E&M-EST. PATIENT-LVL V: CPT | Mod: PBBFAC,,, | Performed by: INTERNAL MEDICINE

## 2022-10-12 RX ORDER — NALOXEGOL OXALATE 25 MG/1
25 TABLET, FILM COATED ORAL DAILY
COMMUNITY
Start: 2022-09-19

## 2022-10-12 RX ORDER — ERGOCALCIFEROL 1.25 MG/1
50000 CAPSULE ORAL
Qty: 12 CAPSULE | Refills: 0 | Status: SHIPPED | OUTPATIENT
Start: 2022-10-12 | End: 2023-01-04

## 2022-10-12 NOTE — PROGRESS NOTES
"Subjective:       Patient ID: Gwendolyn Fournier is a pleasant 67 y.o. Black or  female patient    Chief Complaint: Follow-up      Patient is a pt new to me but was pt from Dr. Lombard. Last visit on 08222022 by YOUNG Iglesias, on 08/22/2022.    HPI     She comes today to establish care with a new PCP.  She was pt from Dr. Lombard,.  She is here with her daughter who will be my next patient.  Her daughter would like to be here for the visit.  The first part of the visit was done without the daughter who has assessed by my medical student and then she joined us.    The patient is extremely pleasant, I met her once in 2020 for epistaxis, she reports as main issue a tremor of bilateral hands that has been worsening for the last month, she was seen by YOUNG Iglesias,  in August.    She had blood work done and was referred to Neurology, not seen yet.  Of note she saw a Neurologist at  in the past, was placed on primidone with no success.    She would like to see a Neurologist within [facility].    She states that the tremor has been fluctuating, it is worse when she tries to type on her phone or point at something.    At the same time, she also reports more and more problems with walking, she states that she tends to have also some kind of "shaking in both feet", she had a fall 1-2 weeks ago, her daughter was unable to help her to stand up and  EMS had to help, she was not hurt. She states that she has some memory issues that are not worsening.    She did blood work recently as the below showing that her diabetes is better controlled, her kidney function is better, she is pleased of her treatment of Trulicity.    Patient Active Problem List   Diagnosis    Essential hypertension    Sciatica of left side    Primary osteoarthritis involving multiple joints    Idiopathic chronic gout of multiple sites without tophus    History of open reduction and internal fixation (ORIF) procedure    Stage 3 chronic kidney disease "    Chronic pain    Severe obesity (BMI 35.0-39.9) with comorbidity    Obstructive sleep apnea    Sickle cell trait    Sacroiliitis, not elsewhere classified    Type 2 diabetes mellitus with stage 3b chronic kidney disease, without long-term current use of insulin    Major depressive disorder, recurrent, moderate    History of tuberculosis exposure          ACTIVE MEDICAL ISSUES:  Documented in Problem List     PAST MEDICAL HISTORY  Documented     PAST SURGICAL HISTORY:  Documented     SOCIAL HISTORY:  Documented     FAMILY HISTORY:  Documented     ALLERGIES AND MEDICATIONS: updated and reviewed.  Documented    Review of Systems   Constitutional:  Negative for activity change and unexpected weight change.   HENT:  Positive for hearing loss. Negative for rhinorrhea and trouble swallowing.    Eyes:  Negative for discharge and visual disturbance.   Respiratory:  Negative for chest tightness and wheezing.    Cardiovascular:  Negative for chest pain and palpitations.   Gastrointestinal:  Positive for constipation. Negative for blood in stool, diarrhea and vomiting.   Endocrine: Negative for polydipsia and polyuria.   Genitourinary:  Negative for difficulty urinating, dysuria, hematuria and menstrual problem.   Musculoskeletal:  Positive for arthralgias and joint swelling. Negative for neck pain.   Neurological:  Positive for tremors. Negative for weakness and headaches.   Psychiatric/Behavioral:  Positive for confusion and dysphoric mood.      Objective:      Physical Exam  Constitutional:       Appearance: Normal appearance.   HENT:      Head: Normocephalic and atraumatic.      Right Ear: Tympanic membrane normal.      Left Ear: Tympanic membrane normal.   Eyes:      Conjunctiva/sclera: Conjunctivae normal.   Cardiovascular:      Rate and Rhythm: Normal rate and regular rhythm.      Pulses: Normal pulses.      Heart sounds: Normal heart sounds.   Pulmonary:      Effort: Pulmonary effort is normal.      Breath sounds:  "Normal breath sounds.   Abdominal:      General: Bowel sounds are normal.   Neurological:      General: No focal deficit present.      Mental Status: She is alert and oriented to person, place, and time.      Comments: Slowed finger to nose and rapid hand movements. Tongue midline.  Tremor noted when hands outstretched and with writing  Struggles with get up and go test.   Walks slowly, gait is rather unsteady, with feet position wide.   Psychiatric:         Mood and Affect: Mood normal.         Behavior: Behavior normal.       Vitals:    10/12/22 1501   BP: 118/60   BP Location: Left arm   Patient Position: Sitting   BP Method: Large (Manual)   Pulse: 108   Resp: 16   Temp: 99 °F (37.2 °C)   TempSrc: Oral   SpO2: 96%   Weight: 109 kg (240 lb 4.8 oz)   Height: 5' 7" (1.702 m)     Body mass index is 37.64 kg/m².    RESULTS: Reviewed labs from last 12 months    Last Lab Results:     Lab Results   Component Value Date    WBC 12.30 10/05/2022    HGB 12.2 10/05/2022    HCT 37.1 10/05/2022     10/05/2022     10/05/2022    K 3.0 (L) 10/05/2022    CL 93 (L) 10/05/2022    CO2 35 (H) 10/05/2022    BUN 26 (H) 10/05/2022    CREATININE 1.5 (H) 10/05/2022    CALCIUM 10.6 (H) 10/05/2022    ALBUMIN 3.6 10/05/2022    AST 10 10/05/2022    ALT 15 10/05/2022    CHOL 198 10/05/2022    TRIG 245 (H) 10/05/2022    HDL 36 (L) 10/05/2022    LDLCALC 113.0 10/05/2022    HGBA1C 5.6 10/05/2022    TSH 4.826 (H) 10/05/2022       Assessment:       1. Establishing care with new doctor, encounter for    2. Essential hypertension    3. Type 2 diabetes mellitus with stage 3b chronic kidney disease, without long-term current use of insulin    4. Severe obesity (BMI 35.0-39.9) with comorbidity    5. Stage 3a chronic kidney disease    6. High serum parathyroid hormone (PTH)    7. Vitamin D deficiency    8. Tremor of both hands    9. Imbalance    10. Memory deficit    11. Psychophysiological insomnia    12. Sickle cell trait        Plan: "   Gwendolyn was seen today for follow-up.    Diagnoses and all orders for this visit:    Establishing care with new doctor, encounter for    Discussed the importance of a good pt-doctor trust relationship, provided pt and daughter with my contact.    Essential hypertension    BP at goal, same treatment.    Type 2 diabetes mellitus with stage 3b chronic kidney disease, without long-term current use of insulin    Much better with Trulicity. Praised pt.    Severe obesity (BMI 35.0-39.9) with comorbidity    We discussed weight issues and safe, effective ways of losing pounds, includin) diet:  low carbohydrate, low fat diet, stay away from fast food, fried and processed food, use whole grain, lot of fruits and vegetables, use healthy fat such as avocado, nuts and olive oil in reasonable quantity, stay away from sodas. Regular meals with lean proteins.  2) physical activity: ideally 150 min a week, with cardiovascular and resistance activity. IMPOSSIBLE FOR HER.  Patient was encouraged to set realistic attainable goals for weight loss, and we will follow up periodically.    Stage 3a chronic kidney disease    Better kidney function.    High serum parathyroid hormone (PTH)    PTH is very high. Daughter googled this and wonders if she needs surgery. May be due to CKD and low vit D, but will reassess.    Vitamin D deficiency  -     ergocalciferol (ERGOCALCIFEROL) 50,000 unit Cap; Take 1 capsule (50,000 Units total) by mouth every 7 days.    Will substitute.    Tremor of both hands  -     Ambulatory referral/consult to Neurology; Future    See HPI and PE. Parkinsonism? Other such as normal pressure hydrocephalus? Need to review all records as she was seen once in Neurology outside of [facility].    Imbalance  -     Ambulatory referral/consult to Neurology; Future    See above. Concerning. Referral placed.    Memory deficit    Not a new issue. May be related to neurological condition? See above.    Psychophysiological  insomnia    Will reassess.    Sickle cell trait    Due to complexity of the situation of this nice pt I will be PCP for, I will see her back in one month, needs regular and thorough f-up.     Follow up in about 4 weeks (around 11/9/2022) for f-up.    This note was created by combination of typed  and M-Modal dictation.  Transcription errors may be present.  If there are any questions, please contact me.

## 2022-10-12 NOTE — PROGRESS NOTES
Health Maintenance Due   Topic     Diabetes Urine Screening      TETANUS VACCINE      Mammogram      Foot Exam      DEXA Scan

## 2022-10-12 NOTE — PROGRESS NOTES
Chief Complaint  Chief Complaint   Patient presents with    Follow-up       HPI  Gwendolyn Fournier is a 67 y.o. female with multiple medical diagnoses as listed in the medical history and problem list that presents for ***.  Their last appointment with primary care was 2022.      ***      PAST MEDICAL HISTORY:  Past Medical History:   Diagnosis Date    Arthritis     Gout attack     Hx of psychiatric care     Hypertension     Psychiatric problem     Renal disorder     Sciatic nerve pain     Therapy     used to follow with psychiatrist but doesn't recall whom,     Tuberculosis        PAST SURGICAL HISTORY:  Past Surgical History:   Procedure Laterality Date    COLONOSCOPY N/A 2019    Procedure: COLONOSCOPY;  Surgeon: Shanna Jose MD;  Location: [facility] ENDO;  Service: Endoscopy;  Laterality: N/A;    INJECTION OF JOINT Bilateral 3/13/2019    Procedure: INJECTION, JOINT BILATERAL SI JOINT;  Surgeon: Evan Coreas MD;  Location: [facility] PAIN MGT;  Service: Pain Management;  Laterality: Bilateral;  BILATERAL SI JOINT INJECTION    KNEE SURGERY  2014    left knee surgery        SOCIAL HISTORY:  Social History     Socioeconomic History    Marital status:     Number of children: 5   Occupational History    Occupation: homemaker   Tobacco Use    Smoking status: Former     Types: Cigarettes     Quit date: 1976     Years since quittin.8    Smokeless tobacco: Never   Substance and Sexual Activity    Alcohol use: Not Currently     Alcohol/week: 0.0 standard drinks     Comment: red wine    Drug use: No    Sexual activity: Not Currently     Partners: Male     Social Determinants of Health     Financial Resource Strain: Low Risk     Difficulty of Paying Living Expenses: Not very hard   Food Insecurity: Food Insecurity Present    Worried About Running Out of Food in the Last Year: Never true    Ran Out of Food in the Last Year: Sometimes true   Transportation Needs: No Transportation Needs    Lack  of Transportation (Medical): No    Lack of Transportation (Non-Medical): No   Physical Activity: Unknown    Days of Exercise per Week: 0 days   Stress: Stress Concern Present    Feeling of Stress : To some extent   Social Connections: Unknown    Frequency of Communication with Friends and Family: Once a week    Frequency of Social Gatherings with Friends and Family: Once a week    Active Member of Clubs or Organizations: Yes    Attends Club or Organization Meetings: More than 4 times per year    Marital Status:    Housing Stability: Low Risk     Unable to Pay for Housing in the Last Year: No    Number of Places Lived in the Last Year: 1    Unstable Housing in the Last Year: No       FAMILY HISTORY:  Family History   Problem Relation Age of Onset    Tuberculosis Mother     Stroke Father     Bipolar disorder Daughter     Suicide Daughter     Depression Daughter     Bipolar disorder Daughter     Depression Daughter        ALLERGIES AND MEDICATIONS: updated and reviewed.  Review of patient's allergies indicates:   Allergen Reactions    Ondansetron hcl (pf) Hives and Itching    Ketorolac Itching     Current Outpatient Medications   Medication Sig Dispense Refill    albuterol (ACCUNEB) 0.63 mg/3 mL Nebu Inhale 1 ampule into the lungs as needed.      allopurinoL (ZYLOPRIM) 100 MG tablet TAKE 1 TABLET(100 MG) BY MOUTH TWICE DAILY 180 tablet 0    amLODIPine (NORVASC) 10 MG tablet TAKE 1 TABLET(10 MG) BY MOUTH EVERY DAY 90 tablet 0    atorvastatin (LIPITOR) 40 MG tablet Take 1 tablet (40 mg total) by mouth every evening. 90 tablet 3    blood sugar diagnostic Strp To check BG two times daily, freestyle lite meter 200 each 3    blood-glucose meter kit To check BG two times daily, freestyle lite meter 1 each 0    buPROPion (WELLBUTRIN) 100 MG tablet Take 100 mg by mouth every morning.      diazePAM (VALIUM) 5 MG tablet TAKE 1 TABLET(5 MG) BY MOUTH EVERY 12 HOURS AS NEEDED FOR ANXIETY 60 tablet 2    diphenhydrAMINE  (BENADRYL) 25 mg capsule Take 25 mg by mouth every 6 (six) hours as needed.      docusate sodium (COLACE) 100 MG capsule Take 1 capsule (100 mg total) by mouth as needed for Constipation. 90 capsule 3    DULoxetine (CYMBALTA) 60 MG capsule TAKE 1 CAPSULE(60 MG) BY MOUTH EVERY DAY 90 capsule 3    EScitalopram oxalate (LEXAPRO) 10 MG tablet Take 1 tablet (10 mg total) by mouth once daily. 90 tablet 3    ferrous sulfate (FEOSOL) 325 mg (65 mg iron) Tab tablet Take 325 mg by mouth as needed.       flash glucose scanning reader (FREESTYLE HAI 2 READER) Misc 1 each by Misc.(Non-Drug; Combo Route) route 4 (four) times daily with meals and nightly. 1 each 0    flash glucose sensor (FREESTYLE HAI 2 SENSOR) Kit 1 each by Misc.(Non-Drug; Combo Route) route 2 hours after meals and at bedtime. 1 kit 11    FLUCELVAX QUAD [id number], PF, 60 mcg (15 mcg x 4)/0.5 mL Syrg vaccine       fluticasone (FLONASE) 50 mcg/actuation nasal spray SHAKE LIQUID AND USE 2 SPRAYS(100 MCG) IN EACH NOSTRIL EVERY DAY 48 mL 1    furosemide (LASIX) 40 MG tablet TAKE 1 TABLET(40 MG) BY MOUTH EVERY DAY 90 tablet 0    gabapentin (NEURONTIN) 300 MG capsule Take 1 capsule (300 mg total) by mouth 3 (three) times daily. 90 capsule 5    glipiZIDE (GLUCOTROL) 10 MG TR24 Take 1 tablet (10 mg total) by mouth daily with breakfast. 90 tablet 1    hydroCHLOROthiazide (HYDRODIURIL) 25 MG tablet TAKE 1 TABLET(25 MG) BY MOUTH EVERY DAY 90 tablet 0    HYDROcodone-acetaminophen (NORCO) 5-325 mg per tablet TK 1 T PO Q 12 H PRN      indomethacin (INDOCIN) 50 MG capsule Take 1 capsule (50 mg total) by mouth 3 (three) times daily as needed. 30 capsule 11    lancets [facility] To check BG two times daily, to use with insurance preferred meter 200 each 3    metoprolol succinate (TOPROL-XL) 50 MG 24 hr tablet Take 1 tablet (50 mg total) by mouth 2 (two) times daily. 180 tablet 1    MOVANTIK 25 mg tablet Take 25 mg by mouth once daily.      PREVNAR 13, PF, 0.5 mL Syrg        "primidone (MYSOLINE) 50 MG Tab Take 1 tablet (50 mg total) by mouth 3 (three) times daily. 90 tablet 1    QUEtiapine (SEROQUEL) 100 MG Tab TAKE 1 TABLET(100 MG) BY MOUTH EVERY NIGHT AS NEEDED 90 tablet 1    tiZANidine (ZANAFLEX) 4 MG tablet TAKE 1 TO 2 TABLETS BY MOUTH EVERY NIGHT AS NEEDED 60 tablet 5    traMADoL (ULTRAM) 50 mg tablet TAKE 1 TABLET(50 MG) BY MOUTH EVERY 8 HOURS AS NEEDED FOR PAIN 90 tablet 2    TRULICITY 0.75 mg/0.5 mL pen injector ADMINISTER 0.75 MG UNDER THE SKIN EVERY 7 DAYS 2 pen 2     Current Facility-Administered Medications   Medication Dose Route Frequency Provider Last Rate Last Admin    lidocaine (PF) 10 mg/ml (1%) injection 40 mg  4 mL Intramuscular 1 time in Clinic/HOD Azikiwe K. Lombard, MD             ROS  Review of Systems        Physical Exam  Vitals:    10/12/22 1501   BP: 118/60   BP Location: Left arm   Patient Position: Sitting   BP Method: Large (Manual)   Pulse: 108   Resp: 16   Temp: 99 °F (37.2 °C)   TempSrc: Oral   SpO2: 96%   Weight: 109 kg (240 lb 4.8 oz)   Height: 5' 7" (1.702 m)    Body mass index is 37.64 kg/m².  Weight: 109 kg (240 lb 4.8 oz)   Height: 5' 7" (170.2 cm)   Physical Exam      Health Maintenance         Date Due Completion Date    Diabetes Urine Screening Never done ---    TETANUS VACCINE Never done ---    Mammogram 04/30/2014 4/30/2013    Foot Exam 07/30/2021 7/30/2020    DEXA Scan 02/20/2022 2/20/2019    Eye Exam 12/20/2022 12/20/2021    Hemoglobin A1c 04/05/2023 10/5/2022    Low Dose Statin 08/24/2023 8/24/2022    Lipid Panel 10/05/2023 10/5/2022    Override on 9/15/2015: Done (future)    Colorectal Cancer Screening 01/21/2029 1/21/2019              Assessment and Plan:  There are no diagnoses linked to this encounter.        "

## 2022-10-19 ENCOUNTER — TELEPHONE (OUTPATIENT)
Dept: FAMILY MEDICINE | Facility: CLINIC | Age: 67
End: 2022-10-19

## 2022-10-19 DIAGNOSIS — E11.22 TYPE 2 DIABETES MELLITUS WITH STAGE 3B CHRONIC KIDNEY DISEASE, WITHOUT LONG-TERM CURRENT USE OF INSULIN: ICD-10-CM

## 2022-10-19 DIAGNOSIS — N18.32 TYPE 2 DIABETES MELLITUS WITH STAGE 3B CHRONIC KIDNEY DISEASE, WITHOUT LONG-TERM CURRENT USE OF INSULIN: ICD-10-CM

## 2022-10-19 RX ORDER — FLASH GLUCOSE SENSOR
1 KIT MISCELLANEOUS
Qty: 1 KIT | Refills: 11 | Status: CANCELLED | OUTPATIENT
Start: 2022-10-19

## 2022-10-19 RX ORDER — FLASH GLUCOSE SCANNING READER
1 EACH MISCELLANEOUS
Qty: 1 EACH | Refills: 0 | Status: CANCELLED | OUTPATIENT
Start: 2022-10-19

## 2022-10-19 NOTE — TELEPHONE ENCOUNTER
----- Message from Eleanor Mccullough sent at 10/19/2022 12:07 PM CDT -----  Regarding: freestyle coy 2  Name of Who is Calling:  KAMALA DELA CRUZ [[id number]]        What is the request in detail: Pt needs a prescription for freestyle coy 2          Can the clinic reply by [facility]:yes          [facility][phone]

## 2022-10-19 NOTE — TELEPHONE ENCOUNTER
Last Office Visit Info:   The patient's last visit with Merlyn Bruner MD was on 10/12/2022.    The patient's last visit in current department was on 10/12/2022.        Last CBC Results:   Lab Results   Component Value Date    WBC 12.30 10/05/2022    HGB 12.2 10/05/2022    HCT 37.1 10/05/2022     10/05/2022       Last CMP Results  Lab Results   Component Value Date     10/05/2022    K 3.0 (L) 10/05/2022    CL 93 (L) 10/05/2022    CO2 35 (H) 10/05/2022    BUN 26 (H) 10/05/2022    CREATININE 1.5 (H) 10/05/2022    CALCIUM 10.6 (H) 10/05/2022    ALBUMIN 3.6 10/05/2022    AST 10 10/05/2022    ALT 15 10/05/2022       Last Lipids  Lab Results   Component Value Date    CHOL 198 10/05/2022    TRIG 245 (H) 10/05/2022    HDL 36 (L) 10/05/2022    LDLCALC 113.0 10/05/2022       Last A1C  Lab Results   Component Value Date    HGBA1C 5.6 10/05/2022       Last TSH  Lab Results   Component Value Date    TSH 4.826 (H) 10/05/2022             Current Med Refills  Medication List with Changes/Refills   Current Medications    ALBUTEROL (ACCUNEB) 0.63 MG/3 ML NEBU    Inhale 1 ampule into the lungs as needed.       Start Date: --        End Date: --    ALLOPURINOL (ZYLOPRIM) 100 MG TABLET    TAKE 1 TABLET(100 MG) BY MOUTH TWICE DAILY       Start Date: 9/12/2022 End Date: --    AMLODIPINE (NORVASC) 10 MG TABLET    TAKE 1 TABLET(10 MG) BY MOUTH EVERY DAY       Start Date: 9/19/2022 End Date: --    ATORVASTATIN (LIPITOR) 40 MG TABLET    Take 1 tablet (40 mg total) by mouth every evening.       Start Date: 10/17/2022End Date: --    BLOOD SUGAR DIAGNOSTIC STRP    To check BG two times daily, freestyle lite meter       Start Date: 9/9/2020  End Date: --    BLOOD-GLUCOSE METER KIT    To check BG two times daily, freestyle lite meter       Start Date: 9/9/2020  End Date: 10/12/2022    BUPROPION (WELLBUTRIN) 100 MG TABLET    Take 100 mg by mouth every morning.       Start Date: 4/20/2022 End Date: --    DIAZEPAM (VALIUM) 5 MG  TABLET    TAKE 1 TABLET(5 MG) BY MOUTH EVERY 12 HOURS AS NEEDED FOR ANXIETY       Start Date: 7/11/2022 End Date: --    DIPHENHYDRAMINE (BENADRYL) 25 MG CAPSULE    Take 25 mg by mouth every 6 (six) hours as needed.       Start Date: --        End Date: --    DOCUSATE SODIUM (COLACE) 100 MG CAPSULE    Take 1 capsule (100 mg total) by mouth as needed for Constipation.       Start Date: 10/22/2021End Date: --    DULOXETINE (CYMBALTA) 60 MG CAPSULE    TAKE 1 CAPSULE(60 MG) BY MOUTH EVERY DAY       Start Date: 7/11/2022 End Date: --    ERGOCALCIFEROL (ERGOCALCIFEROL) 50,000 UNIT CAP    Take 1 capsule (50,000 Units total) by mouth every 7 days.       Start Date: 10/12/2022End Date: 11/11/2022    ESCITALOPRAM OXALATE (LEXAPRO) 10 MG TABLET    Take 1 tablet (10 mg total) by mouth once daily.       Start Date: 9/9/2021  End Date: 10/12/2022    FERROUS SULFATE (FEOSOL) 325 MG (65 MG IRON) TAB TABLET    Take 325 mg by mouth as needed.        Start Date: 12/28/2017End Date: --    FLASH GLUCOSE SCANNING READER (FREESTYLE HAI 2 READER) MISC    1 each by Misc.(Non-Drug; Combo Route) route 4 (four) times daily with meals and nightly.       Start Date: 10/22/2021End Date: --    FLASH GLUCOSE SENSOR (FREESTYLE HAI 2 SENSOR) KIT    1 each by Misc.(Non-Drug; Combo Route) route 2 hours after meals and at bedtime.       Start Date: 10/22/2021End Date: --    FLUCELVAX QUAD 3447-1551, PF, 60 MCG (15 MCG X 4)/0.5 ML SYRG VACCINE           Start Date: 11/6/2017 End Date: --    FLUTICASONE (FLONASE) 50 MCG/ACTUATION NASAL SPRAY    SHAKE LIQUID AND USE 2 SPRAYS(100 MCG) IN EACH NOSTRIL EVERY DAY       Start Date: 10/11/2018End Date: --    FUROSEMIDE (LASIX) 40 MG TABLET    TAKE 1 TABLET(40 MG) BY MOUTH EVERY DAY       Start Date: 8/16/2022 End Date: --    GABAPENTIN (NEURONTIN) 300 MG CAPSULE    Take 1 capsule (300 mg total) by mouth 3 (three) times daily.       Start Date: 12/29/2021End Date: --    GLIPIZIDE (GLUCOTROL) 10 MG TR24    Take  1 tablet (10 mg total) by mouth daily with breakfast.       Start Date: 3/24/2022 End Date: --    HYDROCHLOROTHIAZIDE (HYDRODIURIL) 25 MG TABLET    TAKE 1 TABLET(25 MG) BY MOUTH EVERY DAY       Start Date: 9/19/2022 End Date: --    HYDROCODONE-ACETAMINOPHEN (NORCO) 5-325 MG PER TABLET    TK 1 T PO Q 12 H PRN       Start Date: 11/24/2020End Date: --    INDOMETHACIN (INDOCIN) 50 MG CAPSULE    Take 1 capsule (50 mg total) by mouth 3 (three) times daily as needed.       Start Date: 5/22/2021 End Date: --    LANCETS MISC    To check BG two times daily, to use with insurance preferred meter       Start Date: 8/7/2020  End Date: --    METOPROLOL SUCCINATE (TOPROL-XL) 50 MG 24 HR TABLET    Take 1 tablet (50 mg total) by mouth 2 (two) times daily.       Start Date: 3/19/2021 End Date: --    MOVANTIK 25 MG TABLET    Take 25 mg by mouth once daily.       Start Date: 9/19/2022 End Date: --    PREVNAR 13, PF, 0.5 ML SYRG           Start Date: 11/6/2017 End Date: --    PRIMIDONE (MYSOLINE) 50 MG TAB    TAKE 1 TABLET(50 MG) BY MOUTH THREE TIMES DAILY       Start Date: 10/18/2022End Date: --    QUETIAPINE (SEROQUEL) 100 MG TAB    TAKE 1 TABLET(100 MG) BY MOUTH EVERY NIGHT AS NEEDED       Start Date: 3/24/2022 End Date: --    TIZANIDINE (ZANAFLEX) 4 MG TABLET    TAKE 1 TO 2 TABLETS BY MOUTH EVERY NIGHT AS NEEDED       Start Date: 6/20/2022 End Date: --    TRAMADOL (ULTRAM) 50 MG TABLET    TAKE 1 TABLET(50 MG) BY MOUTH EVERY 8 HOURS AS NEEDED FOR PAIN       Start Date: 4/23/2020 End Date: --    TRULICITY 0.75 MG/0.5 ML PEN INJECTOR    ADMINISTER 0.75 MG UNDER THE SKIN EVERY 7 DAYS       Start Date: 8/26/2022 End Date: --       Order(s) placed per written order guidelines: none    Please advise.

## 2022-10-23 NOTE — PROGRESS NOTES
Subjective:       Patient ID: Gwendolyn Fournier is a 67 y.o. female.      History of Present Illness  67 year old female who presents today for evaluation of bilateral hand tremors and memory loss. Past medical history below. She states she has noticed a bilateral hand tremor that started a couple of months ago when she uses her cell phone. She denies noticing this tremor with eating, drinking, or trying to complete other tasks. She notes that she often forgets time such what day it is or day of the week. She feels her confusion with time all started back in 2016 since the death of her daughter. She still drives independently. Denies ever getting lost in familiar places. She completes all ADLs independently. Ha snot had any issues managing her finances or medications. No trouble walking or recent falls    Past Medical History:   Diagnosis Date    Arthritis     Gout attack     Hx of psychiatric care     Hypertension     Psychiatric problem     Renal disorder     Sciatic nerve pain 2013    Therapy     used to follow with psychiatrist but doesn't recall whom, 2012    Tuberculosis        Past Surgical History:   Procedure Laterality Date    COLONOSCOPY N/A 1/21/2019    Procedure: COLONOSCOPY;  Surgeon: Shanna Jose MD;  Location: [facility] ENDO;  Service: Endoscopy;  Laterality: N/A;    INJECTION OF JOINT Bilateral 3/13/2019    Procedure: INJECTION, JOINT BILATERAL SI JOINT;  Surgeon: Evan Coreas MD;  Location: [facility] PAIN T;  Service: Pain Management;  Laterality: Bilateral;  BILATERAL SI JOINT INJECTION    KNEE SURGERY  1/2014    left knee surgery        Family History   Problem Relation Age of Onset    Tuberculosis Mother     Stroke Father     Bipolar disorder Daughter     Suicide Daughter     Depression Daughter     Bipolar disorder Daughter     Depression Daughter        Social History     Socioeconomic History    Marital status:     Number of children: 5   Occupational History    Occupation: homemaker    Tobacco Use    Smoking status: Former     Types: Cigarettes     Quit date: 1976     Years since quittin.8    Smokeless tobacco: Never   Substance and Sexual Activity    Alcohol use: Not Currently     Alcohol/week: 0.0 standard drinks     Comment: red wine    Drug use: No    Sexual activity: Not Currently     Partners: Male     Social Determinants of Health     Financial Resource Strain: Low Risk     Difficulty of Paying Living Expenses: Not very hard   Food Insecurity: Food Insecurity Present    Worried About Running Out of Food in the Last Year: Never true    Ran Out of Food in the Last Year: Sometimes true   Transportation Needs: No Transportation Needs    Lack of Transportation (Medical): No    Lack of Transportation (Non-Medical): No   Physical Activity: Unknown    Days of Exercise per Week: 0 days   Stress: Stress Concern Present    Feeling of Stress : To some extent   Social Connections: Unknown    Frequency of Communication with Friends and Family: Once a week    Frequency of Social Gatherings with Friends and Family: Once a week    Active Member of Clubs or Organizations: Yes    Attends Club or Organization Meetings: More than 4 times per year    Marital Status:    Housing Stability: Low Risk     Unable to Pay for Housing in the Last Year: No    Number of Places Lived in the Last Year: 1    Unstable Housing in the Last Year: No       Current Outpatient Medications   Medication Sig Dispense Refill    albuterol (ACCUNEB) 0.63 mg/3 mL Nebu Inhale 1 ampule into the lungs as needed.      blood sugar diagnostic Strp To check BG two times daily, freestyle lite meter 200 each 3    buPROPion (WELLBUTRIN) 100 MG tablet Take 100 mg by mouth every morning.      diazePAM (VALIUM) 5 MG tablet TAKE 1 TABLET(5 MG) BY MOUTH EVERY 12 HOURS AS NEEDED FOR ANXIETY 60 tablet 2    diphenhydrAMINE (BENADRYL) 25 mg capsule Take 25 mg by mouth every 6 (six) hours as needed.      docusate sodium (COLACE) 100 MG  capsule Take 1 capsule (100 mg total) by mouth as needed for Constipation. 90 capsule 3    DULoxetine (CYMBALTA) 60 MG capsule TAKE 1 CAPSULE(60 MG) BY MOUTH EVERY DAY 90 capsule 3    EScitalopram oxalate (LEXAPRO) 10 MG tablet Take 1 tablet (10 mg total) by mouth once daily. 90 tablet 3    ferrous sulfate (FEOSOL) 325 mg (65 mg iron) Tab tablet Take 325 mg by mouth as needed.       flash glucose sensor (FREESTYLE HAI 2 SENSOR) Kit 1 each by Misc.(Non-Drug; Combo Route) route 2 hours after meals and at bedtime. 1 kit 11    FLUCELVAX QUAD 9266-5888, PF, 60 mcg (15 mcg x 4)/0.5 mL Syrg vaccine       fluticasone (FLONASE) 50 mcg/actuation nasal spray SHAKE LIQUID AND USE 2 SPRAYS(100 MCG) IN EACH NOSTRIL EVERY DAY 48 mL 1    furosemide (LASIX) 40 MG tablet TAKE 1 TABLET(40 MG) BY MOUTH EVERY DAY 90 tablet 0    gabapentin (NEURONTIN) 300 MG capsule Take 1 capsule (300 mg total) by mouth 3 (three) times daily. 90 capsule 5    glipiZIDE (GLUCOTROL) 10 MG TR24 Take 1 tablet (10 mg total) by mouth daily with breakfast. 90 tablet 1    HYDROcodone-acetaminophen (NORCO) 5-325 mg per tablet TK 1 T PO Q 12 H PRN      indomethacin (INDOCIN) 50 MG capsule Take 1 capsule (50 mg total) by mouth 3 (three) times daily as needed. 30 capsule 11    lancets [facility] To check BG two times daily, to use with insurance preferred meter 200 each 3    metoprolol succinate (TOPROL-XL) 50 MG 24 hr tablet Take 1 tablet (50 mg total) by mouth 2 (two) times daily. 180 tablet 1    PREVNAR 13, PF, 0.5 mL Syrg       QUEtiapine (SEROQUEL) 100 MG Tab TAKE 1 TABLET(100 MG) BY MOUTH EVERY NIGHT AS NEEDED 90 tablet 1    tiZANidine (ZANAFLEX) 4 MG tablet TAKE 1 TO 2 TABLETS BY MOUTH EVERY NIGHT AS NEEDED 60 tablet 5    traMADoL (ULTRAM) 50 mg tablet TAKE 1 TABLET(50 MG) BY MOUTH EVERY 8 HOURS AS NEEDED FOR PAIN 90 tablet 2    allopurinoL (ZYLOPRIM) 100 MG tablet TAKE 1 TABLET(100 MG) BY MOUTH TWICE DAILY 180 tablet 0    amLODIPine (NORVASC) 10 MG tablet TAKE 1  TABLET(10 MG) BY MOUTH EVERY DAY 90 tablet 0    atorvastatin (LIPITOR) 40 MG tablet Take 1 tablet (40 mg total) by mouth every evening. 90 tablet 0    blood-glucose meter kit To check BG two times daily, freestyle lite meter 1 each 0    ergocalciferol (ERGOCALCIFEROL) 50,000 unit Cap Take 1 capsule (50,000 Units total) by mouth every 7 days. 12 capsule 0    flash glucose scanning reader (FREESTYLE HAI 2 READER) Misc 1 each by Misc.(Non-Drug; Combo Route) route 4 (four) times daily with meals and nightly. 1 each 0    FLUAD QUAD 2022-23,65Y UP,,PF, 60 mcg (15 mcg x 4)/0.5 mL Syrg       hydroCHLOROthiazide (HYDRODIURIL) 25 MG tablet TAKE 1 TABLET(25 MG) BY MOUTH EVERY DAY 90 tablet 0    MOVANTIK 25 mg tablet Take 25 mg by mouth once daily.      mupirocin (BACTROBAN) 2 % ointment Apply topically 3 (three) times daily. 30 g 0    PFIZER COVID BIVAL,12Y UP,,PF, 30 mcg/0.3 mL injection       primidone (MYSOLINE) 50 MG Tab TAKE 1 TABLET(50 MG) BY MOUTH THREE TIMES DAILY 90 tablet 1    TRULICITY 0.75 mg/0.5 mL pen injector ADMINISTER 0.75 MG UNDER THE SKIN EVERY 7 DAYS 2 pen 2     Current Facility-Administered Medications   Medication Dose Route Frequency Provider Last Rate Last Admin    lidocaine (PF) 10 mg/ml (1%) injection 40 mg  4 mL Intramuscular 1 time in Clinic/HOD Azikiwe K. Lombard, MD           Review of patient's allergies indicates:   Allergen Reactions    Ondansetron hcl (pf) Hives and Itching    Ketorolac Itching        Review of Systems  Review of Systems   Constitutional: Negative.    HENT: Negative.     Eyes: Negative.    Respiratory: Negative.     Cardiovascular: Negative.    Gastrointestinal: Negative.    Endocrine: Negative.    Genitourinary: Negative.    Musculoskeletal: Negative.    Skin: Negative.    Neurological:  Positive for tremors.   Psychiatric/Behavioral:  Positive for confusion.      Objective:      Neurologic Exam     Mental Status   Oriented to person, place, and time.   Registration:  recalls 3 of 3 objects. Follows 3 step commands.   Attention: normal. Concentration: normal.   Speech: speech is normal   Level of consciousness: alert  Knowledge: consistent with education. Able to perform simple calculations.   Normal comprehension.     Cranial Nerves   Cranial nerves II through XII intact.     Motor Exam   Muscle bulk: normal  Overall muscle tone: normal  Right arm tone: normal  Left arm tone: normal  Right arm pronator drift: absent  Left arm pronator drift: absent  Right leg tone: normal  Left leg tone: normal    Strength   Right neck flexion: 5/5  Left neck flexion: 5/5  Right neck extension: 5/5  Left neck extension: 5/5  Right deltoid: 5/5  Left deltoid: 5/5  Right biceps: 5/5  Left biceps: 5/5  Right triceps: 5/5  Left triceps: 5/5  Right wrist flexion: 5/5  Left wrist flexion: 5/5  Right wrist extension: 5/5  Left wrist extension: 5/5  Right interossei: 5/5  Left interossei: 5/5  Right abdominals: 5/5  Left abdominals: 5/5  Right iliopsoas: 5/5  Left iliopsoas: 5/5  Right quadriceps: 5/5  Left quadriceps: 5/5  Right hamstrin/5  Left hamstrin/5  Right glutei: 5/5  Left glutei: 5/5  Right anterior tibial: 5/5  Left anterior tibial: 5/5  Right posterior tibial: 5/5  Left posterior tibial: 5/5  Right peroneal: 5/5  Left peroneal: 5/5  Right gastroc: 5/5  Left gastroc: 5/5    Sensory Exam   Light touch normal.   Vibration normal.   Proprioception normal.   Pinprick normal.     Gait, Coordination, and Reflexes     Gait  Gait: normal    Coordination   Romberg: negative    Tremor   Resting tremor: absent  Intention tremor: absent  Action tremor: absent    Reflexes   Right brachioradialis: 2+  Left brachioradialis: 2+  Right biceps: 2+  Left biceps: 2+  Right triceps: 2+  Left triceps: 2+  Right patellar: 2+  Left patellar: 2+  Right achilles: 2+  Left achilles: 2+  Right : 2+  Left : 2+    Physical Exam  HENT:      Head: Normocephalic and atraumatic.   Cardiovascular:      Rate and  Rhythm: Normal rate.   Pulmonary:      Effort: Pulmonary effort is normal.   Skin:     General: Skin is warm and dry.   Neurological:      Mental Status: She is alert and oriented to person, place, and time.      Cranial Nerves: Cranial nerves 2-12 are intact.      Sensory: Sensation is intact.      Motor: Motor function is intact.      Coordination: Coordination is intact. Romberg Test normal.      Gait: Gait is intact.      Deep Tendon Reflexes:      Reflex Scores:       Tricep reflexes are 2+ on the right side and 2+ on the left side.       Bicep reflexes are 2+ on the right side and 2+ on the left side.       Brachioradialis reflexes are 2+ on the right side and 2+ on the left side.       Patellar reflexes are 2+ on the right side and 2+ on the left side.       Achilles reflexes are 2+ on the right side and 2+ on the left side.  Psychiatric:         Mood and Affect: Mood normal.         Speech: Speech normal.         Behavior: Behavior is cooperative.         Assessment and Plan:       1. Tremor of both hands  - she declined any medication option for her tremors at this time.   No tremor was noted on exam     2. Memory deficit  - VITAMIN B1; Future  - Vitamin B12 Deficiency Panel; Future  - VITAMIN B6; Future  - VITAMIN B2; Future  - NIACIN (VITAMIN B3); Future  - Homocysteine, serum; Future  - METHYLMALONIC ACID, SERUM; Future  - Magnesium; Future  - Creatinine, serum; Future  -MRI Brain

## 2022-10-26 ENCOUNTER — OFFICE VISIT (OUTPATIENT)
Dept: FAMILY MEDICINE | Facility: CLINIC | Age: 67
End: 2022-10-26
Payer: MEDICARE

## 2022-10-26 VITALS
TEMPERATURE: 98 F | SYSTOLIC BLOOD PRESSURE: 131 MMHG | BODY MASS INDEX: 37.44 KG/M2 | WEIGHT: 238.56 LBS | RESPIRATION RATE: 16 BRPM | DIASTOLIC BLOOD PRESSURE: 70 MMHG | HEIGHT: 67 IN | OXYGEN SATURATION: 95 % | HEART RATE: 102 BPM

## 2022-10-26 DIAGNOSIS — L08.9 INFECTED CYST OF SKIN: Primary | ICD-10-CM

## 2022-10-26 DIAGNOSIS — Z78.0 POSTMENOPAUSAL: ICD-10-CM

## 2022-10-26 DIAGNOSIS — L72.9 INFECTED CYST OF SKIN: Primary | ICD-10-CM

## 2022-10-26 DIAGNOSIS — Z12.31 ENCOUNTER FOR SCREENING MAMMOGRAM FOR MALIGNANT NEOPLASM OF BREAST: ICD-10-CM

## 2022-10-26 PROCEDURE — 99213 PR OFFICE/OUTPT VISIT, EST, LEVL III, 20-29 MIN: ICD-10-PCS | Mod: S$GLB,,, | Performed by: PHYSICIAN ASSISTANT

## 2022-10-26 PROCEDURE — 99999 PR PBB SHADOW E&M-EST. PATIENT-LVL V: ICD-10-PCS | Mod: PBBFAC,,, | Performed by: PHYSICIAN ASSISTANT

## 2022-10-26 PROCEDURE — 1159F MED LIST DOCD IN RCRD: CPT | Mod: CPTII,S$GLB,, | Performed by: PHYSICIAN ASSISTANT

## 2022-10-26 PROCEDURE — 3044F HG A1C LEVEL LT 7.0%: CPT | Mod: CPTII,S$GLB,, | Performed by: PHYSICIAN ASSISTANT

## 2022-10-26 PROCEDURE — 1100F PR PT FALLS ASSESS DOC 2+ FALLS/FALL W/INJURY/YR: ICD-10-PCS | Mod: CPTII,S$GLB,, | Performed by: PHYSICIAN ASSISTANT

## 2022-10-26 PROCEDURE — 1100F PTFALLS ASSESS-DOCD GE2>/YR: CPT | Mod: CPTII,S$GLB,, | Performed by: PHYSICIAN ASSISTANT

## 2022-10-26 PROCEDURE — 3288F FALL RISK ASSESSMENT DOCD: CPT | Mod: CPTII,S$GLB,, | Performed by: PHYSICIAN ASSISTANT

## 2022-10-26 PROCEDURE — 1157F PR ADVANCE CARE PLAN OR EQUIV PRESENT IN MEDICAL RECORD: ICD-10-PCS | Mod: CPTII,S$GLB,, | Performed by: PHYSICIAN ASSISTANT

## 2022-10-26 PROCEDURE — 99999 PR PBB SHADOW E&M-EST. PATIENT-LVL V: CPT | Mod: PBBFAC,,, | Performed by: PHYSICIAN ASSISTANT

## 2022-10-26 PROCEDURE — 3078F DIAST BP <80 MM HG: CPT | Mod: CPTII,S$GLB,, | Performed by: PHYSICIAN ASSISTANT

## 2022-10-26 PROCEDURE — 99213 OFFICE O/P EST LOW 20 MIN: CPT | Mod: S$GLB,,, | Performed by: PHYSICIAN ASSISTANT

## 2022-10-26 PROCEDURE — 1157F ADVNC CARE PLAN IN RCRD: CPT | Mod: CPTII,S$GLB,, | Performed by: PHYSICIAN ASSISTANT

## 2022-10-26 PROCEDURE — 3078F PR MOST RECENT DIASTOLIC BLOOD PRESSURE < 80 MM HG: ICD-10-PCS | Mod: CPTII,S$GLB,, | Performed by: PHYSICIAN ASSISTANT

## 2022-10-26 PROCEDURE — 3288F PR FALLS RISK ASSESSMENT DOCUMENTED: ICD-10-PCS | Mod: CPTII,S$GLB,, | Performed by: PHYSICIAN ASSISTANT

## 2022-10-26 PROCEDURE — 1159F PR MEDICATION LIST DOCUMENTED IN MEDICAL RECORD: ICD-10-PCS | Mod: CPTII,S$GLB,, | Performed by: PHYSICIAN ASSISTANT

## 2022-10-26 PROCEDURE — 3075F PR MOST RECENT SYSTOLIC BLOOD PRESS GE 130-139MM HG: ICD-10-PCS | Mod: CPTII,S$GLB,, | Performed by: PHYSICIAN ASSISTANT

## 2022-10-26 PROCEDURE — 3044F PR MOST RECENT HEMOGLOBIN A1C LEVEL <7.0%: ICD-10-PCS | Mod: CPTII,S$GLB,, | Performed by: PHYSICIAN ASSISTANT

## 2022-10-26 PROCEDURE — 3075F SYST BP GE 130 - 139MM HG: CPT | Mod: CPTII,S$GLB,, | Performed by: PHYSICIAN ASSISTANT

## 2022-10-26 RX ORDER — BNT162B2 ORIGINAL AND OMICRON BA.4/BA.5 .1125; .1125 MG/2.25ML; MG/2.25ML
INJECTION, SUSPENSION INTRAMUSCULAR
COMMUNITY
Start: 2022-09-20 | End: 2024-03-05

## 2022-10-26 RX ORDER — MUPIROCIN 20 MG/G
OINTMENT TOPICAL 3 TIMES DAILY
Qty: 30 G | Refills: 0 | Status: SHIPPED | OUTPATIENT
Start: 2022-10-26

## 2022-10-26 RX ORDER — INFLUENZA A VIRUS A/VICTORIA/2570/2019 IVR-215 (H1N1) ANTIGEN (FORMALDEHYDE INACTIVATED), INFLUENZA A VIRUS A/DARWIN/6/2021 IVR-227 (H3N2) ANTIGEN (FORMALDEHYDE INACTIVATED), INFLUENZA B VIRUS B/AUSTRIA/1359417/2021 BVR-26 ANTIGEN (FORMALDEHYDE INACTIVATED), INFLUENZA B VIRUS B/PHUKET/3073/2013 BVR-1B ANTIGEN (FORMALDEHYDE INACTIVATED) 15; 15; 15; 15 UG/.5ML; UG/.5ML; UG/.5ML; UG/.5ML
INJECTION, SUSPENSION INTRAMUSCULAR
COMMUNITY
Start: 2022-09-20 | End: 2024-03-05

## 2022-10-26 NOTE — PROGRESS NOTES
Subjective:       Patient ID: Gwendolyn Fournier is a 67 y.o. female.    Chief Complaint: Cyst (Knot on the left side of head for about two weeks )    HPI: 66 yo female presents for cyst. Left side of head, noticed 1 week ago. Tender to touch. Feels its getting bigger. No drainage. Hurts to lie on left side.    Review of Systems   Integumentary:  Positive for mole/lesion (left head).   Neurological:  Positive for tremors.       Objective:      Physical Exam  Constitutional:       Appearance: Normal appearance.   HENT:      Head: Normocephalic and atraumatic.     Neurological:      Mental Status: She is alert.   Psychiatric:         Mood and Affect: Mood normal.         Behavior: Behavior normal.       Assessment:       Problem List Items Addressed This Visit    None  Visit Diagnoses       Infected cyst of skin    -  Primary    Relevant Medications    mupirocin (BACTROBAN) 2 % ointment    Other Relevant Orders    Ambulatory referral/consult to Dermatology    Postmenopausal        Relevant Orders    DXA Bone Density Spine And Hip    Colon cancer screening        Encounter for screening mammogram for malignant neoplasm of breast        Relevant Orders    Mammo Digital Screening Bilat            Plan:         Gwendolyn was seen today for cyst and hand injury.    Diagnoses and all orders for this visit:    Infected cyst of skin  -     mupirocin (BACTROBAN) 2 % ointment; Apply topically 3 (three) times daily.  -     Ambulatory referral/consult to Dermatology; Future    Postmenopausal  -     DXA Bone Density Spine And Hip; Future    Encounter for screening mammogram for malignant neoplasm of breast  -     Mammo Digital Screening Bilat; Future

## 2022-11-03 ENCOUNTER — CLINICAL SUPPORT (OUTPATIENT)
Dept: REHABILITATION | Facility: HOSPITAL | Age: 67
End: 2022-11-03
Attending: PHYSICAL MEDICINE & REHABILITATION
Payer: MEDICARE

## 2022-11-03 DIAGNOSIS — R29.898 DECREASED STRENGTH OF TRUNK AND BACK: ICD-10-CM

## 2022-11-03 DIAGNOSIS — R29.3 POOR POSTURE: ICD-10-CM

## 2022-11-03 DIAGNOSIS — M25.69 DECREASED RANGE OF MOTION OF TRUNK AND BACK: ICD-10-CM

## 2022-11-03 PROCEDURE — 97110 THERAPEUTIC EXERCISES: CPT | Mod: PN

## 2022-11-03 PROCEDURE — 97161 PT EVAL LOW COMPLEX 20 MIN: CPT | Mod: PN

## 2022-11-04 NOTE — PLAN OF CARE
"[facility] - PHYSICAL THERAPY EVALUATION     Name: Gwendolyn Fournier  Clinic number: [id number]    Therapy diagnosis:   Encounter Diagnoses   Name Primary?    Poor posture     Decreased range of motion of trunk and back     Decreased strength of trunk and back      Physician: Corbin Caruso MD    Physician orders: PT Eval and Treat   Medical diagnosis from referral:   M47.816 (ICD-10-CM) - Lumbar facet arthropathy   M96.1 (ICD-10-CM) - Failed back syndrome   Evaluation date: 11/3/2022  Authorization period expiration: 9/15/23  Plan of care expiration: 2/3/2023  Reassessment due: 12/3/2022   Visit # / visits authorized:     Time in: 1105  Time out: 1200  Total billable time: 55 minutes    Precautions: Standard, HTN    Pattern of pain determined: Pattern 5      SUBJECTIVE   Date of onset: chronic; many years  History of current condition - Gwendolyn reports that back pain began when she had what she describes as a lumbar fusion in . She reports that her daughter  shortly after surgery and affected her memory and recovery following. She reports that her surgery was to help with her sciatic pain. She reports that she does not have sciatica anymore, but does have significant localized back pain. Describes pain as "lightning" and denies any radicular symptoms or paresthesias in the lower extremities. Reports aggravating factors to be turning over in bed, walking, standing up straight. Alleviating factors are reported to be sleeping and lying down. States she would like to be able to walk taller, sleep more comfortably, and get out of bed quicker.    Medical history:   Past Medical History:   Diagnosis Date    Arthritis     Gout attack     Hx of psychiatric care     Hypertension     Psychiatric problem     Renal disorder     Sciatic nerve pain 2013    Therapy     used to follow with psychiatrist but doesn't recall whom,     Tuberculosis        Surgical history:   Gwendolyn Fournier  has a past surgical " history that includes Knee surgery (1/2014); Colonoscopy (N/A, 1/21/2019); and Injection of joint (Bilateral, 3/13/2019).    Medications:   Gwendolyn has a current medication list which includes the following prescription(s): albuterol, allopurinol, amlodipine, atorvastatin, blood sugar diagnostic, blood-glucose meter, bupropion, diazepam, diphenhydramine, docusate sodium, duloxetine, ergocalciferol, escitalopram oxalate, ferrous sulfate, freestyle coy 2 reader, freestyle coy 2 sensor, fluad quad 2022-23(65y up)(pf), flucelvax quad [id number] (pf), fluticasone propionate, furosemide, gabapentin, glipizide, hydrochlorothiazide, hydrocodone-acetaminophen, indomethacin, lancets, metoprolol succinate, movantik, mupirocin, pfizer covid bival(12y up)(pf), prevnar 13 (pf), primidone, quetiapine, tizanidine, tramadol, and trulicity, and the following Facility-Administered Medications: lidocaine (pf) 10 mg/ml (1%).    Allergies:   Review of patient's allergies indicates:   Allergen Reactions    Ondansetron hcl (pf) Hives and Itching    Ketorolac Itching        Imaging: no relevant imaging on file     Prior therapy and/or treatment(s): no  Social history: lives with her daughter in a [facility] with 1 DEANDRE in back and 5 DEANDRE with bilateral hand rails. She is never alone. Reports a few falls in the past few months and had to call the fire department.   Occupation: retired  Leisure: would like to go bowling, would like to start learning how to fish      Current level of function: daughter cleans, cooks, puts on patient's robe. Patient able to dress, toilet, with independence. Sits on edge of tub with handheld showerhead to bathe. Ambulates poorly with rolling walker.   DME owned/used: rollator, BSC over toilet, tub bench (broken)       Pain:  Current 2/10, worst 10/10, best 2/10   Location: generalized lumbar spine  Description: lightning  Aggravating factors: Standing, Walking, Lifting, and Getting out of bed/chair  Easing factors:  "lying in bed  Disturbed sleep: multiple times per night 2/2 kidney and incontinence issues       Pain patterns:  1.  Where is your pain the worst? Low back  2.  Is your pain constant or intermittent? Constant   3.  Does bending forward make your typical pain worse? yes  4.  Since the start of your back pain, has there been a change in your bowel or bladder? Not related   5.  What can't you do now that you use to be able to do? Hobbies, general mobility      Pts goals: "to be more active, less pain, be more mobile, learn how to fish"      *Clinical red flags*:  Cough/sneeze strain: (--)  Bladder/bowel changes: (--)  Falls: (+)  Night pain: (--)  Unexplained weight loss: (--)  General health: poor    OBJECTIVE     Postural examination/scapula alignment: Rounded shoulder, Head forward, Increased kyphosis, and Decreased lordosis  Joint integrity: Hard end feel  Palpation: tenderness with gross mild palpation to lumbar spine    Leg length: WFL  Correction of posture: better with lumbar roll    Trunk mobility:   Norm ROM loss   Flexion Fingers touch toes, sacral angle >/= 70 deg, uniform spinal curvature, posterior weight shift  major loss   Extension ASIS surpasses toes, spine of scapulae surpasses heels, uniform spinal curve major loss   Side-bending right  major loss   Side-bending left  major loss   Rotation right PT observes contralateral shoulder major loss   Rotation left PT observes contra lateral shoulder major loss     LE mobility:   Norm Observed   HS length        70-90 deg R: major loss      L: major loss   Hip rotation     IR 30 deg    ER 40 deg R: L:     IR:  Major loss  Major      ER:  Major loss  Major loss   Jaciel test (hip flexors/quads)  R: -           L: -     LE strength:  RLE  LLE    Hip flexion 4-/5 Hip flexion 4-/5   Hip extension 3+/5 Hip extension: 3+/5   Hip abduction 4-/5 Hip abduction 4-/5   Hip adduction 4-/5 Hip adduction  4-/5   Knee flexion 4/5 Knee flexion 4/5   Knee extension 4/5 Knee " extension 4/5   Ankle dorsiflexion 4/5 Ankle dorsiflexion 4/5   Ankle plantarflexion 4/5 Ankle plantarflexion 4/5     Gait:  Assistive device used: rollator  Level of assistance: supervision  Patient displays the following gait deviations:  slow gait speed, anterior trunk lean, thoracic kyphosis, decreased step length, decreased stride length, lateral sway.     Special tests:   Test name  Result   Prone Instability Test (+)   SIJ Provocation Test(s):  YVETTE, compression, distraction, sacral thrust (--)   Straight Leg Raise (--)   Neural Tension (Slump) Test (--)   Walking on toes Not able   Walking on heels  Not able   Jennifer's sign  (+)   Bridge test Not able   Active SLR Not able       Neurological screening:    Sensation: WFL   Left Right   Patella Tendon 1+ 1+   Achilles Tendon 1+ 1+   Clonus (--) (--)     Baseline isometric testing on MedX equipment:   Not appropriate for MedX machine, will transfer to ortho    Timed Up & Go Test (TUG)    Instructions to the patient:   From sitting in a chair, stand up, walk 3 meters, turn around, walk back, and sit down    Scoring:   Assistive Device and/or Bracing Used: 4 wheeled walker  TUG Time: 38.1 seconds     Community Dwelling Older Adult = > 13.5 sec. are associated with high fall risk      5 times sit-stand Unable to complete  >12 sec= fall risk for general elderly  >16 sec= fall risk for Parkinson's disease  >10 sec= balance/vestibular dysfunction (<59 y/o)  >14.2 sec= balance/vestibular dysfunction (>59 y/o)  >12 sec= fall risk for CVA       Limitation/restriction for FOTO Lumbar Survey    Therapist reviewed FOTO scores for Gwendolyn Fournier on 11/3/2022.   FOTO documents entered into [employer] - see Media section.    Limitation Score: 65%         TREATMENT   Total tx time separate from evaluation: 10 minutes      Gwendolyn received therapeutic exercises to develop/improve posture, lumbar/thoracic ROM, strength and muscular endurance for 10 minutes including the following  exercises:     LTR  PPT  SOC  DKTC    MedX dynamic exercise and baseline IM test    Manual therapy provided: none    Written home exercises provided: yes  Exercises were reviewed and Gwendolyn was able to demonstrate them prior to the end of the session.  Gwendolyn demonstrated fair  understanding of the education provided.     See EMR under patient instructions for exercises provided 11/3/2022.      Education provided:   - Discussed proper sitting and standing posture, body mechanics, applicable ergonomics, and lumbar roll implications.  - Discussed sleep positioning, morning stiffness, and log rolling technique for reduced spinal strain.  - Pt received [facility] handouts which discussed therapy expectations, purpose/opportunity for health coaching and wellness program when discharged from therapy, back education and care specifically for seated/standing posture, lifting techniques, components of exercise, and the importance of nutrition, hydration, and sleep.   - Pt received a handout regarding anticipated muscular soreness following MedX isometric testing and strategies for management were reviewed with patient including stretching, cold application and scheduled rest.   - PT discussed importance of attending therapy and performing home exercises consistently for optimal therapeutic gain, as well as discussed attendance policy in full.       ASSESSMENT   Gwendolyn is a 67 y.o. female referred to outpatient Physical Therapy with a medical diagnosis of LBP. Pt presents with signs and symptoms including: increased low back pain, decreased lumbar ROM, decreased LE Strength, soft tissue dysfunction, postural imbalance,impaired joint mobility, and decreased tolerance to functional activities. Pt is not appropriate for  program 2/2 co-morbidities and limited mobility. Will transfer to Ortho caseload.     Pain pattern: Pattern 5       Based on the above history and physical examination, an active physical therapy  program is recommended. Pt prognosis is good and pt would likely benefit from skilled outpatient physical therapy to address the deficits stated above and in the chart below, to provide pt/family education, and to maximize pt's level of function and independence in both the home and community.     Plan of care discussed with patient: yes  Pt's spiritual, cultural and educational needs have been considered and pt is agreeable to the plan of care and goals as stated below:     Anticipated barriers for therapy: attendance, co-morbidities     PT evaluation completed? yes    Medical necessity is demonstrated by the following problem list.    Pt presents with the following impairments:     History  Co-morbidities and personal factors that may impact POC Co-morbidities:   anxiety, CAD, CKD stage , coping style/mechanism, depression, gout, high BMI, and poor medication/medical compliance    Personal factors:   no deficits     moderate   Examination  Body structures and functions, activity limitations and participation restrictions that may impact POC Body regions:   back  lower extremities  trunk    Body systems:    gross symmetry  ROM  strength  gross coordinated movement  gait    Participation restrictions:  Limited mobility    Activity limitations:  Learning and applying knowledge  no deficits    General tasks/commands  no deficits    Communication  no deficits    Mobility  lifting and carrying objects  walking  moving around using equipment (WC)    Self-care  looking after one's health    Domestic life  assisting others    Interactions/relationships  no deficits     Life areas  no deficits    Community/social life  no deficits         moderate   Clinical presentation evolving clinical presentation with changing clinical characteristics moderate   Decision making/complexity score: moderate       Goals:  Pt is in agreement with the following goals.    Goals:  Short Term Goals: 4 weeks   1. The patient with report  "compliance with HEP to maximize functional outcomes.  2. The patient will demonstrate increase in BLE MMT by 1/2 grade to improve pt's functional mobility  3. The patient will decrease pain level by 50% in order to improve QOL.    Long Term Goals: 8 weeks   1. The patient will demonstrate understanding and performance of advanced HEP to allow for independence once discharged from therapy.   2. 2. The patient will demonstrate increase in BLE MMT by 1 grade to improve pt's functional mobility  3. The patient will report 40 % functional limitation on FOTO to indicate an improvement in overall function.  4. The patient will be able to ambulate with appropriate posture in order to improve pt's ability to perform ADLs.      Plan   Outpatient physical therapy 2x week for 10 weeks or 20 visits to include the following:   - Patient education  - Therapeutic exercise  - Manual therapy  - Performance testing   - Neuromuscular re-education  - Therapeutic activity   - Modalities    Pt may be seen by PTA as part of the rehabilitation team.     Therapist: Fredy Munoz, PT  11/3/2022    "I certify the need for these services furnished under this plan of treatment and while under my care."    ____________________________________  Physician/referring practitioner    _______________  Date of signature          "

## 2022-11-07 ENCOUNTER — TELEPHONE (OUTPATIENT)
Dept: FAMILY MEDICINE | Facility: CLINIC | Age: 67
End: 2022-11-07
Payer: MEDICARE

## 2022-11-07 DIAGNOSIS — E11.22 TYPE 2 DIABETES MELLITUS WITH STAGE 3B CHRONIC KIDNEY DISEASE, WITHOUT LONG-TERM CURRENT USE OF INSULIN: ICD-10-CM

## 2022-11-07 DIAGNOSIS — N18.32 TYPE 2 DIABETES MELLITUS WITH STAGE 3B CHRONIC KIDNEY DISEASE, WITHOUT LONG-TERM CURRENT USE OF INSULIN: ICD-10-CM

## 2022-11-07 RX ORDER — FLASH GLUCOSE SENSOR
1 KIT MISCELLANEOUS
Qty: 1 KIT | Refills: 11 | Status: SHIPPED | OUTPATIENT
Start: 2022-11-07 | End: 2022-11-29 | Stop reason: SDUPTHER

## 2022-11-07 NOTE — TELEPHONE ENCOUNTER
----- Message from Aide Fernandes sent at 11/7/2022  3:11 PM CST -----  Regarding: Liat/ Daughter/  [phone]  Type: RX Refill Request    Who Called:  Daughter    Refill or New Rx:  Rx    RX Name and Strength:  flash glucose sensor (FREESTYLE HAI 2 SENSOR) Kit    Preferred Pharmacy with phone number:  [id number] - [place], [place] - [address] [place] [address] [address] AT [facility]    Local or Mail Order:  Local    Ordering Provider:  ELVIS Bruner    Would the patient rather a call back or a response via [facility]?  Call back    Best Call Back Number:  [phone]

## 2022-11-11 ENCOUNTER — CLINICAL SUPPORT (OUTPATIENT)
Dept: REHABILITATION | Facility: HOSPITAL | Age: 67
End: 2022-11-11
Payer: MEDICARE

## 2022-11-11 DIAGNOSIS — M25.69 DECREASED RANGE OF MOTION OF TRUNK AND BACK: ICD-10-CM

## 2022-11-11 DIAGNOSIS — R29.898 DECREASED STRENGTH OF TRUNK AND BACK: ICD-10-CM

## 2022-11-11 DIAGNOSIS — R29.3 POOR POSTURE: Primary | ICD-10-CM

## 2022-11-11 PROCEDURE — 97110 THERAPEUTIC EXERCISES: CPT | Mod: PN

## 2022-11-11 NOTE — PROGRESS NOTES
"[facility]   Physical Therapy Treatment Note     Name: Gwendolyn ASCENCIO [facility] Number: [id number]    Therapy Diagnosis:   Encounter Diagnoses   Name Primary?    Poor posture Yes    Decreased range of motion of trunk and back     Decreased strength of trunk and back      Physician: Corbin Caruso MD    Visit Date: 11/11/2022    Physician orders: PT Eval and Treat   Medical diagnosis from referral:   M47.816 (ICD-10-CM) - Lumbar facet arthropathy   M96.1 (ICD-10-CM) - Failed back syndrome   Evaluation date: 11/3/2022  Authorization period expiration: 9/15/23  Plan of care expiration: 2/3/2023  Reassessment due: 12/3/2022   Visit # / visits authorized: 1/12    PTA Visit #: 0/5     Time In: 11:50  Time Out: 12:50  Total Billable Time: 30 minutes 1 on 1 time with PT    SUBJECTIVE     Pt reports: Pt reports pain in back. She also reports that she might not be able to do all exercises because of previous L TKA and OA in R Knee. States she might have to get a pain sensor soon.   She was not compliant with home exercise program, due to not receiving HEP in initial evaluation  Response to previous treatment: "fine"  Functional change: no major changes    Pain: 4/10  Location: Lower back    OBJECTIVE     Objective Measures updated at progress report unless specified.     Treatment     Gwendolyn received the treatments listed below:      therapeutic exercises to develop strength, endurance, and ROM for 55 minutes including:  LTR: x20  Seated March: x20  Sit to Stand: 3x5  Seated Lumbar Flexion: x20  Hooklying Clamshells: 3x10 yellow TB  Glute Sets: 2x10 with 2" hold  SLR: 1x10    cold pack for 10 minutes to low back.        Patient Education and Home Exercises     Home Exercises Provided and Patient Education Provided     Education provided:   - HEP    Written Home Exercises Provided: yes. Exercises were reviewed and Gwendolyn was able to demonstrate them prior to the end of the session.  Gwendolyn demonstrated " good  understanding of the education provided. See EMR under Patient Instructions for exercises provided during therapy sessions    ASSESSMENT     First follow up session since IE. Pt demonstrating limited activity tolerance, weakness and decreased ROM in lumbar spine and in B LE. Pt educated on safety when using rollator, reminded to lock device prior to sitting down or standing up. Initiated variety of lumbar mobility and LE strengthening exercises. Pt demonstrating difficulty with sit-to-stands - pt relying on B UE to stand and sit, difficulty with B knee flexion in sitting, and difficulty with forward trunk lean. Pt was able to perform remainder of prescribed exercises without significant exacerbation of sx. Heavy cueing required for proper exercise form throughout.    Gwendolyn Is progressing well towards her goals.   Pt prognosis is Fair.     Pt will continue to benefit from skilled outpatient physical therapy to address the deficits listed in the problem list box on initial evaluation, provide pt/family education and to maximize pt's level of independence in the home and community environment.     Pt's spiritual, cultural and educational needs considered and pt agreeable to plan of care and goals.     Anticipated barriers to physical therapy:   - history of chronic back pain  - pt c/o B knee pain    Goals:   Short Term Goals: 4 weeks   1. The patient with report compliance with HEP to maximize functional outcomes.  2. The patient will demonstrate increase in BLE MMT by 1/2 grade to improve pt's functional mobility  3. The patient will decrease pain level by 50% in order to improve QOL.     Long Term Goals: 8 weeks   1. The patient will demonstrate understanding and performance of advanced HEP to allow for independence once discharged from therapy.   2. 2. The patient will demonstrate increase in BLE MMT by 1 grade to improve pt's functional mobility  3. The patient will report 40 % functional limitation on FOTO  to indicate an improvement in overall function.  4. The patient will be able to ambulate with appropriate posture in order to improve pt's ability to perform ADLs    PLAN     Continue to emphasize safety considerations with walker  Emphasis on WB tolerance, LE strength, lumbar mobility. Progress slowly    Pan Gonzalez, PT

## 2022-11-14 DIAGNOSIS — F33.1 MAJOR DEPRESSIVE DISORDER, RECURRENT, MODERATE: ICD-10-CM

## 2022-11-14 RX ORDER — ESCITALOPRAM OXALATE 10 MG/1
10 TABLET ORAL DAILY
Qty: 90 TABLET | Refills: 3 | Status: SHIPPED | OUTPATIENT
Start: 2022-11-14 | End: 2023-10-30

## 2022-11-14 NOTE — TELEPHONE ENCOUNTER
----- Message from Sana Ramachandran sent at 11/14/2022  9:35 AM CST -----  Regarding: Pharmacy Call  .Type:  Pharmacy Calling to Clarify an RX    Name of Caller:Radha     Pharmacy Name: [facility]     Prescription Name:  Lexapro 10mg     What do they need to clarify? Need new rx - patient previously had rx at [facility] is switching to [facility]     Can you be contacted via [facility]? No     Best Call Back Number:  [phone]

## 2022-11-14 NOTE — TELEPHONE ENCOUNTER
No new care gaps identified.  [facility] Embedded Care Gaps. Reference number: [id number]. 11/14/2022   9:54:56 AM CST

## 2022-11-17 ENCOUNTER — OFFICE VISIT (OUTPATIENT)
Dept: NEUROLOGY | Facility: CLINIC | Age: 67
End: 2022-11-17
Payer: MEDICARE

## 2022-11-17 VITALS
HEART RATE: 101 BPM | WEIGHT: 239.88 LBS | BODY MASS INDEX: 37.65 KG/M2 | SYSTOLIC BLOOD PRESSURE: 173 MMHG | HEIGHT: 67 IN | DIASTOLIC BLOOD PRESSURE: 83 MMHG

## 2022-11-17 DIAGNOSIS — R25.1 TREMOR OF BOTH HANDS: ICD-10-CM

## 2022-11-17 PROCEDURE — 3008F PR BODY MASS INDEX (BMI) DOCUMENTED: ICD-10-PCS | Mod: CPTII,S$GLB,,

## 2022-11-17 PROCEDURE — 1157F ADVNC CARE PLAN IN RCRD: CPT | Mod: CPTII,S$GLB,,

## 2022-11-17 PROCEDURE — 99213 PR OFFICE/OUTPT VISIT, EST, LEVL III, 20-29 MIN: ICD-10-PCS | Mod: S$GLB,,,

## 2022-11-17 PROCEDURE — 99999 PR PBB SHADOW E&M-EST. PATIENT-LVL V: ICD-10-PCS | Mod: PBBFAC,,,

## 2022-11-17 PROCEDURE — 3008F BODY MASS INDEX DOCD: CPT | Mod: CPTII,S$GLB,,

## 2022-11-17 PROCEDURE — 3288F FALL RISK ASSESSMENT DOCD: CPT | Mod: CPTII,S$GLB,,

## 2022-11-17 PROCEDURE — 99999 PR PBB SHADOW E&M-EST. PATIENT-LVL V: CPT | Mod: PBBFAC,,,

## 2022-11-17 PROCEDURE — 1159F PR MEDICATION LIST DOCUMENTED IN MEDICAL RECORD: ICD-10-PCS | Mod: CPTII,S$GLB,,

## 2022-11-17 PROCEDURE — 1160F PR REVIEW ALL MEDS BY PRESCRIBER/CLIN PHARMACIST DOCUMENTED: ICD-10-PCS | Mod: CPTII,S$GLB,,

## 2022-11-17 PROCEDURE — 1101F PT FALLS ASSESS-DOCD LE1/YR: CPT | Mod: CPTII,S$GLB,,

## 2022-11-17 PROCEDURE — 1159F MED LIST DOCD IN RCRD: CPT | Mod: CPTII,S$GLB,,

## 2022-11-17 PROCEDURE — 1125F AMNT PAIN NOTED PAIN PRSNT: CPT | Mod: CPTII,S$GLB,,

## 2022-11-17 PROCEDURE — 3079F DIAST BP 80-89 MM HG: CPT | Mod: CPTII,S$GLB,,

## 2022-11-17 PROCEDURE — 3077F SYST BP >= 140 MM HG: CPT | Mod: CPTII,S$GLB,,

## 2022-11-17 PROCEDURE — 3288F PR FALLS RISK ASSESSMENT DOCUMENTED: ICD-10-PCS | Mod: CPTII,S$GLB,,

## 2022-11-17 PROCEDURE — 3079F PR MOST RECENT DIASTOLIC BLOOD PRESSURE 80-89 MM HG: ICD-10-PCS | Mod: CPTII,S$GLB,,

## 2022-11-17 PROCEDURE — 3077F PR MOST RECENT SYSTOLIC BLOOD PRESSURE >= 140 MM HG: ICD-10-PCS | Mod: CPTII,S$GLB,,

## 2022-11-17 PROCEDURE — 1160F RVW MEDS BY RX/DR IN RCRD: CPT | Mod: CPTII,S$GLB,,

## 2022-11-17 PROCEDURE — 1157F PR ADVANCE CARE PLAN OR EQUIV PRESENT IN MEDICAL RECORD: ICD-10-PCS | Mod: CPTII,S$GLB,,

## 2022-11-17 PROCEDURE — 99213 OFFICE O/P EST LOW 20 MIN: CPT | Mod: S$GLB,,,

## 2022-11-17 PROCEDURE — 3044F PR MOST RECENT HEMOGLOBIN A1C LEVEL <7.0%: ICD-10-PCS | Mod: CPTII,S$GLB,,

## 2022-11-17 PROCEDURE — 3044F HG A1C LEVEL LT 7.0%: CPT | Mod: CPTII,S$GLB,,

## 2022-11-17 PROCEDURE — 1125F PR PAIN SEVERITY QUANTIFIED, PAIN PRESENT: ICD-10-PCS | Mod: CPTII,S$GLB,,

## 2022-11-17 PROCEDURE — 1101F PR PT FALLS ASSESS DOC 0-1 FALLS W/OUT INJ PAST YR: ICD-10-PCS | Mod: CPTII,S$GLB,,

## 2022-11-17 RX ORDER — PRIMIDONE 50 MG/1
100 TABLET ORAL 3 TIMES DAILY
Qty: 180 TABLET | Refills: 11 | Status: SHIPPED | OUTPATIENT
Start: 2022-11-17 | End: 2023-11-28

## 2022-11-17 NOTE — PROGRESS NOTES
Subjective:       Patient ID: Gwendolyn Fournier is a 67 y.o. female.      History of Present Illness  8/24/22 Initial visit   67 year old female who presents today for evaluation of bilateral hand tremors and memory loss. Past medical history below. She states she has noticed a bilateral hand tremor that started a couple of months ago when she uses her cell phone. She denies noticing this tremor with eating, drinking, or trying to complete other tasks. She notes that she often forgets time such what day it is or day of the week. She feels her confusion with time all started back in 2016 since the death of her daughter. She still drives independently. Denies ever getting lost in familiar places. She completes all ADLs independently. Ha snot had any issues managing her finances or medications. No trouble walking or recent falls    11/17/22  Presents today for follow up visit. Shortly after our last visit she starting taking primidone for her tremors in her hands. Feels that she continues to shake when she is using her Iphone. She is tolerating primidone well without any side effects.     Past Medical History:   Diagnosis Date    Arthritis     Gout attack     Hx of psychiatric care     Hypertension     Psychiatric problem     Renal disorder     Sciatic nerve pain 2013    Therapy     used to follow with psychiatrist but doesn't recall whom, 2012    Tuberculosis        Past Surgical History:   Procedure Laterality Date    COLONOSCOPY N/A 1/21/2019    Procedure: COLONOSCOPY;  Surgeon: Shanna Jose MD;  Location: [facility] ENDO;  Service: Endoscopy;  Laterality: N/A;    INJECTION OF JOINT Bilateral 3/13/2019    Procedure: INJECTION, JOINT BILATERAL SI JOINT;  Surgeon: Evan Coreas MD;  [facility];  Service: Pain Management;  Laterality: Bilateral;  BILATERAL SI JOINT INJECTION    KNEE SURGERY  1/2014    left knee surgery        Family History   Problem Relation Age of Onset    Tuberculosis Mother     Stroke  Father     Bipolar disorder Daughter     Suicide Daughter     Depression Daughter     Bipolar disorder Daughter     Depression Daughter        Social History     Socioeconomic History    Marital status:     Number of children: 5   Occupational History    Occupation: homemaker   Tobacco Use    Smoking status: Former     Types: Cigarettes     Quit date: 1976     Years since quittin.9    Smokeless tobacco: Never   Substance and Sexual Activity    Alcohol use: Not Currently     Alcohol/week: 0.0 standard drinks     Comment: red wine    Drug use: No    Sexual activity: Not Currently     Partners: Male     Social Determinants of Health     Financial Resource Strain: Low Risk     Difficulty of Paying Living Expenses: Not very hard   Food Insecurity: Food Insecurity Present    Worried About Running Out of Food in the Last Year: Never true    Ran Out of Food in the Last Year: Sometimes true   Transportation Needs: No Transportation Needs    Lack of Transportation (Medical): No    Lack of Transportation (Non-Medical): No   Physical Activity: Unknown    Days of Exercise per Week: 0 days   Stress: Stress Concern Present    Feeling of Stress : To some extent   Social Connections: Unknown    Frequency of Communication with Friends and Family: Once a week    Frequency of Social Gatherings with Friends and Family: Once a week    Active Member of Clubs or Organizations: Yes    Attends Club or Organization Meetings: More than 4 times per year    Marital Status:    Housing Stability: Low Risk     Unable to Pay for Housing in the Last Year: No    Number of Places Lived in the Last Year: 1    Unstable Housing in the Last Year: No       Current Outpatient Medications   Medication Sig Dispense Refill    albuterol (ACCUNEB) 0.63 mg/3 mL Nebu Inhale 1 ampule into the lungs as needed.      allopurinoL (ZYLOPRIM) 100 MG tablet TAKE 1 TABLET(100 MG) BY MOUTH TWICE DAILY 180 tablet 0    amLODIPine (NORVASC) 10 MG tablet  TAKE 1 TABLET(10 MG) BY MOUTH EVERY DAY 90 tablet 0    atorvastatin (LIPITOR) 40 MG tablet Take 1 tablet (40 mg total) by mouth every evening. 90 tablet 0    blood sugar diagnostic Strp To check BG two times daily, freestyle lite meter 200 each 3    blood-glucose meter kit To check BG two times daily, freestyle lite meter 1 each 0    buPROPion (WELLBUTRIN) 100 MG tablet Take 100 mg by mouth every morning.      diazePAM (VALIUM) 5 MG tablet TAKE 1 TABLET(5 MG) BY MOUTH EVERY 12 HOURS AS NEEDED FOR ANXIETY 60 tablet 2    diphenhydrAMINE (BENADRYL) 25 mg capsule Take 25 mg by mouth every 6 (six) hours as needed.      docusate sodium (COLACE) 100 MG capsule Take 1 capsule (100 mg total) by mouth as needed for Constipation. 90 capsule 3    DULoxetine (CYMBALTA) 60 MG capsule TAKE 1 CAPSULE(60 MG) BY MOUTH EVERY DAY 90 capsule 3    EScitalopram oxalate (LEXAPRO) 10 MG tablet Take 1 tablet (10 mg total) by mouth once daily. 90 tablet 3    ferrous sulfate (FEOSOL) 325 mg (65 mg iron) Tab tablet Take 325 mg by mouth as needed.       flash glucose scanning reader (FREESTYLE HAI 2 READER) Misc 1 each by Misc.(Non-Drug; Combo Route) route 4 (four) times daily with meals and nightly. 1 each 0    flash glucose sensor (FREESTYLE HAI 2 SENSOR) Kit 1 each by Misc.(Non-Drug; Combo Route) route 2 hours after meals and at bedtime. 1 kit 11    FLUAD QUAD 2022-23,65Y UP,,PF, 60 mcg (15 mcg x 4)/0.5 mL Syrg       FLUCELVAX QUAD 3737-8571, PF, 60 mcg (15 mcg x 4)/0.5 mL Syrg vaccine       fluticasone (FLONASE) 50 mcg/actuation nasal spray SHAKE LIQUID AND USE 2 SPRAYS(100 MCG) IN EACH NOSTRIL EVERY DAY 48 mL 1    furosemide (LASIX) 40 MG tablet TAKE 1 TABLET(40 MG) BY MOUTH EVERY DAY 90 tablet 0    gabapentin (NEURONTIN) 300 MG capsule Take 1 capsule (300 mg total) by mouth 3 (three) times daily. 90 capsule 5    glipiZIDE (GLUCOTROL) 10 MG TR24 Take 1 tablet (10 mg total) by mouth daily with breakfast. 90 tablet 1     hydroCHLOROthiazide (HYDRODIURIL) 25 MG tablet TAKE 1 TABLET(25 MG) BY MOUTH EVERY DAY 90 tablet 0    HYDROcodone-acetaminophen (NORCO) 5-325 mg per tablet TK 1 T PO Q 12 H PRN      indomethacin (INDOCIN) 50 MG capsule Take 1 capsule (50 mg total) by mouth 3 (three) times daily as needed. 30 capsule 11    lancets Misc To check BG two times daily, to use with insurance preferred meter 200 each 3    metoprolol succinate (TOPROL-XL) 50 MG 24 hr tablet Take 1 tablet (50 mg total) by mouth 2 (two) times daily. 180 tablet 1    MOVANTIK 25 mg tablet Take 25 mg by mouth once daily.      mupirocin (BACTROBAN) 2 % ointment Apply topically 3 (three) times daily. 30 g 0    PFIZER COVID BIVAL,12Y UP,,PF, 30 mcg/0.3 mL injection       PREVNAR 13, PF, 0.5 mL Syrg       primidone (MYSOLINE) 50 MG Tab Take 2 tablets (100 mg total) by mouth 3 (three) times daily. 180 tablet 11    QUEtiapine (SEROQUEL) 100 MG Tab TAKE 1 TABLET(100 MG) BY MOUTH EVERY NIGHT AS NEEDED 90 tablet 1    tiZANidine (ZANAFLEX) 4 MG tablet TAKE 1 TO 2 TABLETS BY MOUTH EVERY NIGHT AS NEEDED 60 tablet 5    traMADoL (ULTRAM) 50 mg tablet TAKE 1 TABLET(50 MG) BY MOUTH EVERY 8 HOURS AS NEEDED FOR PAIN 90 tablet 2    TRULICITY 0.75 mg/0.5 mL pen injector ADMINISTER 0.75 MG UNDER THE SKIN EVERY 7 DAYS 2 pen 2     Current Facility-Administered Medications   Medication Dose Route Frequency Provider Last Rate Last Admin    lidocaine (PF) 10 mg/ml (1%) injection 40 mg  4 mL Intramuscular 1 time in Clinic/HOD Azikiwe K. Lombard, MD           Review of patient's allergies indicates:   Allergen Reactions    Ondansetron hcl (pf) Hives and Itching    Ketorolac Itching        Review of Systems  Review of Systems   Constitutional: Negative.    HENT: Negative.     Eyes: Negative.    Respiratory: Negative.     Cardiovascular: Negative.    Gastrointestinal: Negative.    Endocrine: Negative.    Genitourinary: Negative.    Musculoskeletal: Negative.    Skin: Negative.     Neurological:  Positive for tremors.   Psychiatric/Behavioral: Negative.       Objective:      Neurologic Exam     Mental Status   Oriented to person, place, and time.   Registration: recalls 3 of 3 objects. Follows 3 step commands.   Attention: normal. Concentration: normal.   Speech: speech is normal   Level of consciousness: alert  Knowledge: consistent with education. Able to perform simple calculations.   Normal comprehension.     Cranial Nerves   Cranial nerves II through XII intact.     Motor Exam   Muscle bulk: normal  Overall muscle tone: normal  Right arm tone: normal  Left arm tone: normal  Right arm pronator drift: absent  Left arm pronator drift: absent  Right leg tone: normal  Left leg tone: normal    Strength   Right neck flexion: 5/5  Left neck flexion: 5/5  Right neck extension: 5/5  Left neck extension: 5/5  Right deltoid: 5/5  Left deltoid: 5/5  Right biceps: 5/5  Left biceps: 5/5  Right triceps: 5/5  Left triceps: 5/5  Right wrist flexion: 5/5  Left wrist flexion: 5/5  Right wrist extension: 5/5  Left wrist extension: 5/5  Right interossei: 5/5  Left interossei: 5/5  Right abdominals: 5/5  Left abdominals: 5/5  Right iliopsoas: 5/5  Left iliopsoas: 5/5  Right quadriceps: 5/5  Left quadriceps: 5/5  Right hamstrin/5  Left hamstrin/5  Right glutei: 5/5  Left glutei: 5/5  Right anterior tibial: 5/5  Left anterior tibial: 5/5  Right posterior tibial: 5/5  Left posterior tibial: 5/5  Right peroneal: 5/5  Left peroneal: 5/5  Right gastroc: 5/5  Left gastroc: 5/5    Sensory Exam   Light touch normal.   Vibration normal.   Proprioception normal.   Pinprick normal.     Gait, Coordination, and Reflexes     Gait  Gait: normal    Coordination   Romberg: negative    Tremor   Resting tremor: absent  Intention tremor: absent  Action tremor: absent    Reflexes   Right brachioradialis: 2+  Left brachioradialis: 2+  Right biceps: 2+  Left biceps: 2+  Right triceps: 2+  Left triceps: 2+  Right patellar:  2+  Left patellar: 2+  Right achilles: 2+  Left achilles: 2+  Right : 2+  Left : 2+    Physical Exam  HENT:      Head: Normocephalic and atraumatic.   Cardiovascular:      Rate and Rhythm: Normal rate.   Pulmonary:      Effort: Pulmonary effort is normal.   Skin:     General: Skin is warm and dry.   Neurological:      Mental Status: She is alert and oriented to person, place, and time.      Cranial Nerves: Cranial nerves 2-12 are intact.      Sensory: Sensation is intact.      Motor: Motor function is intact.      Coordination: Coordination is intact. Romberg Test normal.      Gait: Gait is intact.      Deep Tendon Reflexes:      Reflex Scores:       Tricep reflexes are 2+ on the right side and 2+ on the left side.       Bicep reflexes are 2+ on the right side and 2+ on the left side.       Brachioradialis reflexes are 2+ on the right side and 2+ on the left side.       Patellar reflexes are 2+ on the right side and 2+ on the left side.       Achilles reflexes are 2+ on the right side and 2+ on the left side.  Psychiatric:         Mood and Affect: Mood normal.         Speech: Speech normal.         Behavior: Behavior is cooperative.         Assessment and Plan:      1. Tremor of both hands  - ? Medication side effect  - primidone (MYSOLINE) 50 MG Tab; Take 2 tablets (100 mg total) by mouth 3 (three) times daily.  Dispense: 180 tablet; Refill: 11

## 2022-11-18 ENCOUNTER — CLINICAL SUPPORT (OUTPATIENT)
Dept: REHABILITATION | Facility: HOSPITAL | Age: 67
End: 2022-11-18
Payer: MEDICARE

## 2022-11-18 DIAGNOSIS — R29.898 DECREASED STRENGTH OF TRUNK AND BACK: ICD-10-CM

## 2022-11-18 DIAGNOSIS — R29.3 POOR POSTURE: Primary | ICD-10-CM

## 2022-11-18 DIAGNOSIS — M25.69 DECREASED RANGE OF MOTION OF TRUNK AND BACK: ICD-10-CM

## 2022-11-18 PROCEDURE — 97110 THERAPEUTIC EXERCISES: CPT | Mod: PN

## 2022-11-18 NOTE — PROGRESS NOTES
"[facility]   Physical Therapy Treatment Note     Name: Gwendolyn ASCENCIO [facility] Number: [id number]    Therapy Diagnosis:   Encounter Diagnoses   Name Primary?    Poor posture Yes    Decreased range of motion of trunk and back     Decreased strength of trunk and back      Physician: Corbin Caruso MD    Visit Date: 11/18/2022    Physician orders: PT Eval and Treat   Medical diagnosis from referral:   M47.816 (ICD-10-CM) - Lumbar facet arthropathy   M96.1 (ICD-10-CM) - Failed back syndrome   Evaluation date: 11/3/2022  Authorization period expiration: 9/15/23  Plan of care expiration: 2/3/2023  Reassessment due: 12/3/2022   Visit # / visits authorized: 1/12    PTA Visit #: 0/5     Time In: 11:50  Time Out: 12:50  Total Billable Time: 60    SUBJECTIVE     Pt reports: Pt reports constant pain in back that has remained the same  She was compliant with HEP  Response to previous treatment: "fine"  Functional change: no major changes    Pain: 4/10  Location: Lower back    OBJECTIVE     Objective Measures updated at progress report unless specified.     Treatment     Gwendolyn received the treatments listed below:      therapeutic exercises to develop strength, endurance, and ROM for 55 minutes including:  LTR: x20  Seated March: x20  Sit to Stand from elevated table and PT supervision: 3x10  Seated Lumbar Flexion: x30  Sidelying Clamshells: 3x10 yellow TB  LAQ: 2x10 with 2lb weight  SLR: 2x10    cold pack for 10 minutes to low back.        Patient Education and Home Exercises     Home Exercises Provided and Patient Education Provided     Education provided:   - HEP    Written Home Exercises Provided: yes. Exercises were reviewed and Gwendolyn was able to demonstrate them prior to the end of the session.  Gwendolyn demonstrated good  understanding of the education provided. See EMR under Patient Instructions for exercises provided during therapy sessions    ASSESSMENT     Pt comes to PT ambulating with RW. " Continued with exercises, increased volume for STS with PT supervision. Pt requires frequent rest breaks due to low activity tolerance. Pt demonstrating limited ROM, most notably during lower trunk rotations. Pt able to tolerate exercises this session, requiring frequent cues for proper form. Pt demonstrated extreme difficulty with standing up from standard chair at conclusion of session. Requires max assist from PT to stand up from chair prior to leaving clinic.     Gwendolyn Is progressing well towards her goals.   Pt prognosis is Fair.     Pt will continue to benefit from skilled outpatient physical therapy to address the deficits listed in the problem list box on initial evaluation, provide pt/family education and to maximize pt's level of independence in the home and community environment.     Pt's spiritual, cultural and educational needs considered and pt agreeable to plan of care and goals.     Anticipated barriers to physical therapy:   - history of chronic back pain  - pt c/o B knee pain    Goals:   Short Term Goals: 4 weeks   1. The patient with report compliance with HEP to maximize functional outcomes.  2. The patient will demonstrate increase in BLE MMT by 1/2 grade to improve pt's functional mobility  3. The patient will decrease pain level by 50% in order to improve QOL.     Long Term Goals: 8 weeks   1. The patient will demonstrate understanding and performance of advanced HEP to allow for independence once discharged from therapy.   2. 2. The patient will demonstrate increase in BLE MMT by 1 grade to improve pt's functional mobility  3. The patient will report 40 % functional limitation on FOTO to indicate an improvement in overall function.  4. The patient will be able to ambulate with appropriate posture in order to improve pt's ability to perform ADLs    PLAN     Continue to emphasize safety considerations with walker  Emphasis on WB tolerance, LE strength, lumbar mobility. Progress slowly within  tolerance.    Pan Gonzalez, PT

## 2022-11-23 DIAGNOSIS — N18.32 TYPE 2 DIABETES MELLITUS WITH STAGE 3B CHRONIC KIDNEY DISEASE, WITHOUT LONG-TERM CURRENT USE OF INSULIN: Primary | ICD-10-CM

## 2022-11-23 DIAGNOSIS — E11.22 TYPE 2 DIABETES MELLITUS WITH STAGE 3B CHRONIC KIDNEY DISEASE, WITHOUT LONG-TERM CURRENT USE OF INSULIN: Primary | ICD-10-CM

## 2022-11-23 NOTE — TELEPHONE ENCOUNTER
No new care gaps identified.  [facility] Embedded Care Gaps. Reference number: [id number]. 11/23/2022   10:59:34 AM CST

## 2022-11-23 NOTE — TELEPHONE ENCOUNTER
----- Message from Randa Mcclain sent at 11/23/2022 10:52 AM CST -----  Regarding: Refill Request  Type: RX Refill Request      Who Called: Self       Refill or New Rx: Refill       RX Name and Strength: flash glucose scanning reader (Bitex.laYLE HAI 2 READER) [facility]        Preferred Pharmacy with phone number: .    [facility]     [address] [place] [address]   [phone]. [phone]    Would the patient rather a call back or a response via [facility]? Call       Best Call Back Number: [phone]

## 2022-11-23 NOTE — PROGRESS NOTES
"[facility]   Physical Therapy Treatment Note     Name: Gwendolyn ASCENCIO [facility] Number: [id number]    Therapy Diagnosis:   Encounter Diagnoses   Name Primary?    Poor posture Yes    Decreased range of motion of trunk and back     Decreased strength of trunk and back        Physician: Corbin Caruso MD    Visit Date: 11/25/2022    Physician orders: PT Eval and Treat   Medical diagnosis from referral:   M47.816 (ICD-10-CM) - Lumbar facet arthropathy   M96.1 (ICD-10-CM) - Failed back syndrome   Evaluation date: 11/3/2022  Authorization period expiration: 9/15/23  Plan of care expiration: 2/3/2023  Reassessment due: 12/3/2022   Visit # / visits authorized: 4/12     PTA Visit #: 0/5     Time In: 11:10  Time Out: 12:14  Total Billable Time: 54 minutes    SUBJECTIVE     Pt reports: Patient reports high pain levels. States she is getting a pain sensor on 11/30/22. States that her pain is bilateral up her spine and into her arms  She was compliant with HEP  Response to previous treatment: soreness (arms from sit to stand)  Functional change: ongoing    Pain: 9/10  Location: Lower back    OBJECTIVE     Objective Measures updated at progress report unless specified.     Treatment     Gwendolyn received the treatments listed below:      therapeutic exercises to develop strength, endurance, and ROM for 54 minutes including:  LTR: x20  Seated March: x20  Sit to Stand from elevated table CGA: 3x10  Seated  Lumbar Flexion (SB roll outs) 3" holds x25  Sidelying Clamshells: 2x10  LAQ: 2x10 with 2lb weight: NP today  SLR: 2x10: NP today  PPT with breathing 2" holds, 3x10  Supine march with TrA activation 2x10  Hook-lying adduction with ball 3" x25  Quad sets 3" holds 2x10 each leg  Quadriceps stretching side-lying (therapist assisted)     cold pack for 10 minutes to low back.      Patient Education and Home Exercises     Home Exercises Provided and Patient Education Provided     Education provided:   - log " rolling    Written Home Exercises Provided: yes. Exercises were reviewed and Gwendolyn was able to demonstrate them prior to the end of the session.  Gwendolyn demonstrated good  understanding of the education provided. See EMR under Patient Instructions for exercises provided during therapy sessions    ASSESSMENT     Patient presents to PT with 4WW, ambulating with weight through her forearms. Pt presents with increased hip flexion, knee flexion, and forward posture. Patient states high pain levels today, and is hopeful the pain sensor will help on 11/30. Patient requires monitoring for appropriate form and body mechanics throughout session. Education provided on log rolling to decrease twisting forces on spine. Patient demonstrated decreased pain response when practicing log rolling throughout session. Pt reported fatigue following STS transitions from hi-lo table, requiring CGA to prevent LOB    Gwendolyn Is progressing well towards her goals.   Pt prognosis is Fair.     Pt will continue to benefit from skilled outpatient physical therapy to address the deficits listed in the problem list box on initial evaluation, provide pt/family education and to maximize pt's level of independence in the home and community environment.     Pt's spiritual, cultural and educational needs considered and pt agreeable to plan of care and goals.     Anticipated barriers to physical therapy:   - history of chronic back pain  - pt c/o B knee pain    Goals:   Short Term Goals: 4 weeks progressing, not met  1. The patient with report compliance with HEP to maximize functional outcomes. progressing, not met  2. The patient will demonstrate increase in BLE MMT by 1/2 grade to improve pt's functional mobility. progressing, not met  3. The patient will decrease pain level by 50% in order to improve QOL. progressing, not met     Long Term Goals: 8 weeks   1. The patient will demonstrate understanding and performance of advanced HEP to allow for  independence once discharged from therapy. progressing, not met  2. 2. The patient will demonstrate increase in BLE MMT by 1 grade to improve pt's functional mobility progressing, not met  3. The patient will report 40 % functional limitation on FOTO to indicate an improvement in overall function. progressing, not met  4. The patient will be able to ambulate with appropriate posture in order to improve pt's ability to perform ADLs progressing, not met    PLAN     Continue to emphasize safety considerations with walker  Lumbar mobility and core stabilization    Мария Schmidt, PT

## 2022-11-25 ENCOUNTER — CLINICAL SUPPORT (OUTPATIENT)
Dept: REHABILITATION | Facility: HOSPITAL | Age: 67
End: 2022-11-25
Payer: MEDICARE

## 2022-11-25 DIAGNOSIS — M25.69 DECREASED RANGE OF MOTION OF TRUNK AND BACK: ICD-10-CM

## 2022-11-25 DIAGNOSIS — R29.3 POOR POSTURE: Primary | ICD-10-CM

## 2022-11-25 DIAGNOSIS — R29.898 DECREASED STRENGTH OF TRUNK AND BACK: ICD-10-CM

## 2022-11-25 PROCEDURE — 97110 THERAPEUTIC EXERCISES: CPT | Mod: PN

## 2022-11-29 ENCOUNTER — TELEPHONE (OUTPATIENT)
Dept: FAMILY MEDICINE | Facility: CLINIC | Age: 67
End: 2022-11-29

## 2022-11-29 ENCOUNTER — TELEPHONE (OUTPATIENT)
Dept: FAMILY MEDICINE | Facility: CLINIC | Age: 67
End: 2022-11-29
Payer: MEDICARE

## 2022-11-29 DIAGNOSIS — E87.6 HYPOKALEMIA: Primary | ICD-10-CM

## 2022-11-29 DIAGNOSIS — E11.22 TYPE 2 DIABETES MELLITUS WITH STAGE 3B CHRONIC KIDNEY DISEASE, WITHOUT LONG-TERM CURRENT USE OF INSULIN: ICD-10-CM

## 2022-11-29 DIAGNOSIS — N18.32 TYPE 2 DIABETES MELLITUS WITH STAGE 3B CHRONIC KIDNEY DISEASE, WITHOUT LONG-TERM CURRENT USE OF INSULIN: ICD-10-CM

## 2022-11-29 NOTE — TELEPHONE ENCOUNTER
No new care gaps identified.  [facility] Embedded Care Gaps. Reference number: [id number]. 11/29/2022   2:06:51 PM CST

## 2022-11-29 NOTE — TELEPHONE ENCOUNTER
Pt scheduled for back stimulator surgery tomorrow (11/30) but Potassium is too low per anesthesia.  Has not been checked since 10/5 per pts daughter.

## 2022-11-29 NOTE — TELEPHONE ENCOUNTER
----- Message from Eleanor Mccullough sent at 11/29/2022  9:26 AM CST -----  Regarding: Authoriztion  Name of Who is Calling:KAMALA DELA CRUZ [[id number]]          What is the request in detail: Pt is calling bc she says that she needs prior authorization for [employer] to get her Freestyle coy          Can the clinic reply by [facility]:  yes        What Number to Call Back if not in [facility]:[phone]

## 2022-11-29 NOTE — TELEPHONE ENCOUNTER
Message was sent under wrong chart; pts daughter states she saw the note under labs from [facility] regarding her potassium level being low; please advise since her appt is not until February with you

## 2022-11-30 RX ORDER — FLASH GLUCOSE SCANNING READER
1 EACH MISCELLANEOUS
Qty: 1 EACH | Refills: 0 | Status: SHIPPED | OUTPATIENT
Start: 2022-11-30 | End: 2023-11-24 | Stop reason: SDUPTHER

## 2022-11-30 RX ORDER — FLASH GLUCOSE SENSOR
1 KIT MISCELLANEOUS
Qty: 1 KIT | Refills: 11 | Status: SHIPPED | OUTPATIENT
Start: 2022-11-30 | End: 2023-11-24 | Stop reason: SDUPTHER

## 2022-12-06 ENCOUNTER — TELEPHONE (OUTPATIENT)
Dept: FAMILY MEDICINE | Facility: CLINIC | Age: 67
End: 2022-12-06
Payer: MEDICARE

## 2022-12-08 ENCOUNTER — TELEPHONE (OUTPATIENT)
Dept: FAMILY MEDICINE | Facility: CLINIC | Age: 67
End: 2022-12-08
Payer: MEDICARE

## 2022-12-08 DIAGNOSIS — N18.32 TYPE 2 DIABETES MELLITUS WITH STAGE 3B CHRONIC KIDNEY DISEASE, WITHOUT LONG-TERM CURRENT USE OF INSULIN: ICD-10-CM

## 2022-12-08 DIAGNOSIS — E11.22 TYPE 2 DIABETES MELLITUS WITH STAGE 3B CHRONIC KIDNEY DISEASE, WITHOUT LONG-TERM CURRENT USE OF INSULIN: ICD-10-CM

## 2022-12-08 RX ORDER — FLASH GLUCOSE SENSOR
1 KIT MISCELLANEOUS
Qty: 1 KIT | Refills: 11 | Status: CANCELLED | OUTPATIENT
Start: 2022-12-08

## 2022-12-08 RX ORDER — FLASH GLUCOSE SCANNING READER
1 EACH MISCELLANEOUS
Qty: 1 EACH | Refills: 0 | Status: CANCELLED | OUTPATIENT
Start: 2022-12-08

## 2022-12-08 NOTE — TELEPHONE ENCOUNTER
----- Message from Toña Kern MA sent at 12/8/2022  9:40 AM CST -----  Type: Patient Call Back    Who called:[facility]    What is the request in detail: needs orders (CGM FreeStyle Margaret)  and clinical notes for this pt.     Can the clinic reply by [facility]?No    Would the patient rather a call back or a response via [facility]? yes    Best call back number: [phone]    FAx Number : [phone]

## 2022-12-09 ENCOUNTER — LAB VISIT (OUTPATIENT)
Dept: LAB | Facility: HOSPITAL | Age: 67
End: 2022-12-09
Attending: INTERNAL MEDICINE
Payer: MEDICARE

## 2022-12-09 DIAGNOSIS — E87.6 HYPOKALEMIA: ICD-10-CM

## 2022-12-09 PROCEDURE — 36415 COLL VENOUS BLD VENIPUNCTURE: CPT | Mod: PO | Performed by: INTERNAL MEDICINE

## 2022-12-09 PROCEDURE — 80048 BASIC METABOLIC PNL TOTAL CA: CPT | Performed by: INTERNAL MEDICINE

## 2022-12-10 LAB
ANION GAP SERPL CALC-SCNC: 10 MMOL/L (ref 8–16)
BUN SERPL-MCNC: 24 MG/DL (ref 8–23)
CALCIUM SERPL-MCNC: 11.3 MG/DL (ref 8.7–10.5)
CHLORIDE SERPL-SCNC: 98 MMOL/L (ref 95–110)
CO2 SERPL-SCNC: 31 MMOL/L (ref 23–29)
CREAT SERPL-MCNC: 1.5 MG/DL (ref 0.5–1.4)
EST. GFR  (NO RACE VARIABLE): 37.7 ML/MIN/1.73 M^2
GLUCOSE SERPL-MCNC: 82 MG/DL (ref 70–110)
POTASSIUM SERPL-SCNC: 3.1 MMOL/L (ref 3.5–5.1)
SODIUM SERPL-SCNC: 139 MMOL/L (ref 136–145)

## 2022-12-15 ENCOUNTER — TELEPHONE (OUTPATIENT)
Dept: FAMILY MEDICINE | Facility: CLINIC | Age: 67
End: 2022-12-15
Payer: MEDICARE

## 2022-12-15 DIAGNOSIS — E87.6 HYPOKALEMIA: Primary | ICD-10-CM

## 2022-12-15 NOTE — TELEPHONE ENCOUNTER
Repeat potassium level was still low, she is requesting RX be sent to pharmacy for potassium supplement

## 2022-12-15 NOTE — TELEPHONE ENCOUNTER
----- Message from Kelsie Thomson sent at 12/15/2022 11:13 AM CST -----  Type: Patient Call Back    Who called: self    What is the request in detail: patient is requesting a script for potassium. Please call     Can the clinic reply by [facility]? no    Would the patient rather a call back or a response via [facility]? call    Best call back number: .[phone] (home)

## 2022-12-16 ENCOUNTER — TELEPHONE (OUTPATIENT)
Dept: FAMILY MEDICINE | Facility: CLINIC | Age: 67
End: 2022-12-16
Payer: MEDICARE

## 2022-12-16 RX ORDER — POTASSIUM CHLORIDE 20 MEQ/1
20 TABLET, EXTENDED RELEASE ORAL DAILY
Qty: 90 TABLET | Refills: 0 | Status: SHIPPED | OUTPATIENT
Start: 2022-12-16 | End: 2023-03-22

## 2022-12-16 NOTE — TELEPHONE ENCOUNTER
----- Message from Randa Mcclain sent at 12/16/2022 10:39 AM CST -----  Regarding: Refill Request  Type: RX Refill Request      Who Called: Self       Refill or New Rx: Refill       RX Name and Strength: flash glucose sensor (FREESTYLE HAI 2 SENSOR) Kit  She needs to file an appeal     She also sent a request to have a Rx for potassium     Preferred Pharmacy with phone number: .  [facility]    [address] [place] [address]  [phone]. [phone]    Would the patient rather a call back or a response via [facility]? Call       Best Call Back Number:  [phone]

## 2022-12-16 NOTE — TELEPHONE ENCOUNTER
Advised pt that order for FS Margaret was requested and sent to [facility] on 12/08/22. Pt states that the PA was denied and was told by N she may do an appeal. Pt wants it sent to [facility]. Advised pt that it will still be denied by [facility] she must complete the appeal. Also advised that refill on potassium is pending.

## 2022-12-27 DIAGNOSIS — F51.04 PSYCHOPHYSIOLOGICAL INSOMNIA: ICD-10-CM

## 2022-12-29 ENCOUNTER — TELEPHONE (OUTPATIENT)
Dept: FAMILY MEDICINE | Facility: CLINIC | Age: 67
End: 2022-12-29
Payer: MEDICARE

## 2022-12-29 DIAGNOSIS — F51.04 PSYCHOPHYSIOLOGICAL INSOMNIA: ICD-10-CM

## 2022-12-29 RX ORDER — QUETIAPINE FUMARATE 100 MG/1
TABLET, FILM COATED ORAL
Qty: 90 TABLET | Refills: 0 | OUTPATIENT
Start: 2022-12-29

## 2022-12-29 RX ORDER — QUETIAPINE FUMARATE 100 MG/1
TABLET, FILM COATED ORAL
Qty: 90 TABLET | Refills: 1 | OUTPATIENT
Start: 2022-12-29

## 2022-12-29 NOTE — TELEPHONE ENCOUNTER
----- Message from Jannet Grimes sent at 12/29/2022 11:14 AM CST -----  Type: RX Refill Request    Who Called: Self     Have you contacted your pharmacy Yes     Refill or New Rx:Refill     RX Name and Strength:QUEtiapine (SEROQUEL) 100 MG Tab 90 tablet     Preferred Pharmacy with phone number:[id number] - [place] - [address] AT [facility] [address]   Phone: [phone]  Fax:  [phone]    Local or Mail Order:Local     Would the patient rather a call back or a response via [facility]? Call     Best Call Back Number: [phone]

## 2022-12-29 NOTE — TELEPHONE ENCOUNTER
Sent month supply sent on 11/14/2022. Advised pt to contact her pharmacy and have them check her profile.

## 2022-12-29 NOTE — TELEPHONE ENCOUNTER
Spoke to pharmacist and was told too soon to get filled, need to wait until jan 5th; LM informing pt

## 2022-12-29 NOTE — TELEPHONE ENCOUNTER
----- Message from Andie Ramachandran sent at 12/29/2022 11:31 AM CST -----  Regarding: self .[phone]  .Type: Patient Call Back    Who called: self     What is the request in detail: Pt stated that the pharmacy is telling her that QUEtiapine (SEROQUEL) 100 MG Tab it's not appropriate and was told to contact the office for clarification      Can the clinic reply by [facility]? Call back     Would the patient rather a call back or a response via [facility]?  Call back     Best call back number: .[phone]

## 2022-12-29 NOTE — TELEPHONE ENCOUNTER
No new care gaps identified.  [facility] Embedded Care Gaps. Reference number: [id number]. 12/29/2022   8:50:36 AM CST

## 2023-01-09 ENCOUNTER — PATIENT MESSAGE (OUTPATIENT)
Dept: ADMINISTRATIVE | Facility: HOSPITAL | Age: 68
End: 2023-01-09
Payer: MEDICARE

## 2023-01-18 DIAGNOSIS — E11.9 TYPE 2 DIABETES MELLITUS WITHOUT COMPLICATION, UNSPECIFIED WHETHER LONG TERM INSULIN USE: ICD-10-CM

## 2023-01-25 ENCOUNTER — TELEPHONE (OUTPATIENT)
Dept: FAMILY MEDICINE | Facility: CLINIC | Age: 68
End: 2023-01-25
Payer: MEDICARE

## 2023-01-25 NOTE — TELEPHONE ENCOUNTER
Spoke with pt. Informed her that Dr. Bruner will decide to refill Rx (gabapentin 300 mg) at upcoming appt on 2/4

## 2023-01-26 ENCOUNTER — TELEPHONE (OUTPATIENT)
Dept: CARDIOLOGY | Facility: CLINIC | Age: 68
End: 2023-01-26
Payer: MEDICARE

## 2023-01-26 NOTE — TELEPHONE ENCOUNTER
I have attempted to contact this patient by phone with the following results: busy signal. Will message patient via online portal. This message was in regards to  appointment being rescheduled due to provider being out.

## 2023-01-27 DIAGNOSIS — M54.32 SCIATICA OF LEFT SIDE: ICD-10-CM

## 2023-01-27 RX ORDER — DULOXETIN HYDROCHLORIDE 60 MG/1
60 CAPSULE, DELAYED RELEASE ORAL DAILY
Qty: 90 CAPSULE | Refills: 3 | Status: SHIPPED | OUTPATIENT
Start: 2023-01-27 | End: 2024-01-05

## 2023-01-27 RX ORDER — GABAPENTIN 300 MG/1
300 CAPSULE ORAL 3 TIMES DAILY
Qty: 90 CAPSULE | Refills: 5 | Status: SHIPPED | OUTPATIENT
Start: 2023-01-27 | End: 2023-12-04

## 2023-01-27 NOTE — TELEPHONE ENCOUNTER
----- Message from Kadie Branch sent at 1/27/2023  1:54 PM CST -----  Type: RX Refill Request        Who Called: self         Have you contacted your pharmacy: no         Refill or New Rx: refill         RX Name and Strength:  gabapentin (NEURONTIN) 300 MG capsule and DULoxetine (CYMBALTA) 60 MG capsule          Preferred Pharmacy with phone number:   [facility] #[id number] - [address] AT [facility]   Phone:  [phone]  Fax:  [phone]              Local or Mail Order: local         Ordering Provider:          Would the patient rather a call back or a response via [facility]? Yes         Best Call Back Number: [phone] ([place])               Thank you    has context menu

## 2023-01-27 NOTE — TELEPHONE ENCOUNTER
No new care gaps identified.  [facility] Embedded Care Gaps. Reference number: [id number]. 1/27/2023   2:07:35 PM CST

## 2023-02-03 ENCOUNTER — OFFICE VISIT (OUTPATIENT)
Dept: FAMILY MEDICINE | Facility: CLINIC | Age: 68
End: 2023-02-03
Payer: MEDICARE

## 2023-02-03 VITALS
SYSTOLIC BLOOD PRESSURE: 130 MMHG | DIASTOLIC BLOOD PRESSURE: 76 MMHG | HEIGHT: 67 IN | OXYGEN SATURATION: 98 % | WEIGHT: 244.25 LBS | HEART RATE: 100 BPM | TEMPERATURE: 98 F | BODY MASS INDEX: 38.34 KG/M2 | RESPIRATION RATE: 18 BRPM

## 2023-02-03 DIAGNOSIS — N18.32 TYPE 2 DIABETES MELLITUS WITH STAGE 3B CHRONIC KIDNEY DISEASE, WITHOUT LONG-TERM CURRENT USE OF INSULIN: Primary | ICD-10-CM

## 2023-02-03 DIAGNOSIS — F33.1 MAJOR DEPRESSIVE DISORDER, RECURRENT, MODERATE: ICD-10-CM

## 2023-02-03 DIAGNOSIS — N18.31 STAGE 3A CHRONIC KIDNEY DISEASE: ICD-10-CM

## 2023-02-03 DIAGNOSIS — E66.01 SEVERE OBESITY (BMI 35.0-39.9) WITH COMORBIDITY: ICD-10-CM

## 2023-02-03 DIAGNOSIS — D57.3 SICKLE CELL TRAIT: ICD-10-CM

## 2023-02-03 DIAGNOSIS — F11.20 OPIOID DEPENDENCE, UNCOMPLICATED: ICD-10-CM

## 2023-02-03 DIAGNOSIS — I70.0 AORTIC ATHEROSCLEROSIS: ICD-10-CM

## 2023-02-03 DIAGNOSIS — E11.22 TYPE 2 DIABETES MELLITUS WITH STAGE 3B CHRONIC KIDNEY DISEASE, WITHOUT LONG-TERM CURRENT USE OF INSULIN: Primary | ICD-10-CM

## 2023-02-03 DIAGNOSIS — I47.10 SVT (SUPRAVENTRICULAR TACHYCARDIA): ICD-10-CM

## 2023-02-03 DIAGNOSIS — Z12.31 ENCOUNTER FOR SCREENING MAMMOGRAM FOR BREAST CANCER: ICD-10-CM

## 2023-02-03 DIAGNOSIS — M81.0 POST-MENOPAUSAL OSTEOPOROSIS: ICD-10-CM

## 2023-02-03 DIAGNOSIS — M46.1 SACROILIITIS, NOT ELSEWHERE CLASSIFIED: ICD-10-CM

## 2023-02-03 PROCEDURE — 3008F PR BODY MASS INDEX (BMI) DOCUMENTED: ICD-10-PCS | Mod: CPTII,S$GLB,, | Performed by: INTERNAL MEDICINE

## 2023-02-03 PROCEDURE — 1160F PR REVIEW ALL MEDS BY PRESCRIBER/CLIN PHARMACIST DOCUMENTED: ICD-10-PCS | Mod: CPTII,S$GLB,, | Performed by: INTERNAL MEDICINE

## 2023-02-03 PROCEDURE — 1157F PR ADVANCE CARE PLAN OR EQUIV PRESENT IN MEDICAL RECORD: ICD-10-PCS | Mod: CPTII,S$GLB,, | Performed by: INTERNAL MEDICINE

## 2023-02-03 PROCEDURE — 1159F PR MEDICATION LIST DOCUMENTED IN MEDICAL RECORD: ICD-10-PCS | Mod: CPTII,S$GLB,, | Performed by: INTERNAL MEDICINE

## 2023-02-03 PROCEDURE — 1160F RVW MEDS BY RX/DR IN RCRD: CPT | Mod: CPTII,S$GLB,, | Performed by: INTERNAL MEDICINE

## 2023-02-03 PROCEDURE — 1157F ADVNC CARE PLAN IN RCRD: CPT | Mod: CPTII,S$GLB,, | Performed by: INTERNAL MEDICINE

## 2023-02-03 PROCEDURE — 3078F PR MOST RECENT DIASTOLIC BLOOD PRESSURE < 80 MM HG: ICD-10-PCS | Mod: CPTII,S$GLB,, | Performed by: INTERNAL MEDICINE

## 2023-02-03 PROCEDURE — 1125F PR PAIN SEVERITY QUANTIFIED, PAIN PRESENT: ICD-10-PCS | Mod: CPTII,S$GLB,, | Performed by: INTERNAL MEDICINE

## 2023-02-03 PROCEDURE — 3075F PR MOST RECENT SYSTOLIC BLOOD PRESS GE 130-139MM HG: ICD-10-PCS | Mod: CPTII,S$GLB,, | Performed by: INTERNAL MEDICINE

## 2023-02-03 PROCEDURE — 3288F FALL RISK ASSESSMENT DOCD: CPT | Mod: CPTII,S$GLB,, | Performed by: INTERNAL MEDICINE

## 2023-02-03 PROCEDURE — 99214 PR OFFICE/OUTPT VISIT, EST, LEVL IV, 30-39 MIN: ICD-10-PCS | Mod: S$GLB,,, | Performed by: INTERNAL MEDICINE

## 2023-02-03 PROCEDURE — 3078F DIAST BP <80 MM HG: CPT | Mod: CPTII,S$GLB,, | Performed by: INTERNAL MEDICINE

## 2023-02-03 PROCEDURE — 3008F BODY MASS INDEX DOCD: CPT | Mod: CPTII,S$GLB,, | Performed by: INTERNAL MEDICINE

## 2023-02-03 PROCEDURE — 1125F AMNT PAIN NOTED PAIN PRSNT: CPT | Mod: CPTII,S$GLB,, | Performed by: INTERNAL MEDICINE

## 2023-02-03 PROCEDURE — 99999 PR PBB SHADOW E&M-EST. PATIENT-LVL V: CPT | Mod: PBBFAC,,, | Performed by: INTERNAL MEDICINE

## 2023-02-03 PROCEDURE — 3075F SYST BP GE 130 - 139MM HG: CPT | Mod: CPTII,S$GLB,, | Performed by: INTERNAL MEDICINE

## 2023-02-03 PROCEDURE — 99214 OFFICE O/P EST MOD 30 MIN: CPT | Mod: S$GLB,,, | Performed by: INTERNAL MEDICINE

## 2023-02-03 PROCEDURE — 99999 PR PBB SHADOW E&M-EST. PATIENT-LVL V: ICD-10-PCS | Mod: PBBFAC,,, | Performed by: INTERNAL MEDICINE

## 2023-02-03 PROCEDURE — 1100F PTFALLS ASSESS-DOCD GE2>/YR: CPT | Mod: CPTII,S$GLB,, | Performed by: INTERNAL MEDICINE

## 2023-02-03 PROCEDURE — 3288F PR FALLS RISK ASSESSMENT DOCUMENTED: ICD-10-PCS | Mod: CPTII,S$GLB,, | Performed by: INTERNAL MEDICINE

## 2023-02-03 PROCEDURE — 1100F PR PT FALLS ASSESS DOC 2+ FALLS/FALL W/INJURY/YR: ICD-10-PCS | Mod: CPTII,S$GLB,, | Performed by: INTERNAL MEDICINE

## 2023-02-03 PROCEDURE — 1159F MED LIST DOCD IN RCRD: CPT | Mod: CPTII,S$GLB,, | Performed by: INTERNAL MEDICINE

## 2023-02-03 RX ORDER — SEMAGLUTIDE 1.34 MG/ML
0.25 INJECTION, SOLUTION SUBCUTANEOUS
Qty: 1 PEN | Refills: 5 | Status: SHIPPED | OUTPATIENT
Start: 2023-02-03 | End: 2023-08-21 | Stop reason: SDUPTHER

## 2023-02-03 NOTE — PROGRESS NOTES
Health Maintenance Due   Topic     Diabetes Urine Screening  Consult pcp    TETANUS VACCINE  Notified pt can get vaccine at pharmacy.    Mammogram  Pt will schedule in portal     Foot Exam  Consult pcp    DEXA Scan  Pt will schedule in portal     Eye Exam  Consult pcp

## 2023-02-03 NOTE — PROGRESS NOTES
Subjective:       Patient ID: Gwendolyn Fournier is a pleasant 68 y.o. Black or  female patient    Chief Complaint: Follow-up      Patient is a pt I saw last on 10/12/2022, see my last notes and the list of problems below.    HPI     Pt with PMH as per list of problems below who comes today for a regular f-up visit.  In the interval:  - Neurology  - Dermatology  - PT for radiculopathy, lumbar.  She comes today for a regular f-up visit.  She is here with her daughter who is my patient too.  She reports no specific concern.  She would like to go from Trulicity to Ozempic.  She states that Trulicity makes her nauseated?  She had some friends who received Ozempic with good response.    She otherwise expressed no specific concern.  She took her COVID vaccine and boosters as well as the flu shot.      Patient Active Problem List   Diagnosis    Essential hypertension    Sciatica of left side    Primary osteoarthritis involving multiple joints    Idiopathic chronic gout of multiple sites without tophus    History of open reduction and internal fixation (ORIF) procedure    Stage 3 chronic kidney disease    Chronic pain    Severe obesity (BMI 35.0-39.9) with comorbidity    Obstructive sleep apnea    Sickle cell trait    Sacroiliitis, not elsewhere classified    Type 2 diabetes mellitus with stage 3b chronic kidney disease, without long-term current use of insulin    Major depressive disorder, recurrent, moderate    History of tuberculosis exposure    Poor posture    Decreased range of motion of trunk and back    Decreased strength of trunk and back    Opioid dependence, uncomplicated    SVT (supraventricular tachycardia)    Aortic atherosclerosis          ACTIVE MEDICAL ISSUES:  Documented in Problem List     PAST MEDICAL HISTORY  Documented     PAST SURGICAL HISTORY:  Documented     SOCIAL HISTORY:  Documented     FAMILY HISTORY:  Documented     ALLERGIES AND MEDICATIONS: updated and  reviewed.  Documented    Review of Systems   Constitutional:  Negative for activity change, appetite change, fatigue and fever.   HENT:  Negative for congestion, postnasal drip, rhinorrhea, sinus pressure and sore throat.    Eyes:  Negative for pain, discharge and redness.   Respiratory:  Negative for cough, chest tightness and shortness of breath.    Cardiovascular:  Negative for chest pain, palpitations and leg swelling.   Gastrointestinal:  Negative for abdominal pain, constipation and nausea.   Endocrine: Negative for cold intolerance and heat intolerance.   Genitourinary:  Negative for difficulty urinating, flank pain, frequency, menstrual problem, pelvic pain, urgency and vaginal discharge.   Musculoskeletal:  Negative for arthralgias, back pain, joint swelling and neck pain.   Skin:  Negative for color change and rash.   Allergic/Immunologic: Negative for environmental allergies and food allergies.   Neurological:  Negative for weakness, numbness and headaches.   Hematological:  Negative for adenopathy.   Psychiatric/Behavioral:  Negative for behavioral problems, hallucinations, sleep disturbance and suicidal ideas. The patient is not nervous/anxious.      Objective:      Physical Exam  Vitals and nursing note reviewed.   Constitutional:       Appearance: Normal appearance. She is well-developed. She is obese.   HENT:      Right Ear: Tympanic membrane and external ear normal.      Left Ear: Tympanic membrane and external ear normal.   Eyes:      Conjunctiva/sclera: Conjunctivae normal.   Neck:      Thyroid: No thyromegaly.   Cardiovascular:      Rate and Rhythm: Normal rate and regular rhythm.      Pulses: Normal pulses.      Heart sounds: Normal heart sounds.   Pulmonary:      Effort: Pulmonary effort is normal. No respiratory distress.      Breath sounds: Normal breath sounds. No wheezing.   Abdominal:      General: Bowel sounds are normal.      Palpations: Abdomen is soft. There is no mass.       "Tenderness: There is no abdominal tenderness.   Musculoskeletal:         General: Normal range of motion.      Cervical back: Normal range of motion and neck supple.   Lymphadenopathy:      Cervical: No cervical adenopathy.   Skin:     General: Skin is warm and dry.   Neurological:      Mental Status: She is alert and oriented to person, place, and time. Mental status is at baseline.   Psychiatric:         Mood and Affect: Mood normal.         Behavior: Behavior normal.         Thought Content: Thought content normal.         Judgment: Judgment normal.       Vitals:    02/03/23 1306   BP: 130/76   BP Location: Right arm   Patient Position: Sitting   BP Method: Small (Manual)   Pulse: 100   Resp: 18   Temp: 98.4 °F (36.9 °C)   TempSrc: Oral   SpO2: 98%   Weight: 110.8 kg (244 lb 4.3 oz)   Height: 5' 7" (1.702 m)     Body mass index is 38.26 kg/m².    RESULTS: Reviewed labs from last 12 months    Last Lab Results:     Lab Results   Component Value Date    WBC 12.30 10/05/2022    HGB 12.2 10/05/2022    HCT 37.1 10/05/2022     10/05/2022     12/09/2022    K 3.1 (L) 12/09/2022    CL 98 12/09/2022    CO2 31 (H) 12/09/2022    BUN 24 (H) 12/09/2022    CREATININE 1.5 (H) 12/09/2022    CALCIUM 11.3 (H) 12/09/2022    ALBUMIN 3.6 10/05/2022    AST 10 10/05/2022    ALT 15 10/05/2022    CHOL 198 10/05/2022    TRIG 245 (H) 10/05/2022    HDL 36 (L) 10/05/2022    LDLCALC 113.0 10/05/2022    HGBA1C 5.6 10/05/2022    TSH 4.826 (H) 10/05/2022       Assessment:       1. Type 2 diabetes mellitus with stage 3b chronic kidney disease, without long-term current use of insulin    2. Severe obesity (BMI 35.0-39.9) with comorbidity    3. Stage 3a chronic kidney disease    4. Major depressive disorder, recurrent, moderate    5. SVT (supraventricular tachycardia)    6. Sacroiliitis, not elsewhere classified    7. Opioid dependence, uncomplicated    8. Sickle cell trait    9. Aortic atherosclerosis    10. Post-menopausal osteoporosis "    11. Encounter for screening mammogram for breast cancer        Plan:   Gwendolyn was seen today for follow-up.    Diagnoses and all orders for this visit:    Type 2 diabetes mellitus with stage 3b chronic kidney disease, without long-term current use of insulin  -     semaglutide (OZEMPIC) 0.25 mg or 0.5 mg(2 mg/1.5 mL) pen injector; Inject 0.25 mg into the skin every 7 days.    See HPI, pt would like to hold Trulicity and go to Ozempic, see HPI. Will go ahead. Discussed expected benefits, the fact that when stopping it, weight will pile up again, so need to have a good lifestyle. Also discussed the risk of GI issues, hypoglycemia, the need to monitor kidney function, the risk of thyroid cancer. Discussed shortage.  Will see her in 6 weeks to assess, will start with 0.25 mg  for one month.    Severe obesity (BMI 35.0-39.9) with comorbidity  -     semaglutide (OZEMPIC) 0.25 mg or 0.5 mg(2 mg/1.5 mL) pen injector; Inject 0.25 mg into the skin every 7 days.    See above.    Stage 3a chronic kidney disease    Will monitor.    Major depressive disorder, recurrent, moderate    Seems to be doing well.    SVT (supraventricular tachycardia)    As per PMH.    Sacroiliitis, not elsewhere classified    Same.    Opioid dependence, uncomplicated    Sickle cell trait    Aortic atherosclerosis    See list of problems.    Post-menopausal osteoporosis    Will reassess.    Encounter for screening mammogram for breast cancer  -     Mammo Digital Screening Bilat; Future      Follow up in about 6 weeks (around 3/17/2023) for f-up.    This note was created by combination of typed  and M-Modal dictation.  Transcription errors may be present.  If there are any questions, please contact me.

## 2023-02-06 ENCOUNTER — LAB VISIT (OUTPATIENT)
Dept: LAB | Facility: HOSPITAL | Age: 68
End: 2023-02-06
Attending: INTERNAL MEDICINE
Payer: MEDICARE

## 2023-02-06 ENCOUNTER — OFFICE VISIT (OUTPATIENT)
Dept: CARDIOLOGY | Facility: CLINIC | Age: 68
End: 2023-02-06
Payer: MEDICARE

## 2023-02-06 VITALS
DIASTOLIC BLOOD PRESSURE: 70 MMHG | WEIGHT: 241.75 LBS | SYSTOLIC BLOOD PRESSURE: 102 MMHG | OXYGEN SATURATION: 95 % | BODY MASS INDEX: 37.86 KG/M2 | HEART RATE: 100 BPM | RESPIRATION RATE: 20 BRPM

## 2023-02-06 DIAGNOSIS — I10 ESSENTIAL HYPERTENSION: Primary | ICD-10-CM

## 2023-02-06 DIAGNOSIS — I47.10 SVT (SUPRAVENTRICULAR TACHYCARDIA): ICD-10-CM

## 2023-02-06 DIAGNOSIS — F33.1 MAJOR DEPRESSIVE DISORDER, RECURRENT, MODERATE: ICD-10-CM

## 2023-02-06 DIAGNOSIS — E11.22 TYPE 2 DIABETES MELLITUS WITH STAGE 3B CHRONIC KIDNEY DISEASE, WITHOUT LONG-TERM CURRENT USE OF INSULIN: ICD-10-CM

## 2023-02-06 DIAGNOSIS — Z20.1 HISTORY OF TUBERCULOSIS EXPOSURE: ICD-10-CM

## 2023-02-06 DIAGNOSIS — E66.01 SEVERE OBESITY (BMI 35.0-39.9) WITH COMORBIDITY: ICD-10-CM

## 2023-02-06 DIAGNOSIS — N18.32 TYPE 2 DIABETES MELLITUS WITH STAGE 3B CHRONIC KIDNEY DISEASE, WITHOUT LONG-TERM CURRENT USE OF INSULIN: ICD-10-CM

## 2023-02-06 DIAGNOSIS — N18.30 STAGE 3 CHRONIC KIDNEY DISEASE, UNSPECIFIED WHETHER STAGE 3A OR 3B CKD: ICD-10-CM

## 2023-02-06 DIAGNOSIS — I70.0 AORTIC ATHEROSCLEROSIS: ICD-10-CM

## 2023-02-06 DIAGNOSIS — D57.3 SICKLE CELL TRAIT: ICD-10-CM

## 2023-02-06 DIAGNOSIS — I10 ESSENTIAL HYPERTENSION: ICD-10-CM

## 2023-02-06 DIAGNOSIS — R06.02 SOB (SHORTNESS OF BREATH): ICD-10-CM

## 2023-02-06 LAB
T4 FREE SERPL-MCNC: 0.94 NG/DL (ref 0.71–1.51)
TSH SERPL DL<=0.005 MIU/L-ACNC: 1.58 UIU/ML (ref 0.4–4)

## 2023-02-06 PROCEDURE — 1126F PR PAIN SEVERITY QUANTIFIED, NO PAIN PRESENT: ICD-10-PCS | Mod: CPTII,S$GLB,, | Performed by: INTERNAL MEDICINE

## 2023-02-06 PROCEDURE — 1159F PR MEDICATION LIST DOCUMENTED IN MEDICAL RECORD: ICD-10-PCS | Mod: CPTII,S$GLB,, | Performed by: INTERNAL MEDICINE

## 2023-02-06 PROCEDURE — 3008F BODY MASS INDEX DOCD: CPT | Mod: CPTII,S$GLB,, | Performed by: INTERNAL MEDICINE

## 2023-02-06 PROCEDURE — 99214 OFFICE O/P EST MOD 30 MIN: CPT | Mod: S$GLB,,, | Performed by: INTERNAL MEDICINE

## 2023-02-06 PROCEDURE — 3008F PR BODY MASS INDEX (BMI) DOCUMENTED: ICD-10-PCS | Mod: CPTII,S$GLB,, | Performed by: INTERNAL MEDICINE

## 2023-02-06 PROCEDURE — 1160F PR REVIEW ALL MEDS BY PRESCRIBER/CLIN PHARMACIST DOCUMENTED: ICD-10-PCS | Mod: CPTII,S$GLB,, | Performed by: INTERNAL MEDICINE

## 2023-02-06 PROCEDURE — 1100F PR PT FALLS ASSESS DOC 2+ FALLS/FALL W/INJURY/YR: ICD-10-PCS | Mod: CPTII,S$GLB,, | Performed by: INTERNAL MEDICINE

## 2023-02-06 PROCEDURE — 99999 PR PBB SHADOW E&M-EST. PATIENT-LVL V: ICD-10-PCS | Mod: PBBFAC,,, | Performed by: INTERNAL MEDICINE

## 2023-02-06 PROCEDURE — 3074F SYST BP LT 130 MM HG: CPT | Mod: CPTII,S$GLB,, | Performed by: INTERNAL MEDICINE

## 2023-02-06 PROCEDURE — 3078F DIAST BP <80 MM HG: CPT | Mod: CPTII,S$GLB,, | Performed by: INTERNAL MEDICINE

## 2023-02-06 PROCEDURE — 93000 EKG 12-LEAD: ICD-10-PCS | Mod: S$GLB,,, | Performed by: INTERNAL MEDICINE

## 2023-02-06 PROCEDURE — 1157F PR ADVANCE CARE PLAN OR EQUIV PRESENT IN MEDICAL RECORD: ICD-10-PCS | Mod: CPTII,S$GLB,, | Performed by: INTERNAL MEDICINE

## 2023-02-06 PROCEDURE — 1159F MED LIST DOCD IN RCRD: CPT | Mod: CPTII,S$GLB,, | Performed by: INTERNAL MEDICINE

## 2023-02-06 PROCEDURE — 1157F ADVNC CARE PLAN IN RCRD: CPT | Mod: CPTII,S$GLB,, | Performed by: INTERNAL MEDICINE

## 2023-02-06 PROCEDURE — 1160F RVW MEDS BY RX/DR IN RCRD: CPT | Mod: CPTII,S$GLB,, | Performed by: INTERNAL MEDICINE

## 2023-02-06 PROCEDURE — 93000 ELECTROCARDIOGRAM COMPLETE: CPT | Mod: S$GLB,,, | Performed by: INTERNAL MEDICINE

## 2023-02-06 PROCEDURE — 99214 PR OFFICE/OUTPT VISIT, EST, LEVL IV, 30-39 MIN: ICD-10-PCS | Mod: S$GLB,,, | Performed by: INTERNAL MEDICINE

## 2023-02-06 PROCEDURE — 84443 ASSAY THYROID STIM HORMONE: CPT | Performed by: INTERNAL MEDICINE

## 2023-02-06 PROCEDURE — 1126F AMNT PAIN NOTED NONE PRSNT: CPT | Mod: CPTII,S$GLB,, | Performed by: INTERNAL MEDICINE

## 2023-02-06 PROCEDURE — 99999 PR PBB SHADOW E&M-EST. PATIENT-LVL V: CPT | Mod: PBBFAC,,, | Performed by: INTERNAL MEDICINE

## 2023-02-06 PROCEDURE — 3074F PR MOST RECENT SYSTOLIC BLOOD PRESSURE < 130 MM HG: ICD-10-PCS | Mod: CPTII,S$GLB,, | Performed by: INTERNAL MEDICINE

## 2023-02-06 PROCEDURE — 36415 COLL VENOUS BLD VENIPUNCTURE: CPT | Performed by: INTERNAL MEDICINE

## 2023-02-06 PROCEDURE — 3288F FALL RISK ASSESSMENT DOCD: CPT | Mod: CPTII,S$GLB,, | Performed by: INTERNAL MEDICINE

## 2023-02-06 PROCEDURE — 1100F PTFALLS ASSESS-DOCD GE2>/YR: CPT | Mod: CPTII,S$GLB,, | Performed by: INTERNAL MEDICINE

## 2023-02-06 PROCEDURE — 84439 ASSAY OF FREE THYROXINE: CPT | Performed by: INTERNAL MEDICINE

## 2023-02-06 PROCEDURE — 3078F PR MOST RECENT DIASTOLIC BLOOD PRESSURE < 80 MM HG: ICD-10-PCS | Mod: CPTII,S$GLB,, | Performed by: INTERNAL MEDICINE

## 2023-02-06 PROCEDURE — 3288F PR FALLS RISK ASSESSMENT DOCUMENTED: ICD-10-PCS | Mod: CPTII,S$GLB,, | Performed by: INTERNAL MEDICINE

## 2023-02-06 RX ORDER — FUROSEMIDE 20 MG/1
20 TABLET ORAL
Qty: 90 TABLET | Refills: 3 | Status: SHIPPED | OUTPATIENT
Start: 2023-02-06 | End: 2024-02-07

## 2023-02-06 RX ORDER — FUROSEMIDE 20 MG/1
20 TABLET ORAL
Qty: 90 TABLET | Refills: 3 | Status: SHIPPED | OUTPATIENT
Start: 2023-02-06 | End: 2023-02-06

## 2023-02-06 NOTE — TELEPHONE ENCOUNTER
No new care gaps identified.  [facility] Embedded Care Gaps. Reference number: [id number]. 2/06/2023   1:16:13 PM CST

## 2023-02-06 NOTE — PROGRESS NOTES
CARDIOVASCULAR CONSULTATION    REASON FOR CONSULT:   Gwendolyn Fournier is a 68 y.o. female who presents for evaluation.      HISTORY OF PRESENT ILLNESS:     Patient is a pleasant 66-year-old lady.  For the past few weeks has been experiencing worsening dyspnea on exertion as well as pedal edema.  Can hardly walk 10 ft and then has to stop because of significant shortness of breath.  Also bilateral leg swelling.  BNP was checked which was elevated at 182. Got an echocardiogram done earlier today the results of which are not available yet.  Denies chest pains at rest on exertion however exercise tolerance severely limited to 10 ft because of shortness of breath.  EKG done the clinic today was personally reviewed and shows sinus tachycardia left axis deviation, poor R-wave progression.  Has severe dyslipidemia, however has not been taking Lipitor.  She states that she misplaced that medication and hence has not been taking it.      Nov 21: Patient keeps on having frequent palpitations depite being on toprol xl. States that these symptoms go away after she puts ice towels on her face or takes deep breath. Holter showed SVT      The left ventricle is normal in size with concentric hypertrophy and hyperdynamic systolic function.  The estimated ejection fraction is 75%.  Normal right ventricular size with normal right ventricular systolic function.  Normal left ventricular diastolic function.  Mild left atrial enlargement.  Normal central venous pressure (3 mmHg).  TDS        Normal myocardial perfusion scan. There is no evidence of myocardial ischemia or infarction.    There is a mild intensity defect in the anteroapical wall of the left ventricle, secondary to breast attenuation.    The gated perfusion images showed an ejection fraction of 84% at rest.    There is normal wall motion at rest and post stress.    The EKG portion of this study is negative for ischemia.    The patient reported no chest pain during the stress  test.    There were no arrhythmias during stress.      Sinus rhythm with heart rates varying between 82 and 197 BPM with an average of 101 BPM  There were rare PVCs totalling 378 and averaging 7.88 per hour. There were 4 couplets. There were 24 bigeminal cycles. There were 1 triplets.  There were very frequent PACs  SVT with heart rate upwards of 200. Some episodes appear to atrial tachycardia.      Notes from February 23:  Patient here for follow-up after a long hiatus.  Main complaint is dyspnea on exertion.  Gets short of breath after walking only 10 ft.  Has not been using her sleep apnea machine.      PAST MEDICAL HISTORY:     Past Medical History:   Diagnosis Date    Arthritis     Gout attack     Hx of psychiatric care     Hypertension     Psychiatric problem     Renal disorder     Sciatic nerve pain 2013    Therapy     used to follow with psychiatrist but doesn't recall whom, 2012    Tuberculosis        PAST SURGICAL HISTORY:     Past Surgical History:   Procedure Laterality Date    COLONOSCOPY N/A 1/21/2019    Procedure: COLONOSCOPY;  Surgeon: Shanna Jose MD;  Location: [facility] ENDO;  Service: Endoscopy;  Laterality: N/A;    INJECTION OF JOINT Bilateral 3/13/2019    Procedure: INJECTION, JOINT BILATERAL SI JOINT;  Surgeon: Evan Coreas MD;  Location: [facility] PAIN MGT;  Service: Pain Management;  Laterality: Bilateral;  BILATERAL SI JOINT INJECTION    KNEE SURGERY  1/2014    left knee surgery        ALLERGIES AND MEDICATION:     Review of patient's allergies indicates:   Allergen Reactions    Ondansetron hcl (pf) Hives and Itching    Ketorolac Itching        Medication List            Accurate as of February 6, 2023  2:35 PM. If you have any questions, ask your nurse or doctor.                CONTINUE taking these medications      albuterol 0.63 mg/3 mL Nebu  Commonly known as: ACCUNEB     allopurinoL 100 MG tablet  Commonly known as: ZYLOPRIM  TAKE 1 TABLET(100 MG) BY MOUTH TWICE DAILY     amLODIPine 10 MG  tablet  Commonly known as: NORVASC  TAKE 1 TABLET(10 MG) BY MOUTH EVERY DAY     atorvastatin 40 MG tablet  Commonly known as: LIPITOR  TAKE 1 TABLET(40 MG) BY MOUTH EVERY EVENING     blood sugar diagnostic Strp  To check BG two times daily, freestyle lite meter     blood-glucose meter kit  To check BG two times daily, freestyle lite meter     buPROPion 100 MG tablet  Commonly known as: WELLBUTRIN     diazePAM 5 MG tablet  Commonly known as: VALIUM  TAKE 1 TABLET(5 MG) BY MOUTH EVERY 12 HOURS AS NEEDED FOR ANXIETY     diphenhydrAMINE 25 mg capsule  Commonly known as: BENADRYL     docusate sodium 100 MG capsule  Commonly known as: COLACE  Take 1 capsule (100 mg total) by mouth as needed for Constipation.     DULoxetine 60 MG capsule  Commonly known as: CYMBALTA  Take 1 capsule (60 mg total) by mouth once daily.     ergocalciferol 50,000 unit Cap  Commonly known as: ERGOCALCIFEROL  TAKE 1 CAPSULE BY MOUTH EVERY 7 DAYS     EScitalopram oxalate 10 MG tablet  Commonly known as: LEXAPRO  Take 1 tablet (10 mg total) by mouth once daily.     ferrous sulfate 325 mg (65 mg iron) Tab tablet  Commonly known as: FEOSOL     * flash glucose scanning reader Misc  Test tid and as needed.     * FREESTYLE [place] 2 [place] [facility] [place]  Generic drug: flash glucose scanning reader  1 each by Misc.(Non-Drug; Combo Route) route 4 (four) times daily with meals and nightly.     FLUAD QUAD 2022-23(65Y UP)(PF) 60 mcg (15 mcg x 4)/0.5 mL Syrg  Generic drug: flu vac 2022 [id number](PF)     FLUCELVAX QUAD 8988-6141 (PF) 60 mcg (15 mcg x 4)/0.5 mL Syrg vaccine  Generic drug: influenza     fluticasone propionate 50 mcg/actuation nasal spray  Commonly known as: FLONASE  SHAKE LIQUID AND USE 2 SPRAYS(100 MCG) IN EACH NOSTRIL EVERY DAY     FREESTYLE HAI 2 SENSOR Kit  Generic drug: flash glucose sensor  1 each by Misc.(Non-Drug; Combo Route) route 2 hours after meals and at bedtime.     furosemide 40 MG tablet  Commonly known as: LASIX  TAKE 1 TABLET(40 MG)  BY MOUTH EVERY DAY     gabapentin 300 MG capsule  Commonly known as: NEURONTIN  Take 1 capsule (300 mg total) by mouth 3 (three) times daily.     glipiZIDE 10 MG Tr24  Commonly known as: GLUCOTROL  TAKE 1 TABLET(10 MG) BY MOUTH DAILY WITH BREAKFAST     hydroCHLOROthiazide 25 MG tablet  Commonly known as: HYDRODIURIL  TAKE 1 TABLET(25 MG) BY MOUTH EVERY DAY     HYDROcodone-acetaminophen 5-325 mg per tablet  Commonly known as: NORCO     indomethacin 50 MG capsule  Commonly known as: INDOCIN  Take 1 capsule (50 mg total) by mouth 3 (three) times daily as needed.     lancets Misc  To check BG two times daily, to use with insurance preferred meter     metoprolol succinate 50 MG 24 hr tablet  Commonly known as: TOPROL-XL  Take 1 tablet (50 mg total) by mouth 2 (two) times daily.     MOVANTIK 25 mg tablet  Generic drug: naloxegoL     mupirocin 2 % ointment  Commonly known as: BACTROBAN  Apply topically 3 (three) times daily.     OZEMPIC 0.25 mg or 0.5 mg(2 mg/1.5 mL) pen injector  Generic drug: semaglutide  Inject 0.25 mg into the skin every 7 days.     PFIZER COVID BIVAL(12Y UP)(PF) 30 mcg/0.3 mL injection  Generic drug: sars-cov-2 (covid-19 pfizer omicron)     potassium chloride SA 20 MEQ tablet  Commonly known as: K-DUR,KLOR-CON  Take 1 tablet (20 mEq total) by mouth once daily.     PREVNAR 13 (PF) 0.5 mL Syrg  Generic drug: pneumoc 13-francisca conj-dip cr(PF)     primidone 50 MG Tab  Commonly known as: MYSOLINE  Take 2 tablets (100 mg total) by mouth 3 (three) times daily.     QUEtiapine 100 MG Tab  Commonly known as: SEROQUEL  TAKE 1 TABLET(100 MG) BY MOUTH EVERY NIGHT AS NEEDED     tiZANidine 4 MG tablet  Commonly known as: ZANAFLEX  TAKE 1 TO 2 TABLETS BY MOUTH EVERY NIGHT AS NEEDED     traMADoL 50 mg tablet  Commonly known as: ULTRAM  TAKE 1 TABLET(50 MG) BY MOUTH EVERY 8 HOURS AS NEEDED FOR PAIN           * This list has 2 medication(s) that are the same as other medications prescribed for you. Read the directions  carefully, and ask your doctor or other care provider to review them with you.                  SOCIAL HISTORY:     Social History     Socioeconomic History    Marital status:     Number of children: 5   Occupational History    Occupation: homemaker   Tobacco Use    Smoking status: Former     Types: Cigarettes     Quit date: 1976     Years since quittin.1    Smokeless tobacco: Never   Substance and Sexual Activity    Alcohol use: Not Currently     Alcohol/week: 0.0 standard drinks     Comment: red wine    Drug use: No    Sexual activity: Not Currently     Partners: Male     Social Determinants of Health     Financial Resource Strain: Low Risk     Difficulty of Paying Living Expenses: Not very hard   Food Insecurity: Food Insecurity Present    Worried About Running Out of Food in the Last Year: Never true    Ran Out of Food in the Last Year: Sometimes true   Transportation Needs: No Transportation Needs    Lack of Transportation (Medical): No    Lack of Transportation (Non-Medical): No   Physical Activity: Unknown    Days of Exercise per Week: 0 days   Stress: Stress Concern Present    Feeling of Stress : To some extent   Social Connections: Unknown    Frequency of Communication with Friends and Family: Once a week    Frequency of Social Gatherings with Friends and Family: Once a week    Active Member of Clubs or Organizations: Yes    Attends Club or Organization Meetings: More than 4 times per year    Marital Status:    Housing Stability: Low Risk     Unable to Pay for Housing in the Last Year: No    Number of Places Lived in the Last Year: 1    Unstable Housing in the Last Year: No       FAMILY HISTORY:     Family History   Problem Relation Age of Onset    Tuberculosis Mother     Stroke Father     Bipolar disorder Daughter     Suicide Daughter     Depression Daughter     Bipolar disorder Daughter     Depression Daughter        REVIEW OF SYSTEMS:   Review of Systems   Constitutional:  Negative.   HENT: Negative.     Eyes: Negative.    Cardiovascular:  Positive for dyspnea on exertion, leg swelling and palpitations.   Respiratory:  Positive for shortness of breath.    Endocrine: Negative.    Hematologic/Lymphatic: Negative.    Skin: Negative.    Musculoskeletal: Negative.    Gastrointestinal: Negative.    Genitourinary: Negative.    Neurological: Negative.    Psychiatric/Behavioral: Negative.     Allergic/Immunologic: Negative.      A 10 point review of systems was performed and all the pertinent positives have been mentioned. Rest of review of systems was negative.        PHYSICAL EXAM:     Vitals:    02/06/23 1427   BP: 102/70   Pulse: 100   Resp: 20    Body mass index is 37.86 kg/m².  Weight: 109.6 kg (241 lb 11.8 oz)         Physical Exam  Constitutional:       Appearance: Normal appearance. She is well-developed.   HENT:      Head: Normocephalic.   Eyes:      Pupils: Pupils are equal, round, and reactive to light.   Cardiovascular:      Rate and Rhythm: Normal rate and regular rhythm.   Pulmonary:      Effort: Pulmonary effort is normal.      Breath sounds: Normal breath sounds.   Abdominal:      General: Bowel sounds are normal.      Palpations: Abdomen is soft.      Tenderness: There is no abdominal tenderness.   Musculoskeletal:         General: Normal range of motion.      Cervical back: Normal range of motion and neck supple.   Skin:     General: Skin is warm.   Neurological:      Mental Status: She is alert and oriented to person, place, and time.         DATA:     Laboratory:  CBC:  Recent Labs   Lab 09/15/21  1410 12/15/21  1819 10/05/22  0714   WBC 12.64 12.16 12.30   Hemoglobin 13.1 11.4 L 12.2   Hematocrit 40.8 34.8 L 37.1   Platelets 420 338 413         CHEMISTRIES:  Recent Labs   Lab 10/29/21  0819 12/15/21  1819 03/24/22  1055 09/07/22  1614 10/05/22  0714 12/09/22  1527   Glucose 120 H 138 H 164 H  --  77 82   Sodium 142 141 144  --  138 139   Potassium 3.2 L 3.0 L 3.0 L  --   3.0 L 3.1 L   BUN 24 H 23 34 H  --  26 H 24 H   Creatinine 1.8 H 1.7 H 2.0 H 2.2 H 1.5 H 1.5 H   eGFR if  33.1 A 35 A 28.9 A  --   --   --    eGFR if non  28.7 A 31 A 25.1 A  --   --   --    Calcium 10.9 H 10.5 10.9 H  --  10.6 H 11.3 H   Magnesium  --   --   --   --  1.9  --          CARDIAC BIOMARKERS:  Recent Labs   Lab 12/15/21  1819   Troponin I 0.012         COAGS:        LIPIDS/LFTS:  Recent Labs   Lab 10/22/20  0800 10/29/21  0819 12/15/21  1819 10/05/22  0714   Cholesterol 269 H 160  --  198   Triglycerides 169 H 163 H  --  245 H   HDL 51 40  --  36 L   LDL Cholesterol 184.2 H 87.4  --  113.0   Non-HDL Cholesterol 218 120  --  162   AST  --  12 13 10   ALT  --  13 15 15         Hemoglobin A1C   Date Value Ref Range Status   10/05/2022 5.6 4.0 - 5.6 % Final     Comment:     ADA Screening Guidelines:  5.7-6.4%  Consistent with prediabetes  >or=6.5%  Consistent with diabetes    High levels of fetal hemoglobin interfere with the HbA1C  assay. Heterozygous hemoglobin variants (HbS, HgC, etc)do  not significantly interfere with this assay.   However, presence of multiple variants may affect accuracy.     09/15/2021 6.7 (H) 4.0 - 5.6 % Final     Comment:     ADA Screening Guidelines:  5.7-6.4%  Consistent with prediabetes  >or=6.5%  Consistent with diabetes    High levels of fetal hemoglobin interfere with the HbA1C  assay. Heterozygous hemoglobin variants (HbS, HgC, etc)do  not significantly interfere with this assay.   However, presence of multiple variants may affect accuracy.     04/28/2021 7.1 (H) 4.0 - 5.6 % Final     Comment:     ADA Screening Guidelines:  5.7-6.4%  Consistent with prediabetes  >or=6.5%  Consistent with diabetes    High levels of fetal hemoglobin interfere with the HbA1C  assay. Heterozygous hemoglobin variants (HbS, HgC, etc)do  not significantly interfere with this assay.   However, presence of multiple variants may affect accuracy.         TSH  Recent Labs    Lab 07/23/20  1434 10/05/22  0714   TSH 2.370 4.826 H         The 10-year ASCVD risk score (Melanie DK, et al., 2019) is: 16.3%    Values used to calculate the score:      Age: 68 years      Sex: Female      Is Non- : Yes      Diabetic: Yes      Tobacco smoker: No      Systolic Blood Pressure: 102 mmHg      Is BP treated: Yes      HDL Cholesterol: 36 mg/dL      Total Cholesterol: 198 mg/dL           ASSESSMENT AND PLAN     Patient Active Problem List   Diagnosis    Essential hypertension    Sciatica of left side    Primary osteoarthritis involving multiple joints    Idiopathic chronic gout of multiple sites without tophus    History of open reduction and internal fixation (ORIF) procedure    Stage 3 chronic kidney disease    Chronic pain    Severe obesity (BMI 35.0-39.9) with comorbidity    Obstructive sleep apnea    Sickle cell trait    Sacroiliitis, not elsewhere classified    Type 2 diabetes mellitus with stage 3b chronic kidney disease, without long-term current use of insulin    Major depressive disorder, recurrent, moderate    History of tuberculosis exposure    Poor posture    Decreased range of motion of trunk and back    Decreased strength of trunk and back    Opioid dependence, uncomplicated    SVT (supraventricular tachycardia)    Aortic atherosclerosis       Patient with severe lifestyle limiting dyspnea on exertion. Check stress test and echocardiogram    Chronic kidney disease    SVT: Continue beta blockers.     Abnormal TSH: Recheck labs.  Follow-up with primary care.    Follow-up  in Cardiology Clinic after testing          Thank you very much for involving me in the care of your patient.  Please do not hesitate to contact me if there are any questions.      Mazin Shankar MD, FACC, [facility]  Interventional Cardiologist, [facility].           This note was dictated with the help of speech recognition software.  There might be un-intended errors and/or  substitutions.

## 2023-02-06 NOTE — TELEPHONE ENCOUNTER
----- Message from Sera Zaidi sent at 2/6/2023  1:03 PM CST -----  .Type: RX Refill Request    Who Called: Self    Have you contacted your pharmacy:No    Refill or New Rx: Refill    RX Name and Strength:diazePAM (VALIUM) 5 MG tablet    Preferred Pharmacy with phone number: .  [id number] - [address] AT [address] [address]  Phone: [phone] Fax: [phone]      Local or Mail Order: Local    Ordering Provider:[place]    Would the patient rather a call back or a response via [facility]? Call    Best Call Back Number: .[phone] (home)       Additional Information:

## 2023-02-07 RX ORDER — DIAZEPAM 5 MG/1
TABLET ORAL
Qty: 60 TABLET | Refills: 2 | OUTPATIENT
Start: 2023-02-07

## 2023-02-08 ENCOUNTER — PATIENT MESSAGE (OUTPATIENT)
Dept: ADMINISTRATIVE | Facility: HOSPITAL | Age: 68
End: 2023-02-08
Payer: MEDICARE

## 2023-02-08 ENCOUNTER — PATIENT OUTREACH (OUTPATIENT)
Dept: ADMINISTRATIVE | Facility: HOSPITAL | Age: 68
End: 2023-02-08
Payer: MEDICARE

## 2023-02-08 NOTE — PROGRESS NOTES
HM and immunization's reviewed and updated.  Due for mammogram and eye exam. Left message for patient to return call. Sent [facility] message.

## 2023-03-22 DIAGNOSIS — E87.6 HYPOKALEMIA: ICD-10-CM

## 2023-03-22 RX ORDER — POTASSIUM CHLORIDE 20 MEQ/1
TABLET, EXTENDED RELEASE ORAL
Qty: 90 TABLET | Refills: 2 | Status: SHIPPED | OUTPATIENT
Start: 2023-03-22 | End: 2023-12-17

## 2023-03-22 NOTE — TELEPHONE ENCOUNTER
Refill Decision Note   Gwendolyn Irlanda  is requesting a refill authorization.  Brief Assessment and Rationale for Refill:  Approve     Medication Therapy Plan:  Renal fxn reviewed; Dose ok based on labs taken 12/9/22    Medication Reconciliation Completed: No   Comments:     No Care Gaps recommended.     Note composed:10:30 AM 03/22/2023

## 2023-03-22 NOTE — TELEPHONE ENCOUNTER
No new care gaps identified.  [facility] Embedded Care Gaps. Reference number: [id number]. 3/22/2023   10:04:42 AM STEVENT

## 2023-03-22 NOTE — TELEPHONE ENCOUNTER
Refill Routing Note   Medication(s) are not appropriate for processing by [facility] for the following reason(s):      Required labs abnormal    [facility] action(s):  Defer              Appointments  past 12m or future 3m with PCP    Date Provider   Last Visit   2/3/2023 Merlyn Bruner MD   Next Visit   4/11/2023 Merlyn Bruner MD   ED visits in past 90 days: 0        Note composed:10:08 AM 03/22/2023

## 2023-04-12 ENCOUNTER — PATIENT MESSAGE (OUTPATIENT)
Dept: ADMINISTRATIVE | Facility: HOSPITAL | Age: 68
End: 2023-04-12
Payer: MEDICARE

## 2023-04-13 ENCOUNTER — PATIENT OUTREACH (OUTPATIENT)
Dept: ADMINISTRATIVE | Facility: HOSPITAL | Age: 68
End: 2023-04-13
Payer: MEDICARE

## 2023-04-13 DIAGNOSIS — E11.9 TYPE 2 DIABETES MELLITUS WITHOUT COMPLICATION, UNSPECIFIED WHETHER LONG TERM INSULIN USE: Primary | ICD-10-CM

## 2023-04-13 NOTE — PROGRESS NOTES
HM and immunization's reviewed and updated. DM labs Q2 communication push response. Scheduled labs. Notified patient through portal

## 2023-04-17 ENCOUNTER — LAB VISIT (OUTPATIENT)
Dept: LAB | Facility: HOSPITAL | Age: 68
End: 2023-04-17
Attending: INTERNAL MEDICINE
Payer: MEDICARE

## 2023-04-17 DIAGNOSIS — E11.9 TYPE 2 DIABETES MELLITUS WITHOUT COMPLICATION, UNSPECIFIED WHETHER LONG TERM INSULIN USE: ICD-10-CM

## 2023-04-17 LAB
ALBUMIN SERPL BCP-MCNC: 3.6 G/DL (ref 3.5–5.2)
ALP SERPL-CCNC: 119 U/L (ref 55–135)
ALT SERPL W/O P-5'-P-CCNC: 11 U/L (ref 10–44)
ANION GAP SERPL CALC-SCNC: 13 MMOL/L (ref 8–16)
AST SERPL-CCNC: 11 U/L (ref 10–40)
BILIRUB SERPL-MCNC: 0.3 MG/DL (ref 0.1–1)
BUN SERPL-MCNC: 23 MG/DL (ref 8–23)
CALCIUM SERPL-MCNC: 10.7 MG/DL (ref 8.7–10.5)
CHLORIDE SERPL-SCNC: 101 MMOL/L (ref 95–110)
CO2 SERPL-SCNC: 29 MMOL/L (ref 23–29)
CREAT SERPL-MCNC: 1.6 MG/DL (ref 0.5–1.4)
EST. GFR  (NO RACE VARIABLE): 34.7 ML/MIN/1.73 M^2
ESTIMATED AVG GLUCOSE: 128 MG/DL (ref 68–131)
GLUCOSE SERPL-MCNC: 126 MG/DL (ref 70–110)
HBA1C MFR BLD: 6.1 % (ref 4–5.6)
POTASSIUM SERPL-SCNC: 3.1 MMOL/L (ref 3.5–5.1)
PROT SERPL-MCNC: 7.3 G/DL (ref 6–8.4)
SODIUM SERPL-SCNC: 143 MMOL/L (ref 136–145)

## 2023-04-17 PROCEDURE — 36415 COLL VENOUS BLD VENIPUNCTURE: CPT | Mod: PO | Performed by: INTERNAL MEDICINE

## 2023-04-17 PROCEDURE — 80053 COMPREHEN METABOLIC PANEL: CPT | Performed by: INTERNAL MEDICINE

## 2023-04-17 PROCEDURE — 83036 HEMOGLOBIN GLYCOSYLATED A1C: CPT | Performed by: INTERNAL MEDICINE

## 2023-05-10 DIAGNOSIS — M54.32 SCIATICA OF LEFT SIDE: ICD-10-CM

## 2023-05-10 RX ORDER — TIZANIDINE 4 MG/1
TABLET ORAL
Qty: 60 TABLET | Refills: 5 | Status: SHIPPED | OUTPATIENT
Start: 2023-05-10 | End: 2023-10-09

## 2023-05-10 NOTE — TELEPHONE ENCOUNTER
No care due was identified.  [facility] Embedded Care Due Messages. Reference number: [id number].   5/10/2023 5:57:58 AM CDT

## 2023-05-18 ENCOUNTER — OFFICE VISIT (OUTPATIENT)
Dept: FAMILY MEDICINE | Facility: CLINIC | Age: 68
End: 2023-05-18
Payer: MEDICARE

## 2023-05-18 ENCOUNTER — LAB VISIT (OUTPATIENT)
Dept: LAB | Facility: HOSPITAL | Age: 68
End: 2023-05-18
Attending: INTERNAL MEDICINE
Payer: MEDICARE

## 2023-05-18 VITALS
RESPIRATION RATE: 16 BRPM | OXYGEN SATURATION: 96 % | WEIGHT: 250.88 LBS | BODY MASS INDEX: 39.38 KG/M2 | DIASTOLIC BLOOD PRESSURE: 80 MMHG | HEIGHT: 67 IN | HEART RATE: 93 BPM | SYSTOLIC BLOOD PRESSURE: 136 MMHG | TEMPERATURE: 98 F

## 2023-05-18 DIAGNOSIS — G47.00 INSOMNIA, UNSPECIFIED TYPE: ICD-10-CM

## 2023-05-18 DIAGNOSIS — N18.31 STAGE 3A CHRONIC KIDNEY DISEASE: ICD-10-CM

## 2023-05-18 DIAGNOSIS — F33.1 MAJOR DEPRESSIVE DISORDER, RECURRENT, MODERATE: ICD-10-CM

## 2023-05-18 DIAGNOSIS — G47.33 OSA (OBSTRUCTIVE SLEEP APNEA): ICD-10-CM

## 2023-05-18 DIAGNOSIS — I10 ESSENTIAL HYPERTENSION: ICD-10-CM

## 2023-05-18 DIAGNOSIS — E66.01 SEVERE OBESITY (BMI 35.0-39.9) WITH COMORBIDITY: ICD-10-CM

## 2023-05-18 DIAGNOSIS — I10 ESSENTIAL HYPERTENSION: Primary | ICD-10-CM

## 2023-05-18 LAB
ALBUMIN SERPL BCP-MCNC: 3.7 G/DL (ref 3.5–5.2)
ALP SERPL-CCNC: 104 U/L (ref 55–135)
ALT SERPL W/O P-5'-P-CCNC: 15 U/L (ref 10–44)
ANION GAP SERPL CALC-SCNC: 16 MMOL/L (ref 8–16)
AST SERPL-CCNC: 13 U/L (ref 10–40)
BASOPHILS # BLD AUTO: 0.06 K/UL (ref 0–0.2)
BASOPHILS NFR BLD: 0.5 % (ref 0–1.9)
BILIRUB SERPL-MCNC: 0.2 MG/DL (ref 0.1–1)
BUN SERPL-MCNC: 38 MG/DL (ref 8–23)
CALCIUM SERPL-MCNC: 11.1 MG/DL (ref 8.7–10.5)
CHLORIDE SERPL-SCNC: 100 MMOL/L (ref 95–110)
CO2 SERPL-SCNC: 30 MMOL/L (ref 23–29)
CREAT SERPL-MCNC: 1.6 MG/DL (ref 0.5–1.4)
DIFFERENTIAL METHOD: ABNORMAL
EOSINOPHIL # BLD AUTO: 0.4 K/UL (ref 0–0.5)
EOSINOPHIL NFR BLD: 2.8 % (ref 0–8)
ERYTHROCYTE [DISTWIDTH] IN BLOOD BY AUTOMATED COUNT: 12.9 % (ref 11.5–14.5)
EST. GFR  (NO RACE VARIABLE): 34.7 ML/MIN/1.73 M^2
GLUCOSE SERPL-MCNC: 135 MG/DL (ref 70–110)
HCT VFR BLD AUTO: 37.3 % (ref 37–48.5)
HGB BLD-MCNC: 11.6 G/DL (ref 12–16)
IMM GRANULOCYTES # BLD AUTO: 0.04 K/UL (ref 0–0.04)
IMM GRANULOCYTES NFR BLD AUTO: 0.3 % (ref 0–0.5)
LYMPHOCYTES # BLD AUTO: 4 K/UL (ref 1–4.8)
LYMPHOCYTES NFR BLD: 31.6 % (ref 18–48)
MCH RBC QN AUTO: 29.9 PG (ref 27–31)
MCHC RBC AUTO-ENTMCNC: 31.1 G/DL (ref 32–36)
MCV RBC AUTO: 96 FL (ref 82–98)
MONOCYTES # BLD AUTO: 0.8 K/UL (ref 0.3–1)
MONOCYTES NFR BLD: 6.6 % (ref 4–15)
NEUTROPHILS # BLD AUTO: 7.3 K/UL (ref 1.8–7.7)
NEUTROPHILS NFR BLD: 58.2 % (ref 38–73)
NRBC BLD-RTO: 0 /100 WBC
PLATELET # BLD AUTO: 375 K/UL (ref 150–450)
PMV BLD AUTO: 10.4 FL (ref 9.2–12.9)
POTASSIUM SERPL-SCNC: 4.2 MMOL/L (ref 3.5–5.1)
PROT SERPL-MCNC: 7.5 G/DL (ref 6–8.4)
RBC # BLD AUTO: 3.88 M/UL (ref 4–5.4)
SODIUM SERPL-SCNC: 146 MMOL/L (ref 136–145)
TSH SERPL DL<=0.005 MIU/L-ACNC: 2.46 UIU/ML (ref 0.4–4)
WBC # BLD AUTO: 12.55 K/UL (ref 3.9–12.7)

## 2023-05-18 PROCEDURE — 85025 COMPLETE CBC W/AUTO DIFF WBC: CPT | Performed by: INTERNAL MEDICINE

## 2023-05-18 PROCEDURE — 1159F PR MEDICATION LIST DOCUMENTED IN MEDICAL RECORD: ICD-10-PCS | Mod: CPTII,,, | Performed by: INTERNAL MEDICINE

## 2023-05-18 PROCEDURE — 1157F PR ADVANCE CARE PLAN OR EQUIV PRESENT IN MEDICAL RECORD: ICD-10-PCS | Mod: CPTII,,, | Performed by: INTERNAL MEDICINE

## 2023-05-18 PROCEDURE — 3079F DIAST BP 80-89 MM HG: CPT | Mod: CPTII,,, | Performed by: INTERNAL MEDICINE

## 2023-05-18 PROCEDURE — 3044F PR MOST RECENT HEMOGLOBIN A1C LEVEL <7.0%: ICD-10-PCS | Mod: CPTII,,, | Performed by: INTERNAL MEDICINE

## 2023-05-18 PROCEDURE — 3079F PR MOST RECENT DIASTOLIC BLOOD PRESSURE 80-89 MM HG: ICD-10-PCS | Mod: CPTII,,, | Performed by: INTERNAL MEDICINE

## 2023-05-18 PROCEDURE — 99214 OFFICE O/P EST MOD 30 MIN: CPT | Mod: ,,, | Performed by: INTERNAL MEDICINE

## 2023-05-18 PROCEDURE — 99999 PR PBB SHADOW E&M-EST. PATIENT-LVL V: CPT | Mod: PBBFAC,,, | Performed by: INTERNAL MEDICINE

## 2023-05-18 PROCEDURE — 1160F PR REVIEW ALL MEDS BY PRESCRIBER/CLIN PHARMACIST DOCUMENTED: ICD-10-PCS | Mod: CPTII,,, | Performed by: INTERNAL MEDICINE

## 2023-05-18 PROCEDURE — 3288F PR FALLS RISK ASSESSMENT DOCUMENTED: ICD-10-PCS | Mod: CPTII,,, | Performed by: INTERNAL MEDICINE

## 2023-05-18 PROCEDURE — 99214 PR OFFICE/OUTPT VISIT, EST, LEVL IV, 30-39 MIN: ICD-10-PCS | Mod: ,,, | Performed by: INTERNAL MEDICINE

## 2023-05-18 PROCEDURE — 1157F ADVNC CARE PLAN IN RCRD: CPT | Mod: CPTII,,, | Performed by: INTERNAL MEDICINE

## 2023-05-18 PROCEDURE — 80053 COMPREHEN METABOLIC PANEL: CPT | Performed by: INTERNAL MEDICINE

## 2023-05-18 PROCEDURE — 3008F BODY MASS INDEX DOCD: CPT | Mod: CPTII,,, | Performed by: INTERNAL MEDICINE

## 2023-05-18 PROCEDURE — 99215 OFFICE O/P EST HI 40 MIN: CPT | Mod: PO | Performed by: INTERNAL MEDICINE

## 2023-05-18 PROCEDURE — 84443 ASSAY THYROID STIM HORMONE: CPT | Performed by: INTERNAL MEDICINE

## 2023-05-18 PROCEDURE — 3075F PR MOST RECENT SYSTOLIC BLOOD PRESS GE 130-139MM HG: ICD-10-PCS | Mod: CPTII,,, | Performed by: INTERNAL MEDICINE

## 2023-05-18 PROCEDURE — 1160F RVW MEDS BY RX/DR IN RCRD: CPT | Mod: CPTII,,, | Performed by: INTERNAL MEDICINE

## 2023-05-18 PROCEDURE — 1159F MED LIST DOCD IN RCRD: CPT | Mod: CPTII,,, | Performed by: INTERNAL MEDICINE

## 2023-05-18 PROCEDURE — 3288F FALL RISK ASSESSMENT DOCD: CPT | Mod: CPTII,,, | Performed by: INTERNAL MEDICINE

## 2023-05-18 PROCEDURE — 1101F PT FALLS ASSESS-DOCD LE1/YR: CPT | Mod: CPTII,,, | Performed by: INTERNAL MEDICINE

## 2023-05-18 PROCEDURE — 3044F HG A1C LEVEL LT 7.0%: CPT | Mod: CPTII,,, | Performed by: INTERNAL MEDICINE

## 2023-05-18 PROCEDURE — 3008F PR BODY MASS INDEX (BMI) DOCUMENTED: ICD-10-PCS | Mod: CPTII,,, | Performed by: INTERNAL MEDICINE

## 2023-05-18 PROCEDURE — 3075F SYST BP GE 130 - 139MM HG: CPT | Mod: CPTII,,, | Performed by: INTERNAL MEDICINE

## 2023-05-18 PROCEDURE — 99999 PR PBB SHADOW E&M-EST. PATIENT-LVL V: ICD-10-PCS | Mod: PBBFAC,,, | Performed by: INTERNAL MEDICINE

## 2023-05-18 PROCEDURE — 36415 COLL VENOUS BLD VENIPUNCTURE: CPT | Mod: PO | Performed by: INTERNAL MEDICINE

## 2023-05-18 PROCEDURE — 1101F PR PT FALLS ASSESS DOC 0-1 FALLS W/OUT INJ PAST YR: ICD-10-PCS | Mod: CPTII,,, | Performed by: INTERNAL MEDICINE

## 2023-05-18 NOTE — PROGRESS NOTES
Health Maintenance Due   Topic     Diabetes Urine Screening      TETANUS VACCINE      Mammogram      Foot Exam      DEXA Scan      Eye Exam

## 2023-05-18 NOTE — PROGRESS NOTES
Subjective:       Patient ID: Gwendolyn Fournier is a pleasant 68 y.o. Black or  female patient    Chief Complaint: Follow-up and Medication Management      Patient is a pt I saw last on 02/03/2023, see my last notes.    HPI     Pt with PMH as per list of problems below coming today for a regular f-up visit.  From our last visit:  - Cardiology  - Pain specialist.   As usually, she is here with her daughter who is my patient too.    At last visit she wanted to switch from Trulicity to Ozempic as she stated that Trulicity made her nauseated.  She started Ozempic but reports she has not lost weight, even gained some.  However even if discussed with her at last visit, her diet has not been ideal. She likes sodas and drinks too many.   She has been overeating crawfishes last week with leg swelling afterwards.  Her daughter who lives with her has been trying to cook healthy meals for her. She walks with a cane and does not exercise.  Her daughter had bariatric surgery last week and is very pleased. She is the one who does the injections of Ozempic for her mom.    Patient Active Problem List   Diagnosis    Essential hypertension    Sciatica of left side    Primary osteoarthritis involving multiple joints    Idiopathic chronic gout of multiple sites without tophus    History of open reduction and internal fixation (ORIF) procedure    Stage 3 chronic kidney disease    Chronic pain    Severe obesity (BMI 35.0-39.9) with comorbidity    Obstructive sleep apnea    Sickle cell trait    Sacroiliitis, not elsewhere classified    Type 2 diabetes mellitus with stage 3b chronic kidney disease, without long-term current use of insulin    Major depressive disorder, recurrent, moderate    History of tuberculosis exposure    Poor posture    Decreased range of motion of trunk and back    Decreased strength of trunk and back    Opioid dependence, uncomplicated    SVT (supraventricular tachycardia)    Aortic atherosclerosis           ACTIVE MEDICAL ISSUES:  Documented in Problem List     PAST MEDICAL HISTORY  Documented     PAST SURGICAL HISTORY:  Documented     SOCIAL HISTORY:  Documented     FAMILY HISTORY:  Documented     ALLERGIES AND MEDICATIONS: updated and reviewed.  Documented    Review of Systems   Constitutional:  Positive for activity change and unexpected weight change. Negative for appetite change, fatigue and fever.   HENT:  Negative for congestion, postnasal drip, rhinorrhea, sinus pressure and sore throat.    Eyes:  Negative for pain, discharge and redness.   Respiratory:  Negative for cough, chest tightness and shortness of breath.    Cardiovascular:  Positive for leg swelling. Negative for chest pain and palpitations.   Gastrointestinal:  Negative for abdominal pain, constipation and nausea.   Endocrine: Negative for cold intolerance and heat intolerance.   Genitourinary:  Negative for difficulty urinating, flank pain, frequency, menstrual problem, pelvic pain, urgency and vaginal discharge.   Musculoskeletal:  Negative for arthralgias, back pain, joint swelling and neck pain.   Skin:  Negative for color change and rash.   Allergic/Immunologic: Negative for environmental allergies and food allergies.   Neurological:  Negative for weakness, numbness and headaches.   Hematological:  Negative for adenopathy.   Psychiatric/Behavioral:  Negative for behavioral problems, hallucinations, sleep disturbance and suicidal ideas. The patient is not nervous/anxious.      Objective:      Physical Exam  Vitals and nursing note reviewed.   Constitutional:       Appearance: Normal appearance. She is well-developed. She is obese.      Comments: Non pitting mild swelling of bilateral ankles   HENT:      Right Ear: Tympanic membrane and external ear normal.      Left Ear: Tympanic membrane and external ear normal.   Eyes:      Conjunctiva/sclera: Conjunctivae normal.   Neck:      Thyroid: No thyromegaly.   Cardiovascular:      Rate and  "Rhythm: Normal rate and regular rhythm.      Pulses: Normal pulses.      Heart sounds: Normal heart sounds.   Pulmonary:      Effort: Pulmonary effort is normal. No respiratory distress.      Breath sounds: Normal breath sounds. No wheezing.   Abdominal:      General: Bowel sounds are normal.      Palpations: Abdomen is soft. There is no mass.      Tenderness: There is no abdominal tenderness.   Musculoskeletal:         General: Normal range of motion.      Cervical back: Normal range of motion and neck supple.   Lymphadenopathy:      Cervical: No cervical adenopathy.   Skin:     General: Skin is warm and dry.   Neurological:      Mental Status: She is alert and oriented to person, place, and time. Mental status is at baseline.   Psychiatric:         Mood and Affect: Mood normal.         Behavior: Behavior normal.         Thought Content: Thought content normal.         Judgment: Judgment normal.       Vitals:    05/18/23 1325 05/18/23 1344   BP: (!) 140/74 136/80   BP Location: Left arm Right arm   Patient Position: Sitting Sitting   BP Method: Large (Manual) Large (Manual)   Pulse: 93    Resp: 16    Temp: 98.2 °F (36.8 °C)    TempSrc: Oral    SpO2: 96%    Weight: 113.8 kg (250 lb 14.1 oz)    Height: 5' 7" (1.702 m)      Body mass index is 39.29 kg/m².    RESULTS: Reviewed labs from last 12 months    Last Lab Results:     Lab Results   Component Value Date    WBC 12.30 10/05/2022    HGB 12.2 10/05/2022    HCT 37.1 10/05/2022     10/05/2022     04/17/2023    K 3.1 (L) 04/17/2023     04/17/2023    CO2 29 04/17/2023    BUN 23 04/17/2023    CREATININE 1.6 (H) 04/17/2023    CALCIUM 10.7 (H) 04/17/2023    ALBUMIN 3.6 04/17/2023    AST 11 04/17/2023    ALT 11 04/17/2023    CHOL 198 10/05/2022    TRIG 245 (H) 10/05/2022    HDL 36 (L) 10/05/2022    LDLCALC 113.0 10/05/2022    HGBA1C 6.1 (H) 04/17/2023    TSH 1.580 02/06/2023       Assessment:       1. Essential hypertension    2. Severe obesity (BMI " 35.0-39.9) with comorbidity    3. Stage 3a chronic kidney disease    4. Major depressive disorder, recurrent, moderate    5. Insomnia, unspecified type    6. YUE (obstructive sleep apnea)        Plan:   Gwendolyn was seen today for follow-up and medication management.    Diagnoses and all orders for this visit:    Essential hypertension  -     Comprehensive Metabolic Panel; Future  -     CBC Auto Differential; Future  -     TSH; Future    BP at goal at recheck. Same treatment but advised not to take furosemide too often due to leg swelling.  Needs to monitor salt intake. No pitting edema. Will do blood work.    Severe obesity (BMI 35.0-39.9) with comorbidity    We discussed weight issues and safe, effective ways of losing pounds, includin) diet:  low carbohydrate, low fat diet, stay away from fast food, fried and processed food, use whole grain, lot of fruits and vegetables, use healthy fat such as avocado, nuts and olive oil in reasonable quantity, stay away from sodas. Regular meals with lean proteins.  2) physical activity: ideally 150 min a week, with cardiovascular and resistance activity.  Patient was encouraged to set realistic attainable goals for weight loss, and we will follow up periodically.  Will increase Ozempic to 0.5 mg dosage, advised again that changes of lifestyle are mandatory. Advised to exercise as per tolerance. Pt possibly interested in bariatric surgery as her daughter.  Pt's WL goal for next visit: 10 pounds.     Stage 3a chronic kidney disease    Will do blood work, no NSAIDs.    Major depressive disorder, recurrent, moderate    Seems to be doing better. Same treatment.    Insomnia, unspecified type    Same treatment for now.    YUE (obstructive sleep apnea)  -     Ambulatory referral/consult to Sleep Disorders; Future    Has probable sleep apnea, had this Dx before but does not use her CPAP. Will refer her again.     Follow up in about 4 weeks (around 6/15/2023) for f-up.    This note  was created by combination of typed  and M-Modal dictation.  Transcription errors may be present.  If there are any questions, please contact me.

## 2023-06-03 DIAGNOSIS — I10 ESSENTIAL HYPERTENSION: ICD-10-CM

## 2023-06-04 NOTE — TELEPHONE ENCOUNTER
No care due was identified.  [facility] Embedded Care Due Messages. Reference number: [id number].   6/03/2023 9:58:03 PM CDT

## 2023-06-05 RX ORDER — HYDROCHLOROTHIAZIDE 25 MG/1
TABLET ORAL
Qty: 90 TABLET | Refills: 3 | Status: SHIPPED | OUTPATIENT
Start: 2023-06-05 | End: 2024-03-05

## 2023-06-05 NOTE — TELEPHONE ENCOUNTER
Refill Decision Note   Gwendolyn Fournier  is requesting a refill authorization.  Brief Assessment and Rationale for Refill:  Approve     Medication Therapy Plan:  Cr. is stable. Ok to approve    Medication Reconciliation Completed: No   Comments:     No Care Gaps recommended.     Note composed:3:56 AM 06/05/2023

## 2023-06-09 DIAGNOSIS — M1A.0790 CHRONIC GOUT OF FOOT, UNSPECIFIED CAUSE, UNSPECIFIED LATERALITY: ICD-10-CM

## 2023-06-09 RX ORDER — ALLOPURINOL 100 MG/1
TABLET ORAL
Qty: 180 TABLET | Refills: 0 | Status: SHIPPED | OUTPATIENT
Start: 2023-06-09 | End: 2023-09-05

## 2023-06-09 NOTE — TELEPHONE ENCOUNTER
No care due was identified.  [facility] Embedded Care Due Messages. Reference number: [id number].   6/09/2023 5:59:33 AM CDT

## 2023-06-09 NOTE — TELEPHONE ENCOUNTER
Refill Routing Note   Medication(s) are not appropriate for processing by [facility] for the following reason(s):      Required labs outdated  Required labs abnormal    [facility] action(s):  Defer None identified          Appointments  past 12m or future 3m with PCP    Date Provider   Last Visit   5/18/2023 Merlyn Bruner MD   Next Visit   6/27/2023 Merlyn Bruner MD   ED visits in past 90 days: 0        Note composed:12:15 PM 06/09/2023

## 2023-06-19 ENCOUNTER — PATIENT OUTREACH (OUTPATIENT)
Dept: ADMINISTRATIVE | Facility: HOSPITAL | Age: 68
End: 2023-06-19
Payer: MEDICARE

## 2023-06-19 NOTE — PROGRESS NOTES
Population Health Chart Review & Patient Outreach Details:     Reason for Outreach Encounter:     [x]  Non-Compliant Report   []  Payor Report (Humana, PHN, BCBS, MSSP, MCIP, UHC, etc.)   []  Pre-Visit Chart Review     Updates Requested / Reviewed:     [x]  Care Everywhere    []     []  External Sources ([facility], [facility], DIS, etc.)   []  Care Team Updated    Patient Outreach Method:    [x]  Telephone Outreach Completed   [] Successful   [x] Left Voicemail   [] Unable to Contact (wrong number, no voicemail)  []  [facility] Outreach Sent  []  Letter Outreach Mailed  []  Fax Sent for External Records  []  External Records Upload    Health Maintenance Topics Addressed and Outreach Outcomes / Actions Taken:        [x]      Breast Cancer Screening []  Mammo Scheduled      []  External Records Requested     []  Added Reminder to Complete to Upcoming Primary Care Appt Notes     []  Patient Declined     []  Patient Will Call Back to Schedule     []  Patient Will Schedule with External Provider / Order Routed if Applicable             []       Cervical Cancer Screening []  Pap Scheduled      []  External Records Requested     []  Added Reminder to Complete to Upcoming Primary Care Appt Notes     []  Patient Declined     []  Patient Will Call Back to Schedule     []  Patient Will Schedule with External Provider               []          Colorectal Cancer Screening []  Colonoscopy Case Request or Referral Placed     []  External Records Requested     []  Added Reminder to Complete to Upcoming Primary Care Appt Notes     []  Patient Declined     []  Patient Will Call Back to Schedule     []  Patient Will Schedule with External Provider     []  Fit Kit Mailed (add the SmartPhrase under additional notes)     []  Reminded Patient to Complete Home Test             [x]      Diabetic Eye Exam []  Eye Camera Scheduled or Optometry Referral Placed     []  External Records Requested     []  Added Reminder to Complete to  Upcoming Primary Care Appt Notes     []  Patient Declined     []  Patient Will Call Back to Schedule     []  Patient Will Schedule with External Provider             []      Blood Pressure Control []  Primary Care Follow Up Visit Scheduled     []  Remote Blood Pressure Reading Captured     []  Added Reminder to Complete to Upcoming Primary Care Appt Notes     []  Patient Declined     []  Patient Will Call Back / Patient Will Send Portal Message with Reading     []  Patient Will Call Back to Schedule Provider Visit             []       HbA1c & Other Labs []  Lab Appt Scheduled for Due Labs     []  Primary Care Follow Up Visit Scheduled      []  Reminded Patient to Complete Home Test     []  Added Reminder to Complete to Upcoming Primary Care Appt Notes     []  Patient Declined     []  Patient Will Call Back to Schedule     []  Patient Will Schedule with External Provider / Order Routed if Applicable           []    Schedule Primary Care Appt []  Primary Care Appt Scheduled     []  Patient Declined     []  Patient Will Call Back to Schedule     []  Pt Established with External Provider & Updated Care Team             []      Medication Adherence []  Primary Care Appointment Scheduled     []  Added Reminder to Upcoming Primary Care Appt Notes     []  Patient Reminded to  Prescription     []  Patient Declined, Provider Notified if Needed     []  Sent Provider Message to Review and/or Add Exclusion to Problem List             []      Osteoporosis Screening []  DXA Appointment Scheduled     []  External Records Requested     []  Added Reminder to Complete to Upcoming Primary Care Appt Notes     []  Patient Declined     []  Patient Will Call Back to Schedule     []  Patient Will Schedule with External Provider / Order Routed if Applicable     Additional Care Coordinator Notes:     HM and immunization's reviewed and updated.    Further Action Needed If Patient Returns Outreach:     Calling for overdue health  maintenance above, will need to get recent records of where patient had done, if not done can put in orders to get scheduled. Please forward messages to me.

## 2023-06-22 DIAGNOSIS — N18.32 TYPE 2 DIABETES MELLITUS WITH STAGE 3B CHRONIC KIDNEY DISEASE, WITHOUT LONG-TERM CURRENT USE OF INSULIN: ICD-10-CM

## 2023-06-22 DIAGNOSIS — E11.22 TYPE 2 DIABETES MELLITUS WITH STAGE 3B CHRONIC KIDNEY DISEASE, WITHOUT LONG-TERM CURRENT USE OF INSULIN: ICD-10-CM

## 2023-06-23 RX ORDER — GLIPIZIDE 10 MG/1
TABLET, FILM COATED, EXTENDED RELEASE ORAL
Qty: 90 TABLET | Refills: 1 | Status: SHIPPED | OUTPATIENT
Start: 2023-06-23 | End: 2023-12-29

## 2023-06-23 NOTE — TELEPHONE ENCOUNTER
No care due was identified.  [facility] Embedded Care Due Messages. Reference number: [id number].   6/22/2023 9:42:15 PM CDT

## 2023-06-23 NOTE — TELEPHONE ENCOUNTER
Refill Decision Note   Gwendolyn Fournier  is requesting a refill authorization.  Brief Assessment and Rationale for Refill:  Approve     Medication Therapy Plan:  Labs are stable. Ok to approve      Comments:     Note composed:1:03 AM 06/23/2023

## 2023-07-03 RX ORDER — ATORVASTATIN CALCIUM 40 MG/1
TABLET, FILM COATED ORAL
Qty: 90 TABLET | Refills: 0 | Status: SHIPPED | OUTPATIENT
Start: 2023-07-03 | End: 2023-10-04

## 2023-07-27 LAB
LEFT EYE DM RETINOPATHY: NEGATIVE
RIGHT EYE DM RETINOPATHY: NEGATIVE

## 2023-07-31 ENCOUNTER — PATIENT OUTREACH (OUTPATIENT)
Dept: ADMINISTRATIVE | Facility: HOSPITAL | Age: 68
End: 2023-07-31
Payer: MEDICARE

## 2023-08-08 ENCOUNTER — PATIENT OUTREACH (OUTPATIENT)
Dept: ADMINISTRATIVE | Facility: HOSPITAL | Age: 68
End: 2023-08-08
Payer: MEDICARE

## 2023-08-08 NOTE — PROGRESS NOTES
Additional Care Coordinator Notes:     HM and immunization's reviewed and updated.    Further Action Needed If Patient Returns Outreach:    Population Health Chart Review & Patient Outreach Details:     Reason for Outreach Encounter:     [x]  Non-Compliant Report   []  Payor Report (Humana, PHN, BCBS, MSSP, MCIP, UHC, etc.)   []  Pre-Visit Chart Review     Updates Requested / Reviewed:     [x]  Care Everywhere    [x]     []  External Sources ([facility], [facility], DIS, etc.)   []  Care Team Updated    Patient Outreach Method:    []  Telephone Outreach Completed   [] Successful   [] Left Voicemail   [] Unable to Contact (wrong number, no voicemail)  []  [facility] Outreach Sent  []  Letter Outreach Mailed  []  Fax Sent for External Records  [x]  External Records Upload    Health Maintenance Topics Addressed and Outreach Outcomes / Actions Taken:        []      Breast Cancer Screening []  Mammo Scheduled      []  External Records Requested     []  Added Reminder to Complete to Upcoming Primary Care Appt Notes     []  Patient Declined     []  Patient Will Call Back to Schedule     []  Patient Will Schedule with External Provider / Order Routed if Applicable             []       Cervical Cancer Screening []  Pap Scheduled      []  External Records Requested     []  Added Reminder to Complete to Upcoming Primary Care Appt Notes     []  Patient Declined     []  Patient Will Call Back to Schedule     []  Patient Will Schedule with External Provider               []          Colorectal Cancer Screening []  Colonoscopy Case Request or Referral Placed     []  External Records Requested     []  Added Reminder to Complete to Upcoming Primary Care Appt Notes     []  Patient Declined     []  Patient Will Call Back to Schedule     []  Patient Will Schedule with External Provider     []  Fit Kit Mailed (add the SmartPhrase under additional notes)     [] PAT Scheduled      [] Cologuard orders placed.      []  Reminded  Patient to Complete Home Test             [x]      Diabetic Eye Exam []  Eye Camera Scheduled or Optometry Referral Placed     []  External Records Requested     []  Added Reminder to Complete to Upcoming Primary Care Appt Notes     []  Patient Declined     []  Patient Will Call Back to Schedule     []  Patient Will Schedule with External Provider             []      Blood Pressure Control []  Primary Care Follow Up Visit Scheduled     []  Remote Blood Pressure Reading Captured     []  Added Reminder to Complete to Upcoming Primary Care Appt Notes     []  Patient Declined     []  Patient Will Call Back / Patient Will Send Portal Message with Reading     []  Patient Will Call Back to Schedule Provider Visit             []       HbA1c & Other Labs []  Lab Appt Scheduled for Due Labs     []  Primary Care Follow Up Visit Scheduled      []  Reminded Patient to Complete Home Test     []  Added Reminder to Complete to Upcoming Primary Care Appt Notes     []  Patient Declined     []  Patient Will Call Back to Schedule     []  Patient Will Schedule with External Provider / Order Routed if Applicable           []    Schedule Primary Care Appt []  Primary Care Appt Scheduled     []  Patient Declined     []  Patient Will Call Back to Schedule     []  Pt Established with External Provider & Updated Care Team             []      Medication Adherence []  Primary Care Appointment Scheduled     []  Added Reminder to Upcoming Primary Care Appt Notes     []  Patient Reminded to  Prescription     []  Patient Declined, Provider Notified if Needed     []  Sent Provider Message to Review and/or Add Exclusion to Problem List             []      Osteoporosis Screening []  DXA Appointment Scheduled     []  External Records Requested     []  Added Reminder to Complete to Upcoming Primary Care Appt Notes     []  Patient Declined     []  Patient Will Call Back to Schedule     []  Patient Will Schedule with External Provider / Order  Routed if Applicable

## 2023-08-21 DIAGNOSIS — E66.01 SEVERE OBESITY (BMI 35.0-39.9) WITH COMORBIDITY: ICD-10-CM

## 2023-08-21 DIAGNOSIS — N18.32 TYPE 2 DIABETES MELLITUS WITH STAGE 3B CHRONIC KIDNEY DISEASE, WITHOUT LONG-TERM CURRENT USE OF INSULIN: ICD-10-CM

## 2023-08-21 DIAGNOSIS — E11.22 TYPE 2 DIABETES MELLITUS WITH STAGE 3B CHRONIC KIDNEY DISEASE, WITHOUT LONG-TERM CURRENT USE OF INSULIN: ICD-10-CM

## 2023-08-21 RX ORDER — SEMAGLUTIDE 1.34 MG/ML
0.25 INJECTION, SOLUTION SUBCUTANEOUS
Qty: 1 EACH | Refills: 5 | Status: SHIPPED | OUTPATIENT
Start: 2023-08-21 | End: 2023-08-23 | Stop reason: SDUPTHER

## 2023-08-21 NOTE — TELEPHONE ENCOUNTER
----- Message from Brad Hima sent at 8/21/2023  9:26 AM CDT -----  Regarding: Self [phone]  Type: RX Refill Request    Who Called:  Self     Have you contacted your pharmacy: no     Refill    RX Name and Strength: semaglutide (OZEMPIC) 0.25 mg or 0.5 mg(2 mg/1.5 mL) pen injector    Preferred Pharmacy with phone number:   [id number] - [address] AT [address] [address]  Phone: [phone] Fax: [phone]    Local or Mail Order local     Would the patient rather a call back or a response via [facility]? Call back     Best Call Back Number: [phone]     Additional Information:     Thank you.

## 2023-08-21 NOTE — TELEPHONE ENCOUNTER
Last Office Visit Info:   The patient's last visit with Merlyn Bruner MD was on 5/18/2023.    The patient's last visit in current department was on 5/18/2023.        Last CBC Results:   Lab Results   Component Value Date    WBC 12.55 05/18/2023    HGB 11.6 (L) 05/18/2023    HCT 37.3 05/18/2023     05/18/2023       Last CMP Results  Lab Results   Component Value Date     (H) 05/18/2023    K 4.2 05/18/2023     05/18/2023    CO2 30 (H) 05/18/2023    BUN 38 (H) 05/18/2023    CREATININE 1.6 (H) 05/18/2023    CALCIUM 11.1 (H) 05/18/2023    ALBUMIN 3.7 05/18/2023    AST 13 05/18/2023    ALT 15 05/18/2023       Last Lipids  Lab Results   Component Value Date    CHOL 198 10/05/2022    TRIG 245 (H) 10/05/2022    HDL 36 (L) 10/05/2022    LDLCALC 113.0 10/05/2022       Last A1C  Lab Results   Component Value Date    HGBA1C 6.1 (H) 04/17/2023       Last TSH  Lab Results   Component Value Date    TSH 2.465 05/18/2023             Current Med Refills  Medication List with Changes/Refills   Current Medications    ALBUTEROL (ACCUNEB) 0.63 MG/3 ML NEBU    Inhale 1 ampule into the lungs as needed.       Start Date: --        End Date: --    ALLOPURINOL (ZYLOPRIM) 100 MG TABLET    TAKE 1 TABLET(100 MG) BY MOUTH TWICE DAILY       Start Date: 6/9/2023  End Date: --    AMLODIPINE (NORVASC) 10 MG TABLET    TAKE 1 TABLET(10 MG) BY MOUTH EVERY DAY       Start Date: 3/11/2023 End Date: --    ATORVASTATIN (LIPITOR) 40 MG TABLET    TAKE 1 TABLET(40 MG) BY MOUTH EVERY EVENING       Start Date: 7/3/2023  End Date: --    BLOOD SUGAR DIAGNOSTIC STRP    To check BG two times daily, freestyle lite meter       Start Date: 9/9/2020  End Date: --    BLOOD-GLUCOSE METER KIT    To check BG two times daily, freestyle lite meter       Start Date: 9/9/2020  End Date: 5/18/2023    BUPROPION (WELLBUTRIN) 100 MG TABLET    Take 100 mg by mouth every morning.       Start Date: 4/20/2022 End Date: --    DIAZEPAM (VALIUM) 5 MG TABLET     TAKE 1 TABLET(5 MG) BY MOUTH EVERY 12 HOURS AS NEEDED FOR ANXIETY       Start Date: 7/11/2022 End Date: --    DIPHENHYDRAMINE (BENADRYL) 25 MG CAPSULE    Take 25 mg by mouth every 6 (six) hours as needed.       Start Date: --        End Date: --    DOCUSATE SODIUM (COLACE) 100 MG CAPSULE    Take 1 capsule (100 mg total) by mouth as needed for Constipation.       Start Date: 10/22/2021End Date: --    DULOXETINE (CYMBALTA) 60 MG CAPSULE    Take 1 capsule (60 mg total) by mouth once daily.       Start Date: 1/27/2023 End Date: --    ERGOCALCIFEROL (ERGOCALCIFEROL) 50,000 UNIT CAP    TAKE 1 CAPSULE BY MOUTH EVERY 7 DAYS       Start Date: 1/4/2023  End Date: --    ESCITALOPRAM OXALATE (LEXAPRO) 10 MG TABLET    Take 1 tablet (10 mg total) by mouth once daily.       Start Date: 11/14/2022End Date: 11/14/2023    FERROUS SULFATE (FEOSOL) 325 MG (65 MG IRON) TAB TABLET    Take 325 mg by mouth as needed.        Start Date: 12/28/2017End Date: --    FLASH GLUCOSE SCANNING READER (FREESTYLE HAI 2 READER) MISC    1 each by Misc.(Non-Drug; Combo Route) route 4 (four) times daily with meals and nightly.       Start Date: 11/30/2022End Date: --    FLASH GLUCOSE SCANNING READER MISC    Test tid and as needed.       Start Date: 11/23/2022End Date: --    FLASH GLUCOSE SENSOR (FREESTYLE HAI 2 SENSOR) KIT    1 each by Misc.(Non-Drug; Combo Route) route 2 hours after meals and at bedtime.       Start Date: 11/30/2022End Date: --    FLUAD QUAD 2022-23,65Y UP,,PF, 60 MCG (15 MCG X 4)/0.5 ML SYRG           Start Date: 9/20/2022 End Date: --    FLUCELVAX QUAD 8154-5710, PF, 60 MCG (15 MCG X 4)/0.5 ML SYRG VACCINE           Start Date: 11/6/2017 End Date: --    FLUTICASONE (FLONASE) 50 MCG/ACTUATION NASAL SPRAY    SHAKE LIQUID AND USE 2 SPRAYS(100 MCG) IN EACH NOSTRIL EVERY DAY       Start Date: 10/11/2018End Date: --    FUROSEMIDE (LASIX) 20 MG TABLET    Take 1 tablet (20 mg total) by mouth as needed (for fluid overload).       Start  Date: 2/6/2023  End Date: --    GABAPENTIN (NEURONTIN) 300 MG CAPSULE    Take 1 capsule (300 mg total) by mouth 3 (three) times daily.       Start Date: 1/27/2023 End Date: --    GLIPIZIDE (GLUCOTROL) 10 MG TR24    TAKE 1 TABLET(10 MG) BY MOUTH DAILY WITH BREAKFAST       Start Date: 6/23/2023 End Date: --    HYDROCHLOROTHIAZIDE (HYDRODIURIL) 25 MG TABLET    TAKE 1 TABLET(25 MG) BY MOUTH EVERY DAY       Start Date: 6/5/2023  End Date: --    HYDROCODONE-ACETAMINOPHEN (NORCO) 5-325 MG PER TABLET    TK 1 T PO Q 12 H PRN       Start Date: 11/24/2020End Date: --    INDOMETHACIN (INDOCIN) 50 MG CAPSULE    Take 1 capsule (50 mg total) by mouth 3 (three) times daily as needed.       Start Date: 5/22/2021 End Date: --    LANCETS MISC    To check BG two times daily, to use with insurance preferred meter       Start Date: 8/7/2020  End Date: --    METOPROLOL SUCCINATE (TOPROL-XL) 50 MG 24 HR TABLET    Take 1 tablet (50 mg total) by mouth 2 (two) times daily.       Start Date: 3/19/2021 End Date: --    MOVANTIK 25 MG TABLET    Take 25 mg by mouth once daily.       Start Date: 9/19/2022 End Date: --    MUPIROCIN (BACTROBAN) 2 % OINTMENT    Apply topically 3 (three) times daily.       Start Date: 10/26/2022End Date: --    PFIZER COVID BIVAL,12Y UP,,PF, 30 MCG/0.3 ML INJECTION           Start Date: 9/20/2022 End Date: --    POTASSIUM CHLORIDE SA (K-DUR,KLOR-CON) 20 MEQ TABLET    Take 1 tablet (20 mEq total) by mouth once daily.       Start Date: 3/22/2023 End Date: --    PREVNAR 13, PF, 0.5 ML SYRG           Start Date: 11/6/2017 End Date: --    PRIMIDONE (MYSOLINE) 50 MG TAB    Take 2 tablets (100 mg total) by mouth 3 (three) times daily.       Start Date: 11/17/2022End Date: 11/17/2023    QUETIAPINE (SEROQUEL) 100 MG TAB    TAKE 1 TABLET(100 MG) BY MOUTH EVERY NIGHT AS NEEDED       Start Date: 4/10/2023 End Date: --    SEMAGLUTIDE (OZEMPIC) 0.25 MG OR 0.5 MG(2 MG/1.5 ML) PEN INJECTOR    Inject 0.25 mg into the skin every 7 days.        Start Date: 2/3/2023  End Date: 2/3/2024    TIZANIDINE (ZANAFLEX) 4 MG TABLET    TAKE 1 TO 2 TABLETS BY MOUTH EVERY NIGHT AS NEEDED       Start Date: 5/10/2023 End Date: --    TRAMADOL (ULTRAM) 50 MG TABLET    TAKE 1 TABLET(50 MG) BY MOUTH EVERY 8 HOURS AS NEEDED FOR PAIN       Start Date: 4/23/2020 End Date: --       Order(s) placed per written order guidelines: none    Please advise.

## 2023-08-21 NOTE — TELEPHONE ENCOUNTER
No care due was identified.  [facility] Embedded Care Due Messages. Reference number: [id number].   8/21/2023 9:30:47 AM CDT

## 2023-08-22 ENCOUNTER — HOSPITAL ENCOUNTER (OUTPATIENT)
Dept: CARDIOLOGY | Facility: HOSPITAL | Age: 68
Discharge: HOME | End: 2023-08-22
Attending: INTERNAL MEDICINE
Payer: MEDICARE

## 2023-08-22 ENCOUNTER — TELEPHONE (OUTPATIENT)
Dept: FAMILY MEDICINE | Facility: CLINIC | Age: 68
End: 2023-08-22
Payer: MEDICARE

## 2023-08-22 VITALS — BODY MASS INDEX: 39.24 KG/M2 | HEIGHT: 67 IN | WEIGHT: 250 LBS

## 2023-08-22 DIAGNOSIS — I10 ESSENTIAL HYPERTENSION: ICD-10-CM

## 2023-08-22 PROCEDURE — 93306 TTE W/DOPPLER COMPLETE: CPT

## 2023-08-22 PROCEDURE — 93306 TTE W/DOPPLER COMPLETE: CPT | Mod: 26,,, | Performed by: INTERNAL MEDICINE

## 2023-08-22 PROCEDURE — 93306 ECHO (CUPID ONLY): ICD-10-PCS | Mod: 26,,, | Performed by: INTERNAL MEDICINE

## 2023-08-22 NOTE — TELEPHONE ENCOUNTER
----- Message from Sera Zaidi sent at 8/22/2023  8:44 AM CDT -----  .Type: RX Refill Request    Who Called: Self    Have you contacted your pharmacy: Yes     Refill or New Rx: New Rx     RX Name and Strength:semaglutide (OZEMPIC) 0.5 mg(2 mg/1.5 mL) pen injector    Preferred Pharmacy with phone number:.  [id number] - [address] AT [address] [address]  Phone: [phone] Fax: [phone]    Local or Mail Order: Local     Ordering Provider: REJI Bruner    Would the patient rather a call back or a response via [facility]? Call Back     Best Call Back Number: .[phone] (home)       Additional Information:

## 2023-08-23 ENCOUNTER — TELEPHONE (OUTPATIENT)
Dept: FAMILY MEDICINE | Facility: CLINIC | Age: 68
End: 2023-08-23

## 2023-08-23 DIAGNOSIS — E11.22 TYPE 2 DIABETES MELLITUS WITH STAGE 3B CHRONIC KIDNEY DISEASE, WITHOUT LONG-TERM CURRENT USE OF INSULIN: ICD-10-CM

## 2023-08-23 DIAGNOSIS — N18.32 TYPE 2 DIABETES MELLITUS WITH STAGE 3B CHRONIC KIDNEY DISEASE, WITHOUT LONG-TERM CURRENT USE OF INSULIN: ICD-10-CM

## 2023-08-23 DIAGNOSIS — E66.01 SEVERE OBESITY (BMI 35.0-39.9) WITH COMORBIDITY: ICD-10-CM

## 2023-08-23 LAB
ASCENDING AORTA: 3.1 CM
AV MEAN GRADIENT: 7 MMHG
AV PEAK GRADIENT: 11 MMHG
BSA FOR ECHO PROCEDURE: 2.32 M2
CV ECHO LV RWT: 0.77 CM
DOP CALC AO PEAK VEL: 1.68 M/S
DOP CALC AO VTI: 22 CM
DOP CALC LVOT AREA: 4.4 CM2
DOP CALC LVOT DIAMETER: 2.37 CM
E WAVE DECELERATION TIME: 103.67 MSEC
E/A RATIO: 0.56
E/E' RATIO: 9.54 M/S
ECHO LV POSTERIOR WALL: 1.36 CM (ref 0.6–1.1)
FRACTIONAL SHORTENING: 53 % (ref 28–44)
INTERVENTRICULAR SEPTUM: 1.61 CM (ref 0.6–1.1)
IVC DIAMETER: 3.06 CM
LA MAJOR: 5.1 CM
LA MINOR: 4.65 CM
LA WIDTH: 4.6 CM
LEFT ATRIUM SIZE: 3.02 CM
LEFT ATRIUM VOLUME INDEX: 25.9 ML/M2
LEFT ATRIUM VOLUME: 57.44 CM3
LEFT INTERNAL DIMENSION IN SYSTOLE: 1.64 CM (ref 2.1–4)
LEFT VENTRICLE DIASTOLIC VOLUME INDEX: 23.21 ML/M2
LEFT VENTRICLE DIASTOLIC VOLUME: 51.52 ML
LEFT VENTRICLE MASS INDEX: 86 G/M2
LEFT VENTRICLE SYSTOLIC VOLUME INDEX: 3.4 ML/M2
LEFT VENTRICLE SYSTOLIC VOLUME: 7.62 ML
LEFT VENTRICULAR INTERNAL DIMENSION IN DIASTOLE: 3.52 CM (ref 3.5–6)
LEFT VENTRICULAR MASS: 191.75 G
LV LATERAL E/E' RATIO: 10.33 M/S
LV SEPTAL E/E' RATIO: 8.86 M/S
MV PEAK A VEL: 1.1 M/S
MV PEAK E VEL: 0.62 M/S
MV STENOSIS PRESSURE HALF TIME: 30.06 MS
MV VALVE AREA P 1/2 METHOD: 7.32 CM2
PISA TR MAX VEL: 2.75 M/S
RA MAJOR: 5.16 CM
RA PRESSURE ESTIMATED: 8 MMHG
RA WIDTH: 3.77 CM
RIGHT VENTRICULAR END-DIASTOLIC DIMENSION: 4.08 CM
RV TB RVSP: 11 MMHG
SINUS: 3.15 CM
STJ: 2.92 CM
TDI LATERAL: 0.06 M/S
TDI SEPTAL: 0.07 M/S
TDI: 0.07 M/S
TR MAX PG: 30 MMHG
TRICUSPID ANNULAR PLANE SYSTOLIC EXCURSION: 2.19 CM
TV REST PULMONARY ARTERY PRESSURE: 38 MMHG
Z-SCORE OF LEFT VENTRICULAR DIMENSION IN END DIASTOLE: -7.9
Z-SCORE OF LEFT VENTRICULAR DIMENSION IN END SYSTOLE: -8.33

## 2023-08-23 RX ORDER — SEMAGLUTIDE 1.34 MG/ML
0.5 INJECTION, SOLUTION SUBCUTANEOUS
Qty: 1 EACH | Refills: 0 | Status: SHIPPED | OUTPATIENT
Start: 2023-08-23 | End: 2023-09-12

## 2023-08-23 NOTE — TELEPHONE ENCOUNTER
----- Message from Sera Zaidi sent at 8/23/2023  2:31 PM CDT -----  .Type: Patient Call Back    Who called: Self    What is the request in detail: Patient stated she is out of semaglutide (OZEMPIC) 0.5 mg(2 mg/1.5 mL) pen injecto, stated pharmacy informed it was too early to get a refill    Can the clinic reply by [facility]? No     Would the patient rather a call back or a response via [facility]? Call Back     Best call back number: .[phone] (home)       Additional Information:

## 2023-08-23 NOTE — TELEPHONE ENCOUNTER
Per patient, she is out of medication, but is being told by the pharmacy that it is too soon to be refilled.  Stated Dr. Bruner advised her to increase medication from 0.25 mg to 0.5 mg.  Patient stated she is due to administer an injection tomorrow, but does not have enough to do so.    Please advise.

## 2023-08-25 ENCOUNTER — PATIENT MESSAGE (OUTPATIENT)
Dept: ADMINISTRATIVE | Facility: HOSPITAL | Age: 68
End: 2023-08-25
Payer: MEDICARE

## 2023-08-25 ENCOUNTER — PATIENT OUTREACH (OUTPATIENT)
Dept: ADMINISTRATIVE | Facility: HOSPITAL | Age: 68
End: 2023-08-25
Payer: MEDICARE

## 2023-08-25 NOTE — PROGRESS NOTES
Population Health Chart Review & Patient Outreach Details:     Reason for Outreach Encounter:     [x]  Non-Compliant Report   []  Payor Report (Humana, PHN, BCBS, MSSP, MCIP, UHC, etc.)   []  Pre-Visit Chart Review     Updates Requested / Reviewed:     [x]  Care Everywhere    []     []  External Sources ([facility], [facility], DIS, etc.)   []  Care Team Updated    Patient Outreach Method:    [x]  Telephone Outreach Completed   [] Successful   [x] Left Voicemail   [] Unable to Contact (wrong number, no voicemail)  [x]  [facility] Outreach Sent  []  Letter Outreach Mailed  []  Fax Sent for External Records  []  External Records Upload    Health Maintenance Topics Addressed and Outreach Outcomes / Actions Taken:        [x]      Breast Cancer Screening []  Mammo Scheduled      []  External Records Requested     []  Added Reminder to Complete to Upcoming Primary Care Appt Notes     []  Patient Declined     []  Patient Will Call Back to Schedule     []  Patient Will Schedule with External Provider / Order Routed if Applicable             []       Cervical Cancer Screening []  Pap Scheduled      []  External Records Requested     []  Added Reminder to Complete to Upcoming Primary Care Appt Notes     []  Patient Declined     []  Patient Will Call Back to Schedule     []  Patient Will Schedule with External Provider               []          Colorectal Cancer Screening []  Colonoscopy Case Request or Referral Placed     []  External Records Requested     []  Added Reminder to Complete to Upcoming Primary Care Appt Notes     []  Patient Declined     []  Patient Will Call Back to Schedule     []  Patient Will Schedule with External Provider     []  Fit Kit Mailed (add the SmartPhrase under additional notes)     []  Reminded Patient to Complete Home Test             []      Diabetic Eye Exam []  Eye Camera Scheduled or Optometry Referral Placed     []  External Records Requested     []  Added Reminder to Complete to  Upcoming Primary Care Appt Notes     []  Patient Declined     []  Patient Will Call Back to Schedule     []  Patient Will Schedule with External Provider             []      Blood Pressure Control []  Primary Care Follow Up Visit Scheduled     []  Remote Blood Pressure Reading Captured     []  Added Reminder to Complete to Upcoming Primary Care Appt Notes     []  Patient Declined     []  Patient Will Call Back / Patient Will Send Portal Message with Reading     []  Patient Will Call Back to Schedule Provider Visit             [x]       HbA1c & Other Labs []  Lab Appt Scheduled for Due Labs     []  Primary Care Follow Up Visit Scheduled      []  Reminded Patient to Complete Home Test     []  Added Reminder to Complete to Upcoming Primary Care Appt Notes     []  Patient Declined     []  Patient Will Call Back to Schedule     []  Patient Will Schedule with External Provider / Order Routed if Applicable           []    Schedule Primary Care Appt []  Primary Care Appt Scheduled     []  Patient Declined     []  Patient Will Call Back to Schedule     []  Pt Established with External Provider & Updated Care Team             []      Medication Adherence []  Primary Care Appointment Scheduled     []  Added Reminder to Upcoming Primary Care Appt Notes     []  Patient Reminded to  Prescription     []  Patient Declined, Provider Notified if Needed     []  Sent Provider Message to Review and/or Add Exclusion to Problem List             [x]      Osteoporosis Screening []  DXA Appointment Scheduled     []  External Records Requested     []  Added Reminder to Complete to Upcoming Primary Care Appt Notes     []  Patient Declined     []  Patient Will Call Back to Schedule     []  Patient Will Schedule with External Provider / Order Routed if Applicable     Additional Care Coordinator Notes:         Further Action Needed If Patient Returns Outreach:     Due for overdue HM below, need to get recent records of where patient had  done, if not done can help get it scheduled. Please forward messages to me.   Health Maintenance Due   Topic    Diabetes Urine Screening     Mammogram     DEXA Scan

## 2023-09-02 DIAGNOSIS — M1A.0790 CHRONIC GOUT OF FOOT, UNSPECIFIED CAUSE, UNSPECIFIED LATERALITY: ICD-10-CM

## 2023-09-02 NOTE — TELEPHONE ENCOUNTER
No care due was identified.  [facility] Embedded Care Due Messages. Reference number: [id number].   9/02/2023 3:59:34 PM CDT

## 2023-09-03 NOTE — TELEPHONE ENCOUNTER
Refill Routing Note   Medication(s) are not appropriate for processing by [facility] for the following reason(s):      Required labs outdated    [facility] action(s):  Defer Care Due:  None identified            Appointments  past 12m or future 3m with PCP    Date Provider   Last Visit   5/18/2023 Merlyn Bruner MD   Next Visit   9/12/2023 Merlyn Bruner MD   ED visits in past 90 days: 0        Note composed:4:20 PM 09/03/2023

## 2023-09-05 ENCOUNTER — PATIENT OUTREACH (OUTPATIENT)
Dept: ADMINISTRATIVE | Facility: HOSPITAL | Age: 68
End: 2023-09-05
Payer: MEDICARE

## 2023-09-05 ENCOUNTER — PATIENT MESSAGE (OUTPATIENT)
Dept: ADMINISTRATIVE | Facility: HOSPITAL | Age: 68
End: 2023-09-05
Payer: MEDICARE

## 2023-09-05 RX ORDER — ALLOPURINOL 100 MG/1
TABLET ORAL
Qty: 180 TABLET | Refills: 0 | Status: SHIPPED | OUTPATIENT
Start: 2023-09-05 | End: 2023-11-28

## 2023-09-05 NOTE — PROGRESS NOTES
Population Health Chart Review & Patient Outreach Details:     Reason for Outreach Encounter:     []  Non-Compliant Report   [x]  Payor Report (Humana, PHN, BCBS, MSSP, MCIP, UHC, etc.)   []  Pre-Visit Chart Review     Updates Requested / Reviewed:     [x]  Care Everywhere    []     []  External Sources ([facility], [facility], DIS, etc.)   []  Care Team Updated    Patient Outreach Method:    [x]  Telephone Outreach Completed   [] Successful   [x] Left Voicemail   [] Unable to Contact (wrong number, no voicemail)  [x]  [facility] Outreach Sent  []  Letter Outreach Mailed  []  Fax Sent for External Records  []  External Records Upload    Health Maintenance Topics Addressed and Outreach Outcomes / Actions Taken:        [x]      Breast Cancer Screening []  Mammo Scheduled      []  External Records Requested     []  Added Reminder to Complete to Upcoming Primary Care Appt Notes     []  Patient Declined     []  Patient Will Call Back to Schedule     []  Patient Will Schedule with External Provider / Order Routed if Applicable             []       Cervical Cancer Screening []  Pap Scheduled      []  External Records Requested     []  Added Reminder to Complete to Upcoming Primary Care Appt Notes     []  Patient Declined     []  Patient Will Call Back to Schedule     []  Patient Will Schedule with External Provider               []          Colorectal Cancer Screening []  Colonoscopy Case Request or Referral Placed     []  External Records Requested     []  Added Reminder to Complete to Upcoming Primary Care Appt Notes     []  Patient Declined     []  Patient Will Call Back to Schedule     []  Patient Will Schedule with External Provider     []  Fit Kit Mailed (add the SmartPhrase under additional notes)     []  Reminded Patient to Complete Home Test             []      Diabetic Eye Exam []  Eye Camera Scheduled or Optometry Referral Placed     []  External Records Requested     []  Added Reminder to Complete to  Upcoming Primary Care Appt Notes     []  Patient Declined     []  Patient Will Call Back to Schedule     []  Patient Will Schedule with External Provider             []      Blood Pressure Control []  Primary Care Follow Up Visit Scheduled     []  Remote Blood Pressure Reading Captured     []  Added Reminder to Complete to Upcoming Primary Care Appt Notes     []  Patient Declined     []  Patient Will Call Back / Patient Will Send Portal Message with Reading     []  Patient Will Call Back to Schedule Provider Visit             [x]       HbA1c & Other Labs []  Lab Appt Scheduled for Due Labs     []  Primary Care Follow Up Visit Scheduled      []  Reminded Patient to Complete Home Test     []  Added Reminder to Complete to Upcoming Primary Care Appt Notes     []  Patient Declined     []  Patient Will Call Back to Schedule     []  Patient Will Schedule with External Provider / Order Routed if Applicable           []    Schedule Primary Care Appt []  Primary Care Appt Scheduled     []  Patient Declined     []  Patient Will Call Back to Schedule     []  Pt Established with External Provider & Updated Care Team             []      Medication Adherence []  Primary Care Appointment Scheduled     []  Added Reminder to Upcoming Primary Care Appt Notes     []  Patient Reminded to  Prescription     []  Patient Declined, Provider Notified if Needed     []  Sent Provider Message to Review and/or Add Exclusion to Problem List             [x]      Osteoporosis Screening []  DXA Appointment Scheduled     []  External Records Requested     []  Added Reminder to Complete to Upcoming Primary Care Appt Notes     []  Patient Declined     []  Patient Will Call Back to Schedule     []  Patient Will Schedule with External Provider / Order Routed if Applicable     Additional Care Coordinator Notes:         Further Action Needed If Patient Returns Outreach:     Due for overdue HM below, need to get recent records if already done. If not  done, orders/referrals need to be place and schedule. Please forward messages to me.   Due for below need to be advised.  Health Maintenance Due   Topic Date Due    Diabetes Urine Screening  now    Mammogram  now    DEXA Scan  now    Lipid Panel  10/05/2023

## 2023-09-06 ENCOUNTER — CLINICAL SUPPORT (OUTPATIENT)
Dept: FAMILY MEDICINE | Facility: CLINIC | Age: 68
End: 2023-09-06
Payer: MEDICARE

## 2023-09-06 DIAGNOSIS — Z23 FLU VACCINE NEED: Primary | ICD-10-CM

## 2023-09-06 PROCEDURE — G0008 FLU VACCINE - QUADRIVALENT - ADJUVANTED: ICD-10-PCS | Mod: S$GLB,,, | Performed by: INTERNAL MEDICINE

## 2023-09-06 PROCEDURE — 99999 PR PBB SHADOW E&M-EST. PATIENT-LVL I: CPT | Mod: PBBFAC,,,

## 2023-09-06 PROCEDURE — 99999 PR PBB SHADOW E&M-EST. PATIENT-LVL I: ICD-10-PCS | Mod: PBBFAC,,,

## 2023-09-06 PROCEDURE — 90694 VACC AIIV4 NO PRSRV 0.5ML IM: CPT | Mod: S$GLB,,, | Performed by: INTERNAL MEDICINE

## 2023-09-06 PROCEDURE — G0008 ADMIN INFLUENZA VIRUS VAC: HCPCS | Mod: S$GLB,,, | Performed by: INTERNAL MEDICINE

## 2023-09-06 PROCEDURE — 90694 FLU VACCINE - QUADRIVALENT - ADJUVANTED: ICD-10-PCS | Mod: S$GLB,,, | Performed by: INTERNAL MEDICINE

## 2023-09-12 ENCOUNTER — OFFICE VISIT (OUTPATIENT)
Dept: FAMILY MEDICINE | Facility: CLINIC | Age: 68
End: 2023-09-12
Payer: MEDICARE

## 2023-09-12 VITALS
TEMPERATURE: 99 F | HEIGHT: 67 IN | RESPIRATION RATE: 18 BRPM | WEIGHT: 252 LBS | BODY MASS INDEX: 39.55 KG/M2 | SYSTOLIC BLOOD PRESSURE: 158 MMHG | OXYGEN SATURATION: 93 % | HEART RATE: 112 BPM | DIASTOLIC BLOOD PRESSURE: 72 MMHG

## 2023-09-12 DIAGNOSIS — E11.22 TYPE 2 DIABETES MELLITUS WITH STAGE 3B CHRONIC KIDNEY DISEASE, WITHOUT LONG-TERM CURRENT USE OF INSULIN: Primary | ICD-10-CM

## 2023-09-12 DIAGNOSIS — B35.1 ONYCHOMYCOSIS: ICD-10-CM

## 2023-09-12 DIAGNOSIS — I10 ESSENTIAL HYPERTENSION: ICD-10-CM

## 2023-09-12 DIAGNOSIS — M10.9 GOUT, UNSPECIFIED CAUSE, UNSPECIFIED CHRONICITY, UNSPECIFIED SITE: ICD-10-CM

## 2023-09-12 DIAGNOSIS — G47.33 OSA (OBSTRUCTIVE SLEEP APNEA): ICD-10-CM

## 2023-09-12 DIAGNOSIS — N18.32 TYPE 2 DIABETES MELLITUS WITH STAGE 3B CHRONIC KIDNEY DISEASE, WITHOUT LONG-TERM CURRENT USE OF INSULIN: Primary | ICD-10-CM

## 2023-09-12 DIAGNOSIS — N18.31 STAGE 3A CHRONIC KIDNEY DISEASE: ICD-10-CM

## 2023-09-12 DIAGNOSIS — E66.01 SEVERE OBESITY (BMI 35.0-39.9) WITH COMORBIDITY: ICD-10-CM

## 2023-09-12 PROCEDURE — 3078F DIAST BP <80 MM HG: CPT | Mod: CPTII,S$GLB,, | Performed by: INTERNAL MEDICINE

## 2023-09-12 PROCEDURE — 1159F MED LIST DOCD IN RCRD: CPT | Mod: CPTII,S$GLB,, | Performed by: INTERNAL MEDICINE

## 2023-09-12 PROCEDURE — 1157F PR ADVANCE CARE PLAN OR EQUIV PRESENT IN MEDICAL RECORD: ICD-10-PCS | Mod: CPTII,S$GLB,, | Performed by: INTERNAL MEDICINE

## 2023-09-12 PROCEDURE — 2023F PR DILATED RETINAL EXAM W/O EVID OF RETINOPATHY: ICD-10-PCS | Mod: CPTII,S$GLB,, | Performed by: INTERNAL MEDICINE

## 2023-09-12 PROCEDURE — 1125F PR PAIN SEVERITY QUANTIFIED, PAIN PRESENT: ICD-10-PCS | Mod: CPTII,S$GLB,, | Performed by: INTERNAL MEDICINE

## 2023-09-12 PROCEDURE — 99999 PR PBB SHADOW E&M-EST. PATIENT-LVL V: ICD-10-PCS | Mod: PBBFAC,,, | Performed by: INTERNAL MEDICINE

## 2023-09-12 PROCEDURE — 1100F PTFALLS ASSESS-DOCD GE2>/YR: CPT | Mod: CPTII,S$GLB,, | Performed by: INTERNAL MEDICINE

## 2023-09-12 PROCEDURE — 2023F DILAT RTA XM W/O RTNOPTHY: CPT | Mod: CPTII,S$GLB,, | Performed by: INTERNAL MEDICINE

## 2023-09-12 PROCEDURE — 3077F SYST BP >= 140 MM HG: CPT | Mod: CPTII,S$GLB,, | Performed by: INTERNAL MEDICINE

## 2023-09-12 PROCEDURE — 3288F PR FALLS RISK ASSESSMENT DOCUMENTED: ICD-10-PCS | Mod: CPTII,S$GLB,, | Performed by: INTERNAL MEDICINE

## 2023-09-12 PROCEDURE — 99999 PR PBB SHADOW E&M-EST. PATIENT-LVL V: CPT | Mod: PBBFAC,,, | Performed by: INTERNAL MEDICINE

## 2023-09-12 PROCEDURE — 3044F PR MOST RECENT HEMOGLOBIN A1C LEVEL <7.0%: ICD-10-PCS | Mod: CPTII,S$GLB,, | Performed by: INTERNAL MEDICINE

## 2023-09-12 PROCEDURE — 1125F AMNT PAIN NOTED PAIN PRSNT: CPT | Mod: CPTII,S$GLB,, | Performed by: INTERNAL MEDICINE

## 2023-09-12 PROCEDURE — 3077F PR MOST RECENT SYSTOLIC BLOOD PRESSURE >= 140 MM HG: ICD-10-PCS | Mod: CPTII,S$GLB,, | Performed by: INTERNAL MEDICINE

## 2023-09-12 PROCEDURE — 3044F HG A1C LEVEL LT 7.0%: CPT | Mod: CPTII,S$GLB,, | Performed by: INTERNAL MEDICINE

## 2023-09-12 PROCEDURE — 3288F FALL RISK ASSESSMENT DOCD: CPT | Mod: CPTII,S$GLB,, | Performed by: INTERNAL MEDICINE

## 2023-09-12 PROCEDURE — 1157F ADVNC CARE PLAN IN RCRD: CPT | Mod: CPTII,S$GLB,, | Performed by: INTERNAL MEDICINE

## 2023-09-12 PROCEDURE — 1159F PR MEDICATION LIST DOCUMENTED IN MEDICAL RECORD: ICD-10-PCS | Mod: CPTII,S$GLB,, | Performed by: INTERNAL MEDICINE

## 2023-09-12 PROCEDURE — 99214 PR OFFICE/OUTPT VISIT, EST, LEVL IV, 30-39 MIN: ICD-10-PCS | Mod: S$GLB,,, | Performed by: INTERNAL MEDICINE

## 2023-09-12 PROCEDURE — 3008F PR BODY MASS INDEX (BMI) DOCUMENTED: ICD-10-PCS | Mod: CPTII,S$GLB,, | Performed by: INTERNAL MEDICINE

## 2023-09-12 PROCEDURE — 3078F PR MOST RECENT DIASTOLIC BLOOD PRESSURE < 80 MM HG: ICD-10-PCS | Mod: CPTII,S$GLB,, | Performed by: INTERNAL MEDICINE

## 2023-09-12 PROCEDURE — 99214 OFFICE O/P EST MOD 30 MIN: CPT | Mod: S$GLB,,, | Performed by: INTERNAL MEDICINE

## 2023-09-12 PROCEDURE — 1100F PR PT FALLS ASSESS DOC 2+ FALLS/FALL W/INJURY/YR: ICD-10-PCS | Mod: CPTII,S$GLB,, | Performed by: INTERNAL MEDICINE

## 2023-09-12 PROCEDURE — 3008F BODY MASS INDEX DOCD: CPT | Mod: CPTII,S$GLB,, | Performed by: INTERNAL MEDICINE

## 2023-09-12 RX ORDER — SEMAGLUTIDE 0.68 MG/ML
INJECTION, SOLUTION SUBCUTANEOUS
COMMUNITY
Start: 2023-08-23 | End: 2023-09-12

## 2023-09-12 RX ORDER — SEMAGLUTIDE 1.34 MG/ML
1 INJECTION, SOLUTION SUBCUTANEOUS
Qty: 3 ML | Refills: 2 | Status: SHIPPED | OUTPATIENT
Start: 2023-09-12 | End: 2023-11-24

## 2023-09-12 NOTE — PROGRESS NOTES
Subjective:       Patient ID: Gwendolyn Fournier is a pleasant 68 y.o. Black or  female patient    Chief Complaint: Follow-up      Patient is a pt I saw last on 05/18/2023, see my last notes.    HPI     Patient with past medical history as per list of problems below coming today for regular follow-up visit.    She is with the daughter who is also my patient.    She reports feeling globally fine, she is aware of the fact that her diet is not optimal, she is on Ozempic and would like to increase the dosage as good tolerance, she will take her daughter as role model as she had a gastric sleeve and lost a lot of weight.    Patient Active Problem List   Diagnosis    Essential hypertension    Sciatica of left side    Primary osteoarthritis involving multiple joints    Idiopathic chronic gout of multiple sites without tophus    History of open reduction and internal fixation (ORIF) procedure    Stage 3 chronic kidney disease    Chronic pain    Severe obesity (BMI 35.0-39.9) with comorbidity    Obstructive sleep apnea    Sickle cell trait    Sacroiliitis, not elsewhere classified    Type 2 diabetes mellitus with stage 3b chronic kidney disease, without long-term current use of insulin    Major depressive disorder, recurrent, moderate    History of tuberculosis exposure    Poor posture    Decreased range of motion of trunk and back    Decreased strength of trunk and back    Opioid dependence, uncomplicated    SVT (supraventricular tachycardia)    Aortic atherosclerosis          ACTIVE MEDICAL ISSUES:  Documented in Problem List     PAST MEDICAL HISTORY  Documented     PAST SURGICAL HISTORY:  Documented     SOCIAL HISTORY:  Documented     FAMILY HISTORY:  Documented     ALLERGIES AND MEDICATIONS: updated and reviewed.  Documented    Review of Systems   Constitutional:  Positive for activity change and unexpected weight change. Negative for appetite change, fatigue and fever.   HENT:  Negative for congestion,  postnasal drip, rhinorrhea, sinus pressure and sore throat.    Eyes:  Negative for pain, discharge and redness.   Respiratory:  Negative for cough, chest tightness and shortness of breath.    Cardiovascular:  Positive for leg swelling. Negative for chest pain and palpitations.   Gastrointestinal:  Negative for abdominal pain, constipation and nausea.   Endocrine: Negative for cold intolerance and heat intolerance.   Genitourinary:  Negative for difficulty urinating, flank pain, frequency, menstrual problem, pelvic pain, urgency and vaginal discharge.   Musculoskeletal:  Negative for arthralgias, back pain, joint swelling and neck pain.   Skin:  Negative for color change and rash.   Allergic/Immunologic: Negative for environmental allergies and food allergies.   Neurological:  Negative for weakness, numbness and headaches.   Hematological:  Negative for adenopathy.   Psychiatric/Behavioral:  Negative for behavioral problems, hallucinations, sleep disturbance and suicidal ideas. The patient is not nervous/anxious.        Objective:      Physical Exam  Vitals and nursing note reviewed.   Constitutional:       Appearance: Normal appearance. She is well-developed. She is obese.      Comments: Non pitting mild swelling of bilateral ankles   HENT:      Right Ear: Tympanic membrane and external ear normal.      Left Ear: Tympanic membrane and external ear normal.   Eyes:      Conjunctiva/sclera: Conjunctivae normal.   Neck:      Thyroid: No thyromegaly.   Cardiovascular:      Rate and Rhythm: Normal rate and regular rhythm.      Pulses: Normal pulses.      Heart sounds: Normal heart sounds.   Pulmonary:      Effort: Pulmonary effort is normal. No respiratory distress.      Breath sounds: Normal breath sounds. No wheezing.   Abdominal:      General: Bowel sounds are normal.      Palpations: Abdomen is soft. There is no mass.      Tenderness: There is no abdominal tenderness.   Musculoskeletal:         General: Normal range  "of motion.      Cervical back: Normal range of motion and neck supple.   Lymphadenopathy:      Cervical: No cervical adenopathy.   Skin:     General: Skin is warm and dry.   Neurological:      Mental Status: She is alert and oriented to person, place, and time. Mental status is at baseline.   Psychiatric:         Mood and Affect: Mood normal.         Behavior: Behavior normal.         Thought Content: Thought content normal.         Judgment: Judgment normal.         Vitals:    09/12/23 1540   BP: (!) 158/72   BP Location: Right arm   Patient Position: Sitting   BP Method: Small (Manual)   Pulse: (!) 112   Resp: 18   Temp: 99.2 °F (37.3 °C)   TempSrc: Oral   SpO2: (!) 93%   Weight: 114.3 kg (251 lb 15.8 oz)   Height: 5' 7" (1.702 m)     Body mass index is 39.47 kg/m².    RESULTS: Reviewed labs from last 12 months    Last Lab Results:     Lab Results   Component Value Date    WBC 12.55 05/18/2023    HGB 11.6 (L) 05/18/2023    HCT 37.3 05/18/2023     05/18/2023     (H) 05/18/2023    K 4.2 05/18/2023     05/18/2023    CO2 30 (H) 05/18/2023    BUN 38 (H) 05/18/2023    CREATININE 1.6 (H) 05/18/2023    CALCIUM 11.1 (H) 05/18/2023    ALBUMIN 3.7 05/18/2023    AST 13 05/18/2023    ALT 15 05/18/2023    CHOL 198 10/05/2022    TRIG 245 (H) 10/05/2022    HDL 36 (L) 10/05/2022    LDLCALC 113.0 10/05/2022    HGBA1C 6.1 (H) 04/17/2023    TSH 2.465 05/18/2023       Assessment:       1. Type 2 diabetes mellitus with stage 3b chronic kidney disease, without long-term current use of insulin    2. Onychomycosis    3. Stage 3a chronic kidney disease    4. Essential hypertension    5. YUE (obstructive sleep apnea)    6. Gout, unspecified cause, unspecified chronicity, unspecified site    7. Severe obesity (BMI 35.0-39.9) with comorbidity        Plan:   Gwendolyn was seen today for follow-up.    Diagnoses and all orders for this visit:    Type 2 diabetes mellitus with stage 3b chronic kidney disease, without long-term " current use of insulin  -     Ambulatory referral/consult to Podiatry; Future  -     Hemoglobin A1C; Future  -     Lipid Panel; Future  -     semaglutide (OZEMPIC) 1 mg/dose (4 mg/3 mL); Inject 1 mg into the skin every 7 days.    Will refer to Podiatry, will do blood work, patient could benefit of Ozempic due to diabetes and overweight, will be went to closely if obtained due to CKD.      Onychomycosis  -     Ambulatory referral/consult to Podiatry; Future    Referral placed.      Stage 3a chronic kidney disease  -     Ambulatory referral/consult to Nephrology; Future    Will refer patient to Nephrology.      Essential hypertension  -     CBC Auto Differential; Future  -     Comprehensive Metabolic Panel; Future  -     TSH; Future    BP above goal, will monitor.      YUE (obstructive sleep apnea)    Using CPAP?    Gout, unspecified cause, unspecified chronicity, unspecified site  -     URIC ACID; Future    Will check uric acid level.      Severe obesity (BMI 35.0-39.9) with comorbidity  -     semaglutide (OZEMPIC) 1 mg/dose (4 mg/3 mL); Inject 1 mg into the skin every 7 days.  -     BNP; Future    Discuss lifestyle, will order higher dose of Ozempic.  Patient has excellent tolerance but needs to work on her lifestyle.  Education provided.    Follow up in about 3 months (around 12/12/2023) for f-up.    This note was created by combination of typed  and M-Modal dictation.  Transcription errors may be present.  If there are any questions, please contact me.

## 2023-09-14 ENCOUNTER — LAB VISIT (OUTPATIENT)
Dept: LAB | Facility: HOSPITAL | Age: 68
End: 2023-09-14
Attending: INTERNAL MEDICINE
Payer: MEDICARE

## 2023-09-14 DIAGNOSIS — E11.22 TYPE 2 DIABETES MELLITUS WITH STAGE 3B CHRONIC KIDNEY DISEASE, WITHOUT LONG-TERM CURRENT USE OF INSULIN: ICD-10-CM

## 2023-09-14 DIAGNOSIS — E66.01 SEVERE OBESITY (BMI 35.0-39.9) WITH COMORBIDITY: ICD-10-CM

## 2023-09-14 DIAGNOSIS — N18.32 TYPE 2 DIABETES MELLITUS WITH STAGE 3B CHRONIC KIDNEY DISEASE, WITHOUT LONG-TERM CURRENT USE OF INSULIN: ICD-10-CM

## 2023-09-14 DIAGNOSIS — M10.9 GOUT, UNSPECIFIED CAUSE, UNSPECIFIED CHRONICITY, UNSPECIFIED SITE: ICD-10-CM

## 2023-09-14 DIAGNOSIS — I10 ESSENTIAL HYPERTENSION: ICD-10-CM

## 2023-09-14 LAB
ALBUMIN SERPL BCP-MCNC: 3.5 G/DL (ref 3.5–5.2)
ALP SERPL-CCNC: 111 U/L (ref 55–135)
ALT SERPL W/O P-5'-P-CCNC: 15 U/L (ref 10–44)
ANION GAP SERPL CALC-SCNC: 18 MMOL/L (ref 8–16)
AST SERPL-CCNC: 14 U/L (ref 10–40)
BASOPHILS # BLD AUTO: 0.06 K/UL (ref 0–0.2)
BASOPHILS NFR BLD: 0.5 % (ref 0–1.9)
BILIRUB SERPL-MCNC: 0.2 MG/DL (ref 0.1–1)
BNP SERPL-MCNC: 34 PG/ML (ref 0–99)
BUN SERPL-MCNC: 19 MG/DL (ref 8–23)
CALCIUM SERPL-MCNC: 10.5 MG/DL (ref 8.7–10.5)
CHLORIDE SERPL-SCNC: 103 MMOL/L (ref 95–110)
CHOLEST SERPL-MCNC: 192 MG/DL (ref 120–199)
CHOLEST/HDLC SERPL: 5.6 {RATIO} (ref 2–5)
CO2 SERPL-SCNC: 20 MMOL/L (ref 23–29)
CREAT SERPL-MCNC: 1.5 MG/DL (ref 0.5–1.4)
DIFFERENTIAL METHOD: ABNORMAL
EOSINOPHIL # BLD AUTO: 0.3 K/UL (ref 0–0.5)
EOSINOPHIL NFR BLD: 1.9 % (ref 0–8)
ERYTHROCYTE [DISTWIDTH] IN BLOOD BY AUTOMATED COUNT: 13.4 % (ref 11.5–14.5)
EST. GFR  (NO RACE VARIABLE): 37.5 ML/MIN/1.73 M^2
ESTIMATED AVG GLUCOSE: 108 MG/DL (ref 68–131)
GLUCOSE SERPL-MCNC: 98 MG/DL (ref 70–110)
HBA1C MFR BLD: 5.4 % (ref 4–5.6)
HCT VFR BLD AUTO: 34.7 % (ref 37–48.5)
HDLC SERPL-MCNC: 34 MG/DL (ref 40–75)
HDLC SERPL: 17.7 % (ref 20–50)
HGB BLD-MCNC: 11.2 G/DL (ref 12–16)
IMM GRANULOCYTES # BLD AUTO: 0.05 K/UL (ref 0–0.04)
IMM GRANULOCYTES NFR BLD AUTO: 0.4 % (ref 0–0.5)
LDLC SERPL CALC-MCNC: 108.2 MG/DL (ref 63–159)
LYMPHOCYTES # BLD AUTO: 4.1 K/UL (ref 1–4.8)
LYMPHOCYTES NFR BLD: 31.1 % (ref 18–48)
MCH RBC QN AUTO: 29.9 PG (ref 27–31)
MCHC RBC AUTO-ENTMCNC: 32.3 G/DL (ref 32–36)
MCV RBC AUTO: 93 FL (ref 82–98)
MONOCYTES # BLD AUTO: 1.2 K/UL (ref 0.3–1)
MONOCYTES NFR BLD: 9.3 % (ref 4–15)
NEUTROPHILS # BLD AUTO: 7.5 K/UL (ref 1.8–7.7)
NEUTROPHILS NFR BLD: 56.8 % (ref 38–73)
NONHDLC SERPL-MCNC: 158 MG/DL
NRBC BLD-RTO: 0 /100 WBC
PLATELET # BLD AUTO: 326 K/UL (ref 150–450)
PMV BLD AUTO: 10.3 FL (ref 9.2–12.9)
POTASSIUM SERPL-SCNC: 3.8 MMOL/L (ref 3.5–5.1)
PROT SERPL-MCNC: 7.4 G/DL (ref 6–8.4)
RBC # BLD AUTO: 3.75 M/UL (ref 4–5.4)
SODIUM SERPL-SCNC: 141 MMOL/L (ref 136–145)
TRIGL SERPL-MCNC: 249 MG/DL (ref 30–150)
TSH SERPL DL<=0.005 MIU/L-ACNC: 1.71 UIU/ML (ref 0.4–4)
URATE SERPL-MCNC: 5.3 MG/DL (ref 2.4–5.7)
WBC # BLD AUTO: 13.27 K/UL (ref 3.9–12.7)

## 2023-09-14 PROCEDURE — 80053 COMPREHEN METABOLIC PANEL: CPT | Performed by: INTERNAL MEDICINE

## 2023-09-14 PROCEDURE — 36415 COLL VENOUS BLD VENIPUNCTURE: CPT | Mod: PO | Performed by: INTERNAL MEDICINE

## 2023-09-14 PROCEDURE — 83880 ASSAY OF NATRIURETIC PEPTIDE: CPT | Performed by: INTERNAL MEDICINE

## 2023-09-14 PROCEDURE — 84443 ASSAY THYROID STIM HORMONE: CPT | Performed by: INTERNAL MEDICINE

## 2023-09-14 PROCEDURE — 80061 LIPID PANEL: CPT | Performed by: INTERNAL MEDICINE

## 2023-09-14 PROCEDURE — 84550 ASSAY OF BLOOD/URIC ACID: CPT | Performed by: INTERNAL MEDICINE

## 2023-09-14 PROCEDURE — 85025 COMPLETE CBC W/AUTO DIFF WBC: CPT | Performed by: INTERNAL MEDICINE

## 2023-09-14 PROCEDURE — 83036 HEMOGLOBIN GLYCOSYLATED A1C: CPT | Performed by: INTERNAL MEDICINE

## 2023-09-16 DIAGNOSIS — F51.04 PSYCHOPHYSIOLOGICAL INSOMNIA: ICD-10-CM

## 2023-09-16 NOTE — TELEPHONE ENCOUNTER
No care due was identified.  [facility] Embedded Care Due Messages. Reference number: [id number].   9/16/2023 4:07:17 PM CDT

## 2023-09-18 DIAGNOSIS — F51.04 PSYCHOPHYSIOLOGICAL INSOMNIA: ICD-10-CM

## 2023-09-18 NOTE — TELEPHONE ENCOUNTER
No care due was identified.  [facility] Embedded Care Due Messages. Reference number: [id number].   9/18/2023 11:38:32 AM CDT

## 2023-09-20 ENCOUNTER — TELEPHONE (OUTPATIENT)
Dept: PODIATRY | Facility: CLINIC | Age: 68
End: 2023-09-20
Payer: MEDICARE

## 2023-09-20 ENCOUNTER — NUTRITION (OUTPATIENT)
Dept: NEPHROLOGY | Facility: CLINIC | Age: 68
End: 2023-09-20
Payer: MEDICARE

## 2023-09-20 ENCOUNTER — OFFICE VISIT (OUTPATIENT)
Dept: NEPHROLOGY | Facility: CLINIC | Age: 68
End: 2023-09-20
Payer: MEDICARE

## 2023-09-20 VITALS
OXYGEN SATURATION: 92 % | BODY MASS INDEX: 39.44 KG/M2 | HEART RATE: 116 BPM | HEIGHT: 67 IN | SYSTOLIC BLOOD PRESSURE: 152 MMHG | WEIGHT: 251.31 LBS | DIASTOLIC BLOOD PRESSURE: 94 MMHG

## 2023-09-20 DIAGNOSIS — N18.32 TYPE 2 DIABETES MELLITUS WITH STAGE 3B CHRONIC KIDNEY DISEASE, WITHOUT LONG-TERM CURRENT USE OF INSULIN: ICD-10-CM

## 2023-09-20 DIAGNOSIS — E11.22 TYPE 2 DIABETES MELLITUS WITH STAGE 3B CHRONIC KIDNEY DISEASE, WITHOUT LONG-TERM CURRENT USE OF INSULIN: ICD-10-CM

## 2023-09-20 DIAGNOSIS — N18.31 STAGE 3A CHRONIC KIDNEY DISEASE: ICD-10-CM

## 2023-09-20 DIAGNOSIS — I10 HYPERTENSION, UNSPECIFIED TYPE: Primary | ICD-10-CM

## 2023-09-20 PROCEDURE — 99999 PR PBB SHADOW E&M-EST. PATIENT-LVL IV: ICD-10-PCS | Mod: PBBFAC,,, | Performed by: INTERNAL MEDICINE

## 2023-09-20 PROCEDURE — 97802 PR MED NUTR THER, 1ST, INDIV, EA 15 MIN: ICD-10-PCS | Mod: S$GLB,,,

## 2023-09-20 PROCEDURE — 1125F PR PAIN SEVERITY QUANTIFIED, PAIN PRESENT: ICD-10-PCS | Mod: CPTII,S$GLB,, | Performed by: INTERNAL MEDICINE

## 2023-09-20 PROCEDURE — 2023F PR DILATED RETINAL EXAM W/O EVID OF RETINOPATHY: ICD-10-PCS | Mod: CPTII,S$GLB,, | Performed by: INTERNAL MEDICINE

## 2023-09-20 PROCEDURE — 1157F ADVNC CARE PLAN IN RCRD: CPT | Mod: CPTII,S$GLB,, | Performed by: INTERNAL MEDICINE

## 2023-09-20 PROCEDURE — 3080F PR MOST RECENT DIASTOLIC BLOOD PRESSURE >= 90 MM HG: ICD-10-PCS | Mod: CPTII,S$GLB,, | Performed by: INTERNAL MEDICINE

## 2023-09-20 PROCEDURE — 3008F BODY MASS INDEX DOCD: CPT | Mod: CPTII,S$GLB,, | Performed by: INTERNAL MEDICINE

## 2023-09-20 PROCEDURE — 3062F POS MACROALBUMINURIA REV: CPT | Mod: CPTII,S$GLB,, | Performed by: INTERNAL MEDICINE

## 2023-09-20 PROCEDURE — 1101F PT FALLS ASSESS-DOCD LE1/YR: CPT | Mod: CPTII,S$GLB,, | Performed by: INTERNAL MEDICINE

## 2023-09-20 PROCEDURE — 3062F PR POS MACROALBUMINURIA RESULT DOCUMENTED/REVIEW: ICD-10-PCS | Mod: CPTII,S$GLB,, | Performed by: INTERNAL MEDICINE

## 2023-09-20 PROCEDURE — 3066F NEPHROPATHY DOC TX: CPT | Mod: CPTII,S$GLB,, | Performed by: INTERNAL MEDICINE

## 2023-09-20 PROCEDURE — 1159F MED LIST DOCD IN RCRD: CPT | Mod: CPTII,S$GLB,, | Performed by: INTERNAL MEDICINE

## 2023-09-20 PROCEDURE — 1101F PR PT FALLS ASSESS DOC 0-1 FALLS W/OUT INJ PAST YR: ICD-10-PCS | Mod: CPTII,S$GLB,, | Performed by: INTERNAL MEDICINE

## 2023-09-20 PROCEDURE — 3044F PR MOST RECENT HEMOGLOBIN A1C LEVEL <7.0%: ICD-10-PCS | Mod: CPTII,S$GLB,, | Performed by: INTERNAL MEDICINE

## 2023-09-20 PROCEDURE — 99204 OFFICE O/P NEW MOD 45 MIN: CPT | Mod: S$GLB,,, | Performed by: INTERNAL MEDICINE

## 2023-09-20 PROCEDURE — 97802 MEDICAL NUTRITION INDIV IN: CPT | Mod: S$GLB,,,

## 2023-09-20 PROCEDURE — 3044F HG A1C LEVEL LT 7.0%: CPT | Mod: CPTII,S$GLB,, | Performed by: INTERNAL MEDICINE

## 2023-09-20 PROCEDURE — 99999 PR PBB SHADOW E&M-EST. PATIENT-LVL II: CPT | Mod: PBBFAC,,,

## 2023-09-20 PROCEDURE — 1157F PR ADVANCE CARE PLAN OR EQUIV PRESENT IN MEDICAL RECORD: ICD-10-PCS | Mod: CPTII,S$GLB,, | Performed by: INTERNAL MEDICINE

## 2023-09-20 PROCEDURE — 2023F DILAT RTA XM W/O RTNOPTHY: CPT | Mod: CPTII,S$GLB,, | Performed by: INTERNAL MEDICINE

## 2023-09-20 PROCEDURE — 3077F PR MOST RECENT SYSTOLIC BLOOD PRESSURE >= 140 MM HG: ICD-10-PCS | Mod: CPTII,S$GLB,, | Performed by: INTERNAL MEDICINE

## 2023-09-20 PROCEDURE — 3008F PR BODY MASS INDEX (BMI) DOCUMENTED: ICD-10-PCS | Mod: CPTII,S$GLB,, | Performed by: INTERNAL MEDICINE

## 2023-09-20 PROCEDURE — 1125F AMNT PAIN NOTED PAIN PRSNT: CPT | Mod: CPTII,S$GLB,, | Performed by: INTERNAL MEDICINE

## 2023-09-20 PROCEDURE — 3288F PR FALLS RISK ASSESSMENT DOCUMENTED: ICD-10-PCS | Mod: CPTII,S$GLB,, | Performed by: INTERNAL MEDICINE

## 2023-09-20 PROCEDURE — 3066F PR DOCUMENTATION OF TREATMENT FOR NEPHROPATHY: ICD-10-PCS | Mod: CPTII,S$GLB,, | Performed by: INTERNAL MEDICINE

## 2023-09-20 PROCEDURE — 3077F SYST BP >= 140 MM HG: CPT | Mod: CPTII,S$GLB,, | Performed by: INTERNAL MEDICINE

## 2023-09-20 PROCEDURE — 99999 PR PBB SHADOW E&M-EST. PATIENT-LVL II: ICD-10-PCS | Mod: PBBFAC,,,

## 2023-09-20 PROCEDURE — 1159F PR MEDICATION LIST DOCUMENTED IN MEDICAL RECORD: ICD-10-PCS | Mod: CPTII,S$GLB,, | Performed by: INTERNAL MEDICINE

## 2023-09-20 PROCEDURE — 3080F DIAST BP >= 90 MM HG: CPT | Mod: CPTII,S$GLB,, | Performed by: INTERNAL MEDICINE

## 2023-09-20 PROCEDURE — 99999 PR PBB SHADOW E&M-EST. PATIENT-LVL IV: CPT | Mod: PBBFAC,,, | Performed by: INTERNAL MEDICINE

## 2023-09-20 PROCEDURE — 3288F FALL RISK ASSESSMENT DOCD: CPT | Mod: CPTII,S$GLB,, | Performed by: INTERNAL MEDICINE

## 2023-09-20 PROCEDURE — 99204 PR OFFICE/OUTPT VISIT, NEW, LEVL IV, 45-59 MIN: ICD-10-PCS | Mod: S$GLB,,, | Performed by: INTERNAL MEDICINE

## 2023-09-20 RX ORDER — DAPAGLIFLOZIN 10 MG/1
10 TABLET, FILM COATED ORAL DAILY
Qty: 30 TABLET | Refills: 3 | Status: SHIPPED | OUTPATIENT
Start: 2023-09-20 | End: 2024-01-16

## 2023-09-20 NOTE — PROGRESS NOTES
REASON FOR CONSULT: CKD    REFERRING PHYSICIAN: Merlyn Bruner MD      HISTORY OF PRESENT ILLNESS: 68 y.o. female who is new to me  has a past medical history of Arthritis, Gout attack, psychiatric care, Hypertension, Psychiatric problem, Renal disorder, Sciatic nerve pain (2013), Therapy, Tuberculosis, and Unspecified cataract (07/27/2023). patient was referred here for abnormal renal function   The patient denies taking NSAIDs or new antibiotics, recreational drugs, recent episode of dehydration, diarrhea, nausea or vomiting, acute illness, hospitalization or exposure to IV radiocontrast.     ROS:  General: negative for chills, or fatigue  ENT: No epistaxis or headaches  Hematological and Lymphatic: No bleeding problems or blood clots.  Endocrine: No skin changes or temperature intolerance  Respiratory: No cough, shortness of breath, or wheezing  Cardiovascular: No chest pain or dyspnea   Gastrointestinal: No abdominal pain, change in bowel habits  Genito-Urinary: No dysuria, trouble voiding, or hematuria  Musculoskeletal: ROS: negative for - joint pain, joint stiffness, joint swelling, muscle pain or muscular weakness  Neurological: No focal weakness, no numbness  Dermatological: No rash or ulcers.    PAST MEDICAL HISTORY:  Past Medical History:   Diagnosis Date    Arthritis     Gout attack     Hx of psychiatric care     Hypertension     Psychiatric problem     Renal disorder     Sciatic nerve pain 2013    Therapy     used to follow with psychiatrist but doesn't recall whom, 2012    Tuberculosis     Unspecified cataract 07/27/2023    Mild on both eyes       PAST SURGICAL HISTORY:  Past Surgical History:   Procedure Laterality Date    COLONOSCOPY N/A 1/21/2019    Procedure: COLONOSCOPY;  Surgeon: Shanna Jose MD;  Location: [facility];  Service: Endoscopy;  Laterality: N/A;    INJECTION OF JOINT Bilateral 3/13/2019    Procedure: INJECTION, JOINT BILATERAL SI JOINT;  Surgeon: Evan Coreas MD;  Location:  [facility] [place] [facility];  Service: Pain Management;  Laterality: Bilateral;  BILATERAL SI JOINT INJECTION    KNEE SURGERY  2014    left knee surgery        FAMILY HISTORY:   Family History   Problem Relation Age of Onset    Tuberculosis Mother     Stroke Father     Bipolar disorder Daughter     Suicide Daughter     Depression Daughter     Bipolar disorder Daughter     Depression Daughter        SOCIAL HISTORY:  Social History     Socioeconomic History    Marital status:     Number of children: 5   Occupational History    Occupation: homemaker   Tobacco Use    Smoking status: Former     Current packs/day: 0.00     Types: Cigarettes     Quit date: 1976     Years since quittin.7    Smokeless tobacco: Never   Substance and Sexual Activity    Alcohol use: Not Currently     Alcohol/week: 0.0 standard drinks of alcohol     Comment: red wine    Drug use: No    Sexual activity: Not Currently     Partners: Male     Social Determinants of Health     Financial Resource Strain: Low Risk  (10/10/2022)    Overall Financial Resource Strain (CARDIA)     Difficulty of Paying Living Expenses: Not very hard   Food Insecurity: Food Insecurity Present (10/10/2022)    Hunger Vital Sign     Worried About Running Out of Food in the Last Year: Never true     Ran Out of Food in the Last Year: Sometimes true   Transportation Needs: No Transportation Needs (10/10/2022)    PRAPARE - Transportation     Lack of Transportation (Medical): No     Lack of Transportation (Non-Medical): No   Physical Activity: Unknown (10/10/2022)    Exercise Vital Sign     Days of Exercise per Week: 0 days   Stress: Stress Concern Present (10/10/2022)    [employer] [place] [employer] - Occupational Stress Questionnaire     Feeling of Stress : To some extent   Social Connections: Unknown (10/10/2022)    Social Connection and Isolation Panel [NHANES]     Frequency of Communication with Friends and Family: Once a week     Frequency of Social  Gatherings with Friends and Family: Once a week     Active Member of Clubs or Organizations: Yes     Attends Club or Organization Meetings: More than 4 times per year     Marital Status:    Housing Stability: Low Risk  (10/10/2022)    Housing Stability Vital Sign     Unable to Pay for Housing in the Last Year: No     Number of Places Lived in the Last Year: 1     Unstable Housing in the Last Year: No       ALLERGIES:  Review of patient's allergies indicates:   Allergen Reactions    Ondansetron hcl (pf) Hives and Itching    Ketorolac Itching       MEDICATIONS:    Current Outpatient Medications:     albuterol (ACCUNEB) 0.63 mg/3 mL Nebu, Inhale 1 ampule into the lungs as needed., Disp: , Rfl:     allopurinoL (ZYLOPRIM) 100 MG tablet, TAKE 1 TABLET(100 MG) BY MOUTH TWICE DAILY, Disp: 180 tablet, Rfl: 0    amLODIPine (NORVASC) 10 MG tablet, TAKE 1 TABLET(10 MG) BY MOUTH EVERY DAY, Disp: 90 tablet, Rfl: 3    atorvastatin (LIPITOR) 40 MG tablet, TAKE 1 TABLET(40 MG) BY MOUTH EVERY EVENING, Disp: 90 tablet, Rfl: 0    blood sugar diagnostic Strp, To check BG two times daily, freestyle lite meter, Disp: 200 each, Rfl: 3    blood-glucose meter kit, To check BG two times daily, freestyle lite meter, Disp: 1 each, Rfl: 0    buPROPion (WELLBUTRIN) 100 MG tablet, Take 100 mg by mouth every morning., Disp: , Rfl:     diazePAM (VALIUM) 5 MG tablet, TAKE 1 TABLET(5 MG) BY MOUTH EVERY 12 HOURS AS NEEDED FOR ANXIETY, Disp: 60 tablet, Rfl: 2    diphenhydrAMINE (BENADRYL) 25 mg capsule, Take 25 mg by mouth every 6 (six) hours as needed., Disp: , Rfl:     docusate sodium (COLACE) 100 MG capsule, Take 1 capsule (100 mg total) by mouth as needed for Constipation., Disp: 90 capsule, Rfl: 3    DULoxetine (CYMBALTA) 60 MG capsule, Take 1 capsule (60 mg total) by mouth once daily., Disp: 90 capsule, Rfl: 3    ergocalciferol (ERGOCALCIFEROL) 50,000 unit Cap, TAKE 1 CAPSULE BY MOUTH EVERY 7 DAYS, Disp: 12 capsule, Rfl: 0    EScitalopram  oxalate (LEXAPRO) 10 MG tablet, Take 1 tablet (10 mg total) by mouth once daily., Disp: 90 tablet, Rfl: 3    ferrous sulfate (FEOSOL) 325 mg (65 mg iron) Tab tablet, Take 325 mg by mouth as needed. , Disp: , Rfl:     flash glucose scanning reader (FREESTYLE HAI 2 READER) Misc, 1 each by Misc.(Non-Drug; Combo Route) route 4 (four) times daily with meals and nightly., Disp: 1 each, Rfl: 0    flash glucose scanning reader Misc, Test tid and as needed., Disp: 1 each, Rfl: 0    flash glucose sensor (FREESTYLE HAI 2 SENSOR) Kit, 1 each by Misc.(Non-Drug; Combo Route) route 2 hours after meals and at bedtime., Disp: 1 kit, Rfl: 11    FLUAD QUAD 2022-23,65Y UP,,PF, 60 mcg (15 mcg x 4)/0.5 mL Syrg, , Disp: , Rfl:     FLUCELVAX QUAD 4244-2759, PF, 60 mcg (15 mcg x 4)/0.5 mL Syrg vaccine, , Disp: , Rfl:     fluticasone (FLONASE) 50 mcg/actuation nasal spray, SHAKE LIQUID AND USE 2 SPRAYS(100 MCG) IN EACH NOSTRIL EVERY DAY, Disp: 48 mL, Rfl: 1    furosemide (LASIX) 20 MG tablet, Take 1 tablet (20 mg total) by mouth as needed (for fluid overload)., Disp: 90 tablet, Rfl: 3    gabapentin (NEURONTIN) 300 MG capsule, Take 1 capsule (300 mg total) by mouth 3 (three) times daily., Disp: 90 capsule, Rfl: 5    glipiZIDE (GLUCOTROL) 10 MG TR24, TAKE 1 TABLET(10 MG) BY MOUTH DAILY WITH BREAKFAST, Disp: 90 tablet, Rfl: 1    hydroCHLOROthiazide (HYDRODIURIL) 25 MG tablet, TAKE 1 TABLET(25 MG) BY MOUTH EVERY DAY, Disp: 90 tablet, Rfl: 3    HYDROcodone-acetaminophen (NORCO) 5-325 mg per tablet, TK 1 T PO Q 12 H PRN, Disp: , Rfl:     indomethacin (INDOCIN) 50 MG capsule, Take 1 capsule (50 mg total) by mouth 3 (three) times daily as needed., Disp: 30 capsule, Rfl: 11    lancets [facility], To check BG two times daily, to use with insurance preferred meter, Disp: 200 each, Rfl: 3    metoprolol succinate (TOPROL-XL) 50 MG 24 hr tablet, Take 1 tablet (50 mg total) by mouth 2 (two) times daily., Disp: 180 tablet, Rfl: 1    MOVANTIK 25 mg tablet,  Take 25 mg by mouth once daily., Disp: , Rfl:     mupirocin (BACTROBAN) 2 % ointment, Apply topically 3 (three) times daily. (Patient not taking: Reported on 9/12/2023), Disp: 30 g, Rfl: 0    PFIZER COVID BIVAL,12Y UP,,PF, 30 mcg/0.3 mL injection, , Disp: , Rfl:     potassium chloride SA (K-DUR,KLOR-CON) 20 MEQ tablet, Take 1 tablet (20 mEq total) by mouth once daily., Disp: 90 tablet, Rfl: 2    PREVNAR 13, PF, 0.5 mL Syrg, , Disp: , Rfl:     primidone (MYSOLINE) 50 MG Tab, Take 2 tablets (100 mg total) by mouth 3 (three) times daily., Disp: 180 tablet, Rfl: 11    QUEtiapine (SEROQUEL) 100 MG Tab, TAKE 1 TABLET(100 MG) BY MOUTH EVERY NIGHT AS NEEDED, Disp: 90 tablet, Rfl: 1    semaglutide (OZEMPIC) 1 mg/dose (4 mg/3 mL), Inject 1 mg into the skin every 7 days., Disp: 3 mL, Rfl: 2    tiZANidine (ZANAFLEX) 4 MG tablet, TAKE 1 TO 2 TABLETS BY MOUTH EVERY NIGHT AS NEEDED, Disp: 60 tablet, Rfl: 5    traMADoL (ULTRAM) 50 mg tablet, TAKE 1 TABLET(50 MG) BY MOUTH EVERY 8 HOURS AS NEEDED FOR PAIN, Disp: 90 tablet, Rfl: 2    Current Facility-Administered Medications:     lidocaine (PF) 10 mg/ml (1%) injection 40 mg, 4 mL, Intramuscular, 1 time in Clinic/HOD, [place], Azikiwe K., MD   Medication List with Changes/Refills   Current Medications    ALBUTEROL (ACCUNEB) 0.63 MG/3 ML NEBU    Inhale 1 ampule into the lungs as needed.    ALLOPURINOL (ZYLOPRIM) 100 MG TABLET    TAKE 1 TABLET(100 MG) BY MOUTH TWICE DAILY    AMLODIPINE (NORVASC) 10 MG TABLET    TAKE 1 TABLET(10 MG) BY MOUTH EVERY DAY    ATORVASTATIN (LIPITOR) 40 MG TABLET    TAKE 1 TABLET(40 MG) BY MOUTH EVERY EVENING    BLOOD SUGAR DIAGNOSTIC STRP    To check BG two times daily, freestyle lite meter    BLOOD-GLUCOSE METER KIT    To check BG two times daily, freestyle lite meter    BUPROPION (WELLBUTRIN) 100 MG TABLET    Take 100 mg by mouth every morning.    DIAZEPAM (VALIUM) 5 MG TABLET    TAKE 1 TABLET(5 MG) BY MOUTH EVERY 12 HOURS AS NEEDED FOR ANXIETY     DIPHENHYDRAMINE (BENADRYL) 25 MG CAPSULE    Take 25 mg by mouth every 6 (six) hours as needed.    DOCUSATE SODIUM (COLACE) 100 MG CAPSULE    Take 1 capsule (100 mg total) by mouth as needed for Constipation.    DULOXETINE (CYMBALTA) 60 MG CAPSULE    Take 1 capsule (60 mg total) by mouth once daily.    ERGOCALCIFEROL (ERGOCALCIFEROL) 50,000 UNIT CAP    TAKE 1 CAPSULE BY MOUTH EVERY 7 DAYS    ESCITALOPRAM OXALATE (LEXAPRO) 10 MG TABLET    Take 1 tablet (10 mg total) by mouth once daily.    FERROUS SULFATE (FEOSOL) 325 MG (65 MG IRON) TAB TABLET    Take 325 mg by mouth as needed.     FLASH GLUCOSE SCANNING READER (FREESTYLE HAI 2 READER) MISC    1 each by Misc.(Non-Drug; Combo Route) route 4 (four) times daily with meals and nightly.    FLASH GLUCOSE SCANNING READER MISC    Test tid and as needed.    FLASH GLUCOSE SENSOR (FREESTYLE HAI 2 SENSOR) KIT    1 each by Misc.(Non-Drug; Combo Route) route 2 hours after meals and at bedtime.    FLUAD QUAD 2022-23,65Y UP,,PF, 60 MCG (15 MCG X 4)/0.5 ML SYRG        FLUCELVAX QUAD 8631-8066, PF, 60 MCG (15 MCG X 4)/0.5 ML SYRG VACCINE        FLUTICASONE (FLONASE) 50 MCG/ACTUATION NASAL SPRAY    SHAKE LIQUID AND USE 2 SPRAYS(100 MCG) IN EACH NOSTRIL EVERY DAY    FUROSEMIDE (LASIX) 20 MG TABLET    Take 1 tablet (20 mg total) by mouth as needed (for fluid overload).    GABAPENTIN (NEURONTIN) 300 MG CAPSULE    Take 1 capsule (300 mg total) by mouth 3 (three) times daily.    GLIPIZIDE (GLUCOTROL) 10 MG TR24    TAKE 1 TABLET(10 MG) BY MOUTH DAILY WITH BREAKFAST    HYDROCHLOROTHIAZIDE (HYDRODIURIL) 25 MG TABLET    TAKE 1 TABLET(25 MG) BY MOUTH EVERY DAY    HYDROCODONE-ACETAMINOPHEN (NORCO) 5-325 MG PER TABLET    TK 1 T PO Q 12 H PRN    INDOMETHACIN (INDOCIN) 50 MG CAPSULE    Take 1 capsule (50 mg total) by mouth 3 (three) times daily as needed.    LANCETS MISC    To check BG two times daily, to use with insurance preferred meter    METOPROLOL SUCCINATE (TOPROL-XL) 50 MG 24 HR TABLET     Take 1 tablet (50 mg total) by mouth 2 (two) times daily.    MOVANTIK 25 MG TABLET    Take 25 mg by mouth once daily.    MUPIROCIN (BACTROBAN) 2 % OINTMENT    Apply topically 3 (three) times daily.    PFIZER COVID BIVAL,12Y UP,,PF, 30 MCG/0.3 ML INJECTION        POTASSIUM CHLORIDE SA (K-DUR,KLOR-CON) 20 MEQ TABLET    Take 1 tablet (20 mEq total) by mouth once daily.    PREVNAR 13, PF, 0.5 ML SYRG        PRIMIDONE (MYSOLINE) 50 MG TAB    Take 2 tablets (100 mg total) by mouth 3 (three) times daily.    QUETIAPINE (SEROQUEL) 100 MG TAB    TAKE 1 TABLET(100 MG) BY MOUTH EVERY NIGHT AS NEEDED    SEMAGLUTIDE (OZEMPIC) 1 MG/DOSE (4 MG/3 ML)    Inject 1 mg into the skin every 7 days.    TIZANIDINE (ZANAFLEX) 4 MG TABLET    TAKE 1 TO 2 TABLETS BY MOUTH EVERY NIGHT AS NEEDED    TRAMADOL (ULTRAM) 50 MG TABLET    TAKE 1 TABLET(50 MG) BY MOUTH EVERY 8 HOURS AS NEEDED FOR PAIN        PHYSICAL EXAM:  There were no vitals taken for this visit.    General: No distress, No fever or chills  Head: Normocephalic,atraumatic  Eyes: conjunctivae/corneas clear. PERRL, EOM's intact.  Nose: Nares normal. Mucosa normal. No drainage or sinus tenderness.  Neck: No adenopathy,no carotid bruit,no JVD  Lungs:Clear to auscultation bilaterally, No Crackles  Heart: Regular rate and rhythm, no murmur, gallops or rubs  Abdomen: Soft, no tenderness, bowel sounds normal  Extremities: Atraumatic, no edema in LE  Skin: Turgor normal. No rashes or ulcers  Neurologic: No focal weakness, oriented.          LABS:  Lab Results   Component Value Date    CREATININE 1.5 (H) 09/14/2023       Prot/Creat Ratio, Urine   Date Value Ref Range Status   07/23/2020 1.42 (H) 0.00 - 0.20 Final   02/08/2020 0.61 (H) 0.00 - 0.20 Final   04/12/2019 1.50 (H) 0.00 - 0.20 Final       Lab Results   Component Value Date     09/14/2023    K 3.8 09/14/2023    CO2 20 (L) 09/14/2023       last PTH   Lab Results   Component Value Date    .5 (H) 10/05/2022    CALCIUM 10.5  09/14/2023       Lab Results   Component Value Date    HGB 11.2 (L) 09/14/2023        Lab Results   Component Value Date    HGBA1C 5.4 09/14/2023       Lab Results   Component Value Date    LDLCALC 108.2 09/14/2023           ASSESSMENT:    1- CKD IIIB ( stable)  - pt has baseline scr 1.5 and GFR 37  --Avoid NSAIDs intake  - UACR 1070    2-HTN :   -Continue current BP meds regimen    3-DM II: controlled  - last A1c 5.4      RTC in 6m       Thanks for allowing me to participate in the care of this patient.     11:00 AM    CHUCKY CAMACHO MD  NEPHROLOGY ATTENDING

## 2023-09-20 NOTE — TELEPHONE ENCOUNTER
Called and spoke to pt. Made aware Dr. Encarnacion has sx this PM and appt will need to be r/s'd. Appt r/s'd w/ pt for 9/25 @ 3:45pm. No concerns voiced.

## 2023-09-20 NOTE — TELEPHONE ENCOUNTER
----- Message from Pk Hess sent at 9/20/2023  2:50 PM CDT -----  Regarding: pt called  Can the clinic reply in [facility]: no            Please refill the medication(s) listed below. Please call the patient when the prescription(s) is ready for  at this phone number    Telephone Information:  Mobile          [phone]               Medication #1 QUEtiapine (SEROQUEL) 100 MG Tab                  Preferred Pharmacy:   [id number] - [place] [address] [place] [address] [place] [address]  Phone: [phone] Fax: [phone]

## 2023-09-21 RX ORDER — QUETIAPINE FUMARATE 100 MG/1
TABLET, FILM COATED ORAL
Qty: 90 TABLET | Refills: 1 | Status: SHIPPED | OUTPATIENT
Start: 2023-09-21 | End: 2023-09-21

## 2023-09-21 RX ORDER — QUETIAPINE FUMARATE 100 MG/1
TABLET, FILM COATED ORAL
Qty: 90 TABLET | Refills: 1 | Status: SHIPPED | OUTPATIENT
Start: 2023-09-21 | End: 2023-11-15

## 2023-09-25 ENCOUNTER — OFFICE VISIT (OUTPATIENT)
Dept: PODIATRY | Facility: CLINIC | Age: 68
End: 2023-09-25
Payer: MEDICARE

## 2023-09-25 VITALS — BODY MASS INDEX: 39.44 KG/M2 | HEIGHT: 67 IN | WEIGHT: 251.31 LBS

## 2023-09-25 VITALS — WEIGHT: 251.31 LBS | BODY MASS INDEX: 39.44 KG/M2 | HEIGHT: 67 IN

## 2023-09-25 DIAGNOSIS — N18.32 TYPE 2 DIABETES MELLITUS WITH STAGE 3B CHRONIC KIDNEY DISEASE, WITHOUT LONG-TERM CURRENT USE OF INSULIN: Primary | ICD-10-CM

## 2023-09-25 DIAGNOSIS — E11.22 TYPE 2 DIABETES MELLITUS WITH STAGE 3B CHRONIC KIDNEY DISEASE, WITHOUT LONG-TERM CURRENT USE OF INSULIN: Primary | ICD-10-CM

## 2023-09-25 DIAGNOSIS — B35.1 ONYCHOMYCOSIS: ICD-10-CM

## 2023-09-25 PROCEDURE — 3288F FALL RISK ASSESSMENT DOCD: CPT | Mod: CPTII,S$GLB,, | Performed by: PODIATRIST

## 2023-09-25 PROCEDURE — 1157F ADVNC CARE PLAN IN RCRD: CPT | Mod: CPTII,S$GLB,, | Performed by: PODIATRIST

## 2023-09-25 PROCEDURE — 3044F HG A1C LEVEL LT 7.0%: CPT | Mod: CPTII,S$GLB,, | Performed by: PODIATRIST

## 2023-09-25 PROCEDURE — 3008F BODY MASS INDEX DOCD: CPT | Mod: CPTII,S$GLB,, | Performed by: PODIATRIST

## 2023-09-25 PROCEDURE — 99999 PR PBB SHADOW E&M-EST. PATIENT-LVL V: CPT | Mod: PBBFAC,,, | Performed by: PODIATRIST

## 2023-09-25 PROCEDURE — 3008F PR BODY MASS INDEX (BMI) DOCUMENTED: ICD-10-PCS | Mod: CPTII,S$GLB,, | Performed by: PODIATRIST

## 2023-09-25 PROCEDURE — 1159F MED LIST DOCD IN RCRD: CPT | Mod: CPTII,S$GLB,, | Performed by: PODIATRIST

## 2023-09-25 PROCEDURE — 1159F PR MEDICATION LIST DOCUMENTED IN MEDICAL RECORD: ICD-10-PCS | Mod: CPTII,S$GLB,, | Performed by: PODIATRIST

## 2023-09-25 PROCEDURE — 1157F PR ADVANCE CARE PLAN OR EQUIV PRESENT IN MEDICAL RECORD: ICD-10-PCS | Mod: CPTII,S$GLB,, | Performed by: PODIATRIST

## 2023-09-25 PROCEDURE — 1101F PT FALLS ASSESS-DOCD LE1/YR: CPT | Mod: CPTII,S$GLB,, | Performed by: PODIATRIST

## 2023-09-25 PROCEDURE — 99999 PR PBB SHADOW E&M-EST. PATIENT-LVL V: ICD-10-PCS | Mod: PBBFAC,,, | Performed by: PODIATRIST

## 2023-09-25 PROCEDURE — 99203 PR OFFICE/OUTPT VISIT, NEW, LEVL III, 30-44 MIN: ICD-10-PCS | Mod: S$GLB,,, | Performed by: PODIATRIST

## 2023-09-25 PROCEDURE — 3066F PR DOCUMENTATION OF TREATMENT FOR NEPHROPATHY: ICD-10-PCS | Mod: CPTII,S$GLB,, | Performed by: PODIATRIST

## 2023-09-25 PROCEDURE — 3044F PR MOST RECENT HEMOGLOBIN A1C LEVEL <7.0%: ICD-10-PCS | Mod: CPTII,S$GLB,, | Performed by: PODIATRIST

## 2023-09-25 PROCEDURE — 99203 OFFICE O/P NEW LOW 30 MIN: CPT | Mod: S$GLB,,, | Performed by: PODIATRIST

## 2023-09-25 PROCEDURE — 3062F PR POS MACROALBUMINURIA RESULT DOCUMENTED/REVIEW: ICD-10-PCS | Mod: CPTII,S$GLB,, | Performed by: PODIATRIST

## 2023-09-25 PROCEDURE — 3288F PR FALLS RISK ASSESSMENT DOCUMENTED: ICD-10-PCS | Mod: CPTII,S$GLB,, | Performed by: PODIATRIST

## 2023-09-25 PROCEDURE — 1101F PR PT FALLS ASSESS DOC 0-1 FALLS W/OUT INJ PAST YR: ICD-10-PCS | Mod: CPTII,S$GLB,, | Performed by: PODIATRIST

## 2023-09-25 PROCEDURE — 3062F POS MACROALBUMINURIA REV: CPT | Mod: CPTII,S$GLB,, | Performed by: PODIATRIST

## 2023-09-25 PROCEDURE — 3066F NEPHROPATHY DOC TX: CPT | Mod: CPTII,S$GLB,, | Performed by: PODIATRIST

## 2023-09-25 RX ORDER — CICLOPIROX 80 MG/ML
SOLUTION TOPICAL NIGHTLY
Qty: 6.6 ML | Refills: 3 | Status: SHIPPED | OUTPATIENT
Start: 2023-09-25

## 2023-09-25 NOTE — PROGRESS NOTES
"Referring Physician: Gwendolyn ASCENCIO Irlanda was referred to nephrology nutrition education by Dr. Starr      Reason for visit:  Chief Complaint   Patient presents with    Nutrition Counseling    Diabetes    Chronic Kidney Disease    Hypertension    Obesity        :1955     Allergies Reviewed  Meds Reviewed    Past Medical Hx: CKD3, HTN, obesity, T2DM    Anthropometrics  Weight:114 kg (251 lb 5.2 oz)  Height:5' 7" (1.702 m)  BMI:Body mass index is 39.36 kg/m².   IBW: 61.6kg    Labs:   Lab Results   Component Value Date    CREATININE 1.5 (H) 2023    URICACID 5.3 2023     Prot/Creat Ratio, Urine   Date Value Ref Range Status   2020 1.42 (H) 0.00 - 0.20 Final   2020 0.61 (H) 0.00 - 0.20 Final   2019 1.50 (H) 0.00 - 0.20 Final     Lab Results   Component Value Date     2023    K 3.8 2023    CO2 20 (L) 2023     2023     Lab Results   Component Value Date    .5 (H) 10/05/2022    CALCIUM 10.5 2023       eGFR: 37.5  HgA1C: WNL    Estimated Energy Needs: 1800Kcals/day (30kcal/kg),  50g protein (0.8g/kg)   Wt used for Calories: IBW    24hr Diet Recall / Usual Intake Hx:   Breakfast: egg sandwich on toast w/ butter   Lunch: stew chicken (made at home), w/ peas, sweet potatoes, potato salad (store bought)  Dinner: same thing as lunch   Snacks: none / daughter made cheesecake   Drinks: 1 coke per day, lemonade, water sometimes     Eating Out: 3x/wk     Who does the cooking/grocery shopping: daughter    Supplements: none     Assessment: Pt attends appointment with her daughter- after appt with dr. Starr. She reports daughter preparing most of her meals at home. Pt does admit to drinking coke daily- encouraged her to switch to a clear soda such as sprite. Reviewed handouts on low sodium diet, salt substitutes, and moderate animal protein. Discussed the importance of reading food labels to reduce sodium intake. Also reviewed how to use the plate " method to build healthy/balanced meals and snacks. Reviewed sodium at length. Pt does not drink much plain water. K WNL but low in the past. Answered all questions. Provided handouts and contact information.     Nutrition Diagnosis: Food and nutrition related knowledge deficit r/t no prior nutrition education as evidenced by intake of some high sodium foods.     Recommendations: 1. Start reading food labels and select foods lower in sodium. Avoid high sodium foods such as sausage, hot dogs, gonsalez, and pickles, etc.   2. Limit sodium intake to 2000 mg/day or less. Do not add salt to meals either when cooking or at the table.   3. Start using the plate method to build healthy/balanced meals consisting of plenty of vegetables, moderate animal protein, and moderate carbohydrates.   4. Stay hydrated with water. Avoid sugar sweetened beverages and dark sodas/pre-made teas.    5. Reduce intake of animal protein- specifically processed meats. Consider following a plant based diet which consists of plenty of vegetables, whole grains and plant based protein sources such as beans, nuts, tofu, edamame, and quinoa.      Goals Set with Pt:   Switch coke to sprite. Drink more water.   Start eating a low sodium diet- start reading food labels.     Written Materials Provided:   [facility] sodium handout   [facility] protein handout  Salt Free Seasonings handout   Sodium label reading handout   Build MyPlate Handout   Goal Setting Sheet      Consultation Time: 32 minutes.    Follow Up: 12 months/PRN.

## 2023-09-25 NOTE — PATIENT INSTRUCTIONS
Recommendations: 1. Start reading food labels and select foods lower in sodium. Avoid high sodium foods such as sausage, hot dogs, gonsalez, and pickles, etc.   2. Limit sodium intake to 2000 mg/day or less. Do not add salt to meals either when cooking or at the table.   3. Start using the plate method to build healthy/balanced meals consisting of plenty of vegetables, moderate animal protein, and moderate carbohydrates.   4. Stay hydrated with water. Avoid sugar sweetened beverages and dark sodas/pre-made teas.    5. Reduce intake of animal protein- specifically processed meats. Consider following a plant based diet which consists of plenty of vegetables, whole grains and plant based protein sources such as beans, nuts, tofu, edamame, and quinoa.      Goals Set with Pt:   Switch coke to sprite. Drink more water.   Start eating a low sodium diet- start reading food labels.

## 2023-09-26 NOTE — PROGRESS NOTES
Subjective:     Patient ID: Gwendolyn Fournier is a 68 y.o. female.    Chief Complaint: Diabetes Mellitus, Diabetic Foot Exam (9/12/23 Dr Bruner PCP), and Ingrown Toenail (Left great toenail)    Gwendolyn is a 68 y.o. female who presents to the clinic upon referral from Dr. Bruner  for evaluation and treatment of diabetic feet. Gwendolyn has a past medical history of Arthritis, Gout attack, psychiatric care, Hypertension, Psychiatric problem, Renal disorder, Sciatic nerve pain (2013), Therapy, Tuberculosis, and Unspecified cataract (07/27/2023). Presents for diabetic foot risk assessment. Reports painful ingrown nail right great toe.     PCP: Merlyn Bruner MD    Date Last Seen by PCP: per above    Current shoe gear: Tennis shoes    Hemoglobin A1C   Date Value Ref Range Status   09/14/2023 5.4 4.0 - 5.6 % Final     Comment:     ADA Screening Guidelines:  5.7-6.4%  Consistent with prediabetes  >or=6.5%  Consistent with diabetes    High levels of fetal hemoglobin interfere with the HbA1C  assay. Heterozygous hemoglobin variants (HbS, HgC, etc)do  not significantly interfere with this assay.   However, presence of multiple variants may affect accuracy.     04/17/2023 6.1 (H) 4.0 - 5.6 % Final     Comment:     ADA Screening Guidelines:  5.7-6.4%  Consistent with prediabetes  >or=6.5%  Consistent with diabetes    High levels of fetal hemoglobin interfere with the HbA1C  assay. Heterozygous hemoglobin variants (HbS, HgC, etc)do  not significantly interfere with this assay.   However, presence of multiple variants may affect accuracy.     10/05/2022 5.6 4.0 - 5.6 % Final     Comment:     ADA Screening Guidelines:  5.7-6.4%  Consistent with prediabetes  >or=6.5%  Consistent with diabetes    High levels of fetal hemoglobin interfere with the HbA1C  assay. Heterozygous hemoglobin variants (HbS, HgC, etc)do  not significantly interfere with this assay.   However, presence of multiple variants may affect accuracy.           Review of  Systems   Constitutional: Negative for chills.   Cardiovascular:  Negative for chest pain and claudication.   Respiratory:  Negative for cough.    Skin:  Positive for color change, dry skin and nail changes.   Musculoskeletal:  Positive for joint pain.   Gastrointestinal:  Negative for nausea.   Neurological:  Positive for paresthesias. Negative for numbness.   Psychiatric/Behavioral:  The patient is not nervous/anxious.         Objective:     Physical Exam  Constitutional:       Appearance: She is well-developed.      Comments: Oriented to time, place, and person.   Cardiovascular:      Comments: DP and PT pulses are palpable bilaterally. 3 sec capillary refill time and toes and feet are warm to touch proximally .  There is  hair growth on the feet and toes b/l. There is no edema b/l. No spider veins or varicosities present b/l.     Musculoskeletal:      Comments: Equinus noted b/l ankles with < 10 deg DF noted. MMT 5/5 in DF/PF/Inv/Ev resistance with no reproduction of pain in any direction. Passive range of motion of ankle and pedal joints is painless b/l.     Feet:      Right foot:      Skin integrity: No callus or dry skin.      Left foot:      Skin integrity: No callus or dry skin.   Lymphadenopathy:      Comments: Negative lymphadenopathy bilateral popliteal fossa and tarsal tunnel.   Skin:     Comments: No open lesions, lacerations or wounds noted.Interdigital spaces clean, dry and intact b/l. No erythema noted to b/l foot.    Toenails 1-5 bilaterally are elongated by 2-3 mm, thickened by 2-3 mm, discolored/yellowed, dystrophic, brittle with subungual debris.       Neurological:      Mental Status: She is alert.      Comments: Light touch, proprioception, and sharp/dull sensation are all intact bilaterally. Protective threshold with the Bridgeton-Wienstein monofilament is intact bilaterally.    Psychiatric:         Behavior: Behavior is cooperative.           Assessment:      Encounter Diagnoses   Name Primary?     Type 2 diabetes mellitus with stage 3b chronic kidney disease, without long-term current use of insulin Yes    Onychomycosis      Plan:     Gwendolyn was seen today for diabetes mellitus, diabetic foot exam and ingrown toenail.    Diagnoses and all orders for this visit:    Type 2 diabetes mellitus with stage 3b chronic kidney disease, without long-term current use of insulin  -     Ambulatory referral/consult to Podiatry  -     DIABETIC SHOES FOR HOME USE    Onychomycosis  -     Ambulatory referral/consult to Podiatry    Other orders  -     ciclopirox (PENLAC) 8 % Soln; Apply topically nightly.      I counseled the patient on her conditions, their implications and medical management.        - Shoe inspection. Diabetic Foot Education. Patient reminded of the importance of good nutrition and blood sugar control to help prevent podiatric complications of diabetes. Patient instructed on proper foot hygeine. We discussed wearing proper shoe gear, daily foot inspections, never walking without protective shoe gear, caution putting sharp instruments to feet     - Discussed DM foot care:  Wear comfortable, proper fitting shoes. Wash feet daily. Dry well. After drying, apply moisturizer to feet (no lotion to webspaces). Inspect feet daily for skin breaks, blisters, swelling, or redness. Wear cotton socks (preferably white)  Change socks every day. Do NOT walk barefoot. Do NOT use heating pads or warm/hot water soaks     At patient's request, I discussed different treatments for toenail fungus. We discussed oral antifungals but I did not recommend them as a first line treatment since the medication is taken internally and can have side effects such as rash, taste disturbances, and liver enzyme elevation. We discussed topical Penlac to be applied daily and removed weekly. Pt. Expresses understanding and would like to try the Penlac. Rx sent to the pharmacy.     - Shoe inspection. Diabetic Foot Education. Patient reminded of the  importance of good nutrition and blood sugar control to help prevent podiatric complications of diabetes. Patient instructed on proper foot hygeine. We discussed wearing proper shoe gear, daily foot inspections, never walking without protective shoe gear, caution putting sharp instruments to feet     - Discussed DM foot care:  Wear comfortable, proper fitting shoes. Wash feet daily. Dry well. After drying, apply moisturizer to feet (no lotion to webspaces). Inspect feet daily for skin breaks, blisters, swelling, or redness. Wear cotton socks (preferably white)  Change socks every day. Do NOT walk barefoot. Do NOT use heating pads or warm/hot water soaks     Discussed treatment options with patient. Options included soaking, avulsion and matrixectomy. Risks and benefits discussed and all questions were answered. The patient wishes to proceed with  Nail avulsion at a later date      In depth conversation on the treatment of ingrown nail; partial nail avulsion vs chemical matrixectomy vs conservative treatment of soaking and nail trimming    Right hallux nail trimmed and filed smooth. No blood drawn.     F/u one year DM foot exam sooner PRN

## 2023-09-27 ENCOUNTER — PATIENT MESSAGE (OUTPATIENT)
Dept: ADMINISTRATIVE | Facility: HOSPITAL | Age: 68
End: 2023-09-27
Payer: MEDICARE

## 2023-10-04 RX ORDER — ATORVASTATIN CALCIUM 40 MG/1
TABLET, FILM COATED ORAL
Qty: 90 TABLET | Refills: 0 | Status: SHIPPED | OUTPATIENT
Start: 2023-10-04 | End: 2023-12-29

## 2023-10-09 ENCOUNTER — OFFICE VISIT (OUTPATIENT)
Dept: NEUROLOGY | Facility: CLINIC | Age: 68
End: 2023-10-09
Payer: MEDICARE

## 2023-10-09 ENCOUNTER — TELEPHONE (OUTPATIENT)
Dept: NEUROLOGY | Facility: CLINIC | Age: 68
End: 2023-10-09
Payer: MEDICARE

## 2023-10-09 DIAGNOSIS — R25.1 TREMOR: ICD-10-CM

## 2023-10-09 PROCEDURE — 3044F HG A1C LEVEL LT 7.0%: CPT | Mod: CPTII,95,, | Performed by: PSYCHIATRY & NEUROLOGY

## 2023-10-09 PROCEDURE — 1157F ADVNC CARE PLAN IN RCRD: CPT | Mod: CPTII,95,, | Performed by: PSYCHIATRY & NEUROLOGY

## 2023-10-09 PROCEDURE — 99215 OFFICE O/P EST HI 40 MIN: CPT | Mod: 95,,, | Performed by: PSYCHIATRY & NEUROLOGY

## 2023-10-09 PROCEDURE — 3066F NEPHROPATHY DOC TX: CPT | Mod: CPTII,95,, | Performed by: PSYCHIATRY & NEUROLOGY

## 2023-10-09 PROCEDURE — 1157F PR ADVANCE CARE PLAN OR EQUIV PRESENT IN MEDICAL RECORD: ICD-10-PCS | Mod: CPTII,95,, | Performed by: PSYCHIATRY & NEUROLOGY

## 2023-10-09 PROCEDURE — 99215 PR OFFICE/OUTPT VISIT, EST, LEVL V, 40-54 MIN: ICD-10-PCS | Mod: 95,,, | Performed by: PSYCHIATRY & NEUROLOGY

## 2023-10-09 PROCEDURE — 3062F POS MACROALBUMINURIA REV: CPT | Mod: CPTII,95,, | Performed by: PSYCHIATRY & NEUROLOGY

## 2023-10-09 PROCEDURE — 3066F PR DOCUMENTATION OF TREATMENT FOR NEPHROPATHY: ICD-10-PCS | Mod: CPTII,95,, | Performed by: PSYCHIATRY & NEUROLOGY

## 2023-10-09 PROCEDURE — 3044F PR MOST RECENT HEMOGLOBIN A1C LEVEL <7.0%: ICD-10-PCS | Mod: CPTII,95,, | Performed by: PSYCHIATRY & NEUROLOGY

## 2023-10-09 PROCEDURE — 3062F PR POS MACROALBUMINURIA RESULT DOCUMENTED/REVIEW: ICD-10-PCS | Mod: CPTII,95,, | Performed by: PSYCHIATRY & NEUROLOGY

## 2023-10-09 RX ORDER — PROPRANOLOL HYDROCHLORIDE 10 MG/1
10 TABLET ORAL 3 TIMES DAILY
Qty: 90 TABLET | Refills: 11 | Status: SHIPPED | OUTPATIENT
Start: 2023-10-09 | End: 2024-10-08

## 2023-10-09 NOTE — TELEPHONE ENCOUNTER
"----- Message from Beckie Tyler MD sent at 10/6/2023  5:54 PM CDT -----  Regarding: Misbooking  Please help me rebook her for 4pm- this Monday 40 mins  She is labeled as "Established" seeing me as a new patient in a 20 min    "

## 2023-10-09 NOTE — TELEPHONE ENCOUNTER
Left detail message appointment has been moved to 4pm as the 220pm was cancel as it was not booked correctly.Ask for a call back to the office.

## 2023-10-09 NOTE — PROGRESS NOTES
The patient location is: home  The chief complaint leading to consultation is: Tremors  Visit type: audiovisual  Total time spent with patient: 40mins  Each patient to whom he or she provides medical services by telemedicine is:  (1) informed of the relationship between the physician and patient and the respective role of any other health care provider with respect to management of the patient; and (2) notified that he or she may decline to receive medical services by telemedicine and may withdraw from such care at any time.    Notes: below    [facility] DISORD[facility] CLINIC NEW VIDEO CONSULT NOTE    PCP/Referring Provider: Self, Aaareferral  No address on file  Date of Service: 10/9/2023    Chief Complaint: Tremors    HPI: Gwendolyn Fournier is a R HANDED 68 y.o. female with a medical issues significant for Depression, lspine dz, kidney dz, DM, HTN, HL, coming for tremor workup. She noted tremor since last year. B/L hands and legs and progressive. Tremor affects handwriting, texting. No small handwriting. Tremor     No fam hx of tremor  No lung dz or low HR    Does not take albuerol  On seroquel  Is not currently taking metoprolol    Medication history:  Takes primidone 100mg BID  Takes gabapentin    Neuroleptic exposure:  -None    Current Living Situation: home    PD Review of Symptoms:  Anosmia: none  -YUE: YES  -RBD: none    Review of Systems:   Review of Systems   Constitutional:  Negative for fever.   HENT:  Negative for congestion.    Eyes:  Negative for double vision.   Respiratory:  Negative for cough and shortness of breath.    Cardiovascular:  Negative for chest pain and leg swelling.   Gastrointestinal:  Negative for nausea.   Genitourinary:  Negative for dysuria.   Musculoskeletal:  Negative for falls.   Skin:  Negative for rash.   Neurological:  Negative for tremors, speech change and headaches.   Psychiatric/Behavioral:  Positive for depression.          Current Medications:  Outpatient Encounter Medications  as of 10/9/2023   Medication Sig Dispense Refill    albuterol (ACCUNEB) 0.63 mg/3 mL Nebu Inhale 1 ampule into the lungs as needed.      allopurinoL (ZYLOPRIM) 100 MG tablet TAKE 1 TABLET(100 MG) BY MOUTH TWICE DAILY 180 tablet 0    amLODIPine (NORVASC) 10 MG tablet TAKE 1 TABLET(10 MG) BY MOUTH EVERY DAY 90 tablet 3    atorvastatin (LIPITOR) 40 MG tablet TAKE 1 TABLET(40 MG) BY MOUTH EVERY EVENING 90 tablet 0    blood sugar diagnostic Strp To check BG two times daily, freestyle lite meter 200 each 3    blood-glucose meter kit To check BG two times daily, freestyle lite meter 1 each 0    buPROPion (WELLBUTRIN) 100 MG tablet Take 100 mg by mouth every morning.      ciclopirox (PENLAC) 8 % Soln Apply topically nightly. 6.6 mL 3    dapagliflozin propanediol (FARXIGA) 10 mg tablet Take 1 tablet (10 mg total) by mouth once daily. 30 tablet 3    diazePAM (VALIUM) 5 MG tablet TAKE 1 TABLET(5 MG) BY MOUTH EVERY 12 HOURS AS NEEDED FOR ANXIETY 60 tablet 2    diphenhydrAMINE (BENADRYL) 25 mg capsule Take 25 mg by mouth every 6 (six) hours as needed.      docusate sodium (COLACE) 100 MG capsule Take 1 capsule (100 mg total) by mouth as needed for Constipation. 90 capsule 3    DULoxetine (CYMBALTA) 60 MG capsule Take 1 capsule (60 mg total) by mouth once daily. 90 capsule 3    ergocalciferol (ERGOCALCIFEROL) 50,000 unit Cap TAKE 1 CAPSULE BY MOUTH EVERY 7 DAYS 12 capsule 0    EScitalopram oxalate (LEXAPRO) 10 MG tablet Take 1 tablet (10 mg total) by mouth once daily. 90 tablet 3    ferrous sulfate (FEOSOL) 325 mg (65 mg iron) Tab tablet Take 325 mg by mouth as needed.       flash glucose scanning reader (FREESTYLE HAI 2 READER) Misc 1 each by Misc.(Non-Drug; Combo Route) route 4 (four) times daily with meals and nightly. 1 each 0    flash glucose scanning reader Misc Test tid and as needed. 1 each 0    flash glucose sensor (FREESTYLE HAI 2 SENSOR) Kit 1 each by Misc.(Non-Drug; Combo Route) route 2 hours after meals and at  bedtime. 1 kit 11    FLUAD QUAD 2022-23,65Y UP,,PF, 60 mcg (15 mcg x 4)/0.5 mL Syrg       FLUCELVAX QUAD 5062-7021, PF, 60 mcg (15 mcg x 4)/0.5 mL Syrg vaccine       fluticasone (FLONASE) 50 mcg/actuation nasal spray SHAKE LIQUID AND USE 2 SPRAYS(100 MCG) IN EACH NOSTRIL EVERY DAY 48 mL 1    furosemide (LASIX) 20 MG tablet Take 1 tablet (20 mg total) by mouth as needed (for fluid overload). 90 tablet 3    gabapentin (NEURONTIN) 300 MG capsule Take 1 capsule (300 mg total) by mouth 3 (three) times daily. 90 capsule 5    glipiZIDE (GLUCOTROL) 10 MG TR24 TAKE 1 TABLET(10 MG) BY MOUTH DAILY WITH BREAKFAST 90 tablet 1    hydroCHLOROthiazide (HYDRODIURIL) 25 MG tablet TAKE 1 TABLET(25 MG) BY MOUTH EVERY DAY 90 tablet 3    HYDROcodone-acetaminophen (NORCO) 5-325 mg per tablet TK 1 T PO Q 12 H PRN      indomethacin (INDOCIN) 50 MG capsule Take 1 capsule (50 mg total) by mouth 3 (three) times daily as needed. 30 capsule 11    lancets Misc To check BG two times daily, to use with insurance preferred meter 200 each 3    MOVANTIK 25 mg tablet Take 25 mg by mouth once daily.      mupirocin (BACTROBAN) 2 % ointment Apply topically 3 (three) times daily. 30 g 0    PFIZER COVID BIVAL,12Y UP,,PF, 30 mcg/0.3 mL injection       potassium chloride SA (K-DUR,KLOR-CON) 20 MEQ tablet Take 1 tablet (20 mEq total) by mouth once daily. 90 tablet 2    PREVNAR 13, PF, 0.5 mL Syrg       primidone (MYSOLINE) 50 MG Tab Take 2 tablets (100 mg total) by mouth 3 (three) times daily. 180 tablet 11    propranoloL (INDERAL) 10 MG tablet Take 1 tablet (10 mg total) by mouth 3 (three) times daily. 90 tablet 11    QUEtiapine (SEROQUEL) 100 MG Tab TAKE 1 TABLET(100 MG) BY MOUTH EVERY NIGHT AS NEEDED 90 tablet 1    semaglutide (OZEMPIC) 1 mg/dose (4 mg/3 mL) Inject 1 mg into the skin every 7 days. 3 mL 2    traMADoL (ULTRAM) 50 mg tablet TAKE 1 TABLET(50 MG) BY MOUTH EVERY 8 HOURS AS NEEDED FOR PAIN 90 tablet 2    [DISCONTINUED] metoprolol succinate  (TOPROL-XL) 50 MG 24 hr tablet Take 1 tablet (50 mg total) by mouth 2 (two) times daily. 180 tablet 1    [DISCONTINUED] tiZANidine (ZANAFLEX) 4 MG tablet TAKE 1 TO 2 TABLETS BY MOUTH EVERY NIGHT AS NEEDED 60 tablet 5     Facility-Administered Encounter Medications as of 10/9/2023   Medication Dose Route Frequency Provider Last Rate Last Admin    lidocaine (PF) 10 mg/ml (1%) injection 40 mg  4 mL Intramuscular 1 time in Clinic/HOD [place], Azikiwe K., MD           Past Medical History:  Patient Active Problem List   Diagnosis    Essential hypertension    Sciatica of left side    Primary osteoarthritis involving multiple joints    Idiopathic chronic gout of multiple sites without tophus    History of open reduction and internal fixation (ORIF) procedure    Stage 3 chronic kidney disease    Chronic pain    Severe obesity (BMI 35.0-39.9) with comorbidity    Obstructive sleep apnea    Sickle cell trait    Sacroiliitis, not elsewhere classified    Type 2 diabetes mellitus with stage 3b chronic kidney disease, without long-term current use of insulin    Major depressive disorder, recurrent, moderate    History of tuberculosis exposure    Poor posture    Decreased range of motion of trunk and back    Decreased strength of trunk and back    Opioid dependence, uncomplicated    SVT (supraventricular tachycardia)    Aortic atherosclerosis    Tremor       Past Surgical History:  Past Surgical History:   Procedure Laterality Date    COLONOSCOPY N/A 1/21/2019    Procedure: COLONOSCOPY;  Surgeon: Shanna oJse MD;  Location: [facility] ENDO;  Service: Endoscopy;  Laterality: N/A;    INJECTION OF JOINT Bilateral 3/13/2019    Procedure: INJECTION, JOINT BILATERAL SI JOINT;  Surgeon: Evan Coreas MD;  Location: [facility] PAIN MGT;  Service: Pain Management;  Laterality: Bilateral;  BILATERAL SI JOINT INJECTION    KNEE SURGERY  1/2014    left knee surgery        Social:  Social History     Socioeconomic History    Marital status:      Number of children: 5   Occupational History    Occupation: homemaker   Tobacco Use    Smoking status: Former     Current packs/day: 0.00     Types: Cigarettes     Quit date: 1976     Years since quittin.8    Smokeless tobacco: Never   Substance and Sexual Activity    Alcohol use: Not Currently     Alcohol/week: 0.0 standard drinks of alcohol     Comment: red wine    Drug use: No    Sexual activity: Not Currently     Partners: Male     Social Determinants of Health     Financial Resource Strain: Low Risk  (10/9/2023)    Overall Financial Resource Strain (CARDIA)     Difficulty of Paying Living Expenses: Not hard at all   Food Insecurity: No Food Insecurity (10/9/2023)    Hunger Vital Sign     Worried About Running Out of Food in the Last Year: Never true     Ran Out of Food in the Last Year: Never true   Transportation Needs: No Transportation Needs (10/9/2023)    PRAPARE - Transportation     Lack of Transportation (Medical): No     Lack of Transportation (Non-Medical): No   Physical Activity: Unknown (10/10/2022)    Exercise Vital Sign     Days of Exercise per Week: 0 days   Stress: Stress Concern Present (10/10/2022)    [employer] - Occupational Stress Questionnaire     Feeling of Stress : To some extent   Social Connections: Unknown (10/9/2023)    Social Connection and Isolation Panel [NHANES]     Frequency of Communication with Friends and Family: More than three times a week     Frequency of Social Gatherings with Friends and Family: Once a week     Active Member of Clubs or Organizations: Yes     Attends Club or Organization Meetings: More than 4 times per year     Marital Status:    Housing Stability: Low Risk  (10/9/2023)    Housing Stability Vital Sign     Unable to Pay for Housing in the Last Year: No     Number of Places Lived in the Last Year: 1     Unstable Housing in the Last Year: No       Family History:  Family History   Problem Relation Age of Onset     Tuberculosis Mother     Stroke Father     Bipolar disorder Daughter     Suicide Daughter     Depression Daughter     Bipolar disorder Daughter     Depression Daughter        PHYSICAL:  There were no vitals taken for this visit.    Physical Exam  Constitutional: Well-developed, well-nourished, appears stated age  Eyes: No scleral icterus  ENT: Moist oral mucosa  Cardiovascular: No lower extremity edema   Respiratory: No labored breathing   Skin: No rash   Hematologic: No bruising  Other: GI/ deferred   Mental status: Alert and oriented to person, place, time, and situation;   Speech: normal (not dysarthric), no aphasia  Cranial nerves:            CN II: Pupils mid-position and equal, not tested light or accommodation  CN III, IV, VI: Extraocular movements full, no nystagmus visualized  CN V: Not tested   CN VII: Face strong and symmetric bilaterally   CN VIII: Hearing intact to voice and conversation   CN IX, X: Palate raises midline and symmetric   CN XI: Strong shoulder shrug B/L  CN XII: Tongue appears midline   Motor: Normal bulk by appearance, no drift   Sensory: Not tested    Gait: antalgic  Deep tendon reflexes: Not tested  Movement/Coordination                    No hypophonic speech.                     No facial masking.  No bradykinesia.                  Bradykinesia  ? Finger taps Finger flicks MARLENE Heel taps   Left 0 0 0 0   Right 0 0 0 0       Tremor Exam   Arms extended Arms in wing position, fingers almost touching Re-emergent Arms extended wrists extended Intention Resting Kinetic   Left ?+ ? ? ? ? ? ?   Right ? ? ? ? ? ? ?   Head tremor: No-No Yes-Yes NEG     Archimedes Spirals   Left ?+   Right ?       Laboratory Data:  NA    Imaging:  NA    Assessment//Plan:   Problem List Items Addressed This Visit          Neuro    Tremor    Current Assessment & Plan     Appears to have a b/l hand tremor consistent with ET tremor  Continues primidone 100mg BID  Suggested she can try propranolol 10mg BID for  tremor suppression  She know to stop if SOB or lightheadeness            Beckie Tyler MD, MS  [facility]  Department of Neurology  Movement Disorders

## 2023-10-09 NOTE — ASSESSMENT & PLAN NOTE
Appears to have a b/l hand tremor consistent with ET tremor  Continues primidone 100mg BID  Suggested she can try propranolol 10mg BID for tremor suppression  She know to stop if SOB or lightheadeness

## 2023-10-17 ENCOUNTER — PATIENT MESSAGE (OUTPATIENT)
Dept: ADMINISTRATIVE | Facility: HOSPITAL | Age: 68
End: 2023-10-17
Payer: MEDICARE

## 2023-10-29 DIAGNOSIS — F33.1 MAJOR DEPRESSIVE DISORDER, RECURRENT, MODERATE: ICD-10-CM

## 2023-10-29 NOTE — TELEPHONE ENCOUNTER
No care due was identified.  [facility] Embedded Care Due Messages. Reference number: [id number].   10/29/2023 12:23:14 PM CDT

## 2023-10-29 NOTE — TELEPHONE ENCOUNTER
Refill Routing Note   Medication(s) are not appropriate for processing by [facility] for the following reason(s):      Drug-drug interaction    [facility] action(s):  Defer Care Due:  None identified     Medication Therapy Plan: EScitalopram oxalate, DULoxetine      Appointments  past 12m or future 3m with PCP    Date Provider   Last Visit   9/12/2023 Merlyn Bruner MD   Next Visit   12/12/2023 Merlyn Bruner MD   ED visits in past 90 days: 0        Note composed:4:23 PM 10/29/2023

## 2023-10-30 RX ORDER — ESCITALOPRAM OXALATE 10 MG/1
10 TABLET ORAL
Qty: 90 TABLET | Refills: 3 | Status: SHIPPED | OUTPATIENT
Start: 2023-10-30

## 2023-11-03 ENCOUNTER — PATIENT MESSAGE (OUTPATIENT)
Dept: ADMINISTRATIVE | Facility: HOSPITAL | Age: 68
End: 2023-11-03
Payer: MEDICARE

## 2023-11-03 ENCOUNTER — PATIENT OUTREACH (OUTPATIENT)
Dept: ADMINISTRATIVE | Facility: HOSPITAL | Age: 68
End: 2023-11-03
Payer: MEDICARE

## 2023-11-03 DIAGNOSIS — M81.0 POSTMENOPAUSAL OSTEOPOROSIS OF MULTIPLE SITES: Primary | ICD-10-CM

## 2023-11-03 NOTE — PROGRESS NOTES
Population Health Chart Review & Patient Outreach Details     Payor report. -- mammogram. Also due bone density.  Left message for patient to return call.Sent [facility] message.   Due for overdue HM above, need to get recent records if already done. If not done, orders/referrals need to be place and schedule. Please forward messages to me.   Further Action Needed If Patient Returns Outreach:            Updates Requested / Reviewed:     [x]  Care Everywhere    []     []  External Sources ([facility], [facility], DIS, etc.)    [] [facility]   [] [facility]   [] Other:    []  Care Team Updated   []  Removed  or Duplicate Orders   []  Immunization Reconciliation Completed / Queried    [] [place]   [] [place]   [] [place]   [] [place]      Health Maintenance Topics Addressed and Outreach Outcomes / Actions Taken:             Breast Cancer Screening []  Mammogram Order Placed    []  Mammogram Screening Scheduled    []  External Records Requested & Care Team Updated if Applicable    []  External Records Uploaded & Care Team Updated if Applicable    []  Pt Declined Scheduling Mammogram    []  Pt Will Schedule with External Provider / Order Routed & Care Team Updated if Applicable              Cervical Cancer Screening []  Pap Smear Scheduled in Primary Care or OBGYN    []  External Records Requested & Care Team Updated if Applicable       []  External Records Uploaded, Care Team Updated, & History Updated if Applicable    []  Patient Declined Scheduling Pap Smear    []  Patient Will Schedule with External Provider & Care Team Updated if Applicable                  Colorectal Cancer Screening []  Colonoscopy Case Request / Referral / Home Test Order Placed    []  External Records Requested & Care Team Updated if Applicable    []  External Records Uploaded, Care Team Updated, & History Updated if Applicable    []  Patient Declined Completing Colon Cancer Screening    []  Patient Will Schedule with External Provider &  Care Team Updated if Applicable    []  Fit Kit Mailed (add the SmartPhrase under additional notes)    []  Reminded Patient to Complete Home Test                Diabetic Eye Exam []  Eye Exam Screening Order Placed    []  Eye Camera Scheduled or Optometry/Ophthalmology Referral Placed    []  External Records Requested & Care Team Updated if Applicable    []  External Records Uploaded, Care Team Updated, & History Updated if Applicable    []  Patient Declined Scheduling Eye Exam    []  Patient Will Schedule with External Provider & Care Team Updated if Applicable             Blood Pressure Control []  Primary Care Follow Up Visit Scheduled     []  Remote Blood Pressure Reading Captured    []  Patient Declined Remote Reading or Scheduling Appt - Escalated to PCP    []  Patient Will Call Back or Send Portal Message with Reading                 HbA1c & Other Labs []  Overdue Lab(s) Ordered    []  Overdue Lab(s) Scheduled    []  External Records Uploaded & Care Team Updated if Applicable    []  Primary Care Follow Up Visit Scheduled     []  Reminded Patient to Complete A1c Home Test    []  Patient Declined Scheduling Labs or Will Call Back to Schedule    []  Patient Will Schedule with External Provider / Order Routed, & Care Team Updated if Applicable           Primary Care Appointment []  Primary Care Appt Scheduled    []  Patient Declined Scheduling or Will Call Back to Schedule    []  Pt Established with External Provider, Updated Care Team, & Informed Pt to Notify Payor if Applicable           Medication Adherence /    Statin Use []  Primary Care Appointment Scheduled    []  Patient Reminded to  Prescription    []  Patient Declined, Provider Notified if Needed    []  Sent Provider Message to Review to Evaluate Pt for Statin, Add Exclusion Dx Codes, Document   Exclusion in Problem List, Change Statin Intensity Level to Moderate or High Intensity if Applicable                Osteoporosis Screening []  Dexa Order  Placed    []  Dexa Appointment Scheduled    []  External Records Requested & Care Team Updated    []  External Records Uploaded, Care Team Updated, & History Updated if Applicable    []  Patient Declined Scheduling Dexa or Will Call Back to Schedule    []  Patient Will Schedule with External Provider / Order Routed & Care Team Updated if Applicable       Additional Notes:

## 2023-11-06 ENCOUNTER — PATIENT MESSAGE (OUTPATIENT)
Dept: ADMINISTRATIVE | Facility: HOSPITAL | Age: 68
End: 2023-11-06
Payer: MEDICARE

## 2023-11-06 DIAGNOSIS — M54.32 SCIATICA OF LEFT SIDE: ICD-10-CM

## 2023-11-06 RX ORDER — TIZANIDINE 4 MG/1
TABLET ORAL
Qty: 60 TABLET | Refills: 5 | OUTPATIENT
Start: 2023-11-06

## 2023-11-06 NOTE — TELEPHONE ENCOUNTER
----- Message from Pk Hess sent at 11/6/2023  1:22 PM CST -----  Regarding: pt called  Can the clinic reply in [facility]: no           Please refill the medication(s) listed b elow. Please call the patient when the prescription(s) is ready for  at this phone number   Telephone Information:  Mobile          [phone]               Medication #1 tiZANidine (ZANAFLEX) 4 MG tablet                  Preferred Pharmacy:   [id number] - [place] [address] [place] [address]  Phone: [phone] Fax: [phone]

## 2023-11-06 NOTE — TELEPHONE ENCOUNTER
No care due was identified.  [facility] Embedded Care Due Messages. Reference number: [id number].   11/06/2023 5:56:24 AM CST

## 2023-11-08 DIAGNOSIS — M54.50 ACUTE LEFT-SIDED LOW BACK PAIN WITHOUT SCIATICA: ICD-10-CM

## 2023-11-08 RX ORDER — TIZANIDINE 4 MG/1
4 TABLET ORAL 2 TIMES DAILY PRN
Qty: 60 TABLET | Refills: 0 | Status: SHIPPED | OUTPATIENT
Start: 2023-11-08 | End: 2023-11-29

## 2023-11-08 NOTE — TELEPHONE ENCOUNTER
No care due was identified.  Health [facility] Embedded Care Due Messages. Reference number: [id number].   11/08/2023 9:53:47 AM CST

## 2023-11-08 NOTE — TELEPHONE ENCOUNTER
----- Message from Melissa Mancia sent at 11/8/2023  9:05 AM CST -----  Regarding: [id number]  Type: RX Refill Request    Who Called: self     Have you contacted your pharmacy:yes     Refill or New Rx:refill     RX Name and Strength:tizanidine 4 MG       Preferred Pharmacy with phone number:   [facility] #[id number] - [address] AT [address]  [place] [address]  Phone: [phone] Fax: [phone]        Local or Mail Order:local     Ordering Provider:Zohreh    Would the patient rather a call back or a response via [facility]? Call back     Best Call Back Number:[phone]      Additional Information: pt  states she is completely out

## 2023-11-14 DIAGNOSIS — F51.04 PSYCHOPHYSIOLOGICAL INSOMNIA: ICD-10-CM

## 2023-11-14 NOTE — TELEPHONE ENCOUNTER
No care due was identified.  Health [facility] Embedded Care Due Messages. Reference number: [id number].   11/14/2023 10:18:49 AM CST

## 2023-11-15 RX ORDER — QUETIAPINE FUMARATE 100 MG/1
TABLET, FILM COATED ORAL
Qty: 90 TABLET | Refills: 1 | Status: SHIPPED | OUTPATIENT
Start: 2023-11-15

## 2023-11-20 ENCOUNTER — PATIENT MESSAGE (OUTPATIENT)
Dept: ADMINISTRATIVE | Facility: HOSPITAL | Age: 68
End: 2023-11-20
Payer: MEDICARE

## 2023-11-20 ENCOUNTER — PATIENT OUTREACH (OUTPATIENT)
Dept: ADMINISTRATIVE | Facility: HOSPITAL | Age: 68
End: 2023-11-20
Payer: MEDICARE

## 2023-11-20 ENCOUNTER — TELEPHONE (OUTPATIENT)
Dept: ADMINISTRATIVE | Facility: HOSPITAL | Age: 68
End: 2023-11-20
Payer: MEDICARE

## 2023-11-20 VITALS — SYSTOLIC BLOOD PRESSURE: 143 MMHG | DIASTOLIC BLOOD PRESSURE: 88 MMHG

## 2023-11-20 NOTE — PROGRESS NOTES

## 2023-11-24 DIAGNOSIS — E11.22 TYPE 2 DIABETES MELLITUS WITH STAGE 3B CHRONIC KIDNEY DISEASE, WITHOUT LONG-TERM CURRENT USE OF INSULIN: ICD-10-CM

## 2023-11-24 DIAGNOSIS — N18.32 TYPE 2 DIABETES MELLITUS WITH STAGE 3B CHRONIC KIDNEY DISEASE, WITHOUT LONG-TERM CURRENT USE OF INSULIN: ICD-10-CM

## 2023-11-24 DIAGNOSIS — E66.01 SEVERE OBESITY (BMI 35.0-39.9) WITH COMORBIDITY: ICD-10-CM

## 2023-11-24 RX ORDER — FLASH GLUCOSE SENSOR
1 KIT MISCELLANEOUS
Qty: 1 KIT | Refills: 11 | OUTPATIENT
Start: 2023-11-24 | End: 2024-02-15 | Stop reason: SDUPTHER

## 2023-11-24 RX ORDER — FLASH GLUCOSE SCANNING READER
1 EACH MISCELLANEOUS
Qty: 1 EACH | Refills: 0 | OUTPATIENT
Start: 2023-11-24 | End: 2024-02-15 | Stop reason: SDUPTHER

## 2023-11-24 RX ORDER — SEMAGLUTIDE 1.34 MG/ML
1 INJECTION, SOLUTION SUBCUTANEOUS
Qty: 9 ML | Refills: 1 | Status: SHIPPED | OUTPATIENT
Start: 2023-11-24 | End: 2023-11-28

## 2023-11-24 RX ORDER — FLASH GLUCOSE SCANNING READER
EACH MISCELLANEOUS
Qty: 1 EACH | Refills: 1 | OUTPATIENT
Start: 2023-11-24

## 2023-11-24 NOTE — TELEPHONE ENCOUNTER
No care due was identified.  Health [facility] Embedded Care Due Messages. Reference number: [id number].   11/24/2023 9:45:49 AM CST

## 2023-11-24 NOTE — TELEPHONE ENCOUNTER
Refill Routing Note   Medication(s) are not appropriate for processing by [facility] for the following reason(s):        New or recently adjusted medication: recent dose adjustment    [facility] action(s):  Defer  Quick Discontinue  Approve      Medication Therapy Plan:         Appointments  past 12m or future 3m with PCP    Date Provider   Last Visit   9/12/2023 Merlyn Bruner MD   Next Visit   12/5/2023 Merlyn Bruner MD   ED visits in past 90 days: 0        Note composed:12:48 PM 11/24/2023

## 2023-11-28 ENCOUNTER — OFFICE VISIT (OUTPATIENT)
Dept: FAMILY MEDICINE | Facility: CLINIC | Age: 68
End: 2023-11-28
Payer: MEDICARE

## 2023-11-28 ENCOUNTER — LAB VISIT (OUTPATIENT)
Dept: LAB | Facility: HOSPITAL | Age: 68
End: 2023-11-28
Attending: INTERNAL MEDICINE
Payer: MEDICARE

## 2023-11-28 VITALS
WEIGHT: 257.25 LBS | BODY MASS INDEX: 40.38 KG/M2 | HEIGHT: 67 IN | DIASTOLIC BLOOD PRESSURE: 78 MMHG | TEMPERATURE: 98 F | HEART RATE: 88 BPM | OXYGEN SATURATION: 96 % | SYSTOLIC BLOOD PRESSURE: 130 MMHG | RESPIRATION RATE: 16 BRPM

## 2023-11-28 DIAGNOSIS — I10 ESSENTIAL HYPERTENSION: Primary | ICD-10-CM

## 2023-11-28 DIAGNOSIS — E11.22 TYPE 2 DIABETES MELLITUS WITH STAGE 3B CHRONIC KIDNEY DISEASE, WITHOUT LONG-TERM CURRENT USE OF INSULIN: ICD-10-CM

## 2023-11-28 DIAGNOSIS — N18.32 TYPE 2 DIABETES MELLITUS WITH STAGE 3B CHRONIC KIDNEY DISEASE, WITHOUT LONG-TERM CURRENT USE OF INSULIN: ICD-10-CM

## 2023-11-28 DIAGNOSIS — E66.1 CLASS 3 DRUG-INDUCED OBESITY WITH SERIOUS COMORBIDITY AND BODY MASS INDEX (BMI) OF 40.0 TO 44.9 IN ADULT: ICD-10-CM

## 2023-11-28 DIAGNOSIS — M15.9 PRIMARY OSTEOARTHRITIS INVOLVING MULTIPLE JOINTS: ICD-10-CM

## 2023-11-28 DIAGNOSIS — I10 ESSENTIAL HYPERTENSION: ICD-10-CM

## 2023-11-28 DIAGNOSIS — R06.02 SHORTNESS OF BREATH: ICD-10-CM

## 2023-11-28 DIAGNOSIS — M54.50 ACUTE LEFT-SIDED LOW BACK PAIN WITHOUT SCIATICA: ICD-10-CM

## 2023-11-28 DIAGNOSIS — M1A.0790 CHRONIC GOUT OF FOOT, UNSPECIFIED CAUSE, UNSPECIFIED LATERALITY: ICD-10-CM

## 2023-11-28 DIAGNOSIS — E66.01 SEVERE OBESITY (BMI 35.0-39.9) WITH COMORBIDITY: ICD-10-CM

## 2023-11-28 LAB
ALBUMIN SERPL BCP-MCNC: 3.5 G/DL (ref 3.5–5.2)
ALP SERPL-CCNC: 118 U/L (ref 55–135)
ALT SERPL W/O P-5'-P-CCNC: 11 U/L (ref 10–44)
ANION GAP SERPL CALC-SCNC: 11 MMOL/L (ref 8–16)
AST SERPL-CCNC: 13 U/L (ref 10–40)
BASOPHILS # BLD AUTO: 0.06 K/UL (ref 0–0.2)
BASOPHILS NFR BLD: 0.6 % (ref 0–1.9)
BILIRUB SERPL-MCNC: 0.3 MG/DL (ref 0.1–1)
BNP SERPL-MCNC: 46 PG/ML (ref 0–99)
BUN SERPL-MCNC: 25 MG/DL (ref 8–23)
CALCIUM SERPL-MCNC: 10.6 MG/DL (ref 8.7–10.5)
CHLORIDE SERPL-SCNC: 102 MMOL/L (ref 95–110)
CO2 SERPL-SCNC: 29 MMOL/L (ref 23–29)
CREAT SERPL-MCNC: 1.6 MG/DL (ref 0.5–1.4)
DIFFERENTIAL METHOD: ABNORMAL
EOSINOPHIL # BLD AUTO: 0.4 K/UL (ref 0–0.5)
EOSINOPHIL NFR BLD: 4.1 % (ref 0–8)
ERYTHROCYTE [DISTWIDTH] IN BLOOD BY AUTOMATED COUNT: 13.5 % (ref 11.5–14.5)
EST. GFR  (NO RACE VARIABLE): 34.7 ML/MIN/1.73 M^2
GLUCOSE SERPL-MCNC: 88 MG/DL (ref 70–110)
HCT VFR BLD AUTO: 37 % (ref 37–48.5)
HGB BLD-MCNC: 11.7 G/DL (ref 12–16)
IMM GRANULOCYTES # BLD AUTO: 0.03 K/UL (ref 0–0.04)
IMM GRANULOCYTES NFR BLD AUTO: 0.3 % (ref 0–0.5)
LYMPHOCYTES # BLD AUTO: 3.4 K/UL (ref 1–4.8)
LYMPHOCYTES NFR BLD: 31.7 % (ref 18–48)
MCH RBC QN AUTO: 30.3 PG (ref 27–31)
MCHC RBC AUTO-ENTMCNC: 31.6 G/DL (ref 32–36)
MCV RBC AUTO: 96 FL (ref 82–98)
MONOCYTES # BLD AUTO: 0.8 K/UL (ref 0.3–1)
MONOCYTES NFR BLD: 7.1 % (ref 4–15)
NEUTROPHILS # BLD AUTO: 6 K/UL (ref 1.8–7.7)
NEUTROPHILS NFR BLD: 56.2 % (ref 38–73)
NRBC BLD-RTO: 0 /100 WBC
PLATELET # BLD AUTO: 341 K/UL (ref 150–450)
PMV BLD AUTO: 10.3 FL (ref 9.2–12.9)
POTASSIUM SERPL-SCNC: 4.2 MMOL/L (ref 3.5–5.1)
PROT SERPL-MCNC: 7.4 G/DL (ref 6–8.4)
RBC # BLD AUTO: 3.86 M/UL (ref 4–5.4)
SODIUM SERPL-SCNC: 142 MMOL/L (ref 136–145)
WBC # BLD AUTO: 10.57 K/UL (ref 3.9–12.7)

## 2023-11-28 PROCEDURE — 1157F ADVNC CARE PLAN IN RCRD: CPT | Mod: CPTII,S$GLB,, | Performed by: INTERNAL MEDICINE

## 2023-11-28 PROCEDURE — 3288F PR FALLS RISK ASSESSMENT DOCUMENTED: ICD-10-PCS | Mod: CPTII,S$GLB,, | Performed by: INTERNAL MEDICINE

## 2023-11-28 PROCEDURE — 1101F PT FALLS ASSESS-DOCD LE1/YR: CPT | Mod: CPTII,S$GLB,, | Performed by: INTERNAL MEDICINE

## 2023-11-28 PROCEDURE — 1157F PR ADVANCE CARE PLAN OR EQUIV PRESENT IN MEDICAL RECORD: ICD-10-PCS | Mod: CPTII,S$GLB,, | Performed by: INTERNAL MEDICINE

## 2023-11-28 PROCEDURE — 3066F NEPHROPATHY DOC TX: CPT | Mod: CPTII,S$GLB,, | Performed by: INTERNAL MEDICINE

## 2023-11-28 PROCEDURE — 3062F PR POS MACROALBUMINURIA RESULT DOCUMENTED/REVIEW: ICD-10-PCS | Mod: CPTII,S$GLB,, | Performed by: INTERNAL MEDICINE

## 2023-11-28 PROCEDURE — 36415 COLL VENOUS BLD VENIPUNCTURE: CPT | Mod: PO | Performed by: INTERNAL MEDICINE

## 2023-11-28 PROCEDURE — 3044F HG A1C LEVEL LT 7.0%: CPT | Mod: CPTII,S$GLB,, | Performed by: INTERNAL MEDICINE

## 2023-11-28 PROCEDURE — 2023F PR DILATED RETINAL EXAM W/O EVID OF RETINOPATHY: ICD-10-PCS | Mod: CPTII,S$GLB,, | Performed by: INTERNAL MEDICINE

## 2023-11-28 PROCEDURE — 1160F PR REVIEW ALL MEDS BY PRESCRIBER/CLIN PHARMACIST DOCUMENTED: ICD-10-PCS | Mod: CPTII,S$GLB,, | Performed by: INTERNAL MEDICINE

## 2023-11-28 PROCEDURE — 85025 COMPLETE CBC W/AUTO DIFF WBC: CPT | Performed by: INTERNAL MEDICINE

## 2023-11-28 PROCEDURE — 3078F PR MOST RECENT DIASTOLIC BLOOD PRESSURE < 80 MM HG: ICD-10-PCS | Mod: CPTII,S$GLB,, | Performed by: INTERNAL MEDICINE

## 2023-11-28 PROCEDURE — 1159F MED LIST DOCD IN RCRD: CPT | Mod: CPTII,S$GLB,, | Performed by: INTERNAL MEDICINE

## 2023-11-28 PROCEDURE — 3075F PR MOST RECENT SYSTOLIC BLOOD PRESS GE 130-139MM HG: ICD-10-PCS | Mod: CPTII,S$GLB,, | Performed by: INTERNAL MEDICINE

## 2023-11-28 PROCEDURE — 3078F DIAST BP <80 MM HG: CPT | Mod: CPTII,S$GLB,, | Performed by: INTERNAL MEDICINE

## 2023-11-28 PROCEDURE — 99214 PR OFFICE/OUTPT VISIT, EST, LEVL IV, 30-39 MIN: ICD-10-PCS | Mod: S$GLB,,, | Performed by: INTERNAL MEDICINE

## 2023-11-28 PROCEDURE — 83880 ASSAY OF NATRIURETIC PEPTIDE: CPT | Performed by: INTERNAL MEDICINE

## 2023-11-28 PROCEDURE — 99999 PR PBB SHADOW E&M-EST. PATIENT-LVL V: ICD-10-PCS | Mod: PBBFAC,,, | Performed by: INTERNAL MEDICINE

## 2023-11-28 PROCEDURE — 99214 OFFICE O/P EST MOD 30 MIN: CPT | Mod: S$GLB,,, | Performed by: INTERNAL MEDICINE

## 2023-11-28 PROCEDURE — 2023F DILAT RTA XM W/O RTNOPTHY: CPT | Mod: CPTII,S$GLB,, | Performed by: INTERNAL MEDICINE

## 2023-11-28 PROCEDURE — 99999 PR PBB SHADOW E&M-EST. PATIENT-LVL V: CPT | Mod: PBBFAC,,, | Performed by: INTERNAL MEDICINE

## 2023-11-28 PROCEDURE — 3008F PR BODY MASS INDEX (BMI) DOCUMENTED: ICD-10-PCS | Mod: CPTII,S$GLB,, | Performed by: INTERNAL MEDICINE

## 2023-11-28 PROCEDURE — 3288F FALL RISK ASSESSMENT DOCD: CPT | Mod: CPTII,S$GLB,, | Performed by: INTERNAL MEDICINE

## 2023-11-28 PROCEDURE — 1159F PR MEDICATION LIST DOCUMENTED IN MEDICAL RECORD: ICD-10-PCS | Mod: CPTII,S$GLB,, | Performed by: INTERNAL MEDICINE

## 2023-11-28 PROCEDURE — 3008F BODY MASS INDEX DOCD: CPT | Mod: CPTII,S$GLB,, | Performed by: INTERNAL MEDICINE

## 2023-11-28 PROCEDURE — 80053 COMPREHEN METABOLIC PANEL: CPT | Performed by: INTERNAL MEDICINE

## 2023-11-28 PROCEDURE — 1101F PR PT FALLS ASSESS DOC 0-1 FALLS W/OUT INJ PAST YR: ICD-10-PCS | Mod: CPTII,S$GLB,, | Performed by: INTERNAL MEDICINE

## 2023-11-28 PROCEDURE — 3066F PR DOCUMENTATION OF TREATMENT FOR NEPHROPATHY: ICD-10-PCS | Mod: CPTII,S$GLB,, | Performed by: INTERNAL MEDICINE

## 2023-11-28 PROCEDURE — 3044F PR MOST RECENT HEMOGLOBIN A1C LEVEL <7.0%: ICD-10-PCS | Mod: CPTII,S$GLB,, | Performed by: INTERNAL MEDICINE

## 2023-11-28 PROCEDURE — 1160F RVW MEDS BY RX/DR IN RCRD: CPT | Mod: CPTII,S$GLB,, | Performed by: INTERNAL MEDICINE

## 2023-11-28 PROCEDURE — 3062F POS MACROALBUMINURIA REV: CPT | Mod: CPTII,S$GLB,, | Performed by: INTERNAL MEDICINE

## 2023-11-28 PROCEDURE — 3075F SYST BP GE 130 - 139MM HG: CPT | Mod: CPTII,S$GLB,, | Performed by: INTERNAL MEDICINE

## 2023-11-28 RX ORDER — ALLOPURINOL 100 MG/1
TABLET ORAL
Qty: 180 TABLET | Refills: 3 | Status: SHIPPED | OUTPATIENT
Start: 2023-11-28

## 2023-11-28 RX ORDER — SEMAGLUTIDE 2.68 MG/ML
2 INJECTION, SOLUTION SUBCUTANEOUS
Qty: 3 ML | Refills: 11 | Status: SHIPPED | OUTPATIENT
Start: 2023-11-28 | End: 2024-11-27

## 2023-11-28 NOTE — TELEPHONE ENCOUNTER
No care due was identified.  [facility] Embedded Care Due Messages. Reference number: [id number].   11/28/2023 5:18:31 PM CST

## 2023-11-28 NOTE — PROGRESS NOTES
Subjective:       Patient ID: Gwendolyn Fournier is a pleasant 68 y.o. Black or  female patient    Chief Complaint: Follow-up (Concerns about breathing,swelling and bp)      Patient is a pt I saw last on 09/12/2023, see my last notes.    HPI     Pr with PMH as per list of problems below coming today to f-up re: above.  From our last visit:  - Neurology  - Podiatry   - Nephrology   She is here with her daughter as usually.  She gained 6 pounds from last visit, she is aware of the fact that her diet is not good.  She is on semaglutide.  She is willing to do better re: her diet, she has poor food choices for now and drinks sodas, but as her daughter had a sleeve surgery and lost a great amount of weight afterwards, she is going to take her as role model for her own lifestyle.  She reports more joint pain and shortness of breath, relates this to this weight gain.      Patient Active Problem List   Diagnosis    Essential hypertension    Sciatica of left side    Primary osteoarthritis involving multiple joints    Idiopathic chronic gout of multiple sites without tophus    History of open reduction and internal fixation (ORIF) procedure    Stage 3 chronic kidney disease    Chronic pain    Severe obesity (BMI 35.0-39.9) with comorbidity    Obstructive sleep apnea    Sickle cell trait    Sacroiliitis, not elsewhere classified    Type 2 diabetes mellitus with stage 3b chronic kidney disease, without long-term current use of insulin    Major depressive disorder, recurrent, moderate    History of tuberculosis exposure    Poor posture    Decreased range of motion of trunk and back    Decreased strength of trunk and back    Opioid dependence, uncomplicated    SVT (supraventricular tachycardia)    Aortic atherosclerosis    Tremor          ACTIVE MEDICAL ISSUES:  Documented in Problem List     PAST MEDICAL HISTORY  Documented     PAST SURGICAL HISTORY:  Documented     SOCIAL HISTORY:  Documented     FAMILY  HISTORY:  Documented     ALLERGIES AND MEDICATIONS: updated and reviewed.  Documented    Review of Systems   Constitutional:  Positive for activity change and unexpected weight change. Negative for appetite change, fatigue and fever.   HENT:  Negative for congestion, postnasal drip, rhinorrhea, sinus pressure and sore throat.    Eyes:  Negative for pain, discharge and redness.   Respiratory:  Negative for cough, chest tightness and shortness of breath.    Cardiovascular:  Positive for leg swelling. Negative for chest pain and palpitations.   Gastrointestinal:  Negative for abdominal pain, constipation and nausea.   Endocrine: Negative for cold intolerance and heat intolerance.   Genitourinary:  Negative for difficulty urinating, flank pain, frequency, menstrual problem, pelvic pain, urgency and vaginal discharge.   Musculoskeletal:  Negative for arthralgias, back pain, joint swelling and neck pain.   Skin:  Negative for color change and rash.   Allergic/Immunologic: Negative for environmental allergies and food allergies.   Neurological:  Negative for weakness, numbness and headaches.   Hematological:  Negative for adenopathy.   Psychiatric/Behavioral:  Negative for behavioral problems, hallucinations, sleep disturbance and suicidal ideas. The patient is not nervous/anxious.        Objective:      Physical Exam  Vitals and nursing note reviewed.   Constitutional:       Appearance: Normal appearance. She is well-developed. She is obese.      Comments: Non pitting mild swelling of bilateral ankles   HENT:      Right Ear: Tympanic membrane and external ear normal.      Left Ear: Tympanic membrane and external ear normal.   Eyes:      Conjunctiva/sclera: Conjunctivae normal.   Neck:      Thyroid: No thyromegaly.   Cardiovascular:      Rate and Rhythm: Normal rate and regular rhythm.      Pulses: Normal pulses.      Heart sounds: Normal heart sounds.   Pulmonary:      Effort: Pulmonary effort is normal. No respiratory  "distress.      Breath sounds: Normal breath sounds. No wheezing.   Abdominal:      General: Bowel sounds are normal.      Palpations: Abdomen is soft. There is no mass.      Tenderness: There is no abdominal tenderness.   Musculoskeletal:         General: Normal range of motion.      Cervical back: Normal range of motion and neck supple.   Lymphadenopathy:      Cervical: No cervical adenopathy.   Skin:     General: Skin is warm and dry.   Neurological:      Mental Status: She is alert and oriented to person, place, and time. Mental status is at baseline.   Psychiatric:         Mood and Affect: Mood normal.         Behavior: Behavior normal.         Thought Content: Thought content normal.         Judgment: Judgment normal.         Vitals:    11/28/23 1255   BP: 130/78   BP Location: Right arm   Patient Position: Sitting   BP Method: Large (Manual)   Pulse: 88   Resp: 16   Temp: 98.1 °F (36.7 °C)   TempSrc: Oral   SpO2: 96%   Weight: 116.7 kg (257 lb 4.4 oz)   Height: 5' 7" (1.702 m)     Body mass index is 40.3 kg/m².    RESULTS: Reviewed labs from last 12 months    Last Lab Results:     Lab Results   Component Value Date    WBC 10.57 11/28/2023    HGB 11.7 (L) 11/28/2023    HCT 37.0 11/28/2023     11/28/2023     11/28/2023    K 4.2 11/28/2023     11/28/2023    CO2 29 11/28/2023    BUN 25 (H) 11/28/2023    CREATININE 1.6 (H) 11/28/2023    CALCIUM 10.6 (H) 11/28/2023    ALBUMIN 3.5 11/28/2023    AST 13 11/28/2023    ALT 11 11/28/2023    CHOL 192 09/14/2023    TRIG 249 (H) 09/14/2023    HDL 34 (L) 09/14/2023    LDLCALC 108.2 09/14/2023    HGBA1C 5.4 09/14/2023    TSH 1.708 09/14/2023       Assessment:       1. Essential hypertension    2. Type 2 diabetes mellitus with stage 3b chronic kidney disease, without long-term current use of insulin    3. Class 3 drug-induced obesity with serious comorbidity and body mass index (BMI) of 40.0 to 44.9 in adult    4. Severe obesity (BMI 35.0-39.9) with " comorbidity    5. Primary osteoarthritis involving multiple joints    6. Shortness of breath        Plan:   Gwendolyn was seen today for follow-up.    Diagnoses and all orders for this visit:    Essential hypertension  -     CBC Auto Differential; Future  -     Comprehensive Metabolic Panel; Future    BP at goal, same treatment and blood work.    Type 2 diabetes mellitus with stage 3b chronic kidney disease, without long-term current use of insulin  -     semaglutide (OZEMPIC) 2 mg/dose (8 mg/3 mL) PnIj; Inject 2 mg into the skin every 7 days.    See HPI, will go on with same treatment, she will work on her lifestyle.    Class 3 drug-induced obesity with serious comorbidity and body mass index (BMI) of 40.0 to 44.9 in adult  -     semaglutide (OZEMPIC) 2 mg/dose (8 mg/3 mL) PnIj; Inject 2 mg into the skin every 7 days.    Long discussion with pt and daughter. Pt is aware of the fact that she needs to have a good diet when on semaglutide. Diet has been discussed at length, praised the daughter for her own lifestyle changes and for willing to help her mom.    Primary osteoarthritis involving multiple joints    Worsened by weight gain, discussion at this regard.    Shortness of breath  -     BNP; Future    Probably due to overweight but will check BNP.    Follow up in about 3 months (around 2/28/2024) for f-up.    This note was created by combination of typed  and M-Modal dictation.  Transcription errors may be present.  If there are any questions, please contact me.

## 2023-11-28 NOTE — PROGRESS NOTES
Health Maintenance Due   Topic     TETANUS VACCINE      Mammogram      DEXA Scan      COVID-19 Vaccine (6 - 2023-24 season)

## 2023-11-28 NOTE — TELEPHONE ENCOUNTER
Refill Decision Note   Gwendolyn Fournier  is requesting a refill authorization.  Brief Assessment and Rationale for Refill:  Approve     Medication Therapy Plan:  RenaL fx reviewed      Comments:     Note composed:11:22 AM 11/28/2023

## 2023-11-28 NOTE — TELEPHONE ENCOUNTER
No care due was identified.  Health [facility] Embedded Care Due Messages. Reference number: [id number].   11/28/2023 8:53:26 AM CST

## 2023-11-29 RX ORDER — TIZANIDINE 4 MG/1
TABLET ORAL
Qty: 60 TABLET | Refills: 0 | Status: SHIPPED | OUTPATIENT
Start: 2023-11-29 | End: 2023-12-26

## 2023-11-30 DIAGNOSIS — M54.50 ACUTE LEFT-SIDED LOW BACK PAIN WITHOUT SCIATICA: ICD-10-CM

## 2023-11-30 RX ORDER — TIZANIDINE 4 MG/1
TABLET ORAL
Qty: 60 TABLET | Refills: 0 | OUTPATIENT
Start: 2023-11-30

## 2023-11-30 NOTE — TELEPHONE ENCOUNTER
No care due was identified.  Health [facility] Embedded Care Due Messages. Reference number: [id number].   11/30/2023 10:40:12 AM CST

## 2023-12-01 NOTE — TELEPHONE ENCOUNTER
----- Message from Zoya Qureshi sent at 12/1/2023 10:13 AM CST -----  Regarding: self [phone]  Type: RX Refill Request    Who Called:  self    Have you contacted your pharmacy: yes    Refill or New Rx: refill    RX Name and Strength: tiZANidine (ZANAFLEX) 4 MG tablet    Is this a 30 day or 90 day RX: 30    Preferred Pharmacy with phone number:   [facility] #[id number] - [address] [place] [address]  Phone: [phone] Fax: [phone]    Local or Mail Order: local    Ordering Provider: Zohreh    Would the patient rather a call back or a response via [facility]? Call back     Best Call Back Number: [phone]

## 2023-12-03 DIAGNOSIS — M54.32 SCIATICA OF LEFT SIDE: ICD-10-CM

## 2023-12-03 NOTE — TELEPHONE ENCOUNTER
No care due was identified.  Health [facility] Embedded Care Due Messages. Reference number: [id number].   12/03/2023 3:22:51 PM CST

## 2023-12-04 RX ORDER — GABAPENTIN 300 MG/1
300 CAPSULE ORAL 3 TIMES DAILY
Qty: 90 CAPSULE | Refills: 5 | Status: SHIPPED | OUTPATIENT
Start: 2023-12-04

## 2023-12-11 ENCOUNTER — TELEPHONE (OUTPATIENT)
Dept: CARDIOLOGY | Facility: CLINIC | Age: 68
End: 2023-12-11
Payer: MEDICARE

## 2023-12-16 DIAGNOSIS — E87.6 HYPOKALEMIA: ICD-10-CM

## 2023-12-17 RX ORDER — POTASSIUM CHLORIDE 20 MEQ/1
TABLET, EXTENDED RELEASE ORAL
Qty: 90 TABLET | Refills: 3 | Status: SHIPPED | OUTPATIENT
Start: 2023-12-17 | End: 2024-03-05

## 2023-12-17 NOTE — TELEPHONE ENCOUNTER
Provider Staff:  Action required for this patient     Please see care gap opportunities below in Care Due Message.    Thanks!  [facility]     Appointments      Date Provider   Last Visit   11/28/2023 Merlyn Bruner MD   Next Visit   2/29/2024 Merlyn Bruner MD      Refill Decision Note   Gwendolyn Fournier  is requesting a refill authorization.  Brief Assessment and Rationale for Refill:  Approve     Medication Therapy Plan:         Comments:     Note composed:4:13 AM 12/17/2023

## 2023-12-17 NOTE — TELEPHONE ENCOUNTER
Care Due:                  Date            Visit Type   Department     Provider  --------------------------------------------------------------------------------                                EP -                              PRIMARY      [facility]  Last Visit: 11-      CARE ([facility])   CAIN Bruner                              EP -                              PRIMARY      [facility]  Next Visit: 02-      CARE ([facility])   CAIN Bruner                                                            Last  Test          Frequency    Reason                     Performed    Due Date  --------------------------------------------------------------------------------    HBA1C.......  6 months...  glipiZIDE, semaglutide...  09-   03-    Health [facility] Embedded Care Due Messages. Reference number: [id number].   12/16/2023 6:28:10 PM CST

## 2023-12-26 DIAGNOSIS — M54.50 ACUTE LEFT-SIDED LOW BACK PAIN WITHOUT SCIATICA: ICD-10-CM

## 2023-12-26 RX ORDER — TIZANIDINE 4 MG/1
TABLET ORAL
Qty: 60 TABLET | Refills: 0 | Status: SHIPPED | OUTPATIENT
Start: 2023-12-26 | End: 2024-01-05

## 2023-12-26 NOTE — TELEPHONE ENCOUNTER
----- Message from Brad Rabago sent at 12/26/2023  1:21 PM CST -----  Regarding: Self  [phone]  Type: RX Refill Request    Who Called:  Self     Have you contacted your pharmacy: no     Refill    RX Name and Strength: tiZANidine (ZANAFLEX) 4 MG tablet    Preferred Pharmacy with phone number:   [facility] #[id number] - [address] AT [address] [address]  Phone: [phone] Fax: [phone]       Local or Mail Order: local     Would the patient rather a call back or a response via [facility]? Call back     Best Call Back Number: [phone]     Additional Information:     Thank you.

## 2023-12-26 NOTE — TELEPHONE ENCOUNTER
No care due was identified.  [facility] Embedded Care Due Messages. Reference number: [id number].   12/26/2023 1:32:10 PM CST

## 2023-12-28 DIAGNOSIS — N18.32 TYPE 2 DIABETES MELLITUS WITH STAGE 3B CHRONIC KIDNEY DISEASE, WITHOUT LONG-TERM CURRENT USE OF INSULIN: ICD-10-CM

## 2023-12-28 DIAGNOSIS — E11.22 TYPE 2 DIABETES MELLITUS WITH STAGE 3B CHRONIC KIDNEY DISEASE, WITHOUT LONG-TERM CURRENT USE OF INSULIN: ICD-10-CM

## 2023-12-28 NOTE — TELEPHONE ENCOUNTER
No care due was identified.  [facility] Embedded Care Due Messages. Reference number: [id number].   12/28/2023 2:59:36 PM CST

## 2023-12-29 RX ORDER — GLIPIZIDE 10 MG/1
TABLET, FILM COATED, EXTENDED RELEASE ORAL
Qty: 90 TABLET | Refills: 1 | Status: SHIPPED | OUTPATIENT
Start: 2023-12-29 | End: 2024-02-29

## 2023-12-29 RX ORDER — ATORVASTATIN CALCIUM 40 MG/1
TABLET, FILM COATED ORAL
Qty: 90 TABLET | Refills: 0 | Status: SHIPPED | OUTPATIENT
Start: 2023-12-29 | End: 2024-03-30 | Stop reason: SDUPTHER

## 2023-12-29 NOTE — TELEPHONE ENCOUNTER
Refill Routing Note   Medication(s) are not appropriate for processing by [facility] for the following reason(s):        Required labs abnormal    [facility] action(s):  Defer             Pharmacist review requested: Yes     Appointments  past 12m or future 3m with PCP    Date Provider   Last Visit   11/28/2023 Merlyn Bruner MD   Next Visit   2/29/2024 Merlyn Bruner MD   ED visits in past 90 days: 0        Note composed:8:23 AM 12/29/2023

## 2023-12-29 NOTE — TELEPHONE ENCOUNTER
Refill Decision Note   Gwendolyn Irlanda  is requesting a refill authorization.  Brief Assessment and Rationale for Refill:  Approve     Medication Therapy Plan:  Cr and GFR stable    Medication Reconciliation Completed: No   Comments:     No Care Gaps recommended.     Note composed:2:20 PM 12/29/2023

## 2024-01-05 DIAGNOSIS — M54.50 ACUTE LEFT-SIDED LOW BACK PAIN WITHOUT SCIATICA: ICD-10-CM

## 2024-01-05 RX ORDER — TIZANIDINE 4 MG/1
TABLET ORAL
Qty: 60 TABLET | Refills: 0 | Status: SHIPPED | OUTPATIENT
Start: 2024-01-05 | End: 2024-02-12

## 2024-01-05 RX ORDER — DULOXETIN HYDROCHLORIDE 60 MG/1
60 CAPSULE, DELAYED RELEASE ORAL
Qty: 90 CAPSULE | Refills: 3 | Status: SHIPPED | OUTPATIENT
Start: 2024-01-05

## 2024-01-05 NOTE — TELEPHONE ENCOUNTER
No care due was identified.  Health [facility] Embedded Care Due Messages. Reference number: [id number].   1/05/2024 5:59:19 AM CST

## 2024-01-05 NOTE — TELEPHONE ENCOUNTER
Refill Routing Note   Medication(s) are not appropriate for processing by [facility] for the following reason(s):        Outside of protocol  Required labs abnormal    [facility] action(s):  Defer  Route             Pharmacist review requested: Yes     Appointments  past 12m or future 3m with PCP    Date Provider   Last Visit   11/28/2023 Merlyn Bruner MD   Next Visit   2/29/2024 Merlyn Bruner MD   ED visits in past 90 days: 0        Note composed:8:19 AM 01/05/2024

## 2024-01-05 NOTE — TELEPHONE ENCOUNTER
Refill Routing Note   Medication(s) are not appropriate for processing by [facility] for the following reason(s):        Outside of protocol    [facility] action(s):  Route  Approve        Medication Therapy Plan: Cr stable    Pharmacist review requested: Yes     Appointments  past 12m or future 3m with PCP    Date Provider   Last Visit   11/28/2023 Merlyn Bruner MD   Next Visit   2/29/2024 Merlyn Bruner MD   ED visits in past 90 days: 0        Note composed:3:43 PM 01/05/2024

## 2024-01-16 RX ORDER — DAPAGLIFLOZIN 10 MG/1
10 TABLET, FILM COATED ORAL
Qty: 30 TABLET | Refills: 0 | Status: SHIPPED | OUTPATIENT
Start: 2024-01-16 | End: 2024-02-14

## 2024-01-19 ENCOUNTER — TELEPHONE (OUTPATIENT)
Dept: FAMILY MEDICINE | Facility: CLINIC | Age: 69
End: 2024-01-19
Payer: MEDICARE

## 2024-01-19 DIAGNOSIS — E11.22 TYPE 2 DIABETES MELLITUS WITH STAGE 3B CHRONIC KIDNEY DISEASE, WITHOUT LONG-TERM CURRENT USE OF INSULIN: ICD-10-CM

## 2024-01-19 DIAGNOSIS — N18.32 TYPE 2 DIABETES MELLITUS WITH STAGE 3B CHRONIC KIDNEY DISEASE, WITHOUT LONG-TERM CURRENT USE OF INSULIN: ICD-10-CM

## 2024-01-19 RX ORDER — FLASH GLUCOSE SENSOR
1 KIT MISCELLANEOUS
Qty: 1 KIT | Refills: 11 | Status: CANCELLED | OUTPATIENT
Start: 2024-01-19

## 2024-01-19 NOTE — TELEPHONE ENCOUNTER
----- Message from Bel Scott sent at 1/19/2024 12:22 PM CST -----  Type: RX Refill Request    Who Called: pt     Have you contacted your pharmacy:    Refill or New Rx:flash glucose sensor (FREESTYLE HAI 2 SENSOR) Kit  Needs pa    RX Name and Strength:    How is the patient currently taking it? (ex. 1XDay):    Is this a 30 day or 90 day RX:    Preferred Pharmacy with phone number:  [facility]      Local or Mail Order:    Ordering Provider:    Would the patient rather a call back or a response via [facility]? call    Best Call Back Number:[phone] (home)       Additional Information:

## 2024-02-07 RX ORDER — FUROSEMIDE 20 MG/1
TABLET ORAL
Qty: 90 TABLET | Refills: 0 | Status: SHIPPED | OUTPATIENT
Start: 2024-02-07 | End: 2024-03-30 | Stop reason: SDUPTHER

## 2024-02-11 ENCOUNTER — HOSPITAL ENCOUNTER (EMERGENCY)
Facility: HOSPITAL | Age: 69
Discharge: HOME | End: 2024-02-11
Attending: EMERGENCY MEDICINE
Payer: MEDICARE

## 2024-02-11 VITALS
TEMPERATURE: 98 F | SYSTOLIC BLOOD PRESSURE: 161 MMHG | OXYGEN SATURATION: 96 % | DIASTOLIC BLOOD PRESSURE: 92 MMHG | BODY MASS INDEX: 39.24 KG/M2 | WEIGHT: 250 LBS | RESPIRATION RATE: 20 BRPM | HEART RATE: 89 BPM | HEIGHT: 67 IN

## 2024-02-11 DIAGNOSIS — M25.552 LEFT HIP PAIN: ICD-10-CM

## 2024-02-11 DIAGNOSIS — T14.8XXA BRUISE OF MUSCLE: Primary | ICD-10-CM

## 2024-02-11 DIAGNOSIS — S86.912A KNEE STRAIN, LEFT, INITIAL ENCOUNTER: ICD-10-CM

## 2024-02-11 DIAGNOSIS — M25.569 KNEE PAIN: ICD-10-CM

## 2024-02-11 DIAGNOSIS — M54.50 ACUTE LEFT-SIDED LOW BACK PAIN WITHOUT SCIATICA: ICD-10-CM

## 2024-02-11 PROCEDURE — 25000003 PHARM REV CODE 250

## 2024-02-11 PROCEDURE — 99284 EMERGENCY DEPT VISIT MOD MDM: CPT | Mod: 25

## 2024-02-11 RX ORDER — OXYCODONE AND ACETAMINOPHEN 5; 325 MG/1; MG/1
1 TABLET ORAL
Status: COMPLETED | OUTPATIENT
Start: 2024-02-11 | End: 2024-02-11

## 2024-02-11 RX ORDER — ACETAMINOPHEN 500 MG
500 TABLET ORAL EVERY 6 HOURS PRN
Qty: 30 TABLET | Refills: 0 | Status: SHIPPED | OUTPATIENT
Start: 2024-02-11

## 2024-02-11 RX ORDER — LIDOCAINE 50 MG/G
1 PATCH TOPICAL DAILY
Qty: 15 PATCH | Refills: 0 | Status: SHIPPED | OUTPATIENT
Start: 2024-02-11

## 2024-02-11 RX ORDER — LIDOCAINE 50 MG/G
1 PATCH TOPICAL ONCE
Status: DISCONTINUED | OUTPATIENT
Start: 2024-02-11 | End: 2024-02-11 | Stop reason: HOSPADM

## 2024-02-11 RX ADMIN — OXYCODONE HYDROCHLORIDE AND ACETAMINOPHEN 1 TABLET: 5; 325 TABLET ORAL at 01:02

## 2024-02-11 RX ADMIN — LIDOCAINE 5% 1 PATCH: 700 PATCH TOPICAL at 01:02

## 2024-02-11 NOTE — DISCHARGE INSTRUCTIONS
Thank you for coming to our Emergency Department today. It is important to remember that some problems or medical conditions are difficult to diagnose and may not be found or addressed during your Emergency Department visit.  These conditions often start with non-specific symptoms and can only be diagnosed on follow up visits with your primary care physician or specialist when the symptoms continue or change. Please remember that all medical conditions can change, and we cannot predict how you will be feeling tomorrow or the next day. Return to the ER with any questions/concerns, new/concerning symptoms, worsening or failure to improve.   Be sure to follow up with your primary care doctor and review all labs/imaging/tests that were performed during your ER visit with them. It is very common for us to identify non-emergent incidental findings which must be followed up with your primary care physician.  Some labs/imaging/tests may be outside of the normal range, and require non-emergent follow-up and/or further investigation/treatment/procedures/testing to help diagnose/exclude/prevent complications or other potentially serious medical conditions. Some abnormalities may not have been discussed or addressed during your ER visit. Some lab results may not return during your ER visit but can be accessible by downloading the free [facility] silverio or by visiting [email or web address] . It is important for you to review all labs/imaging/tests which are outside of the normal range with your physician.  An ER visit does not replace a primary care visit, and many screening tests or follow-up tests cannot be ordered by an ER doctor or performed by the ER. Some tests may even require pre-approval.  If you do not have a primary care doctor, you may contact the one listed on your discharge paperwork or you may also call the [facility] Appointment Desk at [phone] , or [address] [place] [address] at  [phone] to schedule an  appointment, or establish care with a primary care doctor or even a specialist and to obtain information about local resources. It is important to your health that you have a primary care doctor.  Please take all medications as directed. We have done our best to select a medication for you that will treat your condition however, all medications may potentially have side-effects and it is impossible to predict which medications may give you side-effects or what those side-effects (if any) those medications may give you.  If you feel that you are having a negative effect or side-effect of any medication you should stop taking those medications immediately and seek medical attention. If you feel that you are having a life-threatening reaction call 911.  Do not drive, swim, climb to height, take a bath, operate heavy machinery, drink alcohol or take potentially sedating medications, sign any legal documents or make any important decisions for 24 hours if you have received any pain medications, sedatives or mood altering drugs during your ER visit or within 24 hours of taking them if they have been prescribed to you.   You can find additional resources for Dentists, hearing aids, durable medical equipment, low cost pharmacies and other resources at [email or web address]  Patient agrees with this plan. Discussed with her strict return precautions, she verbalized understanding. Patient is stable for discharge.

## 2024-02-11 NOTE — ED PROVIDER NOTES
Encounter Date: 2/11/2024       History     Chief Complaint   Patient presents with    Hip Pain     Pt to ER with reports of left hip pain radiating towards knee. Pt reports trip and fell 1 week prior and pain is worsening. Pt ambulatory with limp in triage      HPI    69-year-old female past medical history of hypertension, arthritis, sciatica presenting to the emergency department today complaining of left hip and knee pain after a fall 1 week ago.  Patient states she tripped and fell last week with no head injury, no loss of consciousness, no nausea, no vomiting patient states there was no pain afterwards but developing over the week has now had left hip and left knee pain.  Patient was ambulatory after the fall and today in the emergency department.  Patient was not taken anything prior to arrival for pain.  Patient denies any fever, chest pain, shortness for breath, nausea, vomiting, diarrhea, numbness, tingling, extremity swelling.    Review of patient's allergies indicates:   Allergen Reactions    Ondansetron hcl (pf) Hives and Itching    Ketorolac Itching     Past Medical History:   Diagnosis Date    Arthritis     Gout attack     Hx of psychiatric care     Hypertension     Psychiatric problem     Renal disorder     Sciatic nerve pain 2013    Therapy     used to follow with psychiatrist but doesn't recall whom, 2012    Tuberculosis     Unspecified cataract 07/27/2023    Mild on both eyes     Past Surgical History:   Procedure Laterality Date    COLONOSCOPY N/A 1/21/2019    Procedure: COLONOSCOPY;  Surgeon: Shanna Jose MD;  Location: [facility];  Service: Endoscopy;  Laterality: N/A;    INJECTION OF JOINT Bilateral 3/13/2019    Procedure: INJECTION, JOINT BILATERAL SI JOINT;  Surgeon: Evan Coreas MD;  Location: [facility];  Service: Pain Management;  Laterality: Bilateral;  BILATERAL SI JOINT INJECTION    KNEE SURGERY  1/2014    left knee surgery      Family History   Problem Relation Age of Onset     Tuberculosis Mother     Stroke Father     Bipolar disorder Daughter     Suicide Daughter     Depression Daughter     Bipolar disorder Daughter     Depression Daughter      Social History     Tobacco Use    Smoking status: Former     Current packs/day: 0.00     Types: Cigarettes     Quit date: 1976     Years since quittin.1    Smokeless tobacco: Never   Substance Use Topics    Alcohol use: Not Currently     Alcohol/week: 0.0 standard drinks of alcohol     Comment: red wine    Drug use: No     Review of Systems   Constitutional:  Negative for chills, diaphoresis, fatigue and fever.   HENT:  Negative for congestion, rhinorrhea and sore throat.    Eyes:  Negative for redness and visual disturbance.   Respiratory:  Negative for cough and shortness of breath.    Cardiovascular:  Negative for chest pain, palpitations and leg swelling.   Gastrointestinal:  Negative for abdominal pain, diarrhea, nausea and vomiting.   Genitourinary:  Negative for difficulty urinating, dysuria, flank pain and frequency.   Musculoskeletal:  Positive for arthralgias (Left knee and left hip). Negative for back pain, gait problem, joint swelling, myalgias, neck pain and neck stiffness.   Skin:  Negative for rash.   Neurological:  Negative for dizziness, syncope, weakness, light-headedness and headaches.   Hematological:  Does not bruise/bleed easily.       Physical Exam     Initial Vitals   BP Pulse Resp Temp SpO2   24 1159 24 1159 24 1159 24 1159 24 1309   (!) 154/97 99 18 98.4 °F (36.9 °C) 97 %      MAP       --                Physical Exam    Nursing note and vitals reviewed.  Constitutional: She appears well-developed and well-nourished. She is not diaphoretic. No distress.   HENT:   Head: Normocephalic and atraumatic. Head is without raccoon's eyes, without Moses's sign, without abrasion, without contusion and without laceration.   Right Ear: Tympanic membrane, external ear and ear canal normal.    Left Ear: Tympanic membrane, external ear and ear canal normal.   Nose: Nose normal. No rhinorrhea.   Mouth/Throat: Uvula is midline and oropharynx is clear and moist.   Eyes: Conjunctivae and EOM are normal. Pupils are equal, round, and reactive to light. Right eye exhibits no discharge. Left eye exhibits no discharge.   Neck: Trachea normal.   Normal range of motion.   Full passive range of motion without pain.     Cardiovascular:  Normal rate, regular rhythm, normal heart sounds, intact distal pulses and normal pulses.           Pulmonary/Chest: Effort normal and breath sounds normal. No respiratory distress.   Abdominal: Abdomen is soft. Bowel sounds are normal. She exhibits no distension and no pulsatile midline mass. There is no abdominal tenderness.   No right CVA tenderness.  No left CVA tenderness. There is no rebound and no guarding.   Musculoskeletal:         General: Normal range of motion.      Right shoulder: Normal.      Left shoulder: Normal.      Right elbow: Normal.      Left elbow: Normal.      Right wrist: Normal.      Left wrist: Normal.      Cervical back: Normal, full passive range of motion without pain and normal range of motion. No edema, erythema or rigidity. No spinous process tenderness or muscular tenderness. Normal range of motion.      Thoracic back: Normal.      Lumbar back: Normal.      Right hip: Normal.      Left hip: Normal. No deformity, lacerations, tenderness, bony tenderness or crepitus. Normal range of motion. Normal strength.      Right knee: Normal.      Left knee: No swelling, deformity, effusion, erythema, ecchymosis, lacerations, bony tenderness or crepitus. Normal range of motion. Tenderness present over the medial joint line. No lateral joint line, MCL, LCL, ACL, PCL or patellar tendon tenderness. Normal alignment, normal meniscus and normal patellar mobility. Normal pulse.      Instability Tests: Medial Josiah test negative and lateral Josiah test negative.       Right lower leg: Normal.      Left lower leg: Normal.      Right ankle: Normal.      Left ankle: Normal.        Legs:       Comments: Tenderness over left gluteal muscle, no bony tenderness no overlying skin changes including bruising or erythema.  skin intact.     Neurological: She is alert and oriented to person, place, and time. She has normal strength. No cranial nerve deficit or sensory deficit.   Skin: Skin is warm, dry and intact. Capillary refill takes less than 2 seconds. No abrasion, no bruising, no ecchymosis and no laceration noted. No erythema. No pallor.   Psychiatric: She has a normal mood and affect. Her speech is normal and behavior is normal. Thought content normal.         ED Course   Procedures  Labs Reviewed - No data to display       Imaging Results              X-Ray Knee 1 or 2 View Left (Final result)  Result time 02/11/24 13:54:24   Procedure changed from X-Ray Knee 3 View Left     Final result by Nito Hummel MD (02/11/24 13:54:24)                   Impression:      No convincing evidence acute fracture or dislocation.    No definite hardware complication.      Electronically signed by: Nito Hummel  Date:    02/11/2024  Time:    13:54               Narrative:    EXAMINATION:  XR KNEE 1 OR 2 VIEW LEFT    CLINICAL HISTORY:  Hip pain; Pain in unspecified knee    TECHNIQUE:  One or two views of the left knee were performed.    COMPARISON:  Bilateral knee radiograph 02/20/2019.    FINDINGS:  Operative sequela of total knee arthroplasty patellar resurfacing as well as plate screw fixation about the lateral aspect of the visualized femur.  No definite hardware complication is seen.  Findings suggest remote fracture deformity the distal femur.    No definite evidence of acute fracture or dislocation is visualized.    No definite radiopaque foreign body.                                       X-Ray Hip 2 or 3 views Left (with Pelvis when performed) (Final result)  Result time 02/11/24  13:18:42      Final result by Francie Beach MD (02/11/24 13:18:42)                   Impression:      No definite acute fracture seen.  If clinical doubt persists for a nondisplaced fracture consider CT evaluation.      Electronically signed by: Francie Beach MD  Date:    02/11/2024  Time:    13:18               Narrative:    EXAMINATION:  XR HIP WITH PELVIS WHEN PERFORMED, 2 OR 3 VIEWS LEFT    CLINICAL HISTORY:  Pain in left hip    TECHNIQUE:  AP view and lateral view    COMPARISON:  02/20/2019    FINDINGS:  Mild left hip joint space narrowing.  No definite acute fracture identified.    Postoperative changes at the lower lumbar spine.    Postoperative changes of the mid diaphysis of the left femur.    Mild sclerosis at the right sacroiliac joints.    Severe right hip joint space loss.                                       Medications   LIDOcaine 5 % patch 1 patch (1 patch Transdermal Patch Applied 2/11/24 1332)   oxyCODONE-acetaminophen 5-325 mg per tablet 1 tablet (1 tablet Oral Given 2/11/24 1332)     Medical Decision Making  69-year-old female past medical history of hypertension, arthritis, sciatica presenting to the emergency department today complaining of left hip and knee pain after a fall 1 week ago.  Patient states she tripped and fell last week with no head injury, no loss of consciousness, no nausea, no vomiting patient states there was no pain afterwards but developing over the week has now had left hip and left knee pain.  Patient was ambulatory after the fall and today in the emergency department.  Patient was not taken anything prior to arrival for pain.  Patient denies any fever, chest pain, shortness for breath, nausea, vomiting, diarrhea, numbness, tingling, extremity swelling.  Patient's chart and medical history reviewed.  Patient's vitals reviewed.  They are afebrile, no respiratory distress, nontoxic-appearing in the ED.  Physical exam findings pertinent for medial knee  tenderness and tenderness over the left gluteus muscle, pelvis feels stable with no laxity no tenderness over femoral head hip region no overlying bruising no joint swelling full range of motion without limitation.   Differential diagnosis is considered the following.  - Septic Arthritis, Gout, Osteomyelitis:  Unlikely with no swelling, erythema.  Patient was afebrile.  - Fracture/Dislocation:  X-ray ordered for further evaluation with no acute osseous abnormalities.  - Contusion/Sprain/Strain:  Considered with muscle tenderness.  Physical exam findings and imaging results discussed with the patient's plan is made to have her follow up with the primary care physician in the next 2-3 days for re-evaluation.  Patient will be discharged home with lidocaine patches and Tylenol for pain discussed rice therapy including rest, ice, compression, elevation for the treatment of knee pain at home.  Patient agrees with the plan does not have any questions for me at this time.  I discussed with the patient/family the diagnosis, treatment plan, indications for return to the emergency department, and for expected follow-up. The patient/family verbalized an understanding. The patient/family is asked if there are any questions or concerns. We discuss the case, until all issues are addressed to the patient/family's satisfaction. Patient/family understands and is agreeable to the plan.   HEATHER CORNELIUS    DISCLAIMER: This note was prepared with [employer] voice recognition transcription software. Garbled syntax, mangled pronouns, and other bizarre constructions may be attributed to that software system.      Amount and/or Complexity of Data Reviewed  Radiology: ordered.    Risk  Prescription drug management.                                      Clinical Impression:  Final diagnoses:  [M25.552] Left hip pain  [M25.569] Knee pain  [T14.8XXA] Bruise of muscle (Primary)  [S86.912A] Knee strain, left, initial encounter          ED Disposition  Condition    Discharge Stable          ED Prescriptions       Medication Sig Dispense Start Date End Date Auth. Provider    LIDOcaine (LIDODERM) 5 % Place 1 patch onto the skin once daily. Apply patch for 12 hours and then leave off for 12 hours 15 patch 2/11/2024 -- Jax Edwards PA-C    acetaminophen (TYLENOL) 500 MG tablet Take 1 tablet (500 mg total) by mouth every 6 (six) hours as needed for Pain. 30 tablet 2/11/2024 -- Jax Edwards PA-C          Follow-up Information       Follow up With Specialties Details Why Contact Info    Merlyn Bruner MD Family Medicine, Internal Medicine Schedule an appointment as soon as possible for a visit in 3 days for follow up [address] [place] [address]  [phone]               Jax Edwards PA-C  02/11/24 7249

## 2024-02-11 NOTE — TELEPHONE ENCOUNTER
Care Due:                  Date            Visit Type   Department     Provider  --------------------------------------------------------------------------------                                EP -                              PRIMARY      [facility]  Last Visit: 11-      CARE ([facility])   CAIN Bruner                              EP -                              PRIMARY      [facility]  Next Visit: 02-      CARE ([facility])   CAIN Bruner                                                            Last  Test          Frequency    Reason                     Performed    Due Date  --------------------------------------------------------------------------------    Vitamin D...  12 months..  ergocalciferol...........  10-   10-    Health [facility] Embedded Care Due Messages. Reference number: [id number].   2/11/2024 9:18:43 AM CST

## 2024-02-11 NOTE — ED TRIAGE NOTES
Gwendolyn Fournier, a 69 y.o. female presents to the ED w/ complaint of L hip pain radiating towards knee s/p trip and fall x 1 week ago.

## 2024-02-12 RX ORDER — TIZANIDINE 4 MG/1
TABLET ORAL
Qty: 60 TABLET | Refills: 0 | Status: SHIPPED | OUTPATIENT
Start: 2024-02-12 | End: 2024-03-05

## 2024-02-14 RX ORDER — DAPAGLIFLOZIN 10 MG/1
10 TABLET, FILM COATED ORAL
Qty: 30 TABLET | Refills: 0 | Status: SHIPPED | OUTPATIENT
Start: 2024-02-14 | End: 2024-03-14

## 2024-02-15 DIAGNOSIS — N18.32 TYPE 2 DIABETES MELLITUS WITH STAGE 3B CHRONIC KIDNEY DISEASE, WITHOUT LONG-TERM CURRENT USE OF INSULIN: ICD-10-CM

## 2024-02-15 DIAGNOSIS — E11.22 TYPE 2 DIABETES MELLITUS WITH STAGE 3B CHRONIC KIDNEY DISEASE, WITHOUT LONG-TERM CURRENT USE OF INSULIN: ICD-10-CM

## 2024-02-15 RX ORDER — FLASH GLUCOSE SENSOR
1 KIT MISCELLANEOUS
Qty: 1 KIT | Refills: 11 | Status: SHIPPED | OUTPATIENT
Start: 2024-02-15 | End: 2024-02-29 | Stop reason: SDUPTHER

## 2024-02-15 RX ORDER — FLASH GLUCOSE SCANNING READER
1 EACH MISCELLANEOUS
Qty: 1 EACH | Refills: 0 | Status: SHIPPED | OUTPATIENT
Start: 2024-02-15 | End: 2024-05-30 | Stop reason: SDUPTHER

## 2024-02-15 NOTE — TELEPHONE ENCOUNTER
Care Due:                  Date            Visit Type   Department     Provider  --------------------------------------------------------------------------------                                EP -                              PRIMARY      [facility]  Last Visit: 11-      CARE ([facility])   CAIN Bruner                              EP -                              PRIMARY      [facility]  Next Visit: 02-      CARE ([facility])   CAIN Bruner                                                            Last  Test          Frequency    Reason                     Performed    Due Date  --------------------------------------------------------------------------------    HBA1C.......  6 months...  glipiZIDE, semaglutide...  09-   03-    Health [facility] Embedded Care Due Messages. Reference number: [id number].   2/15/2024 10:25:52 AM CST

## 2024-02-15 NOTE — TELEPHONE ENCOUNTER
No care due was identified.  Health [facility] Embedded Care Due Messages. Reference number: [id number].   2/15/2024 10:27:33 AM CST

## 2024-02-29 ENCOUNTER — OFFICE VISIT (OUTPATIENT)
Dept: FAMILY MEDICINE | Facility: CLINIC | Age: 69
End: 2024-02-29
Payer: MEDICARE

## 2024-02-29 VITALS
DIASTOLIC BLOOD PRESSURE: 78 MMHG | HEIGHT: 67 IN | WEIGHT: 256.38 LBS | SYSTOLIC BLOOD PRESSURE: 126 MMHG | HEART RATE: 102 BPM | TEMPERATURE: 98 F | RESPIRATION RATE: 16 BRPM | OXYGEN SATURATION: 95 % | BODY MASS INDEX: 40.24 KG/M2

## 2024-02-29 DIAGNOSIS — M15.9 PRIMARY OSTEOARTHRITIS INVOLVING MULTIPLE JOINTS: ICD-10-CM

## 2024-02-29 DIAGNOSIS — E11.22 TYPE 2 DIABETES MELLITUS WITH STAGE 3B CHRONIC KIDNEY DISEASE, WITHOUT LONG-TERM CURRENT USE OF INSULIN: ICD-10-CM

## 2024-02-29 DIAGNOSIS — M46.1 SACROILIITIS, NOT ELSEWHERE CLASSIFIED: ICD-10-CM

## 2024-02-29 DIAGNOSIS — E66.1 CLASS 3 DRUG-INDUCED OBESITY WITH SERIOUS COMORBIDITY AND BODY MASS INDEX (BMI) OF 40.0 TO 44.9 IN ADULT: ICD-10-CM

## 2024-02-29 DIAGNOSIS — M25.511 CHRONIC PAIN OF BOTH SHOULDERS: ICD-10-CM

## 2024-02-29 DIAGNOSIS — Z53.1 REFUSAL OF BLOOD TRANSFUSIONS AS PATIENT IS JEHOVAH'S WITNESS: ICD-10-CM

## 2024-02-29 DIAGNOSIS — I10 ESSENTIAL HYPERTENSION: Primary | ICD-10-CM

## 2024-02-29 DIAGNOSIS — D57.3 SICKLE CELL TRAIT: ICD-10-CM

## 2024-02-29 DIAGNOSIS — M25.512 CHRONIC PAIN OF BOTH SHOULDERS: ICD-10-CM

## 2024-02-29 DIAGNOSIS — F33.1 MAJOR DEPRESSIVE DISORDER, RECURRENT, MODERATE: ICD-10-CM

## 2024-02-29 DIAGNOSIS — N18.32 TYPE 2 DIABETES MELLITUS WITH STAGE 3B CHRONIC KIDNEY DISEASE, WITHOUT LONG-TERM CURRENT USE OF INSULIN: ICD-10-CM

## 2024-02-29 DIAGNOSIS — G89.29 CHRONIC PAIN OF BOTH SHOULDERS: ICD-10-CM

## 2024-02-29 PROCEDURE — 99214 OFFICE O/P EST MOD 30 MIN: CPT | Mod: S$GLB,,, | Performed by: INTERNAL MEDICINE

## 2024-02-29 PROCEDURE — 99999 PR PBB SHADOW E&M-EST. PATIENT-LVL V: CPT | Mod: PBBFAC,,, | Performed by: INTERNAL MEDICINE

## 2024-02-29 RX ORDER — FLASH GLUCOSE SENSOR
1 KIT MISCELLANEOUS
Qty: 3 KIT | Refills: 11 | Status: SHIPPED | OUTPATIENT
Start: 2024-02-29 | End: 2024-05-21

## 2024-02-29 NOTE — PROGRESS NOTES
"Subjective:       Patient ID: Gwendolyn Fournier is a pleasant 69 y.o. Black or  female patient    Chief Complaint: Follow-up      Patient is a pt I saw last on 11/28/2023, see my last notes.     HPI     Patient with the past medical history as per list of problems below coming today for a regular follow-up visit.   She is here with her daughter as always, her daughter is my next pt.  From our last encounter:  - Pain management   - 02/11/2024  ED for bruise of the muscle (L gluteus)  She is here with her daughter as usually, she was my previous pt.  She reports not feeling so well, she states that she does not receive any more pain medications as Dr. Caruso wanted to go on with epidurals.  She was afraid to go on with this as she was concerned to "get paralyzed" as by her son's concern. She is not a very good historian, she reports pain in regard of the bilateral shoulders, she also mentions pain in regard of the knees among others.    She reports she had a new fall yesterday due to her trying to stand up too fast and her foot got numb.    She has a new appointment coming with Dr. Caruso in April, but would like to have a 2nd opinion regarding pain management.    She is going to see her Cardiologist, Dr. Shankar, next month.  She is still on Ozempic for now 2 mg with not so much WL, she would like to know if Mounjaro would help better.  She takes glipizide too. She may have hypos at 59 but nor her or her daughter can give me more informations.  She lives with her daughter.    Patient Active Problem List   Diagnosis    Essential hypertension    Sciatica of left side    Primary osteoarthritis involving multiple joints    Idiopathic chronic gout of multiple sites without tophus    History of open reduction and internal fixation (ORIF) procedure    Stage 3 chronic kidney disease    Chronic pain    Severe obesity (BMI 35.0-39.9) with comorbidity    Obstructive sleep apnea    Sickle cell trait    Sacroiliitis, not " elsewhere classified    Type 2 diabetes mellitus with stage 3b chronic kidney disease, without long-term current use of insulin    Major depressive disorder, recurrent, moderate    History of tuberculosis exposure    Poor posture    Decreased range of motion of trunk and back    Decreased strength of trunk and back    Opioid dependence, uncomplicated    SVT (supraventricular tachycardia)    Aortic atherosclerosis    Tremor    Refusal of blood transfusions as patient is Hinduism    Class 3 drug-induced obesity with serious comorbidity and body mass index (BMI) of 40.0 to 44.9 in adult          ACTIVE MEDICAL ISSUES:  Documented in Problem List     PAST MEDICAL HISTORY  Documented     PAST SURGICAL HISTORY:  Documented     SOCIAL HISTORY:  Documented     FAMILY HISTORY:  Documented     ALLERGIES AND MEDICATIONS: updated and reviewed.  Documented    Review of Systems   Constitutional:  Positive for activity change and unexpected weight change. Negative for appetite change, fatigue and fever.   HENT:  Negative for congestion, postnasal drip, rhinorrhea, sinus pressure and sore throat.    Eyes:  Negative for pain, discharge and redness.   Respiratory:  Negative for cough, chest tightness and shortness of breath.    Cardiovascular:  Negative for chest pain, palpitations and leg swelling.   Gastrointestinal:  Negative for abdominal pain, constipation and nausea.   Endocrine: Negative for cold intolerance and heat intolerance.   Genitourinary:  Negative for difficulty urinating, flank pain, frequency, menstrual problem, pelvic pain, urgency and vaginal discharge.   Musculoskeletal:  Positive for arthralgias (multiple joints), back pain and neck pain. Negative for joint swelling and myalgias.   Skin:  Negative for color change and rash.   Allergic/Immunologic: Negative for environmental allergies and food allergies.   Neurological:  Negative for weakness, numbness and headaches.   Hematological:  Negative for  "adenopathy.   Psychiatric/Behavioral:  Negative for behavioral problems, hallucinations, sleep disturbance and suicidal ideas. The patient is not nervous/anxious.        Objective:      Physical Exam  Vitals and nursing note reviewed.   Constitutional:       Appearance: Normal appearance. She is well-developed. She is obese.      Comments: Non pitting mild swelling of bilateral ankles   HENT:      Right Ear: Tympanic membrane and external ear normal.      Left Ear: Tympanic membrane and external ear normal.   Eyes:      Conjunctiva/sclera: Conjunctivae normal.   Neck:      Thyroid: No thyromegaly.   Cardiovascular:      Rate and Rhythm: Normal rate and regular rhythm.      Pulses: Normal pulses.      Heart sounds: Normal heart sounds.   Pulmonary:      Effort: Pulmonary effort is normal. No respiratory distress.      Breath sounds: Normal breath sounds. No wheezing.   Abdominal:      General: Bowel sounds are normal.      Palpations: Abdomen is soft. There is no mass.      Tenderness: There is no abdominal tenderness.   Musculoskeletal:         General: Normal range of motion.      Cervical back: Normal range of motion and neck supple.      Comments: Has a rollator, struggles to walk   Lymphadenopathy:      Cervical: No cervical adenopathy.   Skin:     General: Skin is warm and dry.   Neurological:      Mental Status: She is alert and oriented to person, place, and time. Mental status is at baseline.   Psychiatric:         Mood and Affect: Mood normal.         Behavior: Behavior normal.         Thought Content: Thought content normal.         Judgment: Judgment normal.         Vitals:    02/29/24 1408   BP: 126/78   BP Location: Left arm   Patient Position: Sitting   BP Method: Large (Manual)   Pulse: 102   Resp: 16   Temp: 98.4 °F (36.9 °C)   TempSrc: Oral   SpO2: 95%   Weight: 116.3 kg (256 lb 6.3 oz)   Height: 5' 7" (1.702 m)     Body mass index is 40.16 kg/m².    RESULTS: Reviewed labs from last 12 " months    Last Lab Results:     Lab Results   Component Value Date    WBC 10.57 11/28/2023    HGB 11.7 (L) 11/28/2023    HCT 37.0 11/28/2023     11/28/2023     11/28/2023    K 4.2 11/28/2023     11/28/2023    CO2 29 11/28/2023    BUN 25 (H) 11/28/2023    CREATININE 1.6 (H) 11/28/2023    CALCIUM 10.6 (H) 11/28/2023    ALBUMIN 3.5 11/28/2023    AST 13 11/28/2023    ALT 11 11/28/2023    CHOL 192 09/14/2023    TRIG 249 (H) 09/14/2023    HDL 34 (L) 09/14/2023    LDLCALC 108.2 09/14/2023    HGBA1C 5.4 09/14/2023    TSH 1.708 09/14/2023         Assessment:       1. Essential hypertension    2. Type 2 diabetes mellitus with stage 3b chronic kidney disease, without long-term current use of insulin    3. Major depressive disorder, recurrent, moderate    4. Class 3 drug-induced obesity with serious comorbidity and body mass index (BMI) of 40.0 to 44.9 in adult    5. Primary osteoarthritis involving multiple joints    6. Chronic pain of both shoulders    7. Sacroiliitis, not elsewhere classified    8. Aortic atherosclerosis    9. Refusal of blood transfusions as patient is Adventism    10. Opioid dependence, uncomplicated    11. Sickle cell trait        Plan:   Gwendolyn was seen today for follow-up.    Diagnoses and all orders for this visit:    Essential hypertension  -     CBC Auto Differential; Future  -     Comprehensive Metabolic Panel; Future  -     TSH; Future    BP at goal, same treatment and blood work.    Type 2 diabetes mellitus with stage 3b chronic kidney disease, without long-term current use of insulin  -     Hemoglobin A1C; Future  -     flash glucose sensor (FREESTYLE HAI 2 SENSOR) Kit; 1 each by Misc.(Non-Drug; Combo Route) route 2 hours after meals and at bedtime.    Will monitor, pt on semaglutide. Advised to hold glipizide for now as she has hypoglycemic episodes.    Major depressive disorder, recurrent, moderate    Will monitor closely.    Class 3 drug-induced obesity with serious  comorbidity and body mass index (BMI) of 40.0 to 44.9 in adult    We discussed weight issues and safe, effective ways of losing pounds, includin) diet:  low carbohydrate, low fat diet, stay away from fast food, fried and processed food, use whole grain, lot of fruits and vegetables, use healthy fat such as avocado, nuts and olive oil in reasonable quantity, stay away from sodas. Regular meals with lean proteins.  2) physical activity: ideally 150 min a week, with cardiovascular and resistance activity.  Patient was encouraged to set realistic attainable goals for weight loss, and we will follow up periodically.  Pt on semaglutide.    Primary osteoarthritis involving multiple joints  -     Ambulatory referral/consult to Pain Clinic; Future    Pt requested referral to Pain Management for a second opinion.    Chronic pain of both shoulders      Pt requested referral to Pain Management for a second opinion.    Sacroiliitis, not elsewhere classified  -     Ambulatory referral/consult to Pain Clinic; Future    Pt requested referral to Pain Management for a second opinion.    Refusal of blood transfusions as patient is Jain    Sickle cell trait    No change.      Follow up in about 3 months (around 2024) for f-up.    This note was created by combination of typed  and M-Modal dictation.  Transcription errors may be present.  If there are any questions, please contact me.

## 2024-03-01 DIAGNOSIS — I10 ESSENTIAL HYPERTENSION: ICD-10-CM

## 2024-03-01 NOTE — TELEPHONE ENCOUNTER
No care due was identified.  Health [facility] Embedded Care Due Messages. Reference number: [id number].   3/01/2024 3:18:15 AM CST

## 2024-03-02 RX ORDER — AMLODIPINE BESYLATE 10 MG/1
TABLET ORAL
Qty: 90 TABLET | Refills: 3 | Status: SHIPPED | OUTPATIENT
Start: 2024-03-02 | End: 2024-05-14

## 2024-03-02 NOTE — TELEPHONE ENCOUNTER
Refill Decision Note   Gwendolyn Fournier  is requesting a refill authorization.  Brief Assessment and Rationale for Refill:  Approve     Medication Therapy Plan:         Comments:     Note composed:5:32 PM 03/02/2024

## 2024-03-05 DIAGNOSIS — M54.50 ACUTE LEFT-SIDED LOW BACK PAIN WITHOUT SCIATICA: ICD-10-CM

## 2024-03-05 DIAGNOSIS — I10 ESSENTIAL HYPERTENSION: ICD-10-CM

## 2024-03-05 PROBLEM — E66.1 CLASS 3 DRUG-INDUCED OBESITY WITH SERIOUS COMORBIDITY AND BODY MASS INDEX (BMI) OF 40.0 TO 44.9 IN ADULT: Status: ACTIVE | Noted: 2024-03-05

## 2024-03-05 PROBLEM — E66.813 CLASS 3 DRUG-INDUCED OBESITY WITH SERIOUS COMORBIDITY AND BODY MASS INDEX (BMI) OF 40.0 TO 44.9 IN ADULT: Status: ACTIVE | Noted: 2024-03-05

## 2024-03-05 PROBLEM — Z53.1 REFUSAL OF BLOOD TRANSFUSIONS AS PATIENT IS JEHOVAH'S WITNESS: Status: ACTIVE | Noted: 2024-03-05

## 2024-03-05 RX ORDER — TIZANIDINE 4 MG/1
TABLET ORAL
Qty: 60 TABLET | Refills: 0 | Status: SHIPPED | OUTPATIENT
Start: 2024-03-05 | End: 2024-04-02

## 2024-03-05 RX ORDER — HYDROCHLOROTHIAZIDE 25 MG/1
TABLET ORAL
Qty: 90 TABLET | Refills: 2 | Status: SHIPPED | OUTPATIENT
Start: 2024-03-05 | End: 2024-05-14

## 2024-03-05 NOTE — TELEPHONE ENCOUNTER
Refill Routing Note   Medication(s) are not appropriate for processing by [facility] for the following reason(s):        Outside of protocol    [facility] action(s):  Route  Approve        Medication Therapy Plan: eGFR reviewed. Renal dose adj not needed at this time.    Pharmacist review requested: Yes     Appointments  past 12m or future 3m with PCP    Date Provider   Last Visit   2/29/2024 Merlyn Bruner MD   Next Visit   5/30/2024 Merlyn Bruner MD   ED visits in past 90 days: 1        Note composed:12:35 PM 03/05/2024

## 2024-03-05 NOTE — TELEPHONE ENCOUNTER
No care due was identified.  [facility] Embedded Care Due Messages. Reference number: [id number].   3/05/2024 5:58:43 AM CST

## 2024-03-05 NOTE — TELEPHONE ENCOUNTER
Refill Routing Note   Medication(s) are not appropriate for processing by [facility] for the following reason(s):        Required labs abnormal  Outside of protocol    [facility] action(s):  Defer  Route             Pharmacist review requested: Yes     Appointments  past 12m or future 3m with PCP    Date Provider   Last Visit   2/29/2024 Merlyn Bruner MD   Next Visit   5/30/2024 Merlyn Bruner MD   ED visits in past 90 days: 1        Note composed:12:21 PM 03/05/2024

## 2024-03-06 DIAGNOSIS — M54.50 ACUTE LEFT-SIDED LOW BACK PAIN WITHOUT SCIATICA: ICD-10-CM

## 2024-03-06 PROBLEM — F11.20 OPIOID DEPENDENCE, UNCOMPLICATED: Status: RESOLVED | Noted: 2023-02-03 | Resolved: 2024-03-06

## 2024-03-06 RX ORDER — TIZANIDINE 4 MG/1
TABLET ORAL
Qty: 60 TABLET | Refills: 0 | Status: CANCELLED | OUTPATIENT
Start: 2024-03-06

## 2024-03-06 NOTE — TELEPHONE ENCOUNTER
No care due was identified.  Health [facility] Embedded Care Due Messages. Reference number: [id number].   3/06/2024 1:33:13 AM CST

## 2024-03-07 ENCOUNTER — HOSPITAL ENCOUNTER (EMERGENCY)
Facility: HOSPITAL | Age: 69
Discharge: HOME | End: 2024-03-08
Attending: INTERNAL MEDICINE
Payer: MEDICARE

## 2024-03-07 DIAGNOSIS — R42 DIZZINESS: ICD-10-CM

## 2024-03-07 DIAGNOSIS — E87.6 HYPOKALEMIA: ICD-10-CM

## 2024-03-07 DIAGNOSIS — E86.0 DEHYDRATION: Primary | ICD-10-CM

## 2024-03-07 DIAGNOSIS — R42 ORTHOSTATIC DIZZINESS: ICD-10-CM

## 2024-03-07 LAB
ALBUMIN SERPL BCP-MCNC: 3.4 G/DL (ref 3.5–5.2)
ALP SERPL-CCNC: 119 U/L (ref 55–135)
ALT SERPL W/O P-5'-P-CCNC: 12 U/L (ref 10–44)
ANION GAP SERPL CALC-SCNC: 6 MMOL/L (ref 8–16)
AST SERPL-CCNC: 12 U/L (ref 10–40)
BASOPHILS # BLD AUTO: 0.05 K/UL (ref 0–0.2)
BASOPHILS NFR BLD: 0.4 % (ref 0–1.9)
BILIRUB SERPL-MCNC: 0.2 MG/DL (ref 0.1–1)
BUN SERPL-MCNC: 20 MG/DL (ref 8–23)
CALCIUM SERPL-MCNC: 10.8 MG/DL (ref 8.7–10.5)
CHLORIDE SERPL-SCNC: 99 MMOL/L (ref 95–110)
CO2 SERPL-SCNC: 34 MMOL/L (ref 23–29)
CREAT SERPL-MCNC: 1.8 MG/DL (ref 0.5–1.4)
CTP QC/QA: YES
CTP QC/QA: YES
DIFFERENTIAL METHOD BLD: ABNORMAL
EOSINOPHIL # BLD AUTO: 0.1 K/UL (ref 0–0.5)
EOSINOPHIL NFR BLD: 1.2 % (ref 0–8)
ERYTHROCYTE [DISTWIDTH] IN BLOOD BY AUTOMATED COUNT: 13.4 % (ref 11.5–14.5)
EST. GFR  (NO RACE VARIABLE): 30 ML/MIN/1.73 M^2
GLUCOSE SERPL-MCNC: 140 MG/DL (ref 70–110)
HCT VFR BLD AUTO: 33.8 % (ref 37–48.5)
HGB BLD-MCNC: 11 G/DL (ref 12–16)
IMM GRANULOCYTES # BLD AUTO: 0.04 K/UL (ref 0–0.04)
IMM GRANULOCYTES NFR BLD AUTO: 0.3 % (ref 0–0.5)
LYMPHOCYTES # BLD AUTO: 3.1 K/UL (ref 1–4.8)
LYMPHOCYTES NFR BLD: 26.1 % (ref 18–48)
MCH RBC QN AUTO: 29.9 PG (ref 27–31)
MCHC RBC AUTO-ENTMCNC: 32.5 G/DL (ref 32–36)
MCV RBC AUTO: 92 FL (ref 82–98)
MONOCYTES # BLD AUTO: 0.7 K/UL (ref 0.3–1)
MONOCYTES NFR BLD: 6.2 % (ref 4–15)
NEUTROPHILS # BLD AUTO: 7.9 K/UL (ref 1.8–7.7)
NEUTROPHILS NFR BLD: 65.8 % (ref 38–73)
NRBC BLD-RTO: 0 /100 WBC
PLATELET # BLD AUTO: 341 K/UL (ref 150–450)
PMV BLD AUTO: 9.7 FL (ref 9.2–12.9)
POC MOLECULAR INFLUENZA A AGN: NEGATIVE
POC MOLECULAR INFLUENZA B AGN: NEGATIVE
POCT GLUCOSE: 149 MG/DL (ref 70–110)
POTASSIUM SERPL-SCNC: 3 MMOL/L (ref 3.5–5.1)
PROT SERPL-MCNC: 7.1 G/DL (ref 6–8.4)
RBC # BLD AUTO: 3.68 M/UL (ref 4–5.4)
SARS-COV-2 RDRP RESP QL NAA+PROBE: NEGATIVE
SODIUM SERPL-SCNC: 139 MMOL/L (ref 136–145)
TROPONIN I SERPL DL<=0.01 NG/ML-MCNC: 0.01 NG/ML (ref 0–0.03)
WBC # BLD AUTO: 11.96 K/UL (ref 3.9–12.7)

## 2024-03-07 PROCEDURE — 25000003 PHARM REV CODE 250: Performed by: INTERNAL MEDICINE

## 2024-03-07 PROCEDURE — 93010 ELECTROCARDIOGRAM REPORT: CPT | Mod: ,,, | Performed by: INTERNAL MEDICINE

## 2024-03-07 PROCEDURE — 96360 HYDRATION IV INFUSION INIT: CPT

## 2024-03-07 PROCEDURE — 93005 ELECTROCARDIOGRAM TRACING: CPT

## 2024-03-07 PROCEDURE — 87502 INFLUENZA DNA AMP PROBE: CPT

## 2024-03-07 PROCEDURE — 84484 ASSAY OF TROPONIN QUANT: CPT | Performed by: INTERNAL MEDICINE

## 2024-03-07 PROCEDURE — 80053 COMPREHEN METABOLIC PANEL: CPT | Performed by: INTERNAL MEDICINE

## 2024-03-07 PROCEDURE — 87635 SARS-COV-2 COVID-19 AMP PRB: CPT | Performed by: INTERNAL MEDICINE

## 2024-03-07 PROCEDURE — 82962 GLUCOSE BLOOD TEST: CPT

## 2024-03-07 PROCEDURE — 96361 HYDRATE IV INFUSION ADD-ON: CPT

## 2024-03-07 PROCEDURE — 85025 COMPLETE CBC W/AUTO DIFF WBC: CPT | Performed by: INTERNAL MEDICINE

## 2024-03-07 PROCEDURE — 99284 EMERGENCY DEPT VISIT MOD MDM: CPT | Mod: 25

## 2024-03-07 RX ADMIN — SODIUM CHLORIDE 1000 ML: 9 INJECTION, SOLUTION INTRAVENOUS at 10:03

## 2024-03-08 VITALS
TEMPERATURE: 98 F | WEIGHT: 250 LBS | BODY MASS INDEX: 39.24 KG/M2 | SYSTOLIC BLOOD PRESSURE: 163 MMHG | DIASTOLIC BLOOD PRESSURE: 88 MMHG | RESPIRATION RATE: 20 BRPM | OXYGEN SATURATION: 95 % | HEART RATE: 89 BPM | HEIGHT: 67 IN

## 2024-03-08 LAB
OHS QRS DURATION: 88 MS
OHS QTC CALCULATION: 481 MS

## 2024-03-08 PROCEDURE — 25000003 PHARM REV CODE 250: Performed by: INTERNAL MEDICINE

## 2024-03-08 RX ADMIN — POTASSIUM BICARBONATE 60 MEQ: 391 TABLET, EFFERVESCENT ORAL at 12:03

## 2024-03-08 NOTE — ED TRIAGE NOTES
Pt arrived to ED via EMS with a c/o dizziness since tonight. Pt state she might have accidentally taken additional night sleeping pills. Denies dizziness at present. Pt also reports of 4 episodes of diarrhea today. Denies chest pain, SOB. Pt is alert and oriented. V/S within normal limit.

## 2024-03-08 NOTE — ED PROVIDER NOTES
Encounter Date: 3/7/2024       History     Chief Complaint   Patient presents with    Dizziness     Pt reports after taking her normal night time meds she started to feel dizzy. No new medications have been started. Denies any symptoms of illness prior to tonight. EMS reports that daughter was feeling ill as well.      69-year-old female with a past medical history significant for hypertension, opioid dependence, gout, sciatica and renal disease presents to the emergency department complaining of presents to the emergency department complaining of dizziness after taking her nighttime medications.  She also states her daughter complained of feeling similarly.  She denies fever/chills/nausea/vomiting/chest pain/shortness of breath.  She endorses decreased oral water intake and states she has had no oral water intake over the past 24 hours.    The history is provided by the patient. No  was used.     Review of patient's allergies indicates:   Allergen Reactions    Ondansetron hcl (pf) Hives and Itching    Ketorolac Itching     Past Medical History:   Diagnosis Date    Arthritis     Gout attack     Hx of psychiatric care     Hypertension     Opioid dependence, uncomplicated 02/03/2023    Psychiatric problem     Renal disorder     Sciatic nerve pain 2013    Therapy     used to follow with psychiatrist but doesn't recall whom, 2012    Tuberculosis     Unspecified cataract 07/27/2023    Mild on both eyes     Past Surgical History:   Procedure Laterality Date    COLONOSCOPY N/A 1/21/2019    Procedure: COLONOSCOPY;  Surgeon: Shanna Jose MD;  Location: [facility] ENDO;  Service: Endoscopy;  Laterality: N/A;    INJECTION OF JOINT Bilateral 3/13/2019    Procedure: INJECTION, JOINT BILATERAL SI JOINT;  Surgeon: Evan Coreas MD;  Location: [facility] PAIN MGT;  Service: Pain Management;  Laterality: Bilateral;  BILATERAL SI JOINT INJECTION    KNEE SURGERY  1/2014    left knee surgery      Family History   Problem  Relation Age of Onset    Tuberculosis Mother     Stroke Father     Bipolar disorder Daughter     Suicide Daughter     Depression Daughter     Bipolar disorder Daughter     Depression Daughter      Social History     Tobacco Use    Smoking status: Former     Current packs/day: 0.00     Types: Cigarettes     Quit date: 1976     Years since quittin.2    Smokeless tobacco: Never   Substance Use Topics    Alcohol use: Not Currently     Alcohol/week: 0.0 standard drinks of alcohol     Comment: red wine    Drug use: No     Review of Systems   Constitutional:  Negative for chills and fever.   Respiratory:  Negative for shortness of breath.    Cardiovascular:  Negative for chest pain.   Gastrointestinal:  Negative for nausea and vomiting.   Skin:  Negative for rash.   Neurological:  Positive for dizziness. Negative for seizures, facial asymmetry, numbness and headaches.   All other systems reviewed and are negative.      Physical Exam     Initial Vitals [24 2143]   BP Pulse Resp Temp SpO2   (!) 129/58 87 17 98.1 °F (36.7 °C) 95 %      MAP       --         Physical Exam    Nursing note and vitals reviewed.  Constitutional: She is not diaphoretic. No distress.   Obese   HENT:   Head: Normocephalic and atraumatic.   Right Ear: External ear normal.   Left Ear: External ear normal.   Mouth/Throat: Oropharynx is clear and moist.   Eyes: Conjunctivae are normal.   Neck: Neck supple.   Normal range of motion.  Cardiovascular:  Normal rate, regular rhythm and normal heart sounds.           Pulmonary/Chest: Breath sounds normal. No respiratory distress.   Abdominal: Abdomen is soft. Bowel sounds are normal. There is no abdominal tenderness.   Musculoskeletal:         General: Normal range of motion.      Cervical back: Normal range of motion and neck supple.     Neurological: She is alert. She has normal strength.   Skin: Skin is warm and dry. Capillary refill takes less than 2 seconds.   Psychiatric: She has a normal  mood and affect. Thought content normal.         ED Course   Procedures  Labs Reviewed   CBC W/ AUTO DIFFERENTIAL - Abnormal; Notable for the following components:       Result Value    RBC 3.68 (*)     Hemoglobin 11.0 (*)     Hematocrit 33.8 (*)     Gran # (ANC) 7.9 (*)     All other components within normal limits   COMPREHENSIVE METABOLIC PANEL - Abnormal; Notable for the following components:    Potassium 3.0 (*)     CO2 34 (*)     Glucose 140 (*)     Creatinine 1.8 (*)     Calcium 10.8 (*)     Albumin 3.4 (*)     eGFR 30 (*)     Anion Gap 6 (*)     All other components within normal limits   POCT GLUCOSE - Abnormal; Notable for the following components:    POCT Glucose 149 (*)     All other components within normal limits   TROPONIN I   POCT INFLUENZA A/B MOLECULAR   SARS-COV-2 RDRP GENE   POCT GLUCOSE MONITORING CONTINUOUS     EKG Readings: (Independently Interpreted)   Normal sinus rhythm, rate of 89 beats per minute, normal ST, nonspecific T-wave changes, no STEMI       Imaging Results    None          Medications   sodium chloride 0.9% bolus 1,000 mL 1,000 mL (0 mLs Intravenous Stopped 3/8/24 0042)   potassium bicarbonate disintegrating tablet 60 mEq (60 mEq Oral Given 3/8/24 0002)     Medical Decision Making  69-year-old female with a past medical history significant for hypertension, opioid dependence, gout, sciatica and renal disease presents to the emergency department complaining of presents to the emergency department complaining of dizziness after taking her nighttime medications.  She also states her daughter complained of feeling similarly.  She denies fever/chills/nausea/vomiting/chest pain/shortness of breath.  She endorses decreased oral water intake and states she has had no oral water intake over the past 24 hours.  1. Cardiovascular-patient has been hemodynamically stable throughout ED stay and chest pain-free.  EKG shows no acute disease.  Patient has orthostatic dizziness by history.  2.  Pulmonary-patient has had normal O2 sats on room air and no signs or symptoms of respiratory distress.    3. Hematology/infectious disease-rapid flu and COVID-19 screens were negative.  CBC was ordered reassuring.  Patient has been afebrile throughout ED stay.  4. GI/nutrition/renal/endocrine-CMP showed hypokalemia and signs of dehydration.  Saline bolus was given and patient states she feels much better after fluids.  Dizziness has resolved and patient was advised to follow-up with her primary care physician within the next week for re-evaluation/return to the emergency department if condition worsens.  Instructions for dehydration and orthostatic dizziness were given prior to discharge.    Amount and/or Complexity of Data Reviewed  Labs: ordered.                                      Clinical Impression:  Final diagnoses:  [R42] Dizziness  [E86.0] Dehydration (Primary)  [E87.6] Hypokalemia  [R42] Orthostatic dizziness          ED Disposition Condition    Discharge Stable          ED Prescriptions    None       Follow-up Information       Follow up With Specialties Details Why Contact Info    Merlyn Bruner MD Family Medicine, Internal Medicine Schedule an appointment as soon as possible for a visit in 1 week For reevaluation [address] [place] [address]  [phone]      [facility] - Emergency Dept Emergency Medicine Call  If symptoms worsen [address] [facility] [place] [address] [phone]             Joshua Lemus MD  03/08/24 0129

## 2024-03-11 ENCOUNTER — PATIENT OUTREACH (OUTPATIENT)
Dept: EMERGENCY MEDICINE | Facility: HOSPITAL | Age: 69
End: 2024-03-11
Payer: MEDICARE

## 2024-03-12 ENCOUNTER — OFFICE VISIT (OUTPATIENT)
Dept: PAIN MEDICINE | Facility: CLINIC | Age: 69
End: 2024-03-12
Payer: MEDICARE

## 2024-03-12 VITALS
HEART RATE: 94 BPM | OXYGEN SATURATION: 96 % | SYSTOLIC BLOOD PRESSURE: 172 MMHG | RESPIRATION RATE: 16 BRPM | DIASTOLIC BLOOD PRESSURE: 84 MMHG

## 2024-03-12 DIAGNOSIS — M47.816 LUMBAR SPONDYLOSIS: ICD-10-CM

## 2024-03-12 DIAGNOSIS — M46.1 SACROILIITIS, NOT ELSEWHERE CLASSIFIED: ICD-10-CM

## 2024-03-12 DIAGNOSIS — M15.9 PRIMARY OSTEOARTHRITIS INVOLVING MULTIPLE JOINTS: ICD-10-CM

## 2024-03-12 DIAGNOSIS — M54.16 LUMBAR RADICULOPATHY: ICD-10-CM

## 2024-03-12 DIAGNOSIS — M51.36 DDD (DEGENERATIVE DISC DISEASE), LUMBAR: Primary | ICD-10-CM

## 2024-03-12 PROCEDURE — 99999 PR PBB SHADOW E&M-EST. PATIENT-LVL V: CPT | Mod: PBBFAC,,, | Performed by: PAIN MEDICINE

## 2024-03-12 PROCEDURE — 99204 OFFICE O/P NEW MOD 45 MIN: CPT | Mod: S$GLB,,, | Performed by: PAIN MEDICINE

## 2024-03-12 NOTE — PROGRESS NOTES
Patient was seen in the ED on 3/7/24. Phoned patient on 2 separate occasions to assist with Post ED Discharge Navigation. Patient was unavailable. Encounter closed.  Elissa Jones

## 2024-03-12 NOTE — PROGRESS NOTES
Subjective:     Patient ID: Gwendolyn Fournier is a 69 y.o. female    Chief Complaint: Low-back Pain (Midline dull achy pain) and Arm Pain (Bilateral dull pain/Blistering on knuckles )      Referred by: Merlyn Bruner MD      HPI:    Initial Encounter (3/12/24):  Gwendolyn Fournier is a 69 y.o. female who presents today with chronic low back pain.  This pain has been present for years.  Patient is currently established with Dr. Corbin Caruso.  She was referred to me by her primary care physician to get a 2nd opinion.  Patient states that she has had back surgery for right lower extremity radicular pain.  She states that radicular has improved, but still has chronic low back pain.  Dr. Caruso was riding her opioid pain medications, but patient requested to be taken off of these medicines.  She has undergone epidurals in the past, and Dr. Caruso was planning on performing additional interventional procedures however, patient spoke with her brother who radiates concerns about the risks of these procedures patient was nervous to proceed.  Overall, she states that she is happy with Dr. Caruso and that she likes him.  She has not necessarily seeking to transfer care, but wanted know what options were available.  This pain is described in detail below.    Physical Therapy:  Yes.    Non-pharmacologic Treatment:  Rest helps         TENS?  No    Pain Medications:         Currently taking:  Gabapentin, Tylenol #4, Lidoderm patches, Cymbalta, tizanidine    Has tried in the past:  Norco, oxycodone, NSAIDs, Tylenol, tramadol    Has not tried:  TCAs    Blood thinners:  None    Interventional Therapies:   Patient reports previous lumbar epidurals    Relevant Surgeries:   Previous lumbar surgery    Affecting sleep?  Yes    Affecting daily activities? yes    Depressive symptoms? No          SI/HI? No    Work status: Retired    Pain Scores:    Best:       8/10  Worst:     10/10  Usually:   9/10  Today:    8/10    Pain Disability  Index  Family/Home Responsibilities:: 10  Social Activity:: 8  Self Care:: 8  Life-Support Activities:: 10    Review of Systems   Constitutional:  Negative for activity change, appetite change, chills, fatigue, fever and unexpected weight change.   HENT:  Negative for hearing loss.    Eyes:  Negative for visual disturbance.   Respiratory:  Negative for chest tightness and shortness of breath.    Cardiovascular:  Negative for chest pain.   Gastrointestinal:  Negative for abdominal pain, constipation, diarrhea, nausea and vomiting.   Genitourinary:  Negative for difficulty urinating.   Musculoskeletal:  Positive for arthralgias, back pain, gait problem and myalgias. Negative for neck pain.   Skin:  Negative for rash.   Neurological:  Negative for dizziness, weakness, light-headedness, numbness and headaches.   Psychiatric/Behavioral:  Positive for sleep disturbance. Negative for hallucinations and suicidal ideas. The patient is not nervous/anxious.        Past Medical History:   Diagnosis Date    Arthritis     Gout attack     Hx of psychiatric care     Hypertension     Opioid dependence, uncomplicated 02/03/2023    Psychiatric problem     Renal disorder     Sciatic nerve pain 2013    Therapy     used to follow with psychiatrist but doesn't recall whom, 2012    Tuberculosis     Unspecified cataract 07/27/2023    Mild on both eyes       Past Surgical History:   Procedure Laterality Date    COLONOSCOPY N/A 1/21/2019    Procedure: COLONOSCOPY;  Surgeon: Shanna Jose MD;  Location: [facility] ENDO;  Service: Endoscopy;  Laterality: N/A;    INJECTION OF JOINT Bilateral 3/13/2019    Procedure: INJECTION, JOINT BILATERAL SI JOINT;  Surgeon: Evan Coreas MD;  Location: [facility] PAIN MGT;  Service: Pain Management;  Laterality: Bilateral;  BILATERAL SI JOINT INJECTION    KNEE SURGERY  1/2014    left knee surgery        Social History     Socioeconomic History    Marital status:     Number of children: 5   Occupational  History    Occupation: homemaker   Tobacco Use    Smoking status: Former     Current packs/day: 0.00     Types: Cigarettes     Quit date: 1976     Years since quittin.2    Smokeless tobacco: Never   Substance and Sexual Activity    Alcohol use: Not Currently     Alcohol/week: 0.0 standard drinks of alcohol     Comment: red wine    Drug use: No    Sexual activity: Not Currently     Partners: Male     Social Determinants of Health     Financial Resource Strain: Low Risk  (10/9/2023)    Overall Financial Resource Strain (CARDIA)     Difficulty of Paying Living Expenses: Not hard at all   Food Insecurity: No Food Insecurity (10/9/2023)    Hunger Vital Sign     Worried About Running Out of Food in the Last Year: Never true     Ran Out of Food in the Last Year: Never true   Transportation Needs: No Transportation Needs (10/9/2023)    PRAPARE - Transportation     Lack of Transportation (Medical): No     Lack of Transportation (Non-Medical): No   Physical Activity: Unknown (10/10/2022)    Exercise Vital Sign     Days of Exercise per Week: 0 days   Stress: Stress Concern Present (10/10/2022)    [employer] [place] [employer] - Occupational Stress Questionnaire     Feeling of Stress : To some extent   Social Connections: Unknown (10/9/2023)    Social Connection and Isolation Panel [NHANES]     Frequency of Communication with Friends and Family: More than three times a week     Frequency of Social Gatherings with Friends and Family: Once a week     Active Member of Clubs or Organizations: Yes     Attends Club or Organization Meetings: More than 4 times per year     Marital Status:    Housing Stability: Low Risk  (10/9/2023)    Housing Stability Vital Sign     Unable to Pay for Housing in the Last Year: No     Number of Places Lived in the Last Year: 1     Unstable Housing in the Last Year: No       Review of patient's allergies indicates:   Allergen Reactions    Ondansetron hcl (pf) Hives and  Itching    Ketorolac Itching       Current Outpatient Medications on File Prior to Visit   Medication Sig Dispense Refill    acetaminophen (TYLENOL) 500 MG tablet Take 1 tablet (500 mg total) by mouth every 6 (six) hours as needed for Pain. 30 tablet 0    albuterol (ACCUNEB) 0.63 mg/3 mL Nebu Inhale 1 ampule into the lungs as needed.      allopurinoL (ZYLOPRIM) 100 MG tablet TAKE 1 TABLET(100 MG) BY MOUTH TWICE DAILY 180 tablet 3    amLODIPine (NORVASC) 10 MG tablet TAKE 1 TABLET(10 MG) BY MOUTH EVERY DAY 90 tablet 3    atorvastatin (LIPITOR) 40 MG tablet TAKE 1 TABLET(40 MG) BY MOUTH EVERY EVENING 90 tablet 0    blood sugar diagnostic Strp To check BG two times daily, freestyle lite meter 200 each 3    ciclopirox (PENLAC) 8 % Soln Apply topically nightly. 6.6 mL 3    diazePAM (VALIUM) 5 MG tablet TAKE 1 TABLET(5 MG) BY MOUTH EVERY 12 HOURS AS NEEDED FOR ANXIETY 60 tablet 2    diphenhydrAMINE (BENADRYL) 25 mg capsule Take 25 mg by mouth every 6 (six) hours as needed.      docusate sodium (COLACE) 100 MG capsule Take 1 capsule (100 mg total) by mouth as needed for Constipation. 90 capsule 3    DULoxetine (CYMBALTA) 60 MG capsule TAKE 1 CAPSULE(60 MG) BY MOUTH EVERY DAY 90 capsule 3    ergocalciferol (ERGOCALCIFEROL) 50,000 unit Cap TAKE 1 CAPSULE BY MOUTH EVERY 7 DAYS 12 capsule 0    EScitalopram oxalate (LEXAPRO) 10 MG tablet TAKE 1 TABLET(10 MG) BY MOUTH EVERY DAY 90 tablet 3    FARXIGA 10 mg tablet TAKE 1 TABLET(10 MG) BY MOUTH EVERY DAY 30 tablet 0    ferrous sulfate (FEOSOL) 325 mg (65 mg iron) Tab tablet Take 325 mg by mouth as needed.       flash glucose scanning reader (FREESTYLE HAI 2 READER) Misc 1 each by Misc.(Non-Drug; Combo Route) route 4 (four) times daily with meals and nightly. 1 each 0    flash glucose sensor (FREESTYLE HAI 2 SENSOR) Kit 1 each by Misc.(Non-Drug; Combo Route) route 2 hours after meals and at bedtime. 3 kit 11    furosemide (LASIX) 20 MG tablet TAKE 1 TABLET BY MOUTH AS NEEDED  FOR FLUID OVERLOAD 90 tablet 0    gabapentin (NEURONTIN) 300 MG capsule TAKE 1 CAPSULE(300 MG) BY MOUTH THREE TIMES DAILY 90 capsule 5    hydroCHLOROthiazide (HYDRODIURIL) 25 MG tablet TAKE 1 TABLET(25 MG) BY MOUTH EVERY DAY 90 tablet 2    HYDROcodone-acetaminophen (NORCO) 5-325 mg per tablet TK 1 T PO Q 12 H PRN      lancets Misc To check BG two times daily, to use with insurance preferred meter 200 each 3    LIDOcaine (LIDODERM) 5 % Place 1 patch onto the skin once daily. Apply patch for 12 hours and then leave off for 12 hours 15 patch 0    MOVANTIK 25 mg tablet Take 25 mg by mouth once daily.      mupirocin (BACTROBAN) 2 % ointment Apply topically 3 (three) times daily. 30 g 0    propranoloL (INDERAL) 10 MG tablet Take 1 tablet (10 mg total) by mouth 3 (three) times daily. 90 tablet 11    QUEtiapine (SEROQUEL) 100 MG Tab TAKE 1 TABLET(100 MG) BY MOUTH EVERY NIGHT AS NEEDED 90 tablet 1    semaglutide (OZEMPIC) 2 mg/dose (8 mg/3 mL) PnIj Inject 2 mg into the skin every 7 days. 3 mL 11    tiZANidine (ZANAFLEX) 4 MG tablet TAKE 1 TABLET(4 MG) BY MOUTH TWICE DAILY AS NEEDED FOR PAIN 60 tablet 0    traMADoL (ULTRAM) 50 mg tablet TAKE 1 TABLET(50 MG) BY MOUTH EVERY 8 HOURS AS NEEDED FOR PAIN 90 tablet 2    blood-glucose meter kit To check BG two times daily, freestyle lite meter 1 each 0    buPROPion (WELLBUTRIN) 100 MG tablet Take 100 mg by mouth every morning.      primidone (MYSOLINE) 50 MG Tab Take 2 tablets (100 mg total) by mouth 3 (three) times daily. 180 tablet 11     Current Facility-Administered Medications on File Prior to Visit   Medication Dose Route Frequency Provider Last Rate Last Admin    lidocaine (PF) 10 mg/ml (1%) injection 40 mg  4 mL Intramuscular 1 time in Clinic/HOD [place], Azikiwe K., MD           Objective:      BP (!) 172/84 (BP Location: Right arm, Patient Position: Sitting, BP Method: Large (Manual))   Pulse 94   Resp 16   SpO2 96%     Exam:  GEN:  Well developed, well nourished.  No  acute distress.  Normal pain behavior.  HEENT:  No trauma.  Mucous membranes moist.  Nares patent bilaterally.  PSYCH: Normal affect. Thought content appropriate.  CHEST:  Breathing symmetric.  No audible wheezing.  ABD: Soft, non-distended.  SKIN:  Warm, pink, dry.  No rash on exposed areas.    EXT:  No cyanosis, clubbing, or edema.  No color change or changes in nail or hair growth.  NEURO/MUSCULOSKELETAL:  Fully alert, oriented, and appropriate. Speech normal maurilio. No cranial nerve deficits.   Gait:  Antalgic.  Ambulates with single-point cane.  No trendelenburg sign bilaterally.   No focal motor deficits noted       Imaging:    MRI Lumbar Spine Without Contrast  Order: [id number]  Impression      Limited sagittal images of the cervical spine.  Mild reversal of the normal cervical lordosis.  Mild disc osteophyte complexes at C3-C4 and C4-C5.    No significant spinal canal narrowing demonstrated in the thoracic spine.      There are four lumbar vertebral bodies, designated L1-L4.  The vertebral body naming convention on this exam is different than the 3/15/2017 MR lumbar spine.      At L3-L4, there are surgical changes with a posterior fusion, similar to 3/15/2017.  Evidence of moderate-severe left neural foraminal narrowing, similar to 3/15/2017.      At T12-L1, there is a small left paracentral disc extrusion, extending 10 mm inferior to the disc space.  This extrusion was not present on 3/15/2017.      At L4-S1, there is moderate-severe bilateral facet hypertrophy.  The neural foramen are partially obscured by artifact.  There is evidence of moderate bilateral neural foraminal narrowing.      Assessment:       Encounter Diagnoses   Name Primary?    Primary osteoarthritis involving multiple joints     Sacroiliitis, not elsewhere classified      Plan:       Gwendolyn was seen today for low-back pain and arm pain.    Diagnoses and all orders for this visit:    Primary osteoarthritis involving multiple joints  -      Ambulatory referral/consult to Pain Clinic    Sacroiliitis, not elsewhere classified  -     Ambulatory referral/consult to Pain Clinic        Gwendolyn Fournier is a 69 y.o. female with chronic bilateral low back pain.  Here for a 2nd opinion.  States she is mainly here to see if I have any other options besides procedures to help with her back pain.    Pertinent imaging studies reviewed by me. Imaging results were discussed with patient.  We discussed that I do not have any specific treatments for the management of pain that Dr. Caruso does not offer.  She is happy with her current pain management doctor and was only seeking a 2nd opinion to discuss risks of interventional procedures.  We did discuss risks of epidural steroid injections and other spinal interventional procedures.  We discussed the overall risks of these procedures are low and that the chance of nerve damage/paralysis is not significant.  Patient stated that she would like to continue seeing Dr. Caruso.  Return to clinic as needed.          This note was created by combination of typed  and M-Modal dictation. Transcription and phonetic errors may be present.  If there are any questions, please contact me.

## 2024-03-14 RX ORDER — DAPAGLIFLOZIN 10 MG/1
10 TABLET, FILM COATED ORAL
Qty: 30 TABLET | Refills: 0 | Status: SHIPPED | OUTPATIENT
Start: 2024-03-14 | End: 2024-04-19 | Stop reason: SDUPTHER

## 2024-03-28 RX ORDER — ATORVASTATIN CALCIUM 40 MG/1
TABLET, FILM COATED ORAL
Qty: 90 TABLET | Refills: 0 | OUTPATIENT
Start: 2024-03-28

## 2024-04-02 DIAGNOSIS — M54.50 ACUTE LEFT-SIDED LOW BACK PAIN WITHOUT SCIATICA: ICD-10-CM

## 2024-04-02 RX ORDER — ATORVASTATIN CALCIUM 40 MG/1
40 TABLET, FILM COATED ORAL NIGHTLY
Qty: 30 TABLET | Refills: 0 | Status: SHIPPED | OUTPATIENT
Start: 2024-04-02

## 2024-04-02 RX ORDER — TIZANIDINE 4 MG/1
TABLET ORAL
Qty: 60 TABLET | Refills: 0 | Status: SHIPPED | OUTPATIENT
Start: 2024-04-02 | End: 2024-04-22 | Stop reason: SDUPTHER

## 2024-04-02 RX ORDER — FUROSEMIDE 20 MG/1
TABLET ORAL
Qty: 30 TABLET | Refills: 0 | Status: SHIPPED | OUTPATIENT
Start: 2024-04-02 | End: 2024-05-14

## 2024-04-02 NOTE — TELEPHONE ENCOUNTER
No care due was identified.  [facility] Embedded Care Due Messages. Reference number: [id number].   4/02/2024 8:59:48 AM CDT   Clothing

## 2024-04-03 DIAGNOSIS — F51.04 PSYCHOPHYSIOLOGICAL INSOMNIA: ICD-10-CM

## 2024-04-04 RX ORDER — QUETIAPINE FUMARATE 100 MG/1
TABLET, FILM COATED ORAL
Qty: 90 TABLET | Refills: 1 | Status: SHIPPED | OUTPATIENT
Start: 2024-04-04

## 2024-04-04 NOTE — TELEPHONE ENCOUNTER
No care due was identified.  [facility] Embedded Care Due Messages. Reference number: [id number].   4/03/2024 11:02:59 PM CDT

## 2024-04-10 ENCOUNTER — TELEPHONE (OUTPATIENT)
Dept: FAMILY MEDICINE | Facility: CLINIC | Age: 69
End: 2024-04-10
Payer: MEDICARE

## 2024-04-10 NOTE — TELEPHONE ENCOUNTER
Per Artie/[facility], the prior authorization for the Freestyle Margaret Sensor is being handled by one of their clinicians, it is still pending.  Stated Dr. Bruner's office will be notified of PA decision.  Nothing else is needed from Dr. Bruner's office.   Please be advised.     Reference # [id number]

## 2024-04-10 NOTE — TELEPHONE ENCOUNTER
----- Message from Georgia Grissom sent at 4/10/2024  3:20 PM CDT -----  Regarding: Steve [facility] [phone]   Type: Pharmacy Calling to Clarify an RX     Name of Caller:  Steve    Pharmacy Name:  [facility]     Prescription Name:  Valentin cespedes sensor     What do they need to clarify? If the fax was received for the pts sensor     Can you be contacted via [facility]? Call back     Best Call Back Number: [phone]    Additional Information:

## 2024-04-12 ENCOUNTER — TELEPHONE (OUTPATIENT)
Dept: FAMILY MEDICINE | Facility: CLINIC | Age: 69
End: 2024-04-12
Payer: MEDICARE

## 2024-04-12 NOTE — TELEPHONE ENCOUNTER
LVM advising the PA was denied. If call is returned, please have the patient to check her [facility].

## 2024-04-12 NOTE — TELEPHONE ENCOUNTER
----- Message from Calli Clinton sent at 4/12/2024  9:07 AM CDT -----  Regarding: self  Who called:        What is the request in detail: pt stated she need PA for flash glucose sensor (FREESTYLE HAI 2 SENSOR) Kit       Can the clinic reply by [facility]? No        Would the patient rather a call back or a response via [facility]? Call back        Best call back number:[phone]

## 2024-04-19 ENCOUNTER — TELEPHONE (OUTPATIENT)
Dept: FAMILY MEDICINE | Facility: CLINIC | Age: 69
End: 2024-04-19
Payer: MEDICARE

## 2024-04-19 NOTE — TELEPHONE ENCOUNTER
Zehra/patient's daughter checking status of prior authorization.  Daughter advised that prior authorization has been submitted, awaiting response.  Daughter verbalized understanding.

## 2024-04-19 NOTE — TELEPHONE ENCOUNTER
----- Message from Pk Hess sent at 4/19/2024 12:29 PM CDT -----  Type: Patient Call Back         Who called: KAMALA DELA CRUZ [[id number]]         What is the request in detail: Calling to speak to office about appeal for meds.         Can the clinic reply by [facility]? No         Would the patient rather a call back or a response via [facility]? Call back         Best call back number: Telephone Information:  Mobile          [phone]               Additional Information:         Thank you.

## 2024-04-22 DIAGNOSIS — M54.50 ACUTE LEFT-SIDED LOW BACK PAIN WITHOUT SCIATICA: ICD-10-CM

## 2024-04-22 DIAGNOSIS — N18.31 STAGE 3A CHRONIC KIDNEY DISEASE: Primary | ICD-10-CM

## 2024-04-22 RX ORDER — TIZANIDINE 4 MG/1
TABLET ORAL
Qty: 60 TABLET | Refills: 0 | Status: SHIPPED | OUTPATIENT
Start: 2024-04-22 | End: 2024-05-07 | Stop reason: SDUPTHER

## 2024-04-22 NOTE — TELEPHONE ENCOUNTER
Care Due:                  Date            Visit Type   Department     Provider  --------------------------------------------------------------------------------                                EP -                              PRIMARY      [facility]  Last Visit: 02-      CARE ([facility])   CAIN Bruner                              EP -                              PRIMARY      [facility]  Next Visit: 05-      CARE ([facility])   CAIN Bruner                                                            Last  Test          Frequency    Reason                     Performed    Due Date  --------------------------------------------------------------------------------    HBA1C.......  6 months...  semaglutide..............  09-   03-    Vitamin D...  12 months..  ergocalciferol...........  10-   10-    [facility] Embedded Care Due Messages. Reference number: [id number].   4/22/2024 10:00:28 AM CDT

## 2024-04-23 ENCOUNTER — PATIENT OUTREACH (OUTPATIENT)
Dept: ADMINISTRATIVE | Facility: HOSPITAL | Age: 69
End: 2024-04-23
Payer: MEDICARE

## 2024-04-23 ENCOUNTER — PATIENT MESSAGE (OUTPATIENT)
Dept: ADMINISTRATIVE | Facility: HOSPITAL | Age: 69
End: 2024-04-23
Payer: MEDICARE

## 2024-04-23 DIAGNOSIS — Z12.31 ENCOUNTER FOR SCREENING MAMMOGRAM FOR MALIGNANT NEOPLASM OF BREAST: Primary | ICD-10-CM

## 2024-04-23 RX ORDER — DAPAGLIFLOZIN 10 MG/1
10 TABLET, FILM COATED ORAL
Qty: 30 TABLET | Refills: 0 | Status: SHIPPED | OUTPATIENT
Start: 2024-04-23

## 2024-04-23 NOTE — PROGRESS NOTES
Population Health Chart Review & Patient Outreach Details      Additional [facility] Health Notes:    DUE FOR MAMMOGRAM, DEXA SCAN & A1C. IF ALREADY DONE, NEED TO GET RECORDS INFORMATION.  A1C LINK ORDER WITH NEXT LAB APPOINTMENT.  Left message for patient to return call.Sent [facility] message.            Updates Requested / Reviewed:      Updated Care Coordination Note and Care Everywhere         Health Maintenance Topics Overdue:      VBHM Score: 4     Osteoporosis Screening  Hemoglobin A1c  Mammogram  Uncontrolled BP    Tetanus Vaccine                  Health Maintenance Topic(s) Outreach Outcomes & Actions Taken:    Breast Cancer Screening - Outreach Outcomes & Actions Taken  : Mammogram Order Placed and lm and sent portal message.    Osteoporosis Screening - Outreach Outcomes & Actions Taken  : lm and sent portal message.    Lab(s) - Outreach Outcomes & Actions Taken  : Overdue Lab(s) Scheduled

## 2024-05-02 ENCOUNTER — TELEPHONE (OUTPATIENT)
Dept: FAMILY MEDICINE | Facility: CLINIC | Age: 69
End: 2024-05-02
Payer: MEDICARE

## 2024-05-02 NOTE — TELEPHONE ENCOUNTER
----- Message from Sofya Ford sent at 5/2/2024  4:37 PM CDT -----  Regarding: self [phone]  Type: Patient Call Back    Who called: self     What is the request in detail: Pt called in asking if her FreeStyle Margaret was approved. Stated she called in for it and wanted to know if ins approved it.     Can the clinic reply by [facility]? yes    Would the patient rather a call back or a response via [facility]? My chart     Best call back number: [phone]

## 2024-05-03 DIAGNOSIS — M54.50 ACUTE LEFT-SIDED LOW BACK PAIN WITHOUT SCIATICA: ICD-10-CM

## 2024-05-03 RX ORDER — TIZANIDINE 4 MG/1
TABLET ORAL
Qty: 60 TABLET | Refills: 0 | OUTPATIENT
Start: 2024-05-03

## 2024-05-03 NOTE — TELEPHONE ENCOUNTER
No care due was identified.  [facility] Embedded Care Due Messages. Reference number: [id number].   5/03/2024 11:28:16 AM CDT

## 2024-05-03 NOTE — TELEPHONE ENCOUNTER
----- Message from Cherelle Dontrell sent at 5/3/2024 11:17 AM CDT -----  Regarding: SELF [phone]  Type: RX Refill Request     Who Called: SELF    Refill or New Rx: REFILL     RX Name and Strength: tiZANidine (ZANAFLEX) 4 MG tablet          Preferred Pharmacy with phone number:   [facility] #[id number] - [address] AT [address] [address]  Phone: [phone] Fax: [phone]    Local or Mail Order: local     Ordering Provider: Indio Sandoval MD     Would the patient rather a call back or [facility]? Either     Best Call Back Number: [phone]

## 2024-05-03 NOTE — TELEPHONE ENCOUNTER
No care due was identified.  [facility] Embedded Care Due Messages. Reference number: [id number].   5/03/2024 9:24:06 AM CDT

## 2024-05-07 DIAGNOSIS — M54.50 ACUTE LEFT-SIDED LOW BACK PAIN WITHOUT SCIATICA: ICD-10-CM

## 2024-05-07 RX ORDER — TIZANIDINE 4 MG/1
TABLET ORAL
Qty: 60 TABLET | Refills: 0 | Status: SHIPPED | OUTPATIENT
Start: 2024-05-07 | End: 2024-06-03

## 2024-05-07 NOTE — TELEPHONE ENCOUNTER
----- Message from Suzie Lima LPN sent at 5/7/2024 12:11 PM CDT -----  Regarding: FW: self [phone]    ----- Message -----  From: Sofya Ford  Sent: 5/7/2024  12:11 PM CDT  To: Jaime Borjas Staff  Subject: self [phone]                                Type: RX Refill Request    Who Called:  self     Have you contacted your pharmacy: yes     Refill or New Rx: refill     RX Name and Strength:tiZANidine (ZANAFLEX) 4 MG tablet    Preferred Pharmacy with phone number:   [facility] #[id number] - [place] [address] AT [address]  [place] [address]  Phone: [phone] Fax: [phone]    Local or Mail Order: local     Would the patient rather a call back or a response via [facility]?  Call back     Best Call Back Number: [phone]    PT is out of medication.

## 2024-05-07 NOTE — TELEPHONE ENCOUNTER
No care due was identified.  [facility] Embedded Care Due Messages. Reference number: [id number].   5/07/2024 12:35:40 PM CDT

## 2024-05-10 DIAGNOSIS — M54.50 ACUTE LEFT-SIDED LOW BACK PAIN WITHOUT SCIATICA: ICD-10-CM

## 2024-05-10 RX ORDER — TIZANIDINE 4 MG/1
TABLET ORAL
Qty: 60 TABLET | Refills: 0 | OUTPATIENT
Start: 2024-05-10

## 2024-05-10 NOTE — TELEPHONE ENCOUNTER
No care due was identified.  [facility] Embedded Care Due Messages. Reference number: [id number].   5/10/2024 3:44:38 PM CDT

## 2024-05-13 ENCOUNTER — PATIENT MESSAGE (OUTPATIENT)
Dept: NEUROLOGY | Facility: CLINIC | Age: 69
End: 2024-05-13
Payer: MEDICARE

## 2024-05-14 ENCOUNTER — OFFICE VISIT (OUTPATIENT)
Dept: CARDIOLOGY | Facility: CLINIC | Age: 69
End: 2024-05-14
Payer: MEDICARE

## 2024-05-14 VITALS
HEART RATE: 110 BPM | HEIGHT: 67 IN | WEIGHT: 252.19 LBS | SYSTOLIC BLOOD PRESSURE: 152 MMHG | RESPIRATION RATE: 15 BRPM | OXYGEN SATURATION: 95 % | DIASTOLIC BLOOD PRESSURE: 74 MMHG | BODY MASS INDEX: 39.58 KG/M2

## 2024-05-14 DIAGNOSIS — N18.32 TYPE 2 DIABETES MELLITUS WITH STAGE 3B CHRONIC KIDNEY DISEASE, WITHOUT LONG-TERM CURRENT USE OF INSULIN: ICD-10-CM

## 2024-05-14 DIAGNOSIS — E66.01 SEVERE OBESITY (BMI 35.0-39.9) WITH COMORBIDITY: ICD-10-CM

## 2024-05-14 DIAGNOSIS — I42.1 HOCM (HYPERTROPHIC OBSTRUCTIVE CARDIOMYOPATHY): ICD-10-CM

## 2024-05-14 DIAGNOSIS — I47.10 SVT (SUPRAVENTRICULAR TACHYCARDIA): ICD-10-CM

## 2024-05-14 DIAGNOSIS — I10 ESSENTIAL HYPERTENSION: ICD-10-CM

## 2024-05-14 DIAGNOSIS — I70.0 AORTIC ATHEROSCLEROSIS: ICD-10-CM

## 2024-05-14 DIAGNOSIS — N18.30 STAGE 3 CHRONIC KIDNEY DISEASE, UNSPECIFIED WHETHER STAGE 3A OR 3B CKD: ICD-10-CM

## 2024-05-14 DIAGNOSIS — R06.02 SOB (SHORTNESS OF BREATH): ICD-10-CM

## 2024-05-14 DIAGNOSIS — R60.9 EDEMA, UNSPECIFIED TYPE: Primary | ICD-10-CM

## 2024-05-14 DIAGNOSIS — E11.22 TYPE 2 DIABETES MELLITUS WITH STAGE 3B CHRONIC KIDNEY DISEASE, WITHOUT LONG-TERM CURRENT USE OF INSULIN: ICD-10-CM

## 2024-05-14 PROCEDURE — 1157F ADVNC CARE PLAN IN RCRD: CPT | Mod: CPTII,S$GLB,, | Performed by: INTERNAL MEDICINE

## 2024-05-14 PROCEDURE — 3288F FALL RISK ASSESSMENT DOCD: CPT | Mod: CPTII,S$GLB,, | Performed by: INTERNAL MEDICINE

## 2024-05-14 PROCEDURE — 99999 PR PBB SHADOW E&M-EST. PATIENT-LVL V: CPT | Mod: PBBFAC,,, | Performed by: INTERNAL MEDICINE

## 2024-05-14 PROCEDURE — 1100F PTFALLS ASSESS-DOCD GE2>/YR: CPT | Mod: CPTII,S$GLB,, | Performed by: INTERNAL MEDICINE

## 2024-05-14 PROCEDURE — 99214 OFFICE O/P EST MOD 30 MIN: CPT | Mod: S$GLB,,, | Performed by: INTERNAL MEDICINE

## 2024-05-14 PROCEDURE — 3077F SYST BP >= 140 MM HG: CPT | Mod: CPTII,S$GLB,, | Performed by: INTERNAL MEDICINE

## 2024-05-14 PROCEDURE — 3078F DIAST BP <80 MM HG: CPT | Mod: CPTII,S$GLB,, | Performed by: INTERNAL MEDICINE

## 2024-05-14 PROCEDURE — 3008F BODY MASS INDEX DOCD: CPT | Mod: CPTII,S$GLB,, | Performed by: INTERNAL MEDICINE

## 2024-05-14 PROCEDURE — 1159F MED LIST DOCD IN RCRD: CPT | Mod: CPTII,S$GLB,, | Performed by: INTERNAL MEDICINE

## 2024-05-14 PROCEDURE — 1126F AMNT PAIN NOTED NONE PRSNT: CPT | Mod: CPTII,S$GLB,, | Performed by: INTERNAL MEDICINE

## 2024-05-14 RX ORDER — SPIRONOLACTONE 25 MG/1
25 TABLET ORAL DAILY
Qty: 90 TABLET | Refills: 3 | Status: SHIPPED | OUTPATIENT
Start: 2024-05-14

## 2024-05-14 RX ORDER — FUROSEMIDE 20 MG/1
20 TABLET ORAL
Qty: 90 TABLET | Refills: 3 | Status: SHIPPED | OUTPATIENT
Start: 2024-05-14 | End: 2024-06-12

## 2024-05-14 RX ORDER — AMLODIPINE BESYLATE 5 MG/1
5 TABLET ORAL DAILY
Qty: 90 TABLET | Refills: 3 | Status: SHIPPED | OUTPATIENT
Start: 2024-05-14

## 2024-05-14 RX ORDER — METOPROLOL SUCCINATE 50 MG/1
50 TABLET, EXTENDED RELEASE ORAL DAILY
Qty: 90 TABLET | Refills: 3 | Status: SHIPPED | OUTPATIENT
Start: 2024-05-14

## 2024-05-14 NOTE — PROGRESS NOTES
CARDIOVASCULAR CONSULTATION    REASON FOR CONSULT:   Gwendolyn Fournier is a 69 y.o. female who presents for evaluation.      HISTORY OF PRESENT ILLNESS:     Patient is a pleasant 66-year-old lady.  For the past few weeks has been experiencing worsening dyspnea on exertion as well as pedal edema.  Can hardly walk 10 ft and then has to stop because of significant shortness of breath.  Also bilateral leg swelling.  BNP was checked which was elevated at 182. Got an echocardiogram done earlier today the results of which are not available yet.  Denies chest pains at rest on exertion however exercise tolerance severely limited to 10 ft because of shortness of breath.  EKG done the clinic today was personally reviewed and shows sinus tachycardia left axis deviation, poor R-wave progression.  Has severe dyslipidemia, however has not been taking Lipitor.  She states that she misplaced that medication and hence has not been taking it.      Nov 21: Patient keeps on having frequent palpitations depite being on toprol xl. States that these symptoms go away after she puts ice towels on her face or takes deep breath. Holter showed SVT      The left ventricle is normal in size with concentric hypertrophy and hyperdynamic systolic function.  The estimated ejection fraction is 75%.  Normal right ventricular size with normal right ventricular systolic function.  Normal left ventricular diastolic function.  Mild left atrial enlargement.  Normal central venous pressure (3 mmHg).  TDS        Normal myocardial perfusion scan. There is no evidence of myocardial ischemia or infarction.    There is a mild intensity defect in the anteroapical wall of the left ventricle, secondary to breast attenuation.    The gated perfusion images showed an ejection fraction of 84% at rest.    There is normal wall motion at rest and post stress.    The EKG portion of this study is negative for ischemia.    The patient reported no chest pain during the stress  test.    There were no arrhythmias during stress.      Sinus rhythm with heart rates varying between 82 and 197 BPM with an average of 101 BPM  There were rare PVCs totalling 378 and averaging 7.88 per hour. There were 4 couplets. There were 24 bigeminal cycles. There were 1 triplets.  There were very frequent PACs  SVT with heart rate upwards of 200. Some episodes appear to atrial tachycardia.      Notes from February 23:  Patient here for follow-up after a long hiatus.  Main complaint is dyspnea on exertion.  Gets short of breath after walking only 10 ft.  Has not been using her sleep apnea machine.    Notes from May 2024: Patient here for follow-up.  After a long hiatus.  Main complaint is bilateral pedal edema and worsening dyspnea on exertion.      Results for orders placed during the hospital encounter of 08/22/23    Echo    Interpretation Summary    Left Ventricle: Normal wall motion. There is hyperdynamic systolic function with a visually estimated ejection fraction of greater than 70%. Grade I diastolic dysfunction. LVOT obstruction .  56 mm of gradient across LVOT with Valsalva    Left Atrium: Left atrium is mildly dilated.    Right Ventricle: Mild right ventricular enlargement. Systolic function is normal.    Right Atrium: Right atrium is mildly dilated.    Pulmonary Artery: The estimated pulmonary artery systolic pressure is 38 mmHg.    IVC/SVC: Intermediate venous pressure at 8 mmHg.    TDS        PAST MEDICAL HISTORY:     Past Medical History:   Diagnosis Date    Arthritis     Gout attack     Hx of psychiatric care     Hypertension     Opioid dependence, uncomplicated 02/03/2023    Psychiatric problem     Renal disorder     Sciatic nerve pain 2013    Therapy     used to follow with psychiatrist but doesn't recall whom, 2012    Tuberculosis     Unspecified cataract 07/27/2023    Mild on both eyes       PAST SURGICAL HISTORY:     Past Surgical History:   Procedure Laterality Date    COLONOSCOPY N/A  1/21/2019    Procedure: COLONOSCOPY;  Surgeon: Shanna Jose MD;  Location: [facility] ENDO;  Service: Endoscopy;  Laterality: N/A;    INJECTION OF JOINT Bilateral 3/13/2019    Procedure: INJECTION, JOINT BILATERAL SI JOINT;  Surgeon: Evan Coreas MD;  Location: [facility] PAIN MGT;  Service: Pain Management;  Laterality: Bilateral;  BILATERAL SI JOINT INJECTION    KNEE SURGERY  1/2014    left knee surgery        ALLERGIES AND MEDICATION:     Review of patient's allergies indicates:   Allergen Reactions    Ondansetron hcl (pf) Hives and Itching    Ketorolac Itching        Medication List            Accurate as of May 14, 2024 11:37 AM. If you have any questions, ask your nurse or doctor.                START taking these medications      metoprolol succinate 50 MG 24 hr tablet  Commonly known as: TOPROL-XL  Take 1 tablet (50 mg total) by mouth once daily.  Started by: Mazin Shankar MD     spironolactone 25 MG tablet  Commonly known as: ALDACTONE  Take 1 tablet (25 mg total) by mouth once daily.  Started by: Mazin Shankar MD            CHANGE how you take these medications      amLODIPine 5 MG tablet  Commonly known as: NORVASC  Take 1 tablet (5 mg total) by mouth once daily.  What changed:   medication strength  how much to take  when to take this  Changed by: Mazin Shankar MD     furosemide 20 MG tablet  Commonly known as: LASIX  Take 1 tablet (20 mg total) by mouth as needed (take 1-2 pills as needed for fluid overload).  What changed:   how much to take  how to take this  when to take this  reasons to take this  additional instructions  Changed by: Mazin Shankar MD            CONTINUE taking these medications      acetaminophen 500 MG tablet  Commonly known as: TYLENOL  Take 1 tablet (500 mg total) by mouth every 6 (six) hours as needed for Pain.     albuterol 0.63 mg/3 mL Nebu  Commonly known as: ACCUNEB     allopurinoL 100 MG tablet  Commonly known as: ZYLOPRIM  TAKE 1 TABLET(100 MG) BY MOUTH TWICE DAILY      atorvastatin 40 MG tablet  Commonly known as: LIPITOR  Take 1 tablet (40 mg total) by mouth every evening.     blood sugar diagnostic Strp  To check BG two times daily, freestyle lite meter     blood-glucose meter kit  To check BG two times daily, freestyle lite meter     buPROPion 100 MG tablet  Commonly known as: WELLBUTRIN     ciclopirox 8 % Soln  Commonly known as: PENLAC  Apply topically nightly.     diazePAM 5 MG tablet  Commonly known as: VALIUM  TAKE 1 TABLET(5 MG) BY MOUTH EVERY 12 HOURS AS NEEDED FOR ANXIETY     diphenhydrAMINE 25 mg capsule  Commonly known as: BENADRYL     docusate sodium 100 MG capsule  Commonly known as: COLACE  Take 1 capsule (100 mg total) by mouth as needed for Constipation.     DULoxetine 60 MG capsule  Commonly known as: CYMBALTA  TAKE 1 CAPSULE(60 MG) BY MOUTH EVERY DAY     ergocalciferol 50,000 unit Cap  Commonly known as: ERGOCALCIFEROL  TAKE 1 CAPSULE BY MOUTH EVERY 7 DAYS     EScitalopram oxalate 10 MG tablet  Commonly known as: LEXAPRO  TAKE 1 TABLET(10 MG) BY MOUTH EVERY DAY     FARXIGA 10 mg tablet  Generic drug: dapagliflozin propanediol  TAKE 1 TABLET(10 MG) BY MOUTH EVERY DAY     ferrous sulfate 325 mg (65 mg iron) Tab tablet  Commonly known as: FEOSOL     FREESTYLE HAI 2 READER [facility]  Generic drug: flash glucose scanning reader  1 each by Misc.(Non-Drug; Combo Route) route 4 (four) times daily with meals and nightly.     FREESTYLE HAI 2 SENSOR Kit  Generic drug: flash glucose sensor  1 each by Misc.(Non-Drug; Combo Route) route 2 hours after meals and at bedtime.     gabapentin 300 MG capsule  Commonly known as: NEURONTIN  TAKE 1 CAPSULE(300 MG) BY MOUTH THREE TIMES DAILY     HYDROcodone-acetaminophen 5-325 mg per tablet  Commonly known as: NORCO     lancets Misc  To check BG two times daily, to use with insurance preferred meter     LIDOcaine 5 %  Commonly known as: LIDODERM  Place 1 patch onto the skin once daily. Apply patch for 12 hours and then leave off for  12 hours     MOVANTIK 25 mg tablet  Generic drug: naloxegoL     mupirocin 2 % ointment  Commonly known as: BACTROBAN  Apply topically 3 (three) times daily.     OZEMPIC 2 mg/dose (8 mg/3 mL) Pnij  Generic drug: semaglutide  Inject 2 mg into the skin every 7 days.     primidone 50 MG Tab  Commonly known as: MYSOLINE  Take 2 tablets (100 mg total) by mouth 3 (three) times daily.     QUEtiapine 100 MG Tab  Commonly known as: SEROQUEL  TAKE 1 TABLET(100 MG) BY MOUTH EVERY NIGHT AS NEEDED     tiZANidine 4 MG tablet  Commonly known as: ZANAFLEX  TAKE 1 TABLET(4 MG) BY MOUTH TWICE DAILY AS NEEDED FOR PAIN     traMADoL 50 mg tablet  Commonly known as: ULTRAM  TAKE 1 TABLET(50 MG) BY MOUTH EVERY 8 HOURS AS NEEDED FOR PAIN            STOP taking these medications      hydroCHLOROthiazide 25 MG tablet  Commonly known as: HYDRODIURIL  Stopped by: Mazin Shankar MD               Where to Get Your Medications        These medications [facility] to [id number] - [address] AT [address] [place] [address]      Phone: [phone]   amLODIPine 5 MG tablet  furosemide 20 MG tablet  metoprolol succinate 50 MG 24 hr tablet  spironolactone 25 MG tablet         SOCIAL HISTORY:     Social History     Socioeconomic History    Marital status:     Number of children: 5   Occupational History    Occupation: homemaker   Tobacco Use    Smoking status: Former     Current packs/day: 0.00     Types: Cigarettes     Quit date: 1976     Years since quittin.4    Smokeless tobacco: Never   Substance and Sexual Activity    Alcohol use: Not Currently     Alcohol/week: 0.0 standard drinks of alcohol     Comment: red wine    Drug use: No    Sexual activity: Not Currently     Partners: Male     Social Determinants of Health     Financial Resource Strain: Low Risk  (10/9/2023)    Overall Financial Resource Strain (CARDIA)     Difficulty of Paying Living Expenses: Not hard  "at all   Food Insecurity: No Food Insecurity (10/9/2023)    Hunger Vital Sign     Worried About Running Out of Food in the Last Year: Never true     Ran Out of Food in the Last Year: Never true   Transportation Needs: No Transportation Needs (10/9/2023)    PRAPARE - Transportation     Lack of Transportation (Medical): No     Lack of Transportation (Non-Medical): No   Physical Activity: Unknown (10/10/2022)    Exercise Vital Sign     Days of Exercise per Week: 0 days   Stress: Stress Concern Present (10/10/2022)    [facility] [place] [facility] - Occupational Stress Questionnaire     Feeling of Stress : To some extent   Housing Stability: Low Risk  (10/9/2023)    Housing Stability Vital Sign     Unable to Pay for Housing in the Last Year: No     Number of Places Lived in the Last Year: 1     Unstable Housing in the Last Year: No       FAMILY HISTORY:     Family History   Problem Relation Name Age of Onset    Tuberculosis Mother      Stroke Father      Bipolar disorder Daughter      Suicide Daughter      Depression Daughter      Bipolar disorder Daughter      Depression Daughter         REVIEW OF SYSTEMS:   Review of Systems   Constitutional: Negative.   HENT: Negative.     Eyes: Negative.    Cardiovascular:  Positive for dyspnea on exertion, leg swelling and palpitations.   Respiratory:  Positive for shortness of breath.    Endocrine: Negative.    Hematologic/Lymphatic: Negative.    Skin: Negative.    Musculoskeletal: Negative.    Gastrointestinal: Negative.    Genitourinary: Negative.    Neurological: Negative.    Psychiatric/Behavioral: Negative.     Allergic/Immunologic: Negative.        A 10 point review of systems was performed and all the pertinent positives have been mentioned. Rest of review of systems was negative.        PHYSICAL EXAM:     Vitals:    05/14/24 1050   BP: (!) 152/74   Pulse: 110   Resp: 15    Body mass index is 39.5 kg/m².  Weight: 114.4 kg (252 lb 3.3 oz)   Height: 5' 7" (170.2 " cm)     Physical Exam  Constitutional:       Appearance: Normal appearance. She is well-developed.   HENT:      Head: Normocephalic.   Eyes:      Pupils: Pupils are equal, round, and reactive to light.   Cardiovascular:      Rate and Rhythm: Normal rate and regular rhythm.   Pulmonary:      Effort: Pulmonary effort is normal.      Breath sounds: Normal breath sounds.   Abdominal:      General: Bowel sounds are normal.      Palpations: Abdomen is soft.      Tenderness: There is no abdominal tenderness.   Musculoskeletal:         General: Normal range of motion.      Cervical back: Normal range of motion and neck supple.   Skin:     General: Skin is warm.   Neurological:      Mental Status: She is alert and oriented to person, place, and time.           DATA:     Laboratory:  CBC:  Recent Labs   Lab 09/14/23  0906 11/28/23  1356 03/07/24  2245   WBC 13.27 H 10.57 11.96   Hemoglobin 11.2 L 11.7 L 11.0 L   Hematocrit 34.7 L 37.0 33.8 L   Platelets 326 341 341       CHEMISTRIES:  Recent Labs   Lab 10/29/21  0819 12/15/21  1819 03/24/22  1055 09/07/22  1614 10/05/22  0714 12/09/22  1527 09/14/23  0906 11/28/23  1356 03/07/24  2245   Glucose 120 H 138 H 164 H  --  77   < > 98 88 140 H   Sodium 142 141 144  --  138   < > 141 142 139   Potassium 3.2 L 3.0 L 3.0 L  --  3.0 L   < > 3.8 4.2 3.0 L   BUN 24 H 23 34 H  --  26 H   < > 19 25 H 20   Creatinine 1.8 H 1.7 H 2.0 H   < > 1.5 H   < > 1.5 H 1.6 H 1.8 H   eGFR if  33.1 A 35 A 28.9 A  --   --   --   --   --   --    eGFR if non  28.7 A 31 A 25.1 A  --   --   --   --   --   --    Calcium 10.9 H 10.5 10.9 H  --  10.6 H   < > 10.5 10.6 H 10.8 H   Magnesium  --   --   --   --  1.9  --   --   --   --     < > = values in this interval not displayed.       CARDIAC BIOMARKERS:  Recent Labs   Lab 12/15/21  1819 03/07/24  2245   Troponin I 0.012 0.007       COAGS:        LIPIDS/LFTS:  Recent Labs   Lab 10/29/21  0819 12/15/21  1819 10/05/22  0714  04/17/23  1325 09/14/23  0906 11/28/23  1356 03/07/24  2245   Cholesterol 160  --  198  --  192  --   --    Triglycerides 163 H  --  245 H  --  249 H  --   --    HDL 40  --  36 L  --  34 L  --   --    LDL Cholesterol 87.4  --  113.0  --  108.2  --   --    Non-HDL Cholesterol 120  --  162  --  158  --   --    AST 12   < > 10   < > 14 13 12   ALT 13   < > 15   < > 15 11 12    < > = values in this interval not displayed.       Hemoglobin A1C   Date Value Ref Range Status   09/14/2023 5.4 4.0 - 5.6 % Final     Comment:     ADA Screening Guidelines:  5.7-6.4%  Consistent with prediabetes  >or=6.5%  Consistent with diabetes    High levels of fetal hemoglobin interfere with the HbA1C  assay. Heterozygous hemoglobin variants (HbS, HgC, etc)do  not significantly interfere with this assay.   However, presence of multiple variants may affect accuracy.     04/17/2023 6.1 (H) 4.0 - 5.6 % Final     Comment:     ADA Screening Guidelines:  5.7-6.4%  Consistent with prediabetes  >or=6.5%  Consistent with diabetes    High levels of fetal hemoglobin interfere with the HbA1C  assay. Heterozygous hemoglobin variants (HbS, HgC, etc)do  not significantly interfere with this assay.   However, presence of multiple variants may affect accuracy.     10/05/2022 5.6 4.0 - 5.6 % Final     Comment:     ADA Screening Guidelines:  5.7-6.4%  Consistent with prediabetes  >or=6.5%  Consistent with diabetes    High levels of fetal hemoglobin interfere with the HbA1C  assay. Heterozygous hemoglobin variants (HbS, HgC, etc)do  not significantly interfere with this assay.   However, presence of multiple variants may affect accuracy.         TSH  Recent Labs   Lab 02/06/23  1526 05/18/23  1422 09/14/23  0906   TSH 1.580 2.465 1.708       The 10-year ASCVD risk score (Melanie BURKETT, et al., 2019) is: 32.6%    Values used to calculate the score:      Age: 69 years      Sex: Female      Is Non- : Yes      Diabetic: Yes      Tobacco smoker:  No      Systolic Blood Pressure: 152 mmHg      Is BP treated: Yes      HDL Cholesterol: 34 mg/dL      Total Cholesterol: 192 mg/dL           ASSESSMENT AND PLAN     Patient Active Problem List   Diagnosis    Essential hypertension    Sciatica of left side    Primary osteoarthritis involving multiple joints    Idiopathic chronic gout of multiple sites without tophus    History of open reduction and internal fixation (ORIF) procedure    Stage 3 chronic kidney disease    Chronic pain    Severe obesity (BMI 35.0-39.9) with comorbidity    Obstructive sleep apnea    Sickle cell trait    Sacroiliitis, not elsewhere classified    Type 2 diabetes mellitus with stage 3b chronic kidney disease, without long-term current use of insulin    Major depressive disorder, recurrent, moderate    History of tuberculosis exposure    Poor posture    Decreased range of motion of trunk and back    Decreased strength of trunk and back    SVT (supraventricular tachycardia)    Aortic atherosclerosis    Tremor    Refusal of blood transfusions as patient is [employer]    Class 3 drug-induced obesity with serious comorbidity and body mass index (BMI) of 40.0 to 44.9 in adult       Patient with severe lifestyle limiting dyspnea on exertion.  Check stress test    Hypertrophic obstructive cardiomyopathy:  Initiate Toprol-XL 50 mg daily.  Titrate up to maximally tolerated dose.  Check cardiac MRI.    Switch from hydrochlorothiazide to spironolactone.  Decrease dose of Norvasc.  Monitor blood pressure at home.  Follow-up in 1 month for further titration of therapy and to follow-up on results    Chronic kidney disease    SVT: Continue beta blockers.     Follow-up  in Cardiology Clinic after testing          Thank you very much for involving me in the care of your patient.  Please do not hesitate to contact me if there are any questions.      Mazin Shankar MD, FACC, [facility]  Interventional Cardiologist, [facility].           This note was  dictated with the help of speech recognition software.  There might be un-intended errors and/or substitutions.

## 2024-05-16 RX ORDER — DAPAGLIFLOZIN 10 MG/1
10 TABLET, FILM COATED ORAL
Qty: 30 TABLET | Refills: 0 | OUTPATIENT
Start: 2024-05-16

## 2024-05-21 ENCOUNTER — LAB VISIT (OUTPATIENT)
Dept: LAB | Facility: HOSPITAL | Age: 69
End: 2024-05-21
Attending: INTERNAL MEDICINE
Payer: MEDICARE

## 2024-05-21 DIAGNOSIS — E11.22 TYPE 2 DIABETES MELLITUS WITH STAGE 3B CHRONIC KIDNEY DISEASE, WITHOUT LONG-TERM CURRENT USE OF INSULIN: ICD-10-CM

## 2024-05-21 DIAGNOSIS — N18.32 TYPE 2 DIABETES MELLITUS WITH STAGE 3B CHRONIC KIDNEY DISEASE, WITHOUT LONG-TERM CURRENT USE OF INSULIN: ICD-10-CM

## 2024-05-21 DIAGNOSIS — N18.31 STAGE 3A CHRONIC KIDNEY DISEASE: ICD-10-CM

## 2024-05-21 LAB
25(OH)D3+25(OH)D2 SERPL-MCNC: 39 NG/ML (ref 30–96)
ALBUMIN SERPL BCP-MCNC: 3.4 G/DL (ref 3.5–5.2)
ANION GAP SERPL CALC-SCNC: 9 MMOL/L (ref 8–16)
BASOPHILS # BLD AUTO: 0.06 K/UL (ref 0–0.2)
BASOPHILS NFR BLD: 0.4 % (ref 0–1.9)
BUN SERPL-MCNC: 23 MG/DL (ref 8–23)
CALCIUM SERPL-MCNC: 10.4 MG/DL (ref 8.7–10.5)
CHLORIDE SERPL-SCNC: 107 MMOL/L (ref 95–110)
CO2 SERPL-SCNC: 25 MMOL/L (ref 23–29)
CREAT SERPL-MCNC: 1.6 MG/DL (ref 0.5–1.4)
DIFFERENTIAL METHOD BLD: ABNORMAL
EOSINOPHIL # BLD AUTO: 0.4 K/UL (ref 0–0.5)
EOSINOPHIL NFR BLD: 3 % (ref 0–8)
ERYTHROCYTE [DISTWIDTH] IN BLOOD BY AUTOMATED COUNT: 13.7 % (ref 11.5–14.5)
EST. GFR  (NO RACE VARIABLE): 34.5 ML/MIN/1.73 M^2
ESTIMATED AVG GLUCOSE: 117 MG/DL (ref 68–131)
GLUCOSE SERPL-MCNC: 106 MG/DL (ref 70–110)
HBA1C MFR BLD: 5.7 % (ref 4–5.6)
HCT VFR BLD AUTO: 35 % (ref 37–48.5)
HGB BLD-MCNC: 11.1 G/DL (ref 12–16)
IMM GRANULOCYTES # BLD AUTO: 0.05 K/UL (ref 0–0.04)
IMM GRANULOCYTES NFR BLD AUTO: 0.4 % (ref 0–0.5)
LYMPHOCYTES # BLD AUTO: 4.2 K/UL (ref 1–4.8)
LYMPHOCYTES NFR BLD: 31.2 % (ref 18–48)
MCH RBC QN AUTO: 30.5 PG (ref 27–31)
MCHC RBC AUTO-ENTMCNC: 31.7 G/DL (ref 32–36)
MCV RBC AUTO: 96 FL (ref 82–98)
MONOCYTES # BLD AUTO: 0.9 K/UL (ref 0.3–1)
MONOCYTES NFR BLD: 6.8 % (ref 4–15)
NEUTROPHILS # BLD AUTO: 7.9 K/UL (ref 1.8–7.7)
NEUTROPHILS NFR BLD: 58.2 % (ref 38–73)
NRBC BLD-RTO: 0 /100 WBC
PHOSPHATE SERPL-MCNC: 3 MG/DL (ref 2.7–4.5)
PLATELET # BLD AUTO: 328 K/UL (ref 150–450)
PMV BLD AUTO: 10.7 FL (ref 9.2–12.9)
POTASSIUM SERPL-SCNC: 4.1 MMOL/L (ref 3.5–5.1)
RBC # BLD AUTO: 3.64 M/UL (ref 4–5.4)
SODIUM SERPL-SCNC: 141 MMOL/L (ref 136–145)
WBC # BLD AUTO: 13.57 K/UL (ref 3.9–12.7)

## 2024-05-21 PROCEDURE — 83036 HEMOGLOBIN GLYCOSYLATED A1C: CPT | Performed by: INTERNAL MEDICINE

## 2024-05-21 PROCEDURE — 36415 COLL VENOUS BLD VENIPUNCTURE: CPT | Mod: PO | Performed by: INTERNAL MEDICINE

## 2024-05-21 PROCEDURE — 85025 COMPLETE CBC W/AUTO DIFF WBC: CPT | Performed by: INTERNAL MEDICINE

## 2024-05-21 PROCEDURE — 82306 VITAMIN D 25 HYDROXY: CPT | Performed by: INTERNAL MEDICINE

## 2024-05-21 PROCEDURE — 80069 RENAL FUNCTION PANEL: CPT | Performed by: INTERNAL MEDICINE

## 2024-05-21 RX ORDER — FLASH GLUCOSE SENSOR
KIT MISCELLANEOUS
Qty: 6 KIT | Refills: 0 | Status: SHIPPED | OUTPATIENT
Start: 2024-05-21 | End: 2024-05-30 | Stop reason: SDUPTHER

## 2024-05-21 NOTE — TELEPHONE ENCOUNTER
No care due was identified.  [facility] Embedded Care Due Messages. Reference number: [id number].   5/21/2024 10:12:13 AM CDT

## 2024-05-27 RX ORDER — DAPAGLIFLOZIN 10 MG/1
10 TABLET, FILM COATED ORAL DAILY
Qty: 30 TABLET | Refills: 0 | Status: SHIPPED | OUTPATIENT
Start: 2024-05-27

## 2024-05-28 DIAGNOSIS — M54.32 SCIATICA OF LEFT SIDE: ICD-10-CM

## 2024-05-28 RX ORDER — GABAPENTIN 300 MG/1
300 CAPSULE ORAL 3 TIMES DAILY
Qty: 90 CAPSULE | Refills: 5 | Status: SHIPPED | OUTPATIENT
Start: 2024-05-28

## 2024-05-28 NOTE — TELEPHONE ENCOUNTER
No care due was identified.  [facility] Embedded Care Due Messages. Reference number: [id number].   5/28/2024 3:45:39 PM CDT

## 2024-05-30 ENCOUNTER — OFFICE VISIT (OUTPATIENT)
Dept: FAMILY MEDICINE | Facility: CLINIC | Age: 69
End: 2024-05-30
Payer: MEDICARE

## 2024-05-30 VITALS
WEIGHT: 248 LBS | HEART RATE: 82 BPM | TEMPERATURE: 98 F | RESPIRATION RATE: 16 BRPM | DIASTOLIC BLOOD PRESSURE: 76 MMHG | BODY MASS INDEX: 38.92 KG/M2 | OXYGEN SATURATION: 96 % | SYSTOLIC BLOOD PRESSURE: 156 MMHG | HEIGHT: 67 IN

## 2024-05-30 DIAGNOSIS — N18.32 STAGE 3B CHRONIC KIDNEY DISEASE: ICD-10-CM

## 2024-05-30 DIAGNOSIS — N18.32 TYPE 2 DIABETES MELLITUS WITH STAGE 3B CHRONIC KIDNEY DISEASE, WITHOUT LONG-TERM CURRENT USE OF INSULIN: ICD-10-CM

## 2024-05-30 DIAGNOSIS — M46.1 SACROILIITIS, NOT ELSEWHERE CLASSIFIED: ICD-10-CM

## 2024-05-30 DIAGNOSIS — I42.1 HYPERTROPHIC OBSTRUCTIVE CARDIOMYOPATHY: ICD-10-CM

## 2024-05-30 DIAGNOSIS — I10 ESSENTIAL HYPERTENSION: Primary | ICD-10-CM

## 2024-05-30 DIAGNOSIS — F33.1 MAJOR DEPRESSIVE DISORDER, RECURRENT, MODERATE: ICD-10-CM

## 2024-05-30 DIAGNOSIS — R06.02 SHORTNESS OF BREATH: ICD-10-CM

## 2024-05-30 DIAGNOSIS — E66.01 CLASS 2 SEVERE OBESITY DUE TO EXCESS CALORIES WITH SERIOUS COMORBIDITY AND BODY MASS INDEX (BMI) OF 38.0 TO 38.9 IN ADULT: ICD-10-CM

## 2024-05-30 DIAGNOSIS — Z53.1 REFUSAL OF BLOOD TRANSFUSIONS AS PATIENT IS JEHOVAH'S WITNESS: ICD-10-CM

## 2024-05-30 DIAGNOSIS — E11.22 TYPE 2 DIABETES MELLITUS WITH STAGE 3B CHRONIC KIDNEY DISEASE, WITHOUT LONG-TERM CURRENT USE OF INSULIN: ICD-10-CM

## 2024-05-30 DIAGNOSIS — M15.9 PRIMARY OSTEOARTHRITIS INVOLVING MULTIPLE JOINTS: ICD-10-CM

## 2024-05-30 PROCEDURE — 1160F RVW MEDS BY RX/DR IN RCRD: CPT | Mod: CPTII,S$GLB,, | Performed by: INTERNAL MEDICINE

## 2024-05-30 PROCEDURE — 3288F FALL RISK ASSESSMENT DOCD: CPT | Mod: CPTII,S$GLB,, | Performed by: INTERNAL MEDICINE

## 2024-05-30 PROCEDURE — 99214 OFFICE O/P EST MOD 30 MIN: CPT | Mod: S$GLB,,, | Performed by: INTERNAL MEDICINE

## 2024-05-30 PROCEDURE — 1126F AMNT PAIN NOTED NONE PRSNT: CPT | Mod: CPTII,S$GLB,, | Performed by: INTERNAL MEDICINE

## 2024-05-30 PROCEDURE — 3078F DIAST BP <80 MM HG: CPT | Mod: CPTII,S$GLB,, | Performed by: INTERNAL MEDICINE

## 2024-05-30 PROCEDURE — 3008F BODY MASS INDEX DOCD: CPT | Mod: CPTII,S$GLB,, | Performed by: INTERNAL MEDICINE

## 2024-05-30 PROCEDURE — 99999 PR PBB SHADOW E&M-EST. PATIENT-LVL V: CPT | Mod: PBBFAC,,, | Performed by: INTERNAL MEDICINE

## 2024-05-30 PROCEDURE — 3077F SYST BP >= 140 MM HG: CPT | Mod: CPTII,S$GLB,, | Performed by: INTERNAL MEDICINE

## 2024-05-30 PROCEDURE — 1101F PT FALLS ASSESS-DOCD LE1/YR: CPT | Mod: CPTII,S$GLB,, | Performed by: INTERNAL MEDICINE

## 2024-05-30 PROCEDURE — 3044F HG A1C LEVEL LT 7.0%: CPT | Mod: CPTII,S$GLB,, | Performed by: INTERNAL MEDICINE

## 2024-05-30 PROCEDURE — 1157F ADVNC CARE PLAN IN RCRD: CPT | Mod: CPTII,S$GLB,, | Performed by: INTERNAL MEDICINE

## 2024-05-30 PROCEDURE — 1159F MED LIST DOCD IN RCRD: CPT | Mod: CPTII,S$GLB,, | Performed by: INTERNAL MEDICINE

## 2024-05-30 RX ORDER — FLASH GLUCOSE SCANNING READER
1 EACH MISCELLANEOUS
Qty: 1 EACH | Refills: 0 | Status: CANCELLED | OUTPATIENT
Start: 2024-05-30

## 2024-05-30 RX ORDER — SEMAGLUTIDE 1.34 MG/ML
1 INJECTION, SOLUTION SUBCUTANEOUS
Qty: 3 ML | Refills: 11 | Status: SHIPPED | OUTPATIENT
Start: 2024-05-30 | End: 2025-05-30

## 2024-05-30 RX ORDER — FLASH GLUCOSE SCANNING READER
1 EACH MISCELLANEOUS
Qty: 1 EACH | Refills: 0 | Status: SHIPPED | OUTPATIENT
Start: 2024-05-30

## 2024-05-30 RX ORDER — FLASH GLUCOSE SENSOR
KIT MISCELLANEOUS
Qty: 6 KIT | Refills: 0 | Status: SHIPPED | OUTPATIENT
Start: 2024-05-30

## 2024-05-30 RX ORDER — SEMAGLUTIDE 2.68 MG/ML
1 INJECTION, SOLUTION SUBCUTANEOUS
Qty: 1.5 ML | Refills: 11 | Status: CANCELLED | OUTPATIENT
Start: 2024-05-30 | End: 2025-05-30

## 2024-05-30 RX ORDER — FLASH GLUCOSE SENSOR
KIT MISCELLANEOUS
Qty: 6 KIT | Refills: 0 | Status: CANCELLED | OUTPATIENT
Start: 2024-05-30

## 2024-05-30 NOTE — PROGRESS NOTES
"Subjective:       Patient ID: Gwendolyn Fournier is a pleasant 69 y.o. Black or  female patient    Chief Complaint: Follow-up      Patient is a patient I saw last on 02/29/2024.  See my last notes.     HPI:     Patient with past medical history as per list of problems below coming today for a regular follow-up visit.    From our last encounter:  - Pain management   - Dr. Shankar, Cardiology re: SOB and LE edema. Last visit on 05/14/2024. See his excellent notes.  Adjusted medications, ordered stress test, cardiac MRI, and sleep study. Monthly f-up.  She comes today with her daughter as always.  Her daughter is my next patient.  She reports feeling globally fine.  She comes with a request of clearance for bariatric surgery that would be done at [facility].  I cannot access the notes from the specialist.  She states she is supposed to also see a Cardiologist at  for the clearance?  She is on Ozempic but states she has not been losing weight.  However her lifestyle is not so good, she states she is "custodial there. She is still drinks soft drinks and has fast food more than she is is supposed to. Her daughter tries to help her as she herself had bariatric surgery and is doing great regarding her diet.  So far the pt lost 9 lb from last year.  She wonders if she could receive Mounjaro instead of Ozempic as her son received  Mounjaro and he lost a lot of weight.  However his lifestyle is good.  She states could not obtain her Freestyle recently, she asks for this to be sent to [facility].  She reports that her blood sugar may drop in the evening sometimes to 60, however did not check recently.    She is also on Farxiga.  She finally struggles with ambulation, she may benefit of a Rollator.    Patient Active Problem List   Diagnosis    Essential hypertension    Sciatica of left side    Primary osteoarthritis involving multiple joints    Idiopathic chronic gout of multiple sites without tophus    History of " open reduction and internal fixation (ORIF) procedure    Stage 3 chronic kidney disease    Chronic pain    Severe obesity (BMI 35.0-39.9) with comorbidity    Obstructive sleep apnea    Sickle cell trait    Sacroiliitis, not elsewhere classified    Type 2 diabetes mellitus with stage 3b chronic kidney disease, without long-term current use of insulin    Major depressive disorder, recurrent, moderate    History of tuberculosis exposure    Poor posture    Decreased range of motion of trunk and back    Decreased strength of trunk and back    SVT (supraventricular tachycardia)    Aortic atherosclerosis    Tremor    Refusal of blood transfusions as patient is Religion    Class 3 drug-induced obesity with serious comorbidity and body mass index (BMI) of 40.0 to 44.9 in adult         PAST MEDICAL HISTORY  Past Medical History:   Diagnosis Date    Arthritis     Gout attack     Hx of psychiatric care     Hypertension     Opioid dependence, uncomplicated 02/03/2023    Psychiatric problem     Renal disorder     Sciatic nerve pain 2013    Therapy     used to follow with psychiatrist but doesn't recall whom, 2012    Tuberculosis     Unspecified cataract 07/27/2023    Mild on both eyes        PAST SURGICAL HISTORY:  Past Surgical History:   Procedure Laterality Date    COLONOSCOPY N/A 1/21/2019    Procedure: COLONOSCOPY;  Surgeon: Shanna Jose MD;  Location: [facility] ENDO;  Service: Endoscopy;  Laterality: N/A;    INJECTION OF JOINT Bilateral 3/13/2019    Procedure: INJECTION, JOINT BILATERAL SI JOINT;  Surgeon: Evan Coreas MD;  Location: [facility] PAIN MGT;  Service: Pain Management;  Laterality: Bilateral;  BILATERAL SI JOINT INJECTION    KNEE SURGERY  1/2014    left knee surgery         SOCIAL HISTORY:   reports that she quit smoking about 47 years ago. Her smoking use included cigarettes. She has never used smokeless tobacco. She reports that she does not currently use alcohol. She reports that she does not use  drugs.     FAMILY HISTORY:  Family History   Problem Relation Name Age of Onset    Tuberculosis Mother      Stroke Father      Bipolar disorder Daughter      Suicide Daughter      Depression Daughter      Bipolar disorder Daughter      Depression Daughter          ALLERGIES:   Review of patient's allergies indicates:   Allergen Reactions    Ondansetron hcl (pf) Hives and Itching    Ketorolac Itching       MEDICATIONS:    Current Outpatient Medications:     acetaminophen (TYLENOL) 500 MG tablet, Take 1 tablet (500 mg total) by mouth every 6 (six) hours as needed for Pain., Disp: 30 tablet, Rfl: 0    albuterol (ACCUNEB) 0.63 mg/3 mL Nebu, Inhale 1 ampule into the lungs as needed., Disp: , Rfl:     allopurinoL (ZYLOPRIM) 100 MG tablet, TAKE 1 TABLET(100 MG) BY MOUTH TWICE DAILY, Disp: 180 tablet, Rfl: 3    amLODIPine (NORVASC) 5 MG tablet, Take 1 tablet (5 mg total) by mouth once daily., Disp: 90 tablet, Rfl: 3    atorvastatin (LIPITOR) 40 MG tablet, Take 1 tablet (40 mg total) by mouth every evening., Disp: 30 tablet, Rfl: 0    blood sugar diagnostic Strp, To check BG two times daily, freestyle lite meter, Disp: 200 each, Rfl: 3    blood-glucose meter kit, To check BG two times daily, freestyle lite meter, Disp: 1 each, Rfl: 0    buPROPion (WELLBUTRIN) 100 MG tablet, Take 100 mg by mouth every morning., Disp: , Rfl:     ciclopirox (PENLAC) 8 % Soln, Apply topically nightly., Disp: 6.6 mL, Rfl: 3    dapagliflozin propanediol (FARXIGA) 10 mg tablet, Take 1 tablet (10 mg total) by mouth once daily., Disp: 30 tablet, Rfl: 0    diazePAM (VALIUM) 5 MG tablet, TAKE 1 TABLET(5 MG) BY MOUTH EVERY 12 HOURS AS NEEDED FOR ANXIETY, Disp: 60 tablet, Rfl: 2    diphenhydrAMINE (BENADRYL) 25 mg capsule, Take 25 mg by mouth every 6 (six) hours as needed., Disp: , Rfl:     docusate sodium (COLACE) 100 MG capsule, Take 1 capsule (100 mg total) by mouth as needed for Constipation., Disp: 90 capsule, Rfl: 3    DULoxetine (CYMBALTA) 60 MG  capsule, TAKE 1 CAPSULE(60 MG) BY MOUTH EVERY DAY, Disp: 90 capsule, Rfl: 3    ergocalciferol (ERGOCALCIFEROL) 50,000 unit Cap, TAKE 1 CAPSULE BY MOUTH EVERY 7 DAYS, Disp: 12 capsule, Rfl: 0    EScitalopram oxalate (LEXAPRO) 10 MG tablet, TAKE 1 TABLET(10 MG) BY MOUTH EVERY DAY, Disp: 90 tablet, Rfl: 3    FARXIGA 10 mg tablet, TAKE 1 TABLET(10 MG) BY MOUTH EVERY DAY, Disp: 30 tablet, Rfl: 0    ferrous sulfate (FEOSOL) 325 mg (65 mg iron) Tab tablet, Take 325 mg by mouth as needed. , Disp: , Rfl:     furosemide (LASIX) 20 MG tablet, Take 1 tablet (20 mg total) by mouth as needed (take 1-2 pills as needed for fluid overload)., Disp: 90 tablet, Rfl: 3    gabapentin (NEURONTIN) 300 MG capsule, TAKE 1 CAPSULE(300 MG) BY MOUTH THREE TIMES DAILY, Disp: 90 capsule, Rfl: 5    HYDROcodone-acetaminophen (NORCO) 5-325 mg per tablet, TK 1 T PO Q 12 H PRN, Disp: , Rfl:     lancets Misc, To check BG two times daily, to use with insurance preferred meter, Disp: 200 each, Rfl: 3    LIDOcaine (LIDODERM) 5 %, Place 1 patch onto the skin once daily. Apply patch for 12 hours and then leave off for 12 hours, Disp: 15 patch, Rfl: 0    metoprolol succinate (TOPROL-XL) 50 MG 24 hr tablet, Take 1 tablet (50 mg total) by mouth once daily., Disp: 90 tablet, Rfl: 3    MOVANTIK 25 mg tablet, Take 25 mg by mouth once daily., Disp: , Rfl:     mupirocin (BACTROBAN) 2 % ointment, Apply topically 3 (three) times daily., Disp: 30 g, Rfl: 0    primidone (MYSOLINE) 50 MG Tab, Take 2 tablets (100 mg total) by mouth 3 (three) times daily., Disp: 180 tablet, Rfl: 11    QUEtiapine (SEROQUEL) 100 MG Tab, TAKE 1 TABLET(100 MG) BY MOUTH EVERY NIGHT AS NEEDED, Disp: 90 tablet, Rfl: 1    spironolactone (ALDACTONE) 25 MG tablet, Take 1 tablet (25 mg total) by mouth once daily., Disp: 90 tablet, Rfl: 3    tiZANidine (ZANAFLEX) 4 MG tablet, TAKE 1 TABLET(4 MG) BY MOUTH TWICE DAILY AS NEEDED FOR PAIN, Disp: 60 tablet, Rfl: 0    flash glucose scanning reader  (FREESTYLE HAI 2 READER) Misc, 1 each by Misc.(Non-Drug; Combo Route) route 4 (four) times daily with meals and nightly., Disp: 1 each, Rfl: 0    flash glucose sensor (FREESTYLE HAI 2 SENSOR) Kit, Use as directed to check blood sugar, Disp: 6 kit, Rfl: 0    semaglutide (OZEMPIC) 1 mg/dose (4 mg/3 mL), Inject 1 mg into the skin every 7 days., Disp: 3 mL, Rfl: 11    Current Facility-Administered Medications:     lidocaine (PF) 10 mg/ml (1%) injection 40 mg, 4 mL, Intramuscular, 1 time in Clinic/HOD, [place], Azikiwe K., MD    Review of Systems   Constitutional:  Positive for activity change. Negative for appetite change, fatigue, fever and unexpected weight change.   HENT:  Negative for congestion, postnasal drip, rhinorrhea, sinus pressure and sore throat.    Eyes:  Negative for pain, discharge and redness.   Respiratory:  Positive for shortness of breath (at effort). Negative for cough and chest tightness.    Cardiovascular:  Negative for chest pain, palpitations and leg swelling.   Gastrointestinal:  Negative for abdominal pain, constipation and nausea.   Endocrine: Negative for cold intolerance and heat intolerance.   Genitourinary:  Negative for difficulty urinating, flank pain, frequency, menstrual problem, pelvic pain, urgency and vaginal discharge.   Musculoskeletal:  Positive for arthralgias (multiple joints), back pain and neck pain. Negative for joint swelling and myalgias.   Skin:  Negative for color change and rash.   Allergic/Immunologic: Negative for environmental allergies and food allergies.   Neurological:  Negative for weakness, numbness and headaches.   Hematological:  Negative for adenopathy.   Psychiatric/Behavioral:  Negative for behavioral problems, hallucinations, sleep disturbance and suicidal ideas. The patient is not nervous/anxious.        Objective:      Physical Exam  Vitals and nursing note reviewed.   Constitutional:       Appearance: Normal appearance. She is well-developed. She  "is obese.      Comments: Bilateral feet and ankle swelling, 2 +   HENT:      Right Ear: Tympanic membrane and external ear normal.      Left Ear: Tympanic membrane and external ear normal.   Eyes:      Conjunctiva/sclera: Conjunctivae normal.   Neck:      Thyroid: No thyromegaly.   Cardiovascular:      Rate and Rhythm: Normal rate and regular rhythm.      Pulses: Normal pulses.      Heart sounds: Normal heart sounds.   Pulmonary:      Effort: Pulmonary effort is normal. No respiratory distress.      Breath sounds: Normal breath sounds. No wheezing.   Abdominal:      General: Bowel sounds are normal.      Palpations: Abdomen is soft. There is no mass.      Tenderness: There is no abdominal tenderness.   Musculoskeletal:         General: Normal range of motion.      Cervical back: Normal range of motion and neck supple.      Comments: Struggles to walk   Lymphadenopathy:      Cervical: No cervical adenopathy.   Skin:     General: Skin is warm and dry.   Neurological:      Mental Status: She is alert and oriented to person, place, and time. Mental status is at baseline.   Psychiatric:         Mood and Affect: Mood normal.         Behavior: Behavior normal.         Thought Content: Thought content normal.         Judgment: Judgment normal.         Vitals:    05/30/24 1403   BP: (!) 156/76   BP Location: Left arm   Patient Position: Sitting   BP Method: Large (Manual)   Pulse: 82   Resp: 16   Temp: 97.6 °F (36.4 °C)   TempSrc: Oral   SpO2: 96%   Weight: 112.5 kg (248 lb 0.3 oz)   Height: 5' 7" (1.702 m)     Body mass index is 38.85 kg/m².    RESULTS: Reviewed labs from last 12 months    Last Lab Results:     Lab Results   Component Value Date    WBC 13.57 (H) 05/21/2024    HGB 11.1 (L) 05/21/2024    HCT 35.0 (L) 05/21/2024     05/21/2024     05/21/2024    K 4.1 05/21/2024     05/21/2024    CO2 25 05/21/2024    BUN 23 05/21/2024    CREATININE 1.6 (H) 05/21/2024    CALCIUM 10.4 05/21/2024    ALBUMIN 3.4 " (L) 05/21/2024    AST 12 03/07/2024    ALT 12 03/07/2024    CHOL 192 09/14/2023    TRIG 249 (H) 09/14/2023    HDL 34 (L) 09/14/2023    LDLCALC 108.2 09/14/2023    HGBA1C 5.7 (H) 05/21/2024    TSH 1.708 09/14/2023         Assessment & Plan:       1. Essential hypertension    BP above goal, patient follow up with Dr. Fernández cardiology.  Same treatment for now.  See his notes.  Advised patient not to double the dose of spironolactone as she wanted to do.      2. Type 2 diabetes mellitus with stage 3b chronic kidney disease, without long-term current use of insulin  -     flash glucose sensor (FREESTYLE HAI 2 SENSOR) Kit; Use as directed to check blood sugar  Dispense: 6 kit; Refill: 0  -     flash glucose scanning reader (FREESTYLE HAI 2 READER) Misc; 1 each by Misc.(Non-Drug; Combo Route) route 4 (four) times daily with meals and nightly.  Dispense: 1 each; Refill: 0  -     semaglutide (OZEMPIC) 1 mg/dose (4 mg/3 mL); Inject 1 mg into the skin every 7 days.  Dispense: 3 mL; Refill: 11    See HPI, patient reports low blood sugar in the evening, she will go on with the Farxiga but I am going to decrease semaglutide to 1 mg.Will monitor.     3. Stage 3b chronic kidney disease    Will see Nephrologist next week.    4. Class 2 severe obesity due to excess calories with serious comorbidity and body mass index (BMI) of 38.0 to 38.9 in adult    See HPI. Pt would like bariatric surgery ([facility]) and comes for a request of medical clearance. Based on her global and medical situation, and based on the notes from Cardiologist among others, as well as her present lifestyle, I don't think I can clear her medically at this point. I am more than happy to see her back for clearance if approved by Cardiologist, Nephrologist, and Psychiatrist.     5. Hypertrophic obstructive cardiomyopathy    See notes from Dr. Shankar. Urged pt to undergo all the tests he ordered. She will f-up with him when done with these.    6. Shortness of breath  -      WALKER FOR HOME USE    Worsening, with LE swelling. See above.    7. Primary osteoarthritis involving multiple joints  -     WALKER FOR HOME USE    Same.     8. Sacroiliitis, not elsewhere classified    Same. With spinal cord stenosis?    9. Major depressive disorder, recurrent, moderate    On treatment, seems to be doing well.    10. Refusal of blood transfusions as patient is Pent[employer]alism       No follow-ups on file.    This note was created by combination of typed  and M-Modal dictation.  Transcription errors may be present.  If there are any questions, please contact me.

## 2024-05-31 DIAGNOSIS — N18.32 TYPE 2 DIABETES MELLITUS WITH STAGE 3B CHRONIC KIDNEY DISEASE, WITHOUT LONG-TERM CURRENT USE OF INSULIN: Primary | ICD-10-CM

## 2024-05-31 DIAGNOSIS — E11.22 TYPE 2 DIABETES MELLITUS WITH STAGE 3B CHRONIC KIDNEY DISEASE, WITHOUT LONG-TERM CURRENT USE OF INSULIN: Primary | ICD-10-CM

## 2024-05-31 RX ORDER — BLOOD-GLUCOSE SENSOR
EACH MISCELLANEOUS
Qty: 3 EACH | Refills: 3 | Status: SHIPPED | OUTPATIENT
Start: 2024-05-31

## 2024-05-31 RX ORDER — BLOOD-GLUCOSE,RECEIVER,CONT
EACH MISCELLANEOUS
Qty: 1 EACH | Refills: 3 | Status: SHIPPED | OUTPATIENT
Start: 2024-05-31

## 2024-05-31 RX ORDER — BLOOD-GLUCOSE TRANSMITTER
EACH MISCELLANEOUS
Qty: 1 EACH | Refills: 3 | Status: SHIPPED | OUTPATIENT
Start: 2024-05-31

## 2024-05-31 NOTE — TELEPHONE ENCOUNTER
Informed pt that freestyle coy was denied; she aske dif you could send in the dexcom to see if it will be covered

## 2024-05-31 NOTE — TELEPHONE ENCOUNTER
----- Message from Emily Prado sent at 5/31/2024 11:19 AM CDT -----  Regarding: Patient call back  .Type: Patient Call Back    Who called:self     What is the request in detail: calling in regards to a prior authorization for flash glucose sensor (FREESTYLE HAI 2 SENSOR) Kit. Checking on the status     Can the clinic reply by [facility]?no     Would the patient rather a call back or a response via [facility]? Call     Best call back number:.[phone]      Additional Information:

## 2024-05-31 NOTE — TELEPHONE ENCOUNTER
No care due was identified.  [facility] Embedded Care Due Messages. Reference number: [id number].   5/31/2024 11:54:29 AM CDT

## 2024-06-03 DIAGNOSIS — M54.50 ACUTE LEFT-SIDED LOW BACK PAIN WITHOUT SCIATICA: ICD-10-CM

## 2024-06-03 RX ORDER — TIZANIDINE 4 MG/1
TABLET ORAL
Qty: 60 TABLET | Refills: 0 | Status: SHIPPED | OUTPATIENT
Start: 2024-06-03

## 2024-06-03 NOTE — TELEPHONE ENCOUNTER
No care due was identified.  [facility] Embedded Care Due Messages. Reference number: [id number].   6/03/2024 5:55:41 AM CDT

## 2024-06-04 ENCOUNTER — PATIENT MESSAGE (OUTPATIENT)
Dept: ADMINISTRATIVE | Facility: HOSPITAL | Age: 69
End: 2024-06-04
Payer: MEDICARE

## 2024-06-05 DIAGNOSIS — M54.50 ACUTE LEFT-SIDED LOW BACK PAIN WITHOUT SCIATICA: ICD-10-CM

## 2024-06-05 RX ORDER — TIZANIDINE 4 MG/1
TABLET ORAL
Qty: 60 TABLET | Refills: 0 | Status: CANCELLED | OUTPATIENT
Start: 2024-06-05

## 2024-06-05 NOTE — TELEPHONE ENCOUNTER
No care due was identified.  [facility] Embedded Care Due Messages. Reference number: [id number].   6/05/2024 2:08:15 PM CDT

## 2024-06-06 ENCOUNTER — TELEPHONE (OUTPATIENT)
Dept: FAMILY MEDICINE | Facility: CLINIC | Age: 69
End: 2024-06-06
Payer: MEDICARE

## 2024-06-06 NOTE — TELEPHONE ENCOUNTER
----- Message from Sera Zaidi sent at 6/6/2024  8:35 AM CDT -----  .Type: Patient Call Back    Who called:Self     What is the request in detail: Would like to know can she go back to taking 2 mg of ozempic    Can the clinic reply by [facility]? No    Would the patient rather a call back or a response via [facility]? Call Back       Best call back number: .[phone] (home)       Additional Information:

## 2024-06-06 NOTE — TELEPHONE ENCOUNTER
Informed pt her A1c was 5.7 so that is why dose was decreased; she would need to continue the 1mg until her next visit and it can be reassessed at that time

## 2024-06-12 ENCOUNTER — OFFICE VISIT (OUTPATIENT)
Dept: NEPHROLOGY | Facility: CLINIC | Age: 69
End: 2024-06-12
Payer: MEDICARE

## 2024-06-12 VITALS
WEIGHT: 252 LBS | BODY MASS INDEX: 39.47 KG/M2 | SYSTOLIC BLOOD PRESSURE: 106 MMHG | OXYGEN SATURATION: 97 % | DIASTOLIC BLOOD PRESSURE: 70 MMHG | HEART RATE: 96 BPM

## 2024-06-12 DIAGNOSIS — D63.1 ANEMIA OF CHRONIC RENAL FAILURE, STAGE 3B: ICD-10-CM

## 2024-06-12 DIAGNOSIS — N18.32 TYPE 2 DIABETES MELLITUS WITH STAGE 3B CHRONIC KIDNEY DISEASE, WITHOUT LONG-TERM CURRENT USE OF INSULIN: ICD-10-CM

## 2024-06-12 DIAGNOSIS — I10 HYPERTENSION, UNSPECIFIED TYPE: ICD-10-CM

## 2024-06-12 DIAGNOSIS — E11.22 TYPE 2 DIABETES MELLITUS WITH STAGE 3B CHRONIC KIDNEY DISEASE, WITHOUT LONG-TERM CURRENT USE OF INSULIN: ICD-10-CM

## 2024-06-12 DIAGNOSIS — N18.31 STAGE 3A CHRONIC KIDNEY DISEASE: Primary | ICD-10-CM

## 2024-06-12 DIAGNOSIS — N18.32 ANEMIA OF CHRONIC RENAL FAILURE, STAGE 3B: ICD-10-CM

## 2024-06-12 PROCEDURE — 3074F SYST BP LT 130 MM HG: CPT | Mod: CPTII,S$GLB,, | Performed by: INTERNAL MEDICINE

## 2024-06-12 PROCEDURE — 3008F BODY MASS INDEX DOCD: CPT | Mod: CPTII,S$GLB,, | Performed by: INTERNAL MEDICINE

## 2024-06-12 PROCEDURE — 1125F AMNT PAIN NOTED PAIN PRSNT: CPT | Mod: CPTII,S$GLB,, | Performed by: INTERNAL MEDICINE

## 2024-06-12 PROCEDURE — 1157F ADVNC CARE PLAN IN RCRD: CPT | Mod: CPTII,S$GLB,, | Performed by: INTERNAL MEDICINE

## 2024-06-12 PROCEDURE — 1100F PTFALLS ASSESS-DOCD GE2>/YR: CPT | Mod: CPTII,S$GLB,, | Performed by: INTERNAL MEDICINE

## 2024-06-12 PROCEDURE — 3066F NEPHROPATHY DOC TX: CPT | Mod: CPTII,S$GLB,, | Performed by: INTERNAL MEDICINE

## 2024-06-12 PROCEDURE — 99999 PR PBB SHADOW E&M-EST. PATIENT-LVL IV: CPT | Mod: PBBFAC,,, | Performed by: INTERNAL MEDICINE

## 2024-06-12 PROCEDURE — 3044F HG A1C LEVEL LT 7.0%: CPT | Mod: CPTII,S$GLB,, | Performed by: INTERNAL MEDICINE

## 2024-06-12 PROCEDURE — 3288F FALL RISK ASSESSMENT DOCD: CPT | Mod: CPTII,S$GLB,, | Performed by: INTERNAL MEDICINE

## 2024-06-12 PROCEDURE — 99214 OFFICE O/P EST MOD 30 MIN: CPT | Mod: S$GLB,,, | Performed by: INTERNAL MEDICINE

## 2024-06-12 PROCEDURE — 3078F DIAST BP <80 MM HG: CPT | Mod: CPTII,S$GLB,, | Performed by: INTERNAL MEDICINE

## 2024-06-12 PROCEDURE — 1159F MED LIST DOCD IN RCRD: CPT | Mod: CPTII,S$GLB,, | Performed by: INTERNAL MEDICINE

## 2024-06-12 RX ORDER — FUROSEMIDE 40 MG/1
40 TABLET ORAL 2 TIMES DAILY
Qty: 60 TABLET | Refills: 11 | Status: SHIPPED | OUTPATIENT
Start: 2024-06-12 | End: 2025-06-12

## 2024-06-12 NOTE — PROGRESS NOTES
Progress Note  Nephrology      Referring physician: Merlyn Bruner MD    Reason for visit: CKD     SUBJECTIVE:   69 y.o. female  has a past medical history of Arthritis, Gout attack, psychiatric care, Hypertension, Opioid dependence, uncomplicated (02/03/2023), Psychiatric problem, Renal disorder, Sciatic nerve pain (2013), Therapy, Tuberculosis, and Unspecified cataract (07/27/2023). who has been following up in renal clinic for ckd management   The patient denies taking NSAIDs or new antibiotics, recreational drugs, recent episode of dehydration, diarrhea, nausea or vomiting, acute illness, hospitalization or exposure to IV radiocontrast.     ROS:  General: negative for chills, or fatigue  ENT: No epistaxis or headaches  Hematological and Lymphatic: No bleeding problems or blood clots.  Endocrine: No skin changes or temperature intolerance  Respiratory: No cough, shortness of breath, or wheezing  Cardiovascular: No chest pain or dyspnea   Gastrointestinal: No abdominal pain, change in bowel habits  Genito-Urinary: No dysuria, trouble voiding, or hematuria  Musculoskeletal: ROS: negative for - joint pain, joint stiffness, joint swelling, muscle pain or muscular weakness  Neurological: No focal weakness, no numbness  Dermatological: No rash or ulcers    OBJECTIVE:     There were no vitals filed for this visit.       Physical Exam:  General: no distress, well nourished  HENT: PERRLA, Normal mouth nose and ears.  Neck: no JVD and thyroid not enlarged, symmetric, no tenderness/mass/nodules  Lungs: clear to auscultation bilaterally and normal respiratory effort  Cardiovascular: regular rate and rhythm, S1, S2 normal, no murmur, click, rub or gallop.   Abdomen: soft, non-tender non-distented; bowel sounds normal  Skin: No rashes or lesions  Musculoskeletal:edema in LE, no deformities.   Lymph Nodes: No cervical or supraclavicular adenopathy  Neurologic: Normal strength and tone. No focal numbness or  weakness          Medications:    Current Outpatient Medications:     acetaminophen (TYLENOL) 500 MG tablet, Take 1 tablet (500 mg total) by mouth every 6 (six) hours as needed for Pain., Disp: 30 tablet, Rfl: 0    albuterol (ACCUNEB) 0.63 mg/3 mL Nebu, Inhale 1 ampule into the lungs as needed., Disp: , Rfl:     allopurinoL (ZYLOPRIM) 100 MG tablet, TAKE 1 TABLET(100 MG) BY MOUTH TWICE DAILY, Disp: 180 tablet, Rfl: 3    amLODIPine (NORVASC) 5 MG tablet, Take 1 tablet (5 mg total) by mouth once daily., Disp: 90 tablet, Rfl: 3    atorvastatin (LIPITOR) 40 MG tablet, Take 1 tablet (40 mg total) by mouth every evening., Disp: 30 tablet, Rfl: 0    blood sugar diagnostic Strp, To check BG two times daily, freestyle lite meter, Disp: 200 each, Rfl: 3    blood-glucose meter kit, To check BG two times daily, freestyle lite meter, Disp: 1 each, Rfl: 0    blood-glucose meter,continuous (DEXCOM G6 ) Misc, Use as directed to check blood sugar, Disp: 1 each, Rfl: 3    blood-glucose sensor (DEXCOM G6 SENSOR) Sue, Use as directed to check blood sugar, Disp: 3 each, Rfl: 3    blood-glucose transmitter (DEXCOM G6 TRANSMITTER) Sue, Use as directed to check blood sugar, Disp: 1 each, Rfl: 3    buPROPion (WELLBUTRIN) 100 MG tablet, Take 100 mg by mouth every morning., Disp: , Rfl:     ciclopirox (PENLAC) 8 % Soln, Apply topically nightly., Disp: 6.6 mL, Rfl: 3    dapagliflozin propanediol (FARXIGA) 10 mg tablet, Take 1 tablet (10 mg total) by mouth once daily., Disp: 30 tablet, Rfl: 0    diazePAM (VALIUM) 5 MG tablet, TAKE 1 TABLET(5 MG) BY MOUTH EVERY 12 HOURS AS NEEDED FOR ANXIETY, Disp: 60 tablet, Rfl: 2    diphenhydrAMINE (BENADRYL) 25 mg capsule, Take 25 mg by mouth every 6 (six) hours as needed., Disp: , Rfl:     docusate sodium (COLACE) 100 MG capsule, Take 1 capsule (100 mg total) by mouth as needed for Constipation., Disp: 90 capsule, Rfl: 3    DULoxetine (CYMBALTA) 60 MG capsule, TAKE 1 CAPSULE(60 MG) BY MOUTH EVERY  DAY, Disp: 90 capsule, Rfl: 3    ergocalciferol (ERGOCALCIFEROL) 50,000 unit Cap, TAKE 1 CAPSULE BY MOUTH EVERY 7 DAYS, Disp: 12 capsule, Rfl: 0    EScitalopram oxalate (LEXAPRO) 10 MG tablet, TAKE 1 TABLET(10 MG) BY MOUTH EVERY DAY, Disp: 90 tablet, Rfl: 3    FARXIGA 10 mg tablet, TAKE 1 TABLET(10 MG) BY MOUTH EVERY DAY, Disp: 30 tablet, Rfl: 0    ferrous sulfate (FEOSOL) 325 mg (65 mg iron) Tab tablet, Take 325 mg by mouth as needed. , Disp: , Rfl:     flash glucose scanning reader (FREESTYLE HAI 2 READER) Misc, 1 each by Misc.(Non-Drug; Combo Route) route 4 (four) times daily with meals and nightly., Disp: 1 each, Rfl: 0    flash glucose sensor (FREESTYLE HAI 2 SENSOR) Kit, Use as directed to check blood sugar, Disp: 6 kit, Rfl: 0    furosemide (LASIX) 20 MG tablet, Take 1 tablet (20 mg total) by mouth as needed (take 1-2 pills as needed for fluid overload)., Disp: 90 tablet, Rfl: 3    gabapentin (NEURONTIN) 300 MG capsule, TAKE 1 CAPSULE(300 MG) BY MOUTH THREE TIMES DAILY, Disp: 90 capsule, Rfl: 5    HYDROcodone-acetaminophen (NORCO) 5-325 mg per tablet, TK 1 T PO Q 12 H PRN, Disp: , Rfl:     lancets Misc, To check BG two times daily, to use with insurance preferred meter, Disp: 200 each, Rfl: 3    LIDOcaine (LIDODERM) 5 %, Place 1 patch onto the skin once daily. Apply patch for 12 hours and then leave off for 12 hours, Disp: 15 patch, Rfl: 0    metoprolol succinate (TOPROL-XL) 50 MG 24 hr tablet, Take 1 tablet (50 mg total) by mouth once daily., Disp: 90 tablet, Rfl: 3    MOVANTIK 25 mg tablet, Take 25 mg by mouth once daily., Disp: , Rfl:     mupirocin (BACTROBAN) 2 % ointment, Apply topically 3 (three) times daily., Disp: 30 g, Rfl: 0    primidone (MYSOLINE) 50 MG Tab, Take 2 tablets (100 mg total) by mouth 3 (three) times daily., Disp: 180 tablet, Rfl: 11    QUEtiapine (SEROQUEL) 100 MG Tab, TAKE 1 TABLET(100 MG) BY MOUTH EVERY NIGHT AS NEEDED, Disp: 90 tablet, Rfl: 1    semaglutide (OZEMPIC) 1 mg/dose  (4 mg/3 mL), Inject 1 mg into the skin every 7 days., Disp: 3 mL, Rfl: 11    spironolactone (ALDACTONE) 25 MG tablet, Take 1 tablet (25 mg total) by mouth once daily., Disp: 90 tablet, Rfl: 3    tiZANidine (ZANAFLEX) 4 MG tablet, TAKE 1 TABLET(4 MG) BY MOUTH TWICE DAILY AS NEEDED FOR PAIN, Disp: 60 tablet, Rfl: 0    Current Facility-Administered Medications:     lidocaine (PF) 10 mg/ml (1%) injection 40 mg, 4 mL, Intramuscular, 1 time in Clinic/HOD, [place], Azikiwe K., MD         Laboratory:  Lab Results   Component Value Date    CREATININE 1.6 (H) 05/21/2024       Prot/Creat Ratio, Urine   Date Value Ref Range Status   05/21/2024 1.76 (H) 0.00 - 0.20 Final   07/23/2020 1.42 (H) 0.00 - 0.20 Final   02/08/2020 0.61 (H) 0.00 - 0.20 Final       Lab Results   Component Value Date     05/21/2024    K 4.1 05/21/2024    CO2 25 05/21/2024       last PTH   Lab Results   Component Value Date    .5 (H) 10/05/2022    CALCIUM 10.4 05/21/2024    PHOS 3.0 05/21/2024       Lab Results   Component Value Date    HGB 11.1 (L) 05/21/2024        Lab Results   Component Value Date    HGBA1C 5.7 (H) 05/21/2024       Lab Results   Component Value Date    LDLCALC 108.2 09/14/2023       Other Labs were reviewed      ASSESSMENT/PLAN:     1- CKD IIIB ( stable) 2/2 diabetic nephropathy   - pt has baseline scr 1.6 and GFR 35  --Avoid NSAIDs intake  - UPCR 1.76  - on farxiga   - will add lasix 40 for LE edema     2-HTN :   -Continue current BP meds regimen     3-DM II: controlled  - last A1c 5.7    4- AOCD : hbg at goal 11        RTC in 1 yr       CHUCKY CAMACHO MD  NEPHROLOGY ATTENDING

## 2024-06-19 ENCOUNTER — HOSPITAL ENCOUNTER (OUTPATIENT)
Dept: RADIOLOGY | Facility: HOSPITAL | Age: 69
Discharge: HOME | End: 2024-06-19
Attending: INTERNAL MEDICINE
Payer: MEDICARE

## 2024-06-19 ENCOUNTER — HOSPITAL ENCOUNTER (OUTPATIENT)
Dept: CARDIOLOGY | Facility: HOSPITAL | Age: 69
Discharge: HOME | End: 2024-06-19
Attending: INTERNAL MEDICINE
Payer: MEDICARE

## 2024-06-19 DIAGNOSIS — R06.02 SOB (SHORTNESS OF BREATH): ICD-10-CM

## 2024-06-19 LAB
CV STRESS BASE HR: 86 BPM
DIASTOLIC BLOOD PRESSURE: 90 MMHG
OHS CV CPX 85 PERCENT MAX PREDICTED HEART RATE MALE: 128
OHS CV CPX MAX PREDICTED HEART RATE: 150
OHS CV CPX PATIENT IS FEMALE: 1
OHS CV CPX PATIENT IS MALE: 0
OHS CV CPX PEAK DIASTOLIC BLOOD PRESSURE: 79 MMHG
OHS CV CPX PEAK HEAR RATE: 96 BPM
OHS CV CPX PEAK RATE PRESSURE PRODUCT: NORMAL
OHS CV CPX PEAK SYSTOLIC BLOOD PRESSURE: 130 MMHG
OHS CV CPX PERCENT MAX PREDICTED HEART RATE ACHIEVED: 66
OHS CV CPX RATE PRESSURE PRODUCT PRESENTING: NORMAL
SYSTOLIC BLOOD PRESSURE: 169 MMHG

## 2024-06-19 PROCEDURE — 93018 CV STRESS TEST I&R ONLY: CPT | Mod: ,,, | Performed by: INTERNAL MEDICINE

## 2024-06-19 PROCEDURE — 63600175 PHARM REV CODE 636 W HCPCS: Performed by: INTERNAL MEDICINE

## 2024-06-19 PROCEDURE — 93016 CV STRESS TEST SUPVJ ONLY: CPT | Mod: ,,, | Performed by: INTERNAL MEDICINE

## 2024-06-19 PROCEDURE — 93017 CV STRESS TEST TRACING ONLY: CPT

## 2024-06-19 PROCEDURE — 78452 HT MUSCLE IMAGE SPECT MULT: CPT

## 2024-06-19 PROCEDURE — A9502 TC99M TETROFOSMIN: HCPCS | Performed by: INTERNAL MEDICINE

## 2024-06-19 PROCEDURE — 78452 HT MUSCLE IMAGE SPECT MULT: CPT | Mod: 26,,, | Performed by: INTERNAL MEDICINE

## 2024-06-19 RX ORDER — REGADENOSON 0.08 MG/ML
0.4 INJECTION, SOLUTION INTRAVENOUS ONCE
Status: COMPLETED | OUTPATIENT
Start: 2024-06-19 | End: 2024-06-19

## 2024-06-19 RX ADMIN — TETROFOSMIN 10.1 MILLICURIE: 1.38 INJECTION, POWDER, LYOPHILIZED, FOR SOLUTION INTRAVENOUS at 07:06

## 2024-06-19 RX ADMIN — REGADENOSON 0.4 MG: 0.08 INJECTION, SOLUTION INTRAVENOUS at 10:06

## 2024-06-19 RX ADMIN — TETROFOSMIN 31 MILLICURIE: 1.38 INJECTION, POWDER, LYOPHILIZED, FOR SOLUTION INTRAVENOUS at 10:06

## 2024-06-26 DIAGNOSIS — I42.1 HYPERTROPHIC OBSTRUCTIVE CARDIOMYOPATHY (HOCM): ICD-10-CM

## 2024-06-26 DIAGNOSIS — R60.9 EDEMA, UNSPECIFIED TYPE: Primary | ICD-10-CM

## 2024-06-29 DIAGNOSIS — M54.50 ACUTE LEFT-SIDED LOW BACK PAIN WITHOUT SCIATICA: ICD-10-CM

## 2024-06-30 NOTE — TELEPHONE ENCOUNTER
Care Due:                  Date            Visit Type   Department     Provider  --------------------------------------------------------------------------------                                EP -                              PRIMARY      [facility]  Last Visit: 05-      CARE ([facility])   MEDICINE       Merlyn Bruner  Next Visit: None Scheduled  None         None Found                                                            Last  Test          Frequency    Reason                     Performed    Due Date  --------------------------------------------------------------------------------    Uric Acid...  12 months..  allopurinoL..............  09-   09-    [facility] Embedded Care Due Messages. Reference number: [id number].   6/29/2024 10:19:21 PM CDT

## 2024-07-01 RX ORDER — TIZANIDINE 4 MG/1
TABLET ORAL
Qty: 60 TABLET | Refills: 0 | Status: SHIPPED | OUTPATIENT
Start: 2024-07-01

## 2024-07-02 ENCOUNTER — PATIENT MESSAGE (OUTPATIENT)
Dept: CARDIOLOGY | Facility: HOSPITAL | Age: 69
End: 2024-07-02
Payer: MEDICARE

## 2024-07-02 ENCOUNTER — TELEPHONE (OUTPATIENT)
Dept: CARDIOLOGY | Facility: HOSPITAL | Age: 69
End: 2024-07-02
Payer: MEDICARE

## 2024-07-02 NOTE — TELEPHONE ENCOUNTER
I had the pleasure of discussing your upcoming Cardiac MRI scheduled for 07/11/2024 @ 7:00 at [facility] at [address] [address] across [address] from the Main [facility].     We discussed and ensured that you will arrive for the scheduled appointment and confirmed the absence of a pacemaker/defibrillator, the absence of a cerebral aneurysm or surgical clip, pump, nerve or brain stimulator, middle or inner ear prosthesis or other metal implant or being injured by a metal object (i.e. bullet, bb, shrapnel).    The test should take about 40-50 minutes to complete. It is important to hold still for the exam or the pictures will be unreadable. If you are claustrophobic or have pain issues that may interfere with your ability to stay still, please discuss this with the ordering provider. You may or may not receive IV gadolinium during the exam if this is indicated, so an IV will be placed. Your heart rate, blood pressure, and oxygen saturation will be continuously monitored throughout the exam. Rescue medications will be on hand in the event of any issues.      Thank you again for your time today. I can be reached at [phone], M-F from 7:30-4.

## 2024-07-08 ENCOUNTER — TELEPHONE (OUTPATIENT)
Dept: FAMILY MEDICINE | Facility: CLINIC | Age: 69
End: 2024-07-08

## 2024-07-08 ENCOUNTER — TELEPHONE (OUTPATIENT)
Dept: FAMILY MEDICINE | Facility: CLINIC | Age: 69
End: 2024-07-08
Payer: MEDICARE

## 2024-07-08 ENCOUNTER — OFFICE VISIT (OUTPATIENT)
Dept: FAMILY MEDICINE | Facility: CLINIC | Age: 69
End: 2024-07-08
Payer: MEDICARE

## 2024-07-08 DIAGNOSIS — E11.22 TYPE 2 DIABETES MELLITUS WITH STAGE 3B CHRONIC KIDNEY DISEASE, WITHOUT LONG-TERM CURRENT USE OF INSULIN: ICD-10-CM

## 2024-07-08 DIAGNOSIS — E66.01 SEVERE OBESITY (BMI 35.0-39.9) WITH COMORBIDITY: ICD-10-CM

## 2024-07-08 DIAGNOSIS — Z20.1 HISTORY OF TUBERCULOSIS EXPOSURE: ICD-10-CM

## 2024-07-08 DIAGNOSIS — N18.32 STAGE 3B CHRONIC KIDNEY DISEASE: ICD-10-CM

## 2024-07-08 DIAGNOSIS — I10 ESSENTIAL HYPERTENSION: ICD-10-CM

## 2024-07-08 DIAGNOSIS — D57.3 SICKLE CELL TRAIT: ICD-10-CM

## 2024-07-08 DIAGNOSIS — I47.10 SVT (SUPRAVENTRICULAR TACHYCARDIA): ICD-10-CM

## 2024-07-08 DIAGNOSIS — U07.1 POSITIVE SELF-ADMINISTERED ANTIGEN TEST FOR COVID-19: Primary | ICD-10-CM

## 2024-07-08 DIAGNOSIS — N18.32 TYPE 2 DIABETES MELLITUS WITH STAGE 3B CHRONIC KIDNEY DISEASE, WITHOUT LONG-TERM CURRENT USE OF INSULIN: ICD-10-CM

## 2024-07-08 PROCEDURE — 99214 OFFICE O/P EST MOD 30 MIN: CPT | Mod: 95,,,

## 2024-07-08 PROCEDURE — 1159F MED LIST DOCD IN RCRD: CPT | Mod: CPTII,95,,

## 2024-07-08 PROCEDURE — 1157F ADVNC CARE PLAN IN RCRD: CPT | Mod: CPTII,95,,

## 2024-07-08 PROCEDURE — 3066F NEPHROPATHY DOC TX: CPT | Mod: CPTII,95,,

## 2024-07-08 PROCEDURE — 1160F RVW MEDS BY RX/DR IN RCRD: CPT | Mod: CPTII,95,,

## 2024-07-08 PROCEDURE — 3044F HG A1C LEVEL LT 7.0%: CPT | Mod: CPTII,95,,

## 2024-07-08 RX ORDER — BENZONATATE 100 MG/1
100 CAPSULE ORAL 3 TIMES DAILY PRN
Qty: 30 CAPSULE | Refills: 0 | Status: SHIPPED | OUTPATIENT
Start: 2024-07-08 | End: 2024-07-18

## 2024-07-08 RX ORDER — FLUTICASONE PROPIONATE 50 MCG
1 SPRAY, SUSPENSION (ML) NASAL DAILY
Qty: 18.2 ML | Refills: 0 | Status: SHIPPED | OUTPATIENT
Start: 2024-07-08

## 2024-07-08 NOTE — PROGRESS NOTES
The patient location is: Patient Home  The chief complaint leading to consultation is: as below  Visit type:   Virtual visit with synchronous audio and video        Total time spent with patient: 15 minutes  Each patient to whom he or she provides medical services by telemedicine is:  (1) informed of the relationship between the physician and patient and the respective role of any other health care provider with respect to management of the patient; and (2) notified that she may decline to receive medical services by telemedicine and may withdraw from such care at any time.      HPI     Chief Complaint:  No chief complaint on file.      Gwendolyn Fournier is a 69 y.o. female with multiple medical diagnoses as listed in the medical history and problem list that presents for  COVID positive home test    Sinus Problem  Episode onset: 3 days ago. The problem is unchanged. The maximum temperature recorded prior to her arrival was 100.4 - 100.9 F (fever and aches at home). Associated symptoms include chills, congestion, coughing, headaches, shortness of breath (fatigue and  slightly weak) and sinus pressure. Past treatments include acetaminophen.    Patient is laying in bed while on video appointment     Patient states that she tested positive by home test for COVID 3 days ago.  Symptoms include fever, diarrhea, loss of smell and appetite, cough  with mucus, weakness/fatigue.  Denies other sick contacts.  Her daughter  also tested positive for COVID.  They are both quarantine at home together   States that she has goes to the grocery store.           Assessment & Plan      Suggested to patient to drink warm tea and honey for her throat, Vicks Vaporub, humidifier or a  hot shower steam with the vapors to help with her sinuses.  Suggested to drink electrolyte water, Pedialyte, sugar free Gatorade or Powerade for electrolytes due to her diarrhea  and to drink more fluids the prevent dehydration and if not allergic to eat  avocados, banana, potatoes to help bulk her stools  due to diarrhea.     Suggested patient to continue to quarantine with her daughter for 5 days and at least 24 hours without no fever without taking a fever reduce her medication.  Continue to mask if symptomatic,  are not quarantine and  around other people.   If you have symptoms and tested positive:  More than 5 days since symptoms first appeared AND  More than 24 hours fever free without medications AND       symptoms have improved   For five days after ending isolation, masks are required.    If you had no symptoms but tested positive:  More than 5 days since the date of the first positive test. If you develop symptoms, then use the guidelines above  For five days after ending isolation, masks are required.      Testing is not recommended if you are symptom free after completing isolation.     Inform patient that I would prescribe Paxlovid due to her symptoms,  age and medical history that meet criteria.  Inform patient that if symptoms worsened to seek urgent care or emergency room.     Called patient back.  Patient instructed to stop taking her Seroquel and atorvastatin for 5 days  while she is on the Paxlovid.   Paxlovid has been dose reduced due to her chronic kidney disease.     Information provided on AVS    Problem List Items Addressed This Visit          Cardiac/Vascular    Essential hypertension    SVT (supraventricular tachycardia)   Continue amlodipine  and metoprolol as prescribed       Renal/    Stage 3 chronic kidney disease   Paxlovid has been dose reduced       ID    History of tuberculosis exposure    Positive self-administered antigen test for COVID-19 - Primary    Relevant Medications    benzonatate (TESSALON) 100 MG capsule    fluticasone propionate (FLONASE) 50 mcg/actuation nasal spray    nirmatrelvir-ritonavir 150-100 mg DsPk       Oncology    Sickle cell trait   Reviewed as a criteria-  Paxlovid       Endocrine    Severe obesity (BMI  35.0-39.9) with comorbidity    Type 2 diabetes mellitus with stage 3b chronic kidney disease, without long-term current use of insulin    Reviewed has a criteria-  Paxlovid    --------------------------------------------      Health Maintenance:  Health Maintenance         Date Due Completion Date    TETANUS VACCINE Never done ---    Mammogram 04/30/2014 4/30/2013    DEXA Scan 02/20/2022 2/20/2019    COVID-19 Vaccine (6 - 2023-24 season) 09/01/2023 9/20/2022    Eye Exam 07/27/2024 7/27/2023    Influenza Vaccine (1) 09/01/2024 9/6/2023    Override on 11/25/2019: Done    Diabetes Urine Screening 09/14/2024 9/14/2023    Lipid Panel 09/14/2024 9/14/2023    Override on 9/15/2015: Done (future)    Foot Exam 09/25/2024 9/25/2023 (Done)    Override on 9/25/2023: Done    Hemoglobin A1c 11/21/2024 5/21/2024    High Dose Statin 06/12/2025 6/12/2024    Colorectal Cancer Screening 01/21/2029 1/21/2019            Health maintenance reviewed    Follow Up:  Follow up if symptoms worsen or fail to improve.    Exam     Review of Systems:  (as noted above)  Review of Systems   Constitutional:  Positive for chills, fatigue and fever.   HENT:  Positive for congestion and sinus pressure.    Respiratory:  Positive for cough and shortness of breath (fatigue and  slightly weak).    Gastrointestinal:  Positive for diarrhea.   Neurological:  Positive for headaches.       Physical Exam:   Physical Exam  Constitutional:       Appearance: She is ill-appearing.   Neurological:      Mental Status: She is alert and oriented to person, place, and time.       There were no vitals filed for this visit.   There is no height or weight on file to calculate BMI.        History     Past Medical History:  Past Medical History:   Diagnosis Date    Arthritis     Gout attack     Hx of psychiatric care     Hypertension     Opioid dependence, uncomplicated 02/03/2023    Psychiatric problem     Renal disorder     Sciatic nerve pain 2013    Therapy     used to  follow with psychiatrist but doesn't recall whom,     Tuberculosis     Unspecified cataract 2023    Mild on both eyes       Past Surgical History:  Past Surgical History:   Procedure Laterality Date    COLONOSCOPY N/A 2019    Procedure: COLONOSCOPY;  Surgeon: Shanna Jose MD;  Location: [facility] ENDO;  Service: Endoscopy;  Laterality: N/A;    INJECTION OF JOINT Bilateral 3/13/2019    Procedure: INJECTION, JOINT BILATERAL SI JOINT;  Surgeon: Evan Coreas MD;  Location: [facility] PAIN MGT;  Service: Pain Management;  Laterality: Bilateral;  BILATERAL SI JOINT INJECTION    KNEE SURGERY  2014    left knee surgery        Social History:  Social History     Socioeconomic History    Marital status:     Number of children: 5   Occupational History    Occupation: homemaker   Tobacco Use    Smoking status: Former     Current packs/day: 0.00     Types: Cigarettes     Quit date: 1976     Years since quittin.5    Smokeless tobacco: Never   Substance and Sexual Activity    Alcohol use: Not Currently     Alcohol/week: 0.0 standard drinks of alcohol     Comment: red wine    Drug use: No    Sexual activity: Not Currently     Partners: Male     Social Determinants of Health     Financial Resource Strain: Low Risk  (2024)    Overall Financial Resource Strain (CARDIA)     Difficulty of Paying Living Expenses: Not hard at all   Food Insecurity: No Food Insecurity (2024)    Hunger Vital Sign     Worried About Running Out of Food in the Last Year: Never true     Ran Out of Food in the Last Year: Never true   Transportation Needs: No Transportation Needs (10/9/2023)    PRAPARE - Transportation     Lack of Transportation (Medical): No     Lack of Transportation (Non-Medical): No   Physical Activity: Unknown (10/10/2022)    Exercise Vital Sign     Days of Exercise per Week: 0 days   Stress: Stress Concern Present (10/10/2022)    [employer] - Occupational Stress  Questionnaire     Feeling of Stress : To some extent   Housing Stability: Low Risk  (10/9/2023)    Housing Stability Vital Sign     Unable to Pay for Housing in the Last Year: No     Number of Places Lived in the Last Year: 1     Unstable Housing in the Last Year: No       Family History:  Family History   Problem Relation Name Age of Onset    Tuberculosis Mother      Stroke Father      Bipolar disorder Daughter      Suicide Daughter      Depression Daughter      Bipolar disorder Daughter      Depression Daughter         Allergies and Medications: (updated and reviewed)  Review of patient's allergies indicates:   Allergen Reactions    Ondansetron hcl (pf) Hives and Itching    Ketorolac Itching     Current Outpatient Medications   Medication Sig Dispense Refill    acetaminophen (TYLENOL) 500 MG tablet Take 1 tablet (500 mg total) by mouth every 6 (six) hours as needed for Pain. 30 tablet 0    albuterol (ACCUNEB) 0.63 mg/3 mL Nebu Inhale 1 ampule into the lungs as needed.      allopurinoL (ZYLOPRIM) 100 MG tablet TAKE 1 TABLET(100 MG) BY MOUTH TWICE DAILY 180 tablet 3    amLODIPine (NORVASC) 5 MG tablet Take 1 tablet (5 mg total) by mouth once daily. 90 tablet 3    atorvastatin (LIPITOR) 40 MG tablet Take 1 tablet (40 mg total) by mouth every evening. 30 tablet 0    benzonatate (TESSALON) 100 MG capsule Take 1 capsule (100 mg total) by mouth 3 (three) times daily as needed for Cough. 30 capsule 0    blood sugar diagnostic Strp To check BG two times daily, freestyle lite meter 200 each 3    blood-glucose meter kit To check BG two times daily, freestyle lite meter 1 each 0    blood-glucose meter,continuous (DEXCOM G6 ) Misc Use as directed to check blood sugar 1 each 3    blood-glucose sensor (DEXCOM G6 SENSOR) Sue Use as directed to check blood sugar 3 each 3    blood-glucose transmitter (DEXCOM G6 TRANSMITTER) Sue Use as directed to check blood sugar 1 each 3    buPROPion (WELLBUTRIN) 100 MG tablet Take 100  mg by mouth every morning.      ciclopirox (PENLAC) 8 % Soln Apply topically nightly. 6.6 mL 3    dapagliflozin propanediol (FARXIGA) 10 mg tablet Take 1 tablet (10 mg total) by mouth once daily. 30 tablet 0    diazePAM (VALIUM) 5 MG tablet TAKE 1 TABLET(5 MG) BY MOUTH EVERY 12 HOURS AS NEEDED FOR ANXIETY 60 tablet 2    diphenhydrAMINE (BENADRYL) 25 mg capsule Take 25 mg by mouth every 6 (six) hours as needed.      docusate sodium (COLACE) 100 MG capsule Take 1 capsule (100 mg total) by mouth as needed for Constipation. 90 capsule 3    DULoxetine (CYMBALTA) 60 MG capsule TAKE 1 CAPSULE(60 MG) BY MOUTH EVERY DAY 90 capsule 3    ergocalciferol (ERGOCALCIFEROL) 50,000 unit Cap TAKE 1 CAPSULE BY MOUTH EVERY 7 DAYS 12 capsule 0    EScitalopram oxalate (LEXAPRO) 10 MG tablet TAKE 1 TABLET(10 MG) BY MOUTH EVERY DAY 90 tablet 3    FARXIGA 10 mg tablet TAKE 1 TABLET(10 MG) BY MOUTH EVERY DAY 30 tablet 0    ferrous sulfate (FEOSOL) 325 mg (65 mg iron) Tab tablet Take 325 mg by mouth as needed.       flash glucose scanning reader (FREESTYLE HAI 2 READER) Misc 1 each by Misc.(Non-Drug; Combo Route) route 4 (four) times daily with meals and nightly. 1 each 0    flash glucose sensor (FREESTYLE HAI 2 SENSOR) Kit Use as directed to check blood sugar 6 kit 0    fluticasone propionate (FLONASE) 50 mcg/actuation nasal spray 1 spray (50 mcg total) by Each Nostril route once daily. 18.2 mL 0    furosemide (LASIX) 40 MG tablet Take 1 tablet (40 mg total) by mouth 2 (two) times daily. 60 tablet 11    gabapentin (NEURONTIN) 300 MG capsule TAKE 1 CAPSULE(300 MG) BY MOUTH THREE TIMES DAILY 90 capsule 5    HYDROcodone-acetaminophen (NORCO) 5-325 mg per tablet TK 1 T PO Q 12 H PRN      lancets Misc To check BG two times daily, to use with insurance preferred meter 200 each 3    LIDOcaine (LIDODERM) 5 % Place 1 patch onto the skin once daily. Apply patch for 12 hours and then leave off for 12 hours 15 patch 0    metoprolol succinate  (TOPROL-XL) 50 MG 24 hr tablet Take 1 tablet (50 mg total) by mouth once daily. 90 tablet 3    MOVANTIK 25 mg tablet Take 25 mg by mouth once daily.      mupirocin (BACTROBAN) 2 % ointment Apply topically 3 (three) times daily. 30 g 0    nirmatrelvir-ritonavir 150-100 mg DsPk Take 2 tablets by mouth 2 (two) times daily for 5 days. Each dose contains 1 nirmatrelvir (pink tablet) and 1 ritonavir (white tablet). Take both tablets together 20 tablet 0    primidone (MYSOLINE) 50 MG Tab Take 2 tablets (100 mg total) by mouth 3 (three) times daily. 180 tablet 11    QUEtiapine (SEROQUEL) 100 MG Tab TAKE 1 TABLET(100 MG) BY MOUTH EVERY NIGHT AS NEEDED 90 tablet 1    semaglutide (OZEMPIC) 1 mg/dose (4 mg/3 mL) Inject 1 mg into the skin every 7 days. 3 mL 11    spironolactone (ALDACTONE) 25 MG tablet Take 1 tablet (25 mg total) by mouth once daily. 90 tablet 3    tiZANidine (ZANAFLEX) 4 MG tablet TAKE 1 TABLET(4 MG) BY MOUTH TWICE DAILY AS NEEDED FOR PAIN 60 tablet 0     Current Facility-Administered Medications   Medication Dose Route Frequency Provider Last Rate Last Admin    lidocaine (PF) 10 mg/ml (1%) injection 40 mg  4 mL Intramuscular 1 time in Clinic/HOD [place], Azikiwe K., MD           Patient Care Team:  Merlyn Bruner MD as PCP - General (Family Medicine)  Twin Cruz OD as Consulting Physician (Optometry)  Mc Baez MA as Care Coordinator       - The patient was sent an After Visit Summary virtually that lists all medications with directions, allergies, education, orders placed during this encounter and follow-up instructions.      - I have reviewed the patient's medical information including past medical, family, and social history sections including the medications and allergies.      - We discussed the patient's current medications.     This note was created by combination of typed  and MModal dictation.  Transcription errors may be present.  If there are any questions,  please contact me.       Kelsey Nava PA-C

## 2024-07-08 NOTE — TELEPHONE ENCOUNTER
----- Message from Mari Harrison sent at 7/8/2024  8:26 AM CDT -----  Name of Who is Calling: KAMALA DELA CRUZ [[id number]]          What is the request in detail: Pt is requesting a call back. Pt advised she have covid and need a prescription. Please assist.           Can the clinic reply by [facility]: No          [facility]  [phone]

## 2024-07-08 NOTE — TELEPHONE ENCOUNTER
Called patient back.  Patient instructed to stop taking her Seroquel and atorvastatin for 5 days  while she is on the Paxlovid.  Can restart the Seroquel and atorvastatin  (lipitor)  after we have completed the 5 days of Paxlovid.  Paxlovid has been dose reduced due to her chronic kidney disease.

## 2024-07-15 ENCOUNTER — TELEPHONE (OUTPATIENT)
Dept: CARDIOLOGY | Facility: CLINIC | Age: 69
End: 2024-07-15
Payer: MEDICARE

## 2024-07-16 DIAGNOSIS — U07.1 POSITIVE SELF-ADMINISTERED ANTIGEN TEST FOR COVID-19: ICD-10-CM

## 2024-07-16 NOTE — TELEPHONE ENCOUNTER
Patient requesting refill of Paxlovid. Advised patient that refill are not generally given on such medications.  Patient asked why not.  Patient advised because it is an antiviral and provider needs to make sure medication is really needed before prescribed.  Patient requested tohave request forwarded to provider without scheduling visit.   Please advise.

## 2024-07-16 NOTE — TELEPHONE ENCOUNTER
----- Message from Pietro Jon sent at 7/16/2024  2:39 PM CDT -----  Regarding: self  Type: Patient Call Back    Who called:self    What is the request in detail:pt is calling to get a refill but the medication she wanted to get the refill on wasn't on her chart. Would like a callback     Can the clinic reply by [facility]?no    Would the patient rather a call back or a response via [facility]? callback    Best call back number:[phone]    Additional Information:

## 2024-07-23 NOTE — TELEPHONE ENCOUNTER
Patient informed of YOUNG Mendoza's response.  Patient verbalized understanding, scheduled appointment for 7/24/2024.

## 2024-08-01 DIAGNOSIS — M54.50 ACUTE LEFT-SIDED LOW BACK PAIN WITHOUT SCIATICA: ICD-10-CM

## 2024-08-01 RX ORDER — TIZANIDINE 4 MG/1
TABLET ORAL
Qty: 60 TABLET | Refills: 0 | Status: SHIPPED | OUTPATIENT
Start: 2024-08-01

## 2024-08-01 NOTE — TELEPHONE ENCOUNTER
No care due was identified.  [facility] Embedded Care Due Messages. Reference number: [id number].   8/01/2024 11:17:19 AM CDT

## 2024-08-13 DIAGNOSIS — F51.04 PSYCHOPHYSIOLOGICAL INSOMNIA: ICD-10-CM

## 2024-08-13 RX ORDER — QUETIAPINE FUMARATE 100 MG/1
TABLET, FILM COATED ORAL
Qty: 30 TABLET | Refills: 0 | Status: SHIPPED | OUTPATIENT
Start: 2024-08-13

## 2024-08-13 RX ORDER — QUETIAPINE FUMARATE 100 MG/1
TABLET, FILM COATED ORAL
Qty: 30 TABLET | Refills: 0 | Status: CANCELLED | OUTPATIENT
Start: 2024-08-13

## 2024-08-13 NOTE — TELEPHONE ENCOUNTER
No care due was identified.  [facility] Embedded Care Due Messages. Reference number: [id number].   8/13/2024 11:28:19 AM CDT

## 2024-08-14 NOTE — TELEPHONE ENCOUNTER
No care due was identified.  [facility] Embedded Care Due Messages. Reference number: [id number].   8/13/2024 8:41:26 PM CDT

## 2024-08-21 DIAGNOSIS — E11.22 TYPE 2 DIABETES MELLITUS WITH STAGE 3B CHRONIC KIDNEY DISEASE, WITHOUT LONG-TERM CURRENT USE OF INSULIN: ICD-10-CM

## 2024-08-21 DIAGNOSIS — N18.32 TYPE 2 DIABETES MELLITUS WITH STAGE 3B CHRONIC KIDNEY DISEASE, WITHOUT LONG-TERM CURRENT USE OF INSULIN: ICD-10-CM

## 2024-08-21 RX ORDER — SEMAGLUTIDE 1.34 MG/ML
1 INJECTION, SOLUTION SUBCUTANEOUS
Qty: 3 ML | Refills: 11 | Status: SHIPPED | OUTPATIENT
Start: 2024-08-21 | End: 2025-08-21

## 2024-08-21 NOTE — TELEPHONE ENCOUNTER
Care Due:                  Date            Visit Type   Department     Provider  --------------------------------------------------------------------------------                                EP -                              PRIMARY      [facility]  Last Visit: 05-      CARE ([facility])   CAIN Bruner                              EP -                              PRIMARY      [facility]  Next Visit: 08-      CARE ([facility])   CAIN Bruner                                                            Last  Test          Frequency    Reason                     Performed    Due Date  --------------------------------------------------------------------------------    HBA1C.......  6 months...  semaglutide..............  05- 11-    [facility] Embedded Care Due Messages. Reference number: [id number].   8/21/2024 12:59:18 PM CDT

## 2024-08-29 DIAGNOSIS — M54.50 ACUTE LEFT-SIDED LOW BACK PAIN WITHOUT SCIATICA: ICD-10-CM

## 2024-08-29 NOTE — TELEPHONE ENCOUNTER
Care Due:                  Date            Visit Type   Department     Provider  --------------------------------------------------------------------------------                                EP -                              PRIMARY      [facility]  Last Visit: 05-      CARE ([facility])   CAIN Bruner                              EP -                              PRIMARY      [facility]  Next Visit: 08-      CARE ([facility])   CAIN Bruner                                                            Last  Test          Frequency    Reason                     Performed    Due Date  --------------------------------------------------------------------------------    Uric Acid...  12 months..  allopurinoL..............  09-   09-    [facility] Embedded Care Due Messages. Reference number: [id number].   8/29/2024 5:17:09 PM CDT

## 2024-08-30 ENCOUNTER — OFFICE VISIT (OUTPATIENT)
Dept: FAMILY MEDICINE | Facility: CLINIC | Age: 69
End: 2024-08-30
Payer: MEDICARE

## 2024-08-30 VITALS
OXYGEN SATURATION: 95 % | HEART RATE: 80 BPM | RESPIRATION RATE: 18 BRPM | DIASTOLIC BLOOD PRESSURE: 84 MMHG | BODY MASS INDEX: 37.68 KG/M2 | SYSTOLIC BLOOD PRESSURE: 136 MMHG | HEIGHT: 67 IN | TEMPERATURE: 98 F | WEIGHT: 240.06 LBS

## 2024-08-30 DIAGNOSIS — D57.3 SICKLE CELL TRAIT: ICD-10-CM

## 2024-08-30 DIAGNOSIS — N18.32 TYPE 2 DIABETES MELLITUS WITH STAGE 3B CHRONIC KIDNEY DISEASE, WITHOUT LONG-TERM CURRENT USE OF INSULIN: ICD-10-CM

## 2024-08-30 DIAGNOSIS — F51.04 PSYCHOPHYSIOLOGICAL INSOMNIA: ICD-10-CM

## 2024-08-30 DIAGNOSIS — Z86.16 HISTORY OF COVID-19: ICD-10-CM

## 2024-08-30 DIAGNOSIS — E66.01 SEVERE OBESITY (BMI 35.0-39.9) WITH COMORBIDITY: ICD-10-CM

## 2024-08-30 DIAGNOSIS — M81.0 POST-MENOPAUSAL OSTEOPOROSIS: ICD-10-CM

## 2024-08-30 DIAGNOSIS — Z12.31 ENCOUNTER FOR SCREENING MAMMOGRAM FOR BREAST CANCER: ICD-10-CM

## 2024-08-30 DIAGNOSIS — N18.32 STAGE 3B CHRONIC KIDNEY DISEASE: ICD-10-CM

## 2024-08-30 DIAGNOSIS — E11.22 TYPE 2 DIABETES MELLITUS WITH STAGE 3B CHRONIC KIDNEY DISEASE, WITHOUT LONG-TERM CURRENT USE OF INSULIN: ICD-10-CM

## 2024-08-30 DIAGNOSIS — I10 ESSENTIAL HYPERTENSION: Primary | ICD-10-CM

## 2024-08-30 DIAGNOSIS — K59.03 DRUG-INDUCED CONSTIPATION: ICD-10-CM

## 2024-08-30 PROCEDURE — 99999 PR PBB SHADOW E&M-EST. PATIENT-LVL III: CPT | Mod: PBBFAC,,, | Performed by: INTERNAL MEDICINE

## 2024-08-30 RX ORDER — AMLODIPINE BESYLATE 10 MG/1
10 TABLET ORAL
COMMUNITY
Start: 2024-06-06

## 2024-08-30 RX ORDER — FUROSEMIDE 20 MG/1
20-40 TABLET ORAL DAILY PRN
COMMUNITY
Start: 2024-08-14

## 2024-08-30 RX ORDER — TIZANIDINE 4 MG/1
TABLET ORAL
Qty: 60 TABLET | Refills: 0 | Status: SHIPPED | OUTPATIENT
Start: 2024-08-30

## 2024-08-30 RX ORDER — POTASSIUM CHLORIDE 20 MEQ/1
20 TABLET, EXTENDED RELEASE ORAL
COMMUNITY
Start: 2024-06-26

## 2024-08-30 RX ORDER — QUETIAPINE FUMARATE 100 MG/1
TABLET, FILM COATED ORAL
Qty: 90 TABLET | Refills: 3 | Status: SHIPPED | OUTPATIENT
Start: 2024-08-30

## 2024-08-30 RX ORDER — DULAGLUTIDE 0.75 MG/.5ML
INJECTION, SOLUTION SUBCUTANEOUS
COMMUNITY
End: 2024-08-30

## 2024-08-30 RX ORDER — ZOLPIDEM TARTRATE 5 MG/1
5 TABLET ORAL NIGHTLY
COMMUNITY
Start: 2024-08-02

## 2024-08-30 RX ORDER — ACETAMINOPHEN AND CODEINE PHOSPHATE 300; 60 MG/1; MG/1
1 TABLET ORAL 2 TIMES DAILY PRN
COMMUNITY
Start: 2024-08-20

## 2024-08-30 RX ORDER — TIRZEPATIDE 5 MG/.5ML
5 INJECTION, SOLUTION SUBCUTANEOUS
Qty: 2 ML | Refills: 0 | Status: SHIPPED | OUTPATIENT
Start: 2024-08-30 | End: 2024-09-29

## 2024-08-30 RX ORDER — SEMAGLUTIDE 2.68 MG/ML
2 INJECTION, SOLUTION SUBCUTANEOUS
COMMUNITY
Start: 2024-08-02 | End: 2024-08-30

## 2024-08-30 NOTE — PROGRESS NOTES
Subjective:       Patient ID: Gwendolyn Fournier is a pleasant 69 y.o. Black or  female patient    Chief Complaint: Follow-up      Patient is a patient I saw last on May 30, 2024, see my last notes.    HPI:     Patient with past medical history as per list of problems below coming today for a regular follow-up visit   From our last visit:  - 07/08/2024 COVID   She is here with her daughter as always, Zehra is my next pt.  She reports feeling globally fine, she recovered well from her COVID infection, states however she was rather sick.  She would like to be switched from Ozempic to Mounjaro.  She says she is not losing weight so much while her son who is on Mounjaro is.  However her diet is not optimal, she will try to do better, her daughter is helping a lot. She cannot be very active.  She reports constipation on semaglutide, she tried stool softeners.  Her daughter is on Linzess that she states works well while she is on semaglutide, she would like to try the same.    She otherwise is no concerns to report.  She is willing do blood work before the next visit.    Patient Active Problem List   Diagnosis    Essential hypertension    Sciatica of left side    Primary osteoarthritis involving multiple joints    Idiopathic chronic gout of multiple sites without tophus    History of open reduction and internal fixation (ORIF) procedure    Stage 3 chronic kidney disease    Chronic pain    Severe obesity (BMI 35.0-39.9) with comorbidity    Obstructive sleep apnea    Sickle cell trait    Sacroiliitis, not elsewhere classified    Type 2 diabetes mellitus with stage 3b chronic kidney disease, without long-term current use of insulin    Major depressive disorder, recurrent, moderate    History of tuberculosis exposure    Poor posture    Decreased range of motion of trunk and back    Decreased strength of trunk and back    SVT (supraventricular tachycardia)    Aortic atherosclerosis    Tremor    Refusal of  blood transfusions as patient is [employer]    Class 3 drug-induced obesity with serious comorbidity and body mass index (BMI) of 40.0 to 44.9 in adult    Positive self-administered antigen test for COVID-19         PAST MEDICAL HISTORY  Past Medical History:   Diagnosis Date    Arthritis     Gout attack     Hx of psychiatric care     Hypertension     Opioid dependence, uncomplicated 02/03/2023    Psychiatric problem     Renal disorder     Sciatic nerve pain 2013    Therapy     used to follow with psychiatrist but doesn't recall whom, 2012    Tuberculosis     Unspecified cataract 07/27/2023    Mild on both eyes        PAST SURGICAL HISTORY:  Past Surgical History:   Procedure Laterality Date    COLONOSCOPY N/A 1/21/2019    Procedure: COLONOSCOPY;  Surgeon: Shanna Jose MD;  Location: [facility] ENDO;  Service: Endoscopy;  Laterality: N/A;    INJECTION OF JOINT Bilateral 3/13/2019    Procedure: INJECTION, JOINT BILATERAL SI JOINT;  Surgeon: Evan Coreas MD;  Location: [facility] PAIN MGT;  Service: Pain Management;  Laterality: Bilateral;  BILATERAL SI JOINT INJECTION    KNEE SURGERY  1/2014    left knee surgery         SOCIAL HISTORY:   reports that she quit smoking about 47 years ago. Her smoking use included cigarettes. She has never used smokeless tobacco. She reports that she does not currently use alcohol. She reports that she does not use drugs.     FAMILY HISTORY:  Family History   Problem Relation Name Age of Onset    Tuberculosis Mother      Stroke Father      Bipolar disorder Daughter      Suicide Daughter      Depression Daughter      Bipolar disorder Daughter      Depression Daughter          ALLERGIES:   Review of patient's allergies indicates:   Allergen Reactions    Ondansetron hcl (pf) Hives and Itching    Ketorolac Itching       MEDICATIONS:    Current Outpatient Medications:     acetaminophen (TYLENOL) 500 MG tablet, Take 1 tablet (500 mg total) by mouth every 6 (six) hours as needed for Pain.,  Disp: 30 tablet, Rfl: 0    acetaminophen-codeine 300-60mg (TYLENOL #4) 300-60 mg Tab, Take 1 tablet by mouth 2 (two) times daily as needed., Disp: , Rfl:     albuterol (ACCUNEB) 0.63 mg/3 mL Nebu, Inhale 1 ampule into the lungs as needed., Disp: , Rfl:     allopurinoL (ZYLOPRIM) 100 MG tablet, TAKE 1 TABLET(100 MG) BY MOUTH TWICE DAILY, Disp: 180 tablet, Rfl: 3    atorvastatin (LIPITOR) 40 MG tablet, Take 1 tablet (40 mg total) by mouth every evening., Disp: 30 tablet, Rfl: 0    blood sugar diagnostic Strp, To check BG two times daily, freestyle lite meter, Disp: 200 each, Rfl: 3    blood-glucose meter kit, To check BG two times daily, freestyle lite meter, Disp: 1 each, Rfl: 0    buPROPion (WELLBUTRIN) 100 MG tablet, Take 100 mg by mouth every morning., Disp: , Rfl:     ciclopirox (PENLAC) 8 % Soln, Apply topically nightly., Disp: 6.6 mL, Rfl: 3    dapagliflozin propanediol (FARXIGA) 10 mg tablet, Take 1 tablet (10 mg total) by mouth once daily., Disp: 30 tablet, Rfl: 0    diazePAM (VALIUM) 5 MG tablet, TAKE 1 TABLET(5 MG) BY MOUTH EVERY 12 HOURS AS NEEDED FOR ANXIETY, Disp: 60 tablet, Rfl: 2    diphenhydrAMINE (BENADRYL) 25 mg capsule, Take 25 mg by mouth every 6 (six) hours as needed., Disp: , Rfl:     docusate sodium (COLACE) 100 MG capsule, Take 1 capsule (100 mg total) by mouth as needed for Constipation., Disp: 90 capsule, Rfl: 3    DULoxetine (CYMBALTA) 60 MG capsule, TAKE 1 CAPSULE(60 MG) BY MOUTH EVERY DAY, Disp: 90 capsule, Rfl: 3    ergocalciferol (ERGOCALCIFEROL) 50,000 unit Cap, TAKE 1 CAPSULE BY MOUTH EVERY 7 DAYS, Disp: 12 capsule, Rfl: 0    EScitalopram oxalate (LEXAPRO) 10 MG tablet, TAKE 1 TABLET(10 MG) BY MOUTH EVERY DAY, Disp: 90 tablet, Rfl: 3    FARXIGA 10 mg tablet, TAKE 1 TABLET(10 MG) BY MOUTH EVERY DAY, Disp: 30 tablet, Rfl: 0    ferrous sulfate (FEOSOL) 325 mg (65 mg iron) Tab tablet, Take 325 mg by mouth as needed. , Disp: , Rfl:     flash glucose scanning reader (FREESTYLE HAI 2  READER) Misc, 1 each by Misc.(Non-Drug; Combo Route) route 4 (four) times daily with meals and nightly., Disp: 1 each, Rfl: 0    flash glucose sensor (FREESTYLE HAI 2 SENSOR) Kit, Use as directed to check blood sugar, Disp: 6 kit, Rfl: 0    fluticasone propionate (FLONASE) 50 mcg/actuation nasal spray, 1 spray (50 mcg total) by Each Nostril route once daily., Disp: 18.2 mL, Rfl: 0    gabapentin (NEURONTIN) 300 MG capsule, TAKE 1 CAPSULE(300 MG) BY MOUTH THREE TIMES DAILY, Disp: 90 capsule, Rfl: 5    HYDROcodone-acetaminophen (NORCO) 5-325 mg per tablet, TK 1 T PO Q 12 H PRN, Disp: , Rfl:     lancets [facility], To check BG two times daily, to use with insurance preferred meter, Disp: 200 each, Rfl: 3    LIDOcaine (LIDODERM) 5 %, Place 1 patch onto the skin once daily. Apply patch for 12 hours and then leave off for 12 hours, Disp: 15 patch, Rfl: 0    metoprolol succinate (TOPROL-XL) 50 MG 24 hr tablet, Take 1 tablet (50 mg total) by mouth once daily., Disp: 90 tablet, Rfl: 3    MOVANTIK 25 mg tablet, Take 25 mg by mouth once daily., Disp: , Rfl:     mupirocin (BACTROBAN) 2 % ointment, Apply topically 3 (three) times daily., Disp: 30 g, Rfl: 0    potassium chloride SA (K-DUR,KLOR-CON) 20 MEQ tablet, Take 20 mEq by mouth., Disp: , Rfl:     spironolactone (ALDACTONE) 25 MG tablet, Take 1 tablet (25 mg total) by mouth once daily., Disp: 90 tablet, Rfl: 3    tiZANidine (ZANAFLEX) 4 MG tablet, TAKE 1 TABLET(4 MG) BY MOUTH TWICE DAILY AS NEEDED FOR PAIN, Disp: 60 tablet, Rfl: 0    zolpidem (AMBIEN) 5 MG Tab, Take 5 mg by mouth every evening., Disp: , Rfl:     amLODIPine (NORVASC) 10 MG tablet, Take 10 mg by mouth., Disp: , Rfl:     amLODIPine (NORVASC) 5 MG tablet, Take 1 tablet (5 mg total) by mouth once daily., Disp: 90 tablet, Rfl: 3    blood-glucose meter,continuous (DEXCOM G6 ) Misc, Use as directed to check blood sugar (Patient not taking: Reported on 8/30/2024), Disp: 1 each, Rfl: 3    blood-glucose sensor  (DEXCOM G6 SENSOR) Sue, Use as directed to check blood sugar (Patient not taking: Reported on 8/30/2024), Disp: 3 each, Rfl: 3    blood-glucose transmitter (DEXCOM G6 TRANSMITTER) Sue, Use as directed to check blood sugar (Patient not taking: Reported on 8/30/2024), Disp: 1 each, Rfl: 3    furosemide (LASIX) 20 MG tablet, Take 20-40 mg by mouth daily as needed., Disp: , Rfl:     furosemide (LASIX) 40 MG tablet, Take 1 tablet (40 mg total) by mouth 2 (two) times daily., Disp: 60 tablet, Rfl: 11    linaCLOtide (LINZESS) 145 mcg Cap capsule, Take 1 capsule (145 mcg total) by mouth before breakfast., Disp: 90 capsule, Rfl: 1    primidone (MYSOLINE) 50 MG Tab, Take 2 tablets (100 mg total) by mouth 3 (three) times daily., Disp: 180 tablet, Rfl: 11    QUEtiapine (SEROQUEL) 100 MG Tab, TAKE 1 TABLET BY MOUTH AT BEDTIME, Disp: 90 tablet, Rfl: 3    tirzepatide (MOUNJARO) 5 mg/0.5 mL PnIj, Inject 5 mg into the skin every 7 days., Disp: 2 mL, Rfl: 0    Current Facility-Administered Medications:     lidocaine (PF) 10 mg/ml (1%) injection 40 mg, 4 mL, Intramuscular, 1 time in Clinic/HOD, [place], Azikiwe K., MD    Review of Systems   Constitutional:  Negative for chills, fatigue, fever and unexpected weight change.   HENT:  Negative for congestion.    Respiratory:  Negative for cough and shortness of breath.    Gastrointestinal:  Positive for constipation. Negative for diarrhea.   Neurological:  Positive for headaches.   Psychiatric/Behavioral:  Positive for sleep disturbance.    All other systems reviewed and are negative.      Objective:      Physical Exam  Vitals reviewed.   Constitutional:       Appearance: Normal appearance. She is obese.   Cardiovascular:      Rate and Rhythm: Normal rate and regular rhythm.      Pulses: Normal pulses.      Heart sounds: Normal heart sounds.   Pulmonary:      Effort: Pulmonary effort is normal.      Breath sounds: Normal breath sounds.   Abdominal:      General: Bowel sounds are normal.  "  Musculoskeletal:      Comments: Has a cane   Neurological:      Mental Status: She is alert. Mental status is at baseline.   Psychiatric:         Mood and Affect: Mood normal.         Behavior: Behavior normal.         Thought Content: Thought content normal.         Judgment: Judgment normal.         Vitals:    08/30/24 1400   BP: 136/84   BP Location: Right arm   Patient Position: Sitting   BP Method: Small (Manual)   Pulse: 80   Resp: 18   Temp: 98.1 °F (36.7 °C)   TempSrc: Oral   SpO2: 95%   Weight: 108.9 kg (240 lb 1.3 oz)   Height: 5' 7" (1.702 m)     Body mass index is 37.6 kg/m².    RESULTS: Reviewed labs from last 12 months    Last Lab Results:     Lab Results   Component Value Date    WBC 13.57 (H) 05/21/2024    HGB 11.1 (L) 05/21/2024    HCT 35.0 (L) 05/21/2024     05/21/2024     05/21/2024    K 4.1 05/21/2024     05/21/2024    CO2 25 05/21/2024    BUN 23 05/21/2024    CREATININE 1.6 (H) 05/21/2024    CALCIUM 10.4 05/21/2024    ALBUMIN 3.4 (L) 05/21/2024    AST 12 03/07/2024    ALT 12 03/07/2024    CHOL 192 09/14/2023    TRIG 249 (H) 09/14/2023    HDL 34 (L) 09/14/2023    LDLCALC 108.2 09/14/2023    HGBA1C 5.7 (H) 05/21/2024    TSH 1.708 09/14/2023       Assessment & Plan:       1. Essential hypertension  -     CBC Auto Differential; Future  -     Comprehensive Metabolic Panel; Future; Expected date: 08/30/2024  -     TSH; Future; Expected date: 08/30/2024    BP at goal, same treatment.    2. Type 2 diabetes mellitus with stage 3b chronic kidney disease, without long-term current use of insulin  -     Hemoglobin A1C; Future; Expected date: 08/30/2024  -     Lipid Panel; Future; Expected date: 08/30/2024  -     tirzepatide (MOUNJARO) 5 mg/0.5 mL PnIj; Inject 5 mg into the skin every 7 days.  Dispense: 2 mL; Refill: 0    See HPI, patient would like to be switched to Mounjaro.  I am going to go to 5 mg first, she has been on 2 mg of Ozempic then 1 mg with no issue except constipation.  " Will monitor in 3 months.  Advised to drink plenty of fluid and careful to constipation with semaglutide, will order Linzess that works for her daughter.    3. Severe obesity (BMI 35.0-39.9) with comorbidity  -     tirzepatide (MOUNJARO) 5 mg/0.5 mL PnIj; Inject 5 mg into the skin every 7 days.  Dispense: 2 mL; Refill: 0    See above.  Advised patient that it is important to have a good diet.  I told us know sure that Mounjaro would help much better than Ozempic but she wants to try.  She is going to finish her present supply of Ozempic then switch to Mounjaro.     4. Stage 3b chronic kidney disease    Will need to monitor CKD as patient is on semaglutide.  Advised regarding hydration, no NSAIDs, follow-up Nephrology.  Has been stable for some time.    5. Psychophysiological insomnia  -     QUEtiapine (SEROQUEL) 100 MG Tab; TAKE 1 TABLET BY MOUTH AT BEDTIME  Dispense: 90 tablet; Refill: 3    6. History of COVID-19    7. Sickle cell trait    8. Post-menopausal osteoporosis  -     DXA Bone Density Axial Skeleton 1 or more sites; Future; Expected date: 08/30/2024    9. Encounter for screening mammogram for breast cancer  -     Mammo Digital Screening Bilat; Future; Expected date: 08/30/2024    10. Drug-induced constipation  -     linaCLOtide (LINZESS) 145 mcg Cap capsule; Take 1 capsule (145 mcg total) by mouth before breakfast.  Dispense: 90 capsule; Refill: 1    See all above.    Follow up in about 3 months (around 11/30/2024) for with Dr. Abebe after blood work.    This note was created by combination of typed  and M-Modal dictation.  Transcription errors may be present.  If there are any questions, please contact me.

## 2024-08-30 NOTE — PROGRESS NOTES
Health Maintenance Due   Topic     TETANUS VACCINE  Hx of chickenpox. Notified pt can get vaccine at pharmacy.    Mammogram  Pt will schedule in portal     DEXA Scan  Pt will schedule in portal     COVID-19 Vaccine (6 - 2023-24 season) Not offered at this Facility    Eye Exam  Consult pcp    Diabetes Urine Screening  Consult pcp    Lipid Panel  Consult pcp    Foot Exam  Consult pcp

## 2024-09-09 DIAGNOSIS — M54.50 ACUTE LEFT-SIDED LOW BACK PAIN WITHOUT SCIATICA: ICD-10-CM

## 2024-09-09 RX ORDER — TIZANIDINE 4 MG/1
TABLET ORAL
Qty: 60 TABLET | Refills: 0 | Status: SHIPPED | OUTPATIENT
Start: 2024-09-09

## 2024-09-09 NOTE — TELEPHONE ENCOUNTER
No care due was identified.  Health [facility] Embedded Care Due Messages. Reference number: [id number].   9/09/2024 5:57:25 AM CDT

## 2024-09-19 DIAGNOSIS — E11.22 TYPE 2 DIABETES MELLITUS WITH STAGE 3B CHRONIC KIDNEY DISEASE, WITHOUT LONG-TERM CURRENT USE OF INSULIN: ICD-10-CM

## 2024-09-19 DIAGNOSIS — E66.01 SEVERE OBESITY (BMI 35.0-39.9) WITH COMORBIDITY: ICD-10-CM

## 2024-09-19 DIAGNOSIS — N18.32 TYPE 2 DIABETES MELLITUS WITH STAGE 3B CHRONIC KIDNEY DISEASE, WITHOUT LONG-TERM CURRENT USE OF INSULIN: ICD-10-CM

## 2024-09-19 RX ORDER — TIRZEPATIDE 5 MG/.5ML
5 INJECTION, SOLUTION SUBCUTANEOUS
Qty: 2 ML | Refills: 2 | Status: SHIPPED | OUTPATIENT
Start: 2024-09-19 | End: 2024-12-18

## 2024-09-19 NOTE — TELEPHONE ENCOUNTER
No care due was identified.  [facility] Embedded Care Due Messages. Reference number: [id number].   9/19/2024 1:27:11 PM CDT

## 2024-09-25 DIAGNOSIS — E11.9 TYPE 2 DIABETES MELLITUS WITHOUT COMPLICATION: ICD-10-CM

## 2024-10-09 RX ORDER — FUROSEMIDE 40 MG/1
40 TABLET ORAL 2 TIMES DAILY
Qty: 60 TABLET | Refills: 11 | Status: SHIPPED | OUTPATIENT
Start: 2024-10-09

## 2024-10-14 RX ORDER — FUROSEMIDE 40 MG/1
40 TABLET ORAL 2 TIMES DAILY
Qty: 60 TABLET | Refills: 11 | Status: SHIPPED | OUTPATIENT
Start: 2024-10-14

## 2024-10-16 ENCOUNTER — PATIENT OUTREACH (OUTPATIENT)
Dept: ADMINISTRATIVE | Facility: HOSPITAL | Age: 69
End: 2024-10-16
Payer: MEDICARE

## 2024-10-16 NOTE — PROGRESS NOTES
Population Health Chart Review & Patient Outreach Details      Additional [facility] Health Notes:    Upcoming appointment scheduled with Dr. Abebe 1/2/2025, updated teams.            Updates Requested / Reviewed:      Updated Care Coordination Note, Care Everywhere, and Care Team Updated         Health Maintenance Topics Overdue:      VBHM Score: 4     Osteoporosis Screening  Eye Exam  Mammogram  Foot Exam    Tetanus Vaccine                  Health Maintenance Topic(s) Outreach Outcomes & Actions Taken:    Lab(s) - Outreach Outcomes & Actions Taken  : Overdue Lab(s) Scheduled    Provider Pt Reattribution - Outreach Outcomes & Actions Taken  : Upcoming Appt with Another Provider Already Scheduled

## 2024-10-28 DIAGNOSIS — M54.50 ACUTE LEFT-SIDED LOW BACK PAIN WITHOUT SCIATICA: ICD-10-CM

## 2024-10-28 RX ORDER — TIZANIDINE 4 MG/1
TABLET ORAL
Qty: 60 TABLET | Refills: 0 | Status: SHIPPED | OUTPATIENT
Start: 2024-10-28

## 2024-11-13 DIAGNOSIS — M54.32 SCIATICA OF LEFT SIDE: ICD-10-CM

## 2024-11-13 RX ORDER — GABAPENTIN 300 MG/1
300 CAPSULE ORAL 3 TIMES DAILY
Qty: 90 CAPSULE | Refills: 5 | Status: SHIPPED | OUTPATIENT
Start: 2024-11-13

## 2024-11-13 NOTE — TELEPHONE ENCOUNTER
Care Due:                  Date            Visit Type   Department     Provider  --------------------------------------------------------------------------------                                EP -                              PRIMARY      [facility]  Last Visit: 08-      CARE ([facility])   MEDICINE       Merlyn Bruner                              EP -                              PRIMARY      [facility]  Next Visit: 01-      CARE ([facility])   MEDICINE       Yolette Abebe                                                            Last  Test          Frequency    Reason                     Performed    Due Date  --------------------------------------------------------------------------------    Uric Acid...  12 months..  allopurinoL..............  09-   09-    [employer] Embedded Care Due Messages. Reference number: [id number].   11/13/2024 12:47:12 PM CST

## 2024-11-13 NOTE — TELEPHONE ENCOUNTER
----- Message from Med Assistant Allen sent at 11/13/2024 12:29 PM CST -----  Type: RX Refill Request    Who Called:  pt  Have you contacted your pharmacy:    Refill    RX Name and Strength:  gabapentin (NEURONTIN) 300 MG capsule  Preferred Pharmacy with phone number:    [id number] - [address] AT [address]  [place] [address]  Phone: [phone] Fax: [phone]    Local or Mail Order:    Would the patient rather a call back or a response via [facility]?    Best Call Back Number:  Telephone Information:  Mobile          [phone]     Additional Information:    Thank you.

## 2024-11-19 ENCOUNTER — HOSPITAL ENCOUNTER (OUTPATIENT)
Dept: RADIOLOGY | Facility: HOSPITAL | Age: 69
Discharge: HOME | End: 2024-11-19
Attending: INTERNAL MEDICINE
Payer: MEDICARE

## 2024-11-19 DIAGNOSIS — Z12.31 ENCOUNTER FOR SCREENING MAMMOGRAM FOR MALIGNANT NEOPLASM OF BREAST: ICD-10-CM

## 2024-11-19 PROCEDURE — 77063 BREAST TOMOSYNTHESIS BI: CPT | Mod: TC

## 2024-11-20 DIAGNOSIS — E11.22 TYPE 2 DIABETES MELLITUS WITH STAGE 3B CHRONIC KIDNEY DISEASE, WITHOUT LONG-TERM CURRENT USE OF INSULIN: ICD-10-CM

## 2024-11-20 DIAGNOSIS — N18.32 TYPE 2 DIABETES MELLITUS WITH STAGE 3B CHRONIC KIDNEY DISEASE, WITHOUT LONG-TERM CURRENT USE OF INSULIN: ICD-10-CM

## 2024-11-20 RX ORDER — FLASH GLUCOSE SENSOR
KIT MISCELLANEOUS
Qty: 6 KIT | Refills: 0 | Status: SHIPPED | OUTPATIENT
Start: 2024-11-20

## 2024-11-20 RX ORDER — FLASH GLUCOSE SCANNING READER
1 EACH MISCELLANEOUS
Qty: 1 EACH | Refills: 0 | Status: SHIPPED | OUTPATIENT
Start: 2024-11-20

## 2024-11-20 NOTE — TELEPHONE ENCOUNTER
No care due was identified.  Health [facility] Embedded Care Due Messages. Reference number: [id number].   11/20/2024 9:16:09 AM CST

## 2024-11-20 NOTE — TELEPHONE ENCOUNTER
----- Message from Diane sent at 11/20/2024  9:03 AM CST -----  Regarding: self  Who called: self    What is the request in detail: pt is needing clearance for provider for Freestyle Margaret 2    Can the clinic reply by [facility]? No    Would the patient rather a call back or a response via [facility]? Call back    Best call back number: [phone]      Additional Information:    Thank you.

## 2024-11-21 RX ORDER — ATORVASTATIN CALCIUM 40 MG/1
40 TABLET, FILM COATED ORAL NIGHTLY
Qty: 90 TABLET | Refills: 3 | Status: SHIPPED | OUTPATIENT
Start: 2024-11-21

## 2024-11-22 DIAGNOSIS — M54.50 ACUTE LEFT-SIDED LOW BACK PAIN WITHOUT SCIATICA: ICD-10-CM

## 2024-11-22 RX ORDER — TIZANIDINE 4 MG/1
TABLET ORAL
Qty: 60 TABLET | Refills: 3 | Status: SHIPPED | OUTPATIENT
Start: 2024-11-22

## 2024-11-22 NOTE — TELEPHONE ENCOUNTER
No care due was identified.  [facility] Embedded Care Due Messages. Reference number: [id number].   11/22/2024 11:34:19 AM CST

## 2024-11-25 DIAGNOSIS — R92.8 ABNORMAL MAMMOGRAM OF RIGHT BREAST: Primary | ICD-10-CM

## 2024-11-27 ENCOUNTER — TELEPHONE (OUTPATIENT)
Dept: FAMILY MEDICINE | Facility: CLINIC | Age: 69
End: 2024-11-27
Payer: MEDICARE

## 2024-11-27 NOTE — TELEPHONE ENCOUNTER
----- Message from Diane sent at 11/27/2024 10:01 AM CST -----  Regarding: self  Who called: self    What is the request in detail: pt is stating authorizations for Style Margaret 2 was incorrect and needs to redone and sent to [phone]    Can the clinic reply by [facility]? No    Would the patient rather a call back or a response via [facility]? Call back    Best call back number: [phone]    Additional Information:    Thank you.

## 2024-12-02 ENCOUNTER — TELEPHONE (OUTPATIENT)
Dept: FAMILY MEDICINE | Facility: CLINIC | Age: 69
End: 2024-12-02
Payer: MEDICARE

## 2024-12-02 NOTE — TELEPHONE ENCOUNTER
Patient scheduled appointment for 12/31/2024 to discuss continuous glucose monitoring device needing a prior authorization.

## 2024-12-02 NOTE — TELEPHONE ENCOUNTER
Freestyle coy denied due to pt not on multiple insulin injections daily; she will need to schedule OV for appeal to be discussed and completed

## 2024-12-02 NOTE — TELEPHONE ENCOUNTER
LM informing pt sensor has already been denied by insurance; she will need to schedule an appointment to discuss reason why for an appeal to be completed

## 2024-12-02 NOTE — TELEPHONE ENCOUNTER
----- Message from Georgia sent at 12/2/2024  9:57 AM CST -----  Regarding: Self   Type: RX Refill Request    Who Called: Self    Have you contacted your pharmacy: yes     Refill    RX Name and Strength:flash glucose sensor (FREESTYLE HAI 2 SENSOR) Kit     Preferred Pharmacy with phone number: ..      [facility] [place] [id number]  [place] [address]  Phone: [phone] Fax: [phone]            Local or Mail Order: local     Would the patient rather a call back or a response [email or web address]? Call back     Best Call Back Number:.[phone]      Additional Information:  Pt states she gets hypoglycemic at night     Thank you.

## 2024-12-11 ENCOUNTER — HOSPITAL ENCOUNTER (OUTPATIENT)
Dept: RADIOLOGY | Facility: HOSPITAL | Age: 69
Discharge: HOME | End: 2024-12-11
Attending: INTERNAL MEDICINE
Payer: MEDICARE

## 2024-12-11 DIAGNOSIS — R92.8 ABNORMAL MAMMOGRAM OF RIGHT BREAST: ICD-10-CM

## 2024-12-11 PROCEDURE — 77065 DX MAMMO INCL CAD UNI: CPT | Mod: 26,RT,, | Performed by: RADIOLOGY

## 2024-12-11 PROCEDURE — 77065 DX MAMMO INCL CAD UNI: CPT | Mod: TC,RT

## 2024-12-11 PROCEDURE — 76642 ULTRASOUND BREAST LIMITED: CPT | Mod: 26,RT,, | Performed by: RADIOLOGY

## 2024-12-11 PROCEDURE — 77061 BREAST TOMOSYNTHESIS UNI: CPT | Mod: 26,RT,, | Performed by: RADIOLOGY

## 2024-12-11 PROCEDURE — 76642 ULTRASOUND BREAST LIMITED: CPT | Mod: TC,RT

## 2024-12-13 NOTE — TELEPHONE ENCOUNTER
Care Due:                  Date            Visit Type   Department     Provider  --------------------------------------------------------------------------------                                EP -                              PRIMARY      [facility]  Last Visit: 08-      CARE ([facility])   MEDICINE       Merlyn Bruner                              ESTABLISHED                              PATIENT -    [facility]  Next Visit: 12-      [facility]      MEDICINE       Yolette Abebe                                                            Last  Test          Frequency    Reason                     Performed    Due Date  --------------------------------------------------------------------------------    CMP.........  12 months..  allopurinoL..............  03- 03-    [employer] Embedded Care Due Messages. Reference number: [id number].   12/13/2024 9:32:31 AM CST

## 2024-12-16 ENCOUNTER — HOSPITAL ENCOUNTER (OUTPATIENT)
Dept: RADIOLOGY | Facility: HOSPITAL | Age: 69
Discharge: HOME | End: 2024-12-16
Attending: INTERNAL MEDICINE
Payer: MEDICARE

## 2024-12-16 DIAGNOSIS — R92.8 ABNORMAL MAMMOGRAM OF RIGHT BREAST: ICD-10-CM

## 2024-12-17 RX ORDER — POTASSIUM CHLORIDE 20 MEQ/1
20 TABLET, EXTENDED RELEASE ORAL ONCE
Qty: 1 TABLET | Refills: 0 | OUTPATIENT
Start: 2024-12-17 | End: 2024-12-17

## 2024-12-19 DIAGNOSIS — N18.32 TYPE 2 DIABETES MELLITUS WITH STAGE 3B CHRONIC KIDNEY DISEASE, WITHOUT LONG-TERM CURRENT USE OF INSULIN: ICD-10-CM

## 2024-12-19 DIAGNOSIS — E11.22 TYPE 2 DIABETES MELLITUS WITH STAGE 3B CHRONIC KIDNEY DISEASE, WITHOUT LONG-TERM CURRENT USE OF INSULIN: ICD-10-CM

## 2024-12-19 DIAGNOSIS — E66.01 SEVERE OBESITY (BMI 35.0-39.9) WITH COMORBIDITY: ICD-10-CM

## 2024-12-19 RX ORDER — TIRZEPATIDE 5 MG/.5ML
5 INJECTION, SOLUTION SUBCUTANEOUS
Qty: 2 ML | Refills: 0 | Status: SHIPPED | OUTPATIENT
Start: 2024-12-19 | End: 2025-01-16

## 2024-12-19 NOTE — TELEPHONE ENCOUNTER
----- Message from Pietro sent at 12/19/2024 12:25 PM CST -----  Regarding: self  Type: RX Refill Request    Who Called:self    Have you contacted your pharmacy:    Refill    RX Name and Strength:tirzepatide (MOUNJARO) 5 mg/0.5 mL Gilbert    Preferred Pharmacy with phone number:  [facility] #[id number] - [address] AT [address]  [place] [address]  Phone: [phone] Fax: [phone]          Local or Mail Order:local    Would the patient rather a call back or a response via [facility]? callback    Best Call Back Number:[phone]    Additional Information:

## 2024-12-19 NOTE — TELEPHONE ENCOUNTER
Last Office Visit Info:   The patient's last visit with Yolette Abebe MD was on Patient does not have a PCP or has not yet seen their PCP.    The patient's last visit in current department was on 8/30/2024.        Last CBC Results:   Lab Results   Component Value Date    WBC 13.57 (H) 05/21/2024    HGB 11.1 (L) 05/21/2024    HCT 35.0 (L) 05/21/2024     05/21/2024       Last CMP Results  Lab Results   Component Value Date     05/21/2024    K 4.1 05/21/2024     05/21/2024    CO2 25 05/21/2024    BUN 23 05/21/2024    CREATININE 1.6 (H) 05/21/2024    CALCIUM 10.4 05/21/2024    ALBUMIN 3.4 (L) 05/21/2024    AST 12 03/07/2024    ALT 12 03/07/2024       Last Lipids  Lab Results   Component Value Date    CHOL 192 09/14/2023    TRIG 249 (H) 09/14/2023    HDL 34 (L) 09/14/2023    LDLCALC 108.2 09/14/2023       Last A1C  Lab Results   Component Value Date    HGBA1C 5.7 (H) 05/21/2024       Last TSH  Lab Results   Component Value Date    TSH 1.708 09/14/2023             Current Med Refills  Medication List with Changes/Refills   Current Medications    ACETAMINOPHEN (TYLENOL) 500 MG TABLET    Take 1 tablet (500 mg total) by mouth every 6 (six) hours as needed for Pain.       Start Date: 2/11/2024 End Date: --    ACETAMINOPHEN-CODEINE 300-60MG (TYLENOL #4) 300-60 MG TAB    Take 1 tablet by mouth 2 (two) times daily as needed.       Start Date: 8/20/2024 End Date: --    ALBUTEROL (ACCUNEB) 0.63 MG/3 ML NEBU    Inhale 1 ampule into the lungs as needed.       Start Date: --        End Date: --    ALLOPURINOL (ZYLOPRIM) 100 MG TABLET    TAKE 1 TABLET(100 MG) BY MOUTH TWICE DAILY       Start Date: 11/28/2023End Date: --    AMLODIPINE (NORVASC) 10 MG TABLET    Take 10 mg by mouth.       Start Date: 6/6/2024  End Date: --    AMLODIPINE (NORVASC) 5 MG TABLET    Take 1 tablet (5 mg total) by mouth once daily.       Start Date: 5/14/2024 End Date: --    ATORVASTATIN (LIPITOR) 40 MG TABLET    Take 1 tablet (40 mg total) by  mouth every evening.       Start Date: 11/21/2024End Date: --    BLOOD SUGAR DIAGNOSTIC STRP    To check BG two times daily, freestyle lite meter       Start Date: 9/9/2020  End Date: --    BLOOD-GLUCOSE METER KIT    To check BG two times daily, freestyle lite meter       Start Date: 9/9/2020  End Date: 8/30/2024    BLOOD-GLUCOSE METER,CONTINUOUS (DEXCOM G6 ) MISC    Use as directed to check blood sugar       Start Date: 5/31/2024 End Date: --    BLOOD-GLUCOSE SENSOR (DEXCOM G6 SENSOR) JAZMIN    Use as directed to check blood sugar       Start Date: 5/31/2024 End Date: --    BLOOD-GLUCOSE TRANSMITTER (DEXCOM G6 TRANSMITTER) JAZMIN    Use as directed to check blood sugar       Start Date: 5/31/2024 End Date: --    BUPROPION (WELLBUTRIN) 100 MG TABLET    Take 100 mg by mouth every morning.       Start Date: 4/20/2022 End Date: --    CICLOPIROX (PENLAC) 8 % SOLN    Apply topically nightly.       Start Date: 9/25/2023 End Date: --    DAPAGLIFLOZIN PROPANEDIOL (FARXIGA) 10 MG TABLET    Take 1 tablet (10 mg total) by mouth once daily.       Start Date: 5/27/2024 End Date: --    DIAZEPAM (VALIUM) 5 MG TABLET    TAKE 1 TABLET(5 MG) BY MOUTH EVERY 12 HOURS AS NEEDED FOR ANXIETY       Start Date: 7/11/2022 End Date: --    DIPHENHYDRAMINE (BENADRYL) 25 MG CAPSULE    Take 25 mg by mouth every 6 (six) hours as needed.       Start Date: --        End Date: --    DOCUSATE SODIUM (COLACE) 100 MG CAPSULE    Take 1 capsule (100 mg total) by mouth as needed for Constipation.       Start Date: 10/22/2021End Date: --    DULOXETINE (CYMBALTA) 60 MG CAPSULE    TAKE 1 CAPSULE(60 MG) BY MOUTH EVERY DAY       Start Date: 1/5/2024  End Date: --    ERGOCALCIFEROL (ERGOCALCIFEROL) 50,000 UNIT CAP    TAKE 1 CAPSULE BY MOUTH EVERY 7 DAYS       Start Date: 1/4/2023  End Date: --    ESCITALOPRAM OXALATE (LEXAPRO) 10 MG TABLET    TAKE 1 TABLET(10 MG) BY MOUTH EVERY DAY       Start Date: 10/30/2023End Date: --    FARXIGA 10 MG TABLET    TAKE 1  TABLET(10 MG) BY MOUTH EVERY DAY       Start Date: 4/23/2024 End Date: --    FERROUS SULFATE (FEOSOL) 325 MG (65 MG IRON) TAB TABLET    Take 325 mg by mouth as needed.        Start Date: 12/28/2017End Date: --    FLASH GLUCOSE SCANNING READER (FREESTYLE HAI 2 READER) MISC    1 each by Misc.(Non-Drug; Combo Route) route 4 (four) times daily with meals and nightly.       Start Date: 11/20/2024End Date: --    FLASH GLUCOSE SENSOR (FREESTYLE HAI 2 SENSOR) KIT    Use as directed to check blood sugar       Start Date: 11/20/2024End Date: --    FLUTICASONE PROPIONATE (FLONASE) 50 MCG/ACTUATION NASAL SPRAY    1 spray (50 mcg total) by Each Nostril route once daily.       Start Date: 7/8/2024  End Date: --    FUROSEMIDE (LASIX) 20 MG TABLET    Take 20-40 mg by mouth daily as needed.       Start Date: 8/14/2024 End Date: --    FUROSEMIDE (LASIX) 40 MG TABLET    TAKE 1 TABLET(40 MG) BY MOUTH TWICE DAILY       Start Date: 10/14/2024End Date: --    GABAPENTIN (NEURONTIN) 300 MG CAPSULE    Take 1 capsule (300 mg total) by mouth 3 (three) times daily.       Start Date: 11/13/2024End Date: --    HYDROCODONE-ACETAMINOPHEN (NORCO) 5-325 MG PER TABLET    TK 1 T PO Q 12 H PRN       Start Date: 11/24/2020End Date: --    LANCETS MISC    To check BG two times daily, to use with insurance preferred meter       Start Date: 8/7/2020  End Date: --    LIDOCAINE (LIDODERM) 5 %    Place 1 patch onto the skin once daily. Apply patch for 12 hours and then leave off for 12 hours       Start Date: 2/11/2024 End Date: --    LINACLOTIDE (LINZESS) 145 MCG CAP CAPSULE    Take 1 capsule (145 mcg total) by mouth before breakfast.       Start Date: 8/30/2024 End Date: --    METOPROLOL SUCCINATE (TOPROL-XL) 50 MG 24 HR TABLET    Take 1 tablet (50 mg total) by mouth once daily.       Start Date: 5/14/2024 End Date: --    MOVANTIK 25 MG TABLET    Take 25 mg by mouth once daily.       Start Date: 9/19/2022 End Date: --    MUPIROCIN (BACTROBAN) 2 % OINTMENT     Apply topically 3 (three) times daily.       Start Date: 10/26/2022End Date: --    POTASSIUM CHLORIDE SA (K-DUR,KLOR-CON) 20 MEQ TABLET    Take 20 mEq by mouth.       Start Date: 6/26/2024 End Date: --    PRIMIDONE (MYSOLINE) 50 MG TAB    Take 2 tablets (100 mg total) by mouth 3 (three) times daily.       Start Date: 11/17/2022End Date: 6/12/2024    QUETIAPINE (SEROQUEL) 100 MG TAB    TAKE 1 TABLET BY MOUTH AT BEDTIME       Start Date: 8/30/2024 End Date: --    SPIRONOLACTONE (ALDACTONE) 25 MG TABLET    Take 1 tablet (25 mg total) by mouth once daily.       Start Date: 5/14/2024 End Date: --    TIZANIDINE (ZANAFLEX) 4 MG TABLET    TAKE 1 TABLET(4 MG) BY MOUTH TWICE DAILY AS NEEDED FOR PAIN       Start Date: 11/22/2024End Date: --    ZOLPIDEM (AMBIEN) 5 MG TAB    Take 5 mg by mouth every evening.       Start Date: 8/2/2024  End Date: --       Order(s) placed per written order guidelines: none    Please advise.

## 2024-12-19 NOTE — TELEPHONE ENCOUNTER
No care due was identified.  Health [facility] Embedded Care Due Messages. Reference number: [id number].   12/19/2024 12:57:46 PM CST

## 2024-12-30 ENCOUNTER — PATIENT OUTREACH (OUTPATIENT)
Dept: ADMINISTRATIVE | Facility: HOSPITAL | Age: 69
End: 2024-12-30
Payer: MEDICARE

## 2024-12-30 NOTE — LETTER
AUTHORIZATION FOR RELEASE OF   CONFIDENTIAL INFORMATION    Dear Richard,     We are seeing Gwendolyn Fournier, date of birth 1955, in the clinic at [facility]. Yolette Abebe MD is the patient's PCP. Gwendolyn Fournier has an outstanding lab/procedure at the time we reviewed her chart. In order to help keep her health information updated, she has authorized us to request the following medical record(s):        (  )  MAMMOGRAM                                      (  )  COLONOSCOPY      (  )  PAP SMEAR                                          (  )  OUTSIDE LAB RESULTS     (  )  DEXA SCAN                                          ( X ) DIABETIC EYE EXAM            (  )  FOOT EXAM                                          (  )  ENTIRE RECORD     (  )  OUTSIDE IMMUNIZATIONS                 (  )  _______________         Please fax records to [facility], Roy, Pooja, MD, [phone]      [address] [facility] [facility] [address]        If you have any questions, please contact Blake Saravia MA,[facility] at [phone].             Patient Name: Gwendolyn Fournier  : 1955  Patient Phone #: [phone]

## 2024-12-31 ENCOUNTER — TELEPHONE (OUTPATIENT)
Dept: FAMILY MEDICINE | Facility: CLINIC | Age: 69
End: 2024-12-31
Payer: MEDICARE

## 2024-12-31 ENCOUNTER — OFFICE VISIT (OUTPATIENT)
Dept: FAMILY MEDICINE | Facility: CLINIC | Age: 69
End: 2024-12-31
Payer: MEDICARE

## 2024-12-31 ENCOUNTER — HOSPITAL ENCOUNTER (OUTPATIENT)
Dept: RADIOLOGY | Facility: HOSPITAL | Age: 69
Discharge: HOME | End: 2024-12-31
Attending: INTERNAL MEDICINE
Payer: MEDICARE

## 2024-12-31 DIAGNOSIS — R10.31 RIGHT LOWER QUADRANT ABDOMINAL PAIN: Primary | ICD-10-CM

## 2024-12-31 DIAGNOSIS — E11.22 TYPE 2 DIABETES MELLITUS WITH STAGE 3B CHRONIC KIDNEY DISEASE, WITHOUT LONG-TERM CURRENT USE OF INSULIN: ICD-10-CM

## 2024-12-31 DIAGNOSIS — N18.32 TYPE 2 DIABETES MELLITUS WITH STAGE 3B CHRONIC KIDNEY DISEASE, WITHOUT LONG-TERM CURRENT USE OF INSULIN: ICD-10-CM

## 2024-12-31 DIAGNOSIS — E16.2 HYPOGLYCEMIA: ICD-10-CM

## 2024-12-31 DIAGNOSIS — R10.31 RIGHT LOWER QUADRANT ABDOMINAL PAIN: ICD-10-CM

## 2024-12-31 PROCEDURE — 1160F RVW MEDS BY RX/DR IN RCRD: CPT | Mod: CPTII,95,, | Performed by: INTERNAL MEDICINE

## 2024-12-31 PROCEDURE — 74176 CT ABD & PELVIS W/O CONTRAST: CPT | Mod: 26,,, | Performed by: RADIOLOGY

## 2024-12-31 PROCEDURE — 3044F HG A1C LEVEL LT 7.0%: CPT | Mod: CPTII,95,, | Performed by: INTERNAL MEDICINE

## 2024-12-31 PROCEDURE — 99214 OFFICE O/P EST MOD 30 MIN: CPT | Mod: 95,,, | Performed by: INTERNAL MEDICINE

## 2024-12-31 PROCEDURE — 1159F MED LIST DOCD IN RCRD: CPT | Mod: CPTII,95,, | Performed by: INTERNAL MEDICINE

## 2024-12-31 PROCEDURE — 1157F ADVNC CARE PLAN IN RCRD: CPT | Mod: CPTII,95,, | Performed by: INTERNAL MEDICINE

## 2024-12-31 PROCEDURE — 74176 CT ABD & PELVIS W/O CONTRAST: CPT | Mod: TC

## 2024-12-31 PROCEDURE — 3066F NEPHROPATHY DOC TX: CPT | Mod: CPTII,95,, | Performed by: INTERNAL MEDICINE

## 2024-12-31 RX ORDER — FLASH GLUCOSE SENSOR
KIT MISCELLANEOUS
Qty: 6 KIT | Refills: 2 | Status: SHIPPED | OUTPATIENT
Start: 2024-12-31

## 2024-12-31 NOTE — ASSESSMENT & PLAN NOTE
Had low blood sugar overnight, lowest was 40.   Usually happens overnight   Patient would benefit from CGM

## 2024-12-31 NOTE — ASSESSMENT & PLAN NOTE
Right flank pain radiating to the front  Has h/o of kidney stones   - CT Abd Pelvis STAT to rule out stones   - avoid NSAIDs due to CKD. Ok to continue tylenol   - encouraged plenty of fluids

## 2024-12-31 NOTE — PROGRESS NOTES
Chief Complaint: No chief complaint on file.      Patient Location: [place]   Audio or Audiovisual and Time: Audiovisual, 15 mins     Gwendolyn Fournier  is a 69 y.o. year old patient who presents today for back pain    Right lower back pain, started a week ago. No inciting event. Severity is 10/10 at the worse. Laying down she doesn't have the pain. Sometimes can't turn right. Has not been improving. Tylenol helped initially. Has had this pain in the past, had an ultrasound which showed a kidney stone. Reports no issues urinating. Denies hematuria. Pain does not radiate.     Also saw blood upon wiping anus. Colonoscopy normal in 2019. Has history of hemorrhoids. Sometimes has constipation.     Had low blood sugar overnight, lowest was 40.     Past Medical History:   Diagnosis Date    Arthritis     Gout attack     Hx of psychiatric care     Hypertension     Opioid dependence, uncomplicated 02/03/2023    Psychiatric problem     Renal disorder     Sciatic nerve pain 2013    Therapy     used to follow with psychiatrist but doesn't recall whom, 2012    Tuberculosis     Unspecified cataract 07/27/2023    Mild on both eyes       Past Surgical History:   Procedure Laterality Date    COLONOSCOPY N/A 01/21/2019    Procedure: COLONOSCOPY;  Surgeon: Shanna Jose MD;  Location: [facility];  Service: Endoscopy;  Laterality: N/A;    HYSTERECTOMY      INJECTION OF JOINT Bilateral 03/13/2019    Procedure: INJECTION, JOINT BILATERAL SI JOINT;  Surgeon: Evan Coreas MD;  Location: [facility];  Service: Pain Management;  Laterality: Bilateral;  BILATERAL SI JOINT INJECTION    KNEE SURGERY  01/01/2014    left knee surgery         Family History   Problem Relation Name Age of Onset    Tuberculosis Mother      Stroke Father      Bipolar disorder Daughter      Suicide Daughter      Depression Daughter      Bipolar disorder Daughter      Depression Daughter          Social History     Socioeconomic History    Marital status:      Number of children: 5   Occupational History    Occupation: homemaker   Tobacco Use    Smoking status: Former     Current packs/day: 0.00     Types: Cigarettes     Quit date: 1976     Years since quittin.0    Smokeless tobacco: Never   Substance and Sexual Activity    Alcohol use: Not Currently     Alcohol/week: 0.0 standard drinks of alcohol     Comment: red wine    Drug use: No    Sexual activity: Not Currently     Partners: Male     Social Drivers of Health     Financial Resource Strain: Low Risk  (2024)    Overall Financial Resource Strain (CARDIA)     Difficulty of Paying Living Expenses: Not hard at all   Food Insecurity: No Food Insecurity (2024)    Hunger Vital Sign     Worried About Running Out of Food in the Last Year: Never true     Ran Out of Food in the Last Year: Never true   Transportation Needs: No Transportation Needs (10/9/2023)    PRAPARE - Transportation     Lack of Transportation (Medical): No     Lack of Transportation (Non-Medical): No   Physical Activity: Unknown (10/10/2022)    Exercise Vital Sign     Days of Exercise per Week: 0 days   Stress: Stress Concern Present (10/10/2022)    [employer] [place] [employer] - Occupational Stress Questionnaire     Feeling of Stress : To some extent   Housing Stability: Low Risk  (10/9/2023)    Housing Stability Vital Sign     Unable to Pay for Housing in the Last Year: No     Number of Places Lived in the Last Year: 1     Unstable Housing in the Last Year: No         Current Outpatient Medications:     acetaminophen (TYLENOL) 500 MG tablet, Take 1 tablet (500 mg total) by mouth every 6 (six) hours as needed for Pain., Disp: 30 tablet, Rfl: 0    acetaminophen-codeine 300-60mg (TYLENOL #4) 300-60 mg Tab, Take 1 tablet by mouth 2 (two) times daily as needed., Disp: , Rfl:     albuterol (ACCUNEB) 0.63 mg/3 mL Nebu, Inhale 1 ampule into the lungs as needed., Disp: , Rfl:     allopurinoL (ZYLOPRIM) 100 MG tablet, TAKE 1  TABLET(100 MG) BY MOUTH TWICE DAILY, Disp: 180 tablet, Rfl: 3    amLODIPine (NORVASC) 10 MG tablet, Take 10 mg by mouth., Disp: , Rfl:     amLODIPine (NORVASC) 5 MG tablet, Take 1 tablet (5 mg total) by mouth once daily., Disp: 90 tablet, Rfl: 3    atorvastatin (LIPITOR) 40 MG tablet, Take 1 tablet (40 mg total) by mouth every evening., Disp: 90 tablet, Rfl: 3    blood sugar diagnostic Strp, To check BG two times daily, freestyle lite meter, Disp: 200 each, Rfl: 3    blood-glucose meter kit, To check BG two times daily, freestyle lite meter, Disp: 1 each, Rfl: 0    blood-glucose meter,continuous (DEXCOM G6 ) Misc, Use as directed to check blood sugar (Patient not taking: Reported on 8/30/2024), Disp: 1 each, Rfl: 3    blood-glucose sensor (DEXCOM G6 SENSOR) Sue, Use as directed to check blood sugar (Patient not taking: Reported on 8/30/2024), Disp: 3 each, Rfl: 3    blood-glucose transmitter (DEXCOM G6 TRANSMITTER) Sue, Use as directed to check blood sugar (Patient not taking: Reported on 8/30/2024), Disp: 1 each, Rfl: 3    buPROPion (WELLBUTRIN) 100 MG tablet, Take 100 mg by mouth every morning., Disp: , Rfl:     ciclopirox (PENLAC) 8 % Soln, Apply topically nightly., Disp: 6.6 mL, Rfl: 3    dapagliflozin propanediol (FARXIGA) 10 mg tablet, Take 1 tablet (10 mg total) by mouth once daily., Disp: 30 tablet, Rfl: 0    diazePAM (VALIUM) 5 MG tablet, TAKE 1 TABLET(5 MG) BY MOUTH EVERY 12 HOURS AS NEEDED FOR ANXIETY, Disp: 60 tablet, Rfl: 2    diphenhydrAMINE (BENADRYL) 25 mg capsule, Take 25 mg by mouth every 6 (six) hours as needed., Disp: , Rfl:     docusate sodium (COLACE) 100 MG capsule, Take 1 capsule (100 mg total) by mouth as needed for Constipation., Disp: 90 capsule, Rfl: 3    DULoxetine (CYMBALTA) 60 MG capsule, TAKE 1 CAPSULE(60 MG) BY MOUTH EVERY DAY, Disp: 90 capsule, Rfl: 3    ergocalciferol (ERGOCALCIFEROL) 50,000 unit Cap, TAKE 1 CAPSULE BY MOUTH EVERY 7 DAYS, Disp: 12 capsule, Rfl: 0     EScitalopram oxalate (LEXAPRO) 10 MG tablet, TAKE 1 TABLET(10 MG) BY MOUTH EVERY DAY, Disp: 90 tablet, Rfl: 3    FARXIGA 10 mg tablet, TAKE 1 TABLET(10 MG) BY MOUTH EVERY DAY, Disp: 30 tablet, Rfl: 0    ferrous sulfate (FEOSOL) 325 mg (65 mg iron) Tab tablet, Take 325 mg by mouth as needed. , Disp: , Rfl:     flash glucose scanning reader (FREESTYLE HAI 2 READER) Misc, 1 each by Misc.(Non-Drug; Combo Route) route 4 (four) times daily with meals and nightly., Disp: 1 each, Rfl: 0    flash glucose sensor (FREESTYLE HAI 2 SENSOR) Kit, Use as directed to check blood sugar, Disp: 6 kit, Rfl: 2    fluticasone propionate (FLONASE) 50 mcg/actuation nasal spray, 1 spray (50 mcg total) by Each Nostril route once daily., Disp: 18.2 mL, Rfl: 0    furosemide (LASIX) 20 MG tablet, Take 20-40 mg by mouth daily as needed., Disp: , Rfl:     furosemide (LASIX) 40 MG tablet, TAKE 1 TABLET(40 MG) BY MOUTH TWICE DAILY, Disp: 60 tablet, Rfl: 11    gabapentin (NEURONTIN) 300 MG capsule, Take 1 capsule (300 mg total) by mouth 3 (three) times daily., Disp: 90 capsule, Rfl: 5    HYDROcodone-acetaminophen (NORCO) 5-325 mg per tablet, TK 1 T PO Q 12 H PRN, Disp: , Rfl:     lancets Misc, To check BG two times daily, to use with insurance preferred meter, Disp: 200 each, Rfl: 3    LIDOcaine (LIDODERM) 5 %, Place 1 patch onto the skin once daily. Apply patch for 12 hours and then leave off for 12 hours, Disp: 15 patch, Rfl: 0    linaCLOtide (LINZESS) 145 mcg Cap capsule, Take 1 capsule (145 mcg total) by mouth before breakfast., Disp: 90 capsule, Rfl: 1    metoprolol succinate (TOPROL-XL) 50 MG 24 hr tablet, Take 1 tablet (50 mg total) by mouth once daily., Disp: 90 tablet, Rfl: 3    MOVANTIK 25 mg tablet, Take 25 mg by mouth once daily., Disp: , Rfl:     mupirocin (BACTROBAN) 2 % ointment, Apply topically 3 (three) times daily., Disp: 30 g, Rfl: 0    potassium chloride SA (K-DUR,KLOR-CON) 20 MEQ tablet, Take 20 mEq by mouth., Disp: , Rfl:      primidone (MYSOLINE) 50 MG Tab, Take 2 tablets (100 mg total) by mouth 3 (three) times daily., Disp: 180 tablet, Rfl: 11    QUEtiapine (SEROQUEL) 100 MG Tab, TAKE 1 TABLET BY MOUTH AT BEDTIME, Disp: 90 tablet, Rfl: 3    spironolactone (ALDACTONE) 25 MG tablet, Take 1 tablet (25 mg total) by mouth once daily., Disp: 90 tablet, Rfl: 3    tirzepatide (MOUNJARO) 5 mg/0.5 mL PnIj, Inject 5 mg into the skin every 7 days., Disp: 2 mL, Rfl: 0    tiZANidine (ZANAFLEX) 4 MG tablet, TAKE 1 TABLET(4 MG) BY MOUTH TWICE DAILY AS NEEDED FOR PAIN, Disp: 60 tablet, Rfl: 3    zolpidem (AMBIEN) 5 MG Tab, Take 5 mg by mouth every evening., Disp: , Rfl:     Current Facility-Administered Medications:     lidocaine (PF) 10 mg/ml (1%) injection 40 mg, 4 mL, Intramuscular, 1 time in Clinic/HOD, [place], Azikiwe K., MD     Review of Systems   Constitutional:  Negative for chills and fever.   Gastrointestinal:  Negative for abdominal pain.   Genitourinary:  Positive for flank pain. Negative for dysuria, frequency, hematuria and urgency.   Musculoskeletal:  Positive for back pain.        Physical Exam not conducted due to virtual visit    Assessment:       1. Right lower quadrant abdominal pain    2. Type 2 diabetes mellitus with stage 3b chronic kidney disease, without long-term current use of insulin    3. Hypoglycemia          Plan:   1. Right lower quadrant abdominal pain  Assessment & Plan:  Right flank pain radiating to the front  Has h/o of kidney stones   - CT Abd Pelvis STAT to rule out stones   - avoid NSAIDs due to CKD. Ok to continue tylenol   - encouraged plenty of fluids    Orders:  -     Cancel: CT Abdomen Pelvis  Without Contrast; Future; Expected date: 12/31/2024  -     CT Abdomen Pelvis  Without Contrast; Future; Expected date: 12/31/2024    2. Type 2 diabetes mellitus with stage 3b chronic kidney disease, without long-term current use of insulin  Assessment & Plan:  Patient requested refill of freestyle coy sensor   She  reports episode of hypoglycemia   Will be seeing pt on 1/4/24 to discuss her condition further    Orders:  -     flash glucose sensor (FREESTYLE HAI 2 SENSOR) Kit; Use as directed to check blood sugar  Dispense: 6 kit; Refill: 2    3. Hypoglycemia  Assessment & Plan:  Had low blood sugar overnight, lowest was 40.   Usually happens overnight   Patient would benefit from CGM    Orders:  -     flash glucose sensor (FREESTYLE HAI 2 SENSOR) Kit; Use as directed to check blood sugar  Dispense: 6 kit; Refill: 2         No follow-ups on file.

## 2024-12-31 NOTE — TELEPHONE ENCOUNTER
Called patient to schedule ct scan, patient answered and appointment has been scheduled for today at [facility]

## 2024-12-31 NOTE — ASSESSMENT & PLAN NOTE
Patient requested refill of freestyle coy sensor   She reports episode of hypoglycemia   Will be seeing pt on 1/4/24 to discuss her condition further

## 2025-01-02 ENCOUNTER — OFFICE VISIT (OUTPATIENT)
Dept: FAMILY MEDICINE | Facility: CLINIC | Age: 70
End: 2025-01-02
Payer: MEDICARE

## 2025-01-02 VITALS
SYSTOLIC BLOOD PRESSURE: 126 MMHG | HEIGHT: 67 IN | OXYGEN SATURATION: 99 % | TEMPERATURE: 98 F | DIASTOLIC BLOOD PRESSURE: 70 MMHG | BODY MASS INDEX: 38.54 KG/M2 | WEIGHT: 245.56 LBS | HEART RATE: 93 BPM | RESPIRATION RATE: 18 BRPM

## 2025-01-02 DIAGNOSIS — N63.10 MASS OF RIGHT BREAST, UNSPECIFIED QUADRANT: ICD-10-CM

## 2025-01-02 DIAGNOSIS — M54.50 ACUTE RIGHT-SIDED LOW BACK PAIN WITHOUT SCIATICA: Primary | ICD-10-CM

## 2025-01-02 DIAGNOSIS — R25.1 TREMOR OF BOTH HANDS: ICD-10-CM

## 2025-01-02 DIAGNOSIS — I10 ESSENTIAL HYPERTENSION: ICD-10-CM

## 2025-01-02 DIAGNOSIS — F33.1 MAJOR DEPRESSIVE DISORDER, RECURRENT, MODERATE: ICD-10-CM

## 2025-01-02 DIAGNOSIS — M54.32 SCIATICA OF LEFT SIDE: ICD-10-CM

## 2025-01-02 DIAGNOSIS — E11.22 TYPE 2 DIABETES MELLITUS WITH STAGE 3B CHRONIC KIDNEY DISEASE, WITHOUT LONG-TERM CURRENT USE OF INSULIN: ICD-10-CM

## 2025-01-02 DIAGNOSIS — N18.4 CHRONIC KIDNEY DISEASE (CKD), STAGE IV (SEVERE): ICD-10-CM

## 2025-01-02 DIAGNOSIS — K59.03 DRUG-INDUCED CONSTIPATION: ICD-10-CM

## 2025-01-02 DIAGNOSIS — N18.32 TYPE 2 DIABETES MELLITUS WITH STAGE 3B CHRONIC KIDNEY DISEASE, WITHOUT LONG-TERM CURRENT USE OF INSULIN: ICD-10-CM

## 2025-01-02 PROBLEM — N18.30 STAGE 3 CHRONIC KIDNEY DISEASE: Status: RESOLVED | Noted: 2019-01-03 | Resolved: 2025-01-02

## 2025-01-02 PROBLEM — E66.1 CLASS 3 DRUG-INDUCED OBESITY WITH SERIOUS COMORBIDITY AND BODY MASS INDEX (BMI) OF 40.0 TO 44.9 IN ADULT: Status: RESOLVED | Noted: 2024-03-05 | Resolved: 2025-01-02

## 2025-01-02 PROBLEM — E66.813 CLASS 3 DRUG-INDUCED OBESITY WITH SERIOUS COMORBIDITY AND BODY MASS INDEX (BMI) OF 40.0 TO 44.9 IN ADULT: Status: RESOLVED | Noted: 2024-03-05 | Resolved: 2025-01-02

## 2025-01-02 PROBLEM — R10.31 RIGHT LOWER QUADRANT ABDOMINAL PAIN: Status: RESOLVED | Noted: 2024-12-31 | Resolved: 2025-01-02

## 2025-01-02 PROCEDURE — 99999 PR PBB SHADOW E&M-EST. PATIENT-LVL V: CPT | Mod: PBBFAC,,, | Performed by: INTERNAL MEDICINE

## 2025-01-02 RX ORDER — HYDROCHLOROTHIAZIDE 25 MG/1
TABLET ORAL
COMMUNITY
Start: 2024-11-27

## 2025-01-02 RX ORDER — PRIMIDONE 50 MG/1
100 TABLET ORAL 3 TIMES DAILY
Qty: 180 TABLET | Refills: 11 | Status: SHIPPED | OUTPATIENT
Start: 2025-01-02 | End: 2026-01-02

## 2025-01-02 RX ORDER — LIDOCAINE 50 MG/G
1 PATCH TOPICAL DAILY
Qty: 15 PATCH | Refills: 0 | Status: SHIPPED | OUTPATIENT
Start: 2025-01-02

## 2025-01-02 RX ORDER — GABAPENTIN 300 MG/1
300 CAPSULE ORAL NIGHTLY
Start: 2025-01-02

## 2025-01-02 NOTE — ASSESSMENT & PLAN NOTE
- Confirmed stage 4 chronic kidney disease with GFR in the 20s.  - Discussed limitations of pain management options due to kidney function.  - Advised patient to maintain good blood pressure control.  - Recommend follow-up labs and considered potential need for nephrology referral if GFR continues to decline.

## 2025-01-02 NOTE — ASSESSMENT & PLAN NOTE
Patient requested refill of freestyle coy sensor   She reports episode of hypoglycemia   - Reviewed recent lab results, noting improved protein levels in urine and excellent A1c control of 5.1.  - Slight decrease in GFR requires monitoring.  - Explained lab results and their significance to the patient.  - Continued Mounjaro for diabetes management.  - Advised patient to maintain good diabetes and blood pressure control to support kidney function.  - Recommend follow-up labs to monitor kidney function.  - Noted mild anemia, likely due to chronic kidney disease.

## 2025-01-02 NOTE — PROGRESS NOTES
Chief Complaint: Follow-up (Pain in lower back on right side / pt states she thinks she may have a kidney stone/Pt has been experiencing a cough with phlegm for about 2 weeks  )      Gwendolyn Fournier  is a 69 y.o. year old patient who presents today for     History of Present Illness    CHIEF COMPLAINT:  Gwendolyn presents today with right-sided back pain rated at 10 out of 10.    BACK PAIN:  She reports right-sided back pain that worsens with movement and improves when lying down. She denies radiation of pain to leg and has not attempted heat therapy for pain relief.    CURRENT MEDICATIONS:  She takes Hydrocodone and Tizanidine at night for pain and sleep. She continues Amlodipine 5 mg, Duloxetine, and Lexapro. Primidone 100 mg 3 times daily for tremors. For constipation, she takes Linaclotide and Movantic as needed. She takes Ambien for sleep and Mounjaro.      ROS:  General: -fever, -chills, -fatigue, -weight gain, -weight loss  Eyes: -vision changes, -redness, -discharge  ENT: -ear pain, -nasal congestion, -sore throat  Cardiovascular: -chest pain, -palpitations, -lower extremity edema  Respiratory: -cough, -shortness of breath  Gastrointestinal: -abdominal pain, -nausea, -vomiting, -diarrhea, -constipation, -blood in stool  Genitourinary: -dysuria, -hematuria, -frequency  Musculoskeletal: -joint pain, -muscle pain, +back pain  Skin: -rash, -lesion  Neurological: -headache, -dizziness, -numbness, -tingling  Psychiatric: -anxiety, -depression, -sleep difficulty         Past Medical History:   Diagnosis Date    Arthritis     Gout attack     Hx of psychiatric care     Hypertension     Opioid dependence, uncomplicated 02/03/2023    Psychiatric problem     Renal disorder     Sciatic nerve pain 2013    Therapy     used to follow with psychiatrist but doesn't recall whom, 2012    Tuberculosis     Unspecified cataract 07/27/2023    Mild on both eyes       Past Surgical History:   Procedure Laterality Date    COLONOSCOPY N/A  2019    Procedure: COLONOSCOPY;  Surgeon: hSanna Jose MD;  Location: [facility] ENDO;  Service: Endoscopy;  Laterality: N/A;    HYSTERECTOMY      INJECTION OF JOINT Bilateral 2019    Procedure: INJECTION, JOINT BILATERAL SI JOINT;  Surgeon: Evan Coreas MD;  Location: [facility] PAIN MGT;  Service: Pain Management;  Laterality: Bilateral;  BILATERAL SI JOINT INJECTION    KNEE SURGERY  2014    left knee surgery         Family History   Problem Relation Name Age of Onset    Tuberculosis Mother      Stroke Father      Bipolar disorder Daughter      Suicide Daughter      Depression Daughter      Bipolar disorder Daughter      Depression Daughter          Social History     Socioeconomic History    Marital status:     Number of children: 5   Occupational History    Occupation: homemaker   Tobacco Use    Smoking status: Former     Current packs/day: 0.00     Types: Cigarettes     Quit date: 1976     Years since quittin.0    Smokeless tobacco: Never   Substance and Sexual Activity    Alcohol use: Not Currently     Alcohol/week: 0.0 standard drinks of alcohol     Comment: red wine    Drug use: No    Sexual activity: Not Currently     Partners: Male     Social Drivers of Health     Financial Resource Strain: Low Risk  (2024)    Overall Financial Resource Strain (CARDIA)     Difficulty of Paying Living Expenses: Not hard at all   Food Insecurity: No Food Insecurity (2024)    Hunger Vital Sign     Worried About Running Out of Food in the Last Year: Never true     Ran Out of Food in the Last Year: Never true   Transportation Needs: No Transportation Needs (10/9/2023)    PRAPARE - Transportation     Lack of Transportation (Medical): No     Lack of Transportation (Non-Medical): No   Physical Activity: Unknown (10/10/2022)    Exercise Vital Sign     Days of Exercise per Week: 0 days   Stress: Stress Concern Present (10/10/2022)    [employer] - Occupational  Stress Questionnaire     Feeling of Stress : To some extent   Housing Stability: Low Risk  (10/9/2023)    Housing Stability Vital Sign     Unable to Pay for Housing in the Last Year: No     Number of Places Lived in the Last Year: 1     Unstable Housing in the Last Year: No         Current Outpatient Medications:     acetaminophen (TYLENOL) 500 MG tablet, Take 1 tablet (500 mg total) by mouth every 6 (six) hours as needed for Pain., Disp: 30 tablet, Rfl: 0    albuterol (ACCUNEB) 0.63 mg/3 mL Nebu, Inhale 1 ampule into the lungs as needed., Disp: , Rfl:     allopurinoL (ZYLOPRIM) 100 MG tablet, TAKE 1 TABLET(100 MG) BY MOUTH TWICE DAILY, Disp: 180 tablet, Rfl: 3    amLODIPine (NORVASC) 5 MG tablet, Take 1 tablet (5 mg total) by mouth once daily., Disp: 90 tablet, Rfl: 3    atorvastatin (LIPITOR) 40 MG tablet, Take 1 tablet (40 mg total) by mouth every evening., Disp: 90 tablet, Rfl: 3    blood sugar diagnostic Strp, To check BG two times daily, freestyle lite meter, Disp: 200 each, Rfl: 3    buPROPion (WELLBUTRIN) 100 MG tablet, Take 100 mg by mouth every morning., Disp: , Rfl:     ciclopirox (PENLAC) 8 % Soln, Apply topically nightly., Disp: 6.6 mL, Rfl: 3    diphenhydrAMINE (BENADRYL) 25 mg capsule, Take 25 mg by mouth every 6 (six) hours as needed., Disp: , Rfl:     docusate sodium (COLACE) 100 MG capsule, Take 1 capsule (100 mg total) by mouth as needed for Constipation., Disp: 90 capsule, Rfl: 3    DULoxetine (CYMBALTA) 60 MG capsule, TAKE 1 CAPSULE(60 MG) BY MOUTH EVERY DAY, Disp: 90 capsule, Rfl: 3    EScitalopram oxalate (LEXAPRO) 10 MG tablet, TAKE 1 TABLET(10 MG) BY MOUTH EVERY DAY, Disp: 90 tablet, Rfl: 3    ferrous sulfate (FEOSOL) 325 mg (65 mg iron) Tab tablet, Take 325 mg by mouth as needed. , Disp: , Rfl:     flash glucose scanning reader (FREESTYLE HAI 2 READER) Misc, 1 each by Misc.(Non-Drug; Combo Route) route 4 (four) times daily with meals and nightly., Disp: 1 each, Rfl: 0    furosemide  (LASIX) 20 MG tablet, Take 20-40 mg by mouth daily as needed., Disp: , Rfl:     furosemide (LASIX) 40 MG tablet, TAKE 1 TABLET(40 MG) BY MOUTH TWICE DAILY, Disp: 60 tablet, Rfl: 11    hydroCHLOROthiazide (HYDRODIURIL) 25 MG tablet, , Disp: , Rfl:     lancets Mis, To check BG two times daily, to use with insurance preferred meter, Disp: 200 each, Rfl: 3    metoprolol succinate (TOPROL-XL) 50 MG 24 hr tablet, Take 1 tablet (50 mg total) by mouth once daily., Disp: 90 tablet, Rfl: 3    MOVANTIK 25 mg tablet, Take 25 mg by mouth once daily., Disp: , Rfl:     mupirocin (BACTROBAN) 2 % ointment, Apply topically 3 (three) times daily., Disp: 30 g, Rfl: 0    QUEtiapine (SEROQUEL) 100 MG Tab, TAKE 1 TABLET BY MOUTH AT BEDTIME, Disp: 90 tablet, Rfl: 3    spironolactone (ALDACTONE) 25 MG tablet, Take 1 tablet (25 mg total) by mouth once daily., Disp: 90 tablet, Rfl: 3    tirzepatide (MOUNJARO) 5 mg/0.5 mL PnIj, Inject 5 mg into the skin every 7 days., Disp: 2 mL, Rfl: 0    tiZANidine (ZANAFLEX) 4 MG tablet, TAKE 1 TABLET(4 MG) BY MOUTH TWICE DAILY AS NEEDED FOR PAIN, Disp: 60 tablet, Rfl: 3    zolpidem (AMBIEN) 5 MG Tab, Take 5 mg by mouth every evening., Disp: , Rfl:     blood-glucose meter kit, To check BG two times daily, freestyle lite meter, Disp: 1 each, Rfl: 0    flash glucose sensor (FREESTYLE HAI 2 SENSOR) Kit, Use as directed to check blood sugar, Disp: 6 kit, Rfl: 2    fluticasone propionate (FLONASE) 50 mcg/actuation nasal spray, 1 spray (50 mcg total) by Each Nostril route once daily., Disp: 18.2 mL, Rfl: 0    gabapentin (NEURONTIN) 300 MG capsule, Take 1 capsule (300 mg total) by mouth every evening., Disp: , Rfl:     LIDOcaine (LIDODERM) 5 %, Place 1 patch onto the skin once daily. Apply patch for 12 hours and then leave off for 12 hours, Disp: 15 patch, Rfl: 0    linaCLOtide (LINZESS) 145 mcg Cap capsule, Take 1 capsule (145 mcg total) by mouth daily as needed (constipation)., Disp: , Rfl:     potassium  chloride SA (K-DUR,KLOR-CON) 20 MEQ tablet, Take 20 mEq by mouth., Disp: , Rfl:     primidone (MYSOLINE) 50 MG Tab, Take 2 tablets (100 mg total) by mouth 3 (three) times daily., Disp: 180 tablet, Rfl: 11    Current Facility-Administered Medications:     lidocaine (PF) 10 mg/ml (1%) injection 40 mg, 4 mL, Intramuscular, 1 time in Clinic/HOD, [place], Azikiwe K., MD           Objective:      Vitals:    01/02/25 0817   BP: 126/70   Pulse: 93   Resp: 18   Temp: 98.3 °F (36.8 °C)       Physical Exam  Constitutional:       Appearance: Normal appearance. She is obese.   HENT:      Head: Normocephalic and atraumatic.   Abdominal:      Tenderness: There is no right CVA tenderness or left CVA tenderness.   Musculoskeletal:      Lumbar back: Tenderness present.        Back:    Skin:     General: Skin is warm and dry.   Neurological:      General: No focal deficit present.      Mental Status: She is alert and oriented to person, place, and time.   Psychiatric:         Mood and Affect: Mood normal.         Behavior: Behavior normal.          Assessment:       1. Acute right-sided low back pain without sciatica    2. Tremor of both hands    3. Drug-induced constipation    4. Sciatica of left side    5. Chronic kidney disease (CKD), stage IV (severe)    6. Type 2 diabetes mellitus with stage 3b chronic kidney disease, without long-term current use of insulin    7. Major depressive disorder, recurrent, moderate    8. Mass of right breast, unspecified quadrant    9. Essential hypertension          Plan:   1. Acute right-sided low back pain without sciatica  Assessment & Plan:  - Assessed as likely muscular in origin given normal CT scan and lack of radiation.  - Gwendolyn reports severe pain (10/10) in the right side of the thoracic spine, worsening with movement.  - Administered steroid injection in the right hip area for pain management, balancing potential benefit against risk of worsening diabetes.  - Recommend OTC 4% lidocaine  patches and refilled 5% lidocaine patches for pain relief.  - Considered Tylenol with codeine as a potential option if other treatments fail.  - Recommend trial of lower dose tizanidine during the day if not causing drowsiness.  - Educated on expected 4-6 week timeline for musculoskeletal pain improvement.  - Potential referral to back and spine specialist for MRI and back injections if pain persists.    Orders:  -     LIDOcaine (LIDODERM) 5 %; Place 1 patch onto the skin once daily. Apply patch for 12 hours and then leave off for 12 hours  Dispense: 15 patch; Refill: 0  -     methylPREDNISolone sod suc(PF) injection 125 mg    2. Tremor of both hands  Assessment & Plan:  - Refilled primidone 100mg, to be taken 3 times daily.  - Medication is working well for tremor management.    Orders:  -     primidone (MYSOLINE) 50 MG Tab; Take 2 tablets (100 mg total) by mouth 3 (three) times daily.  Dispense: 180 tablet; Refill: 11    3. Drug-induced constipation  Assessment & Plan:  - Continued Linaclotide (Linzess) as needed.    Orders:  -     linaCLOtide (LINZESS) 145 mcg Cap capsule; Take 1 capsule (145 mcg total) by mouth daily as needed (constipation).    4. Sciatica of left side  -     gabapentin (NEURONTIN) 300 MG capsule; Take 1 capsule (300 mg total) by mouth every evening.    5. Chronic kidney disease (CKD), stage IV (severe)  Assessment & Plan:  - Confirmed stage 4 chronic kidney disease with GFR in the 20s.  - Discussed limitations of pain management options due to kidney function.  - Advised patient to maintain good blood pressure control.  - Recommend follow-up labs and considered potential need for nephrology referral if GFR continues to decline.      6. Type 2 diabetes mellitus with stage 3b chronic kidney disease, without long-term current use of insulin  Assessment & Plan:  Patient requested refill of freestyle coy sensor   She reports episode of hypoglycemia   - Reviewed recent lab results, noting improved  protein levels in urine and excellent A1c control of 5.1.  - Slight decrease in GFR requires monitoring.  - Explained lab results and their significance to the patient.  - Continued Mounjaro for diabetes management.  - Advised patient to maintain good diabetes and blood pressure control to support kidney function.  - Recommend follow-up labs to monitor kidney function.  - Noted mild anemia, likely due to chronic kidney disease.      7. Major depressive disorder, recurrent, moderate  Assessment & Plan:  Stable   - Continued Duloxetine (Cymbalta) and Lexapro for mood management.      8. Mass of right breast, unspecified quadrant  Assessment & Plan:  - Detected on CT scan.  - Scheduled patient for a biopsy.      9. Essential hypertension  Assessment & Plan:  - Continued Amlodipine 5mg.  - Blood pressure is currently well-controlled.         UP:  - Scheduled virtual appointment in 4 weeks to reassess pain management.    MEDICATIONS/SUPPLEMENTS:  - Gwendolyn is not currently taking vitamin D supplements.         Follow up in about 4 weeks (around 1/30/2025) for f/up pain .    This note was generated with the assistance of ambient listening technology. Verbal consent was obtained by the patient and accompanying visitor(s) for the recording of patient appointment to facilitate this note. I attest to having reviewed and edited the generated note for accuracy, though some syntax or spelling errors may persist. Please contact the author of this note for any clarification.

## 2025-01-02 NOTE — ASSESSMENT & PLAN NOTE
- Refilled primidone 100mg, to be taken 3 times daily.  - Medication is working well for tremor management.

## 2025-01-02 NOTE — ASSESSMENT & PLAN NOTE
- Assessed as likely muscular in origin given normal CT scan and lack of radiation.  - Gwendolyn reports severe pain (10/10) in the right side of the thoracic spine, worsening with movement.  - Administered steroid injection in the right hip area for pain management, balancing potential benefit against risk of worsening diabetes.  - Recommend OTC 4% lidocaine patches and refilled 5% lidocaine patches for pain relief.  - Considered Tylenol with codeine as a potential option if other treatments fail.  - Recommend trial of lower dose tizanidine during the day if not causing drowsiness.  - Educated on expected 4-6 week timeline for musculoskeletal pain improvement.  - Potential referral to back and spine specialist for MRI and back injections if pain persists.

## 2025-01-02 NOTE — PROGRESS NOTES
Health Maintenance Due   Topic Date Due    TETANUS VACCINE  Consult PCP     DEXA Scan  Consult PCP     Eye Exam  Consult PCP     Foot Exam  Consult PCP

## 2025-01-03 DIAGNOSIS — F51.04 PSYCHOPHYSIOLOGICAL INSOMNIA: ICD-10-CM

## 2025-01-03 RX ORDER — QUETIAPINE FUMARATE 100 MG/1
TABLET, FILM COATED ORAL
Qty: 90 TABLET | Refills: 3 | Status: SHIPPED | OUTPATIENT
Start: 2025-01-03

## 2025-01-03 NOTE — TELEPHONE ENCOUNTER
No care due was identified.  Health [facility] Embedded Care Due Messages. Reference number: [id number].   1/03/2025 1:47:06 PM CST

## 2025-01-05 ENCOUNTER — E-VISIT (OUTPATIENT)
Dept: FAMILY MEDICINE | Facility: CLINIC | Age: 70
End: 2025-01-05
Payer: MEDICARE

## 2025-01-05 DIAGNOSIS — M54.6 PAIN IN THORACIC SPINE: Primary | ICD-10-CM

## 2025-01-06 ENCOUNTER — HOSPITAL ENCOUNTER (OUTPATIENT)
Dept: RADIOLOGY | Facility: HOSPITAL | Age: 70
Discharge: HOME | End: 2025-01-06
Attending: INTERNAL MEDICINE
Payer: MEDICARE

## 2025-01-06 VITALS — HEIGHT: 67 IN | BODY MASS INDEX: 38.45 KG/M2 | WEIGHT: 245 LBS

## 2025-01-06 DIAGNOSIS — R92.8 ABNORMAL MAMMOGRAM OF RIGHT BREAST: ICD-10-CM

## 2025-01-06 DIAGNOSIS — N63.10 MASS OF RIGHT BREAST, UNSPECIFIED QUADRANT: Primary | ICD-10-CM

## 2025-01-06 DIAGNOSIS — N18.32 TYPE 2 DIABETES MELLITUS WITH STAGE 3B CHRONIC KIDNEY DISEASE, WITHOUT LONG-TERM CURRENT USE OF INSULIN: ICD-10-CM

## 2025-01-06 DIAGNOSIS — E11.22 TYPE 2 DIABETES MELLITUS WITH STAGE 3B CHRONIC KIDNEY DISEASE, WITHOUT LONG-TERM CURRENT USE OF INSULIN: ICD-10-CM

## 2025-01-06 DIAGNOSIS — E16.2 HYPOGLYCEMIA: ICD-10-CM

## 2025-01-06 PROCEDURE — 77065 DX MAMMO INCL CAD UNI: CPT | Mod: 26,RT,, | Performed by: RADIOLOGY

## 2025-01-06 PROCEDURE — 19083 BX BREAST 1ST LESION US IMAG: CPT | Mod: RT,,, | Performed by: RADIOLOGY

## 2025-01-06 PROCEDURE — 19084 BX BREAST ADD LESION US IMAG: CPT | Mod: ,,, | Performed by: RADIOLOGY

## 2025-01-06 PROCEDURE — 63600175 PHARM REV CODE 636 W HCPCS: Performed by: INTERNAL MEDICINE

## 2025-01-06 PROCEDURE — 19084 BX BREAST ADD LESION US IMAG: CPT

## 2025-01-06 PROCEDURE — 19083 BX BREAST 1ST LESION US IMAG: CPT | Mod: RT

## 2025-01-06 PROCEDURE — 77065 DX MAMMO INCL CAD UNI: CPT | Mod: TC,RT

## 2025-01-06 RX ORDER — FLASH GLUCOSE SENSOR
KIT MISCELLANEOUS
Qty: 6 KIT | Refills: 2 | Status: SHIPPED | OUTPATIENT
Start: 2025-01-06 | End: 2025-01-07 | Stop reason: SDUPTHER

## 2025-01-06 RX ORDER — LIDOCAINE HYDROCHLORIDE AND EPINEPHRINE 10; 20 UG/ML; MG/ML
20 INJECTION, SOLUTION INFILTRATION; PERINEURAL ONCE
Status: DISCONTINUED | OUTPATIENT
Start: 2025-01-06 | End: 2025-01-07 | Stop reason: HOSPADM

## 2025-01-06 RX ORDER — LIDOCAINE HYDROCHLORIDE 20 MG/ML
20 INJECTION, SOLUTION INFILTRATION; PERINEURAL ONCE
Status: COMPLETED | OUTPATIENT
Start: 2025-01-06 | End: 2025-01-06

## 2025-01-06 RX ORDER — LIDOCAINE HYDROCHLORIDE 20 MG/ML
10 INJECTION, SOLUTION INFILTRATION; PERINEURAL ONCE
Status: DISCONTINUED | OUTPATIENT
Start: 2025-01-06 | End: 2025-01-07 | Stop reason: HOSPADM

## 2025-01-06 RX ORDER — LIDOCAINE HYDROCHLORIDE AND EPINEPHRINE 10; 20 UG/ML; MG/ML
20 INJECTION, SOLUTION INFILTRATION; PERINEURAL ONCE
Status: COMPLETED | OUTPATIENT
Start: 2025-01-06 | End: 2025-01-06

## 2025-01-06 RX ADMIN — LIDOCAINE HYDROCHLORIDE 20 ML: 20 INJECTION, SOLUTION INFILTRATION; PERINEURAL at 09:01

## 2025-01-06 RX ADMIN — LIDOCAINE HYDROCHLORIDE,EPINEPHRINE BITARTRATE 20 ML: 20; .01 INJECTION, SOLUTION INFILTRATION; PERINEURAL at 09:01

## 2025-01-06 NOTE — PROGRESS NOTES
Patient reports worsening mid back pain that has been ongoing for almost two weeks. CT Abd and Pelvis was normal. During in person visit, no abnormalities were found on exam. Will proceed with back MRI and back and spine referral. Patient is also being worked up for an abnormal breast mass.

## 2025-01-06 NOTE — TELEPHONE ENCOUNTER
No care due was identified.  [facility] Embedded Care Due Messages. Reference number: [id number].   1/06/2025 7:33:33 AM CST

## 2025-01-07 ENCOUNTER — TELEPHONE (OUTPATIENT)
Dept: FAMILY MEDICINE | Facility: CLINIC | Age: 70
End: 2025-01-07
Payer: MEDICARE

## 2025-01-07 DIAGNOSIS — F40.240 CLAUSTROPHOBIA: Primary | ICD-10-CM

## 2025-01-07 DIAGNOSIS — E16.2 HYPOGLYCEMIA: ICD-10-CM

## 2025-01-07 DIAGNOSIS — E11.22 TYPE 2 DIABETES MELLITUS WITH STAGE 3B CHRONIC KIDNEY DISEASE, WITHOUT LONG-TERM CURRENT USE OF INSULIN: ICD-10-CM

## 2025-01-07 DIAGNOSIS — N18.32 TYPE 2 DIABETES MELLITUS WITH STAGE 3B CHRONIC KIDNEY DISEASE, WITHOUT LONG-TERM CURRENT USE OF INSULIN: ICD-10-CM

## 2025-01-07 RX ORDER — LORAZEPAM 1 MG/1
1 TABLET ORAL ONCE AS NEEDED
Qty: 1 TABLET | Refills: 0 | Status: SHIPPED | OUTPATIENT
Start: 2025-01-07 | End: 2025-01-07

## 2025-01-07 NOTE — TELEPHONE ENCOUNTER
----- Message from Pietro sent at 1/7/2025  8:46 AM CST -----  Regarding: self  Type: Patient Call Back    Who called:self    What is the request in detail:pt is calling to get valium or a script for her mri this Friday, stated she is very claustrophobic      Can the clinic reply by [facility]?no    Would the patient rather a call back or a response via [facility]? callback    Best call back number:[phone]    Additional Information:  [id number] - [place] [address] [place] [address]  Phone: [phone] Fax: [phone]

## 2025-01-08 DIAGNOSIS — N18.32 TYPE 2 DIABETES MELLITUS WITH STAGE 3B CHRONIC KIDNEY DISEASE, WITHOUT LONG-TERM CURRENT USE OF INSULIN: ICD-10-CM

## 2025-01-08 DIAGNOSIS — E11.22 TYPE 2 DIABETES MELLITUS WITH STAGE 3B CHRONIC KIDNEY DISEASE, WITHOUT LONG-TERM CURRENT USE OF INSULIN: ICD-10-CM

## 2025-01-08 DIAGNOSIS — E66.01 SEVERE OBESITY (BMI 35.0-39.9) WITH COMORBIDITY: ICD-10-CM

## 2025-01-08 RX ORDER — TIRZEPATIDE 5 MG/.5ML
INJECTION, SOLUTION SUBCUTANEOUS
Qty: 2 ML | Refills: 0 | OUTPATIENT
Start: 2025-01-08

## 2025-01-08 RX ORDER — FLASH GLUCOSE SENSOR
KIT MISCELLANEOUS
Qty: 6 KIT | Refills: 2 | Status: SHIPPED | OUTPATIENT
Start: 2025-01-08

## 2025-01-08 RX ORDER — TIRZEPATIDE 5 MG/.5ML
5 INJECTION, SOLUTION SUBCUTANEOUS
Qty: 2 ML | Refills: 0 | Status: SHIPPED | OUTPATIENT
Start: 2025-01-08 | End: 2025-02-05

## 2025-01-08 NOTE — TELEPHONE ENCOUNTER
Refill Decision Note   Gwendolyn Fournier  is requesting a refill authorization.  Brief Assessment and Rationale for Refill:  Quick Discontinue     Medication Therapy Plan:         Comments:     Note composed:1:52 PM 01/08/2025

## 2025-01-08 NOTE — TELEPHONE ENCOUNTER
No care due was identified.  [facility] Embedded Care Due Messages. Reference number: [id number].   1/08/2025 12:46:37 PM CST

## 2025-01-08 NOTE — TELEPHONE ENCOUNTER
No care due was identified.  Health [facility] Embedded Care Due Messages. Reference number: [id number].   1/08/2025 1:15:13 PM CST

## 2025-01-08 NOTE — TELEPHONE ENCOUNTER
No care due was identified.  Health [facility] Embedded Care Due Messages. Reference number: [id number].   1/07/2025 10:52:31 PM CST

## 2025-01-10 ENCOUNTER — HOSPITAL ENCOUNTER (OUTPATIENT)
Dept: RADIOLOGY | Facility: HOSPITAL | Age: 70
Discharge: HOME | End: 2025-01-10
Attending: INTERNAL MEDICINE
Payer: MEDICARE

## 2025-01-10 DIAGNOSIS — M54.6 PAIN IN THORACIC SPINE: ICD-10-CM

## 2025-01-10 PROCEDURE — 72146 MRI CHEST SPINE W/O DYE: CPT | Mod: TC

## 2025-01-10 PROCEDURE — 72146 MRI CHEST SPINE W/O DYE: CPT | Mod: 26,,, | Performed by: RADIOLOGY

## 2025-01-13 DIAGNOSIS — G93.0 ARACHNOID CYST: Primary | ICD-10-CM

## 2025-01-14 ENCOUNTER — PATIENT MESSAGE (OUTPATIENT)
Dept: FAMILY MEDICINE | Facility: CLINIC | Age: 70
End: 2025-01-14
Payer: MEDICARE

## 2025-01-15 DIAGNOSIS — E11.22 TYPE 2 DIABETES MELLITUS WITH STAGE 3B CHRONIC KIDNEY DISEASE, WITHOUT LONG-TERM CURRENT USE OF INSULIN: ICD-10-CM

## 2025-01-15 DIAGNOSIS — N18.32 TYPE 2 DIABETES MELLITUS WITH STAGE 3B CHRONIC KIDNEY DISEASE, WITHOUT LONG-TERM CURRENT USE OF INSULIN: ICD-10-CM

## 2025-01-15 DIAGNOSIS — E66.01 SEVERE OBESITY (BMI 35.0-39.9) WITH COMORBIDITY: ICD-10-CM

## 2025-01-15 RX ORDER — TIRZEPATIDE 5 MG/.5ML
5 INJECTION, SOLUTION SUBCUTANEOUS
Qty: 2 ML | Refills: 0 | Status: SHIPPED | OUTPATIENT
Start: 2025-01-15 | End: 2025-02-12

## 2025-01-15 NOTE — TELEPHONE ENCOUNTER
Care Due:                  Date            Visit Type   Department     Provider  --------------------------------------------------------------------------------                                EP -                              PRIMARY      [facility]  Last Visit: 01-      CARE ([facility])   MEDICINE       Yolette Abebe                              ESTABLISHED                              PATIENT -    [facility]  Next Visit: 01-      [facility]       Yolette Abebe                                                            Last  Test          Frequency    Reason                     Performed    Due Date  --------------------------------------------------------------------------------    Uric Acid...  12 months..  allopurinoL..............  09-   09-    Health [facility] Embedded Care Due Messages. Reference number: [id number].   1/15/2025 12:18:10 PM CST

## 2025-01-16 ENCOUNTER — OFFICE VISIT (OUTPATIENT)
Dept: NEUROSURGERY | Facility: CLINIC | Age: 70
End: 2025-01-16
Payer: MEDICARE

## 2025-01-16 ENCOUNTER — HOSPITAL ENCOUNTER (INPATIENT)
Facility: HOSPITAL | Age: 70
LOS: 2 days | Discharge: HOME | DRG: 641 | End: 2025-01-18
Attending: EMERGENCY MEDICINE | Admitting: HOSPITALIST
Payer: MEDICARE

## 2025-01-16 VITALS
HEIGHT: 67 IN | SYSTOLIC BLOOD PRESSURE: 141 MMHG | DIASTOLIC BLOOD PRESSURE: 65 MMHG | BODY MASS INDEX: 38.44 KG/M2 | OXYGEN SATURATION: 98 % | HEART RATE: 89 BPM | WEIGHT: 244.94 LBS

## 2025-01-16 DIAGNOSIS — N18.4 CHRONIC KIDNEY DISEASE (CKD), STAGE IV (SEVERE): ICD-10-CM

## 2025-01-16 DIAGNOSIS — R89.9 ABNORMAL LABORATORY TEST: ICD-10-CM

## 2025-01-16 DIAGNOSIS — N18.9 CHRONIC RENAL IMPAIRMENT, UNSPECIFIED CKD STAGE: Primary | ICD-10-CM

## 2025-01-16 DIAGNOSIS — R29.898 HAND WEAKNESS: ICD-10-CM

## 2025-01-16 DIAGNOSIS — G93.0 ARACHNOID CYST: ICD-10-CM

## 2025-01-16 DIAGNOSIS — R20.0 LOWER EXTREMITY NUMBNESS: Primary | ICD-10-CM

## 2025-01-16 DIAGNOSIS — C50.919 MALIGNANT NEOPLASM OF FEMALE BREAST, UNSPECIFIED ESTROGEN RECEPTOR STATUS, UNSPECIFIED LATERALITY, UNSPECIFIED SITE OF BREAST: ICD-10-CM

## 2025-01-16 DIAGNOSIS — R07.9 CHEST PAIN: ICD-10-CM

## 2025-01-16 DIAGNOSIS — M15.0 PRIMARY OSTEOARTHRITIS INVOLVING MULTIPLE JOINTS: ICD-10-CM

## 2025-01-16 DIAGNOSIS — R93.7 ABNORMAL MRI, THORACIC SPINE: ICD-10-CM

## 2025-01-16 DIAGNOSIS — E87.5 HYPERKALEMIA: ICD-10-CM

## 2025-01-16 PROBLEM — U07.1 POSITIVE SELF-ADMINISTERED ANTIGEN TEST FOR COVID-19: Status: RESOLVED | Noted: 2024-07-08 | Resolved: 2025-01-16

## 2025-01-16 PROBLEM — R29.3 POOR POSTURE: Status: RESOLVED | Noted: 2022-11-03 | Resolved: 2025-01-16

## 2025-01-16 LAB
ALBUMIN SERPL BCP-MCNC: 3.5 G/DL (ref 3.5–5.2)
ALLENS TEST: ABNORMAL
ALP SERPL-CCNC: 137 U/L (ref 40–150)
ALT SERPL W/O P-5'-P-CCNC: 12 U/L (ref 10–44)
ANION GAP SERPL CALC-SCNC: 12 MMOL/L (ref 8–16)
ANION GAP SERPL CALC-SCNC: 7 MMOL/L (ref 8–16)
ANION GAP SERPL CALC-SCNC: 8 MMOL/L (ref 8–16)
AST SERPL-CCNC: 19 U/L (ref 10–40)
BASOPHILS # BLD AUTO: 0.04 K/UL (ref 0–0.2)
BASOPHILS NFR BLD: 0.3 % (ref 0–1.9)
BILIRUB SERPL-MCNC: 0.2 MG/DL (ref 0.1–1)
BILIRUB UR QL STRIP: NEGATIVE
BUN SERPL-MCNC: 45 MG/DL (ref 6–30)
BUN SERPL-MCNC: 45 MG/DL (ref 8–23)
BUN SERPL-MCNC: 48 MG/DL (ref 8–23)
CALCIUM SERPL-MCNC: 9.5 MG/DL (ref 8.7–10.5)
CALCIUM SERPL-MCNC: 9.6 MG/DL (ref 8.7–10.5)
CHLORIDE SERPL-SCNC: 110 MMOL/L (ref 95–110)
CHLORIDE SERPL-SCNC: 111 MMOL/L (ref 95–110)
CHLORIDE SERPL-SCNC: 111 MMOL/L (ref 95–110)
CLARITY UR: CLEAR
CO2 SERPL-SCNC: 17 MMOL/L (ref 23–29)
CO2 SERPL-SCNC: 18 MMOL/L (ref 23–29)
COLOR UR: COLORLESS
CREAT SERPL-MCNC: 2.9 MG/DL (ref 0.5–1.4)
CREAT SERPL-MCNC: 3.2 MG/DL (ref 0.5–1.4)
CREAT SERPL-MCNC: 3.4 MG/DL (ref 0.5–1.4)
DIFFERENTIAL METHOD BLD: ABNORMAL
EOSINOPHIL # BLD AUTO: 0.3 K/UL (ref 0–0.5)
EOSINOPHIL NFR BLD: 2.5 % (ref 0–8)
ERYTHROCYTE [DISTWIDTH] IN BLOOD BY AUTOMATED COUNT: 13.5 % (ref 11.5–14.5)
EST. GFR  (NO RACE VARIABLE): 15 ML/MIN/1.73 M^2
EST. GFR  (NO RACE VARIABLE): 17 ML/MIN/1.73 M^2
GLUCOSE SERPL-MCNC: 71 MG/DL (ref 70–110)
GLUCOSE SERPL-MCNC: 73 MG/DL (ref 70–110)
GLUCOSE SERPL-MCNC: 74 MG/DL (ref 70–110)
GLUCOSE UR QL STRIP: NEGATIVE
HCT VFR BLD AUTO: 29.7 % (ref 37–48.5)
HCT VFR BLD CALC: 29 %PCV (ref 36–54)
HGB BLD-MCNC: 9.4 G/DL (ref 12–16)
HGB UR QL STRIP: NEGATIVE
IMM GRANULOCYTES # BLD AUTO: 0.04 K/UL (ref 0–0.04)
IMM GRANULOCYTES NFR BLD AUTO: 0.3 % (ref 0–0.5)
KETONES UR QL STRIP: NEGATIVE
LEUKOCYTE ESTERASE UR QL STRIP: NEGATIVE
LYMPHOCYTES # BLD AUTO: 2.9 K/UL (ref 1–4.8)
LYMPHOCYTES NFR BLD: 24.9 % (ref 18–48)
MCH RBC QN AUTO: 30.2 PG (ref 27–31)
MCHC RBC AUTO-ENTMCNC: 31.6 G/DL (ref 32–36)
MCV RBC AUTO: 96 FL (ref 82–98)
MONOCYTES # BLD AUTO: 0.9 K/UL (ref 0.3–1)
MONOCYTES NFR BLD: 7.5 % (ref 4–15)
NEUTROPHILS # BLD AUTO: 7.6 K/UL (ref 1.8–7.7)
NEUTROPHILS NFR BLD: 64.5 % (ref 38–73)
NITRITE UR QL STRIP: NEGATIVE
NRBC BLD-RTO: 0 /100 WBC
PH UR STRIP: 6 [PH] (ref 5–8)
PLATELET # BLD AUTO: 254 K/UL (ref 150–450)
PMV BLD AUTO: 10.5 FL (ref 9.2–12.9)
POC IONIZED CALCIUM: 1.35 MMOL/L (ref 1.06–1.42)
POC TCO2 (MEASURED): 21 MMOL/L (ref 23–29)
POCT GLUCOSE: 108 MG/DL (ref 70–110)
POCT GLUCOSE: 121 MG/DL (ref 70–110)
POCT GLUCOSE: 137 MG/DL (ref 70–110)
POCT GLUCOSE: 71 MG/DL (ref 70–110)
POCT GLUCOSE: 86 MG/DL (ref 70–110)
POTASSIUM BLD-SCNC: 6.6 MMOL/L (ref 3.5–5.1)
POTASSIUM SERPL-SCNC: 5.4 MMOL/L (ref 3.5–5.1)
POTASSIUM SERPL-SCNC: 6.2 MMOL/L (ref 3.5–5.1)
PROT SERPL-MCNC: 6.9 G/DL (ref 6–8.4)
PROT UR QL STRIP: ABNORMAL
RBC # BLD AUTO: 3.11 M/UL (ref 4–5.4)
SAMPLE: ABNORMAL
SITE: ABNORMAL
SODIUM BLD-SCNC: 135 MMOL/L (ref 136–145)
SODIUM SERPL-SCNC: 135 MMOL/L (ref 136–145)
SODIUM SERPL-SCNC: 137 MMOL/L (ref 136–145)
SP GR UR STRIP: 1.01 (ref 1–1.03)
URN SPEC COLLECT METH UR: ABNORMAL
UROBILINOGEN UR STRIP-ACNC: NEGATIVE EU/DL
WBC # BLD AUTO: 11.82 K/UL (ref 3.9–12.7)

## 2025-01-16 PROCEDURE — 99291 CRITICAL CARE FIRST HOUR: CPT

## 2025-01-16 PROCEDURE — 82565 ASSAY OF CREATININE: CPT

## 2025-01-16 PROCEDURE — 84295 ASSAY OF SERUM SODIUM: CPT

## 2025-01-16 PROCEDURE — 1159F MED LIST DOCD IN RCRD: CPT | Mod: CPTII,S$GLB,, | Performed by: PHYSICIAN ASSISTANT

## 2025-01-16 PROCEDURE — 63600175 PHARM REV CODE 636 W HCPCS: Performed by: HOSPITALIST

## 2025-01-16 PROCEDURE — 94799 UNLISTED PULMONARY SVC/PX: CPT

## 2025-01-16 PROCEDURE — 3008F BODY MASS INDEX DOCD: CPT | Mod: CPTII,S$GLB,, | Performed by: PHYSICIAN ASSISTANT

## 2025-01-16 PROCEDURE — 93005 ELECTROCARDIOGRAM TRACING: CPT

## 2025-01-16 PROCEDURE — 3078F DIAST BP <80 MM HG: CPT | Mod: CPTII,S$GLB,, | Performed by: PHYSICIAN ASSISTANT

## 2025-01-16 PROCEDURE — 1101F PT FALLS ASSESS-DOCD LE1/YR: CPT | Mod: CPTII,S$GLB,, | Performed by: PHYSICIAN ASSISTANT

## 2025-01-16 PROCEDURE — 1125F AMNT PAIN NOTED PAIN PRSNT: CPT | Mod: CPTII,S$GLB,, | Performed by: PHYSICIAN ASSISTANT

## 2025-01-16 PROCEDURE — 85014 HEMATOCRIT: CPT

## 2025-01-16 PROCEDURE — 11000001 HC ACUTE MED/SURG PRIVATE ROOM

## 2025-01-16 PROCEDURE — 93010 ELECTROCARDIOGRAM REPORT: CPT | Mod: ,,, | Performed by: INTERNAL MEDICINE

## 2025-01-16 PROCEDURE — 94761 N-INVAS EAR/PLS OXIMETRY MLT: CPT

## 2025-01-16 PROCEDURE — 25000003 PHARM REV CODE 250: Performed by: HOSPITALIST

## 2025-01-16 PROCEDURE — 84132 ASSAY OF SERUM POTASSIUM: CPT

## 2025-01-16 PROCEDURE — 3077F SYST BP >= 140 MM HG: CPT | Mod: CPTII,S$GLB,, | Performed by: PHYSICIAN ASSISTANT

## 2025-01-16 PROCEDURE — 94640 AIRWAY INHALATION TREATMENT: CPT

## 2025-01-16 PROCEDURE — 80053 COMPREHEN METABOLIC PANEL: CPT

## 2025-01-16 PROCEDURE — 1157F ADVNC CARE PLAN IN RCRD: CPT | Mod: CPTII,S$GLB,, | Performed by: PHYSICIAN ASSISTANT

## 2025-01-16 PROCEDURE — 25000242 PHARM REV CODE 250 ALT 637 W/ HCPCS: Performed by: EMERGENCY MEDICINE

## 2025-01-16 PROCEDURE — 36415 COLL VENOUS BLD VENIPUNCTURE: CPT | Performed by: HOSPITALIST

## 2025-01-16 PROCEDURE — 99900035 HC TECH TIME PER 15 MIN (STAT)

## 2025-01-16 PROCEDURE — 99204 OFFICE O/P NEW MOD 45 MIN: CPT | Mod: S$GLB,,, | Performed by: PHYSICIAN ASSISTANT

## 2025-01-16 PROCEDURE — 82330 ASSAY OF CALCIUM: CPT

## 2025-01-16 PROCEDURE — 1160F RVW MEDS BY RX/DR IN RCRD: CPT | Mod: CPTII,S$GLB,, | Performed by: PHYSICIAN ASSISTANT

## 2025-01-16 PROCEDURE — 63600175 PHARM REV CODE 636 W HCPCS: Performed by: EMERGENCY MEDICINE

## 2025-01-16 PROCEDURE — 81003 URINALYSIS AUTO W/O SCOPE: CPT | Performed by: HOSPITALIST

## 2025-01-16 PROCEDURE — 80048 BASIC METABOLIC PNL TOTAL CA: CPT | Mod: XB | Performed by: HOSPITALIST

## 2025-01-16 PROCEDURE — 3288F FALL RISK ASSESSMENT DOCD: CPT | Mod: CPTII,S$GLB,, | Performed by: PHYSICIAN ASSISTANT

## 2025-01-16 PROCEDURE — 25000003 PHARM REV CODE 250: Performed by: EMERGENCY MEDICINE

## 2025-01-16 PROCEDURE — 85025 COMPLETE CBC W/AUTO DIFF WBC: CPT

## 2025-01-16 RX ORDER — LIDOCAINE 50 MG/G
1 PATCH TOPICAL DAILY
Status: DISCONTINUED | OUTPATIENT
Start: 2025-01-17 | End: 2025-01-18 | Stop reason: HOSPADM

## 2025-01-16 RX ORDER — METOPROLOL SUCCINATE 50 MG/1
50 TABLET, EXTENDED RELEASE ORAL DAILY
Status: DISCONTINUED | OUTPATIENT
Start: 2025-01-17 | End: 2025-01-18 | Stop reason: HOSPADM

## 2025-01-16 RX ORDER — LORAZEPAM 0.5 MG/1
1 TABLET ORAL ONCE AS NEEDED
Status: DISCONTINUED | OUTPATIENT
Start: 2025-01-16 | End: 2025-01-18 | Stop reason: HOSPADM

## 2025-01-16 RX ORDER — HYDROCODONE BITARTRATE AND ACETAMINOPHEN 5; 325 MG/1; MG/1
1 TABLET ORAL 2 TIMES DAILY PRN
COMMUNITY
Start: 2025-01-14 | End: 2025-01-30 | Stop reason: ALTCHOICE

## 2025-01-16 RX ORDER — ACETAMINOPHEN 325 MG/1
650 TABLET ORAL EVERY 8 HOURS PRN
Status: DISCONTINUED | OUTPATIENT
Start: 2025-01-16 | End: 2025-01-18 | Stop reason: HOSPADM

## 2025-01-16 RX ORDER — HYDROCHLOROTHIAZIDE 25 MG/1
25 TABLET ORAL DAILY
Status: DISCONTINUED | OUTPATIENT
Start: 2025-01-17 | End: 2025-01-17

## 2025-01-16 RX ORDER — GABAPENTIN 300 MG/1
300 CAPSULE ORAL 2 TIMES DAILY
Status: DISCONTINUED | OUTPATIENT
Start: 2025-01-16 | End: 2025-01-17

## 2025-01-16 RX ORDER — GLUCAGON 1 MG
1 KIT INJECTION
Status: DISCONTINUED | OUTPATIENT
Start: 2025-01-16 | End: 2025-01-18 | Stop reason: HOSPADM

## 2025-01-16 RX ORDER — ATORVASTATIN CALCIUM 40 MG/1
40 TABLET, FILM COATED ORAL NIGHTLY
Status: DISCONTINUED | OUTPATIENT
Start: 2025-01-16 | End: 2025-01-18 | Stop reason: HOSPADM

## 2025-01-16 RX ORDER — SODIUM CHLORIDE 0.9 % (FLUSH) 0.9 %
10 SYRINGE (ML) INJECTION EVERY 12 HOURS PRN
Status: DISCONTINUED | OUTPATIENT
Start: 2025-01-16 | End: 2025-01-18 | Stop reason: HOSPADM

## 2025-01-16 RX ORDER — ESCITALOPRAM OXALATE 10 MG/1
10 TABLET ORAL DAILY
Status: DISCONTINUED | OUTPATIENT
Start: 2025-01-17 | End: 2025-01-18 | Stop reason: HOSPADM

## 2025-01-16 RX ORDER — DEXTROSE 50 % IN WATER (D50W) INTRAVENOUS SYRINGE
25
Status: COMPLETED | OUTPATIENT
Start: 2025-01-16 | End: 2025-01-16

## 2025-01-16 RX ORDER — DEXTROSE 50 % IN WATER (D50W) INTRAVENOUS SYRINGE
25 ONCE
Status: COMPLETED | OUTPATIENT
Start: 2025-01-16 | End: 2025-01-16

## 2025-01-16 RX ORDER — AMLODIPINE BESYLATE 5 MG/1
5 TABLET ORAL DAILY
Status: DISCONTINUED | OUTPATIENT
Start: 2025-01-17 | End: 2025-01-18 | Stop reason: HOSPADM

## 2025-01-16 RX ORDER — INSULIN ASPART 100 [IU]/ML
0-10 INJECTION, SOLUTION INTRAVENOUS; SUBCUTANEOUS
Status: DISCONTINUED | OUTPATIENT
Start: 2025-01-16 | End: 2025-01-18 | Stop reason: HOSPADM

## 2025-01-16 RX ORDER — HEPARIN SODIUM 5000 [USP'U]/ML
5000 INJECTION, SOLUTION INTRAVENOUS; SUBCUTANEOUS EVERY 8 HOURS
Status: DISCONTINUED | OUTPATIENT
Start: 2025-01-16 | End: 2025-01-18 | Stop reason: HOSPADM

## 2025-01-16 RX ORDER — IBUPROFEN 200 MG
24 TABLET ORAL
Status: DISCONTINUED | OUTPATIENT
Start: 2025-01-16 | End: 2025-01-18 | Stop reason: HOSPADM

## 2025-01-16 RX ORDER — FUROSEMIDE 40 MG/1
40 TABLET ORAL 2 TIMES DAILY
Qty: 60 TABLET | Refills: 11 | Status: ON HOLD | OUTPATIENT
Start: 2025-01-16 | End: 2025-01-18 | Stop reason: HOSPADM

## 2025-01-16 RX ORDER — IBUPROFEN 200 MG
16 TABLET ORAL
Status: DISCONTINUED | OUTPATIENT
Start: 2025-01-16 | End: 2025-01-18 | Stop reason: HOSPADM

## 2025-01-16 RX ORDER — FUROSEMIDE 40 MG/1
40 TABLET ORAL 2 TIMES DAILY
Status: DISCONTINUED | OUTPATIENT
Start: 2025-01-16 | End: 2025-01-17

## 2025-01-16 RX ORDER — PRIMIDONE 50 MG/1
100 TABLET ORAL 3 TIMES DAILY
Status: DISCONTINUED | OUTPATIENT
Start: 2025-01-16 | End: 2025-01-18 | Stop reason: HOSPADM

## 2025-01-16 RX ORDER — ALBUTEROL SULFATE 2.5 MG/.5ML
5 SOLUTION RESPIRATORY (INHALATION)
Status: COMPLETED | OUTPATIENT
Start: 2025-01-16 | End: 2025-01-16

## 2025-01-16 RX ORDER — AMLODIPINE BESYLATE 5 MG/1
10 TABLET ORAL ONCE
Status: COMPLETED | OUTPATIENT
Start: 2025-01-16 | End: 2025-01-16

## 2025-01-16 RX ORDER — DOCUSATE SODIUM 100 MG/1
100 CAPSULE, LIQUID FILLED ORAL
Status: DISCONTINUED | OUTPATIENT
Start: 2025-01-16 | End: 2025-01-18 | Stop reason: HOSPADM

## 2025-01-16 RX ORDER — ZOLPIDEM TARTRATE 5 MG/1
5 TABLET ORAL NIGHTLY
Status: DISCONTINUED | OUTPATIENT
Start: 2025-01-16 | End: 2025-01-18 | Stop reason: HOSPADM

## 2025-01-16 RX ORDER — QUETIAPINE FUMARATE 100 MG/1
100 TABLET, FILM COATED ORAL NIGHTLY
Status: DISCONTINUED | OUTPATIENT
Start: 2025-01-16 | End: 2025-01-18 | Stop reason: HOSPADM

## 2025-01-16 RX ORDER — TIZANIDINE 4 MG/1
4 TABLET ORAL 2 TIMES DAILY PRN
Status: DISCONTINUED | OUTPATIENT
Start: 2025-01-16 | End: 2025-01-18 | Stop reason: HOSPADM

## 2025-01-16 RX ORDER — HYDROCODONE BITARTRATE AND ACETAMINOPHEN 5; 325 MG/1; MG/1
1 TABLET ORAL 2 TIMES DAILY PRN
Status: DISCONTINUED | OUTPATIENT
Start: 2025-01-16 | End: 2025-01-18 | Stop reason: HOSPADM

## 2025-01-16 RX ORDER — NALOXONE HCL 0.4 MG/ML
0.02 VIAL (ML) INJECTION
Status: DISCONTINUED | OUTPATIENT
Start: 2025-01-16 | End: 2025-01-18 | Stop reason: HOSPADM

## 2025-01-16 RX ORDER — DULOXETIN HYDROCHLORIDE 30 MG/1
60 CAPSULE, DELAYED RELEASE ORAL DAILY
Status: DISCONTINUED | OUTPATIENT
Start: 2025-01-17 | End: 2025-01-18 | Stop reason: HOSPADM

## 2025-01-16 RX ADMIN — PRIMIDONE 100 MG: 50 TABLET ORAL at 11:01

## 2025-01-16 RX ADMIN — HYDROCODONE BITARTRATE AND ACETAMINOPHEN 1 TABLET: 5; 325 TABLET ORAL at 11:01

## 2025-01-16 RX ADMIN — SODIUM ZIRCONIUM CYCLOSILICATE 10 G: 10 POWDER, FOR SUSPENSION ORAL at 09:01

## 2025-01-16 RX ADMIN — QUETIAPINE FUMARATE 100 MG: 100 TABLET ORAL at 11:01

## 2025-01-16 RX ADMIN — ZOLPIDEM TARTRATE 5 MG: 5 TABLET ORAL at 11:01

## 2025-01-16 RX ADMIN — ALBUTEROL SULFATE 5 MG: 2.5 SOLUTION RESPIRATORY (INHALATION) at 05:01

## 2025-01-16 RX ADMIN — INSULIN HUMAN 10 UNITS: 100 INJECTION, SOLUTION PARENTERAL at 07:01

## 2025-01-16 RX ADMIN — AMLODIPINE BESYLATE 10 MG: 5 TABLET ORAL at 11:01

## 2025-01-16 RX ADMIN — ATORVASTATIN CALCIUM 40 MG: 40 TABLET, FILM COATED ORAL at 11:01

## 2025-01-16 RX ADMIN — GABAPENTIN 300 MG: 300 CAPSULE ORAL at 11:01

## 2025-01-16 RX ADMIN — FUROSEMIDE 40 MG: 40 TABLET ORAL at 11:01

## 2025-01-16 RX ADMIN — DEXTROSE MONOHYDRATE 25 G: 25 INJECTION, SOLUTION INTRAVENOUS at 06:01

## 2025-01-16 RX ADMIN — DEXTROSE MONOHYDRATE 25 G: 25 INJECTION, SOLUTION INTRAVENOUS at 05:01

## 2025-01-16 RX ADMIN — HEPARIN SODIUM 5000 UNITS: 5000 INJECTION INTRAVENOUS; SUBCUTANEOUS at 11:01

## 2025-01-16 NOTE — ED PROVIDER NOTES
"Encounter Date: 1/16/2025       History     Chief Complaint   Patient presents with    Abnormal Lab     Pt to ED for abnormal potassium. Potassium >7. Pt denies any CP or SOB. Pt reports increased muscle twitching starting today.      Patient was given preop laboratory for a "nerve device for her back".  The patient states she has been feeling weak lately the laboratory data that was drawn earlier today showed a potassium of 7.  Patient has no other complaints of at this time.  Patient denies any palpitations and an irregular heartbeat.    The history is provided by the patient.     Review of patient's allergies indicates:   Allergen Reactions    Ondansetron hcl (pf) Hives and Itching    Ketorolac Itching     Past Medical History:   Diagnosis Date    Arthritis     Gout attack     Hx of psychiatric care     Hypertension     Opioid dependence, uncomplicated 02/03/2023    Psychiatric problem     Renal disorder     Sciatic nerve pain 2013    Therapy     used to follow with psychiatrist but doesn't recall whom, 2012    Tuberculosis     Unspecified cataract 07/27/2023    Mild on both eyes     Past Surgical History:   Procedure Laterality Date    COLONOSCOPY N/A 01/21/2019    Procedure: COLONOSCOPY;  Surgeon: Shanna Jose MD;  Location: [facility] ENDO;  Service: Endoscopy;  Laterality: N/A;    HYSTERECTOMY      INJECTION OF JOINT Bilateral 03/13/2019    Procedure: INJECTION, JOINT BILATERAL SI JOINT;  Surgeon: Evan Coreas MD;  Location: [facility] PAIN MGT;  Service: Pain Management;  Laterality: Bilateral;  BILATERAL SI JOINT INJECTION    KNEE SURGERY  01/01/2014    left knee surgery      Family History   Problem Relation Name Age of Onset    Tuberculosis Mother      Stroke Father      Bipolar disorder Daughter      Suicide Daughter      Depression Daughter      Bipolar disorder Daughter      Depression Daughter       Social History     Tobacco Use    Smoking status: Former     Current packs/day: 0.00     Types: Cigarettes "     Quit date: 1976     Years since quittin.0    Smokeless tobacco: Never   Substance Use Topics    Alcohol use: Not Currently     Alcohol/week: 0.0 standard drinks of alcohol     Comment: red wine    Drug use: No     Review of Systems   Constitutional: Negative.  Negative for fever.   HENT: Negative.  Negative for sore throat.    Eyes: Negative.    Respiratory: Negative.  Negative for shortness of breath.    Cardiovascular: Negative.  Negative for chest pain.   Gastrointestinal: Negative.  Negative for nausea.   Endocrine: Negative.    Genitourinary: Negative.  Negative for dysuria.   Musculoskeletal:  Positive for back pain (Chronic).   Skin: Negative.  Negative for rash.   Allergic/Immunologic: Negative.    Neurological:  Positive for weakness.   Hematological: Negative.  Does not bruise/bleed easily.   Psychiatric/Behavioral: Negative.     All other systems reviewed and are negative.      Physical Exam     Initial Vitals [25 1529]   BP Pulse Resp Temp SpO2   (!) 146/71 95 18 98.5 °F (36.9 °C) 97 %      MAP       --         Physical Exam    Nursing note and vitals reviewed.  Constitutional: Vital signs are normal. She appears well-developed. She is active and cooperative.   HENT:   Head: Normocephalic and atraumatic.   Eyes: Conjunctivae, EOM and lids are normal. Pupils are equal, round, and reactive to light.   Neck: Trachea normal. Neck supple. No thyroid mass present.    Full passive range of motion without pain.     Cardiovascular:  Normal rate, regular rhythm, S1 normal, S2 normal, normal heart sounds, intact distal pulses and normal pulses.           Pulmonary/Chest: Effort normal and breath sounds normal.   Abdominal: Abdomen is soft and protuberant. Bowel sounds are normal.   Musculoskeletal:         General: Normal range of motion.      Cervical back: Full passive range of motion without pain and neck supple.     Lymphadenopathy:     She has no axillary adenopathy.   Neurological: She is  alert and oriented to person, place, and time. She has normal strength. No cranial nerve deficit or sensory deficit. GCS eye subscore is 4. GCS verbal subscore is 5. GCS motor subscore is 6.   Skin: Skin is warm, dry and intact.   Psychiatric: She has a normal mood and affect. Her speech is normal and behavior is normal. Judgment and thought content normal. Cognition and memory are normal.         ED Course   Critical Care    Date/Time: 1/16/2025 5:36 PM    Performed by: Ben Machado MD  Authorized by: Ben Machado MD  Direct patient critical care time: 9 minutes  Additional history critical care time: 11 minutes  Ordering / reviewing critical care time: 12 minutes  Documentation critical care time: 11 minutes  Consulting other physicians critical care time: 4 minutes  Total critical care time (exclusive of procedural time) : 47 minutes  Critical care time was exclusive of separately billable procedures and treating other patients and teaching time.  Critical care was necessary to treat or prevent imminent or life-threatening deterioration of the following conditions: renal failure.  Critical care was time spent personally by me on the following activities: interpretation of cardiac output measurements, evaluation of patient's response to treatment, examination of patient, ordering and performing treatments and interventions, ordering and review of radiographic studies, ordering and review of laboratory studies, pulse oximetry, re-evaluation of patient's condition and review of old charts.        Labs Reviewed   CBC W/ AUTO DIFFERENTIAL - Abnormal       Result Value    WBC 11.82      RBC 3.11 (*)     Hemoglobin 9.4 (*)     Hematocrit 29.7 (*)     MCV 96      MCH 30.2      MCHC 31.6 (*)     RDW 13.5      Platelets 254      MPV 10.5      Immature Granulocytes 0.3      Gran # (ANC) 7.6      Immature Grans (Abs) 0.04      Lymph # 2.9      Mono # 0.9      Eos # 0.3      Baso # 0.04      nRBC 0      Gran  % 64.5      Lymph % 24.9      Mono % 7.5      Eosinophil % 2.5      Basophil % 0.3      Differential Method Automated     COMPREHENSIVE METABOLIC PANEL - Abnormal    Sodium 135 (*)     Potassium 6.2 (*)     Chloride 111 (*)     CO2 17 (*)     Glucose 74      BUN 48 (*)     Creatinine 3.2 (*)     Calcium 9.5      Total Protein 6.9      Albumin 3.5      Total Bilirubin 0.2      Alkaline Phosphatase 137      AST 19      ALT 12      eGFR 15 (*)     Anion Gap 7 (*)    ISTAT PROCEDURE - Abnormal    POC Glucose 71      POC BUN 45 (*)     POC Creatinine 3.4 (*)     POC Sodium 135 (*)     POC Potassium 6.6 (*)     POC Chloride 110      POC TCO2 (MEASURED) 21 (*)     POC Anion Gap 12      POC Ionized Calcium 1.35      POC Hematocrit 29 (*)     Sample VENOUS      Site Other      Allens Test N/A     URINALYSIS, REFLEX TO URINE CULTURE   ISTAT CHEM8   POCT GLUCOSE MONITORING CONTINUOUS          Imaging Results    None          Medications   dextrose 50 % in water (D50W) injection 25 g (has no administration in time range)   insulin regular injection 10 Units 0.1 mL (has no administration in time range)   albuterol sulfate nebulizer solution 5 mg (5 mg Nebulization Given 1/16/25 1710)     Medical Decision Making  This patient has a history of chronic renal insufficiency and went for preop laboratory data and found to have a potassium of 7.  Patient states he issues so somewhat sluggish.  Lab evaluation here showed a potassium level of 6.5 with chronic renal insufficiency and a mildly low bicarb of 21.  Provided the patient with albuterol nebulizer treatment glucose and insulin to lower her potassium level.  Has no EKG changes with concerns for hyperkalemia.  The patient will be admitted to the hospital for further diagnostic evaluation workup by Hospital Medicine.    Amount and/or Complexity of Data Reviewed  Labs:  Decision-making details documented in ED Course.    Risk  OTC drugs.  Prescription drug management.  Decision  regarding hospitalization.               ED Course as of 01/16/25 1737   Thu Jan 16, 2025   1648 Comprehensive metabolic panel(!) [MI]   1649 Sodium(!): 135 [MI]   1649 Potassium(!): 6.2 [MI]   1649 Chloride(!): 111 [MI]   1649 CO2(!): 17 [MI]   1649 BUN(!): 48 [MI]   1649 Creatinine(!): 3.2 [MI]   1649 eGFR(!): 15 [MI]   1649 CBC auto differential(!) [MI]   1649 Hemoglobin(!): 9.4 [MI]   1649 Hematocrit(!): 29.7 [MI]      ED Course User Index  [MI] Ben Machado MD                           Clinical Impression:  Final diagnoses:  [E87.5] Hyperkalemia  [R89.9] Abnormal laboratory test  [N18.9] Chronic renal impairment, unspecified CKD stage (Primary)          ED Disposition Condition    Admit Stable                Ben Machado MD  01/16/25 173       Ben Machado MD  01/16/25 1989

## 2025-01-16 NOTE — PROGRESS NOTES
[facility]  Neurosurgery    SUBJECTIVE:     History of Present Illness:  Gwendolyn Fournier is a 69 y.o. female with CKD-IV, dm, obesity, YUE, and recently diagnosed breast cancer who presents with acute thoracic back pain with recent MRI showing possible arachnoid webbing at T3-4.  She presents with a family member who assists with the history. Patient states the pain in her lower thoracic spine began 1 week ago with no known trauma or inciting event.  She has never had these types of symptoms before.  The pain is severe in her mid lower thoracic spine and radiates to the right side of her back.  She was started on gabapentin and Norco which helps somewhat with the pain but make her very sedated.  She nearly fell asleep several times during our visit today.      She presents in a wheelchair and reports significant difficulty getting to her visit due to the severe pain.  Prior to the onset of this pain, she was independent and ambulated with a cane for mobility.  She was recently diagnosed with breast cancer; biopsy results were obtained 10 days ago.  Surgery is forthcoming.      (Not in a hospital admission)      Review of patient's allergies indicates:   Allergen Reactions    Ondansetron hcl (pf) Hives and Itching    Ketorolac Itching       Past Medical History:   Diagnosis Date    Arthritis     Gout attack     Hx of psychiatric care     Hypertension     Opioid dependence, uncomplicated 02/03/2023    Psychiatric problem     Renal disorder     Sciatic nerve pain 2013    Therapy     used to follow with psychiatrist but doesn't recall whom, 2012    Tuberculosis     Unspecified cataract 07/27/2023    Mild on both eyes     Past Surgical History:   Procedure Laterality Date    COLONOSCOPY N/A 01/21/2019    Procedure: COLONOSCOPY;  Surgeon: Shanna Jose MD;  Location: [facility];  Service: Endoscopy;  Laterality: N/A;    HYSTERECTOMY      INJECTION OF JOINT Bilateral 03/13/2019    Procedure: INJECTION,  "JOINT BILATERAL SI JOINT;  Surgeon: Evan Coreas MD;  Location: [facility];  Service: Pain Management;  Laterality: Bilateral;  BILATERAL SI JOINT INJECTION    KNEE SURGERY  2014    left knee surgery        Social History     Tobacco Use    Smoking status: Former     Current packs/day: 0.00     Types: Cigarettes     Quit date: 1976     Years since quittin.0    Smokeless tobacco: Never   Substance Use Topics    Alcohol use: Not Currently     Alcohol/week: 0.0 standard drinks of alcohol     Comment: red wine    Drug use: No        Review of Systems:  As noted in HPI    OBJECTIVE:     Vital Signs (Most Recent):  Vitals:    25 0949   BP: (!) 141/65   Pulse: 89   SpO2: 98%   Weight: 111.1 kg (244 lb 14.9 oz)   Height: 5' 7" (1.702 m)   PainSc:   8   PainLoc: Back     Body mass index is 38.36 kg/m².      Physical Exam:  General: well developed, well nourished, very drowsy--fell asleep multiple times during the exam  Head: normocephalic, atraumatic  Neurologic: Alert and oriented. Thought content appropriate  GCS: Motor: 6/Verbal: 5/Eyes: 4 GCS Total: 15  Language: No aphasia  Speech: No dysarthria  Cranial nerves: face symmetric, tongue midline, CN II-XII grossly intact.   Eyes: pupils equal, round, reactive to light with accommodation, EOMI.   Pulmonary: normal respirations, not labored, no accessory muscles used    Sensory: intact to light touch throughout; abnormal sensation to BLE in all dermatomes  No thoracic sensory level identified    Motor Strength: Moves all extremities spontaneously     Strength  Deltoids Triceps Biceps   Hand    Upper: R 5/5 5/5 5/5   4-/5    L 5/5 5/5 5/5   4/5     Iliopsoas Quadriceps  Tibialis  anterior Gastro- cnemius    Lower: R 5/5 5/5  5/5 5/5     L 5/5 5/5  5/5 5/5      Hayden: absent  Clonus: absent    Gait:  Presents in a wheelchair    Midline Bony Tenderness:  Present in lower thoracic spine    Diagnostic Results:  I have personally reviewed " imaging and agree with the findings.     MRI thoracic spine, 01/10/2025:  Examination suggestive of an arachnoid web at T3-4 level with anterior displacement of the cord focally at T4, no associated cord myelomalacia.  No definite well define mass to strongly suggest the presence of an arachnoid cyst, further evaluation with CT myelogram or MRI postcontrast thoracic spine could be done if clinically warranted.  Consider neurosurgical consult.      ASSESSMENT/PLAN:     Gwendolyn Fournier is a 69 y.o. female who presents with acute lower thoracic back pain with radiation to the right.  This is noted to be severe.  Thoracic MRI recently revealed possible arachnoid webbing at T3-4.  She also has a very recent diagnosis of breast cancer.  On exam today, she has some weakness in her hands along with altered sensation to her BLE.  I will order whole spine MRI w/w/o contrast to rule out metastatic disease.  CMP ordered to assess renal function in setting of known CKD-IV.    Patient is quite drowsy on exam today.  She fell asleep multiple times during the history and physical.  She attributed this to new prescriptions for gabapentin and hydrocodone.  We discussed the side effects of these medications, and she is advised to switch gabapentin to nightly only until it is more tolerable.    UPDATE:  Our office was called this afternoon with a critical lab result potassium=7.  I called the patient and advised she go to the ER. She agrees with this plan. PCP also informed of the lab result      Please feel free to call with any further questions        Merry Parsons PA-C  [facility]  Department of Neurosurgery  [phone]    Disclaimer: This note was dictated by speech recognition. Minor errors in transcription may be present.  Please call with any questions.

## 2025-01-16 NOTE — Clinical Note
Diagnosis: Hyperkalemia [312086]   Future Attending Provider: GRAHAM BAL [[id number]]   Reason for IP Medical Treatment  (Clinical interventions that can only be accomplished in the IP setting? ) :: Chronic renal insufficiency with hyperkalemia   Plans for Post-Acute care--if anticipated (pick the single best option):: A. No post acute care anticipated at this time   Special Needs:: No Special Needs [1]

## 2025-01-17 ENCOUNTER — PATIENT MESSAGE (OUTPATIENT)
Dept: SURGERY | Facility: CLINIC | Age: 70
End: 2025-01-17
Payer: MEDICARE

## 2025-01-17 ENCOUNTER — DOCUMENTATION ONLY (OUTPATIENT)
Dept: SURGERY | Facility: CLINIC | Age: 70
End: 2025-01-17
Payer: MEDICARE

## 2025-01-17 LAB
ALBUMIN SERPL BCP-MCNC: 3.2 G/DL (ref 3.5–5.2)
ANION GAP SERPL CALC-SCNC: 6 MMOL/L (ref 8–16)
ANION GAP SERPL CALC-SCNC: 6 MMOL/L (ref 8–16)
BASOPHILS # BLD AUTO: 0.05 K/UL (ref 0–0.2)
BASOPHILS NFR BLD: 0.6 % (ref 0–1.9)
BUN SERPL-MCNC: 44 MG/DL (ref 8–23)
BUN SERPL-MCNC: 44 MG/DL (ref 8–23)
CALCIUM SERPL-MCNC: 9.4 MG/DL (ref 8.7–10.5)
CALCIUM SERPL-MCNC: 9.5 MG/DL (ref 8.7–10.5)
CHLORIDE SERPL-SCNC: 110 MMOL/L (ref 95–110)
CHLORIDE SERPL-SCNC: 113 MMOL/L (ref 95–110)
CO2 SERPL-SCNC: 19 MMOL/L (ref 23–29)
CO2 SERPL-SCNC: 21 MMOL/L (ref 23–29)
CREAT SERPL-MCNC: 2.6 MG/DL (ref 0.5–1.4)
CREAT SERPL-MCNC: 2.8 MG/DL (ref 0.5–1.4)
DIFFERENTIAL METHOD BLD: ABNORMAL
EOSINOPHIL # BLD AUTO: 0.3 K/UL (ref 0–0.5)
EOSINOPHIL NFR BLD: 3 % (ref 0–8)
ERYTHROCYTE [DISTWIDTH] IN BLOOD BY AUTOMATED COUNT: 13.3 % (ref 11.5–14.5)
EST. GFR  (NO RACE VARIABLE): 18 ML/MIN/1.73 M^2
EST. GFR  (NO RACE VARIABLE): 19 ML/MIN/1.73 M^2
GLUCOSE SERPL-MCNC: 79 MG/DL (ref 70–110)
GLUCOSE SERPL-MCNC: 87 MG/DL (ref 70–110)
HCT VFR BLD AUTO: 28.3 % (ref 37–48.5)
HGB BLD-MCNC: 8.8 G/DL (ref 12–16)
IMM GRANULOCYTES # BLD AUTO: 0.03 K/UL (ref 0–0.04)
IMM GRANULOCYTES NFR BLD AUTO: 0.3 % (ref 0–0.5)
LYMPHOCYTES # BLD AUTO: 3 K/UL (ref 1–4.8)
LYMPHOCYTES NFR BLD: 33.1 % (ref 18–48)
MCH RBC QN AUTO: 29.8 PG (ref 27–31)
MCHC RBC AUTO-ENTMCNC: 31.1 G/DL (ref 32–36)
MCV RBC AUTO: 96 FL (ref 82–98)
MONOCYTES # BLD AUTO: 0.6 K/UL (ref 0.3–1)
MONOCYTES NFR BLD: 6.9 % (ref 4–15)
NEUTROPHILS # BLD AUTO: 5.1 K/UL (ref 1.8–7.7)
NEUTROPHILS NFR BLD: 56.1 % (ref 38–73)
NRBC BLD-RTO: 0 /100 WBC
OHS QRS DURATION: 88 MS
OHS QTC CALCULATION: 424 MS
PHOSPHATE SERPL-MCNC: 4 MG/DL (ref 2.7–4.5)
PLATELET # BLD AUTO: 239 K/UL (ref 150–450)
PMV BLD AUTO: 10.3 FL (ref 9.2–12.9)
POCT GLUCOSE: 113 MG/DL (ref 70–110)
POCT GLUCOSE: 72 MG/DL (ref 70–110)
POCT GLUCOSE: 80 MG/DL (ref 70–110)
POCT GLUCOSE: 89 MG/DL (ref 70–110)
POTASSIUM SERPL-SCNC: 5.4 MMOL/L (ref 3.5–5.1)
POTASSIUM SERPL-SCNC: 5.4 MMOL/L (ref 3.5–5.1)
RBC # BLD AUTO: 2.95 M/UL (ref 4–5.4)
SODIUM SERPL-SCNC: 137 MMOL/L (ref 136–145)
SODIUM SERPL-SCNC: 138 MMOL/L (ref 136–145)
WBC # BLD AUTO: 9.02 K/UL (ref 3.9–12.7)

## 2025-01-17 PROCEDURE — 63600175 PHARM REV CODE 636 W HCPCS: Performed by: HOSPITALIST

## 2025-01-17 PROCEDURE — 21400001 HC TELEMETRY ROOM

## 2025-01-17 PROCEDURE — 25000003 PHARM REV CODE 250: Performed by: HOSPITALIST

## 2025-01-17 PROCEDURE — 97530 THERAPEUTIC ACTIVITIES: CPT

## 2025-01-17 PROCEDURE — 80069 RENAL FUNCTION PANEL: CPT | Performed by: INTERNAL MEDICINE

## 2025-01-17 PROCEDURE — 63600175 PHARM REV CODE 636 W HCPCS: Performed by: INTERNAL MEDICINE

## 2025-01-17 PROCEDURE — 36415 COLL VENOUS BLD VENIPUNCTURE: CPT | Performed by: HOSPITALIST

## 2025-01-17 PROCEDURE — 97535 SELF CARE MNGMENT TRAINING: CPT

## 2025-01-17 PROCEDURE — 80048 BASIC METABOLIC PNL TOTAL CA: CPT | Performed by: HOSPITALIST

## 2025-01-17 PROCEDURE — 85025 COMPLETE CBC W/AUTO DIFF WBC: CPT | Performed by: HOSPITALIST

## 2025-01-17 PROCEDURE — 36415 COLL VENOUS BLD VENIPUNCTURE: CPT | Performed by: INTERNAL MEDICINE

## 2025-01-17 PROCEDURE — 97165 OT EVAL LOW COMPLEX 30 MIN: CPT

## 2025-01-17 PROCEDURE — 97161 PT EVAL LOW COMPLEX 20 MIN: CPT

## 2025-01-17 PROCEDURE — 99223 1ST HOSP IP/OBS HIGH 75: CPT | Mod: ,,, | Performed by: INTERNAL MEDICINE

## 2025-01-17 RX ORDER — FUROSEMIDE 40 MG/1
40 TABLET ORAL DAILY
Status: DISCONTINUED | OUTPATIENT
Start: 2025-01-17 | End: 2025-01-17

## 2025-01-17 RX ORDER — GABAPENTIN 300 MG/1
300 CAPSULE ORAL NIGHTLY
Status: DISCONTINUED | OUTPATIENT
Start: 2025-01-18 | End: 2025-01-18 | Stop reason: HOSPADM

## 2025-01-17 RX ORDER — FUROSEMIDE 10 MG/ML
40 INJECTION INTRAMUSCULAR; INTRAVENOUS ONCE
Status: COMPLETED | OUTPATIENT
Start: 2025-01-17 | End: 2025-01-17

## 2025-01-17 RX ORDER — GABAPENTIN 300 MG/1
300 CAPSULE ORAL NIGHTLY
Status: DISCONTINUED | OUTPATIENT
Start: 2025-01-17 | End: 2025-01-17

## 2025-01-17 RX ADMIN — PRIMIDONE 100 MG: 50 TABLET ORAL at 09:01

## 2025-01-17 RX ADMIN — FUROSEMIDE 40 MG: 10 INJECTION, SOLUTION INTRAVENOUS at 09:01

## 2025-01-17 RX ADMIN — ATORVASTATIN CALCIUM 40 MG: 40 TABLET, FILM COATED ORAL at 09:01

## 2025-01-17 RX ADMIN — TIZANIDINE 4 MG: 4 TABLET ORAL at 08:01

## 2025-01-17 RX ADMIN — METOPROLOL SUCCINATE 50 MG: 50 TABLET, EXTENDED RELEASE ORAL at 09:01

## 2025-01-17 RX ADMIN — LIDOCAINE 5% 1 PATCH: 700 PATCH TOPICAL at 09:01

## 2025-01-17 RX ADMIN — HEPARIN SODIUM 5000 UNITS: 5000 INJECTION INTRAVENOUS; SUBCUTANEOUS at 02:01

## 2025-01-17 RX ADMIN — ESCITALOPRAM OXALATE 10 MG: 10 TABLET ORAL at 09:01

## 2025-01-17 RX ADMIN — HEPARIN SODIUM 5000 UNITS: 5000 INJECTION INTRAVENOUS; SUBCUTANEOUS at 09:01

## 2025-01-17 RX ADMIN — PRIMIDONE 100 MG: 50 TABLET ORAL at 02:01

## 2025-01-17 RX ADMIN — AMLODIPINE BESYLATE 5 MG: 5 TABLET ORAL at 09:01

## 2025-01-17 RX ADMIN — FUROSEMIDE 40 MG: 40 TABLET ORAL at 09:01

## 2025-01-17 RX ADMIN — QUETIAPINE FUMARATE 100 MG: 100 TABLET ORAL at 09:01

## 2025-01-17 RX ADMIN — DULOXETINE HYDROCHLORIDE 60 MG: 30 CAPSULE, DELAYED RELEASE ORAL at 09:01

## 2025-01-17 RX ADMIN — HEPARIN SODIUM 5000 UNITS: 5000 INJECTION INTRAVENOUS; SUBCUTANEOUS at 05:01

## 2025-01-17 RX ADMIN — ZOLPIDEM TARTRATE 5 MG: 5 TABLET ORAL at 09:01

## 2025-01-17 NOTE — ASSESSMENT & PLAN NOTE
Apparently the patient has been diagnosed this week.  She has an outpatient appointment with General surgery.

## 2025-01-17 NOTE — SUBJECTIVE & OBJECTIVE
Past Medical History:   Diagnosis Date    Arthritis     Cancer     Gout attack     Hx of psychiatric care     Hypertension     Opioid dependence, uncomplicated 02/03/2023    Psychiatric problem     Renal disorder     Sciatic nerve pain 2013    Therapy     used to follow with psychiatrist but doesn't recall whom, 2012    Tuberculosis     Unspecified cataract 07/27/2023    Mild on both eyes       Past Surgical History:   Procedure Laterality Date    COLONOSCOPY N/A 01/21/2019    Procedure: COLONOSCOPY;  Surgeon: Shanna Jose MD;  Location: [facility] ENDO;  Service: Endoscopy;  Laterality: N/A;    HYSTERECTOMY      INJECTION OF JOINT Bilateral 03/13/2019    Procedure: INJECTION, JOINT BILATERAL SI JOINT;  Surgeon: Evan Coreas MD;  Location: [facility] PAIN MGT;  Service: Pain Management;  Laterality: Bilateral;  BILATERAL SI JOINT INJECTION    KNEE SURGERY  01/01/2014    left knee surgery        Review of patient's allergies indicates:   Allergen Reactions    Ondansetron hcl (pf) Hives and Itching    Ketorolac Itching     Current Facility-Administered Medications   Medication Frequency    acetaminophen tablet 650 mg Q8H PRN    amLODIPine tablet 5 mg Daily    atorvastatin tablet 40 mg QHS    dextrose 50% injection 12.5 g PRN    dextrose 50% injection 25 g PRN    dextrose 50% injection 25 g PRN    docusate sodium capsule 100 mg PRN    DULoxetine DR capsule 60 mg Daily    EScitalopram oxalate tablet 10 mg Daily    furosemide injection 40 mg Once    gabapentin capsule 300 mg QHS    glucagon (human recombinant) injection 1 mg PRN    glucose chewable tablet 16 g PRN    glucose chewable tablet 24 g PRN    heparin (porcine) injection 5,000 Units Q8H    HYDROcodone-acetaminophen 5-325 mg per tablet 1 tablet BID PRN    insulin aspart U-100 pen 0-10 Units QID (AC + HS) PRN    LIDOcaine 5 % patch 1 patch Daily    LORazepam tablet 1 mg Once PRN    metoprolol succinate (TOPROL-XL) 24 hr tablet 50 mg Daily    naloxone 0.4 mg/mL  injection 0.02 mg PRN    primidone tablet 100 mg TID    QUEtiapine tablet 100 mg QHS    sodium chloride 0.9% flush 10 mL Q12H PRN    sodium zirconium cyclosilicate packet 10 g Daily    tiZANidine tablet 4 mg BID PRN    zolpidem tablet 5 mg QHS     Family History       Problem Relation (Age of Onset)    Bipolar disorder Daughter, Daughter    Depression Daughter, Daughter    Stroke Father    Suicide Daughter    Tuberculosis Mother          Tobacco Use    Smoking status: Former     Current packs/day: 0.00     Types: Cigarettes     Quit date: 1976     Years since quittin.0    Smokeless tobacco: Never   Substance and Sexual Activity    Alcohol use: Not Currently     Alcohol/week: 0.0 standard drinks of alcohol     Comment: red wine    Drug use: No    Sexual activity: Not Currently     Partners: Male     Review of Systems   All other systems reviewed and are negative.    Objective:     Vital Signs (Most Recent):  Temp: 99 °F (37.2 °C) (25 162)  Pulse: 83 (25 162)  Resp: 20 (25 162)  BP: 130/62 (25)  SpO2: 98 % (25) Vital Signs (24h Range):  Temp:  [97.6 °F (36.4 °C)-99 °F (37.2 °C)] 99 °F (37.2 °C)  Pulse:  [] 83  Resp:  [14-20] 20  SpO2:  [96 %-100 %] 98 %  BP: (127-191)/(61-75) 130/62     Weight: 114.2 kg (251 lb 12.3 oz) (256)  Body mass index is 39.43 kg/m².  Body surface area is 2.32 meters squared.    I/O last 3 completed shifts:  In: 240 [P.O.:240]  Out: 350 [Urine:350]     Physical Exam  Vitals and nursing note reviewed.   Constitutional:       General: She is awake. She is not in acute distress.     Appearance: Normal appearance. She is well-developed.   HENT:      Head: Normocephalic and atraumatic.      Nose: Nose normal.      Mouth/Throat:      Mouth: Mucous membranes are moist.   Eyes:      Extraocular Movements: Extraocular movements intact.      Conjunctiva/sclera: Conjunctivae normal.   Cardiovascular:      Rate and Rhythm: Normal rate  and regular rhythm.   Pulmonary:      Effort: Pulmonary effort is normal.      Breath sounds: Normal breath sounds.   Abdominal:      General: There is no distension.      Palpations: Abdomen is soft.   Musculoskeletal:         General: No tenderness or signs of injury.      Right lower leg: Edema present.      Left lower leg: Edema present.      Comments: BLE with trace pitting edema   Skin:     General: Skin is warm and dry.      Findings: No erythema or rash.   Neurological:      General: No focal deficit present.      Mental Status: She is alert. Mental status is at baseline.   Psychiatric:         Mood and Affect: Mood normal.         Behavior: Behavior normal.          Significant Labs:  CBC:   Recent Labs   Lab 01/17/25  0716   WBC 9.02   RBC 2.95*   HGB 8.8*   HCT 28.3*      MCV 96   MCH 29.8   MCHC 31.1*     CMP:   Recent Labs   Lab 01/16/25  1557 01/16/25  2247 01/17/25  1612   GLU 74   < > 87   CALCIUM 9.5   < > 9.4   ALBUMIN 3.5  --  3.2*   PROT 6.9  --   --    *   < > 137   K 6.2*   < > 5.4*   CO2 17*   < > 21*   *   < > 110   BUN 48*   < > 44*   CREATININE 3.2*   < > 2.6*   ALKPHOS 137  --   --    ALT 12  --   --    AST 19  --   --    BILITOT 0.2  --   --     < > = values in this interval not displayed.     All labs within the past 24 hours have been reviewed.    Significant Imaging:  Labs: Reviewed

## 2025-01-17 NOTE — PHARMACY MED REC
"Admission Medication History     The home medication history was taken by Anusha Farrar.    You may go to "Admission" then "Reconcile Home Medications" tabs to review and/or act upon these items.     The home medication list has been updated by the Pharmacy department.   Please read ALL comments highlighted in yellow.   Please address this information as you see fit.    Feel free to contact us if you have any questions or require assistance.        Medications listed below were obtained from: [employer]  (Not in a hospital admission)        Anusha Farrar  [phone]                 .          "

## 2025-01-17 NOTE — H&P
[facility] Medicine  History & Physical    Patient Name: Gwendolyn Fournier  MRN: [id number]  Patient Class: IP- Inpatient  Admission Date: 1/16/2025  Attending Physician: Svetlana Dewitt, *   Primary Care Provider: Yolette Abebe MD         Patient information was obtained from ER records.     Subjective:     Principal Problem:Hyperkalemia    Chief Complaint:   Chief Complaint   Patient presents with    Abnormal Lab     Pt to ED for abnormal potassium. Potassium >7. Pt denies any CP or SOB. Pt reports increased muscle twitching starting today. Had unsteady gait today.  This is not unusual for the patient.        HPI: The patient is a 69-year-old woman with a past medical history significant chronic kidney disease stage 3, hypotension, diabetes mellitus, obesity, schizophrenia, insomnia, history of tuberculosis as a child.  Coincidentally she was diagnosed with a right breast cancer earlier this week.  She additionally has a history of low back pain and sciatica down her right leg.  She has had low back surgery and the sciatica has been resolved with the patient has ongoing low back pain.  For this she is scheduled to have a spinal stimulator placed into her low back.  She isn't certain whether she wants this procedure.  Because of her upcoming surgery she had elective blood work today.  Potassium of 7 was found in the patient's primary care office.  She was referred to the emergency room for further care.  She did give a history of some wobbliness an unsteady gait earlier today, but this is not unusual for the patient because of her trouble with her back and general debility.  Otherwise she denies fever, headache, stiff neck, chest pain, shortness of breath, cough, abdominal pain, nausea or vomiting, constipation or diarrhea.  She has been on Ozempic in the past and recently switched to Mounjaro.  Her blood sugars tend to run between 70 and 100.    Past Medical History:   Diagnosis Date     Arthritis     Cancer     Gout attack     Hx of psychiatric care     Hypertension     Opioid dependence, uncomplicated 02/03/2023    Psychiatric problem     Renal disorder     Sciatic nerve pain 2013    Therapy     used to follow with psychiatrist but doesn't recall whom, 2012    Tuberculosis     Unspecified cataract 07/27/2023    Mild on both eyes       Past Surgical History:   Procedure Laterality Date    COLONOSCOPY N/A 01/21/2019    Procedure: COLONOSCOPY;  Surgeon: Shanna Jose MD;  Location: [facility] ENDO;  Service: Endoscopy;  Laterality: N/A;    HYSTERECTOMY      INJECTION OF JOINT Bilateral 03/13/2019    Procedure: INJECTION, JOINT BILATERAL SI JOINT;  Surgeon: Evan Coreas MD;  Location: [facility] PAIN MGT;  Service: Pain Management;  Laterality: Bilateral;  BILATERAL SI JOINT INJECTION    KNEE SURGERY  01/01/2014    left knee surgery        Review of patient's allergies indicates:   Allergen Reactions    Ondansetron hcl (pf) Hives and Itching    Ketorolac Itching       Current Facility-Administered Medications on File Prior to Encounter   Medication    lidocaine (PF) 10 mg/ml (1%) injection 40 mg     Current Outpatient Medications on File Prior to Encounter   Medication Sig    DULoxetine (CYMBALTA) 60 MG capsule TAKE 1 CAPSULE(60 MG) BY MOUTH EVERY DAY    EScitalopram oxalate (LEXAPRO) 10 MG tablet TAKE 1 TABLET(10 MG) BY MOUTH EVERY DAY    furosemide (LASIX) 40 MG tablet TAKE 1 TABLET(40 MG) BY MOUTH TWICE DAILY    gabapentin (NEURONTIN) 300 MG capsule Take 1 capsule (300 mg total) by mouth every evening.    hydroCHLOROthiazide (HYDRODIURIL) 25 MG tablet Take 25 mg by mouth once daily.    HYDROcodone-acetaminophen (NORCO) 5-325 mg per tablet Take 1 tablet by mouth 2 (two) times daily as needed.    metoprolol succinate (TOPROL-XL) 50 MG 24 hr tablet Take 1 tablet (50 mg total) by mouth once daily.    primidone (MYSOLINE) 50 MG Tab Take 2 tablets (100 mg total) by mouth 3 (three) times daily.    QUEtiapine  (SEROQUEL) 100 MG Tab TAKE 1 TABLET BY MOUTH AT BEDTIME    zolpidem (AMBIEN) 5 MG Tab Take 5 mg by mouth every evening.    acetaminophen (TYLENOL) 500 MG tablet Take 1 tablet (500 mg total) by mouth every 6 (six) hours as needed for Pain.    albuterol (ACCUNEB) 0.63 mg/3 mL Nebu Inhale 1 ampule into the lungs as needed.    amLODIPine (NORVASC) 5 MG tablet Take 1 tablet (5 mg total) by mouth once daily.    atorvastatin (LIPITOR) 40 MG tablet Take 1 tablet (40 mg total) by mouth every evening.    blood sugar diagnostic Strp To check BG two times daily, freestyle lite meter    blood-glucose meter kit To check BG two times daily, freestyle lite meter    docusate sodium (COLACE) 100 MG capsule Take 1 capsule (100 mg total) by mouth as needed for Constipation.    flash glucose scanning reader (FREESTYLE HAI 2 READER) Misc 1 each by Misc.(Non-Drug; Combo Route) route 4 (four) times daily with meals and nightly.    flash glucose sensor (FREESTYLE HAI 2 SENSOR) Kit Use as directed to check blood sugar. Change every 14 days.    lancets Misc To check BG two times daily, to use with insurance preferred meter    LIDOcaine (LIDODERM) 5 % Place 1 patch onto the skin once daily. Apply patch for 12 hours and then leave off for 12 hours    linaCLOtide (LINZESS) 145 mcg Cap capsule Take 1 capsule (145 mcg total) by mouth daily as needed (constipation).    LORazepam (ATIVAN) 1 MG tablet Take 1 tablet (1 mg total) by mouth once as needed for Anxiety.    tirzepatide (MOUNJARO) 5 mg/0.5 mL PnIj Inject 5 mg into the skin every 7 days.    tiZANidine (ZANAFLEX) 4 MG tablet TAKE 1 TABLET(4 MG) BY MOUTH TWICE DAILY AS NEEDED FOR PAIN    [DISCONTINUED] allopurinoL (ZYLOPRIM) 100 MG tablet TAKE 1 TABLET(100 MG) BY MOUTH TWICE DAILY    [DISCONTINUED] buPROPion (WELLBUTRIN) 100 MG tablet Take 100 mg by mouth every morning.    [DISCONTINUED] ciclopirox (PENLAC) 8 % Soln Apply topically nightly.    [DISCONTINUED] diphenhydrAMINE (BENADRYL) 25 mg  capsule Take 25 mg by mouth every 6 (six) hours as needed.    [DISCONTINUED] ferrous sulfate (FEOSOL) 325 mg (65 mg iron) Tab tablet Take 325 mg by mouth as needed.     [DISCONTINUED] fluticasone propionate (FLONASE) 50 mcg/actuation nasal spray 1 spray (50 mcg total) by Each Nostril route once daily.    [DISCONTINUED] furosemide (LASIX) 20 MG tablet Take 20-40 mg by mouth daily as needed.    [DISCONTINUED] furosemide (LASIX) 40 MG tablet TAKE 1 TABLET(40 MG) BY MOUTH TWICE DAILY    [DISCONTINUED] MOVANTIK 25 mg tablet Take 25 mg by mouth once daily.    [DISCONTINUED] mupirocin (BACTROBAN) 2 % ointment Apply topically 3 (three) times daily.    [DISCONTINUED] potassium chloride SA (K-DUR,KLOR-CON) 20 MEQ tablet Take 20 mEq by mouth.    [DISCONTINUED] spironolactone (ALDACTONE) 25 MG tablet Take 1 tablet (25 mg total) by mouth once daily.     Family History       Problem Relation (Age of Onset)    Bipolar disorder Daughter, Daughter    Depression Daughter, Daughter    Stroke Father    Suicide Daughter    Tuberculosis Mother          Tobacco Use    Smoking status: Former     Current packs/day: 0.00     Types: Cigarettes     Quit date: 1976     Years since quittin.0    Smokeless tobacco: Never   Substance and Sexual Activity    Alcohol use: Not Currently     Alcohol/week: 0.0 standard drinks of alcohol     Comment: red wine    Drug use: No    Sexual activity: Not Currently     Partners: Male     Review of Systems   All other systems reviewed and are negative.    Objective:     Vital Signs (Most Recent):  Temp: 98.5 °F (36.9 °C) (25 152)  Pulse: 102 (25)  Resp: 15 (25)  BP: (!) 153/67 (25)  SpO2: 100 % (25) Vital Signs (24h Range):  Temp:  [98.5 °F (36.9 °C)] 98.5 °F (36.9 °C)  Pulse:  [] 102  Resp:  [13-25] 15  SpO2:  [97 %-100 %] 100 %  BP: (139-168)/(63-73) 153/67     Weight: 113.4 kg (250 lb)  Body mass index is 39.16 kg/m².     Physical Exam  Vitals  reviewed.   Constitutional:       Appearance: Normal appearance. She is obese.   HENT:      Head: Normocephalic and atraumatic.      Mouth/Throat:      Mouth: Mucous membranes are moist.   Eyes:      Extraocular Movements: Extraocular movements intact.      Conjunctiva/sclera: Conjunctivae normal.      Pupils: Pupils are equal, round, and reactive to light.   Cardiovascular:      Rate and Rhythm: Normal rate and regular rhythm.      Pulses: Normal pulses.      Heart sounds: Normal heart sounds.   Pulmonary:      Effort: Pulmonary effort is normal.      Breath sounds: Normal breath sounds.   Abdominal:      General: Abdomen is flat. Bowel sounds are normal.      Palpations: Abdomen is soft.   Musculoskeletal:         General: Normal range of motion.      Cervical back: Normal range of motion and neck supple.   Skin:     General: Skin is warm and dry.   Neurological:      General: No focal deficit present.      Mental Status: She is alert and oriented to person, place, and time.   Psychiatric:         Mood and Affect: Mood normal.         Behavior: Behavior normal.         Thought Content: Thought content normal.         Judgment: Judgment normal.              CRANIAL NERVES     CN III, IV, VI   Pupils are equal, round, and reactive to light.      Assessment/Plan:     * Hyperkalemia              Breast mass, right  Apparently the patient has been diagnosed this week.  She has an outpatient appointment with General surgery.      Drug-induced constipation  Linzess not on formulary.  Colace ordered      Acute right-sided low back pain without sciatica  See above      Chronic kidney disease (CKD), stage IV (severe)  Creatine stable for now. BMP reviewed- noted Estimated Creatinine Clearance: 21.3 mL/min (A) (based on SCr of 3.2 mg/dL (H)). according to latest data. Based on current GFR, CKD stage is stage 3 - GFR 30-59.  Monitor UOP and serial BMP and adjust therapy as needed. Renally dose meds. Avoid nephrotoxic  medications and procedures.    Hypoglycemia  We will follow, at least 4 times daily      Procedure and treatment not carried out because of patient's decision for reasons of belief and group pressure        Tremor of both hands  Was on Mysoline this has been discontinued.      Aortic atherosclerosis  Not a pressing issue at the moment      SVT (supraventricular tachycardia)  will be followed on telemetry.  Currently in sinus rhythm      Decreased strength of trunk and back  See above      Decreased range of motion of trunk and back  See above.  Would avoid NSAIDs.  PT ordered      History of tuberculosis exposure  This was as a child.  It is not an active issue.      Major depressive disorder, recurrent, moderate  Patient is seemingly has a history of significant depression as well as possible schizophrenia.  Currently despite her acute illness her mood and affect were normal and appropriate.  The patient is pleasant.  We will simply continue her baseline medications for her mental health issues.  Would follow a CPK if the patient develops muscle pain or excessive weakness.  We would also follow to ensure that she does not develop a serotonin syndrome with her use of multiple medications.  I think there has not been significant changes to her medications recently.  She isn't suicidal or homicidal.  She does not need a psychiatric consultation at this time      Type 2 diabetes mellitus with stage 3b chronic kidney disease, without long-term current use of insulin  Creatine stable for now. BMP reviewed- noted Estimated Creatinine Clearance: 21.3 mL/min (A) (based on SCr of 3.2 mg/dL (H)). according to latest data. Based on current GFR, CKD stage is stage 3 - GFR 30-59.  Monitor UOP and serial BMP and adjust therapy as needed. Renally dose meds. Avoid nephrotoxic medications and procedures.  A urinalysis is ordered.  A Nephrology consult was ordered    During her hospitalization we will follow up blood sugars 4 times  daily.  We will cover with sliding scale moderate insulin scale.  Would resume mounjaro as outpatient    Sacroiliitis, not elsewhere classified  See above.  Would avoid NSAIDs      Sickle cell trait  Not an active issue.  Apparently the patient is a Tenriism and has refused blood in the past      Obstructive sleep apnea  We will inquire as to whether the patient uses CPAP at home      Severe obesity (BMI 35.0-39.9) with comorbidity  Body mass index is 39.16 kg/m². Morbid obesity complicates all aspects of disease management from diagnostic modalities to treatment. Weight loss encouraged and health benefits explained to patient.  The patient to have a nutrition consult.  I think the use of Mounjaro in this patient is appropriate, would resume as outpatient.        Chronic pain  The patient will be continued on her chronic narcotic for her low back pain.  Linzess that the patient takes for chronic constipation isn't on formulary.  Her care is discussed with inpatient pharmacy.  Colace we will be continued.      History of open reduction and internal fixation (ORIF) procedure  This is a historical problem.  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  She was on allopurinol in the past and this has been discontinued.  NSAIDs should be avoided.      Primary osteoarthritis involving multiple joints  The patient is primarily complaining of low back pain.  I will continue her p.r.n. hydrocodone , lidocaine patch as well as acetaminophen.  I have ordered physical therapy.  The patient may benefit from inpatient rehab.      Essential hypertension  Patient's blood pressure range in the last 24 hours was: BP  Min: 139/63  Max: 168/73.The patient's inpatient anti-hypertensive regimen is listed below:  Current Antihypertensives  amLODIPine tablet 5 mg, Daily, Oral  furosemide tablet 40 mg, 2 times daily, Oral  hydroCHLOROthiazide tablet 25  mg, Daily, Oral  metoprolol succinate (TOPROL-XL) 24 hr tablet 50 mg, Daily, Oral    Plan  The patient's blood pressure will be followed.  As previously mentioned she should not be administered spironolactone.  Patient was on spironolactone in the past but apparently has recently discontinue it.  I believe furosemide and hydrochlorothiazide are safe in the setting and usually associated with hypokalemia.        VTE Risk Mitigation (From admission, onward)           Ordered     IP VTE HIGH RISK PATIENT  Once         01/16/25 1825     Place sequential compression device  Until discontinued         01/16/25 1825                         The patient will also have 5000 u sq three times daily for dvt prophylaxis, given her renal insufficiency           Emigdio Willingham MD  Department of Hospital Medicine  [facility] [place] - Emergency Dept

## 2025-01-17 NOTE — PLAN OF CARE
Problem: Occupational Therapy  Goal: Occupational Therapy Goal  Description: Goals to be met by: 1/31/2025     Patient will increase functional independence with ADLs by performing:    UE Dressing with Modified [place].  LE Dressing with Supervision.  Grooming while standing at sink with Supervision.  Toileting from toilet with Supervision for hygiene and clothing management.   Step transfer with Supervision  Toilet transfer to toilet with Supervision.    Outcome: Progressing     OT initial eval completed. Pt presenting with increased weakness, B knee buckling, tremulousness from baseline and would benefit from skilled OT to maximize function prior to d/c. Recommend CHANTELLE once medically stable and RW, W/C.

## 2025-01-17 NOTE — PROGRESS NOTES
[facility]  Progress Note    Patient Name: Gwendolyn Fournier  MRN: [id number]  Patient Class: IP- Inpatient   Admission Date: 1/16/2025  Length of Stay: 1 days  Attending Physician: Svetlana Dewitt, *  Primary Care Provider: Yolette Abebe MD        Subjective     Principal Problem:Hyperkalemia        HPI:  The patient is a 69-year-old woman with a past medical history significant chronic kidney disease stage 3, hypotension, diabetes mellitus, obesity, schizophrenia, insomnia, history of tuberculosis as a child.  Coincidentally she was diagnosed with a right breast cancer earlier this week.  She additionally has a history of low back pain and sciatica down her right leg.  She has had low back surgery and the sciatica has been resolved with the patient has ongoing low back pain.  For this she is scheduled to have a spinal stimulator placed into her low back.  She isn't certain whether she wants this procedure.  Because of her upcoming surgery she had elective blood work today.  Potassium of 7 was found in the patient's primary care office.  She was referred to the emergency room for further care.  She did give a history of some wobbliness an unsteady gait earlier today, but this is not unusual for the patient because of her trouble with her back and general debility.  Otherwise she denies fever, headache, stiff neck, chest pain, shortness of breath, cough, abdominal pain, nausea or vomiting, constipation or diarrhea.  She has been on Ozempic in the past and recently switched to Mounjaro.  Her blood sugars tend to run between 70 and 100.    Overview/Hospital Course:  69-year-old woman with a past medical history significant chronic kidney disease stage 3, hypertension, diabetes mellitus, obesity, schizophrenia, insomnia, history of tuberculosis as a child.  Coincidentally she was diagnosed with a right breast cancer earlier this week.  She additionally has a history of low back pain and  sciatica down her right leg.   Because of her upcoming surgery she had elective blood work done,.  Potassium of 7 was found in the patient's primary care office.  She was referred to the emergency room for further care.  She did give a history of some wobbliness an unsteady gait earlier , but this is not unusual for the patient because of her trouble with her back and general debility.  Otherwise she denies fever, headache, stiff neck, chest pain, shortness of breath, cough, abdominal pain, nausea or vomiting, constipation or diarrhea.  She has been on Ozempic in the past and recently switched to Mounjaro.  Hyperkalemia with Lokelma is improving.  Neurosurgery is consulted for back pain,MRI cervical,thorascia nd lumar is oredred.  PT,OT is consulted.      Interval History:   Hyperkalemia with Lokelma is improving.  Neurosurgery is consulted for back pain,MRI cervical,thorascia nd lumar is oredred.  PT,OT is consulted.  Review of Systems   Constitutional:  Positive for activity change and chills.   Respiratory:  Negative for apnea and choking.    Cardiovascular:  Negative for chest pain and leg swelling.   Neurological:  Positive for weakness.     Objective:     Vital Signs (Most Recent):  Temp: 97.8 °F (36.6 °C) (01/17/25 1130)  Pulse: 83 (01/17/25 1130)  Resp: 18 (01/17/25 1130)  BP: 127/61 (01/17/25 1130)  SpO2: 98 % (01/17/25 1130) Vital Signs (24h Range):  Temp:  [97.6 °F (36.4 °C)-98.5 °F (36.9 °C)] 97.8 °F (36.6 °C)  Pulse:  [] 83  Resp:  [13-25] 18  SpO2:  [96 %-100 %] 98 %  BP: (127-191)/(61-75) 127/61     Weight: 114.2 kg (251 lb 12.3 oz)  Body mass index is 39.43 kg/m².    Intake/Output Summary (Last 24 hours) at 1/17/2025 1141  Last data filed at 1/17/2025 0848  Gross per 24 hour   Intake 480 ml   Output 350 ml   Net 130 ml         Physical Exam  Cardiovascular:      Rate and Rhythm: Normal rate.   Pulmonary:      Effort: No respiratory distress.   Abdominal:      Palpations: Abdomen is soft.    Musculoskeletal:         General: No swelling.   Skin:     Coloration: Skin is not jaundiced.      Findings: No bruising.   Neurological:      Mental Status: She is alert.             Significant Labs: All pertinent labs within the past 24 hours have been reviewed.  BMP:   Recent Labs   Lab 01/17/25  0716   GLU 79      K 5.4*   *   CO2 19*   BUN 44*   CREATININE 2.8*   CALCIUM 9.5     CBC:   Recent Labs   Lab 01/16/25  1152 01/16/25  1557 01/16/25  1639 01/17/25  0716   WBC 11.43 11.82  --  9.02   HGB 9.6* 9.4*  --  8.8*   HCT 31.4* 29.7* 29* 28.3*    254  --  239     CMP:   Recent Labs   Lab 01/16/25  1152 01/16/25  1557 01/16/25  2247 01/17/25  0716   * 135* 137 138   K 7.0* 6.2* 5.4* 5.4*    111* 111* 113*   CO2 20* 17* 18* 19*   GLU 86 74 73 79   BUN 49* 48* 45* 44*   CREATININE 3.3* 3.2* 2.9* 2.8*   CALCIUM 9.7 9.5 9.6 9.5   PROT 7.2 6.9  --   --    ALBUMIN 3.7 3.5  --   --    BILITOT 0.2 0.2  --   --    ALKPHOS 142 137  --   --    AST 17 19  --   --    ALT 12 12  --   --    ANIONGAP 4* 7* 8 6*       Significant Imaging: I have reviewed all pertinent imaging results/findings within the past 24 hours.    Assessment and Plan     * Hyperkalemia    Hyperkalemia with Lokelma is improving.          Breast mass, right  Apparently the patient has been diagnosed this week.  She has an outpatient appointment with General surgery.      Drug-induced constipation  Linzess not on formulary.  Colace ordered      Acute right-sided low back pain without sciatica  See above      Chronic kidney disease (CKD), stage IV (severe)  Creatine stable for now. BMP reviewed- noted Estimated Creatinine Clearance: 21.3 mL/min (A) (based on SCr of 3.2 mg/dL (H)). according to latest data. Based on current GFR, CKD stage is stage 3 - GFR 30-59.  Monitor UOP and serial BMP and adjust therapy as needed. Renally dose meds. Avoid nephrotoxic medications and procedures.    Hypoglycemia  We will follow, at least 4  times daily      Procedure and treatment not carried out because of patient's decision for reasons of belief and group pressure        Tremor of both hands  Was on Mysoline this has been discontinued.      Aortic atherosclerosis  Not a pressing issue at the moment      SVT (supraventricular tachycardia)  will be followed on telemetry.  Currently in sinus rhythm      Decreased strength of trunk and back  See above      Decreased range of motion of trunk and back    Neurosurgery is consulted for back pain,MRI cervical,thorascia nd lumar is oredred.  PT,OT is consulted.      History of tuberculosis exposure  This was as a child.  It is not an active issue.      Major depressive disorder, recurrent, moderate  Patient is seemingly has a history of significant depression as well as possible schizophrenia.  Currently despite her acute illness her mood and affect were normal and appropriate.  The patient is pleasant.  We will simply continue her baseline medications for her mental health issues.  Would follow a CPK if the patient develops muscle pain or excessive weakness.  We would also follow to ensure that she does not develop a serotonin syndrome with her use of multiple medications.  I think there has not been significant changes to her medications recently.  She isn't suicidal or homicidal.  She does not need a psychiatric consultation at this time      Type 2 diabetes mellitus with stage 3b chronic kidney disease, without long-term current use of insulin  Creatine stable for now. BMP reviewed- noted Estimated Creatinine Clearance: 21.3 mL/min (A) (based on SCr of 3.2 mg/dL (H)). according to latest data. Based on current GFR, CKD stage is stage 3 - GFR 30-59.  Monitor UOP and serial BMP and adjust therapy as needed. Renally dose meds. Avoid nephrotoxic medications and procedures.  A urinalysis is ordered.  A Nephrology consult was ordered    During her hospitalization we will follow up blood sugars 4 times daily.  We  will cover with sliding scale moderate insulin scale.  Would resume mounjaro as outpatient    Sacroiliitis, not elsewhere classified  See above.  Would avoid NSAIDs      Sickle cell trait  Not an active issue.  Apparently the patient is a Faith and has refused blood in the past      Obstructive sleep apnea  We will inquire as to whether the patient uses CPAP at home      Severe obesity (BMI 35.0-39.9) with comorbidity  Body mass index is 39.16 kg/m². Morbid obesity complicates all aspects of disease management from diagnostic modalities to treatment. Weight loss encouraged and health benefits explained to patient.  The patient to have a nutrition consult.  I think the use of Mounjaro in this patient is appropriate, would resume as outpatient.        Chronic pain  The patient will be continued on her chronic narcotic for her low back pain.  Linzess that the patient takes for chronic constipation isn't on formulary.  Her care is discussed with inpatient pharmacy.  Colace we will be continued.      History of open reduction and internal fixation (ORIF) procedure  This is a historical problem.  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  She was on allopurinol in the past and this has been discontinued.  NSAIDs should be avoided.      Primary osteoarthritis involving multiple joints  The patient is primarily complaining of low back pain.  I will continue her p.r.n. hydrocodone , lidocaine patch as well as acetaminophen.  I have ordered physical therapy.  The patient may benefit from inpatient rehab.      Essential hypertension  Patient's blood pressure range in the last 24 hours was: BP  Min: 139/63  Max: 168/73.The patient's inpatient anti-hypertensive regimen is listed below:  Current Antihypertensives  amLODIPine tablet 5 mg, Daily, Oral  furosemide tablet 40 mg, 2 times daily, Oral  hydroCHLOROthiazide tablet 25 mg, Daily,  Oral  metoprolol succinate (TOPROL-XL) 24 hr tablet 50 mg, Daily, Oral    Plan  The patient's blood pressure will be followed.  As previously mentioned she should not be administered spironolactone.  Patient was on spironolactone in the past but apparently has recently discontinue it.  I believe furosemide and hydrochlorothiazide are safe in the setting and usually associated with hypokalemia.        VTE Risk Mitigation (From admission, onward)           Ordered     heparin (porcine) injection 5,000 Units  Every 8 hours         01/16/25 1958     IP VTE HIGH RISK PATIENT  Once         01/16/25 1825     Place sequential compression device  Until discontinued         01/16/25 1825                    Discharge Planning   SELAM:      Code Status: Full Code   Medical Readiness for Discharge Date:   Discharge Plan A: Home with family                Please place Justification for DME        Svetlana Dewitt MD  Department of Hospital Medicine   [facility] [place] - Med Surg

## 2025-01-17 NOTE — PT/OT/SLP EVAL
Normal one month visit, concerns addressed. 6% weight gain. Feeding well. Sleep issue. Occupational Therapy   Evaluation    Name: Gwendolyn Fournier  MRN: [id number]  Admitting Diagnosis: Hyperkalemia  Recent Surgery: * No surgery found *      Recommendations:     Discharge Recommendations: Moderate Intensity Therapy  Discharge Equipment Recommendations:  walker, rolling, wheelchair  Barriers to discharge:  Other (Comment) (increased need of assist)    Assessment:     Gwendolyn Fournier is a 69 y.o. female with a medical diagnosis of Hyperkalemia.  She presents with The primary encounter diagnosis was Chronic renal impairment, unspecified CKD stage. Diagnoses of Hyperkalemia, Abnormal laboratory test, and Chest pain were also pertinent to this visit. . Performance deficits affecting function: weakness, impaired endurance, impaired self care skills, impaired functional mobility, impaired balance, gait instability, decreased upper extremity function, decreased lower extremity function, decreased coordination, pain, decreased ROM.      OT initial eval completed. Pt presenting with increased weakness, B knee buckling, tremulousness from baseline and would benefit from skilled OT to maximize function prior to d/c. Recommend CHANTELLE once medically stable and RW, W/C.     Rehab Prognosis: Good; patient would benefit from acute skilled OT services to address these deficits and reach maximum level of function.       Plan:     Patient to be seen  (3-4x/wk) to address the above listed problems via self-care/home management, therapeutic activities, therapeutic exercises  Plan of Care Expires: 01/31/25  Plan of Care Reviewed with: patient, daughter    Subjective     Chief Complaint: weakness  Patient/Family Comments/goals: daughter present    Occupational Profile:  Living Environment: Lives with daughter, SSH, 3STE, BHR, walk-in tub  Previous level of function: recently need of assist from daughter for standing, lower body ADLs, and mobility outside with use of SPC. Pt Mod I with rollator inside the home, drives  Equipment  "Used at Home: rollator, cane, quad, shower chair  Assistance upon Discharge: daughter is home with pt during the day    Pain/Comfort:  Pain Rating 1: 0/10 (at rest; pt with intermittent sharp pain in lower R side of back. Pharmacist provided lidocaine patch during eval)  Pain Addressed 1: Pre-medicate for activity, Reposition, Distraction, Cessation of Activity, Nurse notified    Objective:     Communicated with: RN prior to session.  Patient found HOB elevated with bed alarm, telemetry, peripheral IV, PureWick upon OT entry to room.    General Precautions: Standard, fall  Orthopedic Precautions: N/A  Braces: N/A  Respiratory Status: Room air    Occupational Performance:    Bed Mobility:    Patient completed Rolling/Turning to Right with stand by assistance  Patient completed Scooting/Bridging with stand by assistance  Patient completed Supine to Sit with stand by assistance  Patient completed Sit to Supine with stand by assistance    Functional Mobility/Transfers:  Patient completed 4x Sit <> Stand Transfer with minimal/moderate assistance  with  rolling walker   Patient completed Toilet Transfer Step Transfer technique with moderate assistance with  rolling walker  Functional Mobility: Pt maintained sitting balance on eob with supervision static and SBA/CGA dynamic balance or during intermittent sharp R lower back pain 2/2 "nerve compressed," per pt. Pt 95-98% RA,  bpm during mobility. Education on PLB, safe t/f technique, hand placement. Pt performed in room mobility with Min/Mod A and RW 2/2 B knee buckling with assist and BUE tremulousness 2/2 weakness on RW    Activities of Daily Living:  Grooming: independence with setup of wipe for hand hygiene seated  Bathing: maximal assistance to clean BLEs after incontinent bladder during mobility in room en route to toilet causing soiled socks  Upper Body Dressing: contact guard assistance to don gown as robe  Lower Body Dressing: maximal assistance to change " diaper and doff soiled socks in sit and stand with RW; assist provided for clothes and for standing assist x3 trials  Toileting: maximal assistance for clothes mgmt, standing assist with RW, and partially for hygiene    Cognitive/Visual Perceptual:  Cognitive/Psychosocial Skills:     -       Oriented to: Person, Place, Time, and Situation   -       Follows Commands/attention:Follows multistep  commands  -       Communication: clear/fluent  -       Memory: No Deficits noted  -       Safety awareness/insight to disability: intact   -       Mood/Affect/Coping skills/emotional control: Cooperative and Pleasant  Visual/Perceptual:      -Intact  acuity      Physical Exam:  Balance:    -       Fair+ sit, Poor stand  Postural examination/scapula alignment:    -       Rounded shoulders  Dominant hand:    -       R  Upper Extremity Range of Motion:     -       Right Upper Extremity: WFL except proximally to 110 shldr flex  -       Left Upper Extremity: WFL except proximally to 110 shldr flex  Upper Extremity Strength:    -       Right Upper Extremity: 3+/5 grossly; tremulousness noted with exertion and WB on RW during mobility  -       Left Upper Extremity: 3+/5 grossly; tremulousness noted with exertion and WB on RW during mobility   Strength:    -       Right Upper Extremity: grossly functional but < WNL  -       Left Upper Extremity: grossly functional but < WNL  Gross motor coordination:   WFL    AMPAC 6 Click ADL:  AMPAC Total Score: 17    Treatment & Education:  Patient educated on role of OT/ POC development. Occupational profile developed via interview. Patient guided to transition eob for assessment. Initiated ADL / functional mobility training as above with instruction on safe t/f techniques. Educated patient on importance of out of bed mobility, movement/repositioning throughout the day, and call button use. Pt reporting fatigue and requested to lie back down at end of session. Answered questions within scope,  and all needs met.     Patient left HOB elevated with all lines intact, call button in reach, bed alarm on, RN notified, and RN and pharmacist and dtr present    GOALS:   Multidisciplinary Problems       Occupational Therapy Goals          Problem: Occupational Therapy    Goal Priority Disciplines Outcome Interventions   Occupational Therapy Goal     OT, PT/OT Progressing    Description: Goals to be met by: 1/31/2025     Patient will increase functional independence with ADLs by performing:    UE Dressing with Modified [place].  LE Dressing with Supervision.  Grooming while standing at sink with Supervision.  Toileting from toilet with Supervision for hygiene and clothing management.   Step transfer with Supervision  Toilet transfer to toilet with Supervision.                         History:     Past Medical History:   Diagnosis Date    Arthritis     Cancer     Gout attack     Hx of psychiatric care     Hypertension     Opioid dependence, uncomplicated 02/03/2023    Psychiatric problem     Renal disorder     Sciatic nerve pain 2013    Therapy     used to follow with psychiatrist but doesn't recall whom, 2012    Tuberculosis     Unspecified cataract 07/27/2023    Mild on both eyes         Past Surgical History:   Procedure Laterality Date    COLONOSCOPY N/A 01/21/2019    Procedure: COLONOSCOPY;  Surgeon: Shanna Jose MD;  Location: [facility] ENDO;  Service: Endoscopy;  Laterality: N/A;    HYSTERECTOMY      INJECTION OF JOINT Bilateral 03/13/2019    Procedure: INJECTION, JOINT BILATERAL SI JOINT;  Surgeon: Evan Coreas MD;  Location: [facility] PAIN MGT;  Service: Pain Management;  Laterality: Bilateral;  BILATERAL SI JOINT INJECTION    KNEE SURGERY  01/01/2014    left knee surgery        Time Tracking:     OT Date of Treatment: 01/17/25  OT Start Time: 0913  OT Stop Time: 0955  OT Total Time (min): 42 min    Billable Minutes:Evaluation 15  Self Care/Home Management 15  Therapeutic Activity 12    1/17/2025

## 2025-01-17 NOTE — HPI
Ms. Fournier is a 68 yo female with HTN, T2DM, CKD stage 3b, gout, schizophrenia, and recently diagnosed breast cancer who was sent to the ED for outpatient labs with a K of 7.0. She was treated with insulin/D50, albuterol, and Lokelma in the ED. K 5.4 this morning. Nephrology consulted for MIGNON and hyperkalemia. Prior records obtained and reviewed. She is followed outpatient by Dr. Starr, last visit 6/12/24. Her baseline Cr was 1.6 with GFR 35% at that time and attributed to diabetic nephropathy. No labs from 5/2024-now. Chart review notable for addition of spironolactone in 5/2024. Home medications currently include spironolactone and HCTZ. Her Cr was elevated at 3.3 yesterday --> 2.8 today. Patient's main concerns at this time are BUE tremors and legs giving out. She takes gabapentin 300-600mg BID. She is also undergoing evaluation by NSGY.

## 2025-01-17 NOTE — ED NOTES
Repeat cbg 107mgt/dl.  Notified Hospitalist. Patient will receive another dose of D50W 25G and recheck cbg in 30 mins for admin of insulin for hyperkalemia treatment.

## 2025-01-17 NOTE — ASSESSMENT & PLAN NOTE
Neurosurgery is consulted for back pain,MRI cervical,thorascia nd lumar is oredred.  PT,OT is consulted.

## 2025-01-17 NOTE — HOSPITAL COURSE
69-year-old woman with a past medical history significant chronic kidney disease stage 3, hypertension, diabetes mellitus, obesity, schizophrenia, insomnia, history of tuberculosis as a child.  Coincidentally she was diagnosed with a right breast cancer earlier this week.  She additionally has a history of low back pain and sciatica down her right leg.   Because of her upcoming surgery she had elective blood work done,.  Potassium of 7 was found in the patient's primary care office.  She was referred to the emergency room for further care.  She did give a history of some wobbliness an unsteady gait earlier , but this is not unusual for the patient because of her trouble with her back and general debility.  Otherwise she denies fever, headache, stiff neck, chest pain, shortness of breath, cough, abdominal pain, nausea or vomiting, constipation or diarrhea.  She has been on Ozempic in the past and recently switched to Mounjaro.  Hyperkalemia with Lokelma is improving.  Neurosurgery is consulted for back pain,MRI cervical,thorascia nd lumar is oredred.  PT,OT is consulted.  Patient is doing well.  Potassium is down to 4.9.  She will continue on Lokelma for 3 more days.  Discharge plan was discussed with the patient.  She needs to be established with a nephrologist to monitor her kidney function as well as to be established with her primary care physician to repeat blood work in 1 week.  Daughter was at bedside.  Patient reported that she does not take hydrochlorothiazide or Lasix.  She only takes metoprolol and amlodipine.  She also does not take her psychiatric medication except for the 1 prescribed at discharge.  She takes Ambien.

## 2025-01-17 NOTE — SUBJECTIVE & OBJECTIVE
Interval History:   Hyperkalemia with Lokelma is improving.  Neurosurgery is consulted for back pain,MRI cervical,thorascia nd lumar is oredred.  PT,OT is consulted.  Review of Systems   Constitutional:  Positive for activity change and chills.   Respiratory:  Negative for apnea and choking.    Cardiovascular:  Negative for chest pain and leg swelling.   Neurological:  Positive for weakness.     Objective:     Vital Signs (Most Recent):  Temp: 97.8 °F (36.6 °C) (01/17/25 1130)  Pulse: 83 (01/17/25 1130)  Resp: 18 (01/17/25 1130)  BP: 127/61 (01/17/25 1130)  SpO2: 98 % (01/17/25 1130) Vital Signs (24h Range):  Temp:  [97.6 °F (36.4 °C)-98.5 °F (36.9 °C)] 97.8 °F (36.6 °C)  Pulse:  [] 83  Resp:  [13-25] 18  SpO2:  [96 %-100 %] 98 %  BP: (127-191)/(61-75) 127/61     Weight: 114.2 kg (251 lb 12.3 oz)  Body mass index is 39.43 kg/m².    Intake/Output Summary (Last 24 hours) at 1/17/2025 1141  Last data filed at 1/17/2025 0848  Gross per 24 hour   Intake 480 ml   Output 350 ml   Net 130 ml         Physical Exam  Cardiovascular:      Rate and Rhythm: Normal rate.   Pulmonary:      Effort: No respiratory distress.   Abdominal:      Palpations: Abdomen is soft.   Musculoskeletal:         General: No swelling.   Skin:     Coloration: Skin is not jaundiced.      Findings: No bruising.   Neurological:      Mental Status: She is alert.             Significant Labs: All pertinent labs within the past 24 hours have been reviewed.  BMP:   Recent Labs   Lab 01/17/25  0716   GLU 79      K 5.4*   *   CO2 19*   BUN 44*   CREATININE 2.8*   CALCIUM 9.5     CBC:   Recent Labs   Lab 01/16/25  1152 01/16/25  1557 01/16/25  1639 01/17/25  0716   WBC 11.43 11.82  --  9.02   HGB 9.6* 9.4*  --  8.8*   HCT 31.4* 29.7* 29* 28.3*    254  --  239     CMP:   Recent Labs   Lab 01/16/25  1152 01/16/25  1557 01/16/25  2247 01/17/25  0716   * 135* 137 138   K 7.0* 6.2* 5.4* 5.4*    111* 111* 113*   CO2 20* 17* 18*  19*   GLU 86 74 73 79   BUN 49* 48* 45* 44*   CREATININE 3.3* 3.2* 2.9* 2.8*   CALCIUM 9.7 9.5 9.6 9.5   PROT 7.2 6.9  --   --    ALBUMIN 3.7 3.5  --   --    BILITOT 0.2 0.2  --   --    ALKPHOS 142 137  --   --    AST 17 19  --   --    ALT 12 12  --   --    ANIONGAP 4* 7* 8 6*       Significant Imaging: I have reviewed all pertinent imaging results/findings within the past 24 hours.

## 2025-01-17 NOTE — PLAN OF CARE
Case Management Assessment     PCP: Yolette Abbee MD   Pharmacy: [facility]     Patient Arrived From: home  Existing Help at Home: daughter, Zehra    Barriers to Discharge: none    Discharge Plan:    A. Home with daughterZehra. tbd      Patient lives with her daughter, Zehra.  She is independent with ADL's with a cane a rollator.  Her daughter, Zehra maintains the home.  Her son, Easton will help her to get home at time of DC.          01/16/25 2027   Discharge Assessment   Assessment Type Discharge Planning Assessment   Confirmed/corrected address, phone number and insurance Yes   Confirmed Demographics Correct on Facesheet   Source of Information patient;family   Communicated SELAM with patient/caregiver Date not available/Unable to determine   People in Home child(margoth), adult   Do you expect to return to your current living situation? Yes   Do you have help at home or someone to help you manage your care at home? Yes   Prior to hospitilization cognitive status: Alert/Oriented   Current cognitive status: Alert/Oriented   Walking or Climbing Stairs Difficulty yes   Walking or Climbing Stairs ambulation difficulty, requires equipment;stair climbing difficulty, requires equipment   Dressing/Bathing Difficulty no   Do you have any problems with: Errands/Grocery;Needs other help   Home Accessibility stairs to enter home   Number of Stairs, Main Entrance three   Surface of Stairs, Main Entrance concrete   Stair Railings, Main Entrance railings safe and in good condition;railings on both sides of stairs   Home Layout Able to live on 1st floor   Equipment Currently Used at Home cane, straight;rollator  (Dexcom)   Readmission within 30 days? No   Patient currently being followed by outpatient case management? No   Do you currently have service(s) that help you manage your care at home? No   Do you take prescription medications? Yes   Do you have prescription coverage? Yes   Do you have any  problems affording any of your prescribed medications? No   Is the patient taking medications as prescribed? yes   How do you get to doctors appointments? car, drives self   Are you on dialysis? No   Do you take coumadin? No   Discharge Plan A Home with family   Discharge Plan B   (tbd)   DME Needed Upon Discharge    (tbd)   Discharge Plan discussed with: Patient;Adult children   Transition of Care Barriers None

## 2025-01-17 NOTE — ED NOTES
Dr. Willingham made aware of floor nurse's concern for patient blood pressure.   OK for patient to go to floor at this time.  New orders to be placed.

## 2025-01-17 NOTE — ED NOTES
Dr. Willingham made aware of glucose result of 86.  Apple juice given. OK for pt to go to the floor.

## 2025-01-17 NOTE — NURSING
[facility] - [place], [place]  Nurses Note -- 4 Eyes      1/16/2025       Skin assessed on: Admit      [x] No Pressure Injuries Present    [x]Prevention Measures Documented    [] Yes LDA  for Pressure Injury Previously documented     [] Yes New Pressure Injury Discovered   [] LDA for New Pressure Injury Added      Attending RN:  Alba Vanegas, KEIKO     Second RN:  Corry Delaney RN

## 2025-01-17 NOTE — ASSESSMENT & PLAN NOTE
Patient is seemingly has a history of significant depression as well as possible schizophrenia.  Currently despite her acute illness her mood and affect were normal and appropriate.  The patient is pleasant.  We will simply continue her baseline medications for her mental health issues.  Would follow a CPK if the patient develops muscle pain or excessive weakness.  We would also follow to ensure that she does not develop a serotonin syndrome with her use of multiple medications.  I think there has not been significant changes to her medications recently.  She isn't suicidal or homicidal.  She does not need a psychiatric consultation at this time

## 2025-01-17 NOTE — ASSESSMENT & PLAN NOTE
Creatine stable for now. BMP reviewed- noted Estimated Creatinine Clearance: 21.3 mL/min (A) (based on SCr of 3.2 mg/dL (H)). according to latest data. Based on current GFR, CKD stage is stage 3 - GFR 30-59.  Monitor UOP and serial BMP and adjust therapy as needed. Renally dose meds. Avoid nephrotoxic medications and procedures.  A urinalysis is ordered.  A Nephrology consult was ordered    During her hospitalization we will follow up blood sugars 4 times daily.  We will cover with sliding scale moderate insulin scale.  Would resume mounjaro as outpatient

## 2025-01-17 NOTE — ASSESSMENT & PLAN NOTE
The patient is primarily complaining of low back pain.  I will continue her p.r.n. hydrocodone , lidocaine patch as well as acetaminophen.  I have ordered physical therapy.  The patient may benefit from inpatient rehab.

## 2025-01-17 NOTE — ASSESSMENT & PLAN NOTE
Body mass index is 39.16 kg/m². Morbid obesity complicates all aspects of disease management from diagnostic modalities to treatment. Weight loss encouraged and health benefits explained to patient.  The patient to have a nutrition consult.  I think the use of Mounjaro in this patient is appropriate, would resume as outpatient.

## 2025-01-17 NOTE — ASSESSMENT & PLAN NOTE
The patient will be continued on her chronic narcotic for her low back pain.  Linzess that the patient takes for chronic constipation isn't on formulary.  Her care is discussed with inpatient pharmacy.  Colace we will be continued.

## 2025-01-17 NOTE — PLAN OF CARE
Patient is awake alert and oriented. Medicated once for chronic back pain with narcotic as ordered. No complains of SOB, CP, N/V. Cardiac monitoring in place. Call light in reach, bed alarm on, self turns, and free of falls. Continue with plan of care as ordered. No distress noted.  Problem: Adult Inpatient Plan of Care  Goal: Plan of Care Review  1/17/2025 0252 by Alba Vanegas, RN  Outcome: Progressing  1/17/2025 0252 by Alba Vanegas RN  Outcome: Progressing  Goal: Patient-Specific Goal (Individualized)  1/17/2025 0252 by Alba Vanegas RN  Outcome: Progressing  1/17/2025 0252 by Alba Vanegas, RN  Outcome: Progressing  Goal: Optimal Comfort and Wellbeing  1/17/2025 0252 by Alba Vanegas, RN  Outcome: Progressing  1/17/2025 0252 by Alba Vanegas, RN  Outcome: Progressing  Goal: Readiness for Transition of Care  1/17/2025 0252 by Alba Vanegas, RN  Outcome: Progressing  1/17/2025 0252 by Alba Vanegas, RN  Outcome: Progressing     Problem: Diabetes Comorbidity  Goal: Blood Glucose Level Within Targeted Range  Outcome: Progressing     Problem: Pain Acute  Goal: Optimal Pain Control and Function  1/17/2025 0253 by Alba Vanegas, RN  Outcome: Progressing  1/17/2025 0252 by Alba Vanegas, RN  Outcome: Progressing     Problem: Fall Injury Risk  Goal: Absence of Fall and Fall-Related Injury  Outcome: Progressing

## 2025-01-17 NOTE — PROGRESS NOTES
Called Patient with results of breast biopsy from 1/6/25.  Explained that the biopsy showed breast cancer . Discussed what this means and that the next step is to meet with a breast surgeon. An appt was made for 1/27/25 with Dr. Grimes.  Reviewed location of appt and direct contact information given to pt. Patient verbalized understanding.     Oncology Navigation   Intake  Date of Diagnosis: 01/06/25  Cancer Type: Breast  Type of Referral: Internal  Date of Referral: 01/16/25  Initial Nurse Navigator Contact: 01/17/25  Referral to Initial Contact Timeline (days): 1  First Appointment Available: 01/27/25  Appointment Date: 01/27/25  First Available Date vs. Scheduled Date (days): 0     Treatment  Current Status: Staging work-up    Surgical Oncologist: Dr. Grimes  Consult Date: 01/27/25          Procedures: Biopsy; Diagnostic Mammogram  Biopsy Schedule Date: 01/06/25  Diagnostic Mammo Schedule Date: 12/12/24       ER: Positive  [place]: Positive  Her2: Negative       Support Systems: Family members     Acuity      Follow Up  No follow-ups on file.

## 2025-01-17 NOTE — PLAN OF CARE
Problem: Physical Therapy  Goal: Physical Therapy Goal  Description: Goals to be met by: 25     Patient will increase functional independence with mobility by performin. Supine to sit with Supervision  2. Sit to stand transfer with Supervision  3. Bed to chair transfer with Supervision    4. Gait  x 25-50 feet with Supervision using Rolling Walker.   5. Wheelchair propulsion x50 feet with Supervision   6. Ascend/descend 5 stair with right Handrails Contact Guard Assistance .   7. Lower extremity exercise program x10 reps per handout, with supervision    Outcome: Progressing   Moderate Intensity Therapy recommended as pt is at increased risk for falls and readmission if she returns home at present time.  .  If pt D/C's to home then close supervision/assist PRN, RW, and Low Intensity Therapy recommended.

## 2025-01-17 NOTE — ASSESSMENT & PLAN NOTE
The patient has no recent complaints of gout.  She was on allopurinol in the past and this has been discontinued.  NSAIDs should be avoided.

## 2025-01-17 NOTE — CONSULTS
[facility]  Nephrology  Consult Note    Patient Name: Gwendolyn Fournire  MRN: [id number]  Admission Date: 1/16/2025  Hospital Length of Stay: 1 days  Attending Provider: Svetlana Dewitt, Shannan   Primary Care Physician: Yolette Abebe MD  Principal Problem:Hyperkalemia    Inpatient consult to Nephrology  Consult performed by: Merna Vaughn MD  Consult ordered by: Emigdio Willingham MD  Reason for consult: hyperkalemia; MIGNON        Subjective:     HPI: Ms. Fournier is a 70 yo female with HTN, T2DM, CKD stage 3b, gout, schizophrenia, and recently diagnosed breast cancer who was sent to the ED for outpatient labs with a K of 7.0. She was treated with insulin/D50, albuterol, and Lokelma in the ED. K 5.4 this morning. Nephrology consulted for MIGNON and hyperkalemia. Prior records obtained and reviewed. She is followed outpatient by Dr. Starr, last visit 6/12/24. Her baseline Cr was 1.6 with GFR 35% at that time and attributed to diabetic nephropathy. No labs from 5/2024-now. Chart review notable for addition of spironolactone in 5/2024. Home medications currently include spironolactone and HCTZ. Her Cr was elevated at 3.3 yesterday --> 2.8 today. Patient's main concerns at this time are BUE tremors and legs giving out. She takes gabapentin 300-600mg BID. She is also undergoing evaluation by NSGY.     Past Medical History:   Diagnosis Date    Arthritis     Cancer     Gout attack     Hx of psychiatric care     Hypertension     Opioid dependence, uncomplicated 02/03/2023    Psychiatric problem     Renal disorder     Sciatic nerve pain 2013    Therapy     used to follow with psychiatrist but doesn't recall whom, 2012    Tuberculosis     Unspecified cataract 07/27/2023    Mild on both eyes       Past Surgical History:   Procedure Laterality Date    COLONOSCOPY N/A 01/21/2019    Procedure: COLONOSCOPY;  Surgeon: Shanna Jose MD;  Location: [facility];  Service: Endoscopy;  Laterality: N/A;    HYSTERECTOMY       INJECTION OF JOINT Bilateral 2019    Procedure: INJECTION, JOINT BILATERAL SI JOINT;  Surgeon: Evan Coreas MD;  [facility];  Service: Pain Management;  Laterality: Bilateral;  BILATERAL SI JOINT INJECTION    KNEE SURGERY  2014    left knee surgery        Review of patient's allergies indicates:   Allergen Reactions    Ondansetron hcl (pf) Hives and Itching    Ketorolac Itching     Current Facility-Administered Medications   Medication Frequency    acetaminophen tablet 650 mg Q8H PRN    amLODIPine tablet 5 mg Daily    atorvastatin tablet 40 mg QHS    dextrose 50% injection 12.5 g PRN    dextrose 50% injection 25 g PRN    dextrose 50% injection 25 g PRN    docusate sodium capsule 100 mg PRN    DULoxetine DR capsule 60 mg Daily    EScitalopram oxalate tablet 10 mg Daily    furosemide injection 40 mg Once    gabapentin capsule 300 mg QHS    glucagon (human recombinant) injection 1 mg PRN    glucose chewable tablet 16 g PRN    glucose chewable tablet 24 g PRN    heparin (porcine) injection 5,000 Units Q8H    HYDROcodone-acetaminophen 5-325 mg per tablet 1 tablet BID PRN    insulin aspart U-100 pen 0-10 Units QID (AC + HS) PRN    LIDOcaine 5 % patch 1 patch Daily    LORazepam tablet 1 mg Once PRN    metoprolol succinate (TOPROL-XL) 24 hr tablet 50 mg Daily    naloxone 0.4 mg/mL injection 0.02 mg PRN    primidone tablet 100 mg TID    QUEtiapine tablet 100 mg QHS    sodium chloride 0.9% flush 10 mL Q12H PRN    sodium zirconium cyclosilicate packet 10 g Daily    tiZANidine tablet 4 mg BID PRN    zolpidem tablet 5 mg QHS     Family History       Problem Relation (Age of Onset)    Bipolar disorder Daughter, Daughter    Depression Daughter, Daughter    Stroke Father    Suicide Daughter    Tuberculosis Mother          Tobacco Use    Smoking status: Former     Current packs/day: 0.00     Types: Cigarettes     Quit date: 1976     Years since quittin.0    Smokeless tobacco: Never    Substance and Sexual Activity    Alcohol use: Not Currently     Alcohol/week: 0.0 standard drinks of alcohol     Comment: red wine    Drug use: No    Sexual activity: Not Currently     Partners: Male     Review of Systems   All other systems reviewed and are negative.    Objective:     Vital Signs (Most Recent):  Temp: 99 °F (37.2 °C) (01/17/25 1626)  Pulse: 83 (01/17/25 1626)  Resp: 20 (01/17/25 1626)  BP: 130/62 (01/17/25 1626)  SpO2: 98 % (01/17/25 1626) Vital Signs (24h Range):  Temp:  [97.6 °F (36.4 °C)-99 °F (37.2 °C)] 99 °F (37.2 °C)  Pulse:  [] 83  Resp:  [14-20] 20  SpO2:  [96 %-100 %] 98 %  BP: (127-191)/(61-75) 130/62     Weight: 114.2 kg (251 lb 12.3 oz) (01/16/25 2246)  Body mass index is 39.43 kg/m².  Body surface area is 2.32 meters squared.    I/O last 3 completed shifts:  In: 240 [P.O.:240]  Out: 350 [Urine:350]     Physical Exam  Vitals and nursing note reviewed.   Constitutional:       General: She is awake. She is not in acute distress.     Appearance: Normal appearance. She is well-developed.   HENT:      Head: Normocephalic and atraumatic.      Nose: Nose normal.      Mouth/Throat:      Mouth: Mucous membranes are moist.   Eyes:      Extraocular Movements: Extraocular movements intact.      Conjunctiva/sclera: Conjunctivae normal.   Cardiovascular:      Rate and Rhythm: Normal rate and regular rhythm.   Pulmonary:      Effort: Pulmonary effort is normal.      Breath sounds: Normal breath sounds.   Abdominal:      General: There is no distension.      Palpations: Abdomen is soft.   Musculoskeletal:         General: No tenderness or signs of injury.      Right lower leg: Edema present.      Left lower leg: Edema present.      Comments: BLE with trace pitting edema   Skin:     General: Skin is warm and dry.      Findings: No erythema or rash.   Neurological:      General: No focal deficit present.      Mental Status: She is alert. Mental status is at baseline.   Psychiatric:         Mood  and Affect: Mood normal.         Behavior: Behavior normal.          Significant Labs:  CBC:   Recent Labs   Lab 01/17/25  0716   WBC 9.02   RBC 2.95*   HGB 8.8*   HCT 28.3*      MCV 96   MCH 29.8   MCHC 31.1*     CMP:   Recent Labs   Lab 01/16/25  1557 01/16/25  2247 01/17/25  1612   GLU 74   < > 87   CALCIUM 9.5   < > 9.4   ALBUMIN 3.5  --  3.2*   PROT 6.9  --   --    *   < > 137   K 6.2*   < > 5.4*   CO2 17*   < > 21*   *   < > 110   BUN 48*   < > 44*   CREATININE 3.2*   < > 2.6*   ALKPHOS 137  --   --    ALT 12  --   --    AST 19  --   --    BILITOT 0.2  --   --     < > = values in this interval not displayed.     All labs within the past 24 hours have been reviewed.    Significant Imaging:  Labs: Reviewed    Assessment/Plan:     MIGNON on CKD stage 3b  - baseline Cr 1.5-1.6 with GFR 35-38%; last at baseline 5/21/24. Cr elevated at 2.3 on 12/31/24  - Cr 3.3 on arrival. Suspecting that worsening kidney function over the last 6 months is due to the addition of spironolactone in combination with HCTZ  - Cr 2.8 today; improved  - continue holding spironolactone and HCTZ  - agree with lasix for edema  - daily labs. Strict I/Os.     Hyperkalemia  - due to spironolactone + MIGNON  - K 7.0 --> 5.4; improved  - K 5.4 this PM. Stopped po lasix and ordered lasix 40mg IV x1  - will repeat labs in AM    Tremors  - possibly related to gabapentin neurotoxicity in setting of MIGNON  - max dose for current GFR is 300mg daily. Patient agreed to hold tonight's dose and restart QD tomorrow  - agree with ruling out other causes      Thank you for your consult. I will follow-up with patient. Please contact us if you have any additional questions.    Merna Vaughn MD  Nephrology  [facility] - [place] - Med Surg

## 2025-01-17 NOTE — ASSESSMENT & PLAN NOTE
Patient's blood pressure range in the last 24 hours was: BP  Min: 139/63  Max: 168/73.The patient's inpatient anti-hypertensive regimen is listed below:  Current Antihypertensives  amLODIPine tablet 5 mg, Daily, Oral  furosemide tablet 40 mg, 2 times daily, Oral  hydroCHLOROthiazide tablet 25 mg, Daily, Oral  metoprolol succinate (TOPROL-XL) 24 hr tablet 50 mg, Daily, Oral    Plan  The patient's blood pressure will be followed.  As previously mentioned she should not be administered spironolactone.  Patient was on spironolactone in the past but apparently has recently discontinue it.  I believe furosemide and hydrochlorothiazide are safe in the setting and usually associated with hypokalemia.

## 2025-01-17 NOTE — SUBJECTIVE & OBJECTIVE
Past Medical History:   Diagnosis Date    Arthritis     Cancer     Gout attack     Hx of psychiatric care     Hypertension     Opioid dependence, uncomplicated 02/03/2023    Psychiatric problem     Renal disorder     Sciatic nerve pain 2013    Therapy     used to follow with psychiatrist but doesn't recall whom, 2012    Tuberculosis     Unspecified cataract 07/27/2023    Mild on both eyes       Past Surgical History:   Procedure Laterality Date    COLONOSCOPY N/A 01/21/2019    Procedure: COLONOSCOPY;  Surgeon: Shanna Jose MD;  Location: [facility] ENDO;  Service: Endoscopy;  Laterality: N/A;    HYSTERECTOMY      INJECTION OF JOINT Bilateral 03/13/2019    Procedure: INJECTION, JOINT BILATERAL SI JOINT;  Surgeon: Evan Coreas MD;  Location: [facility] PAIN MGT;  Service: Pain Management;  Laterality: Bilateral;  BILATERAL SI JOINT INJECTION    KNEE SURGERY  01/01/2014    left knee surgery        Review of patient's allergies indicates:   Allergen Reactions    Ondansetron hcl (pf) Hives and Itching    Ketorolac Itching       Current Facility-Administered Medications on File Prior to Encounter   Medication    lidocaine (PF) 10 mg/ml (1%) injection 40 mg     Current Outpatient Medications on File Prior to Encounter   Medication Sig    DULoxetine (CYMBALTA) 60 MG capsule TAKE 1 CAPSULE(60 MG) BY MOUTH EVERY DAY    EScitalopram oxalate (LEXAPRO) 10 MG tablet TAKE 1 TABLET(10 MG) BY MOUTH EVERY DAY    furosemide (LASIX) 40 MG tablet TAKE 1 TABLET(40 MG) BY MOUTH TWICE DAILY    gabapentin (NEURONTIN) 300 MG capsule Take 1 capsule (300 mg total) by mouth every evening.    hydroCHLOROthiazide (HYDRODIURIL) 25 MG tablet Take 25 mg by mouth once daily.    HYDROcodone-acetaminophen (NORCO) 5-325 mg per tablet Take 1 tablet by mouth 2 (two) times daily as needed.    metoprolol succinate (TOPROL-XL) 50 MG 24 hr tablet Take 1 tablet (50 mg total) by mouth once daily.    primidone (MYSOLINE) 50 MG Tab Take 2 tablets (100 mg total) by  mouth 3 (three) times daily.    QUEtiapine (SEROQUEL) 100 MG Tab TAKE 1 TABLET BY MOUTH AT BEDTIME    zolpidem (AMBIEN) 5 MG Tab Take 5 mg by mouth every evening.    acetaminophen (TYLENOL) 500 MG tablet Take 1 tablet (500 mg total) by mouth every 6 (six) hours as needed for Pain.    albuterol (ACCUNEB) 0.63 mg/3 mL Nebu Inhale 1 ampule into the lungs as needed.    amLODIPine (NORVASC) 5 MG tablet Take 1 tablet (5 mg total) by mouth once daily.    atorvastatin (LIPITOR) 40 MG tablet Take 1 tablet (40 mg total) by mouth every evening.    blood sugar diagnostic Strp To check BG two times daily, freestyle lite meter    blood-glucose meter kit To check BG two times daily, freestyle lite meter    docusate sodium (COLACE) 100 MG capsule Take 1 capsule (100 mg total) by mouth as needed for Constipation.    flash glucose scanning reader (FREESTYLE HAI 2 READER) Misc 1 each by Misc.(Non-Drug; Combo Route) route 4 (four) times daily with meals and nightly.    flash glucose sensor (FREESTYLE HIA 2 SENSOR) Kit Use as directed to check blood sugar. Change every 14 days.    lancets Misc To check BG two times daily, to use with insurance preferred meter    LIDOcaine (LIDODERM) 5 % Place 1 patch onto the skin once daily. Apply patch for 12 hours and then leave off for 12 hours    linaCLOtide (LINZESS) 145 mcg Cap capsule Take 1 capsule (145 mcg total) by mouth daily as needed (constipation).    LORazepam (ATIVAN) 1 MG tablet Take 1 tablet (1 mg total) by mouth once as needed for Anxiety.    tirzepatide (MOUNJARO) 5 mg/0.5 mL PnIj Inject 5 mg into the skin every 7 days.    tiZANidine (ZANAFLEX) 4 MG tablet TAKE 1 TABLET(4 MG) BY MOUTH TWICE DAILY AS NEEDED FOR PAIN    [DISCONTINUED] allopurinoL (ZYLOPRIM) 100 MG tablet TAKE 1 TABLET(100 MG) BY MOUTH TWICE DAILY    [DISCONTINUED] buPROPion (WELLBUTRIN) 100 MG tablet Take 100 mg by mouth every morning.    [DISCONTINUED] ciclopirox (PENLAC) 8 % Soln Apply topically nightly.     [DISCONTINUED] diphenhydrAMINE (BENADRYL) 25 mg capsule Take 25 mg by mouth every 6 (six) hours as needed.    [DISCONTINUED] ferrous sulfate (FEOSOL) 325 mg (65 mg iron) Tab tablet Take 325 mg by mouth as needed.     [DISCONTINUED] fluticasone propionate (FLONASE) 50 mcg/actuation nasal spray 1 spray (50 mcg total) by Each Nostril route once daily.    [DISCONTINUED] furosemide (LASIX) 20 MG tablet Take 20-40 mg by mouth daily as needed.    [DISCONTINUED] furosemide (LASIX) 40 MG tablet TAKE 1 TABLET(40 MG) BY MOUTH TWICE DAILY    [DISCONTINUED] MOVANTIK 25 mg tablet Take 25 mg by mouth once daily.    [DISCONTINUED] mupirocin (BACTROBAN) 2 % ointment Apply topically 3 (three) times daily.    [DISCONTINUED] potassium chloride SA (K-DUR,KLOR-CON) 20 MEQ tablet Take 20 mEq by mouth.    [DISCONTINUED] spironolactone (ALDACTONE) 25 MG tablet Take 1 tablet (25 mg total) by mouth once daily.     Family History       Problem Relation (Age of Onset)    Bipolar disorder Daughter, Daughter    Depression Daughter, Daughter    Stroke Father    Suicide Daughter    Tuberculosis Mother          Tobacco Use    Smoking status: Former     Current packs/day: 0.00     Types: Cigarettes     Quit date: 1976     Years since quittin.0    Smokeless tobacco: Never   Substance and Sexual Activity    Alcohol use: Not Currently     Alcohol/week: 0.0 standard drinks of alcohol     Comment: red wine    Drug use: No    Sexual activity: Not Currently     Partners: Male     Review of Systems   All other systems reviewed and are negative.    Objective:     Vital Signs (Most Recent):  Temp: 98.5 °F (36.9 °C) (25)  Pulse: 102 (25)  Resp: 15 (25)  BP: (!) 153/67 (25)  SpO2: 100 % (25) Vital Signs (24h Range):  Temp:  [98.5 °F (36.9 °C)] 98.5 °F (36.9 °C)  Pulse:  [] 102  Resp:  [13-25] 15  SpO2:  [97 %-100 %] 100 %  BP: (139-168)/(63-73) 153/67     Weight: 113.4 kg (250 lb)  Body mass  index is 39.16 kg/m².     Physical Exam  Vitals reviewed.   Constitutional:       Appearance: Normal appearance. She is obese.   HENT:      Head: Normocephalic and atraumatic.      Mouth/Throat:      Mouth: Mucous membranes are moist.   Eyes:      Extraocular Movements: Extraocular movements intact.      Conjunctiva/sclera: Conjunctivae normal.      Pupils: Pupils are equal, round, and reactive to light.   Cardiovascular:      Rate and Rhythm: Normal rate and regular rhythm.      Pulses: Normal pulses.      Heart sounds: Normal heart sounds.   Pulmonary:      Effort: Pulmonary effort is normal.      Breath sounds: Normal breath sounds.   Abdominal:      General: Abdomen is flat. Bowel sounds are normal.      Palpations: Abdomen is soft.   Musculoskeletal:         General: Normal range of motion.      Cervical back: Normal range of motion and neck supple.   Skin:     General: Skin is warm and dry.   Neurological:      General: No focal deficit present.      Mental Status: She is alert and oriented to person, place, and time.   Psychiatric:         Mood and Affect: Mood normal.         Behavior: Behavior normal.         Thought Content: Thought content normal.         Judgment: Judgment normal.              CRANIAL NERVES     CN III, IV, VI   Pupils are equal, round, and reactive to light.

## 2025-01-17 NOTE — PT/OT/SLP EVAL
"Physical Therapy Evaluation    Patient Name:  Gwendolyn Fournier   MRN:  [id number]    Recommendations:     Discharge Recommendations: Moderate Intensity Therapy   Discharge Equipment Recommendations: to be determined by next level of care (if pt D/C's home then RW recommended)   Barriers to discharge:  Pt is not functioning at prior independent level and is at increased risk for falls and readmission if she returns home at present time    Assessment:     Gwendolyn Fournier is a 69 y.o. female admitted with a medical diagnosis of Hyperkalemia.  She presents with the following impairments/functional limitations: weakness, gait instability, decreased ROM, impaired balance, impaired coordination, decreased safety awareness, pain, impaired functional mobility .    Rehab Prognosis: Good; patient would benefit from acute skilled PT services to address these deficits and reach maximum level of function.    Recent Surgery: * No surgery found *      Plan:     During this hospitalization, patient to be seen 6 x/week to address the identified rehab impairments via gait training, therapeutic activities, therapeutic exercises, neuromuscular re-education and progress toward the following goals:    Plan of Care Expires:  01/31/25    Subjective     Chief Complaint: pain, "jerking"  Patient/Family Comments/goals: Pt agreeable to evaluation and to remain up in chair following  Pain/Comfort:  Pain Rating 1:  (not rated, R knee)  Pain Addressed 1: Reposition, Distraction, Cessation of Activity    Patients cultural, spiritual, Shinto conflicts given the current situation: no    Living Environment:  Pt lives with her daughter in a [facility] with 3?-5? DEANDRE  Prior to admission, patients level of function was Modified Independent for ambulation with Rollator, driving.  Equipment used at home: rollator, shower chair ([employer]).  DME owned (not currently used): none.  Upon discharge, patient will have assistance from Daughter.    Objective: " "    Communicated with nsg prior to session.  Patient found HOB elevated with telemetry, peripheral IV, PureWick  upon PT entry to room.    General Precautions: Standard, fall  Orthopedic Precautions:N/A   Braces: N/A  Respiratory Status: Room air    Exams:  Cognitive Exam:  Patient is oriented to Person, Place, and Time  Gross Motor Coordination:  impaired 2/2 myotonic jerking movements, body habitus, pain, weakness  Postural Exam:  Patient presented with the following abnormalities:    -       Rounded shoulders  -       Forward head  Sensation:    -       Intact  light/touch B LE's  Skin Integrity/Edema:      -       Skin integrity: Visible skin intact  -       Edema: Mild B LE's  RLE ROM: WFL  RLE Strength: WFL  LLE ROM: WFL  LLE Strength: WFL    Functional Mobility:  Bed Mobility:     Scooting: CGA  Supine to Sit: minimum assistance  Transfers:     Sit to Stand:  minimum A and with Vc/TC for forward weight shift over OMAR with rolling walker.  Upon standing, pt required min A 2/2 posterior lean  Gait: Gait training with RW and CGA and VC for walker management approx 15'  Balance: Fair+ sit, fair/Fair- stand      AM-PAC 6 CLICK MOBILITY  Total Score:17       Treatment & Education:  Transfer and gait training as above.  Educated pt on PT POC, to "call before you fall", and to perform AP's, TKE's, and GS while up in chair.      Patient left up in chair with call button in reach and CG present.    GOALS:   Multidisciplinary Problems       Physical Therapy Goals          Problem: Physical Therapy    Goal Priority Disciplines Outcome Interventions   Physical Therapy Goal     PT, PT/OT Progressing    Description: Goals to be met by: 25     Patient will increase functional independence with mobility by performin. Supine to sit with Supervision  2. Sit to stand transfer with Supervision  3. Bed to chair transfer with Supervision    4. Gait  x 25-50 feet with Supervision using Rolling Walker.   5. Wheelchair " propulsion x50 feet with Supervision   6. Ascend/descend 5 stair with right Handrails Contact Guard Assistance .   7. Lower extremity exercise program x10 reps per handout, with supervision                         DME Justifications:   Gwendolyn's mobility limitation cannot be sufficiently resolved by the use of a cane. Her functional mobility deficit can be sufficiently resolved with the use of a Rolling Walker. Patient's mobility limitation significantly impairs their ability to participate in one of more activities of daily living.  The use of a RW will significantly improve the patient's ability to participate in MRADLS and the patient will use it on regular basis in the home.    History:     Past Medical History:   Diagnosis Date    Arthritis     Cancer     Gout attack     Hx of psychiatric care     Hypertension     Opioid dependence, uncomplicated 02/03/2023    Psychiatric problem     Renal disorder     Sciatic nerve pain 2013    Therapy     used to follow with psychiatrist but doesn't recall whom, 2012    Tuberculosis     Unspecified cataract 07/27/2023    Mild on both eyes       Past Surgical History:   Procedure Laterality Date    COLONOSCOPY N/A 01/21/2019    Procedure: COLONOSCOPY;  Surgeon: Shanna Jose MD;  Location: [facility] ENDO;  Service: Endoscopy;  Laterality: N/A;    HYSTERECTOMY      INJECTION OF JOINT Bilateral 03/13/2019    Procedure: INJECTION, JOINT BILATERAL SI JOINT;  Surgeon: Evan Coreas MD;  Location: [facility] PAIN MGT;  Service: Pain Management;  Laterality: Bilateral;  BILATERAL SI JOINT INJECTION    KNEE SURGERY  01/01/2014    left knee surgery        Time Tracking:     PT Received On: 01/17/25  PT Start Time: 1607     PT Stop Time: 1625  PT Total Time (min): 18 min     Billable Minutes: Evaluation 8 and Therapeutic Activity 10      01/17/2025

## 2025-01-17 NOTE — HPI
The patient is a 69-year-old woman with a past medical history significant chronic kidney disease stage 3, hypotension, diabetes mellitus, obesity, schizophrenia, insomnia, history of tuberculosis as a child.  Coincidentally she was diagnosed with a right breast cancer earlier this week.  She additionally has a history of low back pain and sciatica down her right leg.  She has had low back surgery and the sciatica has been resolved with the patient has ongoing low back pain.  For this she is scheduled to have a spinal stimulator placed into her low back.  She isn't certain whether she wants this procedure.  Because of her upcoming surgery she had elective blood work today.  Potassium of 7 was found in the patient's primary care office.  She was referred to the emergency room for further care.  She did give a history of some wobbliness an unsteady gait earlier today, but this is not unusual for the patient because of her trouble with her back and general debility.  Otherwise she denies fever, headache, stiff neck, chest pain, shortness of breath, cough, abdominal pain, nausea or vomiting, constipation or diarrhea.  She has been on Ozempic in the past and recently switched to Mounjaro.  Her blood sugars tend to run between 70 and 100.

## 2025-01-18 VITALS
SYSTOLIC BLOOD PRESSURE: 135 MMHG | BODY MASS INDEX: 39.51 KG/M2 | HEIGHT: 67 IN | RESPIRATION RATE: 17 BRPM | DIASTOLIC BLOOD PRESSURE: 73 MMHG | HEART RATE: 79 BPM | OXYGEN SATURATION: 94 % | WEIGHT: 251.75 LBS | TEMPERATURE: 98 F

## 2025-01-18 LAB
ALBUMIN SERPL BCP-MCNC: 3.1 G/DL (ref 3.5–5.2)
ANION GAP SERPL CALC-SCNC: 9 MMOL/L (ref 8–16)
BASOPHILS # BLD AUTO: 0.04 K/UL (ref 0–0.2)
BASOPHILS NFR BLD: 0.5 % (ref 0–1.9)
BUN SERPL-MCNC: 49 MG/DL (ref 8–23)
CALCIUM SERPL-MCNC: 9.5 MG/DL (ref 8.7–10.5)
CHLORIDE SERPL-SCNC: 111 MMOL/L (ref 95–110)
CO2 SERPL-SCNC: 19 MMOL/L (ref 23–29)
CREAT SERPL-MCNC: 2.8 MG/DL (ref 0.5–1.4)
DIFFERENTIAL METHOD BLD: ABNORMAL
EOSINOPHIL # BLD AUTO: 0.3 K/UL (ref 0–0.5)
EOSINOPHIL NFR BLD: 4.2 % (ref 0–8)
ERYTHROCYTE [DISTWIDTH] IN BLOOD BY AUTOMATED COUNT: 13.4 % (ref 11.5–14.5)
EST. GFR  (NO RACE VARIABLE): 18 ML/MIN/1.73 M^2
GLUCOSE SERPL-MCNC: 75 MG/DL (ref 70–110)
GLUCOSE SERPL-MCNC: 78 MG/DL (ref 70–110)
HCT VFR BLD AUTO: 27.2 % (ref 37–48.5)
HGB BLD-MCNC: 8.6 G/DL (ref 12–16)
IMM GRANULOCYTES # BLD AUTO: 0.03 K/UL (ref 0–0.04)
IMM GRANULOCYTES NFR BLD AUTO: 0.4 % (ref 0–0.5)
LYMPHOCYTES # BLD AUTO: 2.7 K/UL (ref 1–4.8)
LYMPHOCYTES NFR BLD: 32.8 % (ref 18–48)
MCH RBC QN AUTO: 29.9 PG (ref 27–31)
MCHC RBC AUTO-ENTMCNC: 31.6 G/DL (ref 32–36)
MCV RBC AUTO: 94 FL (ref 82–98)
MONOCYTES # BLD AUTO: 0.6 K/UL (ref 0.3–1)
MONOCYTES NFR BLD: 7.9 % (ref 4–15)
NEUTROPHILS # BLD AUTO: 4.4 K/UL (ref 1.8–7.7)
NEUTROPHILS NFR BLD: 54.2 % (ref 38–73)
NRBC BLD-RTO: 0 /100 WBC
PHOSPHATE SERPL-MCNC: 3.4 MG/DL (ref 2.7–4.5)
PLATELET # BLD AUTO: 244 K/UL (ref 150–450)
PMV BLD AUTO: 10.4 FL (ref 9.2–12.9)
POCT GLUCOSE: 88 MG/DL (ref 70–110)
POTASSIUM SERPL-SCNC: 4.9 MMOL/L (ref 3.5–5.1)
RBC # BLD AUTO: 2.88 M/UL (ref 4–5.4)
SODIUM SERPL-SCNC: 139 MMOL/L (ref 136–145)
WBC # BLD AUTO: 8.12 K/UL (ref 3.9–12.7)

## 2025-01-18 PROCEDURE — 97530 THERAPEUTIC ACTIVITIES: CPT | Mod: CQ

## 2025-01-18 PROCEDURE — 85025 COMPLETE CBC W/AUTO DIFF WBC: CPT | Performed by: HOSPITALIST

## 2025-01-18 PROCEDURE — 25000003 PHARM REV CODE 250: Performed by: HOSPITALIST

## 2025-01-18 PROCEDURE — 84100 ASSAY OF PHOSPHORUS: CPT | Performed by: INTERNAL MEDICINE

## 2025-01-18 PROCEDURE — 99232 SBSQ HOSP IP/OBS MODERATE 35: CPT | Mod: ,,, | Performed by: STUDENT IN AN ORGANIZED HEALTH CARE EDUCATION/TRAINING PROGRAM

## 2025-01-18 PROCEDURE — 63600175 PHARM REV CODE 636 W HCPCS: Performed by: HOSPITALIST

## 2025-01-18 PROCEDURE — 80048 BASIC METABOLIC PNL TOTAL CA: CPT | Performed by: HOSPITALIST

## 2025-01-18 PROCEDURE — 36415 COLL VENOUS BLD VENIPUNCTURE: CPT | Performed by: INTERNAL MEDICINE

## 2025-01-18 PROCEDURE — 82040 ASSAY OF SERUM ALBUMIN: CPT | Performed by: INTERNAL MEDICINE

## 2025-01-18 PROCEDURE — 25000003 PHARM REV CODE 250: Performed by: INTERNAL MEDICINE

## 2025-01-18 RX ORDER — DIPHENHYDRAMINE HCL 25 MG
25 CAPSULE ORAL EVERY 8 HOURS PRN
Status: DISCONTINUED | OUTPATIENT
Start: 2025-01-18 | End: 2025-01-18 | Stop reason: HOSPADM

## 2025-01-18 RX ORDER — SODIUM BICARBONATE 650 MG/1
650 TABLET ORAL 3 TIMES DAILY
Qty: 90 TABLET | Refills: 0 | Status: SHIPPED | OUTPATIENT
Start: 2025-01-18 | End: 2025-02-17

## 2025-01-18 RX ORDER — FUROSEMIDE 80 MG/1
80 TABLET ORAL DAILY PRN
Qty: 30 TABLET | Refills: 0 | Status: SHIPPED | OUTPATIENT
Start: 2025-01-18 | End: 2025-02-17

## 2025-01-18 RX ADMIN — HEPARIN SODIUM 5000 UNITS: 5000 INJECTION INTRAVENOUS; SUBCUTANEOUS at 02:01

## 2025-01-18 RX ADMIN — LIDOCAINE 5% 1 PATCH: 700 PATCH TOPICAL at 09:01

## 2025-01-18 RX ADMIN — SODIUM ZIRCONIUM CYCLOSILICATE 10 G: 10 POWDER, FOR SUSPENSION ORAL at 09:01

## 2025-01-18 RX ADMIN — AMLODIPINE BESYLATE 5 MG: 5 TABLET ORAL at 09:01

## 2025-01-18 RX ADMIN — DIPHENHYDRAMINE HYDROCHLORIDE 25 MG: 25 CAPSULE ORAL at 10:01

## 2025-01-18 RX ADMIN — PRIMIDONE 100 MG: 50 TABLET ORAL at 09:01

## 2025-01-18 RX ADMIN — HEPARIN SODIUM 5000 UNITS: 5000 INJECTION INTRAVENOUS; SUBCUTANEOUS at 06:01

## 2025-01-18 RX ADMIN — METOPROLOL SUCCINATE 50 MG: 50 TABLET, EXTENDED RELEASE ORAL at 09:01

## 2025-01-18 RX ADMIN — PRIMIDONE 100 MG: 50 TABLET ORAL at 02:01

## 2025-01-18 RX ADMIN — ESCITALOPRAM OXALATE 10 MG: 10 TABLET ORAL at 09:01

## 2025-01-18 RX ADMIN — DULOXETINE HYDROCHLORIDE 60 MG: 30 CAPSULE, DELAYED RELEASE ORAL at 09:01

## 2025-01-18 NOTE — NURSING
Was on the floor placing line in another patient when BRITTANY Scales notifed me that this patient may need access as her PIV went bad. She was initially trying to get midline order for patient. Dr. Chavez with nephrology advised against midline. BRITTANY Scales requested I place PIV if possible. Rashad Granados Supervisor was okay with me placing US guided PIV.  20 gauge, 1.75 inch PIV placed to Lt forearm using US guidance. Okay to use.

## 2025-01-18 NOTE — PLAN OF CARE
Case Management Final Discharge Note    Discharge Disposition: Family Home Care    New DME ordered / company name: None    Relevant SDOH / Transition of Care Barriers:  Mental Illness    Person available to provide assistance at home when needed and their contact information: Carmen daughter [phone]    Scheduled followup appointment: PCP scheduled    Referrals placed: None    Transportation: Family    PT/OT recommended moderate intensity- SNF but family declined.    Patient and family educated on discharge services and updated on DC plan. Bedside RN notified, patient clear to discharge from Case Management Perspective.       01/18/25 1720   Final Note   Assessment Type Final Discharge Note   Anticipated Discharge Disposition Home-Health   What phone number can be called within the next 1-3 days to see how you are doing after discharge? [phone]   Hospital Resources/Appts/Education Provided Appointments scheduled and added to AVS   Post-Acute Status   Post-Acute Authorization Home Health   Home Health Status Set-up Complete/Auth obtained   Patient choice form signed by patient/caregiver List from System Post-Acute Care   Discharge Delays None known at this time

## 2025-01-18 NOTE — CONSULTS
Food & Nutrition  Education    Diet Education: Carbohydrate Controlled Diet Education, Hyperkalemia Diet Education  Time Spent: 30 min  Learners: Pt, Pt family at bedside      Nutrition Education provided with handouts: Choose Your Foods: Foods Lists for Diabetes, Carbohydrate Counting for People with Diabetes, Plate Method for Diabetes, Using Nutrition Labels: Carbohydrate, Potassium Content of Foods       Comments: Educated pt on carbohydrate controlled diet. Discussed making the swap from sugar-sweetened beverages to diets. Discussed having 45 g (3 CHO servings) per meal and 15-30g (1-2 CHO) per snack. Discussed how mayn grams of carbs are in one serving and the appropriate servings of carbohydrate foods. Discussed the diabetic plate method, making sure to have non starchy vegetables, protein, and portioned carb foods at every meal and snack. Emphasized the importance of reading the nutrition label to identify carbohydrate content of food.     Educated pt on potassium content of foods. Discussed foods high and low in potassium and is low potassium foods are consumed in large portions they then become a high potassium food. Pt voiced understanding.       All questions and concerns answered. Dietitian's contact information provided.       Follow-Up: 1/24    Please Re-consult as needed        Thanks!

## 2025-01-18 NOTE — ASSESSMENT & PLAN NOTE
Follows with Dr. Starr  Please make follow up appt for CKD management 1 month  Would discharge with lasix 80 PO with weight based PRN usage, sodium bicarb 650 TID    #Anemia  Hemoglobin   Date Value Ref Range Status   01/18/2025 8.6 (L) 12.0 - 16.0 g/dL Final   Outpatient management - obtain iron and ferritin    #Secondary hyperparathyroidism  Calcium   Date Value Ref Range Status   01/18/2025 9.5 8.7 - 10.5 mg/dL Final     Phosphorus   Date Value Ref Range Status   01/18/2025 3.4 2.7 - 4.5 mg/dL Final     PTH, Intact   Date Value Ref Range Status   10/05/2022 307.5 (H) 9.0 - 77.0 pg/mL Final   Continue to monitor

## 2025-01-18 NOTE — NURSING
Reviewed discharge instructions with patient and daughters. Instructions includes follow up appointments,EMS and when to seeks additional medical help. No acute distress noted [facility], [facility]  Nurses Note -- 4 Eyes      1/18/2025       Skin assessed on: Discharge      [x] No Pressure Injuries Present    []Prevention Measures Documented    [] Yes LDA  for Pressure Injury Previously documented     [] Yes New Pressure Injury Discovered   [] LDA for New Pressure Injury Added      Attending RN:  Brandy Lewis, RN     Second RN:  Juan Carlos Mcfarland LPN      Transferred via wheelchair. No acute distress noted

## 2025-01-18 NOTE — PLAN OF CARE
Problem: Adult Inpatient Plan of Care  Goal: Plan of Care Review  Outcome: Met  Goal: Patient-Specific Goal (Individualized)  Outcome: Met  Goal: Absence of Hospital-Acquired Illness or Injury  Outcome: Met  Goal: Optimal Comfort and Wellbeing  Outcome: Met  Goal: Readiness for Transition of Care  Outcome: Met     Problem: Diabetes Comorbidity  Goal: Blood Glucose Level Within Targeted Range  Outcome: Met     Problem: Pain Acute  Goal: Optimal Pain Control and Function  Outcome: Met     Problem: Fall Injury Risk  Goal: Absence of Fall and Fall-Related Injury  Outcome: Met     Problem: Occupational Therapy  Goal: Occupational Therapy Goal  Description: Goals to be met by: 2025     Patient will increase functional independence with ADLs by performing:    UE Dressing with Modified [place].  LE Dressing with Supervision.  Grooming while standing at sink with Supervision.  Toileting from toilet with Supervision for hygiene and clothing management.   Step transfer with Supervision  Toilet transfer to toilet with Supervision.    Outcome: Met     Problem: Physical Therapy  Goal: Physical Therapy Goal  Description: Goals to be met by: 25     Patient will increase functional independence with mobility by performin. Supine to sit with Supervision  2. Sit to stand transfer with Supervision  3. Bed to chair transfer with Supervision    4. Gait  x 25-50 feet with Supervision using Rolling Walker.   5. Wheelchair propulsion x50 feet with Supervision   6. Ascend/descend 5 stair with right Handrails Contact Guard Assistance .   7. Lower extremity exercise program x10 reps per handout, with supervision    Outcome: Met

## 2025-01-18 NOTE — NURSING
[facility] MEWS TRIGGER FOLLOW UP       MEWS Monitoring, Score is: 4  Indication for review: , O2 sat 87    Patient seen at bedside, HR and O2 reassessed. HR 79 and O2 95. No distress noted. Some mild confusion which family at bedside attributed to ambien which she had just had as that is normal for her.

## 2025-01-18 NOTE — SUBJECTIVE & OBJECTIVE
Interval History: no acute events overnight, reports that the swelling in her legs is much better    Review of patient's allergies indicates:   Allergen Reactions    Ondansetron hcl (pf) Hives and Itching    Ketorolac Itching     Current Facility-Administered Medications   Medication Frequency    acetaminophen tablet 650 mg Q8H PRN    amLODIPine tablet 5 mg Daily    atorvastatin tablet 40 mg QHS    dextrose 50% injection 12.5 g PRN    dextrose 50% injection 25 g PRN    dextrose 50% injection 25 g PRN    diphenhydrAMINE capsule 25 mg Q8H PRN    docusate sodium capsule 100 mg PRN    DULoxetine DR capsule 60 mg Daily    EScitalopram oxalate tablet 10 mg Daily    gabapentin capsule 300 mg QHS    glucagon (human recombinant) injection 1 mg PRN    glucose chewable tablet 16 g PRN    glucose chewable tablet 24 g PRN    heparin (porcine) injection 5,000 Units Q8H    HYDROcodone-acetaminophen 5-325 mg per tablet 1 tablet BID PRN    insulin aspart U-100 pen 0-10 Units QID (AC + HS) PRN    LIDOcaine 5 % patch 1 patch Daily    LORazepam tablet 1 mg Once PRN    metoprolol succinate (TOPROL-XL) 24 hr tablet 50 mg Daily    naloxone 0.4 mg/mL injection 0.02 mg PRN    primidone tablet 100 mg TID    QUEtiapine tablet 100 mg QHS    sodium chloride 0.9% flush 10 mL Q12H PRN    sodium zirconium cyclosilicate packet 10 g Daily    tiZANidine tablet 4 mg BID PRN    zolpidem tablet 5 mg QHS       Objective:     Vital Signs (Most Recent):  Temp: 98.3 °F (36.8 °C) (01/18/25 1148)  Pulse: 79 (01/18/25 1148)  Resp: 17 (01/18/25 1148)  BP: 135/73 (01/18/25 1148)  SpO2: (!) 94 % (01/18/25 1148) Vital Signs (24h Range):  Temp:  [98.2 °F (36.8 °C)-99 °F (37.2 °C)] 98.3 °F (36.8 °C)  Pulse:  [] 79  Resp:  [16-20] 17  SpO2:  [85 %-98 %] 94 %  BP: ()/(50-73) 135/73     Weight: 114.2 kg (251 lb 12.3 oz) (01/16/25 9006)  Body mass index is 39.43 kg/m².  Body surface area is 2.32 meters squared.    I/O last 3 completed shifts:  In: 720  [P.O.:720]  Out: 1850 [Urine:1850]     Physical Exam  Constitutional:       General: She is not in acute distress.     Appearance: She is obese. She is not ill-appearing, toxic-appearing or diaphoretic.   HENT:      Head: Normocephalic and atraumatic.      Nose: Nose normal. No congestion.      Mouth/Throat:      Mouth: Mucous membranes are moist.      Pharynx: No oropharyngeal exudate.   Eyes:      Pupils: Pupils are equal, round, and reactive to light.   Cardiovascular:      Rate and Rhythm: Normal rate.   Musculoskeletal:      Right lower leg: Edema (1+) present.      Left lower leg: Edema (1+) present.   Neurological:      Mental Status: She is alert.          Significant Labs:  All labs within the past 24 hours have been reviewed.     Significant Imaging:  Labs: Reviewed

## 2025-01-18 NOTE — PLAN OF CARE
Problem: Adult Inpatient Plan of Care  Goal: Plan of Care Review  1/18/2025 0540 by Emily Wise RN  Outcome: Progressing  1/18/2025 0533 by Emily Wise RN  Outcome: Progressing  Goal: Patient-Specific Goal (Individualized)  1/18/2025 0540 by Emily Wise RN  Outcome: Progressing  1/18/2025 0533 by Emily Wise RN  Outcome: Progressing  Goal: Absence of Hospital-Acquired Illness or Injury  Outcome: Progressing     Problem: Diabetes Comorbidity  Goal: Blood Glucose Level Within Targeted Range  1/18/2025 0540 by Emily Wise RN  Outcome: Progressing  1/18/2025 0533 by Emily Wise RN  Outcome: Progressing     Problem: Fall Injury Risk  Goal: Absence of Fall and Fall-Related Injury  1/18/2025 0540 by Emily Wise RN  Outcome: Progressing  1/18/2025 0533 by Emily Wise RN  Outcome: Progressing

## 2025-01-18 NOTE — PLAN OF CARE
Problem: Adult Inpatient Plan of Care  Goal: Plan of Care Review  Outcome: Progressing  Goal: Patient-Specific Goal (Individualized)  Outcome: Progressing  Goal: Absence of Hospital-Acquired Illness or Injury  Outcome: Progressing     Problem: Diabetes Comorbidity  Goal: Blood Glucose Level Within Targeted Range  Outcome: Progressing     Problem: Fall Injury Risk  Goal: Absence of Fall and Fall-Related Injury  Outcome: Progressing

## 2025-01-18 NOTE — PT/OT/SLP PROGRESS
Physical Therapy Treatment    Patient Name:  Gwendolyn Fournier   MRN:  [id number]    Recommendations:     Discharge Recommendations: Moderate Intensity Therapy  Discharge Equipment Recommendations: to be determined by next level of care, walker, rolling  Barriers to discharge: Decreased caregiver support    Assessment:     Gwendolyn Fournier is a 69 y.o. female admitted with a medical diagnosis of Hyperkalemia.  She presents with the following impairments/functional limitations: weakness, impaired endurance, impaired self care skills, impaired functional mobility, gait instability, impaired balance, decreased coordination, decreased lower extremity function, decreased safety awareness, pain , pt with fair mobility today, agreeable to getting up to chair, though wanting to keep purewick attached, so unable to amb. Around room/coles. Pt needs encouragement to amb next session.    Rehab Prognosis: Good; patient would benefit from acute skilled PT services to address these deficits and reach maximum level of function.    Recent Surgery: * No surgery found *      Plan:     During this hospitalization, patient to be seen 6 x/week to address the identified rehab impairments via gait training, therapeutic activities, therapeutic exercises, neuromuscular re-education and progress toward the following goals:    Plan of Care Expires:  01/31/25    Subjective     Chief Complaint: some R sided back pain  Patient/Family Comments/goals: pt agreeable to session, daughter present as well. Pt asking to keep Purewick attached for session, reports incontinence at home. Pt also stating her legs feel better today, no buckling as previously.  Pain/Comfort:  Pain Rating 1:  (did not rate)  Location - Side 1: Right  Location 1: back  Pain Addressed 1: Reposition, Distraction  Pain Rating Post-Intervention 1:  (appeared comfortable at rest)      Objective:     Communicated with nurse prior to session.  Patient found HOB elevated with telemetry,  peripheral IV, bed alarm, PureWick upon PT entry to room.     General Precautions: Standard, fall  Orthopedic Precautions: N/A  Braces: N/A  Respiratory Status: Room air     Functional Mobility:  Bed Mobility:     Supine to Sit: minimum assistance  Transfers:     Sit to Stand:  minimum assistance with rolling walker  Gait: amb'd ~6-8 steps w/ RW to chair only (2/2 purewick attached).       AM-PAC 6 CLICK MOBILITY  Turning over in bed (including adjusting bedclothes, sheets and blankets)?: 3  Sitting down on and standing up from a chair with arms (e.g., wheelchair, bedside commode, etc.): 3  Moving from lying on back to sitting on the side of the bed?: 3  Moving to and from a bed to a chair (including a wheelchair)?: 3  Need to walk in hospital room?: 3  Climbing 3-5 steps with a railing?: 2  Basic Mobility Total Score: 17       Treatment & Education:  Pt took steps bed to chair w/ RW and Brennan, purewick attached per pt request.   Pt sat and rested briefly, then stood again from chair w/RW and min.A, steps in place perf'd, no LE buckling noted (per daughter pt's legs were buckling yesterday).     Patient left up in chair with all lines intact, call button in reach, nurse notified, and lunch and daughter present..    GOALS:   Multidisciplinary Problems       Physical Therapy Goals          Problem: Physical Therapy    Goal Priority Disciplines Outcome Interventions   Physical Therapy Goal     PT, PT/OT Progressing    Description: Goals to be met by: 25     Patient will increase functional independence with mobility by performin. Supine to sit with Supervision  2. Sit to stand transfer with Supervision  3. Bed to chair transfer with Supervision    4. Gait  x 25-50 feet with Supervision using Rolling Walker.   5. Wheelchair propulsion x50 feet with Supervision   6. Ascend/descend 5 stair with right Handrails Contact Guard Assistance .   7. Lower extremity exercise program x10 reps per handout, with  supervision                         DME Justifications:   Gwendolyn's mobility limitation cannot be sufficiently resolved by the use of a cane. Her functional mobility deficit can be sufficiently resolved with the use of a Rolling Walker. Patient's mobility limitation significantly impairs their ability to participate in one of more activities of daily living.  The use of a RW will significantly improve the patient's ability to participate in MRADLS and the patient will use it on regular basis in the home.    Time Tracking:     PT Received On: 01/18/25  PT Start Time: 1215     PT Stop Time: 1228  PT Total Time (min): 13 min     Billable Minutes: Therapeutic Activity 13    Treatment Type: Treatment  PT/PTA: PTA     Number of PTA visits since last PT visit: 1 01/18/2025   Reconstitution Date (Optional): 3/29/23

## 2025-01-18 NOTE — DISCHARGE INSTRUCTIONS
**IMPORTANT DISCHARGE INFORMATION**    Follow-up with your primary care physician in 1 week  Follow-up with your cancer doctor in 1 week  You need to be established with kidney doctor to follow up on your kidney function and potassium level        Our goal at [facility] is to always give you outstanding care and exceptional service. You may receive a survey by mail, text or e-mail in the next 7-10 days from Babatunde Owusu and our leadership team asking about the care you received with us. The survey should only take 5-10 minutes to complete and is very important to us.     Your feedback provides us with a way to recognize our staff who work tirelessly to provide the best care! Also, your responses help us learn how to improve when your experience was below our aspiration of excellence. We WILL use your feedback to continue making improvements to help us provide the highest quality care. We keep your personal information and feedback confidential. We appreciate your time completing this survey and can't wait to hear from you!!!     We want you to leave us today feeling like you can DEFINITELY recommend us to others! We look forward to your continued care with us! Thanks so much for choosing [facility] for your healthcare needs!

## 2025-01-18 NOTE — ASSESSMENT & PLAN NOTE
Patient's blood pressure range in the last 24 hours was: BP  Min: 90/50  Max: 137/62.The patient's inpatient anti-hypertensive regimen is listed below:  Current Antihypertensives  amLODIPine tablet 5 mg, Daily, Oral  metoprolol succinate (TOPROL-XL) 24 hr tablet 50 mg, Daily, Oral    Plan  The patient's blood pressure will be followed.  As previously mentioned she should not be administered spironolactone.  Patient was on spironolactone in the past but apparently has recently discontinue it.  I believe furosemide and hydrochlorothiazide are safe in the setting and usually associated with hypokalemia.

## 2025-01-18 NOTE — ASSESSMENT & PLAN NOTE
Creatine stable for now. BMP reviewed- noted Estimated Creatinine Clearance: 24.4 mL/min (A) (based on SCr of 2.8 mg/dL (H)). according to latest data. Based on current GFR, CKD stage is stage 3 - GFR 30-59.  Monitor UOP and serial BMP and adjust therapy as needed. Renally dose meds. Avoid nephrotoxic medications and procedures.  A urinalysis is ordered.  A Nephrology consult was ordered    During her hospitalization we will follow up blood sugars 4 times daily.  We will cover with sliding scale moderate insulin scale.  Would resume mounjaro as outpatient

## 2025-01-18 NOTE — DISCHARGE SUMMARY
[facility] Medicine  Discharge Summary      Patient Name: Gwendolyn Fournier  MRN: [id number]  JANINA: [id number]  Patient Class: IP- Inpatient  Admission Date: 1/16/2025  Hospital Length of Stay: 2 days  Discharge Date and Time:  01/18/2025 1:06 PM  Attending Physician: Corey Calloway DO   Discharging Provider: Corey Calloway DO  Primary Care Provider: Yolette Abebe MD    Primary Care Team: Networked reference to record PCT     HPI:   The patient is a 69-year-old woman with a past medical history significant chronic kidney disease stage 3, hypotension, diabetes mellitus, obesity, schizophrenia, insomnia, history of tuberculosis as a child.  Coincidentally she was diagnosed with a right breast cancer earlier this week.  She additionally has a history of low back pain and sciatica down her right leg.  She has had low back surgery and the sciatica has been resolved with the patient has ongoing low back pain.  For this she is scheduled to have a spinal stimulator placed into her low back.  She isn't certain whether she wants this procedure.  Because of her upcoming surgery she had elective blood work today.  Potassium of 7 was found in the patient's primary care office.  She was referred to the emergency room for further care.  She did give a history of some wobbliness an unsteady gait earlier today, but this is not unusual for the patient because of her trouble with her back and general debility.  Otherwise she denies fever, headache, stiff neck, chest pain, shortness of breath, cough, abdominal pain, nausea or vomiting, constipation or diarrhea.  She has been on Ozempic in the past and recently switched to Mounjaro.  Her blood sugars tend to run between 70 and 100.    * No surgery found *      Hospital Course:   69-year-old woman with a past medical history significant chronic kidney disease stage 3, hypertension, diabetes mellitus, obesity, schizophrenia, insomnia, history of tuberculosis as a child.   Coincidentally she was diagnosed with a right breast cancer earlier this week.  She additionally has a history of low back pain and sciatica down her right leg.   Because of her upcoming surgery she had elective blood work done,.  Potassium of 7 was found in the patient's primary care office.  She was referred to the emergency room for further care.  She did give a history of some wobbliness an unsteady gait earlier , but this is not unusual for the patient because of her trouble with her back and general debility.  Otherwise she denies fever, headache, stiff neck, chest pain, shortness of breath, cough, abdominal pain, nausea or vomiting, constipation or diarrhea.  She has been on Ozempic in the past and recently switched to Mounjaro.  Hyperkalemia with Lokelma is improving.  Neurosurgery is consulted for back pain,MRI cervical,thorascia nd lumar is oredred.  PT,OT is consulted.  Patient is doing well.  Potassium is down to 4.9.  She will continue on Lokelma for 3 more days.  Discharge plan was discussed with the patient.  She needs to be established with a nephrologist to monitor her kidney function as well as to be established with her primary care physician to repeat blood work in 1 week.  Daughter was at bedside.  Patient reported that she does not take hydrochlorothiazide or Lasix.  She only takes metoprolol and amlodipine.  She also does not take her psychiatric medication except for the 1 prescribed at discharge.  She takes Ambien.       Goals of Care Treatment Preferences:  Code Status: Full Code    Living Will: Yes            Physical exam  General:  Alert and oriented x3, not in acute distress  Cardiac:  Regular rate and rhythm  Respiratory:  Lung fields clear to auscultation bilaterally  Abdomen:  Bowel sounds present, nontender, no distention  Musculoskeletal:  No swelling, no erythema    SDOH Screening:  The patient declined to be screened for utility difficulties, food insecurity, transport  difficulties, housing insecurity, and interpersonal safety, so no concerns could be identified this admission.     Consults:   Consults (From admission, onward)          Status Ordering Provider     Inpatient consult to Registered Dietitian/Nutritionist  Once        Provider:  (Not yet assigned)    Completed ABIODUN JACKSON     Inpatient consult to Nephrology  Once        Provider:  Merna Vaughn MD    Completed ABIODUN JACKSON            * Hyperkalemia    Hyperkalemia with Lokelma is improving.          Breast mass, right  Apparently the patient has been diagnosed this week.  She has an outpatient appointment with General surgery.      Drug-induced constipation  Linzess not on formulary.  Colace ordered      Acute right-sided low back pain without sciatica  See above      Chronic kidney disease (CKD), stage IV (severe)  Creatine stable for now. BMP reviewed- noted Estimated Creatinine Clearance: 21.3 mL/min (A) (based on SCr of 3.2 mg/dL (H)). according to latest data. Based on current GFR, CKD stage is stage 3 - GFR 30-59.  Monitor UOP and serial BMP and adjust therapy as needed. Renally dose meds. Avoid nephrotoxic medications and procedures.    Hypoglycemia  We will follow, at least 4 times daily      Procedure and treatment not carried out because of patient's decision for reasons of belief and group pressure        Tremor of both hands  Was on Mysoline this has been discontinued.      Aortic atherosclerosis  Not a pressing issue at the moment      SVT (supraventricular tachycardia)  will be followed on telemetry.  Currently in sinus rhythm      Decreased strength of trunk and back  See above      Decreased range of motion of trunk and back    Neurosurgery is consulted for back pain,MRI cervical,thorascia nd lumar is oredred.  PT,OT is consulted.      History of tuberculosis exposure  This was as a child.  It is not an active issue.      Major depressive disorder, recurrent, moderate  Patient is  seemingly has a history of significant depression as well as possible schizophrenia.  Currently despite her acute illness her mood and affect were normal and appropriate.  The patient is pleasant.  We will simply continue her baseline medications for her mental health issues.  Would follow a CPK if the patient develops muscle pain or excessive weakness.  We would also follow to ensure that she does not develop a serotonin syndrome with her use of multiple medications.  I think there has not been significant changes to her medications recently.  She isn't suicidal or homicidal.  She does not need a psychiatric consultation at this time      Type 2 diabetes mellitus with stage 3b chronic kidney disease, without long-term current use of insulin  Creatine stable for now. BMP reviewed- noted Estimated Creatinine Clearance: 24.4 mL/min (A) (based on SCr of 2.8 mg/dL (H)). according to latest data. Based on current GFR, CKD stage is stage 3 - GFR 30-59.  Monitor UOP and serial BMP and adjust therapy as needed. Renally dose meds. Avoid nephrotoxic medications and procedures.  A urinalysis is ordered.  A Nephrology consult was ordered    During her hospitalization we will follow up blood sugars 4 times daily.  We will cover with sliding scale moderate insulin scale.  Would resume mounjaro as outpatient    Sacroiliitis, not elsewhere classified  See above.  Would avoid NSAIDs      Sickle cell trait  Not an active issue.  Apparently the patient is a Voodoo and has refused blood in the past      Obstructive sleep apnea  We will inquire as to whether the patient uses CPAP at home      Severe obesity (BMI 35.0-39.9) with comorbidity  Body mass index is 39.43 kg/m². Morbid obesity complicates all aspects of disease management from diagnostic modalities to treatment. Weight loss encouraged and health benefits explained to patient.  The patient to have a nutrition consult.  I think the use of Mounjaro in this patient is  appropriate, would resume as outpatient.        Chronic pain  The patient will be continued on her chronic narcotic for her low back pain.  Linzess that the patient takes for chronic constipation isn't on formulary.  Her care is discussed with inpatient pharmacy.  Colace we will be continued.      History of open reduction and internal fixation (ORIF) procedure  This is a historical problem.  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  She was on allopurinol in the past and this has been discontinued.  NSAIDs should be avoided.      Primary osteoarthritis involving multiple joints  The patient is primarily complaining of low back pain.  I will continue her p.r.n. hydrocodone , lidocaine patch as well as acetaminophen.  I have ordered physical therapy.  The patient may benefit from inpatient rehab.      Essential hypertension  Patient's blood pressure range in the last 24 hours was: BP  Min: 90/50  Max: 137/62.The patient's inpatient anti-hypertensive regimen is listed below:  Current Antihypertensives  amLODIPine tablet 5 mg, Daily, Oral  metoprolol succinate (TOPROL-XL) 24 hr tablet 50 mg, Daily, Oral    Plan  The patient's blood pressure will be followed.  As previously mentioned she should not be administered spironolactone.  Patient was on spironolactone in the past but apparently has recently discontinue it.  I believe furosemide and hydrochlorothiazide are safe in the setting and usually associated with hypokalemia.        Final Active Diagnoses:    Diagnosis Date Noted POA    PRINCIPAL PROBLEM:  Hyperkalemia [E87.5] 01/16/2025 Yes    Chronic kidney disease (CKD), stage IV (severe) [N18.4] 01/02/2025 Yes    Acute right-sided low back pain without sciatica [M54.50] 01/02/2025 Yes    Drug-induced constipation [K59.03] 01/02/2025 Yes    Breast mass, right [N63.10] 01/02/2025 Yes    Hypoglycemia [E16.2] 12/31/2024 Yes    Procedure and  treatment not carried out because of patient's decision for reasons of belief and group pressure [Z53.1] 03/05/2024 Not Applicable    Tremor of both hands [R25.1] 10/09/2023 Yes    SVT (supraventricular tachycardia) [I47.10] 02/03/2023 Yes    Aortic atherosclerosis [I70.0] 02/03/2023 Yes    Decreased range of motion of trunk and back [M25.69] 11/03/2022 Yes    Decreased strength of trunk and back [R29.898] 11/03/2022 Yes    History of tuberculosis exposure [Z20.1] 12/29/2021 Yes    Major depressive disorder, recurrent, moderate [F33.1] 03/19/2021 Yes    Type 2 diabetes mellitus with stage 3b chronic kidney disease, without long-term current use of insulin [E11.22, N18.32] 01/25/2021 Yes    Sacroiliitis, not elsewhere classified [M46.1] 12/18/2020 Yes    Obstructive sleep apnea [G47.33] 10/23/2020 Yes    Sickle cell trait [D57.3] 10/23/2020 Yes    Severe obesity (BMI 35.0-39.9) with comorbidity [E66.01] 12/03/2019 Yes    Chronic pain [G89.29] 03/13/2019 Yes    History of open reduction and internal fixation (ORIF) procedure [Z98.890] 11/16/2018 Not Applicable    Idiopathic chronic gout of multiple sites without tophus [M1A.09X0] 11/08/2016 Yes    Primary osteoarthritis involving multiple joints [M15.0] 03/16/2016 Yes    Essential hypertension [I10] 09/15/2015 Yes      Problems Resolved During this Admission:       Discharged Condition: good    Disposition: Home or Self Care    Follow Up:   Follow-up Information       Yolette Abebe MD Follow up in 1 week(s).    Specialty: Internal Medicine  Why: Need to repeat BMP to check on creatinine and potassium level in 1 week  Contact information:  [address] [place] [address] [phone]                           Patient Instructions:      Diet Cardiac     Notify your health care provider if you experience any of the following:  temperature >100.4     Notify your health care provider if you experience any of the following:  persistent nausea and vomiting or  "diarrhea     Notify your health care provider if you experience any of the following:  severe uncontrolled pain     Notify your health care provider if you experience any of the following:  redness, tenderness, or signs of infection (pain, swelling, redness, odor or green/yellow discharge around incision site)     Notify your health care provider if you experience any of the following:  difficulty breathing or increased cough     Notify your health care provider if you experience any of the following:  severe persistent headache     Activity as tolerated       Significant Diagnostic Studies: Labs: BMP:   Recent Labs   Lab 01/17/25  0716 01/17/25  1612 01/18/25  0445   GLU 79 87 75    137 139   K 5.4* 5.4* 4.9   * 110 111*   CO2 19* 21* 19*   BUN 44* 44* 49*   CREATININE 2.8* 2.6* 2.8*   CALCIUM 9.5 9.4 9.5   , CMP   Recent Labs   Lab 01/16/25  1557 01/16/25  2247 01/17/25  0716 01/17/25  1612 01/18/25  0445   *   < > 138 137 139   K 6.2*   < > 5.4* 5.4* 4.9   *   < > 113* 110 111*   CO2 17*   < > 19* 21* 19*   GLU 74   < > 79 87 75   BUN 48*   < > 44* 44* 49*   CREATININE 3.2*   < > 2.8* 2.6* 2.8*   CALCIUM 9.5   < > 9.5 9.4 9.5   PROT 6.9  --   --   --   --    ALBUMIN 3.5  --   --  3.2* 3.1*   BILITOT 0.2  --   --   --   --    ALKPHOS 137  --   --   --   --    AST 19  --   --   --   --    ALT 12  --   --   --   --    ANIONGAP 7*   < > 6* 6* 9    < > = values in this interval not displayed.   , Lipid Panel   Lab Results   Component Value Date    CHOL 124 12/31/2024    HDL 34 (L) 12/31/2024    LDLCALC 70.0 12/31/2024    TRIG 100 12/31/2024    CHOLHDL 27.4 12/31/2024   , Troponin No results for input(s): "TROPONINI" in the last 168 hours., and A1C:   Recent Labs   Lab 12/31/24  0924   HGBA1C 5.1       Pending Diagnostic Studies:       Procedure Component Value Units Date/Time    EKG 12-lead [[id number]]     Order Status: Sent Lab Status: No result            Medications:  Reconciled Home " Medications:      Medication List        START taking these medications      sodium zirconium cyclosilicate 10 gram packet  Commonly known as: Lokelma  Take 1 packet (10 g total) by mouth once daily. Mix entire contents of packet(s) into drinking glass containing 3 tablespoons of water; stir well and drink immediately. Add water and repeat until no powder remains to receive entire dose. for 3 days  Start taking on: January 19, 2025            CONTINUE taking these medications      acetaminophen 500 MG tablet  Commonly known as: TYLENOL  Take 1 tablet (500 mg total) by mouth every 6 (six) hours as needed for Pain.     albuterol 0.63 mg/3 mL Nebu  Commonly known as: ACCUNEB  Inhale 1 ampule into the lungs as needed.     amLODIPine 5 MG tablet  Commonly known as: NORVASC  Take 1 tablet (5 mg total) by mouth once daily.     atorvastatin 40 MG tablet  Commonly known as: LIPITOR  Take 1 tablet (40 mg total) by mouth every evening.     blood sugar diagnostic Strp  To check BG two times daily, freestyle lite meter     blood-glucose meter kit  To check BG two times daily, freestyle lite meter     docusate sodium 100 MG capsule  Commonly known as: COLACE  Take 1 capsule (100 mg total) by mouth as needed for Constipation.     DULoxetine 60 MG capsule  Commonly known as: CYMBALTA  TAKE 1 CAPSULE(60 MG) BY MOUTH EVERY DAY     FREESTYLE HAI 2 READER [facility]  Generic drug: flash glucose scanning reader  1 each by Misc.(Non-Drug; Combo Route) route 4 (four) times daily with meals and nightly.     FREESTYLE HAI 2 SENSOR Kit  Generic drug: flash glucose sensor  Use as directed to check blood sugar. Change every 14 days.     gabapentin 300 MG capsule  Commonly known as: NEURONTIN  Take 1 capsule (300 mg total) by mouth every evening.     HYDROcodone-acetaminophen 5-325 mg per tablet  Commonly known as: NORCO  Take 1 tablet by mouth 2 (two) times daily as needed.     lancets [facility]  To check BG two times daily, to use with insurance  preferred meter     LIDOcaine 5 %  Commonly known as: LIDODERM  Place 1 patch onto the skin once daily. Apply patch for 12 hours and then leave off for 12 hours     linaCLOtide 145 mcg Cap capsule  Commonly known as: LINZESS  Take 1 capsule (145 mcg total) by mouth daily as needed (constipation).     metoprolol succinate 50 MG 24 hr tablet  Commonly known as: TOPROL-XL  Take 1 tablet (50 mg total) by mouth once daily.     MOUNJARO 5 mg/0.5 mL Pnij  Generic drug: tirzepatide  Inject 5 mg into the skin every 7 days.     tiZANidine 4 MG tablet  Commonly known as: ZANAFLEX  TAKE 1 TABLET(4 MG) BY MOUTH TWICE DAILY AS NEEDED FOR PAIN     zolpidem 5 MG Tab  Commonly known as: AMBIEN  Take 5 mg by mouth every evening.            STOP taking these medications      EScitalopram oxalate 10 MG tablet  Commonly known as: LEXAPRO     furosemide 40 MG tablet  Commonly known as: LASIX     hydroCHLOROthiazide 25 MG tablet  Commonly known as: HYDRODIURIL     LORazepam 1 MG tablet  Commonly known as: ATIVAN     primidone 50 MG Tab  Commonly known as: MYSOLINE     QUEtiapine 100 MG Tab  Commonly known as: SEROQUEL              Indwelling Lines/Drains at time of discharge:   Lines/Drains/Airways       Drain  Duration             Female External Urinary Catheter w/ Suction 01/16/25 [id number] 1 day                    Time spent on the discharge of patient: 36 minutes         Corey Calloway DO  Department of Hospital Medicine  [facility] Surg

## 2025-01-18 NOTE — NURSING
[facility]  Nurses Note -- 4 Eyes      1/18/2025       Skin assessed on: Q Shift      [x] No Pressure Injuries Present    []Prevention Measures Documented    [] Yes LDA  for Pressure Injury Previously documented     [] Yes New Pressure Injury Discovered   [] LDA for New Pressure Injury Added      Attending RN:  Juan Carlos Mcfarland LPN     Second RN:  KEIKO Diaz

## 2025-01-18 NOTE — PROGRESS NOTES
[facility]  Nephrology  Progress Note    Patient Name: Gwendolyn Fournier  MRN: [id number]  Admission Date: 1/16/2025  Hospital Length of Stay: 2 days  Attending Provider: Corey Calloawy DO   Primary Care Physician: Yolette Abebe MD  Principal Problem:Hyperkalemia    Subjective:     HPI: Ms. Fournier is a 68 yo female with HTN, T2DM, CKD stage 3b, gout, schizophrenia, and recently diagnosed breast cancer who was sent to the ED for outpatient labs with a K of 7.0. She was treated with insulin/D50, albuterol, and Lokelma in the ED. K 5.4 this morning. Nephrology consulted for MIGNON and hyperkalemia. Prior records obtained and reviewed. She is followed outpatient by Dr. Starr, last visit 6/12/24. Her baseline Cr was 1.6 with GFR 35% at that time and attributed to diabetic nephropathy. No labs from 5/2024-now. Chart review notable for addition of spironolactone in 5/2024. Home medications currently include spironolactone and HCTZ. Her Cr was elevated at 3.3 yesterday --> 2.8 today. Patient's main concerns at this time are BUE tremors and legs giving out. She takes gabapentin 300-600mg BID. She is also undergoing evaluation by NSGY.     Interval History: no acute events overnight, reports that the swelling in her legs is much better    Review of patient's allergies indicates:   Allergen Reactions    Ondansetron hcl (pf) Hives and Itching    Ketorolac Itching     Current Facility-Administered Medications   Medication Frequency    acetaminophen tablet 650 mg Q8H PRN    amLODIPine tablet 5 mg Daily    atorvastatin tablet 40 mg QHS    dextrose 50% injection 12.5 g PRN    dextrose 50% injection 25 g PRN    dextrose 50% injection 25 g PRN    diphenhydrAMINE capsule 25 mg Q8H PRN    docusate sodium capsule 100 mg PRN    DULoxetine DR capsule 60 mg Daily    EScitalopram oxalate tablet 10 mg Daily    gabapentin capsule 300 mg QHS    glucagon (human recombinant) injection 1 mg PRN    glucose chewable tablet 16 g PRN    glucose  chewable tablet 24 g PRN    heparin (porcine) injection 5,000 Units Q8H    HYDROcodone-acetaminophen 5-325 mg per tablet 1 tablet BID PRN    insulin aspart U-100 pen 0-10 Units QID (AC + HS) PRN    LIDOcaine 5 % patch 1 patch Daily    LORazepam tablet 1 mg Once PRN    metoprolol succinate (TOPROL-XL) 24 hr tablet 50 mg Daily    naloxone 0.4 mg/mL injection 0.02 mg PRN    primidone tablet 100 mg TID    QUEtiapine tablet 100 mg QHS    sodium chloride 0.9% flush 10 mL Q12H PRN    sodium zirconium cyclosilicate packet 10 g Daily    tiZANidine tablet 4 mg BID PRN    zolpidem tablet 5 mg QHS       Objective:     Vital Signs (Most Recent):  Temp: 98.3 °F (36.8 °C) (01/18/25 1148)  Pulse: 79 (01/18/25 1148)  Resp: 17 (01/18/25 1148)  BP: 135/73 (01/18/25 1148)  SpO2: (!) 94 % (01/18/25 1148) Vital Signs (24h Range):  Temp:  [98.2 °F (36.8 °C)-99 °F (37.2 °C)] 98.3 °F (36.8 °C)  Pulse:  [] 79  Resp:  [16-20] 17  SpO2:  [85 %-98 %] 94 %  BP: ()/(50-73) 135/73     Weight: 114.2 kg (251 lb 12.3 oz) (01/16/25 2246)  Body mass index is 39.43 kg/m².  Body surface area is 2.32 meters squared.    I/O last 3 completed shifts:  In: 720 [P.O.:720]  Out: 1850 [Urine:1850]     Physical Exam  Constitutional:       General: She is not in acute distress.     Appearance: She is obese. She is not ill-appearing, toxic-appearing or diaphoretic.   HENT:      Head: Normocephalic and atraumatic.      Nose: Nose normal. No congestion.      Mouth/Throat:      Mouth: Mucous membranes are moist.      Pharynx: No oropharyngeal exudate.   Eyes:      Pupils: Pupils are equal, round, and reactive to light.   Cardiovascular:      Rate and Rhythm: Normal rate.   Musculoskeletal:      Right lower leg: Edema (1+) present.      Left lower leg: Edema (1+) present.   Neurological:      Mental Status: She is alert.          Significant Labs:  All labs within the past 24 hours have been reviewed.     Significant Imaging:  Labs:  Reviewed  Assessment/Plan:     Renal/  Chronic kidney disease (CKD), stage IV (severe)  Follows with Dr. Starr  Please make follow up appt for CKD management 1 month  Would discharge with lasix 80 PO with weight based PRN usage, sodium bicarb 650 TID    #Anemia  Hemoglobin   Date Value Ref Range Status   01/18/2025 8.6 (L) 12.0 - 16.0 g/dL Final   Outpatient management - obtain iron and ferritin    #Secondary hyperparathyroidism  Calcium   Date Value Ref Range Status   01/18/2025 9.5 8.7 - 10.5 mg/dL Final     Phosphorus   Date Value Ref Range Status   01/18/2025 3.4 2.7 - 4.5 mg/dL Final     PTH, Intact   Date Value Ref Range Status   10/05/2022 307.5 (H) 9.0 - 77.0 pg/mL Final   Continue to monitor      Hyperkalemia - dietitican follow up  Thank you for your consult. I will follow-up with patient. Please contact us if you have any additional questions.    Joselito Jeffers MD  Nephrology  [facility] - Med Surg

## 2025-01-20 LAB — POCT GLUCOSE: 78 MG/DL (ref 70–110)

## 2025-01-22 ENCOUNTER — PATIENT OUTREACH (OUTPATIENT)
Dept: ADMINISTRATIVE | Facility: CLINIC | Age: 70
End: 2025-01-22
Payer: MEDICARE

## 2025-01-22 NOTE — PROGRESS NOTES
C3 nurse spoke with Gwendolyn Fournier  for a TCC post hospital discharge follow up call. The patient has a scheduled HOSFU appointment with Kelsey Lagos on 1/24/25 @ 0900.

## 2025-01-27 ENCOUNTER — OFFICE VISIT (OUTPATIENT)
Dept: SURGERY | Facility: CLINIC | Age: 70
End: 2025-01-27
Payer: MEDICARE

## 2025-01-27 VITALS
WEIGHT: 243.81 LBS | BODY MASS INDEX: 38.27 KG/M2 | HEIGHT: 67 IN | HEART RATE: 76 BPM | TEMPERATURE: 98 F | DIASTOLIC BLOOD PRESSURE: 64 MMHG | SYSTOLIC BLOOD PRESSURE: 136 MMHG

## 2025-01-27 DIAGNOSIS — C50.911 MALIGNANT NEOPLASM OF RIGHT FEMALE BREAST, UNSPECIFIED ESTROGEN RECEPTOR STATUS, UNSPECIFIED SITE OF BREAST: Primary | ICD-10-CM

## 2025-01-27 PROCEDURE — 1111F DSCHRG MED/CURRENT MED MERGE: CPT | Mod: CPTII,S$GLB,, | Performed by: SURGERY

## 2025-01-27 PROCEDURE — 1126F AMNT PAIN NOTED NONE PRSNT: CPT | Mod: CPTII,S$GLB,, | Performed by: SURGERY

## 2025-01-27 PROCEDURE — 99999 PR PBB SHADOW E&M-EST. PATIENT-LVL II: CPT | Mod: PBBFAC,,, | Performed by: SURGERY

## 2025-01-27 PROCEDURE — 1101F PT FALLS ASSESS-DOCD LE1/YR: CPT | Mod: CPTII,S$GLB,, | Performed by: SURGERY

## 2025-01-27 PROCEDURE — 3078F DIAST BP <80 MM HG: CPT | Mod: CPTII,S$GLB,, | Performed by: SURGERY

## 2025-01-27 PROCEDURE — 99204 OFFICE O/P NEW MOD 45 MIN: CPT | Mod: S$GLB,,, | Performed by: SURGERY

## 2025-01-27 PROCEDURE — 1157F ADVNC CARE PLAN IN RCRD: CPT | Mod: CPTII,S$GLB,, | Performed by: SURGERY

## 2025-01-27 PROCEDURE — 3288F FALL RISK ASSESSMENT DOCD: CPT | Mod: CPTII,S$GLB,, | Performed by: SURGERY

## 2025-01-27 PROCEDURE — 3075F SYST BP GE 130 - 139MM HG: CPT | Mod: CPTII,S$GLB,, | Performed by: SURGERY

## 2025-01-27 PROCEDURE — 3008F BODY MASS INDEX DOCD: CPT | Mod: CPTII,S$GLB,, | Performed by: SURGERY

## 2025-01-27 NOTE — PROGRESS NOTES
[facility]  Breast Surgery        REFERRING PROVIDER: Yolette Abebe MD  [address] [place] [address]    Chief Complaint: No chief complaint on file.      Subjective:      Patient ID: Gwendolyn Fournier is a 70 y.o. female who presents for evaluation of a newly diagnosed right breast cancer. She was in her usual state of health and this was found on screening mammogram. This was her first screening mammogram in 10 years. She subsequently mikel on to have diagnostic mammo, ultrasound, and ultrasound guided biopsy of two suspicious areas. Of note, there is a 3rd area of calcifications that still needs a stereotactic biopsy.    Findings at [facility]:    Specimen to Pathology, Radiology Breast, needle biopsy  Order: [id number]  Status: Edited Result - FINAL     Visible to patient: Yes (seen)     Next appt: 02/12/2025 at 01:00 PM in Radiology ([facility] MAMMO2)     Dx: Mass of right breast, unspecified judie...     1 Result Note      Component 1 mo ago   Final Pathologic Diagnosis     1. 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  Results will be issued in a supplemental report.     Comment:  The biopsy reveals a papillary neoplasm with low to intermediate nuclear features with abundant hemosiderin.  Immunostains for p63 and CK5/6 are negative within the neoplastic papillae.  The differential diagnosis includes encapsulated   papillary carcinoma (pTis) and invasive carcinoma with papillary features.  The distinction between these is made by surgical excision after examination of a fibrous capsule (if a capsule exists) and examination of the periphery of the entire lesion.      2. 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 Christian Duckworth MD, who concurs with the diagnosis.  VC      Comment: Interp By Tali Dumas D.O., Signed on 01/16/2025 at 10            Past Medical History:   Diagnosis Date    Arthritis     Cancer     Gout attack     Hx of psychiatric care     Hypertension     Opioid dependence, uncomplicated 02/03/2023    Psychiatric problem     Renal disorder     Sciatic nerve pain 2013    Therapy     used to follow with psychiatrist but doesn't recall whom, 2012    Tuberculosis     Unspecified cataract 07/27/2023    Mild on both eyes     Past Surgical History:   Procedure Laterality Date    COLONOSCOPY N/A 01/21/2019    Procedure: COLONOSCOPY;  Surgeon: Shanna Jose MD;  Location: [facility] ENDO;  Service: Endoscopy;  Laterality: N/A;    HYSTERECTOMY      INJECTION OF JOINT Bilateral 03/13/2019    Procedure: INJECTION, JOINT BILATERAL SI JOINT;  Surgeon: Evan Coreas MD;  Location: [facility] PAIN MGT;  Service: Pain Management;  Laterality: Bilateral;  BILATERAL SI JOINT INJECTION    KNEE SURGERY  01/01/2014    left knee surgery      Current Outpatient Medications on File Prior to Visit   Medication Sig Dispense Refill    acetaminophen (TYLENOL) 500 MG tablet Take 1 tablet (500 mg total) by mouth every 6 (six) hours as needed for Pain. 30 tablet 0    albuterol (ACCUNEB) 0.63 mg/3 mL Nebu Inhale 1 ampule into the lungs as needed. (Patient not taking: Reported on 1/27/2025)      amLODIPine (NORVASC) 5 MG tablet Take 1 tablet (5 mg total) by mouth once daily. 90 tablet 3    atorvastatin (LIPITOR) 40 MG tablet Take 1 tablet (40 mg total) by mouth  every evening. (Patient not taking: Reported on 1/27/2025) 90 tablet 3    blood sugar diagnostic Strp To check BG two times daily, freestyle lite meter 200 each 3    blood-glucose meter kit To check BG two times daily, freestyle lite meter 1 each 0    docusate sodium (COLACE) 100 MG capsule Take 1 capsule (100 mg total) by mouth as needed for Constipation. 90 capsule 3    DULoxetine (CYMBALTA) 60 MG capsule TAKE 1 CAPSULE(60 MG) BY MOUTH EVERY DAY 90 capsule 3    flash glucose scanning reader (FREESTYLE HAI 2 READER) Misc 1 each by Misc.(Non-Drug; Combo Route) route 4 (four) times daily with meals and nightly. 1 each 0    flash glucose sensor (FREESTYLE HAI 2 SENSOR) Kit Use as directed to check blood sugar. Change every 14 days. 6 kit 2    furosemide (LASIX) 80 MG tablet Take 1 tablet (80 mg total) by mouth daily as needed (with 5 pounds increase in 24-72 hours). 30 tablet 0    gabapentin (NEURONTIN) 300 MG capsule Take 1 capsule (300 mg total) by mouth every evening. (Patient not taking: Reported on 1/27/2025)      HYDROcodone-acetaminophen (NORCO) 5-325 mg per tablet Take 1 tablet by mouth 2 (two) times daily as needed.      lancets Misc To check BG two times daily, to use with insurance preferred meter 200 each 3    LIDOcaine (LIDODERM) 5 % Place 1 patch onto the skin once daily. Apply patch for 12 hours and then leave off for 12 hours 15 patch 0    linaCLOtide (LINZESS) 145 mcg Cap capsule Take 1 capsule (145 mcg total) by mouth daily as needed (constipation).      metoprolol succinate (TOPROL-XL) 50 MG 24 hr tablet Take 1 tablet (50 mg total) by mouth once daily. 90 tablet 3    sodium bicarbonate 650 MG tablet Take 1 tablet (650 mg total) by mouth 3 (three) times daily. (Patient not taking: Reported on 1/27/2025) 90 tablet 0    tirzepatide (MOUNJARO) 5 mg/0.5 mL PnIj Inject 5 mg into the skin every 7 days. 2 mL 0    tiZANidine (ZANAFLEX) 4 MG tablet TAKE 1 TABLET(4 MG) BY MOUTH TWICE DAILY AS NEEDED FOR  PAIN 60 tablet 3    zolpidem (AMBIEN) 5 MG Tab Take 5 mg by mouth every evening. (Patient not taking: Reported on 2025)       Current Facility-Administered Medications on File Prior to Visit   Medication Dose Route Frequency Provider Last Rate Last Admin    lidocaine (PF) 10 mg/ml (1%) injection 40 mg  4 mL Intramuscular 1 time in Clinic/HOD [place], Azikiwe K., MD         Social History     Socioeconomic History    Marital status:     Number of children: 5   Occupational History    Occupation: homemaker   Tobacco Use    Smoking status: Former     Current packs/day: 0.00     Types: Cigarettes     Quit date: 1976     Years since quittin.1    Smokeless tobacco: Never   Substance and Sexual Activity    Alcohol use: Not Currently     Alcohol/week: 0.0 standard drinks of alcohol     Comment: red wine    Drug use: No    Sexual activity: Not Currently     Partners: Male     Social Drivers of Health     Financial Resource Strain: Patient Declined (2025)    Overall Financial Resource Strain (CARDIA)     Difficulty of Paying Living Expenses: Patient declined   Food Insecurity: Patient Declined (2025)    Hunger Vital Sign     Worried About Running Out of Food in the Last Year: Patient declined     Ran Out of Food in the Last Year: Patient declined   Transportation Needs: Patient Declined (2025)    TRANSPORTATION NEEDS     Transportation : Patient declined   Physical Activity: Unknown (10/10/2022)    Exercise Vital Sign     Days of Exercise per Week: 0 days   Stress: Patient Declined (2025)    [employer] - Occupational Stress Questionnaire     Feeling of Stress : Patient declined   Housing Stability: Patient Declined (2025)    Housing Stability Vital Sign     Unable to Pay for Housing in the Last Year: Patient declined     Homeless in the Last Year: Patient declined     Family History   Problem Relation Name Age of Onset    Tuberculosis Mother       "Stroke Father      Bipolar disorder Daughter      Suicide Daughter      Depression Daughter      Bipolar disorder Daughter      Depression Daughter          Review of Systems   All other systems reviewed and are negative.    Objective:   /64 (BP Location: Left arm, Patient Position: Sitting)   Pulse 76   Temp 97.9 °F (36.6 °C) (Oral)   Ht 5' 7" (1.702 m)   Wt 110.6 kg (243 lb 13.3 oz)   BMI 38.19 kg/m²     Physical Exam   Cardiovascular:  Normal rate.            Pulmonary/Chest: Effort normal. Right breast exhibits no inverted nipple, no mass, no nipple discharge and no skin change. Left breast exhibits no inverted nipple, no mass, no nipple discharge and no skin change.   Bilateral large pendulous breast   Musculoskeletal: Lymphadenopathy:      Upper Body:      Right upper body: No supraclavicular or axillary adenopathy.      Left upper body: No supraclavicular or axillary adenopathy.     Neurological: She is alert.       Radiology review: Images personally reviewed by me in the clinic.     Mammo Digital Screening Bilat w/ Davi  Order: [id number]  Status: Final result     Visible to patient: Yes (seen)     Next appt: 02/12/2025 at 01:00 PM in Radiology ([facility] MAMMO2)     Dx: Encounter for screening mammogram for...     0 Result Notes    1 Patient Communication    1  Topic  Assessment    Overall   0 - Incomplete: Needs Additional Imaging Evaluation     Breast Density     Overall   Breast Composition b - Scattered fibroglandular density     Details    Reading Physician Reading Date Result Priority   Som Momin MD  [phone] 11/25/2024 Routine     Physician Responsible for MQSA Outcome Reason    Som Momin MD Signed      Narrative & Impression     Facility:  [facility] [address] [facility]  [place] [id number] [phone]     Name: Gwendolyn Fournier     MRN: [id number]     Result:   Mammo Digital Screening Bilat w/ " Davi     History:  Patient is 69 y.o. and is seen for encounter for screening mammogram for malignant neoplasm of breast.     Films Compared:  Prior images (if available) were compared.     Findings:  This procedure was performed using tomosynthesis. Computer-aided detection was utilized in the interpretation of this examination.        There are scattered areas of fibroglandular density.      Right  There is a focal asymmetry seen in the outer region of the right breast in the middle depth.   There are calcifications seen in the upper region of the right breast in the posterior depth.      Left  There is no evidence of suspicious masses, calcifications, or other abnormal findings in the left breast.     Impression:  Right  Focal Asymmetry: Right breast focal asymmetry at the outer position and middle depth. Assessment: 0 - Incomplete. Diagnostic Mammogram and/or Ultrasound is recommended.   Calcifications: Right breast calcifications at the upper position and posterior depth. Assessment: 0 - Incomplete. Special Views: Magnification View is recommended.      Left  There is no mammographic evidence of malignancy in the left breast.     BI-RADS Category:   Overall: 0 - Incomplete: Needs Additional Imaging Evaluation        Recommendation:  Diagnostic mammogram with possible ultrasound (if indicated) is recommended.  Diagnostic mammogram including magnification views is recommended.           Your estimated lifetime risk of breast cancer (to age 85) based on Tyrer-Cuzick risk assessment model is 3.41%.  According to the American Cancer Society, patients with a lifetime breast cancer risk of 20% or higher might benefit from supplemental screening tests, such as screening breast MRI.     Som Momin MD       Mammo Digital Diagnostic Right with Davi  Order: [id number]  Status: Final result     Visible to patient: Yes (seen)     Next appt: 02/12/2025 at 01:00 PM in Radiology ([facility] MAMMO2)     Dx: Abnormal mammogram  of right breast     Linked study orders: US Breast Right Limited (Order: [id number])     0 Result Notes    1 Patient Communication    1  Topic  Assessment    Overall Right   4 - Suspicious 4 - Suspicious     Breast Density     Overall   Breast Composition b - Scattered fibroglandular density     Details    Reading Physician Reading Date Result Priority   Julissa Boone MD  [phone] 12/12/2024 Routine     Physician Responsible for MQSA Outcome Reason    Julissa Boone MD Signed      Narrative & Impression  Facility:  [facility] [address] [facility]  [place] [id number] [phone]     Name: Gwendolyn Fournier     MRN: [id number]     Result:   Mammo Digital Diagnostic Right with Davi  US Breast Right Limited     History:  Patient is 69 y.o. and is seen for abnormal mammogram of right breast.        Films Compared:  Compared to: 11/19/2024 Mammo Digital Screening Bilat w/ Davi     Findings:  This procedure was performed using tomosynthesis. Computer-aided detection was utilized in the interpretation of this examination.  There are scattered areas of fibroglandular density.      Mammo Digital Diagnostic Right with Davi  Right  Calcifications: There are 5 mm of amorphous calcifications in a grouped distribution seen in the upper region of the right breast in the posterior depth.  These are poorly visualized and were not eavluated with cc magnification despite multiple attempts.  The paitent was unable to proceed with additional attempts due to weakness and discomfort.   Mass: There is a mass seen in the right breast at 9 o'clock.   Mass: There is a mass seen in the right breast at 9 o'clock.      US Breast Right Limited  Right  Calcifications: There are no corresponding calcifications seen on this modality.   Mass: There is a 17 mm x 17 mm x 13 mm irregularly shaped, non-parallel, hypoechoic mass with indistinct margins seen in the  right breast at 9 o'clock, 9 cm from the nipple. The mass correlates with the mass seen on the mammogram.   Mass: There is a 12 mm x 10 mm x 10 mm oval, parallel, hypoechoic mass with indistinct margins seen in the right breast at 9 o'clock, 8 cm from the nipple. The mass correlates with the mass seen on the mammogram.      Impression:  Right     Mass: Right breast 17 mm x 17 mm x 13 mm mass at 9 o'clock. Assessment: 4 - Suspicious finding. Ultrasound-guided biopsy is recommended.   Mass: Right breast 12 mm x 10 mm x 10 mm mass at 9 o'clock. Assessment: 4 - Suspicious finding. Ultrasound-guided biopsy is recommended.   Calcifications: Right breast 5 mm calcifications at the upper position and posterior depth. Assessment: 4 - Suspicious finding. Stereotactic Biopsy is recommended.   Note that these calcifications are suboptimally evaluated  and were not eavluated with cc magnification despite multiple attempts.  The paitent was unable to proceed with additional attempts due to weakness and discomfort.   She and I discussed stereotactic biopsy and she would like to attempt this after above ultrasound guided biopsies.  However, if the calcifications cannot be visualized or she is unable to maintain upright positioning due to weakness or discomfort, stereotactic biopsy may not be feasible.            BI-RADS Category:   Overall: 4 - Suspicious     Recommendation:  Stereotactic Biopsy is recommended.  Ultrasound-guided biopsy is recommended.  I discussed these findings and recommendations with the patient in detail at the time of exam.     Your estimated lifetime risk of breast cancer (to age 85) based on Tyrer-Cuzick risk assessment model is 3.41%.  According to the American Cancer Society, patients with a lifetime breast cancer risk of 20% or higher might benefit from supplemental screening tests, such as screening breast MRI.     Julissa Boone MD         Assessment:       Right breast cancer  Plan:     Options  for management were discussed with the patient and her family. We reviewed the existing data noting the equivalency of breast conserving surgery with radiation therapy and mastectomy. We discussed the need for partial mastectomy/lumpectomy margins to be negative for carcinoma, the necessity for postoperative radiation therapy after breast conservation in most cases, the possibility of a failed or false negative sentinel lymph node biopsy and the potential need for complete lymphadenectomy for a failed or positive sentinel lymph node biopsy were fully discussed.     In the setting of mastectomy, delayed or immediate reconstruction options are available and were discussed.     In the setting of lumpectomy, radiation therapy would be recommended majority of the time.  The duration and treatment side effects were discussed with the patient.  This will coordinated with the radiation oncologist pending final pathology.    We also discussed the role of systemic therapy in the treatment of early stage breast cancer.  We discussed that this is based on tumor biology and sourav status and will be determined based on final pathology.  We discussed that if the cancer is hormone positive, endocrine therapy would be recommended in most cases and its use can reduce the risk of recurrence as well as improve survival. Side effects of treatment were briefly discussed. We also discussed the potential role for chemotherapy based on a number of factors such as tumor phenotype (ER+ vs. triple negative vs. [id number]+) and this would be determined in coordination with the medical oncologist.    -The patient has bilateral large breasts that are causing her some neck and shoulder pain. She may be a candidate for an oncoplastic reduction. Will refer to Plastic Surgery  -referral to Medical Oncology  -The patient still needs the stereotactic biopsy of the 3rd area in her right breast (suspicious calcifications)  -Based on those results and after  Plastics evaluation, will schedule for surgery. Will need preop optimization    Casper Grimes MD, ORIN, FACS  Breast Surgery  [facility] I spent a total of 50 minutes on the day of the visit.This includes face to face time and non-face to face time preparing to see the patient (eg, review of tests), obtaining and/or reviewing separately obtained history, documenting clinical information in the electronic or other health record, independently interpreting results and communicating results to the patient/family/caregiver, or care coordinator.

## 2025-01-30 ENCOUNTER — OFFICE VISIT (OUTPATIENT)
Dept: FAMILY MEDICINE | Facility: CLINIC | Age: 70
End: 2025-01-30
Payer: MEDICARE

## 2025-01-30 DIAGNOSIS — E11.22 TYPE 2 DIABETES MELLITUS WITH STAGE 3B CHRONIC KIDNEY DISEASE, WITHOUT LONG-TERM CURRENT USE OF INSULIN: ICD-10-CM

## 2025-01-30 DIAGNOSIS — G93.0 ARACHNOID CYST: ICD-10-CM

## 2025-01-30 DIAGNOSIS — C50.911 MALIGNANT NEOPLASM OF RIGHT FEMALE BREAST, UNSPECIFIED ESTROGEN RECEPTOR STATUS, UNSPECIFIED SITE OF BREAST: ICD-10-CM

## 2025-01-30 DIAGNOSIS — I42.1 OBSTRUCTIVE HYPERTROPHIC CARDIOMYOPATHY: ICD-10-CM

## 2025-01-30 DIAGNOSIS — I70.0 AORTIC ATHEROSCLEROSIS: ICD-10-CM

## 2025-01-30 DIAGNOSIS — N18.32 TYPE 2 DIABETES MELLITUS WITH STAGE 3B CHRONIC KIDNEY DISEASE, WITHOUT LONG-TERM CURRENT USE OF INSULIN: ICD-10-CM

## 2025-01-30 DIAGNOSIS — N18.4 ANEMIA IN STAGE 4 CHRONIC KIDNEY DISEASE: ICD-10-CM

## 2025-01-30 DIAGNOSIS — F32.2 CURRENT SEVERE EPISODE OF MAJOR DEPRESSIVE DISORDER WITHOUT PSYCHOTIC FEATURES WITHOUT PRIOR EPISODE: ICD-10-CM

## 2025-01-30 DIAGNOSIS — I27.20 PULMONARY HYPERTENSION: ICD-10-CM

## 2025-01-30 DIAGNOSIS — D63.1 ANEMIA IN STAGE 4 CHRONIC KIDNEY DISEASE: ICD-10-CM

## 2025-01-30 DIAGNOSIS — M54.6 PAIN IN THORACIC SPINE: ICD-10-CM

## 2025-01-30 DIAGNOSIS — F51.04 PSYCHOPHYSIOLOGICAL INSOMNIA: ICD-10-CM

## 2025-01-30 DIAGNOSIS — R26.2 AMBULATORY DYSFUNCTION: ICD-10-CM

## 2025-01-30 DIAGNOSIS — K59.03 DRUG-INDUCED CONSTIPATION: ICD-10-CM

## 2025-01-30 DIAGNOSIS — N18.4 CHRONIC KIDNEY DISEASE (CKD), STAGE IV (SEVERE): Primary | ICD-10-CM

## 2025-01-30 PROBLEM — E87.5 HYPERKALEMIA: Status: RESOLVED | Noted: 2025-01-16 | Resolved: 2025-01-30

## 2025-01-30 PROBLEM — Z20.1 HISTORY OF TUBERCULOSIS EXPOSURE: Status: RESOLVED | Noted: 2021-12-29 | Resolved: 2025-01-30

## 2025-01-30 PROBLEM — E16.2 HYPOGLYCEMIA: Status: RESOLVED | Noted: 2024-12-31 | Resolved: 2025-01-30

## 2025-01-30 PROBLEM — M54.50 ACUTE RIGHT-SIDED LOW BACK PAIN WITHOUT SCIATICA: Status: RESOLVED | Noted: 2025-01-02 | Resolved: 2025-01-30

## 2025-01-30 PROBLEM — F33.1 MAJOR DEPRESSIVE DISORDER, RECURRENT, MODERATE: Status: RESOLVED | Noted: 2021-03-19 | Resolved: 2025-01-30

## 2025-01-30 PROCEDURE — 1160F RVW MEDS BY RX/DR IN RCRD: CPT | Mod: CPTII,95,, | Performed by: INTERNAL MEDICINE

## 2025-01-30 PROCEDURE — 98007 SYNCH AUDIO-VIDEO EST HI 40: CPT | Mod: 95,,, | Performed by: INTERNAL MEDICINE

## 2025-01-30 PROCEDURE — 1157F ADVNC CARE PLAN IN RCRD: CPT | Mod: CPTII,95,, | Performed by: INTERNAL MEDICINE

## 2025-01-30 PROCEDURE — 1159F MED LIST DOCD IN RCRD: CPT | Mod: CPTII,95,, | Performed by: INTERNAL MEDICINE

## 2025-01-30 RX ORDER — PREGABALIN 25 MG/1
25 CAPSULE ORAL 2 TIMES DAILY
Qty: 60 CAPSULE | Refills: 2 | Status: SHIPPED | OUTPATIENT
Start: 2025-01-30 | End: 2025-04-30

## 2025-01-30 NOTE — ASSESSMENT & PLAN NOTE
- Reviewed recent hospital admission for hyperkalemia, noting potassium levels normalized by 18th.  - Evaluated medication regimen: discontinued Farxiga due to decreased kidney function (eGFR < 18), continued sodium bicarbonate 650 mg and furosemide (Lasix) as needed.  - Noted upcoming nephrologist appointment in March.

## 2025-01-30 NOTE — ASSESSMENT & PLAN NOTE
- Assessed ongoing back pain, considering neurosurgery or injections pending MRI results.  - Ordered MRI of whole spine for 5th (already scheduled).  - Gwendolyn reports back pain when attempting to lay down, but no pain when not moving.  - Pain has not improved and remains the same.  - Acknowledged the need to find a more permanent solution.

## 2025-01-30 NOTE — ASSESSMENT & PLAN NOTE
- Noted that the patient reports having depression.  - Assessed need for behavioral health support given multiple medical challenges.  - Referred patient to a behavioral health therapist for support.

## 2025-01-30 NOTE — ASSESSMENT & PLAN NOTE
- Continued Mounjaro (tirzepatide) as currently prescribed.  - Noted that A1C level are looking good.

## 2025-01-30 NOTE — PROGRESS NOTES
Chief Complaint: No chief complaint on file.      Gwendolyn Fournier  is a 70 y.o. year old patient who presents today for     History of Present Illness    CHIEF COMPLAINT:  Gwendolyn presents today for follow up of multiple medical conditions including back pain, breast cancer, and recent hospitalization.    BACK PAIN:  She reports back pain occurring with movement and when lying down, though pain is absent when stationary. Pain severity remains unchanged. She is not currently taking pain medications. Previous trial of gabapentin resulted in buckling while walking. She finds Cymbalta helpful for pain management, which she realized after attempting to discontinue the medication.    BREAST CANCER:  She is scheduled for a lumpectomy with breast conservation, to be followed by radiation therapy and possible chemotherapy.    MENTAL HEALTH:  She reports depression affecting her mobility, noting pain when sitting up. She is experiencing grief and has recently begun dwelling on the loss of her  who passed away 20 years ago.    MEDICATIONS:  Current medications include sodium bicarbonate GR 650mg for kidney disease, Mounjaro, and Lasix as needed. She has Ambien prescribed but does not take it regularly. She has hydrocodone available but does not take it due to concerns about addiction. She previously discontinued Farxiga due to declining kidney function.      ROS:  General: -fever, -chills, -fatigue, -weight gain, -weight loss  Eyes: -vision changes, -redness, -discharge  ENT: -ear pain, -nasal congestion, -sore throat  Cardiovascular: -chest pain, -palpitations, -lower extremity edema  Respiratory: -cough, -shortness of breath  Gastrointestinal: -abdominal pain, -nausea, -vomiting, -diarrhea, -constipation, -blood in stool  Genitourinary: -dysuria, -hematuria, -frequency  Musculoskeletal: -joint pain, -muscle pain, +back pain  Skin: -rash, -lesion  Neurological: -headache, -dizziness, -numbness, -tingling  Psychiatric:  -anxiety, +depression, -sleep difficulty, +emotional lability         Past Medical History:   Diagnosis Date    Arthritis     Cancer     Gout attack     History of tuberculosis exposure 2021    Hx of psychiatric care     Hypertension     Opioid dependence, uncomplicated 2023    Psychiatric problem     Renal disorder     Sciatic nerve pain     Therapy     used to follow with psychiatrist but doesn't recall whom,     Tuberculosis     Unspecified cataract 2023    Mild on both eyes       Past Surgical History:   Procedure Laterality Date    COLONOSCOPY N/A 2019    Procedure: COLONOSCOPY;  Surgeon: Shanna Jose MD;  Location: [facility] ENDO;  Service: Endoscopy;  Laterality: N/A;    HYSTERECTOMY      INJECTION OF JOINT Bilateral 2019    Procedure: INJECTION, JOINT BILATERAL SI JOINT;  Surgeon: Evan Coreas MD;  Location: [facility] PAIN MGT;  Service: Pain Management;  Laterality: Bilateral;  BILATERAL SI JOINT INJECTION    KNEE SURGERY  2014    left knee surgery         Family History   Problem Relation Name Age of Onset    Tuberculosis Mother      Stroke Father      Bipolar disorder Daughter      Suicide Daughter      Depression Daughter      Bipolar disorder Daughter      Depression Daughter          Social History     Socioeconomic History    Marital status:     Number of children: 5   Occupational History    Occupation: homemaker   Tobacco Use    Smoking status: Former     Current packs/day: 0.00     Types: Cigarettes     Quit date: 1976     Years since quittin.1    Smokeless tobacco: Never   Substance and Sexual Activity    Alcohol use: Not Currently     Alcohol/week: 0.0 standard drinks of alcohol     Comment: red wine    Drug use: No    Sexual activity: Not Currently     Partners: Male     Social Drivers of Health     Financial Resource Strain: Patient Declined (2025)    Overall Financial Resource Strain (CARDIA)     Difficulty of Paying Living  Expenses: Patient declined   Food Insecurity: Patient Declined (1/16/2025)    Hunger Vital Sign     Worried About Running Out of Food in the Last Year: Patient declined     Ran Out of Food in the Last Year: Patient declined   Transportation Needs: Patient Declined (1/16/2025)    TRANSPORTATION NEEDS     Transportation : Patient declined   Physical Activity: Unknown (10/10/2022)    Exercise Vital Sign     Days of Exercise per Week: 0 days   Stress: Patient Declined (1/16/2025)    [employer] [place] [employer] - Occupational Stress Questionnaire     Feeling of Stress : Patient declined   Housing Stability: Patient Declined (1/16/2025)    Housing Stability Vital Sign     Unable to Pay for Housing in the Last Year: Patient declined     Homeless in the Last Year: Patient declined         Current Outpatient Medications:     acetaminophen (TYLENOL) 500 MG tablet, Take 1 tablet (500 mg total) by mouth every 6 (six) hours as needed for Pain., Disp: 30 tablet, Rfl: 0    amLODIPine (NORVASC) 5 MG tablet, Take 1 tablet (5 mg total) by mouth once daily., Disp: 90 tablet, Rfl: 3    DULoxetine (CYMBALTA) 60 MG capsule, TAKE 1 CAPSULE(60 MG) BY MOUTH EVERY DAY, Disp: 90 capsule, Rfl: 3    furosemide (LASIX) 80 MG tablet, Take 1 tablet (80 mg total) by mouth daily as needed (with 5 pounds increase in 24-72 hours)., Disp: 30 tablet, Rfl: 0    LIDOcaine (LIDODERM) 5 %, Place 1 patch onto the skin once daily. Apply patch for 12 hours and then leave off for 12 hours, Disp: 15 patch, Rfl: 0    linaCLOtide (LINZESS) 145 mcg Cap capsule, Take 1 capsule (145 mcg total) by mouth daily as needed (constipation)., Disp: , Rfl:     metoprolol succinate (TOPROL-XL) 50 MG 24 hr tablet, Take 1 tablet (50 mg total) by mouth once daily., Disp: 90 tablet, Rfl: 3    sodium bicarbonate 650 MG tablet, Take 1 tablet (650 mg total) by mouth 3 (three) times daily., Disp: 90 tablet, Rfl: 0    tirzepatide (MOUNJARO) 5 mg/0.5 mL PnIj, Inject 5 mg  into the skin every 7 days., Disp: 2 mL, Rfl: 0    tiZANidine (ZANAFLEX) 4 MG tablet, TAKE 1 TABLET(4 MG) BY MOUTH TWICE DAILY AS NEEDED FOR PAIN, Disp: 60 tablet, Rfl: 3    zolpidem (AMBIEN) 5 MG Tab, Take 5 mg by mouth nightly as needed (insomnia)., Disp: , Rfl:     atorvastatin (LIPITOR) 40 MG tablet, Take 1 tablet (40 mg total) by mouth every evening. (Patient not taking: Reported on 1/27/2025), Disp: 90 tablet, Rfl: 3    blood sugar diagnostic Strp, To check BG two times daily, freestyle lite meter, Disp: 200 each, Rfl: 3    blood-glucose meter kit, To check BG two times daily, freestyle lite meter, Disp: 1 each, Rfl: 0    docusate sodium (COLACE) 100 MG capsule, Take 1 capsule (100 mg total) by mouth as needed for Constipation., Disp: 90 capsule, Rfl: 3    flash glucose scanning reader (FREESTYLE HAI 2 READER) Misc, 1 each by Misc.(Non-Drug; Combo Route) route 4 (four) times daily with meals and nightly., Disp: 1 each, Rfl: 0    flash glucose sensor (FREESTYLE HAI 2 SENSOR) Kit, Use as directed to check blood sugar. Change every 14 days., Disp: 6 kit, Rfl: 2    lancets Misc, To check BG two times daily, to use with insurance preferred meter, Disp: 200 each, Rfl: 3    pregabalin (LYRICA) 25 MG capsule, Take 1 capsule (25 mg total) by mouth 2 (two) times daily., Disp: 60 capsule, Rfl: 2    Current Facility-Administered Medications:     lidocaine (PF) 10 mg/ml (1%) injection 40 mg, 4 mL, Intramuscular, 1 time in Clinic/HOD, [place], Azikiwe K., MD             Assessment:       1. Chronic kidney disease (CKD), stage IV (severe)    2. Arachnoid cyst    3. Pain in thoracic spine    4. Anemia in stage 4 chronic kidney disease    5. Current severe episode of major depressive disorder without psychotic features without prior episode    6. Ambulatory dysfunction    7. Pulmonary hypertension    8. Obstructive hypertrophic cardiomyopathy    9. Malignant neoplasm of right female breast, unspecified estrogen receptor  status, unspecified site of breast    10. Aortic atherosclerosis    11. Type 2 diabetes mellitus with stage 3b chronic kidney disease, without long-term current use of insulin    12. Psychophysiological insomnia    13. Drug-induced constipation          Plan:   1. Chronic kidney disease (CKD), stage IV (severe)  Assessment & Plan:  - Reviewed recent hospital admission for hyperkalemia, noting potassium levels normalized by 18th.  - Evaluated medication regimen: discontinued Farxiga due to decreased kidney function (eGFR < 18), continued sodium bicarbonate 650 mg and furosemide (Lasix) as needed.  - Noted upcoming nephrologist appointment in March.    Orders:  -     RENAL FUNCTION PANEL; Future; Expected date: 01/30/2025  -     Ferritin; Future; Expected date: 01/30/2025  -     Iron and TIBC; Future; Expected date: 01/30/2025  -     CBC Auto Differential; Future; Expected date: 01/30/2025  -     PHOSPHORUS; Future; Expected date: 01/30/2025    2. Arachnoid cyst  Assessment & Plan:  - Assessed ongoing back pain, considering neurosurgery or injections pending MRI results.  - Ordered MRI of whole spine for 5th (already scheduled).  - Gwendolyn reports back pain when attempting to lay down, but no pain when not moving.  - Pain has not improved and remains the same.  - Acknowledged the need to find a more permanent solution.      3. Pain in thoracic spine  Assessment & Plan:  - Assessed ongoing back pain, considering neurosurgery or injections pending MRI results.  - Ordered MRI of whole spine for 5th (already scheduled).  - Gwendolyn reports back pain when attempting to lay down, but no pain when not moving.  - Pain has not improved and remains the same.  - Acknowledged the need to find a more permanent solution.  - Initiated Lyrica 25 mg immediate release, single daily dose for nerve pain as an alternative to gabapentin.  - Discontinued gabapentin due to side effects (buckling while walking).  - Evaluated current medication  regimen.  - Continued Cymbalta (duloxetine) at current dose, which has been helping with pain since 2015.  - Discontinued Norco (hydrocodone), instructing patient to keep remaining supply for emergencies only.    Orders:  -     pregabalin (LYRICA) 25 MG capsule; Take 1 capsule (25 mg total) by mouth 2 (two) times daily.  Dispense: 60 capsule; Refill: 2  -     Ambulatory referral/consult to Home Health; Future; Expected date: 01/31/2025    4. Anemia in stage 4 chronic kidney disease  -     Ferritin; Future; Expected date: 01/30/2025  -     Iron and TIBC; Future; Expected date: 01/30/2025  -     CBC Auto Differential; Future; Expected date: 01/30/2025    5. Current severe episode of major depressive disorder without psychotic features without prior episode  Assessment & Plan:  - Noted that the patient reports having depression.  - Assessed need for behavioral health support given multiple medical challenges.  - Referred patient to a behavioral health therapist for support.    Orders:  -     Ambulatory referral/consult to Primary Care Behavioral Health (Non-Opioids); Future; Expected date: 02/06/2025    6. Ambulatory dysfunction  -     Ambulatory referral/consult to Home Health; Future; Expected date: 01/31/2025    7. Pulmonary hypertension  Assessment & Plan:  ECHO (2023): Pulmonary Artery: The estimated pulmonary artery systolic pressure is 38 mmHg.   - stable, monitor for now      8. Obstructive hypertrophic cardiomyopathy  Assessment & Plan:  ECHO (2023): There is hyperdynamic systolic function with a visually estimated ejection fraction of greater than 70%. Grade I diastolic dysfunction. LVOT obstruction . 56 mm of gradient across LVOT with Valsalva     No STEPHENS, monitor for now  Cont toprol      9. Malignant neoplasm of right female breast, unspecified estrogen receptor status, unspecified site of breast  Assessment & Plan:  - Reviewed notes from neurosurgeon and general surgeon.  - Developed treatment plan  including lumpectomy to remove breast mass, conserving the entire breast, with possible radiation and chemotherapy afterwards.  - Educated patient about the breast mass requiring treatment.      10. Aortic atherosclerosis  Assessment & Plan:  Not a pressing issue at the moment  - Continued atorvastatin as currently prescribed.      11. Type 2 diabetes mellitus with stage 3b chronic kidney disease, without long-term current use of insulin  Assessment & Plan:  - Continued Mounjaro (tirzepatide) as currently prescribed.  - Noted that A1C level are looking good.      12. Psychophysiological insomnia  Assessment & Plan:  - Noted that patient has Ambien (zolpidem) available for sleep but has not been taking it recently.      13. Drug-induced constipation  Assessment & Plan:  - Continued Linzess (linaclotide) as currently prescribed for constipation management.         MOBILITY:  - Referred patient to home health for physical therapy to improve mobility.  - Discussed importance of movement for physical and mental health.  - Gwendolyn to increase mobility to reduce risk of blood clots and improve mental health.    LABS:  - Ordered comprehensive metabolic panel and other relevant labs to be done on 5th at [facility], coinciding with scheduled MRI appointment.    FOLLOW UP:  - Discussed risks of prolonged immobility, including increased risk of blood clots, especially with cancer diagnosis.  - Follow up virtually in 1 month on Thursday afternoon.  - Contact office if Lyrica is not effective or if any new concerns arise.         No follow-ups on file.    This note was generated with the assistance of ambient listening technology. Verbal consent was obtained by the patient and accompanying visitor(s) for the recording of patient appointment to facilitate this note. I attest to having reviewed and edited the generated note for accuracy, though some syntax or spelling errors may persist. Please contact the author of this note  for any clarification.

## 2025-01-30 NOTE — ASSESSMENT & PLAN NOTE
ECHO (2023): Pulmonary Artery: The estimated pulmonary artery systolic pressure is 38 mmHg.   - stable, monitor for now

## 2025-01-30 NOTE — ASSESSMENT & PLAN NOTE
- Noted that patient has Ambien (zolpidem) available for sleep but has not been taking it recently.

## 2025-01-30 NOTE — ASSESSMENT & PLAN NOTE
- Reviewed notes from neurosurgeon and general surgeon.  - Developed treatment plan including lumpectomy to remove breast mass, conserving the entire breast, with possible radiation and chemotherapy afterwards.  - Educated patient about the breast mass requiring treatment.

## 2025-01-30 NOTE — ASSESSMENT & PLAN NOTE
ECHO (2023): There is hyperdynamic systolic function with a visually estimated ejection fraction of greater than 70%. Grade I diastolic dysfunction. LVOT obstruction . 56 mm of gradient across LVOT with Valsalva     No STEPHENS, monitor for now  Cont toprol

## 2025-01-30 NOTE — ASSESSMENT & PLAN NOTE
- Assessed ongoing back pain, considering neurosurgery or injections pending MRI results.  - Ordered MRI of whole spine for 5th (already scheduled).  - Gwendolyn reports back pain when attempting to lay down, but no pain when not moving.  - Pain has not improved and remains the same.  - Acknowledged the need to find a more permanent solution.  - Initiated Lyrica 25 mg immediate release, single daily dose for nerve pain as an alternative to gabapentin.  - Discontinued gabapentin due to side effects (buckling while walking).  - Evaluated current medication regimen.  - Continued Cymbalta (duloxetine) at current dose, which has been helping with pain since 2015.  - Discontinued Norco (hydrocodone), instructing patient to keep remaining supply for emergencies only.

## 2025-01-31 ENCOUNTER — TELEPHONE (OUTPATIENT)
Dept: BEHAVIORAL HEALTH | Facility: CLINIC | Age: 70
End: 2025-01-31
Payer: MEDICARE

## 2025-01-31 ENCOUNTER — PATIENT MESSAGE (OUTPATIENT)
Dept: BEHAVIORAL HEALTH | Facility: CLINIC | Age: 70
End: 2025-01-31
Payer: MEDICARE

## 2025-01-31 NOTE — PROGRESS NOTES
Behavioral Health Community Health Worker  Initial Assessment  Completed by:  Wayne Villafana    Date:  1/31/2025    Patient Enrollment in Behavioral Health Program:  Patient verbalized understanding of Behavioral Health Integration services to include:  Patient understands that CHW, LCSW, PharmD and consulting Psychiatrist are members of the care team working collaboratively with his/her primary care provider: Yes  Patient understands that activation of their [facility] patient portal account is required for accessing the full scope of team services: Yes  Patient understands that some counseling sessions may occur via video: Yes  Clinic visits with the psychiatrist may be subject to a co-pay based on your insurance: Yes  Patient consents to enroll in BHI program: Yes    Assessments     Single Item Health Literacy Scale:  How often do you need to have someone help you read instructions, pamphlets or other written material from your doctor or pharmacy?: Always    Promis 10:  Promis 10 Responses  In general, would you say your health is: Poor  In general, would you say your quality of life is: Fair  In general, how would you rate your physical health?: Poor  In general, how would you rate your mental health, including your mood and your ability to think?: Fair  In general, how would you rate your satisfaction with your social activities and relationships?: Fair  In general, please rate how well you carry out your usual social activities and roles. (This includes activities at home, at work and in your community, and responsibilities as a parent, child, spouse, employee, friend, etc.): Fair  To what extent are you able to carry out your everyday physical activities such as walking, climbing stairs, carrying groceries, or moving a chair? : Moderately  How often have you been bothered by emotional problems such as feeling anxious, depressed or irritable?: Often  In the past 7 days, how would you rate your fatigue on  average?: Mild  In the past 7 days, on a scale of 0 to 10 (where 0 is no pain and 10 is the worst pain imaginable) how would you rate your pain on average?: 8  Global Physical Health: 14  Global Mental health Score: 10    Depression PHQ:      2025     1:25 PM   PHQ9   Little interest or pleasure in doing things 0   Feeling down, depressed, or hopeless 1   Trouble falling or staying asleep, or sleeping too much 1   Feeling tired or having little energy 1   Poor appetite or overeating 1   Feeling bad about yourself - or that you are a failure or have let yourself or your family down 0   Trouble concentrating on things, such as reading the newspaper or watching television 0   Moving or speaking so slowly that other people could have noticed. Or the opposite - being so fidgety or restless that you have been moving around a lot more than usual 0   PHQ-9 Total Score 4         Generalized Anxiety Disorder 7-Item Scale:      2025     1:28 PM   GAD7   1. Feeling nervous, anxious, or on edge? 0   2. Not being able to stop or control worrying? 0   3. Worrying too much about different things? 0   4. Trouble relaxing? 0   5. Being so restless that it is hard to sit still? 0   6. Becoming easily annoyed or irritable? 0   7. Feeling afraid as if something awful might happen? 0   8. If you checked off any problems, how difficult have these problems made it for you to do your work, take care of things at home, or get along with other people? 0   SUZANNE-7 Score 0       History     Social History     Socioeconomic History    Marital status:     Number of children: 5   Occupational History    Occupation: homemaker   Tobacco Use    Smoking status: Former     Current packs/day: 0.00     Types: Cigarettes     Quit date: 1976     Years since quittin.1    Smokeless tobacco: Never   Substance and Sexual Activity    Alcohol use: Not Currently     Alcohol/week: 0.0 standard drinks of alcohol     Comment: red wine     "Drug use: No    Sexual activity: Not Currently     Partners: Male     Social Drivers of Health     Financial Resource Strain: Patient Declined (1/16/2025)    Overall Financial Resource Strain (CARDIA)     Difficulty of Paying Living Expenses: Patient declined   Food Insecurity: Patient Declined (1/16/2025)    Hunger Vital Sign     Worried About Running Out of Food in the Last Year: Patient declined     Ran Out of Food in the Last Year: Patient declined   Transportation Needs: Patient Declined (1/16/2025)    TRANSPORTATION NEEDS     Transportation : Patient declined   Physical Activity: Unknown (10/10/2022)    Exercise Vital Sign     Days of Exercise per Week: 0 days   Stress: Patient Declined (1/16/2025)    [employer] [place] [employer] - Occupational Stress Questionnaire     Feeling of Stress : Patient declined   Housing Stability: Patient Declined (1/16/2025)    Housing Stability Vital Sign     Unable to Pay for Housing in the Last Year: Patient declined     Homeless in the Last Year: Patient declined       Call Summary   Patient was referred to the BHI (Non-opioid) program by Primary Care Provider, Dr. Yolette FANGW contacted Gwendolyn Fournier who reports depression that limits [his/her] activities of daily living (ADLs).   Patient scored "4" on the PHQ9 and "0" on the SUZANNE 7. Based on these scores patient is eligible for the Behavioral health Integration (Non-opioid) Program. ANNALISAW completed the intake and scheduled an appointment for patient with Gustabo Boone LPC, on 2/17/25.          "

## 2025-02-05 ENCOUNTER — HOSPITAL ENCOUNTER (OUTPATIENT)
Dept: RADIOLOGY | Facility: HOSPITAL | Age: 70
Discharge: HOME | End: 2025-02-05
Attending: PHYSICIAN ASSISTANT
Payer: MEDICARE

## 2025-02-05 DIAGNOSIS — C50.919 MALIGNANT NEOPLASM OF FEMALE BREAST, UNSPECIFIED ESTROGEN RECEPTOR STATUS, UNSPECIFIED LATERALITY, UNSPECIFIED SITE OF BREAST: ICD-10-CM

## 2025-02-05 DIAGNOSIS — R29.898 HAND WEAKNESS: ICD-10-CM

## 2025-02-05 DIAGNOSIS — G93.0 ARACHNOID CYST: ICD-10-CM

## 2025-02-05 DIAGNOSIS — R20.0 LOWER EXTREMITY NUMBNESS: ICD-10-CM

## 2025-02-05 DIAGNOSIS — R93.7 ABNORMAL MRI, THORACIC SPINE: ICD-10-CM

## 2025-02-05 PROCEDURE — 72146 MRI CHEST SPINE W/O DYE: CPT | Mod: TC

## 2025-02-05 PROCEDURE — 72141 MRI NECK SPINE W/O DYE: CPT | Mod: 26,,, | Performed by: RADIOLOGY

## 2025-02-05 PROCEDURE — 72146 MRI CHEST SPINE W/O DYE: CPT | Mod: 26,,, | Performed by: RADIOLOGY

## 2025-02-05 PROCEDURE — 72141 MRI NECK SPINE W/O DYE: CPT | Mod: TC

## 2025-02-05 RX ORDER — FUROSEMIDE 80 MG/1
80 TABLET ORAL DAILY PRN
Qty: 30 TABLET | Refills: 0 | Status: SHIPPED | OUTPATIENT
Start: 2025-02-05 | End: 2025-02-26 | Stop reason: SDUPTHER

## 2025-02-07 ENCOUNTER — OFFICE VISIT (OUTPATIENT)
Dept: CARDIOLOGY | Facility: CLINIC | Age: 70
End: 2025-02-07
Payer: MEDICARE

## 2025-02-07 VITALS
HEART RATE: 109 BPM | HEIGHT: 67 IN | OXYGEN SATURATION: 97 % | DIASTOLIC BLOOD PRESSURE: 74 MMHG | SYSTOLIC BLOOD PRESSURE: 178 MMHG | WEIGHT: 238.13 LBS | RESPIRATION RATE: 15 BRPM | BODY MASS INDEX: 37.37 KG/M2

## 2025-02-07 DIAGNOSIS — R00.2 HEART PALPITATIONS: ICD-10-CM

## 2025-02-07 DIAGNOSIS — I42.1 HOCM (HYPERTROPHIC OBSTRUCTIVE CARDIOMYOPATHY): Primary | ICD-10-CM

## 2025-02-07 DIAGNOSIS — I47.10 SVT (SUPRAVENTRICULAR TACHYCARDIA): ICD-10-CM

## 2025-02-07 RX ORDER — METOPROLOL SUCCINATE 100 MG/1
100 TABLET, EXTENDED RELEASE ORAL DAILY
Qty: 90 TABLET | Refills: 3 | Status: SHIPPED | OUTPATIENT
Start: 2025-02-07

## 2025-02-07 NOTE — PROGRESS NOTES
CARDIOVASCULAR CONSULTATION    REASON FOR CONSULT:   Gwendolyn Fournier is a 70 y.o. female who presents for evaluation.      HISTORY OF PRESENT ILLNESS:     Patient is a pleasant 66-year-old lady.  For the past few weeks has been experiencing worsening dyspnea on exertion as well as pedal edema.  Can hardly walk 10 ft and then has to stop because of significant shortness of breath.  Also bilateral leg swelling.  BNP was checked which was elevated at 182. Got an echocardiogram done earlier today the results of which are not available yet.  Denies chest pains at rest on exertion however exercise tolerance severely limited to 10 ft because of shortness of breath.  EKG done the clinic today was personally reviewed and shows sinus tachycardia left axis deviation, poor R-wave progression.  Has severe dyslipidemia, however has not been taking Lipitor.  She states that she misplaced that medication and hence has not been taking it.      Nov 21: Patient keeps on having frequent palpitations depite being on toprol xl. States that these symptoms go away after she puts ice towels on her face or takes deep breath. Holter showed SVT      The left ventricle is normal in size with concentric hypertrophy and hyperdynamic systolic function.  The estimated ejection fraction is 75%.  Normal right ventricular size with normal right ventricular systolic function.  Normal left ventricular diastolic function.  Mild left atrial enlargement.  Normal central venous pressure (3 mmHg).  TDS        Normal myocardial perfusion scan. There is no evidence of myocardial ischemia or infarction.    There is a mild intensity defect in the anteroapical wall of the left ventricle, secondary to breast attenuation.    The gated perfusion images showed an ejection fraction of 84% at rest.    There is normal wall motion at rest and post stress.    The EKG portion of this study is negative for ischemia.    The patient reported no chest pain during the stress  "test.    There were no arrhythmias during stress.      Sinus rhythm with heart rates varying between 82 and 197 BPM with an average of 101 BPM  There were rare PVCs totalling 378 and averaging 7.88 per hour. There were 4 couplets. There were 24 bigeminal cycles. There were 1 triplets.  There were very frequent PACs  SVT with heart rate upwards of 200. Some episodes appear to atrial tachycardia.      Notes from February 23:  Patient here for follow-up after a long hiatus.  Main complaint is dyspnea on exertion.  Gets short of breath after walking only 10 ft.  Has not been using her sleep apnea machine.    Notes from May 2024: Patient here for follow-up.  After a long hiatus.  Main complaint is bilateral pedal edema and worsening dyspnea on exertion.      Results for orders placed during the hospital encounter of 08/22/23    Echo    Interpretation Summary    Left Ventricle: Normal wall motion. There is hyperdynamic systolic function with a visually estimated ejection fraction of greater than 70%. Grade I diastolic dysfunction. LVOT obstruction .  56 mm of gradient across LVOT with Valsalva    Left Atrium: Left atrium is mildly dilated.    Right Ventricle: Mild right ventricular enlargement. Systolic function is normal.    Right Atrium: Right atrium is mildly dilated.    Pulmonary Artery: The estimated pulmonary artery systolic pressure is 38 mmHg.    IVC/SVC: Intermediate venous pressure at 8 mmHg.    TDS      Feb 25:    CHIEF COMPLAINT:  Gwendolyn presents today for follow up of cardiac condition.    CARDIOVASCULAR:  She experiences palpitations in the epigastric area, describing a "bumping" sensation particularly when lying down. These episodes occur approximately monthly without clear triggers. Holter monitor in 2021 showed 378 PVCs, some bigeminy, and SVTs. She reports home blood pressure readings of 135/70. During summer of last year, she experienced perspiration and stress with exertion when walking to her vehicle. " She denies current chest pain or tightness.    MEDICATIONS:  She takes Amlodipine 5 mg, Atorvastatin 40 mg, Lasix PRN for fluid overload, and Metoprolol 50 mg daily.    MEDICAL HISTORY:  She has a history of left knee replacement with titanium and claustrophobia.           PAST MEDICAL HISTORY:     Past Medical History:   Diagnosis Date    Arthritis     Cancer     Gout attack     History of tuberculosis exposure 12/29/2021    Hx of psychiatric care     Hypertension     Opioid dependence, uncomplicated 02/03/2023    Psychiatric problem     Renal disorder     Sciatic nerve pain 2013    Therapy     used to follow with psychiatrist but doesn't recall whom, 2012    Tuberculosis     Unspecified cataract 07/27/2023    Mild on both eyes       PAST SURGICAL HISTORY:     Past Surgical History:   Procedure Laterality Date    COLONOSCOPY N/A 01/21/2019    Procedure: COLONOSCOPY;  Surgeon: Shanna Jose MD;  Location: [facility] ENDO;  Service: Endoscopy;  Laterality: N/A;    HYSTERECTOMY      INJECTION OF JOINT Bilateral 03/13/2019    Procedure: INJECTION, JOINT BILATERAL SI JOINT;  Surgeon: Evan Coreas MD;  Location: [facility] PAIN MGT;  Service: Pain Management;  Laterality: Bilateral;  BILATERAL SI JOINT INJECTION    KNEE SURGERY  01/01/2014    left knee surgery        ALLERGIES AND MEDICATION:     Review of patient's allergies indicates:   Allergen Reactions    Ondansetron hcl (pf) Hives and Itching    Ketorolac Itching        Medication List            Accurate as of February 7, 2025 11:59 PM. If you have any questions, ask your nurse or doctor.                CHANGE how you take these medications      metoprolol succinate 100 MG 24 hr tablet  Commonly known as: TOPROL-XL  Take 1 tablet (100 mg total) by mouth once daily.  What changed:   medication strength  how much to take  Changed by: Mazin Shankar MD            CONTINUE taking these medications      acetaminophen 500 MG tablet  Commonly known as: TYLENOL  Take 1 tablet  (500 mg total) by mouth every 6 (six) hours as needed for Pain.     amLODIPine 5 MG tablet  Commonly known as: NORVASC  Take 1 tablet (5 mg total) by mouth once daily.     atorvastatin 40 MG tablet  Commonly known as: LIPITOR  Take 1 tablet (40 mg total) by mouth every evening.     blood sugar diagnostic Strp  To check BG two times daily, freestyle lite meter     blood-glucose meter kit  To check BG two times daily, freestyle lite meter     docusate sodium 100 MG capsule  Commonly known as: COLACE  Take 1 capsule (100 mg total) by mouth as needed for Constipation.     DULoxetine 60 MG capsule  Commonly known as: CYMBALTA  TAKE 1 CAPSULE(60 MG) BY MOUTH EVERY DAY     FREESTYLE HAI 2 READER Mis  Generic drug: flash glucose scanning reader  1 each by Misc.(Non-Drug; Combo Route) route 4 (four) times daily with meals and nightly.     FREESTYLE HAI 2 SENSOR Kit  Generic drug: flash glucose sensor  Use as directed to check blood sugar. Change every 14 days.     furosemide 80 MG tablet  Commonly known as: LASIX  Take 1 tablet (80 mg total) by mouth daily as needed (with 5 pounds increase in 24-72 hours).     lancets [facility]  To check BG two times daily, to use with insurance preferred meter     LIDOcaine 5 %  Commonly known as: LIDODERM  Place 1 patch onto the skin once daily. Apply patch for 12 hours and then leave off for 12 hours     linaCLOtide 145 mcg Cap capsule  Commonly known as: LINZESS  Take 1 capsule (145 mcg total) by mouth daily as needed (constipation).     MOUNJARO 5 mg/0.5 mL Pnij  Generic drug: tirzepatide  Inject 5 mg into the skin every 7 days.     pregabalin 25 MG capsule  Commonly known as: LYRICA  Take 1 capsule (25 mg total) by mouth 2 (two) times daily.     sodium bicarbonate 650 MG tablet  Take 1 tablet (650 mg total) by mouth 3 (three) times daily.     tiZANidine 4 MG tablet  Commonly known as: ZANAFLEX  TAKE 1 TABLET(4 MG) BY MOUTH TWICE DAILY AS NEEDED FOR PAIN     zolpidem 5 MG Tab  Commonly  known as: AMBIEN               Where to Get Your Medications        These medications were sent to [id number] - [place] [address] [place] [address] [place] [address]      Phone: [phone]   metoprolol succinate 100 MG 24 hr tablet         SOCIAL HISTORY:     Social History     Socioeconomic History    Marital status:     Number of children: 5   Occupational History    Occupation: homemaker   Tobacco Use    Smoking status: Former     Current packs/day: 0.00     Types: Cigarettes     Quit date: 1976     Years since quittin.1    Smokeless tobacco: Never   Substance and Sexual Activity    Alcohol use: Not Currently     Alcohol/week: 0.0 standard drinks of alcohol     Comment: red wine    Drug use: No    Sexual activity: Not Currently     Partners: Male     Social Drivers of Health     Financial Resource Strain: Patient Declined (2025)    Overall Financial Resource Strain (CARDIA)     Difficulty of Paying Living Expenses: Patient declined   Food Insecurity: Patient Declined (2025)    Hunger Vital Sign     Worried About Running Out of Food in the Last Year: Patient declined     Ran Out of Food in the Last Year: Patient declined   Transportation Needs: Patient Declined (2025)    TRANSPORTATION NEEDS     Transportation : Patient declined   Physical Activity: Unknown (10/10/2022)    Exercise Vital Sign     Days of Exercise per Week: 0 days   Stress: Patient Declined (2025)    [employer] - Occupational Stress Questionnaire     Feeling of Stress : Patient declined   Housing Stability: Patient Declined (2025)    Housing Stability Vital Sign     Unable to Pay for Housing in the Last Year: Patient declined     Homeless in the Last Year: Patient declined       FAMILY HISTORY:     Family History   Problem Relation Name Age of Onset    Tuberculosis Mother      Stroke Father      Bipolar disorder  "Daughter      Suicide Daughter      Depression Daughter      Bipolar disorder Daughter      Depression Daughter         REVIEW OF SYSTEMS:   Review of Systems   Constitutional: Negative.   HENT: Negative.     Eyes: Negative.    Cardiovascular:  Positive for dyspnea on exertion, leg swelling and palpitations.   Respiratory:  Positive for shortness of breath.    Endocrine: Negative.    Hematologic/Lymphatic: Negative.    Skin: Negative.    Musculoskeletal: Negative.    Gastrointestinal: Negative.    Genitourinary: Negative.    Neurological: Negative.    Psychiatric/Behavioral: Negative.     Allergic/Immunologic: Negative.        A 10 point review of systems was performed and all the pertinent positives have been mentioned. Rest of review of systems was negative.        PHYSICAL EXAM:     Vitals:    02/07/25 1419   BP: (!) 178/74   Pulse: 109   Resp: 15    Body mass index is 37.29 kg/m².  Weight: 108 kg (238 lb 1.6 oz)   Height: 5' 7" (170.2 cm)     Physical Exam  Constitutional:       Appearance: Normal appearance. She is well-developed.   HENT:      Head: Normocephalic.   Eyes:      Pupils: Pupils are equal, round, and reactive to light.   Cardiovascular:      Rate and Rhythm: Normal rate and regular rhythm.   Pulmonary:      Effort: Pulmonary effort is normal.      Breath sounds: Normal breath sounds.   Abdominal:      General: Bowel sounds are normal.      Palpations: Abdomen is soft.      Tenderness: There is no abdominal tenderness.   Musculoskeletal:         General: Normal range of motion.      Cervical back: Normal range of motion and neck supple.   Skin:     General: Skin is warm.   Neurological:      Mental Status: She is alert and oriented to person, place, and time.           DATA:     Laboratory:  CBC:  Recent Labs   Lab 01/17/25  0716 01/18/25  0445 02/05/25  1638   WBC 9.02 8.12 12.92 H   Hemoglobin 8.8 L 8.6 L 10.5 L   Hematocrit 28.3 L 27.2 L 32.2 L   Platelets 239 244 326       CHEMISTRIES:  Recent " Labs   Lab 03/24/22  1055 09/07/22  1614 10/05/22  0714 12/09/22  1527 01/17/25  1612 01/18/25  0445 02/05/25  1638   Glucose 164 H  --  77   < > 87 75 89   Sodium 144  --  138   < > 137 139 143   Potassium 3.0 L  --  3.0 L   < > 5.4 H 4.9 3.7   BUN 34 H  --  26 H   < > 44 H 49 H 17   Creatinine 2.0 H   < > 1.5 H   < > 2.6 H 2.8 H 1.6 H   eGFR if  28.9 A  --   --   --   --   --   --    eGFR if non  25.1 A  --   --   --   --   --   --    Calcium 10.9 H  --  10.6 H   < > 9.4 9.5 9.3   Magnesium  --   --  1.9  --   --   --   --     < > = values in this interval not displayed.       CARDIAC BIOMARKERS:  Recent Labs   Lab 03/07/24  2245   Troponin I 0.007       COAGS:        LIPIDS/LFTS:  Recent Labs   Lab 10/05/22  0714 04/17/23  1325 09/14/23  0906 11/28/23  1356 12/31/24  0924 01/16/25  1152 01/16/25  1557   Cholesterol 198  --  192  --  124  --   --    Triglycerides 245 H  --  249 H  --  100  --   --    HDL 36 L  --  34 L  --  34 L  --   --    LDL Cholesterol 113.0  --  108.2  --  70.0  --   --    Non-HDL Cholesterol 162  --  158  --  90  --   --    AST 10   < > 14   < > 11 17 19   ALT 15   < > 15   < > 13 12 12    < > = values in this interval not displayed.       Hemoglobin A1C   Date Value Ref Range Status   12/31/2024 5.1 4.0 - 5.6 % Final     Comment:     ADA Screening Guidelines:  5.7-6.4%  Consistent with prediabetes  >or=6.5%  Consistent with diabetes    High levels of fetal hemoglobin interfere with the HbA1C  assay. Heterozygous hemoglobin variants (HbS, HgC, etc)do  not significantly interfere with this assay.   However, presence of multiple variants may affect accuracy.     05/21/2024 5.7 (H) 4.0 - 5.6 % Final     Comment:     ADA Screening Guidelines:  5.7-6.4%  Consistent with prediabetes  >or=6.5%  Consistent with diabetes    High levels of fetal hemoglobin interfere with the HbA1C  assay. Heterozygous hemoglobin variants (HbS, HgC, etc)do  not significantly interfere  with this assay.   However, presence of multiple variants may affect accuracy.     09/14/2023 5.4 4.0 - 5.6 % Final     Comment:     ADA Screening Guidelines:  5.7-6.4%  Consistent with prediabetes  >or=6.5%  Consistent with diabetes    High levels of fetal hemoglobin interfere with the HbA1C  assay. Heterozygous hemoglobin variants (HbS, HgC, etc)do  not significantly interfere with this assay.   However, presence of multiple variants may affect accuracy.         TSH  Recent Labs   Lab 05/18/23  1422 09/14/23  0906 12/31/24  0924   TSH 2.465 1.708 2.957       The ASCVD Risk score (Melanie BURKETT, et al., 2019) failed to calculate for the following reasons:    The valid total cholesterol range is 130 to 320 mg/dL           ASSESSMENT AND PLAN     Patient Active Problem List   Diagnosis    Essential hypertension    Primary osteoarthritis involving multiple joints    Idiopathic chronic gout of multiple sites without tophus    History of open reduction and internal fixation (ORIF) procedure    Chronic pain    Severe obesity (BMI 35.0-39.9) with comorbidity    Obstructive sleep apnea    Sickle cell trait    Sacroiliitis, not elsewhere classified    Type 2 diabetes mellitus with stage 3b chronic kidney disease, without long-term current use of insulin    Decreased range of motion of trunk and back    Decreased strength of trunk and back    SVT (supraventricular tachycardia)    Aortic atherosclerosis    Tremor of both hands    Procedure and treatment not carried out because of patient's decision for reasons of belief and group pressure    Chronic kidney disease (CKD), stage IV (severe)    Drug-induced constipation    Malignant neoplasm of right female breast    Pulmonary hypertension    Obstructive hypertrophic cardiomyopathy    Pain in thoracic spine    Arachnoid cyst    Anemia in stage 4 chronic kidney disease    Current severe episode of major depressive disorder without psychotic features without prior episode     Psychophysiological insomnia       Assessment & Plan    IMPRESSION:  Elevated blood pressure during visit (178/74), likely due to stress; patient reports normal home readings of 135/70  Previous stress test from May was normal  2021 Holter monitor results showed PVCs and SVTs  Hypertrophic obstructive cardiomyopathy (HCM) present based on previous echo  Increased metoprolol to lower HR, crucial for cardiac condition  Cardiac MRI needed to further evaluate HCM  Event monitor planned  to capture palpitation episodes    PLAN SUMMARY:  - Continue atorvastatin 40 mg daily  - Increase metoprolol to 100 mg daily  - Continue amlodipine 5 mg daily  - Continue Lasix as needed for fluid overload  - Cardiac MRI ordered  - Event monitor ordered for 1 month  - Follow up after completing cardiac MRI and event monitor    OBSTRUCTIVE HYPERTROPHIC CARDIOMYOPATHY:  - Explained importance of completing ordered tests for proper management of cardiac condition.  - Discussed potential serious consequences of untreated hypertrophic obstructive cardiomyopathy, including risk of death.  - Cardiac MRI ordered.  - Event monitor ordered for 1 month.  - Gwendolyn to press button on event monitor when feeling palpitations.    HYPERTENSION:  - Gwendolyn to monitor blood pressure at home daily.  - Increased metoprolol from 50 mg to 100 mg daily.  - Continued amlodipine 5 mg daily.    HYPERLIPIDEMIA:  - Continued atorvastatin 40 mg daily.    FLUID OVERLOAD:  - Continued Lasix as needed for fluid overload.    FOLLOW-UP:  - Follow up after completing cardiac MRI and event monitor.       SVT: Continue beta blockers.               Thank you very much for involving me in the care of your patient.  Please do not hesitate to contact me if there are any questions.      Mazin Shankar MD, FACC, [facility]  Interventional Cardiologist, [facility].     Visit today included increased complexity associated with the care of the episodic problem hypertension,  dyslipidemia, hypertrophic obstructive cardiomyopathy addressed and managing the longitudinal care of the patient due to the serious and/or complex managed problem(s) Problem List[1]  .      This note was dictated with the help of speech recognition software.  There might be un-intended errors and/or substitutions.                             [1]   Patient Active Problem List  Diagnosis    Essential hypertension    Primary osteoarthritis involving multiple joints    Idiopathic chronic gout of multiple sites without tophus    History of open reduction and internal fixation (ORIF) procedure    Chronic pain    Severe obesity (BMI 35.0-39.9) with comorbidity    Obstructive sleep apnea    Sickle cell trait    Sacroiliitis, not elsewhere classified    Type 2 diabetes mellitus with stage 3b chronic kidney disease, without long-term current use of insulin    Decreased range of motion of trunk and back    Decreased strength of trunk and back    SVT (supraventricular tachycardia)    Aortic atherosclerosis    Tremor of both hands    Procedure and treatment not carried out because of patient's decision for reasons of belief and group pressure    Chronic kidney disease (CKD), stage IV (severe)    Drug-induced constipation    Malignant neoplasm of right female breast    Pulmonary hypertension    Obstructive hypertrophic cardiomyopathy    Pain in thoracic spine    Arachnoid cyst    Anemia in stage 4 chronic kidney disease    Current severe episode of major depressive disorder without psychotic features without prior episode    Psychophysiological insomnia

## 2025-02-12 ENCOUNTER — HOSPITAL ENCOUNTER (OUTPATIENT)
Dept: RADIOLOGY | Facility: HOSPITAL | Age: 70
Discharge: HOME | End: 2025-02-12
Attending: INTERNAL MEDICINE
Payer: MEDICARE

## 2025-02-12 DIAGNOSIS — R92.8 ABNORMAL MAMMOGRAM OF RIGHT BREAST: Primary | ICD-10-CM

## 2025-02-12 DIAGNOSIS — R93.7 ABNORMAL MRI, CERVICAL SPINE: Primary | ICD-10-CM

## 2025-02-12 DIAGNOSIS — C50.911 MALIGNANT NEOPLASM OF RIGHT FEMALE BREAST, UNSPECIFIED ESTROGEN RECEPTOR STATUS, UNSPECIFIED SITE OF BREAST: ICD-10-CM

## 2025-02-12 PROCEDURE — 63600175 PHARM REV CODE 636 W HCPCS: Performed by: INTERNAL MEDICINE

## 2025-02-12 PROCEDURE — 19081 BX BREAST 1ST LESION STRTCTC: CPT | Mod: RT,,, | Performed by: RADIOLOGY

## 2025-02-12 PROCEDURE — 27200934 MAMMO BREAST STEREOTACTIC BREAST BIOPSY RIGHT

## 2025-02-12 RX ORDER — LIDOCAINE HCL/EPINEPHRINE/PF 2%-1:200K
20 VIAL (ML) INJECTION ONCE
Status: COMPLETED | OUTPATIENT
Start: 2025-02-12 | End: 2025-02-12

## 2025-02-12 RX ORDER — LIDOCAINE HYDROCHLORIDE 10 MG/ML
10 INJECTION, SOLUTION INFILTRATION; PERINEURAL ONCE
Status: COMPLETED | OUTPATIENT
Start: 2025-02-12 | End: 2025-02-12

## 2025-02-12 RX ADMIN — LIDOCAINE HYDROCHLORIDE,EPINEPHRINE BITARTRATE 14 ML: 20; .005 INJECTION, SOLUTION EPIDURAL; INFILTRATION; INTRACAUDAL; PERINEURAL at 01:02

## 2025-02-12 RX ADMIN — LIDOCAINE HYDROCHLORIDE 4 ML: 10 INJECTION, SOLUTION INFILTRATION; PERINEURAL at 01:02

## 2025-02-13 DIAGNOSIS — N18.32 TYPE 2 DIABETES MELLITUS WITH STAGE 3B CHRONIC KIDNEY DISEASE, WITHOUT LONG-TERM CURRENT USE OF INSULIN: ICD-10-CM

## 2025-02-13 DIAGNOSIS — E11.22 TYPE 2 DIABETES MELLITUS WITH STAGE 3B CHRONIC KIDNEY DISEASE, WITHOUT LONG-TERM CURRENT USE OF INSULIN: ICD-10-CM

## 2025-02-13 DIAGNOSIS — E66.01 SEVERE OBESITY (BMI 35.0-39.9) WITH COMORBIDITY: ICD-10-CM

## 2025-02-13 RX ORDER — TIRZEPATIDE 5 MG/.5ML
5 INJECTION, SOLUTION SUBCUTANEOUS
Qty: 6 ML | Refills: 0 | Status: SHIPPED | OUTPATIENT
Start: 2025-02-13 | End: 2025-05-14

## 2025-02-13 NOTE — TELEPHONE ENCOUNTER
No care due was identified.  Health [facility] Embedded Care Due Messages. Reference number: [id number].   2/13/2025 10:43:15 AM CST
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
## 2025-02-14 ENCOUNTER — TELEPHONE (OUTPATIENT)
Dept: BEHAVIORAL HEALTH | Facility: CLINIC | Age: 70
End: 2025-02-14
Payer: MEDICARE

## 2025-02-14 NOTE — PROGRESS NOTES
CHW reached out to new pt to remind her of virtual appointment with Gustabo Boone LPC Monday. Pt confirmed the appointment.

## 2025-02-17 ENCOUNTER — CLINICAL SUPPORT (OUTPATIENT)
Dept: BEHAVIORAL HEALTH | Facility: CLINIC | Age: 70
End: 2025-02-17
Payer: COMMERCIAL

## 2025-02-17 DIAGNOSIS — F32.2 CURRENT SEVERE EPISODE OF MAJOR DEPRESSIVE DISORDER WITHOUT PSYCHOTIC FEATURES WITHOUT PRIOR EPISODE: Primary | ICD-10-CM

## 2025-02-19 ENCOUNTER — TELEPHONE (OUTPATIENT)
Dept: SURGERY | Facility: CLINIC | Age: 70
End: 2025-02-19
Payer: MEDICARE

## 2025-02-19 ENCOUNTER — TELEPHONE (OUTPATIENT)
Dept: FAMILY MEDICINE | Facility: CLINIC | Age: 70
End: 2025-02-19
Payer: MEDICARE

## 2025-02-19 NOTE — TELEPHONE ENCOUNTER
----- Message from Trice sent at 2/19/2025  2:43 PM CST -----  Regarding: results  Contact: [phone]  ..Type:  Test ResultsWho Called: pt Name of Test (Lab/Mammo/Etc): MAMMO STEREOTACTIC BREAST BIDate of Test: 2/12Ordering Provider: YoletteWhere the test was performed: [facility], RadiologyWould the patient rather a call back or a response via [facility]? Call Best Call Back Number: [phone] Information:  pt is asking for results and also an appt or call to discuss when surgery will be

## 2025-02-19 NOTE — TELEPHONE ENCOUNTER
Called and spoke to the pt. Advised that her biopsy results were still in process and she will receive a call from Dr. Grimes once resulted. Pt v/u.

## 2025-02-21 ENCOUNTER — RESULTS FOLLOW-UP (OUTPATIENT)
Dept: RADIOLOGY | Facility: HOSPITAL | Age: 70
End: 2025-02-21

## 2025-02-21 DIAGNOSIS — K59.03 DRUG-INDUCED CONSTIPATION: ICD-10-CM

## 2025-02-21 NOTE — TELEPHONE ENCOUNTER
No care due was identified.  [facility] Embedded Care Due Messages. Reference number: [id number].   2/21/2025 1:07:04 PM CST

## 2025-02-25 ENCOUNTER — TELEPHONE (OUTPATIENT)
Dept: SURGERY | Facility: CLINIC | Age: 70
End: 2025-02-25
Payer: MEDICARE

## 2025-02-25 NOTE — TELEPHONE ENCOUNTER
Spoke to the pt. She is scheduled for a clinic visit to discuss her pathology results and schedule surgery on 2/27 with Dr. Grimes.

## 2025-02-26 RX ORDER — FUROSEMIDE 80 MG/1
80 TABLET ORAL DAILY PRN
Qty: 30 TABLET | Refills: 0 | Status: SHIPPED | OUTPATIENT
Start: 2025-02-26 | End: 2025-03-28

## 2025-02-27 ENCOUNTER — OFFICE VISIT (OUTPATIENT)
Dept: SURGERY | Facility: CLINIC | Age: 70
End: 2025-02-27
Payer: MEDICARE

## 2025-02-27 VITALS
OXYGEN SATURATION: 96 % | BODY MASS INDEX: 38.58 KG/M2 | HEART RATE: 91 BPM | WEIGHT: 245.81 LBS | DIASTOLIC BLOOD PRESSURE: 83 MMHG | HEIGHT: 67 IN | SYSTOLIC BLOOD PRESSURE: 166 MMHG

## 2025-02-27 DIAGNOSIS — C50.911 MALIGNANT NEOPLASM OF RIGHT FEMALE BREAST, UNSPECIFIED ESTROGEN RECEPTOR STATUS, UNSPECIFIED SITE OF BREAST: Primary | ICD-10-CM

## 2025-02-27 PROCEDURE — 3077F SYST BP >= 140 MM HG: CPT | Mod: CPTII,S$GLB,, | Performed by: SURGERY

## 2025-02-27 PROCEDURE — 99214 OFFICE O/P EST MOD 30 MIN: CPT | Mod: S$GLB,,, | Performed by: SURGERY

## 2025-02-27 PROCEDURE — 1157F ADVNC CARE PLAN IN RCRD: CPT | Mod: CPTII,S$GLB,, | Performed by: SURGERY

## 2025-02-27 PROCEDURE — 3008F BODY MASS INDEX DOCD: CPT | Mod: CPTII,S$GLB,, | Performed by: SURGERY

## 2025-02-27 PROCEDURE — 3288F FALL RISK ASSESSMENT DOCD: CPT | Mod: CPTII,S$GLB,, | Performed by: SURGERY

## 2025-02-27 PROCEDURE — 99999 PR PBB SHADOW E&M-EST. PATIENT-LVL IV: CPT | Mod: PBBFAC,,, | Performed by: SURGERY

## 2025-02-27 PROCEDURE — 1101F PT FALLS ASSESS-DOCD LE1/YR: CPT | Mod: CPTII,S$GLB,, | Performed by: SURGERY

## 2025-02-27 PROCEDURE — 1125F AMNT PAIN NOTED PAIN PRSNT: CPT | Mod: CPTII,S$GLB,, | Performed by: SURGERY

## 2025-02-27 PROCEDURE — 3079F DIAST BP 80-89 MM HG: CPT | Mod: CPTII,S$GLB,, | Performed by: SURGERY

## 2025-02-27 NOTE — PROGRESS NOTES
[facility]  [facility]        REFERRING PROVIDER: No referring provider defined for this encounter.    Chief Complaint: Follow-up      Subjective:      Patient ID: Gwendolyn Fournier is a 70 y.o. female who presents for evaluation of a newly diagnosed right breast cancer. She was in her usual state of health and this was found on screening mammogram. This was her first screening mammogram in 10 years. She subsequently mikel on to have diagnostic mammo, ultrasound, and ultrasound guided biopsy of two suspicious areas. Of note, there is a 3rd area of calcifications that still needs a stereotactic biopsy.    Findings at [facility]:    Specimen to Pathology, Radiology Breast, needle biopsy  Order: [id number]  Status: Edited Result - FINAL     Visible to patient: Yes (seen)     Next appt: 02/12/2025 at 01:00 PM in Radiology ([facility] MAMMO2)     Dx: Mass of right breast, unspecified judie...     1 Result Note      Component 1 mo ago   Final Pathologic Diagnosis     1. 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  Results will be issued in a supplemental report.     Comment:  The biopsy reveals a papillary neoplasm with low to intermediate nuclear features with abundant hemosiderin.  Immunostains for p63 and CK5/6 are negative within the neoplastic papillae.  The differential diagnosis includes encapsulated   papillary carcinoma (pTis) and invasive carcinoma with papillary features.  The distinction between these is made by surgical excision after examination of a fibrous capsule (if a capsule exists) and examination of the periphery of the entire lesion.     2. 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 Christian Duckworth MD, who concurs with the diagnosis.  VC      Comment: Interp By Tali uDmas D.O., Signed on 01/16/2025 at 10        Interval History 2/27/25  Patient returns to clinic following biopsy of 3rd suspicious right breast mass on 2/12/25. Pathology of papillary carcinoma. She returns to discuss surgical options. She expressed her desire to discuss reconstruction with plastic surgery    Pathology results:   Final Pathologic Diagnosis     Right breast, upper posterior, biopsy:  Papillary carcinoma, intermediate nuclear grade (see comment)  Papillary carcinoma measures 3 mm in greatest linear dimension within the core biopsy  Breast biomarkers:  ER: Positive (>95%, strong)  UT: pending           Past Medical History:   Diagnosis Date    Arthritis     Cancer     Gout attack     History of tuberculosis exposure 12/29/2021    Hx of psychiatric care     Hypertension     Opioid dependence, uncomplicated 02/03/2023    Psychiatric problem     Renal disorder     Sciatic nerve pain 2013    Therapy     used to follow with psychiatrist but doesn't recall whom, 2012    Tuberculosis     Unspecified cataract 07/27/2023    Mild on both eyes     Past Surgical History:   Procedure Laterality Date    COLONOSCOPY N/A 01/21/2019    Procedure: COLONOSCOPY;  Surgeon: Shanna Jose MD;  Location: [facility] ENDO;  Service: Endoscopy;  Laterality: N/A;    HYSTERECTOMY      INJECTION OF JOINT Bilateral 03/13/2019    Procedure: INJECTION, JOINT BILATERAL SI JOINT;  Surgeon: Evan Coreas MD;  Location: [facility] PAIN MGT;  Service: Pain Management;  Laterality: Bilateral;  BILATERAL SI  JOINT INJECTION    KNEE SURGERY  01/01/2014    left knee surgery      Current Outpatient Medications on File Prior to Visit   Medication Sig Dispense Refill    amLODIPine (NORVASC) 5 MG tablet Take 1 tablet (5 mg total) by mouth once daily. 90 tablet 3    atorvastatin (LIPITOR) 40 MG tablet Take 1 tablet (40 mg total) by mouth every evening. 90 tablet 3    blood sugar diagnostic Strp To check BG two times daily, freestyle lite meter 200 each 3    docusate sodium (COLACE) 100 MG capsule Take 1 capsule (100 mg total) by mouth as needed for Constipation. 90 capsule 3    DULoxetine (CYMBALTA) 60 MG capsule TAKE 1 CAPSULE(60 MG) BY MOUTH EVERY DAY 90 capsule 3    flash glucose scanning reader (FREESTYLE HAI 2 READER) Misc 1 each by Misc.(Non-Drug; Combo Route) route 4 (four) times daily with meals and nightly. 1 each 0    flash glucose sensor (FREESTYLE HAI 2 SENSOR) Kit Use as directed to check blood sugar. Change every 14 days. 6 kit 2    furosemide (LASIX) 80 MG tablet Take 1 tablet (80 mg total) by mouth daily as needed (with 5 pounds increase in 24-72 hours). 30 tablet 0    lancets Misc To check BG two times daily, to use with insurance preferred meter 200 each 3    LIDOcaine (LIDODERM) 5 % Place 1 patch onto the skin once daily. Apply patch for 12 hours and then leave off for 12 hours 15 patch 0    linaCLOtide (LINZESS) 145 mcg Cap capsule Take 1 capsule (145 mcg total) by mouth daily as needed (constipation). 90 capsule 0    metoprolol succinate (TOPROL-XL) 100 MG 24 hr tablet Take 1 tablet (100 mg total) by mouth once daily. 90 tablet 3    pregabalin (LYRICA) 25 MG capsule Take 1 capsule (25 mg total) by mouth 2 (two) times daily. 60 capsule 2    tirzepatide (MOUNJARO) 5 mg/0.5 mL PnIj Inject 5 mg into the skin every 7 days. 6 mL 0    tiZANidine (ZANAFLEX) 4 MG tablet TAKE 1 TABLET(4 MG) BY MOUTH TWICE DAILY AS NEEDED FOR PAIN 60 tablet 3    zolpidem (AMBIEN) 5 MG Tab Take 5 mg by mouth nightly as needed  (insomnia).      acetaminophen (TYLENOL) 500 MG tablet Take 1 tablet (500 mg total) by mouth every 6 (six) hours as needed for Pain. (Patient not taking: Reported on 2025) 30 tablet 0    blood-glucose meter kit To check BG two times daily, freestyle lite meter 1 each 0    sodium bicarbonate 650 MG tablet Take 1 tablet (650 mg total) by mouth 3 (three) times daily. 90 tablet 0     Current Facility-Administered Medications on File Prior to Visit   Medication Dose Route Frequency Provider Last Rate Last Admin    lidocaine (PF) 10 mg/ml (1%) injection 40 mg  4 mL Intramuscular 1 time in Clinic/HOD [place], Azikiwe K., MD         Social History     Socioeconomic History    Marital status:     Number of children: 5   Occupational History    Occupation: homemaker   Tobacco Use    Smoking status: Former     Current packs/day: 0.00     Types: Cigarettes     Quit date: 1976     Years since quittin.2    Smokeless tobacco: Never   Substance and Sexual Activity    Alcohol use: Not Currently     Alcohol/week: 0.0 standard drinks of alcohol     Comment: red wine    Drug use: No    Sexual activity: Not Currently     Partners: Male     Social Drivers of Health     Financial Resource Strain: Patient Declined (2025)    Overall Financial Resource Strain (CARDIA)     Difficulty of Paying Living Expenses: Patient declined   Food Insecurity: Patient Declined (2025)    Hunger Vital Sign     Worried About Running Out of Food in the Last Year: Patient declined     Ran Out of Food in the Last Year: Patient declined   Transportation Needs: Patient Declined (2025)    TRANSPORTATION NEEDS     Transportation : Patient declined   Physical Activity: Unknown (10/10/2022)    Exercise Vital Sign     Days of Exercise per Week: 0 days   Stress: Patient Declined (2025)    [employer] - Occupational Stress Questionnaire     Feeling of Stress : Patient declined   Housing Stability:  "Patient Declined (1/16/2025)    Housing Stability Vital Sign     Unable to Pay for Housing in the Last Year: Patient declined     Homeless in the Last Year: Patient declined     Family History   Problem Relation Name Age of Onset    Tuberculosis Mother      Stroke Father      Bipolar disorder Daughter      Suicide Daughter      Depression Daughter      Bipolar disorder Daughter      Depression Daughter          Review of Systems   All other systems reviewed and are negative.    Objective:   BP (!) 166/83 (BP Location: Right arm, Patient Position: Sitting)   Pulse 91   Ht 5' 7" (1.702 m)   Wt 111.5 kg (245 lb 13 oz)   SpO2 96%   BMI 38.50 kg/m²     Physical Exam   Cardiovascular:  Normal rate.            Pulmonary/Chest: Effort normal. Right breast exhibits no inverted nipple, no mass, no nipple discharge and no skin change. Left breast exhibits no inverted nipple, no mass, no nipple discharge and no skin change.   Bilateral large pendulous breast   Musculoskeletal: Lymphadenopathy:      Upper Body:      Right upper body: No supraclavicular or axillary adenopathy.      Left upper body: No supraclavicular or axillary adenopathy.     Neurological: She is alert.       Radiology review: Images personally reviewed by me in the clinic.     Mammo Digital Screening Bilat w/ Davi  Order: [id number]  Status: Final result     Visible to patient: Yes (seen)     Next appt: 02/12/2025 at 01:00 PM in Radiology ([facility] MAMMO2)     Dx: Encounter for screening mammogram for...     0 Result Notes    1 Patient Communication    1  Topic  Assessment    Overall   0 - Incomplete: Needs Additional Imaging Evaluation     Breast Density     Overall   Breast Composition b - Scattered fibroglandular density     Details    Reading Physician Reading Date Result Priority   Som Momin MD  [phone] 11/25/2024 Routine     Physician Responsible for MQSA Outcome Reason    Som Momin MD Signed      Narrative & Impression   "   Facility:  [facility]  [address] [facility]  [place] [address] [phone]     Name: Gwendolyn Fournier     MRN: [id number]     Result:   Mammo Digital Screening Bilat w/ Davi     History:  Patient is 69 y.o. and is seen for encounter for screening mammogram for malignant neoplasm of breast.     Films Compared:  Prior images (if available) were compared.     Findings:  This procedure was performed using tomosynthesis. Computer-aided detection was utilized in the interpretation of this examination.        There are scattered areas of fibroglandular density.      Right  There is a focal asymmetry seen in the outer region of the right breast in the middle depth.   There are calcifications seen in the upper region of the right breast in the posterior depth.      Left  There is no evidence of suspicious masses, calcifications, or other abnormal findings in the left breast.     Impression:  Right  Focal Asymmetry: Right breast focal asymmetry at the outer position and middle depth. Assessment: 0 - Incomplete. Diagnostic Mammogram and/or Ultrasound is recommended.   Calcifications: Right breast calcifications at the upper position and posterior depth. Assessment: 0 - Incomplete. Special Views: Magnification View is recommended.      Left  There is no mammographic evidence of malignancy in the left breast.     BI-RADS Category:   Overall: 0 - Incomplete: Needs Additional Imaging Evaluation        Recommendation:  Diagnostic mammogram with possible ultrasound (if indicated) is recommended.  Diagnostic mammogram including magnification views is recommended.           Your estimated lifetime risk of breast cancer (to age 85) based on Tyrer-Cuzick risk assessment model is 3.41%.  According to the American Cancer Society, patients with a lifetime breast cancer risk of 20% or higher might benefit from supplemental screening tests, such as screening breast  MRI.     Som Momin MD       Mammo Digital Diagnostic Right with Davi  Order: [id number]  Status: Final result     Visible to patient: Yes (seen)     Next appt: 02/12/2025 at 01:00 PM in Radiology ([facility] MAMMO2)     Dx: Abnormal mammogram of right breast     Linked study orders: US Breast Right Limited (Order: [id number])     0 Result Notes    1 Patient Communication    1  Topic  Assessment    Overall Right   4 - Suspicious 4 - Suspicious     Breast Density     Overall   Breast Composition b - Scattered fibroglandular density     Details    Reading Physician Reading Date Result Priority   Julissa Boone MD  [phone] 12/12/2024 Routine     Physician Responsible for MQSA Outcome Reason    Julissa Boone MD Signed      Narrative & Impression  Facility:  [facility] [address] [facility]  [place] [address]  [phone]     Name: Gwendolyn Fournier     MRN: [id number]     Result:   Mammo Digital Diagnostic Right with Davi  US Breast Right Limited     History:  Patient is 69 y.o. and is seen for abnormal mammogram of right breast.        Films Compared:  Compared to: 11/19/2024 Mammo Digital Screening Bilat w/ Davi     Findings:  This procedure was performed using tomosynthesis. Computer-aided detection was utilized in the interpretation of this examination.  There are scattered areas of fibroglandular density.      Mammo Digital Diagnostic Right with Davi  Right  Calcifications: There are 5 mm of amorphous calcifications in a grouped distribution seen in the upper region of the right breast in the posterior depth.  These are poorly visualized and were not eavluated with cc magnification despite multiple attempts.  The paitent was unable to proceed with additional attempts due to weakness and discomfort.   Mass: There is a mass seen in the right breast at 9 o'clock.   Mass: There is a mass seen in the right breast at 9 o'clock.       US Breast Right Limited  Right  Calcifications: There are no corresponding calcifications seen on this modality.   Mass: There is a 17 mm x 17 mm x 13 mm irregularly shaped, non-parallel, hypoechoic mass with indistinct margins seen in the right breast at 9 o'clock, 9 cm from the nipple. The mass correlates with the mass seen on the mammogram.   Mass: There is a 12 mm x 10 mm x 10 mm oval, parallel, hypoechoic mass with indistinct margins seen in the right breast at 9 o'clock, 8 cm from the nipple. The mass correlates with the mass seen on the mammogram.      Impression:  Right     Mass: Right breast 17 mm x 17 mm x 13 mm mass at 9 o'clock. Assessment: 4 - Suspicious finding. Ultrasound-guided biopsy is recommended.   Mass: Right breast 12 mm x 10 mm x 10 mm mass at 9 o'clock. Assessment: 4 - Suspicious finding. Ultrasound-guided biopsy is recommended.   Calcifications: Right breast 5 mm calcifications at the upper position and posterior depth. Assessment: 4 - Suspicious finding. Stereotactic Biopsy is recommended.   Note that these calcifications are suboptimally evaluated  and were not eavluated with cc magnification despite multiple attempts.  The paitent was unable to proceed with additional attempts due to weakness and discomfort.   She and I discussed stereotactic biopsy and she would like to attempt this after above ultrasound guided biopsies.  However, if the calcifications cannot be visualized or she is unable to maintain upright positioning due to weakness or discomfort, stereotactic biopsy may not be feasible.            BI-RADS Category:   Overall: 4 - Suspicious     Recommendation:  Stereotactic Biopsy is recommended.  Ultrasound-guided biopsy is recommended.  I discussed these findings and recommendations with the patient in detail at the time of exam.     Your estimated lifetime risk of breast cancer (to age 85) based on Tyrer-Cuzick risk assessment model is 3.41%.  According to the American  Cancer Society, patients with a lifetime breast cancer risk of 20% or higher might benefit from supplemental screening tests, such as screening breast MRI.     Julissa Boone MD         Assessment:       Right breast cancer  Plan:     Options for management were discussed with the patient and her family. We reviewed the existing data noting the equivalency of breast conserving surgery with radiation therapy and mastectomy. We discussed the need for partial mastectomy/lumpectomy margins to be negative for carcinoma, the necessity for postoperative radiation therapy after breast conservation in most cases, the possibility of a failed or false negative sentinel lymph node biopsy and the potential need for complete lymphadenectomy for a failed or positive sentinel lymph node biopsy were fully discussed.     In the setting of mastectomy, delayed or immediate reconstruction options are available and were discussed.     In the setting of lumpectomy, radiation therapy would be recommended majority of the time.  The duration and treatment side effects were discussed with the patient.  This will coordinated with the radiation oncologist pending final pathology.    We also discussed the role of systemic therapy in the treatment of early stage breast cancer.  We discussed that this is based on tumor biology and sourav status and will be determined based on final pathology.  We discussed that if the cancer is hormone positive, endocrine therapy would be recommended in most cases and its use can reduce the risk of recurrence as well as improve survival. Side effects of treatment were briefly discussed. We also discussed the potential role for chemotherapy based on a number of factors such as tumor phenotype (ER+ vs. triple negative vs. [id number]+) and this would be determined in coordination with the medical oncologist.    -The patient now with multicentric disease of her right breast with 3 biopsy proven cancers. Discussed  that mastectomy would be the favored approach to ensure negative margins. Will refer to Plastic Surgery for reconstruction vs reduction of her non-cancer side with right mastectomy  -referral to Medical Oncology  -Based on those results and after Plastics evaluation, will schedule for surgery. Will need preop optimization    Humza Henderson MD  General Surgery  2/27/2025

## 2025-02-28 ENCOUNTER — PATIENT OUTREACH (OUTPATIENT)
Dept: ADMINISTRATIVE | Facility: HOSPITAL | Age: 70
End: 2025-02-28
Payer: MEDICARE

## 2025-02-28 ENCOUNTER — OFFICE VISIT (OUTPATIENT)
Dept: FAMILY MEDICINE | Facility: CLINIC | Age: 70
End: 2025-02-28
Payer: MEDICARE

## 2025-02-28 ENCOUNTER — PATIENT MESSAGE (OUTPATIENT)
Dept: FAMILY MEDICINE | Facility: CLINIC | Age: 70
End: 2025-02-28

## 2025-02-28 DIAGNOSIS — M54.6 PAIN IN THORACIC SPINE: ICD-10-CM

## 2025-02-28 DIAGNOSIS — E11.22 TYPE 2 DIABETES MELLITUS WITH STAGE 3B CHRONIC KIDNEY DISEASE, WITHOUT LONG-TERM CURRENT USE OF INSULIN: ICD-10-CM

## 2025-02-28 DIAGNOSIS — C50.911 MALIGNANT NEOPLASM OF RIGHT FEMALE BREAST, UNSPECIFIED ESTROGEN RECEPTOR STATUS, UNSPECIFIED SITE OF BREAST: Primary | ICD-10-CM

## 2025-02-28 DIAGNOSIS — R29.898 DECREASED STRENGTH OF TRUNK AND BACK: ICD-10-CM

## 2025-02-28 DIAGNOSIS — N18.4 CHRONIC KIDNEY DISEASE (CKD), STAGE IV (SEVERE): ICD-10-CM

## 2025-02-28 DIAGNOSIS — N18.32 TYPE 2 DIABETES MELLITUS WITH STAGE 3B CHRONIC KIDNEY DISEASE, WITHOUT LONG-TERM CURRENT USE OF INSULIN: ICD-10-CM

## 2025-02-28 PROBLEM — Z53.1 PROCEDURE AND TREATMENT NOT CARRIED OUT BECAUSE OF PATIENT'S DECISION FOR REASONS OF BELIEF AND GROUP PRESSURE: Status: RESOLVED | Noted: 2024-03-05 | Resolved: 2025-02-28

## 2025-02-28 NOTE — ASSESSMENT & PLAN NOTE
- Evaluated kidney function improvement from 18-19 range to 34.  - Reviewed recent lab results showing improved kidney function, with normal potassium and sodium levels.  - Acknowledged the improvement in kidney function but noted that the patient is still anemic due to chronic kidney disease.  - Scheduled an upcoming appointment with nephrology for further management.  - Monitored anemia related to chronic kidney disease, observing slight improvement.  - Evaluated recent lab results showing improvement in anemia levels.  - Acknowledged the connection between anemia and chronic kidney disease.  - Noted low phosphorus levels, considering supplementation pending nephrologist input.  - Explained significance of phosphorus levels and potential need for supplementation.  - Evaluated recent lab results showing low phosphorus levels.  - Considered starting phosphorus supplements before the nephrology appointment.  - Suggested potential phosphorus supplements, pending nephrologist's recommendation.

## 2025-02-28 NOTE — ASSESSMENT & PLAN NOTE
- Assessed improvement in patient's overall well-being, noting resolution of back pain.  - Evaluated the incomplete MRI due to claustrophobia, which was intended to assess the lower back.  - Acknowledged the need for further evaluation by the neurosurgeon.  - Scheduled an upcoming appointment with a neurosurgeon on the 7th to discuss treatment options, including a potential back pain stimulator.

## 2025-02-28 NOTE — ASSESSMENT & PLAN NOTE
- Initiated process to set up home health for physical therapy to improve mobility.  - Noted patient's transition from using a walker to walking without it.  - Evaluated impr  ovement in patient's mobility and energy levels.  - Planned to set up home health for physical therapy to further improve mobility.  - Referred to home health services for physical therapy.

## 2025-02-28 NOTE — PROGRESS NOTES
Chief Complaint: No chief complaint on file.    Patient location: [place], [place]  Visit type: Audio-visual     Gwendolyn Fournier  is a 70 y.o. year old patient who presents today for     History of Present Illness    CHIEF COMPLAINT:  Gwendolyn presents today for follow up of multiple medical conditions including breast cancer and back pain    BREAST CANCER:  She is pending mastectomy surgery and is considering breast implant reconstruction rather than remaining flat post-mastectomy.    BACK PAIN:  She has history of chronic back pain previously managed with epidural injections approximately 6 times per year, with relief lasting less than 24 hours post-procedure. She recently consulted a neurosurgeon regarding back pain stimulator, though details are unclear due to medication effects during the visit. MRI was attempted but incomplete due to claustrophobia, as she was unable to complete final 20 minutes of imaging.    DIABETES:  She uses Freestyle Margaret continuous glucose monitor. She experiences nocturnal hypoglycemia and has adjusted eating habits to include more carbohydrates at night to address this issue.    LABS/TEST RESULTS:  Kidney function has improved from 18-19 range to 34. Labs showed low phosphorus levels with normal electrolytes and glucose. Anemia is improving. White blood cell count was elevated three weeks ago.    PAST MEDICAL HISTORY:  History includes left upper leg fracture in 2018 and knee replacement with titanium implant.    MENTAL HEALTH:  She reports improvement after consulting with a psychiatrist and receiving support from her daughter.      ROS:  General: -fever, -chills, -fatigue, -weight gain, -weight loss  Eyes: -vision changes, -redness, -discharge  ENT: -ear pain, -nasal congestion, -sore throat  Cardiovascular: -chest pain, -palpitations, -lower extremity edema  Respiratory: -cough, -shortness of breath  Gastrointestinal: -abdominal pain, -nausea, -vomiting, -diarrhea, -constipation,  -blood in stool  Genitourinary: -dysuria, -hematuria, -frequency  Musculoskeletal: -joint pain, -muscle pain, +back pain  Skin: -rash, -lesion  Neurological: -headache, -dizziness, -numbness, -tingling  Psychiatric: -anxiety, -depression, -sleep difficulty         Past Medical History:   Diagnosis Date    Arthritis     Cancer     Gout attack     History of tuberculosis exposure 2021    Hx of psychiatric care     Hypertension     Opioid dependence, uncomplicated 2023    Psychiatric problem     Renal disorder     Sciatic nerve pain     Therapy     used to follow with psychiatrist but doesn't recall whom,     Tuberculosis     Unspecified cataract 2023    Mild on both eyes       Past Surgical History:   Procedure Laterality Date    COLONOSCOPY N/A 2019    Procedure: COLONOSCOPY;  Surgeon: Shanna Jose MD;  Location: [facility] ENDO;  Service: Endoscopy;  Laterality: N/A;    HYSTERECTOMY      INJECTION OF JOINT Bilateral 2019    Procedure: INJECTION, JOINT BILATERAL SI JOINT;  Surgeon: Evan Coreas MD;  Location: [facility] PAIN MGT;  Service: Pain Management;  Laterality: Bilateral;  BILATERAL SI JOINT INJECTION    KNEE SURGERY  2014    left knee surgery         Family History   Problem Relation Name Age of Onset    Tuberculosis Mother      Stroke Father      Bipolar disorder Daughter      Suicide Daughter      Depression Daughter      Bipolar disorder Daughter      Depression Daughter          Social History     Socioeconomic History    Marital status:     Number of children: 5   Occupational History    Occupation: homemaker   Tobacco Use    Smoking status: Former     Current packs/day: 0.00     Types: Cigarettes     Quit date: 1976     Years since quittin.2    Smokeless tobacco: Never   Substance and Sexual Activity    Alcohol use: Not Currently     Alcohol/week: 0.0 standard drinks of alcohol     Comment: red wine    Drug use: No    Sexual activity: Not  Currently     Partners: Male     Social Drivers of Health     Financial Resource Strain: Patient Declined (1/16/2025)    Overall Financial Resource Strain (CARDIA)     Difficulty of Paying Living Expenses: Patient declined   Food Insecurity: Patient Declined (1/16/2025)    Hunger Vital Sign     Worried About Running Out of Food in the Last Year: Patient declined     Ran Out of Food in the Last Year: Patient declined   Transportation Needs: Patient Declined (1/16/2025)    TRANSPORTATION NEEDS     Transportation : Patient declined   Physical Activity: Unknown (10/10/2022)    Exercise Vital Sign     Days of Exercise per Week: 0 days   Stress: Patient Declined (1/16/2025)    [employer] [place] - Occupational Stress Questionnaire     Feeling of Stress : Patient declined   Housing Stability: Patient Declined (1/16/2025)    Housing Stability Vital Sign     Unable to Pay for Housing in the Last Year: Patient declined     Homeless in the Last Year: Patient declined       Current Medications[1]           Assessment:       1. Malignant neoplasm of right female breast, unspecified estrogen receptor status, unspecified site of breast    2. Type 2 diabetes mellitus with stage 3b chronic kidney disease, without long-term current use of insulin    3. Chronic kidney disease (CKD), stage IV (severe)    4. Pain in thoracic spine    5. Decreased strength of trunk and back          Plan:   1. Malignant neoplasm of right female breast, unspecified estrogen receptor status, unspecified site of breast  Assessment & Plan:  - Reviewed breast cancer treatment plan, including upcoming mastectomy.  - Discussed potential benefits of breast implants post-mastectomy for self-esteem and garment fit.  - Noted the patient's diagnosis of breast cancer and scheduled mastectomy.  - Evaluated the patient's consideration of implants instead of being flat-chested after the mastectomy.  - Acknowledged the patient's consideration of  implants and discussed potential benefits.  - has upcoming appointments with breast plastic surgery.      2. Type 2 diabetes mellitus with stage 3b chronic kidney disease, without long-term current use of insulin  Assessment & Plan:  - Continued Mounjaro 5mg for diabetes management due to well-controlled diabetes, despite occasional low blood sugar readings.  - Discontinued Farxiga.  - Noted patient's use of Freestyle Margaret continuous glucose monitor which alerts for low blood sugar, especially at night.  - Evaluated that the patient's diabetes is well-controlled, with recent lab results showing normal glucose levels.  - Assessed the need to potentially adjust medication if low blood sugar episodes become frequent.  - Advised the patient to adjust eating habits to include more carbohydrates at night to prevent low blood sugar.  - Advised patient to continue using Freestyle Margaret for blood sugar monitoring, especially for nighttime lows.  - Gwendolyn to maintain current practice of increased carbohydrate intake at night to manage blood sugar.      3. Chronic kidney disease (CKD), stage IV (severe)  Assessment & Plan:  - Evaluated kidney function improvement from 18-19 range to 34.  - Reviewed recent lab results showing improved kidney function, with normal potassium and sodium levels.  - Acknowledged the improvement in kidney function but noted that the patient is still anemic due to chronic kidney disease.  - Scheduled an upcoming appointment with nephrology for further management.  - Monitored anemia related to chronic kidney disease, observing slight improvement.  - Evaluated recent lab results showing improvement in anemia levels.  - Acknowledged the connection between anemia and chronic kidney disease.  - Noted low phosphorus levels, considering supplementation pending nephrologist input.  - Explained significance of phosphorus levels and potential need for supplementation.  - Evaluated recent lab results showing  low phosphorus levels.  - Considered starting phosphorus supplements before the nephrology appointment.  - Suggested potential phosphorus supplements, pending nephrologist's recommendation.      4. Pain in thoracic spine  Assessment & Plan:  - Assessed improvement in patient's overall well-being, noting resolution of back pain.  - Evaluated the incomplete MRI due to claustrophobia, which was intended to assess the lower back.  - Acknowledged the need for further evaluation by the neurosurgeon.  - Scheduled an upcoming appointment with a neurosurgeon on the 7th to discuss treatment options, including a potential back pain stimulator.      5. Decreased strength of trunk and back  Assessment & Plan:  - Initiated process to set up home health for physical therapy to improve mobility.  - Noted patient's transition from using a walker to walking without it.  - Evaluated impr  ovement in patient's mobility and energy levels.  - Planned to set up home health for physical therapy to further improve mobility.  - Referred to home health services for physical therapy.         CLAUSTROPHOBIA:  - Noted patient's experience of claustrophobia during MRI procedures.  - Evaluated patient's inability to complete the full MRI due to claustrophobia.  - Acknowledged the impact of claustrophobia on the MRI procedure.    ELEVATED WHITE BLOOD CELL COUNT:  - Assessed elevated white blood cell count from 3 weeks ago, attributing to potential stress, infection, or cancer-related factors.  - Noted increase in patient's white blood cell count three weeks ago.  - Planned to monitor the elevated white blood cell count.    BONE HEALTH:  - Considered future bone density scan to assess for osteoporosis, deferring due to patient's current medical priorities.  - Noted patient's history of left upper leg fracture in 2018.  - Noted patient's knee replacement with titanium.    SLEEP ISSUES:  - Addressed relationship between carbohydrate intake and  serotonin release for sleep improvement.    BLOOD PRESSURE MONITORING:  - Gwendolyn to monitor blood pressure at home and report if elevated.  - Contact the office if blood pressure readings are consistently elevated at home.    FOLLOW UP:  - Follow up virtually on May 28th at 1:40 PM.         Follow up in about 3 months (around 5/28/2025).    This note was generated with the assistance of ambient listening technology. Verbal consent was obtained by the patient and accompanying visitor(s) for the recording of patient appointment to facilitate this note. I attest to having reviewed and edited the generated note for accuracy, though some syntax or spelling errors may persist. Please contact the author of this note for any clarification.                  [1]   Current Outpatient Medications:     acetaminophen (TYLENOL) 500 MG tablet, Take 1 tablet (500 mg total) by mouth every 6 (six) hours as needed for Pain. (Patient not taking: Reported on 2/27/2025), Disp: 30 tablet, Rfl: 0    amLODIPine (NORVASC) 5 MG tablet, Take 1 tablet (5 mg total) by mouth once daily., Disp: 90 tablet, Rfl: 3    atorvastatin (LIPITOR) 40 MG tablet, Take 1 tablet (40 mg total) by mouth every evening., Disp: 90 tablet, Rfl: 3    blood sugar diagnostic Strp, To check BG two times daily, freestyle lite meter, Disp: 200 each, Rfl: 3    blood-glucose meter kit, To check BG two times daily, freestyle lite meter, Disp: 1 each, Rfl: 0    docusate sodium (COLACE) 100 MG capsule, Take 1 capsule (100 mg total) by mouth as needed for Constipation., Disp: 90 capsule, Rfl: 3    DULoxetine (CYMBALTA) 60 MG capsule, TAKE 1 CAPSULE(60 MG) BY MOUTH EVERY DAY, Disp: 90 capsule, Rfl: 3    flash glucose scanning reader (FREESTYLE HAI 2 READER) Misc, 1 each by Misc.(Non-Drug; Combo Route) route 4 (four) times daily with meals and nightly., Disp: 1 each, Rfl: 0    flash glucose sensor (FREESTYLE HAI 2 SENSOR) Kit, Use as directed to check blood sugar. Change every  14 days., Disp: 6 kit, Rfl: 2    furosemide (LASIX) 80 MG tablet, Take 1 tablet (80 mg total) by mouth daily as needed (with 5 pounds increase in 24-72 hours)., Disp: 30 tablet, Rfl: 0    lancets Misc, To check BG two times daily, to use with insurance preferred meter, Disp: 200 each, Rfl: 3    LIDOcaine (LIDODERM) 5 %, Place 1 patch onto the skin once daily. Apply patch for 12 hours and then leave off for 12 hours, Disp: 15 patch, Rfl: 0    linaCLOtide (LINZESS) 145 mcg Cap capsule, Take 1 capsule (145 mcg total) by mouth daily as needed (constipation)., Disp: 90 capsule, Rfl: 0    metoprolol succinate (TOPROL-XL) 100 MG 24 hr tablet, Take 1 tablet (100 mg total) by mouth once daily., Disp: 90 tablet, Rfl: 3    pregabalin (LYRICA) 25 MG capsule, Take 1 capsule (25 mg total) by mouth 2 (two) times daily., Disp: 60 capsule, Rfl: 2    sodium bicarbonate 650 MG tablet, Take 1 tablet (650 mg total) by mouth 3 (three) times daily., Disp: 90 tablet, Rfl: 0    tirzepatide (MOUNJARO) 5 mg/0.5 mL PnIj, Inject 5 mg into the skin every 7 days., Disp: 6 mL, Rfl: 0    tiZANidine (ZANAFLEX) 4 MG tablet, TAKE 1 TABLET(4 MG) BY MOUTH TWICE DAILY AS NEEDED FOR PAIN, Disp: 60 tablet, Rfl: 3    zolpidem (AMBIEN) 5 MG Tab, Take 5 mg by mouth nightly as needed (insomnia)., Disp: , Rfl:     Current Facility-Administered Medications:     lidocaine (PF) 10 mg/ml (1%) injection 40 mg, 4 mL, Intramuscular, 1 time in Clinic/HOD, [place], Azikiwe K., MD

## 2025-02-28 NOTE — ASSESSMENT & PLAN NOTE
- Reviewed breast cancer treatment plan, including upcoming mastectomy.  - Discussed potential benefits of breast implants post-mastectomy for self-esteem and garment fit.  - Noted the patient's diagnosis of breast cancer and scheduled mastectomy.  - Evaluated the patient's consideration of implants instead of being flat-chested after the mastectomy.  - Acknowledged the patient's consideration of implants and discussed potential benefits.  - has upcoming appointments with breast plastic surgery.

## 2025-02-28 NOTE — ASSESSMENT & PLAN NOTE
- Continued Mounjaro 5mg for diabetes management due to well-controlled diabetes, despite occasional low blood sugar readings.  - Discontinued Farxiga.  - Noted patient's use of Freestyle Margaret continuous glucose monitor which alerts for low blood sugar, especially at night.  - Evaluated that the patient's diabetes is well-controlled, with recent lab results showing normal glucose levels.  - Assessed the need to potentially adjust medication if low blood sugar episodes become frequent.  - Advised the patient to adjust eating habits to include more carbohydrates at night to prevent low blood sugar.  - Advised patient to continue using Freestyle Margaret for blood sugar monitoring, especially for nighttime lows.  - Gwendolyn to maintain current practice of increased carbohydrate intake at night to manage blood sugar.

## 2025-02-28 NOTE — PROGRESS NOTES
Population Health Chart Review & Patient Outreach Details      Additional Pop Health Notes:    Place in visit note to advise of blood pressure.            Updates Requested / Reviewed:      Updated Care Coordination Note, Care Everywhere, and Care Team Updated           Health Maintenance Topics Overdue:      VBHM Score: 3     Osteoporosis Screening  Foot Exam  Uncontrolled BP    Tetanus Vaccine                  Health Maintenance Topic(s) Outreach Outcomes & Actions Taken:    Blood Pressure - Outreach Outcomes & Actions Taken  : please address elevated blood pressure

## 2025-03-03 RX ORDER — FUROSEMIDE 80 MG/1
80 TABLET ORAL DAILY PRN
Qty: 30 TABLET | Refills: 0 | Status: SHIPPED | OUTPATIENT
Start: 2025-03-03 | End: 2025-03-05

## 2025-03-05 RX ORDER — FUROSEMIDE 80 MG/1
TABLET ORAL
Qty: 90 TABLET | Refills: 3 | Status: SHIPPED | OUTPATIENT
Start: 2025-03-05

## 2025-03-09 DIAGNOSIS — M54.50 ACUTE LEFT-SIDED LOW BACK PAIN WITHOUT SCIATICA: ICD-10-CM

## 2025-03-10 ENCOUNTER — OFFICE VISIT (OUTPATIENT)
Dept: NEUROSURGERY | Facility: CLINIC | Age: 70
End: 2025-03-10
Attending: STUDENT IN AN ORGANIZED HEALTH CARE EDUCATION/TRAINING PROGRAM
Payer: MEDICARE

## 2025-03-10 ENCOUNTER — HOSPITAL ENCOUNTER (OUTPATIENT)
Dept: RADIOLOGY | Facility: HOSPITAL | Age: 70
Discharge: HOME | End: 2025-03-10
Attending: PHYSICIAN ASSISTANT
Payer: MEDICARE

## 2025-03-10 VITALS
OXYGEN SATURATION: 95 % | TEMPERATURE: 99 F | HEIGHT: 67 IN | BODY MASS INDEX: 38.34 KG/M2 | DIASTOLIC BLOOD PRESSURE: 74 MMHG | SYSTOLIC BLOOD PRESSURE: 147 MMHG | HEART RATE: 108 BPM | WEIGHT: 244.25 LBS

## 2025-03-10 DIAGNOSIS — R93.7 ABNORMAL MRI, CERVICAL SPINE: Primary | ICD-10-CM

## 2025-03-10 DIAGNOSIS — R93.7 ABNORMAL MRI, CERVICAL SPINE: ICD-10-CM

## 2025-03-10 PROCEDURE — 3288F FALL RISK ASSESSMENT DOCD: CPT | Mod: CPTII,S$GLB,, | Performed by: STUDENT IN AN ORGANIZED HEALTH CARE EDUCATION/TRAINING PROGRAM

## 2025-03-10 PROCEDURE — 72052 X-RAY EXAM NECK SPINE 6/>VWS: CPT | Mod: TC,FY

## 2025-03-10 PROCEDURE — 1125F AMNT PAIN NOTED PAIN PRSNT: CPT | Mod: CPTII,S$GLB,, | Performed by: STUDENT IN AN ORGANIZED HEALTH CARE EDUCATION/TRAINING PROGRAM

## 2025-03-10 PROCEDURE — 3078F DIAST BP <80 MM HG: CPT | Mod: CPTII,S$GLB,, | Performed by: STUDENT IN AN ORGANIZED HEALTH CARE EDUCATION/TRAINING PROGRAM

## 2025-03-10 PROCEDURE — 1100F PTFALLS ASSESS-DOCD GE2>/YR: CPT | Mod: CPTII,S$GLB,, | Performed by: STUDENT IN AN ORGANIZED HEALTH CARE EDUCATION/TRAINING PROGRAM

## 2025-03-10 PROCEDURE — 99214 OFFICE O/P EST MOD 30 MIN: CPT | Mod: S$GLB,,, | Performed by: STUDENT IN AN ORGANIZED HEALTH CARE EDUCATION/TRAINING PROGRAM

## 2025-03-10 PROCEDURE — 1157F ADVNC CARE PLAN IN RCRD: CPT | Mod: CPTII,S$GLB,, | Performed by: STUDENT IN AN ORGANIZED HEALTH CARE EDUCATION/TRAINING PROGRAM

## 2025-03-10 PROCEDURE — 1159F MED LIST DOCD IN RCRD: CPT | Mod: CPTII,S$GLB,, | Performed by: STUDENT IN AN ORGANIZED HEALTH CARE EDUCATION/TRAINING PROGRAM

## 2025-03-10 PROCEDURE — 72052 X-RAY EXAM NECK SPINE 6/>VWS: CPT | Mod: 26,,, | Performed by: RADIOLOGY

## 2025-03-10 PROCEDURE — 3008F BODY MASS INDEX DOCD: CPT | Mod: CPTII,S$GLB,, | Performed by: STUDENT IN AN ORGANIZED HEALTH CARE EDUCATION/TRAINING PROGRAM

## 2025-03-10 PROCEDURE — 3077F SYST BP >= 140 MM HG: CPT | Mod: CPTII,S$GLB,, | Performed by: STUDENT IN AN ORGANIZED HEALTH CARE EDUCATION/TRAINING PROGRAM

## 2025-03-10 RX ORDER — CYCLOBENZAPRINE HCL 10 MG
10 TABLET ORAL 3 TIMES DAILY PRN
Qty: 90 TABLET | Refills: 2 | Status: SHIPPED | OUTPATIENT
Start: 2025-03-10 | End: 2025-06-08

## 2025-03-10 RX ORDER — TIZANIDINE 4 MG/1
TABLET ORAL
Qty: 60 TABLET | Refills: 3 | Status: SHIPPED | OUTPATIENT
Start: 2025-03-10 | End: 2025-03-10

## 2025-03-12 NOTE — PROGRESS NOTES
[facility]  Neurosurgery    SUBJECTIVE:     Interval history 03/10/2025:  Patient returns to the clinic for evaluation of possible arachnoid web T3-4.  She was very somnolent at her last clinic visit and blames this on medications that she was taking at the time.  She is much more alert today.  She is able to provide better history.    Today, she is telling me that she has a pain that is squarely located on the right flank area.  This does not seem to radiate anywhere but is very focal.  This is in the lumbar region.  She tells me she has bad knees in his already had 1 knee replacement.  She blames any balance issues on that.  She denies any numbness or tingling in her legs.  She denies overt myelopathy symptoms.    Chief complaint is right flank pain.    History of Present Illness:  Gwendolyn Fournier is a 70 y.o. female with CKD-IV, dm, obesity, YUE, and recently diagnosed breast cancer who presents with acute thoracic back pain with recent MRI showing possible arachnoid webbing at T3-4.  She presents with a family member who assists with the history. Patient states the pain in her lower thoracic spine began 1 week ago with no known trauma or inciting event.  She has never had these types of symptoms before.  The pain is severe in her mid lower thoracic spine and radiates to the right side of her back.  She was started on gabapentin and Norco which helps somewhat with the pain but make her very sedated.  She nearly fell asleep several times during our visit today.      She presents in a wheelchair and reports significant difficulty getting to her visit due to the severe pain.  Prior to the onset of this pain, she was independent and ambulated with a cane for mobility.  She was recently diagnosed with breast cancer; biopsy results were obtained 10 days ago.  Surgery is forthcoming.      (Not in a hospital admission)      Review of patient's allergies indicates:   Allergen Reactions    Ondansetron  "hcl (pf) Hives and Itching    Ketorolac Itching       Past Medical History:   Diagnosis Date    Arthritis     Cancer     Gout attack     History of tuberculosis exposure 2021    Hx of psychiatric care     Hypertension     Opioid dependence, uncomplicated 2023    Psychiatric problem     Renal disorder     Sciatic nerve pain     Therapy     used to follow with psychiatrist but doesn't recall whom,     Tuberculosis     Unspecified cataract 2023    Mild on both eyes     Past Surgical History:   Procedure Laterality Date    COLONOSCOPY N/A 2019    Procedure: COLONOSCOPY;  Surgeon: Shanna Jose MD;  Location: [facility] ENDO;  Service: Endoscopy;  Laterality: N/A;    HYSTERECTOMY      INJECTION OF JOINT Bilateral 2019    Procedure: INJECTION, JOINT BILATERAL SI JOINT;  Surgeon: Evan Coreas MD;  Location: [facility] [place], operated by [facility] PAIN MGT;  Service: Pain Management;  Laterality: Bilateral;  BILATERAL SI JOINT INJECTION    KNEE SURGERY  2014    left knee surgery        Social History     Tobacco Use    Smoking status: Former     Current packs/day: 0.00     Types: Cigarettes     Quit date: 1976     Years since quittin.2    Smokeless tobacco: Never   Substance Use Topics    Alcohol use: Not Currently     Alcohol/week: 0.0 standard drinks of alcohol     Comment: red wine    Drug use: No        Review of Systems:  As noted in HPI    OBJECTIVE:     Vital Signs (Most Recent):  Vitals:    03/10/25 1500   BP: (!) 147/74   Pulse: 108   Temp: 98.8 °F (37.1 °C)   TempSrc: Oral   SpO2: 95%   Weight: 110.8 kg (244 lb 4.3 oz)   Height: 5' 7" (1.702 m)   PainSc:   3     Body mass index is 38.26 kg/m².      Physical Exam:  General: well developed, well nourished  Head: normocephalic, atraumatic  Neurologic: Alert and oriented. Thought content appropriate  GCS: Motor: 6/Verbal: 5/Eyes: 4 GCS Total: 15  Language: No aphasia  Speech: No dysarthria  Cranial nerves: face symmetric, tongue midline, CN II-XII " grossly intact.   Eyes: pupils equal, round, reactive to light with accommodation, EOMI.   Pulmonary: normal respirations, not labored, no accessory muscles used    No thoracic sensory level identified    Motor Strength: Moves all extremities spontaneously     Strength  Deltoids Triceps Biceps   Hand    Upper: R 5/5 5/5 5/5   4-/5    L 5/5 5/5 5/5   4/5     Iliopsoas Quadriceps  Tibialis  anterior Gastro- cnemius    Lower: R 5/5 5/5  5/5 5/5     L 5/5 5/5  5/5 5/5      Hayden: absent  Clonus: absent    Gait:  Presents in a wheelchair    Midline Bony Tenderness:  Present in lower thoracic spine    Diagnostic Results:  I have personally reviewed imaging and agree with the findings.     MRI thoracic spine, 01/10/2025:  Examination suggestive of an arachnoid web at T3-4 level with anterior displacement of the cord focally at T4, no associated cord myelomalacia.  No definite well define mass to strongly suggest the presence of an arachnoid cyst, further evaluation with CT myelogram or MRI postcontrast thoracic spine could be done if clinically warranted.  Consider neurosurgical consult.    Flexion-extension cervical spine x-rays were done 03/10/2025.  Radiology made note of pre dental interval increase with flexion.  This appears subtle in my opinion    ASSESSMENT/PLAN:     Gwendolyn Fournier is a 70 y.o. 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  -neurosurgical intervention is not indicated  -it seems to me that the patient is asymptomatic from the possible arachnoid web/cyst at T3-4.  She is not myelopathic.  Her lumbar right flank pain would not be related to this  -it also seems she is asymptomatic from her possible C1-2 instability  -we will have her follow back up in a few months to see how she is doing  -patient was advised to call back with any change in symptoms, emergence of myelopathy symptoms, or questions    Please feel  free to call with any further questions      Bakari Marley  Neurosurgery      Disclaimer: This note was dictated by speech recognition. Minor errors in transcription may be present.  Please call with any questions.

## 2025-03-13 ENCOUNTER — PATIENT MESSAGE (OUTPATIENT)
Dept: FAMILY MEDICINE | Facility: CLINIC | Age: 70
End: 2025-03-13
Payer: MEDICARE

## 2025-03-14 ENCOUNTER — TELEPHONE (OUTPATIENT)
Dept: HEMATOLOGY/ONCOLOGY | Facility: CLINIC | Age: 70
End: 2025-03-14
Payer: MEDICARE

## 2025-03-14 ENCOUNTER — PATIENT MESSAGE (OUTPATIENT)
Dept: HEMATOLOGY/ONCOLOGY | Facility: CLINIC | Age: 70
End: 2025-03-14
Payer: MEDICARE

## 2025-03-18 ENCOUNTER — EXTERNAL HOME HEALTH (OUTPATIENT)
Dept: HOME HEALTH SERVICES | Facility: HOSPITAL | Age: 70
End: 2025-03-18
Payer: MEDICARE

## 2025-03-19 ENCOUNTER — OFFICE VISIT (OUTPATIENT)
Dept: PLASTIC SURGERY | Facility: CLINIC | Age: 70
End: 2025-03-19
Payer: MEDICARE

## 2025-03-19 VITALS
WEIGHT: 249.25 LBS | BODY MASS INDEX: 39.03 KG/M2 | HEART RATE: 79 BPM | DIASTOLIC BLOOD PRESSURE: 58 MMHG | SYSTOLIC BLOOD PRESSURE: 128 MMHG

## 2025-03-19 DIAGNOSIS — C50.911 MALIGNANT NEOPLASM OF RIGHT FEMALE BREAST, UNSPECIFIED ESTROGEN RECEPTOR STATUS, UNSPECIFIED SITE OF BREAST: ICD-10-CM

## 2025-03-19 PROCEDURE — 1159F MED LIST DOCD IN RCRD: CPT | Mod: CPTII,S$GLB,, | Performed by: SURGERY

## 2025-03-19 PROCEDURE — 3008F BODY MASS INDEX DOCD: CPT | Mod: CPTII,S$GLB,, | Performed by: SURGERY

## 2025-03-19 PROCEDURE — 1157F ADVNC CARE PLAN IN RCRD: CPT | Mod: CPTII,S$GLB,, | Performed by: SURGERY

## 2025-03-19 PROCEDURE — 1101F PT FALLS ASSESS-DOCD LE1/YR: CPT | Mod: CPTII,S$GLB,, | Performed by: SURGERY

## 2025-03-19 PROCEDURE — 99204 OFFICE O/P NEW MOD 45 MIN: CPT | Mod: S$GLB,,, | Performed by: SURGERY

## 2025-03-19 PROCEDURE — 3288F FALL RISK ASSESSMENT DOCD: CPT | Mod: CPTII,S$GLB,, | Performed by: SURGERY

## 2025-03-19 PROCEDURE — 3078F DIAST BP <80 MM HG: CPT | Mod: CPTII,S$GLB,, | Performed by: SURGERY

## 2025-03-19 PROCEDURE — 1125F AMNT PAIN NOTED PAIN PRSNT: CPT | Mod: CPTII,S$GLB,, | Performed by: SURGERY

## 2025-03-19 PROCEDURE — 99999 PR PBB SHADOW E&M-EST. PATIENT-LVL IV: CPT | Mod: PBBFAC,,, | Performed by: SURGERY

## 2025-03-19 PROCEDURE — 3074F SYST BP LT 130 MM HG: CPT | Mod: CPTII,S$GLB,, | Performed by: SURGERY

## 2025-03-19 NOTE — PROGRESS NOTES
Plastic Surgery Clinic New Patient History and Physical        Subjective:      Gwendolyn Fournier is a 70 y.o. year old female who presents to the Plastic Surgery Clinic on 03/19/2025 for consultation regarding breast reconstruction. Pt was found to have multicentric right breast cancer. She has seen General surgery who gave options, which she elected for mastectomy. She is not planned for radiation. Her current breast size is 38DD. She is desiring a much smaller size given symptomatic macromastia.  Patient reports hx of sciatica. She denies smoking. She states she is a well controlled diabetic with A1c 5.6.  Vitals:    03/19/25 1348   BP: (!) 128/58   Pulse: 79        Review of patient's allergies indicates:   Allergen Reactions    Ondansetron hcl (pf) Hives and Itching    Ketorolac Itching       Medications Ordered Prior to Encounter[1]    Problem List[2]    Past Surgical History:   Procedure Laterality Date    COLONOSCOPY N/A 01/21/2019    Procedure: COLONOSCOPY;  Surgeon: Shanna Jose MD;  Location: [facility] ENDO;  Service: Endoscopy;  Laterality: N/A;    HYSTERECTOMY      INJECTION OF JOINT Bilateral 03/13/2019    Procedure: INJECTION, JOINT BILATERAL SI JOINT;  Surgeon: Evan Coreas MD;  Location: [facility] PAIN MGT;  Service: Pain Management;  Laterality: Bilateral;  BILATERAL SI JOINT INJECTION    KNEE SURGERY  01/01/2014    left knee surgery        Social History[3]        Review of Systems: 12pt ROS negative other than pertinent findings documented in history.    Objective:     Physical Exam:  Vitals:    03/19/25 1348   BP: (!) 128/58   Pulse: 79       General: Alert; No acute distress  Cardiovascular: Regular rate   Respiratory: Normal respiratory effort. Chest rise symmetric.   Abdomen: Soft, nontender, nondistended  Extremity: Moves all extremities equally.  Neurologic: No focal deficit. Speech normal  Large grade 3 ptotic breasts. Masses felt on right breast        Assessment:       1. Malignant  neoplasm of right female breast, unspecified estrogen receptor status, unspecified site of breast        Plan:   70 y.o. female with Right breast cancer  - pt is electing for mastectomy given multicentric dx. Electing for implant based reconstruction  - interested in DTI, explained TE and delay with 2nd stage depending on flap perfusion intraoperatively  - will also need left mastopexy at time of surgery. If risk reducing mastectomy performed ,then TE on left as welel  - Risks, benefits and alternatives to surgery were discussed. Will submit for insurance authorization.  - Office staff to coordinate surgery date once insurance authorization obtained      All questions were answered. The patient was advised to call the clinic with any questions or concerns prior to their next visit.     Patient was seen and discussed with attending surgeon, Dr. Dariel Vargas MD - Fellow  Plastic and Reconstruction Surgery Department  [phone] office         [1]   Current Outpatient Medications on File Prior to Visit   Medication Sig Dispense Refill    amLODIPine (NORVASC) 5 MG tablet Take 1 tablet (5 mg total) by mouth once daily. 90 tablet 3    atorvastatin (LIPITOR) 40 MG tablet Take 1 tablet (40 mg total) by mouth every evening. 90 tablet 3    blood pressure monitor Kit 1 each by Misc.(Non-Drug; Combo Route) route every 6 (six) months. 1 each 0    blood sugar diagnostic Strp To check BG two times daily, freestyle lite meter 200 each 3    cyclobenzaprine (FLEXERIL) 10 MG tablet Take 1 tablet (10 mg total) by mouth 3 (three) times daily as needed for Muscle spasms. 90 tablet 2    DULoxetine (CYMBALTA) 60 MG capsule TAKE 1 CAPSULE(60 MG) BY MOUTH EVERY DAY 90 capsule 3    flash glucose scanning reader (FREESTYLE HAI 2 READER) Misc 1 each by Misc.(Non-Drug; Combo Route) route 4 (four) times daily with meals and nightly. 1 each 0    flash glucose sensor (FREESTYLE HAI 2 SENSOR) Kit Use as directed  to check blood sugar. Change every 14 days. 6 kit 2    furosemide (LASIX) 80 MG tablet TAKE 1 TABLET BY MOUTH EVERY DAY WITH 5 POUNDS INCREASE WITHIN 24-72 HOURS 90 tablet 3    lancets Misc To check BG two times daily, to use with insurance preferred meter 200 each 3    LIDOcaine (LIDODERM) 5 % Place 1 patch onto the skin once daily. Apply patch for 12 hours and then leave off for 12 hours 15 patch 0    linaCLOtide (LINZESS) 145 mcg Cap capsule Take 1 capsule (145 mcg total) by mouth daily as needed (constipation). 90 capsule 0    metoprolol succinate (TOPROL-XL) 100 MG 24 hr tablet Take 1 tablet (100 mg total) by mouth once daily. 90 tablet 3    pregabalin (LYRICA) 25 MG capsule Take 1 capsule (25 mg total) by mouth 2 (two) times daily. 60 capsule 2    tirzepatide (MOUNJARO) 5 mg/0.5 mL PnIj Inject 5 mg into the skin every 7 days. 6 mL 0    zolpidem (AMBIEN) 5 MG Tab Take 5 mg by mouth nightly as needed (insomnia).      acetaminophen (TYLENOL) 500 MG tablet Take 1 tablet (500 mg total) by mouth every 6 (six) hours as needed for Pain. (Patient not taking: Reported on 2/7/2025) 30 tablet 0    blood-glucose meter kit To check BG two times daily, freestyle lite meter 1 each 0    docusate sodium (COLACE) 100 MG capsule Take 1 capsule (100 mg total) by mouth as needed for Constipation. (Patient not taking: Reported on 3/19/2025) 90 capsule 3    sodium bicarbonate 650 MG tablet Take 1 tablet (650 mg total) by mouth 3 (three) times daily. 90 tablet 0     Current Facility-Administered Medications on File Prior to Visit   Medication Dose Route Frequency Provider Last Rate Last Admin    lidocaine (PF) 10 mg/ml (1%) injection 40 mg  4 mL Intramuscular 1 time in Clinic/HOD [place], Azikiwe K., MD       [2]   Patient Active Problem List  Diagnosis    Essential hypertension    Primary osteoarthritis involving multiple joints    Idiopathic chronic gout of multiple sites without tophus    History of open reduction and internal  fixation (ORIF) procedure    Chronic pain    Severe obesity (BMI 35.0-39.9) with comorbidity    Obstructive sleep apnea    Sickle cell trait    Sacroiliitis, not elsewhere classified    Type 2 diabetes mellitus with stage 3b chronic kidney disease, without long-term current use of insulin    Decreased range of motion of trunk and back    Decreased strength of trunk and back    SVT (supraventricular tachycardia)    Aortic atherosclerosis    Tremor of both hands    Chronic kidney disease (CKD), stage IV (severe)    Drug-induced constipation    Malignant neoplasm of right female breast    Pulmonary hypertension    Obstructive hypertrophic cardiomyopathy    Pain in thoracic spine    Arachnoid cyst    Anemia in stage 4 chronic kidney disease    Current severe episode of major depressive disorder without psychotic features without prior episode    Psychophysiological insomnia   [3]   Social History  Socioeconomic History    Marital status:     Number of children: 5   Occupational History    Occupation: homemaker   Tobacco Use    Smoking status: Former     Current packs/day: 0.00     Types: Cigarettes     Quit date: 1976     Years since quittin.2    Smokeless tobacco: Never   Substance and Sexual Activity    Alcohol use: Not Currently     Alcohol/week: 0.0 standard drinks of alcohol     Comment: red wine    Drug use: No    Sexual activity: Not Currently     Partners: Male     Social Drivers of Health     Financial Resource Strain: Patient Declined (2025)    Overall Financial Resource Strain (CARDIA)     Difficulty of Paying Living Expenses: Patient declined   Food Insecurity: Patient Declined (2025)    Hunger Vital Sign     Worried About Running Out of Food in the Last Year: Patient declined     Ran Out of Food in the Last Year: Patient declined   Transportation Needs: Patient Declined (2025)    TRANSPORTATION NEEDS     Transportation : Patient declined   Physical Activity: Unknown  (10/10/2022)    Exercise Vital Sign     Days of Exercise per Week: 0 days   Stress: Patient Declined (1/16/2025)    [employer] - Occupational Stress Questionnaire     Feeling of Stress : Patient declined   Housing Stability: Patient Declined (1/16/2025)    Housing Stability Vital Sign     Unable to Pay for Housing in the Last Year: Patient declined     Homeless in the Last Year: Patient declined

## 2025-03-20 ENCOUNTER — DOCUMENTATION ONLY (OUTPATIENT)
Dept: CARDIOLOGY | Facility: HOSPITAL | Age: 70
End: 2025-03-20
Payer: MEDICARE

## 2025-03-20 ENCOUNTER — TELEPHONE (OUTPATIENT)
Dept: HEMATOLOGY/ONCOLOGY | Facility: CLINIC | Age: 70
End: 2025-03-20
Payer: MEDICARE

## 2025-03-20 NOTE — PROGRESS NOTES
Phoned patient informing that the cardiac appointment scheduled for tomorrow is only for tracking purposes. Patient is being monitored. Direct number provided to contact the clinic on voicemail, informing not to attend the appointment scheduled for tomorrow.

## 2025-03-20 NOTE — TELEPHONE ENCOUNTER
Called patient and left message to confirm the virtual visit on 3/27/25 and to complete the genetic questionnaire at least 2 days before the visit.

## 2025-03-21 ENCOUNTER — CLINICAL SUPPORT (OUTPATIENT)
Dept: CARDIOLOGY | Facility: HOSPITAL | Age: 70
End: 2025-03-21
Attending: INTERNAL MEDICINE
Payer: MEDICARE

## 2025-03-21 DIAGNOSIS — I47.10 SVT (SUPRAVENTRICULAR TACHYCARDIA): ICD-10-CM

## 2025-03-21 DIAGNOSIS — R00.2 HEART PALPITATIONS: ICD-10-CM

## 2025-03-21 PROCEDURE — 93271 ECG/MONITORING AND ANALYSIS: CPT

## 2025-03-24 DIAGNOSIS — K59.03 DRUG-INDUCED CONSTIPATION: ICD-10-CM

## 2025-03-24 DIAGNOSIS — N18.32 TYPE 2 DIABETES MELLITUS WITH STAGE 3B CHRONIC KIDNEY DISEASE, WITHOUT LONG-TERM CURRENT USE OF INSULIN: ICD-10-CM

## 2025-03-24 DIAGNOSIS — E11.22 TYPE 2 DIABETES MELLITUS WITH STAGE 3B CHRONIC KIDNEY DISEASE, WITHOUT LONG-TERM CURRENT USE OF INSULIN: ICD-10-CM

## 2025-03-24 DIAGNOSIS — E66.01 SEVERE OBESITY (BMI 35.0-39.9) WITH COMORBIDITY: ICD-10-CM

## 2025-03-24 RX ORDER — TIRZEPATIDE 5 MG/.5ML
5 INJECTION, SOLUTION SUBCUTANEOUS
Qty: 6 ML | Refills: 0 | Status: SHIPPED | OUTPATIENT
Start: 2025-03-24 | End: 2025-06-22

## 2025-03-25 ENCOUNTER — OFFICE VISIT (OUTPATIENT)
Dept: INTERNAL MEDICINE | Facility: CLINIC | Age: 70
End: 2025-03-25
Payer: MEDICARE

## 2025-03-25 VITALS
OXYGEN SATURATION: 98 % | RESPIRATION RATE: 18 BRPM | WEIGHT: 249 LBS | TEMPERATURE: 99 F | HEIGHT: 67 IN | HEART RATE: 88 BPM | BODY MASS INDEX: 39.08 KG/M2 | SYSTOLIC BLOOD PRESSURE: 126 MMHG | DIASTOLIC BLOOD PRESSURE: 75 MMHG

## 2025-03-25 DIAGNOSIS — Z90.710 H/O: HYSTERECTOMY: ICD-10-CM

## 2025-03-25 DIAGNOSIS — F32.2 CURRENT SEVERE EPISODE OF MAJOR DEPRESSIVE DISORDER WITHOUT PSYCHOTIC FEATURES WITHOUT PRIOR EPISODE: ICD-10-CM

## 2025-03-25 DIAGNOSIS — G93.0 ARACHNOID CYST: Primary | ICD-10-CM

## 2025-03-25 DIAGNOSIS — Z87.39 HISTORY OF GOUT: ICD-10-CM

## 2025-03-25 DIAGNOSIS — N18.30 STAGE 3 CHRONIC KIDNEY DISEASE, UNSPECIFIED WHETHER STAGE 3A OR 3B CKD: ICD-10-CM

## 2025-03-25 DIAGNOSIS — I70.0 AORTIC ATHEROSCLEROSIS: ICD-10-CM

## 2025-03-25 DIAGNOSIS — R60.9 EDEMA, UNSPECIFIED TYPE: ICD-10-CM

## 2025-03-25 DIAGNOSIS — Z86.16 HISTORY OF COVID-19: ICD-10-CM

## 2025-03-25 DIAGNOSIS — K59.03 DRUG-INDUCED CONSTIPATION: ICD-10-CM

## 2025-03-25 DIAGNOSIS — G47.33 OBSTRUCTIVE SLEEP APNEA: ICD-10-CM

## 2025-03-25 DIAGNOSIS — D64.9 ANEMIA, UNSPECIFIED TYPE: ICD-10-CM

## 2025-03-25 DIAGNOSIS — N18.32 TYPE 2 DIABETES MELLITUS WITH STAGE 3B CHRONIC KIDNEY DISEASE, WITHOUT LONG-TERM CURRENT USE OF INSULIN: ICD-10-CM

## 2025-03-25 DIAGNOSIS — Z98.890 HISTORY OF OPEN REDUCTION AND INTERNAL FIXATION (ORIF) PROCEDURE: ICD-10-CM

## 2025-03-25 DIAGNOSIS — I27.20 PULMONARY HYPERTENSION: ICD-10-CM

## 2025-03-25 DIAGNOSIS — I47.10 SVT (SUPRAVENTRICULAR TACHYCARDIA): ICD-10-CM

## 2025-03-25 DIAGNOSIS — I10 ESSENTIAL HYPERTENSION: ICD-10-CM

## 2025-03-25 DIAGNOSIS — E11.22 TYPE 2 DIABETES MELLITUS WITH STAGE 3B CHRONIC KIDNEY DISEASE, WITHOUT LONG-TERM CURRENT USE OF INSULIN: ICD-10-CM

## 2025-03-25 DIAGNOSIS — I42.1 OBSTRUCTIVE HYPERTROPHIC CARDIOMYOPATHY: ICD-10-CM

## 2025-03-25 DIAGNOSIS — G89.29 OTHER CHRONIC PAIN: ICD-10-CM

## 2025-03-25 DIAGNOSIS — C50.911 MALIGNANT NEOPLASM OF RIGHT FEMALE BREAST, UNSPECIFIED ESTROGEN RECEPTOR STATUS, UNSPECIFIED SITE OF BREAST: ICD-10-CM

## 2025-03-25 DIAGNOSIS — D57.3 SICKLE CELL TRAIT: ICD-10-CM

## 2025-03-25 PROBLEM — R25.1 TREMOR OF BOTH HANDS: Status: RESOLVED | Noted: 2023-10-09 | Resolved: 2025-03-25

## 2025-03-25 PROCEDURE — 3078F DIAST BP <80 MM HG: CPT | Mod: CPTII,S$GLB,, | Performed by: HOSPITALIST

## 2025-03-25 PROCEDURE — 3288F FALL RISK ASSESSMENT DOCD: CPT | Mod: CPTII,S$GLB,, | Performed by: HOSPITALIST

## 2025-03-25 PROCEDURE — 1157F ADVNC CARE PLAN IN RCRD: CPT | Mod: CPTII,S$GLB,, | Performed by: HOSPITALIST

## 2025-03-25 PROCEDURE — 1100F PTFALLS ASSESS-DOCD GE2>/YR: CPT | Mod: CPTII,S$GLB,, | Performed by: HOSPITALIST

## 2025-03-25 PROCEDURE — 1160F RVW MEDS BY RX/DR IN RCRD: CPT | Mod: CPTII,S$GLB,, | Performed by: HOSPITALIST

## 2025-03-25 PROCEDURE — 99417 PROLNG OP E/M EACH 15 MIN: CPT | Mod: S$GLB,,, | Performed by: HOSPITALIST

## 2025-03-25 PROCEDURE — 1159F MED LIST DOCD IN RCRD: CPT | Mod: CPTII,S$GLB,, | Performed by: HOSPITALIST

## 2025-03-25 PROCEDURE — 1125F AMNT PAIN NOTED PAIN PRSNT: CPT | Mod: CPTII,S$GLB,, | Performed by: HOSPITALIST

## 2025-03-25 PROCEDURE — 3074F SYST BP LT 130 MM HG: CPT | Mod: CPTII,S$GLB,, | Performed by: HOSPITALIST

## 2025-03-25 PROCEDURE — 99215 OFFICE O/P EST HI 40 MIN: CPT | Mod: S$GLB,,, | Performed by: HOSPITALIST

## 2025-03-25 PROCEDURE — 3008F BODY MASS INDEX DOCD: CPT | Mod: CPTII,S$GLB,, | Performed by: HOSPITALIST

## 2025-03-25 PROCEDURE — 99999 PR PBB SHADOW E&M-EST. PATIENT-LVL V: CPT | Mod: PBBFAC,,, | Performed by: HOSPITALIST

## 2025-03-25 RX ORDER — ESCITALOPRAM OXALATE 10 MG/1
10 TABLET ORAL DAILY
COMMUNITY
Start: 2025-02-28

## 2025-03-25 RX ORDER — HYDROCODONE BITARTRATE AND ACETAMINOPHEN 5; 325 MG/1; MG/1
1 TABLET ORAL 2 TIMES DAILY PRN
COMMUNITY
Start: 2025-03-21

## 2025-03-25 RX ORDER — SPIRONOLACTONE 25 MG/1
25 TABLET ORAL DAILY
COMMUNITY
Start: 2025-01-28

## 2025-03-25 RX ORDER — GABAPENTIN 300 MG/1
300 CAPSULE ORAL 3 TIMES DAILY
COMMUNITY
Start: 2025-03-07

## 2025-03-25 RX ORDER — TIZANIDINE 4 MG/1
4 TABLET ORAL 2 TIMES DAILY
COMMUNITY
Start: 2025-03-10

## 2025-03-25 RX ORDER — NALOXEGOL OXALATE 25 MG/1
25 TABLET, FILM COATED ORAL DAILY
COMMUNITY
Start: 2025-03-09

## 2025-03-25 NOTE — ASSESSMENT & PLAN NOTE
2024     Did not require hospitalization, intubation or supplemental oxygen use   Had been vaccinated   Recovered from COVID    COVID screening     No fever   No cough   No SOB  No sore throat   No loss of taste or smell   No muscle aches   No nausea, vomiting , diarrhea

## 2025-03-25 NOTE — ASSESSMENT & PLAN NOTE
No history of sickling   She will be at increased risk of thrombosis given the sickle cell trait  Increased risk of thrombosis in the karla operative period , compression stocking use discussed

## 2025-03-25 NOTE — ASSESSMENT & PLAN NOTE
Gout   - Controlled     - I suggest to keep the patient adequately hydrated.  - Please note that surgical stress ,dehydration can precipitate acute gout

## 2025-03-25 NOTE — ASSESSMENT & PLAN NOTE
Weight related conditions     Known to have     HTN  Type 2 Diabetes    Gout   Hyperlipidemia   Sleep apnea   Osteoarthritis    Not troubled with / Not known to have       Acid reflux   Fatty liver       Encouraged maintaining healthy weight for improved health     She is on Mounjaro every 7 days and I suggested to skip 1 dose prior to surgery

## 2025-03-25 NOTE — ASSESSMENT & PLAN NOTE
She is taking amlodipine and Toprol-XL    Home BP readings -125/75  Recent BP readings in the record-  Hypertension-  Blood pressure is acceptable . I suggest continuation of --amlodipine, Toprol-XL------ during the entire perioperative period. I suggest   blood pressure monitoring .I suggest addressing pain control as uncontrolled pain can increased blood pressure

## 2025-03-25 NOTE — ASSESSMENT & PLAN NOTE
She had an echocardiogram from 2023 which reportedly showed      Left Ventricle: Normal wall motion. There is hyperdynamic systolic function with a visually estimated ejection fraction of greater than 70%. Grade I diastolic dysfunction. LVOT obstruction .  56 mm of gradient across LVOT with Valsalva    Left Atrium: Left atrium is mildly dilated.    Right Ventricle: Mild right ventricular enlargement. Systolic function is normal.    Right Atrium: Right atrium is mildly dilated.    Pulmonary Artery: The estimated pulmonary artery systolic pressure is 38 mmHg.    IVC/SVC: Intermediate venous pressure at 8 mmHg.    TDS    June 2024-Normal myocardial perfusion scan. There is no evidence of myocardial ischemia or infarction.   She has hypertrophic obstructive cardiomyopathy and is under cardiology care  She has no chest pain or chest tightness  She is not troubled with breathing  She is currently wearing a 30 day heart monitor to check for any heart rhythm trouble

## 2025-03-25 NOTE — ASSESSMENT & PLAN NOTE
Pulmonary arterial hypertension       WHO functional classification     Class 1 - No limitation ( with reference to fatigue or dyspnea, chest pain, or heart syncope)      Clinical classification - based on etiology       Group 2 hypertrophic obstructive cardiomyopathy  Group 3 sleep apnea    [place] heart association functional class     Class 1         Planned Anesthesia-  likely general anesthesia    Rt heart dysfunction -none       Suggest avoidance of Intra vascular volume over load or reduced pre load   I suggest that the anaesthesiologist be aware of the Pulmonary artery hypertension , so that sedation,  anesthetic plans can be made with consideration of Pulmonary Hypertension  .

## 2025-03-25 NOTE — ASSESSMENT & PLAN NOTE
No suggestions of lower extremity intermittent claudication   Atorvastatin- HLD-I  suggest continuation of statin during the entire perioperative period.

## 2025-03-25 NOTE — ASSESSMENT & PLAN NOTE
Constipated from pain medication    Suggested working on bowel movements in preparation for surgery   Constipation- I suggest giving bowel movement regimen as opioid use,reduced ambulation  can increase the constipation

## 2025-03-25 NOTE — ASSESSMENT & PLAN NOTE
Depression Cymbalta    Cymbalta is helping her depression as well as her back pain    Doing good from a mental health stand point   under psychiatry   Has supportive family   On Medication that is helping   No Suicidal / Homicidal ideation      I suggest monitoring the sodium as SIADH from Cymbalta use and hypersecretion of ADH associated with surgery can reduce sodium in the perioperative period   she has been on Cymbalta for a long time  Discussed risk of bleeding on Cymbalta that she is taking for depression which is helping her back pain also  She has an upcoming psychiatric evaluation on March 31st and I suggested for her to discuss with the psychiatric provider about perioperative usage of Cymbalta

## 2025-03-25 NOTE — ASSESSMENT & PLAN NOTE
Her baseline creatinine is about 1.5 but her kidney function came down from her stopping medication but she has since been back on her medication and I kidney function has improved  She is under Nephrology care    Most recent potassium has been normal  Stages of CKD discussed  Deleterious effects NSAID's , Beneficial effects of Hydration discussed   Acetaminophen ( If not intolerant ) as needed for pain     I  suggest monitoring renal function, in put and out put status karla-operatively. I  suggest avoiding nephrotoxic medication including NSAIDs, COX2 inhibitors, intravenous contrast agent,avoiding hypotension to prevent further renal impairment.   Care with intravenous contrast discussed

## 2025-03-25 NOTE — HPI
History of present illness- I had the pleasure of meeting this pleasant 70 y.o. lady in the pre op clinic prior to her elective Breast surgery. The patient is new to me . Ms Snow was accompanied by Daughter Ms Morales.    I have obtained the history by speaking to the patient and by reviewing the electronic health records.    Events leading up to surgery / History of presenting illness -    I have obtained the information by speaking to her and her caring and knowledgeable daughter  She had a routine mammogram in December of 2024 that showed abnormalities for which she had a biopsy and was diagnosed with breast cancer on the right side and is having a mastectomy procedure planned  She is having mastectomy and an implant placed and she is also having breast reduction on the opposite side to even her breasts    She has no pain from this  No family history of breast cancer      Relevant health conditions of significance for the perioperative period/ History of presenting illness -    Type 2 diabetes  Hypertension  Chronic kidney disease stage 2-had high potassium  Low back surgery in 2016  arachnoid web/cyst 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  She is not myelopathic.  Her lumbar right flank pain would not be related to this  -it also seems she is asymptomatic from her possible C1-2 instability  Anemia  YUE  Sickle cell trait  Depression   Gout   BMI 39  Jehovah witness  Mounjaro     Lives with daughter in a single-level house  She is a homemaker  Pets-Cat  Children-4 daughters and a son who are grown   Pregnancies - 5  Miscarriages - None  C sections- None   Has help post op    Not known to have   Stroke/ Mini stroke  , Lung problem, Thyroid problem,  deep vein thrombosis, pulmonary embolism, acid reflux,  fatty liver , blood vessels stent , tobacco smoking,  allergies

## 2025-03-25 NOTE — ASSESSMENT & PLAN NOTE
Mild anemia Likely from kidney impairment related and the hemoglobin is improving with improvement of kidney function  Noted that she is a Jehovah Witness level work with the surgeon about blood loss given the baseline low hemoglobin  He does not want to take blood products    No overt GI/ blood losses  No stomach upset/ ulcer     No long standing NSAID use  Discussed follow up     I suggest monitoring the hemoglobin in the perioperative period

## 2025-03-25 NOTE — ASSESSMENT & PLAN NOTE
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  She is not myelopathic.    -it also seems she is asymptomatic from her possible C1-2 instability  It was felt that     possible arachnoid web T3-4, possible incidental C1/2 instability.  -neurosurgical intervention is not indicated    No suggestions of cervical myelopathy    I suggested the anesthesiology team be aware of the possible C1-C2 instability during intubation

## 2025-03-25 NOTE — ASSESSMENT & PLAN NOTE
Contributing factors     Occasional Salted food             Not known to have     Heart failure   Liver failure   Kidney failure     Edema- I suggested avoidance of added salt,avoidance of NSAID's, unless advised or ordered  and suggested Limb elevation and stocking use     On  Lasix spironolactone

## 2025-03-25 NOTE — ASSESSMENT & PLAN NOTE
She has longstanding low back pain  She is taking Flexeril, gabapentin, tizanidine 4 longstanding back pain  She had low back surgery in 2016    She is no longer trouble with sciatic after spine surgery and she has longstanding low back pain and is taking hydrocodone for the last 2 months    She has leg weakness or urinary incontinence    opioid use- In view of the opioid use, the patient may have opioid tolerance . I suggest considering the possibility of opioid tolerance  in planning post operative pain control     Discussed possibly reducing the opioid use in preparation for surgery , so that it reduces the opioid tolerance which helps post op pain control     Suggested letting pain management/ Prescribing provider  know about the up coming surgery      Suggest avoidance of abrupt stopping of opioid medication as it can cause opioid withdrawal

## 2025-03-25 NOTE — PROGRESS NOTES
[facility]  Progress Note    Patient Name: Gwendolyn Fournier  MRN: [id number]  Date of Evaluation- 03/25/2025  PCP- Yolette Abebe MD        HPI:  History of present illness- I had the pleasure of meeting this pleasant 70 y.o. lady in the pre op clinic prior to her elective Breast surgery. The patient is new to me . Ms Snow was accompanied by Daughter Ms Morales.    I have obtained the history by speaking to the patient and by reviewing the electronic health records.    Events leading up to surgery / History of presenting illness -    I have obtained the information by speaking to her and her caring and knowledgeable daughter  She had a routine mammogram in December of 2024 that showed abnormalities for which she had a biopsy and was diagnosed with breast cancer on the right side and is having a mastectomy procedure planned  She is having mastectomy and an implant placed and she is also having breast reduction on the opposite side to even her breasts    She has no pain from this  No family history of breast cancer      Relevant health conditions of significance for the perioperative period/ History of presenting illness -    Type 2 diabetes  Hypertension  Chronic kidney disease stage 2-had high potassium  Low back surgery in 2016  arachnoid web/cyst at T3-4.  She is not myelopathic.  Her lumbar right flank pain would not be related to this  -it also seems she is asymptomatic from her possible C1-2 instability  Anemia  YUE  Sickle cell trait  Depression   Gout   BMI 39  Jehovah pranay Jimenes     Lives with daughter in a single-level house  She is a homemaker  Pets-Cat  Children-4 daughters and a son who are grown   Pregnancies - 5  Miscarriages - None  C sections- None   Has help post op    Not known to have   Stroke/ Mini stroke  , Lung problem, Thyroid problem,  deep vein thrombosis, pulmonary embolism, acid reflux,  fatty liver , blood vessels stent , tobacco smoking,  allergies       Subjective/  "Objective:     Chief complaint-Preoperative evaluation, Perioperative Medical management, complication reduction plan     Active cardiac conditions- none    Revised cardiac risk index predictors- none    Functional capacity -Examples of physical activity  -less active due to her back pain,  can take a flight of stairs holding on to the railing----- She can undertake all the above activities without  chest pain,chest tightness, Shortness of breath ,dizziness,lightheadedness making her exercise tolerance more,  than 4 Mets.       Review of Systems   Constitutional:  Negative for chills and fever.        No unusual weight changes       HENT:          Sleep apnea   Eyes:         No new visual changes   Respiratory:          No cough , phlegm    No Hemoptysis   Cardiovascular:         As noted   Gastrointestinal:         No overt GI/ blood losses  Bowel movements- Regular    Endocrine:        Prednisone use > 20 mg daily for 3 weeks- None   Genitourinary:  Negative for dysuria.        No urinary hesitancy    Musculoskeletal:         As above   Longstanding low back   Skin:  Negative for rash.   Neurological:  Negative for syncope.        No unilateral weakness   Hematological:         Current use of Anticoagulants  None       No current suggestions of active tuberculosis    She quit tobacco smoking when she was about 24 years of age    No anesthesia, bleeding, cardiac problems, PONV with previous surgeries/procedures.  Medications and Allergies reviewed in epic.   FH- No anesthesia,bleeding / venous thrombosis heart disease in family      Physical Exam  Blood pressure 126/75, pulse 88, temperature 98.6 °F (37 °C), temperature source Oral, resp. rate 18, height 5' 7" (1.702 m), weight 112.9 kg (249 lb), SpO2 98%.    I offered a sheet and the presence of a chaperone during physical examination   She was comfortable to proceed with the exam without the the presence of a chaperone        Physical Exam  Constitutional- " Vitals - Body mass index is 39 kg/m².,   Vitals:    03/25/25 1140   BP: 126/75   Pulse: 88   Resp: 18   Temp: 98.6 °F (37 °C)     General appearance-Conscious,Coherent  Eyes- No conjunctival icterus,pupils  round , reactive to light , and  Bilateral cataracts  ENT-Oral cavity- moist    , Hearing grossly normal   Neck- No thyromegaly ,Trachea -central, No jugular venous distension,   No Carotid Bruit   Cardiovascular -Heart Sounds- Normal  and  no murmur   , No gallop rhythm   Respiratory - Normal Respiratory Effort, Normal breath sounds,  no wheeze , and  no forced expiratory wheeze    Peripheral pitting pedal edema-- mild, no calf pain   Gastrointestinal -Soft abdomen, No palpable masses, Non Tender,Liver,Spleen not palpable. No-- free fluid and shifting dullness  Musculoskeletal- No finger Clubbing. Strength grossly normal   Lymphatic-No Palpable cervical, axillary,Inguinal lymphadenopathy   Psychiatric - normal effect,Orientation  Rt Dorsalis pedis pulses-palpable    Lt Dorsalis pedis pulses- palpable   Rt Posterior tibial pulses -palpable   Left posterior tibial pulses -palpable   Miscellaneous -  no renal bruit   Investigations  Lab and Imaging have been reviewed in epic.  Noted elevated white cell count on February 5th-she does not have any current suggestions of infection-= no diarrhea, urinary pain, cough phlegm, sinus problems, difficulty watching light, no headache    Review of Medicine tests    EKG- I had independently reviewed the EKG from--1/16/2025  It was reported to be showing     Normal sinus rhythm   Left axis deviation   Low voltage QRS   Abnormal ECG   When compared with ECG of 19-Jun-2024 10:29,   Significant changes have occurred     Review of clinical lab tests:  Lab Results   Component Value Date    CREATININE 1.6 (H) 02/05/2025    HGB 10.5 (L) 02/05/2025     02/05/2025             Review of old records- Was done and information gathered regards to events leading to surgery and health  conditions of significance in the perioperative period.        Preoperative cardiac risk assessment-  The patient does not have any active cardiac conditions . Revised cardiac risk index predictors- 0---.Functional capacity is more than 4 Mets. She will be undergoing a Breast procedure that carries a Moderate Risk risk     Risk of a major Cardiac event ( Defined as death, myocardial infarction, or cardiac arrest at 30 days after noncardiac surgery), based on RCRI score     -3.9%       Will work with the cardiologist         American Society of Anesthesiologists Physical status classification ( ASA ) class: 3     Postoperative pulmonary complication risk assessment:      ARISCAT ( Canet) risk index- risk class -  Low, if duration of surgery is under 3 hours, intermediate, if duration of surgery is over 3 hours          Assessment/Plan:     Arachnoid cyst  She had neurosurgery evaluation  arachnoid web/cyst at T3-4.  She is not myelopathic.    -it also seems she is asymptomatic from her possible C1-2 instability  It was felt that     possible arachnoid web T3-4, possible incidental C1/2 instability.  -neurosurgical intervention is not indicated    No suggestions of cervical myelopathy    I suggested the anesthesiology team be aware of the possible C1-C2 instability during intubation    Chronic pain  She has longstanding low back pain  She is taking Flexeril, gabapentin, tizanidine 4 longstanding back pain  She had low back surgery in 2016    She is no longer trouble with sciatic after spine surgery and she has longstanding low back pain and is taking hydrocodone for the last 2 months    She has leg weakness or urinary incontinence    opioid use- In view of the opioid use, the patient may have opioid tolerance . I suggest considering the possibility of opioid tolerance  in planning post operative pain control     Discussed possibly reducing the opioid use in preparation for surgery , so that it reduces the opioid  tolerance which helps post op pain control     Suggested letting pain management/ Prescribing provider  know about the up coming surgery      Suggest avoidance of abrupt stopping of opioid medication as it can cause opioid withdrawal     Current severe episode of major depressive disorder without psychotic features without prior episode   Depression Cymbalta    Cymbalta is helping her depression as well as her back pain    Doing good from a mental health stand point   under psychiatry   Has supportive family   On Medication that is helping   No Suicidal / Homicidal ideation      I suggest monitoring the sodium as SIADH from Cymbalta use and hypersecretion of ADH associated with surgery can reduce sodium in the perioperative period   she has been on Cymbalta for a long time  Discussed risk of bleeding on Cymbalta that she is taking for depression which is helping her back pain also  She has an upcoming psychiatric evaluation on March 31st and I suggested for her to discuss with the psychiatric provider about perioperative usage of Cymbalta    Aortic atherosclerosis  No suggestions of lower extremity intermittent claudication   Atorvastatin- HLD-I  suggest continuation of statin during the entire perioperative period.     Essential hypertension  She is taking amlodipine and Toprol-XL    Home BP readings -125/75  Recent BP readings in the record-  Hypertension-  Blood pressure is acceptable . I suggest continuation of --amlodipine, Toprol-XL------ during the entire perioperative period. I suggest   blood pressure monitoring .I suggest addressing pain control as uncontrolled pain can increased blood pressure      Obstructive hypertrophic cardiomyopathy  She had an echocardiogram from 2023 which reportedly showed      Left Ventricle: Normal wall motion. There is hyperdynamic systolic function with a visually estimated ejection fraction of greater than 70%. Grade I diastolic dysfunction. LVOT obstruction .  56 mm of  gradient across LVOT with Valsalva    Left Atrium: Left atrium is mildly dilated.    Right Ventricle: Mild right ventricular enlargement. Systolic function is normal.    Right Atrium: Right atrium is mildly dilated.    Pulmonary Artery: The estimated pulmonary artery systolic pressure is 38 mmHg.    IVC/SVC: Intermediate venous pressure at 8 mmHg.    TDS    June 2024-Normal myocardial perfusion scan. There is no evidence of myocardial ischemia or infarction.   She has hypertrophic obstructive cardiomyopathy and is under cardiology care  She has no chest pain or chest tightness  She is not troubled with breathing  She is currently wearing a 30 day heart monitor to check for any heart rhythm trouble      Pulmonary hypertension  Pulmonary arterial hypertension       WHO functional classification     Class 1 - No limitation ( with reference to fatigue or dyspnea, chest pain, or heart syncope)      Clinical classification - based on etiology       Group 2 hypertrophic obstructive cardiomyopathy  Group 3 sleep apnea    [place] heart association functional class     Class 1         Planned Anesthesia-  likely general anesthesia    Rt heart dysfunction -none       Suggest avoidance of Intra vascular volume over load or reduced pre load   I suggest that the anaesthesiologist be aware of the Pulmonary artery hypertension , so that sedation,  anesthetic plans can be made with consideration of Pulmonary Hypertension  .     SVT (supraventricular tachycardia)  No recent problem with her cardiac rhythm    CKD (chronic kidney disease) stage 3, GFR 30-59 ml/min  Her baseline creatinine is about 1.5 but her kidney function came down from her stopping medication but she has since been back on her medication and I kidney function has improved  She is under Nephrology care    Most recent potassium has been normal  Stages of CKD discussed  Deleterious effects NSAID's , Beneficial effects of Hydration discussed   Acetaminophen ( If not  intolerant ) as needed for pain     I  suggest monitoring renal function, in put and out put status karla-operatively. I  suggest avoiding nephrotoxic medication including NSAIDs, COX2 inhibitors, intravenous contrast agent,avoiding hypotension to prevent further renal impairment.   Care with intravenous contrast discussed     Anemia  Mild anemia Likely from kidney impairment related and the hemoglobin is improving with improvement of kidney function  Noted that she is a Jehovah Witness level work with the surgeon about blood loss given the baseline low hemoglobin  He does not want to take blood products    No overt GI/ blood losses  No stomach upset/ ulcer     No long standing NSAID use  Discussed follow up     I suggest monitoring the hemoglobin in the perioperative period     Malignant neoplasm of right female breast  Plan is for surgery    Sickle cell trait  No history of sickling   She will be at increased risk of thrombosis given the sickle cell trait  Increased risk of thrombosis in the karla operative period , compression stocking use discussed     BMI 39.0-39.9,adult  Weight related conditions     Known to have     HTN  Type 2 Diabetes    Gout   Hyperlipidemia   Sleep apnea   Osteoarthritis    Not troubled with / Not known to have       Acid reflux   Fatty liver       Encouraged maintaining healthy weight for improved health     She is on Mounjaro every 7 days and I suggested to skip 1 dose prior to surgery    Type 2 diabetes mellitus with stage 3b chronic kidney disease, without long-term current use of insulin  Type 2Diabetes Mellitus  On treatment with Mounjaro   Hemoglobin A1c- Dec 2024 - 5.1  Capillary glucose check-  Pre breakfast -89- 90   No hypoglycemia recently  She has alarms when her glucose goes low    Diabetes Complications     Microvascular        Diabetes affecting the eyes, Kidneys   No tingling numbness of hands and feet  No reported open areas on the feet   Feet care suggested      Macrovascular     No stroke/ TIA  Not known to have CAD  No suggestion of  lower extremity claudication      Diabetes Mellitus-I suggest monitoring the glucose in the perioperative period ( Before meals and bed time,if the patient is on oral feeds or every 6 hourly ,if the patient is NPO )  Blood glucose target in hospitalized patients is 140-180. Oral Hypoglycemic agents are generally avoided during the hospital stay . If glucose is consistently elevated ,I suggest using basal ,prandial Insulin regimen to control the glucose , as elevated glucose can be associated with adverse surgical out comes. Please consider involving Hospital Medicine or Endocrinology ,if any help is needed with Glucose control. Patient will be instructed based on the pre op clinic guidelines  about adjustment of diabetic treatment (If applicable )  considering the NPO status for Surgery      I had educated that uncontrolled DM can cause post op complications,risk of infection, wound healing problem,increased length of stay in hospital and its associated complications.I suggest exercise as much as possible and follow diabetic diet             Drug-induced constipation  Constipated from pain medication    Suggested working on bowel movements in preparation for surgery   Constipation- I suggest giving bowel movement regimen as opioid use,reduced ambulation  can increase the constipation      History of gout  Gout   - Controlled     - I suggest to keep the patient adequately hydrated.  - Please note that surgical stress ,dehydration can precipitate acute gout        History of open reduction and internal fixation (ORIF) procedure  Left thigh area from a mechanical fall  Not recently  Not known to have osteoporosis    Obstructive sleep apnea  She could not tolerate CPAP  She has not worn CPAP in about 2 years    .   Informed the risk of worsening sleep apnea in the perioperative period and suggest using CPAP/BIPAP use any time in 24 hrs ( day  or night )for planned sleep     I suggest  caution with usage of medication that can cause respiratory suppression in the perioperative period    Avoidance of  supine sleep, weight gain , alcoholic beverages , care with , sedative , CNS depressant use indicated  since all of these can worsen UYE         History of COVID-19 2024     Did not require hospitalization, intubation or supplemental oxygen use   Had been vaccinated   Recovered from COVID    COVID screening     No fever   No cough   No SOB  No sore throat   No loss of taste or smell   No muscle aches   No nausea, vomiting , diarrhea     Edema  Contributing factors     Occasional Salted food             Not known to have     Heart failure   Liver failure   Kidney failure     Edema- I suggested avoidance of added salt,avoidance of NSAID's, unless advised or ordered  and suggested Limb elevation and stocking use     On  Lasix spironolactone    H/O: hysterectomy  For excessive vaginal bleeding from fibroids  Age -50  Surgical approach-laparoscopic  Ovaries retained   No HRT          Preventive perioperative care    Thromboembolic prophylaxis:  Her risk factors for thrombosis include cancer  surgical procedure and age.I suggest  thromboembolic prophylaxis ( mechanical/pharmacological, weighing the risk benefits of pharmacological agent use considering karla procedural bleeding )  during the perioperative period.I suggested being active in the post operative period.      Postoperative pulmonary complication prophylaxis-Risk factors for post operative pulmonary complications include age over 65 years and ASA class >2- I suggest incentive spirometry use, early ambulation, and end tidal carbon dioxide monitoring  , oral care , head end of bed elevation      Renal complication prophylaxis-Risk factors for renal complications include pre-existing renal disease, diabetes mellitus, and hypertension . I suggest keeping her well hydrated and avoidance/ minimizing the use of   NSAID's,FERNANDEZ 2 Inhibitors ,IV contrast if possible in the perioperative period.      Surgical site Infection Prophylaxis-I  suggest appropriate antibiotic for Prophylaxis against Surgical site infections  No reported Staph infection  Skin antibacterial discussed       Delirium prophylaxis-Risk factors - opioid use - I suggest avoidance / minimizing the use of  Benzodiazepines ( unless the patient has been taking it on a regular basis ),Anticholinergic medication,Antihistamines ( like  Benadryl).I suggest minimizing the use of opioid medication and use of IV tylenol,if it is appropriate. I suggest using the lowest possible dose of opioids for the shortest duration possible in the perioperative period. I suggest to Keep shades/blinds open during the day, lights off and shades closed at night to encourage normal sleep/wake cycle.I encourage the presence of the family member with the patient at all times, if at all possible as mental status changes can be picked up early by the family members and they help with reorientation. I encouraged the presence of family to help with orientation in the perioperative period. Benadryl avoidance suggested          This visit was focused on Preoperative evaluation, Perioperative Medical management, complication reduction plans. I suggest that the patient follows up with primary care or relevant sub specialists for ongoing health care.    I appreciate the opportunity to be involved in this patients care. Please feel free to contact me if there were any questions about this consultation.    Patient is optimized    Patient/ care giver/ Family member was instructed to call and update me about any changes to health,  medication, office visits ,testing out side of the karla operative care center , hospitalizations between now and surgery      Danna Aguilar MD  Internal Medicine  [facility]   Cell Phone- [phone]    In summary this pleasant 70 year female is  heading for a time sensitive breast cancer surgery  She has multiple health conditions    Sleep apnea    She has a obstructive CPAP and but is unable to tolerate CPAP use  Addressing sleep apnea might help with pulmonary hypertension  She wants to address sleep apnea after surgery      Sickle cell trait    She will be at increased risk of thrombosis given the sickle cell trait  Cancer also increases the risk of blood clotting    Anemia    Mild anemia Likely from kidney impairment related and the hemoglobin is improving with improvement of kidney function  Noted that she is a Jehovah Witness- will work with the surgeon about expected blood loss given the baseline low hemoglobin  She does not want to take blood products  She may not need blood transfusion, if she is not expected to lose a significant amount of blood    Renal impairment    Her baseline creatinine is about 1.5 but her kidney function came down from her stopping medication but she has since been back on her medication and her kidney function has improved  She is under Nephrology care    Hypertrophic obstructive cardiomyopathy    June 2024-Normal myocardial perfusion scan. There is no evidence of myocardial ischemia or infarction.   She has hypertrophic obstructive cardiomyopathy and is under cardiology care  She has no chest pain or chest tightness  She is not troubled with breathing  No passing out  She is currently wearing a 30 day heart monitor to check for any heart rhythm trouble  I will work with the cardiologist  about letting her proceed with the surgery as her surgery is time sensitive for breast cancer    C1-C2 instability      I suggested the anesthesiology team be aware of the possible C1-C2 instability during intubation    Depression    Discussed risk of bleeding on Cymbalta that she is taking for depression which is helping her back pain also  She has an upcoming psychiatric evaluation on March 31st and I suggested for her to discuss with  the psychiatric provider about perioperative usage of Cymbalta    Messaged cardiologist's, surgeons, primary care provider and nephrologist    I have spent ---78--- minutes of time which includes, time spent to prepare to see the patient , obtaining history ,performing examination, counseling/Educating the patient , Documenting clinical information in the record

## 2025-03-25 NOTE — OUTPATIENT SUBJECTIVE & OBJECTIVE
"Outpatient Subjective & Objective     Chief complaint-Preoperative evaluation, Perioperative Medical management, complication reduction plan     Active cardiac conditions- none    Revised cardiac risk index predictors- none    Functional capacity -Examples of physical activity  -less active due to her back pain,  can take a flight of stairs holding on to the railing----- She can undertake all the above activities without  chest pain,chest tightness, Shortness of breath ,dizziness,lightheadedness making her exercise tolerance more,  than 4 Mets.       Review of Systems   Constitutional:  Negative for chills and fever.        No unusual weight changes       HENT:          Sleep apnea   Eyes:         No new visual changes   Respiratory:          No cough , phlegm    No Hemoptysis   Cardiovascular:         As noted   Gastrointestinal:         No overt GI/ blood losses  Bowel movements- Regular    Endocrine:        Prednisone use > 20 mg daily for 3 weeks- None   Genitourinary:  Negative for dysuria.        No urinary hesitancy    Musculoskeletal:         As above   Longstanding low back   Skin:  Negative for rash.   Neurological:  Negative for syncope.        No unilateral weakness   Hematological:         Current use of Anticoagulants  None       No current suggestions of active tuberculosis    She quit tobacco smoking when she was about 24 years of age    No anesthesia, bleeding, cardiac problems, PONV with previous surgeries/procedures.  Medications and Allergies reviewed in epic.   FH- No anesthesia,bleeding / venous thrombosis heart disease in family      Physical Exam  Blood pressure 126/75, pulse 88, temperature 98.6 °F (37 °C), temperature source Oral, resp. rate 18, height 5' 7" (1.702 m), weight 112.9 kg (249 lb), SpO2 98%.    I offered a sheet and the presence of a chaperone during physical examination   She was comfortable to proceed with the exam without the the presence of a chaperone        Physical " Exam  Constitutional- Vitals - Body mass index is 39 kg/m².,   Vitals:    03/25/25 1140   BP: 126/75   Pulse: 88   Resp: 18   Temp: 98.6 °F (37 °C)     General appearance-Conscious,Coherent  Eyes- No conjunctival icterus,pupils  round , reactive to light , and  Bilateral cataracts  ENT-Oral cavity- moist    , Hearing grossly normal   Neck- No thyromegaly ,Trachea -central, No jugular venous distension,   No Carotid Bruit   Cardiovascular -Heart Sounds- Normal  and  no murmur   , No gallop rhythm   Respiratory - Normal Respiratory Effort, Normal breath sounds,  no wheeze , and  no forced expiratory wheeze    Peripheral pitting pedal edema-- mild, no calf pain   Gastrointestinal -Soft abdomen, No palpable masses, Non Tender,Liver,Spleen not palpable. No-- free fluid and shifting dullness  Musculoskeletal- No finger Clubbing. Strength grossly normal   Lymphatic-No Palpable cervical, axillary,Inguinal lymphadenopathy   Psychiatric - normal effect,Orientation  Rt Dorsalis pedis pulses-palpable    Lt Dorsalis pedis pulses- palpable   Rt Posterior tibial pulses -palpable   Left posterior tibial pulses -palpable   Miscellaneous -  no renal bruit   Investigations  Lab and Imaging have been reviewed in epic.  Noted elevated white cell count on February 5th-she does not have any current suggestions of infection-= no diarrhea, urinary pain, cough phlegm, sinus problems, difficulty watching light, no headache    Review of Medicine tests    EKG- I had independently reviewed the EKG from--1/16/2025  It was reported to be showing     Normal sinus rhythm   Left axis deviation   Low voltage QRS   Abnormal ECG   When compared with ECG of 19-Jun-2024 10:29,   Significant changes have occurred     Review of clinical lab tests:  Lab Results   Component Value Date    CREATININE 1.6 (H) 02/05/2025    HGB 10.5 (L) 02/05/2025     02/05/2025             Review of old records- Was done and information gathered regards to events leading  to surgery and health conditions of significance in the perioperative period.    Outpatient Subjective & Objective

## 2025-03-25 NOTE — ASSESSMENT & PLAN NOTE
For excessive vaginal bleeding from fibroids  Age -50  Surgical approach-laparoscopic  Ovaries retained   No HRT

## 2025-03-25 NOTE — ASSESSMENT & PLAN NOTE
She could not tolerate CPAP  She has not worn CPAP in about 2 years    .   Informed the risk of worsening sleep apnea in the perioperative period and suggest using CPAP/BIPAP use any time in 24 hrs ( day or night )for planned sleep     I suggest  caution with usage of medication that can cause respiratory suppression in the perioperative period    Avoidance of  supine sleep, weight gain , alcoholic beverages , care with , sedative , CNS depressant use indicated  since all of these can worsen YUE

## 2025-03-25 NOTE — ASSESSMENT & PLAN NOTE
Type 2Diabetes Mellitus  On treatment with Mounjaro   Hemoglobin A1c- Dec 2024 - 5.1  Capillary glucose check-  Pre breakfast -89- 90   No hypoglycemia recently  She has alarms when her glucose goes low    Diabetes Complications     Microvascular        Diabetes affecting the eyes, Kidneys   No tingling numbness of hands and feet  No reported open areas on the feet   Feet care suggested     Macrovascular     No stroke/ TIA  Not known to have CAD  No suggestion of  lower extremity claudication      Diabetes Mellitus-I suggest monitoring the glucose in the perioperative period ( Before meals and bed time,if the patient is on oral feeds or every 6 hourly ,if the patient is NPO )  Blood glucose target in hospitalized patients is 140-180. Oral Hypoglycemic agents are generally avoided during the hospital stay . If glucose is consistently elevated ,I suggest using basal ,prandial Insulin regimen to control the glucose , as elevated glucose can be associated with adverse surgical out comes. Please consider involving Hospital Medicine or Endocrinology ,if any help is needed with Glucose control. Patient will be instructed based on the pre op clinic guidelines  about adjustment of diabetic treatment (If applicable )  considering the NPO status for Surgery      I had educated that uncontrolled DM can cause post op complications,risk of infection, wound healing problem,increased length of stay in hospital and its associated complications.I suggest exercise as much as possible and follow diabetic diet

## 2025-03-26 ENCOUNTER — PATIENT MESSAGE (OUTPATIENT)
Dept: HEMATOLOGY/ONCOLOGY | Facility: CLINIC | Age: 70
End: 2025-03-26
Payer: MEDICARE

## 2025-03-26 NOTE — PROGRESS NOTES
"Cancer Genetics  Department of Hematology and Oncology  [facility]    Date of Service:  3/27/25  Visit Provider:  Krystian Friend DNP  Collaborating Physician:  Ella Cuba MD    Patient ID  Name: Gwendolyn Fournier    : 1955    MRN: [id number]      Referring Provider  [facility] [facility], Provider  [address] [place] [address]    Televisit Information  The patient location is: [place], [place]  The chief complaint leading to consultation is: As below    Visit type: audiovisual    Face to Face time with patient: 31 minutes  51 minutes of total time spent on the encounter, which includes face to face time and non-face to face time preparing to see the patient (eg, review of tests), Obtaining and/or reviewing separately obtained history, Documenting clinical information in the electronic or other health record, Independently interpreting results (not separately reported) and communicating results to the patient/family/caregiver, or Care coordination (not separately reported).         Each patient to whom he or she provides medical services by telemedicine is:  (1) informed of the relationship between the physician and patient and the respective role of any other health care provider with respect to management of the patient; and (2) notified that he or she may decline to receive medical services by telemedicine and may withdraw from such care at any time.    Notes:     SUBJECTIVE      Chief Complaint: Genetic Evaluation    History of Present Illness (HPI):  Gwendolyn Fournier ("Gwendolyn"), 70 y.o., assigned female sex at birth, is new to the [facility] and to me.  She was referred through [facility]'s Genetic Wellness Assessment for cancer-genetic risk assessment based on her personal diagnosis of breast cancer.    Genetic History  Personal history of genetic testing:  No  Family history of genetic " testing:  No    Cancer History  Multicentric cancer of the right breast, dx.ages [age]  Papillary carcinoma of the right breast, ER+ VT+, outer region, 9 cm from the nipple, dx.age 69  Mucinous carcinoma of the right breast, ER+ VT+ HER2(-), outer region, 8 cm from the nipple, dx.age 69  Papillary carcinoma of the right breast, ER+ VT+, upper posterior region, dx.age 70    Focused Medical History  Colon polyp:  Yes (per patient)  Other tumor or pertinent mass/lesion:    Likely renal cysts per 12/31/2024 CT AP report  Leiomyomata per 05/2005 hysterectomy pathology report  Pancreatitis:  No  Blood disorder:  No, per patient; anemia, per chart review     Focused Surgical History  Supracervical hysterectomy 05/2005 (age 50); ovaries remain in situ  Colonoscopies:    01/21/2019 (age 63, at [facility])  Multiple others prior to 2019 - Patient does not recall where these were performed  Patient is planning to undergo right mastectomy and left reduction mammoplasty    Focused Social History  Former cigarette smoker for 7 years, 1-5 cigarettes every 5 days; quit at age 24 (0.07-0.35 pack-years)    ONCOLOGY PEDIGREE  Ancestry:  Ashkenazi Shinto:  No  Consanguinity:  No  Other than noted, no known close/distant family history of cancer.  Other than noted, no known family history of benign tumor/mass/lesion or colon polyps.  Limited knowledge of medical history of maternal family and paternal family.    ** If this pedigree appears small/illegible on your screen, expand this note window horizontally. **  Patient does not know if her parents' siblings were their respective full- vs. half-siblings.      Review of Systems  See HPI.       OBJECTIVE     Patient Active Problem List    Diagnosis Date Noted    Crohn disease 03/27/2025    History of COVID-19 03/25/2025    Edema 03/25/2025    H/O: hysterectomy 03/25/2025    Pulmonary hypertension 01/30/2025    Obstructive hypertrophic cardiomyopathy 01/30/2025    Pain in thoracic spine  01/30/2025    Arachnoid cyst 01/30/2025    Anemia 01/30/2025    Current severe episode of major depressive disorder without psychotic features without prior episode 01/30/2025    Psychophysiological insomnia 01/30/2025    CKD (chronic kidney disease) stage 3, GFR 30-59 ml/min 01/02/2025    Drug-induced constipation 01/02/2025    Malignant neoplasm of right female breast 01/02/2025    SVT (supraventricular tachycardia) 02/03/2023    Aortic atherosclerosis 02/03/2023    Decreased range of motion of trunk and back 11/03/2022    Decreased strength of trunk and back 11/03/2022    Type 2 diabetes mellitus with stage 3b chronic kidney disease, without long-term current use of insulin 01/25/2021    Sacroiliitis, not elsewhere classified 12/18/2020    Obstructive sleep apnea 10/23/2020    Sickle cell trait 10/23/2020    BMI 39.0-39.9,adult 12/03/2019    Chronic pain 03/13/2019    History of open reduction and internal fixation (ORIF) procedure 11/16/2018    History of gout 11/08/2016    Primary osteoarthritis involving multiple joints 03/16/2016    Essential hypertension 09/15/2015     Past Medical History:   Diagnosis Date    Arthritis     Cancer     Gout attack     History of tuberculosis exposure 12/29/2021    Hx of psychiatric care     Hyperlipidemia     Hypertension     Opioid dependence, uncomplicated 02/03/2023    Psychiatric problem     Renal disorder     Sciatic nerve pain 2013    Therapy     used to follow with psychiatrist but doesn't recall whom, 2012    Tuberculosis     Unspecified cataract 07/27/2023    Mild on both eyes     Physical Exam  Significantly limited secondary to the inherent nature of a virtual visit.  Very pleasant patient.  Accompanied on the virtual visit by her daughter, Zehra, who is also very pleasant.  Constitutional      Appearance:  Appears well developed and well nourished. No distress.   Neurological     Mental Status:  Alert and oriented.  Psychiatric         Mood and Affect:   "Normal.     Thought Content:  Normal.     Speech:  Normal.     Behavior:  Normal.     Judgment:  Normal.    ASSESSMENT / PLAN      Gwendolyn Fournier ("Gwendolyn"), 70 y.o., assigned female sex at birth, is new to the [facility] and to me.  She was referred through [facility]'s Genetic Wellness Assessment for cancer-genetic risk assessment based on her personal diagnosis of breast cancer.    With Gwendolyn's diagnosis of multicentric breast cancer and much of family history being unknown to her, I believe that germline testing of breast cancer susceptibility genes would be reasonable.    Cancer Genetics  Germline cancer-genetic testing is the testing of genes associated with cancer, known as cancer susceptibility genes.  Just as these genes are inherited from the parents--one copy of each gene from each parent--mutations in these genes can be inherited, as well.  A mutation in a cancer susceptibility gene adversely affects the gene's ability to prevent cancer; therefore, carriers of cancer susceptibility gene mutations may be at increased risk for certain cancers.     Causes of Cancer  Only approximately 5%-10% of cancers are caused by an inherited cancer susceptibility gene mutation; rather, the majority of cancers are sporadic.  Causes of sporadic cancer may include environmental risk factors, lifestyle risk factors, and non-modifiable risk factors.  Family members often share not only genetics but also other risk factors, including environmental and lifestyle risk factors, so cancers can be familial.     Potential Results of Genetic Testing, and Their Implications  Potential results of genetic testing include positive, negative, and variant of unknown significance (VUS).    Positive:  A positive result indicates the presence of at least one clinically significant gene mutation, and the individual's associated cancer risks vary depending upon the cancer susceptibility " gene(s) in which there is/are a mutation(s).  With a positive result, depending upon the specific result and the individual's clinical history, modified risk management may be recommended, including measures for risk reduction and/or surveillance; however, there are not always effective strategies for risk management.  There may be reproductive implications of a positive genetic test result, and there may be cancer-treatment implications of a positive genetic test result.  Negative:  A negative result indicates that no clinically significant mutations were identified in the gene(s) tested.  A negative result does not indicate that that individual cannot develop cancer, and, in fact, that individual may even be at increased risk for cancer based on shared risk factors with affected relatives.  The ability to interpret the meaning of a negative genetic testing result is limited in an individual unaffected with cancer whose affected relatives have not first undergone such testing.  The most informative family member to undergo testing for hereditary cancer syndromes first are those who have personally had cancer.    VUS:  A VUS is a genetic change for which there is not presently enough data for the laboratory to make a determination as to whether the genetic variant is clinically significant.  VUSs are not typically acted upon clinically.       Genetic Mutation Inheritance  When an individual tests positive for a gene mutation, their first-degree relatives each have a 50% chance of carrying the same mutation, and other, more distant blood relatives may also be at risk of carrying the same mutation.      Genetic Discrimination  Genetic discrimination occurs when an individual is discriminated against on the basis of their genetic information.  The Genetic Information Nondiscrimination Act of 2008 (XENA) is U[place]. federal legislation that provides some protections against the use of an individual's genetic information by  "their health insurer and by their employer.  Title I of XENA prohibits most health insurers from utilizing an individual's genetic information to make decisions regarding insurance eligibility or premium charges; this protection does not apply to health insurance obtained through a job with the , and it may not apply to health insurance obtained through the Federal Employees Health Benefits Plan.  Title II of XENA prohibits covered entities, in many cases, from requesting or requiring the genetic information of employees and applicants and from using said information to make employment decisions; this protection does not apply to employers with fewer than 15 employees or to the .  XENA does not protect individuals from genetic discrimination by any other type of policy or entity, including but not limited to life insurance, disability insurance, long-term care insurance,  benefits, and [facility] benefits.    Genetic Testing Logistics  An outside laboratory would perform the testing after a blood sample is collected at [facility].  The test is expected to take approximately three weeks to result.  The patient may incur out-of-pocket costs related to genetic testing.  Post-test genetic counseling can be conducted once the genetic testing results are available.         Testing lab: [facility] powered by [employer]   Test panel: [facility] xG+ with [employer] (requested "STAT" processing)   ICD-10 code(s): C50.911   Z17.0      Verbal informed consent: Obtained   Specimen type: Blood   Specimen collection by: [facility] ([facility])   Specimen collection date: To be scheduled for Monday, 03/31/2025 (offered sample collection for today)   Results expected by: Approximately 2-3 weeks after the genetic testing lab receives the specimen   Post-test genetic counseling: ~4 weeks after sample collection        ICD-10-CM ICD-9-CM   1. Encounter for nonprocreative genetic counseling  Z71.83 " "V26.33   2. Malignant neoplasm of right breast in female, estrogen receptor positive, unspecified site of breast  C50.911 174.9    Z17.0 V86.0     1. Encounter for nonprocreative genetic counseling  Gwendolyn Fournier ("Gwendolyn"), 70 y.o., assigned female sex at birth, is new to the [facility] and to me.  She was referred through [facility]'s Genetic Wellness Assessment for cancer-genetic risk assessment based on her personal diagnosis of breast cancer.    With Gwendolyn's diagnosis of multicentric breast cancer and much of family history being unknown to her, I believe that germline testing of breast cancer susceptibility genes would be reasonable.    Genetic Testing Plan  Offered Gwendolyn options of proceeding with hereditary cancer susceptibility gene testing at this time versus delaying/declining such.  Gwendolyn desires to proceed with such testing at this time.  Provided germline cancer genetic test options of focused panel versus broad panel, and Gwendolyn opts for the latter.     2. Malignant neoplasm of right breast in female, estrogen receptor positive, unspecified site of breast  - [facility] - Hereditary Cancer with RNA; Future     Follow-up:  Follow up in about 3 weeks (around 4/17/2025) for post-test genetic counseling.      Questions were encouraged and answered to the patient's satisfaction, and she verbalized understanding of the information and agreement with the plan.  Advised Gwendolyn that pertinent information will be accessible in this visit note for her review.        Krystian Friend, DNP, APRN, FNP-BC, AOCNP, CGRA  Nurse Practitioner, Cancer Genetics  Department of Hematology and Oncology  [facility]  [facility]        CC:  Dr. Casper Grimes, Dr. Yolette Abebe      Routed to Cancer Genetics Staff:  - Please schedule [facility]+ for Monday, 3/31, at 12:30pm at [facility].        Portions of the record may " have been created with voice-recognition software.  Occasional wrong-word or sound-a-like substitutions may have occurred due to the inherent limitations of voice-recognition software.  Read the chart carefully, and recognize, using context, where substitutions have occurred.

## 2025-03-27 ENCOUNTER — PATIENT MESSAGE (OUTPATIENT)
Dept: HEMATOLOGY/ONCOLOGY | Facility: CLINIC | Age: 70
End: 2025-03-27

## 2025-03-27 ENCOUNTER — OFFICE VISIT (OUTPATIENT)
Dept: HEMATOLOGY/ONCOLOGY | Facility: CLINIC | Age: 70
End: 2025-03-27
Payer: MEDICARE

## 2025-03-27 DIAGNOSIS — Z71.83 ENCOUNTER FOR NONPROCREATIVE GENETIC COUNSELING: Primary | ICD-10-CM

## 2025-03-27 DIAGNOSIS — C50.911 MALIGNANT NEOPLASM OF RIGHT BREAST IN FEMALE, ESTROGEN RECEPTOR POSITIVE, UNSPECIFIED SITE OF BREAST: ICD-10-CM

## 2025-03-27 DIAGNOSIS — Z17.0 MALIGNANT NEOPLASM OF RIGHT BREAST IN FEMALE, ESTROGEN RECEPTOR POSITIVE, UNSPECIFIED SITE OF BREAST: ICD-10-CM

## 2025-03-27 PROBLEM — K50.90 CROHN DISEASE: Status: ACTIVE | Noted: 2025-03-27

## 2025-04-02 ENCOUNTER — TELEPHONE (OUTPATIENT)
Dept: SURGERY | Facility: CLINIC | Age: 70
End: 2025-04-02
Payer: MEDICARE

## 2025-04-02 NOTE — TELEPHONE ENCOUNTER
Spoke to the pt. Advised that 4/22 date needs to be confirmed with Dr. Grimes and that the authorization with plastics has to be approved before we can proceed. Pt v/u.

## 2025-04-09 DIAGNOSIS — C50.911 MALIGNANT NEOPLASM OF RIGHT FEMALE BREAST, UNSPECIFIED ESTROGEN RECEPTOR STATUS, UNSPECIFIED SITE OF BREAST: Primary | ICD-10-CM

## 2025-04-11 ENCOUNTER — ANESTHESIA EVENT (OUTPATIENT)
Dept: SURGERY | Facility: HOSPITAL | Age: 70
End: 2025-04-11
Payer: MEDICARE

## 2025-04-11 NOTE — ANESTHESIA PREPROCEDURE EVALUATION
04/11/2025  Gwendolyn Fournier is a 70 y.o., female.      Pre-op Assessment    I have reviewed the Patient Summary Reports.     I have reviewed the Nursing Notes. I have reviewed the NPO Status.   I have reviewed the Medications.     Review of Systems  Anesthesia Hx:  No problems with previous Anesthesia   History of prior surgery of interest to airway management or planning:          Denies Family Hx of Anesthesia complications.    Denies Personal Hx of Anesthesia complications.                    Hematology/Oncology:  Hematology Normal                     Current/Recent Cancer.  Breast              EENT/Dental:  EENT/Dental Normal           Cardiovascular:  Exercise tolerance: good   Hypertension    Dysrhythmias          ECG has been reviewed.   TTE 2/2023:  ·  Left Ventricle: Normal wall motion. There is hyperdynamic systolic function with a visually estimated ejection fraction of greater than 70%. Grade I diastolic dysfunction. LVOT obstruction .  56 mm of gradient across LVOT with Valsalva  ·  Left Atrium: Left atrium is mildly dilated.  ·  Right Ventricle: Mild right ventricular enlargement. Systolic function is normal.  ·  Right Atrium: Right atrium is mildly dilated.  ·  Pulmonary Artery: The estimated pulmonary artery systolic pressure is 38 mmHg.  ·  IVC/SVC: Intermediate venous pressure at 8 mmHg.  ·  TDS                             Pulmonary:        Sleep Apnea                Renal/:  Chronic Renal Disease, CKD                Hepatic/GI:  Hepatic/GI Normal                    Musculoskeletal:  Arthritis               Neurological:  Neurology Normal                                      Endocrine:  Diabetes, type 2           Dermatological:  Skin Normal    Psych:    depression                Physical Exam  General: Well nourished, Cooperative, Alert and Oriented    Airway:  Mallampati: II   Mouth  Opening: Normal  TM Distance: Normal  Tongue: Normal  Neck ROM: Normal ROM    Dental:  Intact        Anesthesia Plan  Type of Anesthesia, risks & benefits discussed:    Anesthesia Type: Gen ETT  Intra-op Monitoring Plan: Standard ASA Monitors  Post Op Pain Control Plan: multimodal analgesia and IV/PO Opioids PRN  Induction:  IV  Airway Plan: Direct, Post-Induction  Informed Consent: Informed consent signed with the Patient and all parties understand the risks and agree with anesthesia plan.  All questions answered. Patient consented to blood products? Refused (Episcopal objection)  Patient blood refusal form needs to be completed. Please review the form for refusal details in the  tab of Chart Review.  ASA Score: 3  Day of Surgery Review of History & Physical: H&P Update referred to the surgeon/provider.    Ready For Surgery From Anesthesia Perspective.     .    4/11/25  **Pt has C1-C2 instability, would recommend video laryngoscopy and inline stabilization during intubation**  DANE Ruth MD

## 2025-04-21 ENCOUNTER — TELEPHONE (OUTPATIENT)
Dept: SURGERY | Facility: CLINIC | Age: 70
End: 2025-04-21
Payer: MEDICARE

## 2025-04-22 ENCOUNTER — ANESTHESIA (OUTPATIENT)
Dept: SURGERY | Facility: HOSPITAL | Age: 70
End: 2025-04-22
Payer: MEDICARE

## 2025-04-22 ENCOUNTER — HOSPITAL ENCOUNTER (OUTPATIENT)
Facility: HOSPITAL | Age: 70
Discharge: HOME | End: 2025-04-24
Attending: SURGERY | Admitting: SURGERY
Payer: MEDICARE

## 2025-04-22 DIAGNOSIS — C50.911 MALIGNANT NEOPLASM OF RIGHT FEMALE BREAST, UNSPECIFIED ESTROGEN RECEPTOR STATUS, UNSPECIFIED SITE OF BREAST: Primary | ICD-10-CM

## 2025-04-22 LAB — POCT GLUCOSE: 164 MG/DL (ref 70–110)

## 2025-04-22 PROCEDURE — 38525 BIOPSY/REMOVAL LYMPH NODES: CPT | Mod: 51,RT,, | Performed by: SURGERY

## 2025-04-22 PROCEDURE — 25000003 PHARM REV CODE 250: Performed by: NURSE ANESTHETIST, CERTIFIED REGISTERED

## 2025-04-22 PROCEDURE — C1789 PROSTHESIS, BREAST, IMP: HCPCS | Performed by: SURGERY

## 2025-04-22 PROCEDURE — 71000015 HC POSTOP RECOV 1ST HR: Performed by: SURGERY

## 2025-04-22 PROCEDURE — 63600175 PHARM REV CODE 636 W HCPCS: Performed by: NURSE ANESTHETIST, CERTIFIED REGISTERED

## 2025-04-22 PROCEDURE — 63600175 PHARM REV CODE 636 W HCPCS: Performed by: STUDENT IN AN ORGANIZED HEALTH CARE EDUCATION/TRAINING PROGRAM

## 2025-04-22 PROCEDURE — 71000033 HC RECOVERY, INTIAL HOUR: Performed by: SURGERY

## 2025-04-22 PROCEDURE — 19303 MAST SIMPLE COMPLETE: CPT | Mod: RT,,, | Performed by: SURGERY

## 2025-04-22 PROCEDURE — 82962 GLUCOSE BLOOD TEST: CPT | Performed by: SURGERY

## 2025-04-22 PROCEDURE — 19318 BREAST REDUCTION: CPT | Mod: LT,,, | Performed by: SURGERY

## 2025-04-22 PROCEDURE — 63600175 PHARM REV CODE 636 W HCPCS

## 2025-04-22 PROCEDURE — 38900 IO MAP OF SENT LYMPH NODE: CPT | Mod: RT,,, | Performed by: SURGERY

## 2025-04-22 PROCEDURE — 63600175 PHARM REV CODE 636 W HCPCS: Performed by: SURGERY

## 2025-04-22 PROCEDURE — 36000706: Performed by: SURGERY

## 2025-04-22 PROCEDURE — C1729 CATH, DRAINAGE: HCPCS | Performed by: SURGERY

## 2025-04-22 PROCEDURE — 51798 US URINE CAPACITY MEASURE: CPT | Performed by: SURGERY

## 2025-04-22 PROCEDURE — 25000003 PHARM REV CODE 250

## 2025-04-22 PROCEDURE — 37000008 HC ANESTHESIA 1ST 15 MINUTES: Performed by: SURGERY

## 2025-04-22 PROCEDURE — 36000707: Performed by: SURGERY

## 2025-04-22 PROCEDURE — 19357 TISS XPNDR PLMT BRST RCNSTJ: CPT | Mod: 51,RT,, | Performed by: SURGERY

## 2025-04-22 PROCEDURE — 37000009 HC ANESTHESIA EA ADD 15 MINS: Performed by: SURGERY

## 2025-04-22 PROCEDURE — 88307 TISSUE EXAM BY PATHOLOGIST: CPT | Mod: TC | Performed by: SURGERY

## 2025-04-22 PROCEDURE — 27201423 OPTIME MED/SURG SUP & DEVICES STERILE SUPPLY: Performed by: SURGERY

## 2025-04-22 PROCEDURE — 71000016 HC POSTOP RECOV ADDL HR: Performed by: SURGERY

## 2025-04-22 PROCEDURE — A9520 TC99 TILMANOCEPT DIAG 0.5MCI: HCPCS | Performed by: SURGERY

## 2025-04-22 DEVICE — IMPLANTABLE DEVICE: Type: IMPLANTABLE DEVICE | Site: BREAST | Status: FUNCTIONAL

## 2025-04-22 RX ORDER — HEPARIN SODIUM 5000 [USP'U]/ML
5000 INJECTION, SOLUTION INTRAVENOUS; SUBCUTANEOUS ONCE
Status: COMPLETED | OUTPATIENT
Start: 2025-04-22 | End: 2025-04-22

## 2025-04-22 RX ORDER — LIDOCAINE HYDROCHLORIDE 10 MG/ML
1 INJECTION, SOLUTION EPIDURAL; INFILTRATION; INTRACAUDAL; PERINEURAL ONCE AS NEEDED
Status: DISCONTINUED | OUTPATIENT
Start: 2025-04-22 | End: 2025-04-24 | Stop reason: HOSPADM

## 2025-04-22 RX ORDER — BUPIVACAINE HYDROCHLORIDE 2.5 MG/ML
INJECTION, SOLUTION EPIDURAL; INFILTRATION; INTRACAUDAL; PERINEURAL
Status: DISCONTINUED | OUTPATIENT
Start: 2025-04-22 | End: 2025-04-22 | Stop reason: HOSPADM

## 2025-04-22 RX ORDER — MIDAZOLAM HYDROCHLORIDE 1 MG/ML
INJECTION INTRAMUSCULAR; INTRAVENOUS
Status: DISCONTINUED | OUTPATIENT
Start: 2025-04-22 | End: 2025-04-22

## 2025-04-22 RX ORDER — DEXMEDETOMIDINE HYDROCHLORIDE 100 UG/ML
INJECTION, SOLUTION INTRAVENOUS
Status: DISCONTINUED | OUTPATIENT
Start: 2025-04-22 | End: 2025-04-22

## 2025-04-22 RX ORDER — GLUCAGON 1 MG
1 KIT INJECTION
Status: DISCONTINUED | OUTPATIENT
Start: 2025-04-22 | End: 2025-04-24 | Stop reason: HOSPADM

## 2025-04-22 RX ORDER — IBUPROFEN 200 MG
16 TABLET ORAL
Status: DISCONTINUED | OUTPATIENT
Start: 2025-04-22 | End: 2025-04-24 | Stop reason: HOSPADM

## 2025-04-22 RX ORDER — LIDOCAINE HYDROCHLORIDE 20 MG/ML
INJECTION INTRAVENOUS
Status: DISCONTINUED | OUTPATIENT
Start: 2025-04-22 | End: 2025-04-22

## 2025-04-22 RX ORDER — ROCURONIUM BROMIDE 10 MG/ML
INJECTION, SOLUTION INTRAVENOUS
Status: DISCONTINUED | OUTPATIENT
Start: 2025-04-22 | End: 2025-04-22

## 2025-04-22 RX ORDER — GLUCAGON 1 MG
1 KIT INJECTION
Status: DISCONTINUED | OUTPATIENT
Start: 2025-04-22 | End: 2025-04-22

## 2025-04-22 RX ORDER — HALOPERIDOL LACTATE 5 MG/ML
INJECTION, SOLUTION INTRAMUSCULAR
Status: DISCONTINUED | OUTPATIENT
Start: 2025-04-22 | End: 2025-04-22

## 2025-04-22 RX ORDER — TALC
6 POWDER (GRAM) TOPICAL NIGHTLY PRN
Status: DISCONTINUED | OUTPATIENT
Start: 2025-04-22 | End: 2025-04-24 | Stop reason: HOSPADM

## 2025-04-22 RX ORDER — INSULIN ASPART 100 [IU]/ML
0-5 INJECTION, SOLUTION INTRAVENOUS; SUBCUTANEOUS
Status: DISCONTINUED | OUTPATIENT
Start: 2025-04-22 | End: 2025-04-24 | Stop reason: HOSPADM

## 2025-04-22 RX ORDER — KETAMINE HCL IN 0.9 % NACL 50 MG/5 ML
SYRINGE (ML) INTRAVENOUS
Status: DISCONTINUED | OUTPATIENT
Start: 2025-04-22 | End: 2025-04-22

## 2025-04-22 RX ORDER — ACETAMINOPHEN 325 MG/1
650 TABLET ORAL EVERY 8 HOURS PRN
Status: DISCONTINUED | OUTPATIENT
Start: 2025-04-22 | End: 2025-04-22

## 2025-04-22 RX ORDER — IBUPROFEN 200 MG
24 TABLET ORAL
Status: DISCONTINUED | OUTPATIENT
Start: 2025-04-22 | End: 2025-04-24 | Stop reason: HOSPADM

## 2025-04-22 RX ORDER — GABAPENTIN 300 MG/1
300 CAPSULE ORAL 3 TIMES DAILY
Status: DISCONTINUED | OUTPATIENT
Start: 2025-04-23 | End: 2025-04-24 | Stop reason: HOSPADM

## 2025-04-22 RX ORDER — ONDANSETRON 8 MG/1
8 TABLET, ORALLY DISINTEGRATING ORAL EVERY 8 HOURS PRN
Status: DISCONTINUED | OUTPATIENT
Start: 2025-04-22 | End: 2025-04-24 | Stop reason: HOSPADM

## 2025-04-22 RX ORDER — DEXAMETHASONE SODIUM PHOSPHATE 4 MG/ML
INJECTION, SOLUTION INTRA-ARTICULAR; INTRALESIONAL; INTRAMUSCULAR; INTRAVENOUS; SOFT TISSUE
Status: DISCONTINUED | OUTPATIENT
Start: 2025-04-22 | End: 2025-04-22

## 2025-04-22 RX ORDER — HALOPERIDOL LACTATE 5 MG/ML
0.5 INJECTION, SOLUTION INTRAMUSCULAR EVERY 10 MIN PRN
Status: DISCONTINUED | OUTPATIENT
Start: 2025-04-22 | End: 2025-04-22

## 2025-04-22 RX ORDER — SODIUM CHLORIDE 0.9 % (FLUSH) 0.9 %
10 SYRINGE (ML) INJECTION
Status: DISCONTINUED | OUTPATIENT
Start: 2025-04-22 | End: 2025-04-24 | Stop reason: HOSPADM

## 2025-04-22 RX ORDER — OXYCODONE HYDROCHLORIDE 10 MG/1
10 TABLET ORAL EVERY 6 HOURS PRN
Refills: 0 | Status: DISCONTINUED | OUTPATIENT
Start: 2025-04-22 | End: 2025-04-23

## 2025-04-22 RX ORDER — OXYCODONE HYDROCHLORIDE 5 MG/1
5 TABLET ORAL EVERY 4 HOURS PRN
Refills: 0 | Status: DISCONTINUED | OUTPATIENT
Start: 2025-04-22 | End: 2025-04-24 | Stop reason: HOSPADM

## 2025-04-22 RX ORDER — IBUPROFEN 200 MG
16 TABLET ORAL
Status: DISCONTINUED | OUTPATIENT
Start: 2025-04-22 | End: 2025-04-22

## 2025-04-22 RX ORDER — PROPOFOL 10 MG/ML
VIAL (ML) INTRAVENOUS
Status: DISCONTINUED | OUTPATIENT
Start: 2025-04-22 | End: 2025-04-22

## 2025-04-22 RX ORDER — GLUCAGON 1 MG
1 KIT INJECTION
Status: DISCONTINUED | OUTPATIENT
Start: 2025-04-22 | End: 2025-04-23 | Stop reason: HOSPADM

## 2025-04-22 RX ORDER — IBUPROFEN 200 MG
24 TABLET ORAL
Status: DISCONTINUED | OUTPATIENT
Start: 2025-04-22 | End: 2025-04-22

## 2025-04-22 RX ORDER — HYDROMORPHONE HYDROCHLORIDE 1 MG/ML
0.2 INJECTION, SOLUTION INTRAMUSCULAR; INTRAVENOUS; SUBCUTANEOUS EVERY 5 MIN PRN
Status: COMPLETED | OUTPATIENT
Start: 2025-04-22 | End: 2025-04-22

## 2025-04-22 RX ORDER — FENTANYL CITRATE 50 UG/ML
INJECTION, SOLUTION INTRAMUSCULAR; INTRAVENOUS
Status: DISCONTINUED | OUTPATIENT
Start: 2025-04-22 | End: 2025-04-22

## 2025-04-22 RX ORDER — OXYCODONE HYDROCHLORIDE 5 MG/1
5 TABLET ORAL
Status: DISCONTINUED | OUTPATIENT
Start: 2025-04-22 | End: 2025-04-23

## 2025-04-22 RX ORDER — CYCLOBENZAPRINE HCL 10 MG
10 TABLET ORAL 3 TIMES DAILY PRN
Status: DISCONTINUED | OUTPATIENT
Start: 2025-04-22 | End: 2025-04-24 | Stop reason: HOSPADM

## 2025-04-22 RX ORDER — CEFAZOLIN 2 G/1
2 INJECTION, POWDER, FOR SOLUTION INTRAMUSCULAR; INTRAVENOUS
Status: COMPLETED | OUTPATIENT
Start: 2025-04-22 | End: 2025-04-22

## 2025-04-22 RX ORDER — ENOXAPARIN SODIUM 100 MG/ML
40 INJECTION SUBCUTANEOUS EVERY 24 HOURS
Status: DISCONTINUED | OUTPATIENT
Start: 2025-04-23 | End: 2025-04-23

## 2025-04-22 RX ORDER — SODIUM CHLORIDE 9 MG/ML
INJECTION, SOLUTION INTRAVENOUS CONTINUOUS
Status: DISCONTINUED | OUTPATIENT
Start: 2025-04-22 | End: 2025-04-23

## 2025-04-22 RX ORDER — PHENYLEPHRINE HYDROCHLORIDE 10 MG/ML
INJECTION INTRAVENOUS
Status: DISCONTINUED | OUTPATIENT
Start: 2025-04-22 | End: 2025-04-22

## 2025-04-22 RX ORDER — ACETAMINOPHEN 500 MG
1000 TABLET ORAL EVERY 8 HOURS
Status: DISCONTINUED | OUTPATIENT
Start: 2025-04-23 | End: 2025-04-24 | Stop reason: HOSPADM

## 2025-04-22 RX ORDER — INSULIN ASPART 100 [IU]/ML
0-10 INJECTION, SOLUTION INTRAVENOUS; SUBCUTANEOUS
Status: DISCONTINUED | OUTPATIENT
Start: 2025-04-22 | End: 2025-04-22

## 2025-04-22 RX ADMIN — SODIUM CHLORIDE: 0.9 INJECTION, SOLUTION INTRAVENOUS at 06:04

## 2025-04-22 RX ADMIN — HYDROMORPHONE HYDROCHLORIDE 0.2 MG: 1 INJECTION, SOLUTION INTRAMUSCULAR; INTRAVENOUS; SUBCUTANEOUS at 11:04

## 2025-04-22 RX ADMIN — FENTANYL CITRATE 50 MCG: 50 INJECTION, SOLUTION INTRAMUSCULAR; INTRAVENOUS at 07:04

## 2025-04-22 RX ADMIN — HYDROMORPHONE HYDROCHLORIDE 0.2 MG: 1 INJECTION, SOLUTION INTRAMUSCULAR; INTRAVENOUS; SUBCUTANEOUS at 10:04

## 2025-04-22 RX ADMIN — ROCURONIUM BROMIDE 50 MG: 10 INJECTION, SOLUTION INTRAVENOUS at 06:04

## 2025-04-22 RX ADMIN — ROCURONIUM BROMIDE 20 MG: 10 INJECTION, SOLUTION INTRAVENOUS at 09:04

## 2025-04-22 RX ADMIN — PHENYLEPHRINE HYDROCHLORIDE 100 MCG: 10 INJECTION INTRAVENOUS at 07:04

## 2025-04-22 RX ADMIN — LIDOCAINE HYDROCHLORIDE 80 MG: 20 INJECTION INTRAVENOUS at 06:04

## 2025-04-22 RX ADMIN — SUGAMMADEX 400 MG: 100 INJECTION, SOLUTION INTRAVENOUS at 09:04

## 2025-04-22 RX ADMIN — PHENYLEPHRINE HYDROCHLORIDE 200 MCG: 10 INJECTION INTRAVENOUS at 06:04

## 2025-04-22 RX ADMIN — MIDAZOLAM HYDROCHLORIDE 2 MG: 2 INJECTION, SOLUTION INTRAMUSCULAR; INTRAVENOUS at 06:04

## 2025-04-22 RX ADMIN — FENTANYL CITRATE 100 MCG: 50 INJECTION, SOLUTION INTRAMUSCULAR; INTRAVENOUS at 06:04

## 2025-04-22 RX ADMIN — Medication 6 MG: at 10:04

## 2025-04-22 RX ADMIN — ROCURONIUM BROMIDE 20 MG: 10 INJECTION, SOLUTION INTRAVENOUS at 06:04

## 2025-04-22 RX ADMIN — OXYCODONE HYDROCHLORIDE 10 MG: 10 TABLET ORAL at 10:04

## 2025-04-22 RX ADMIN — PHENYLEPHRINE HYDROCHLORIDE 100 MCG: 10 INJECTION INTRAVENOUS at 08:04

## 2025-04-22 RX ADMIN — PHENYLEPHRINE HYDROCHLORIDE 200 MCG: 10 INJECTION INTRAVENOUS at 08:04

## 2025-04-22 RX ADMIN — CEFAZOLIN 2 G: 2 INJECTION, POWDER, FOR SOLUTION INTRAMUSCULAR; INTRAVENOUS at 06:04

## 2025-04-22 RX ADMIN — DEXAMETHASONE SODIUM PHOSPHATE 4 MG: 4 INJECTION, SOLUTION INTRAMUSCULAR; INTRAVENOUS at 06:04

## 2025-04-22 RX ADMIN — PROPOFOL 150 MG: 10 INJECTION, EMULSION INTRAVENOUS at 06:04

## 2025-04-22 RX ADMIN — PHENYLEPHRINE HYDROCHLORIDE 100 MCG: 10 INJECTION INTRAVENOUS at 06:04

## 2025-04-22 RX ADMIN — HALOPERIDOL LACTATE 1 MG: 5 INJECTION, SOLUTION INTRAMUSCULAR at 09:04

## 2025-04-22 RX ADMIN — Medication 30 MG: at 07:04

## 2025-04-22 RX ADMIN — DEXMEDETOMIDINE 12 MCG: 100 INJECTION, SOLUTION, CONCENTRATE INTRAVENOUS at 07:04

## 2025-04-22 RX ADMIN — HEPARIN SODIUM 5000 UNITS: 5000 INJECTION INTRAVENOUS; SUBCUTANEOUS at 01:04

## 2025-04-22 RX ADMIN — CYCLOBENZAPRINE 10 MG: 10 TABLET, FILM COATED ORAL at 10:04

## 2025-04-22 RX ADMIN — Medication 20 MG: at 08:04

## 2025-04-22 RX ADMIN — TILMANOCEPT 500 MICROCURIE: KIT at 01:04

## 2025-04-22 RX ADMIN — SODIUM CHLORIDE: 0.9 INJECTION, SOLUTION INTRAVENOUS at 09:04

## 2025-04-22 RX ADMIN — ROCURONIUM BROMIDE 30 MG: 10 INJECTION, SOLUTION INTRAVENOUS at 07:04

## 2025-04-22 RX ADMIN — PHENYLEPHRINE HYDROCHLORIDE 0.5 MCG/KG/MIN: 10 INJECTION INTRAVENOUS at 06:04

## 2025-04-22 NOTE — H&P
History and Physical           Subjective:      Gwendolyn Fournier is a 70 y.o. year old female who presented to the [facility] on 03/19/2025 for consultation regarding breast reconstruction. Pt was found to have multicentric right breast cancer. She has seen General surgery who gave options, which she elected for mastectomy. She is not planned for radiation. Her current breast size is 38DD. She is desiring a much smaller size given symptomatic macromastia.  Patient reports hx of sciatica. She denies smoking. She states she is a well controlled diabetic with A1c 5.6.         Review of patient's allergies indicates:   Allergen Reactions    Ondansetron hcl (pf) Hives and Itching    Ketorolac Itching         [Medications Ordered Prior to Encounter]    [Medications Ordered Prior to Encounter]         Current Outpatient Medications on File Prior to Visit   Medication Sig Dispense Refill    amLODIPine (NORVASC) 5 MG tablet Take 1 tablet (5 mg total) by mouth once daily. 90 tablet 3    atorvastatin (LIPITOR) 40 MG tablet Take 1 tablet (40 mg total) by mouth every evening. 90 tablet 3    blood pressure monitor Kit 1 each by Misc.(Non-Drug; Combo Route) route every 6 (six) months. 1 each 0    blood sugar diagnostic Strp To check BG two times daily, freestyle lite meter 200 each 3    cyclobenzaprine (FLEXERIL) 10 MG tablet Take 1 tablet (10 mg total) by mouth 3 (three) times daily as needed for Muscle spasms. 90 tablet 2    DULoxetine (CYMBALTA) 60 MG capsule TAKE 1 CAPSULE(60 MG) BY MOUTH EVERY DAY 90 capsule 3    flash glucose scanning reader (FREESTYLE HAI 2 READER) Misc 1 each by Misc.(Non-Drug; Combo Route) route 4 (four) times daily with meals and nightly. 1 each 0    flash glucose sensor (FREESTYLE HAI 2 SENSOR) Kit Use as directed to check blood sugar. Change every 14 days. 6 kit 2    furosemide (LASIX) 80 MG tablet TAKE 1 TABLET BY MOUTH EVERY DAY WITH 5 POUNDS INCREASE WITHIN 24-72 HOURS 90 tablet 3     lancets Misc To check BG two times daily, to use with insurance preferred meter 200 each 3    LIDOcaine (LIDODERM) 5 % Place 1 patch onto the skin once daily. Apply patch for 12 hours and then leave off for 12 hours 15 patch 0    linaCLOtide (LINZESS) 145 mcg Cap capsule Take 1 capsule (145 mcg total) by mouth daily as needed (constipation). 90 capsule 0    metoprolol succinate (TOPROL-XL) 100 MG 24 hr tablet Take 1 tablet (100 mg total) by mouth once daily. 90 tablet 3    pregabalin (LYRICA) 25 MG capsule Take 1 capsule (25 mg total) by mouth 2 (two) times daily. 60 capsule 2    tirzepatide (MOUNJARO) 5 mg/0.5 mL PnIj Inject 5 mg into the skin every 7 days. 6 mL 0    zolpidem (AMBIEN) 5 MG Tab Take 5 mg by mouth nightly as needed (insomnia).        acetaminophen (TYLENOL) 500 MG tablet Take 1 tablet (500 mg total) by mouth every 6 (six) hours as needed for Pain. (Patient not taking: Reported on 2/7/2025) 30 tablet 0    blood-glucose meter kit To check BG two times daily, freestyle lite meter 1 each 0    docusate sodium (COLACE) 100 MG capsule Take 1 capsule (100 mg total) by mouth as needed for Constipation. (Patient not taking: Reported on 3/19/2025) 90 capsule 3    sodium bicarbonate 650 MG tablet Take 1 tablet (650 mg total) by mouth 3 (three) times daily. 90 tablet 0                Current Facility-Administered Medications on File Prior to Visit   Medication Dose Route Frequency Provider Last Rate Last Admin    lidocaine (PF) 10 mg/ml (1%) injection 40 mg  4 mL Intramuscular 1 time in Clinic/HOD [place], Azikiwe K., MD            [Problem List]    [Problem List]  Patient Active Problem List      Diagnosis    Essential hypertension    Primary osteoarthritis involving multiple joints    Idiopathic chronic gout of multiple sites without tophus    History of open reduction and internal fixation (ORIF) procedure    Chronic pain    Severe obesity (BMI 35.0-39.9) with comorbidity    Obstructive sleep apnea    Sickle  cell trait    Sacroiliitis, not elsewhere classified    Type 2 diabetes mellitus with stage 3b chronic kidney disease, without long-term current use of insulin    Decreased range of motion of trunk and back    Decreased strength of trunk and back    SVT (supraventricular tachycardia)    Aortic atherosclerosis    Tremor of both hands    Chronic kidney disease (CKD), stage IV (severe)    Drug-induced constipation    Malignant neoplasm of right female breast    Pulmonary hypertension    Obstructive hypertrophic cardiomyopathy    Pain in thoracic spine    Arachnoid cyst    Anemia in stage 4 chronic kidney disease    Current severe episode of major depressive disorder without psychotic features without prior episode    Psychophysiological insomnia              Past Surgical History:   Procedure Laterality Date    COLONOSCOPY N/A 2019     Procedure: COLONOSCOPY;  Surgeon: Shanna Jose MD;  Location: [facility] ENDO;  Service: Endoscopy;  Laterality: N/A;    HYSTERECTOMY        INJECTION OF JOINT Bilateral 2019     Procedure: INJECTION, JOINT BILATERAL SI JOINT;  Surgeon: Evan Coreas MD;  Location: [facility] PAIN MGT;  Service: Pain Management;  Laterality: Bilateral;  BILATERAL SI JOINT INJECTION    KNEE SURGERY   2014     left knee surgery          [Social History]    [Social History]        Socioeconomic History    Marital status:     Number of children: 5   Occupational History    Occupation: homemaker   Tobacco Use    Smoking status: Former       Current packs/day: 0.00       Types: Cigarettes       Quit date: 1976       Years since quittin.2    Smokeless tobacco: Never   Substance and Sexual Activity    Alcohol use: Not Currently       Alcohol/week: 0.0 standard drinks of alcohol       Comment: red wine    Drug use: No    Sexual activity: Not Currently       Partners: Male      Social Drivers of Health           Financial Resource Strain: Patient Declined (2025)     Overall  Financial Resource Strain (CARDIA)      Difficulty of Paying Living Expenses: Patient declined   Food Insecurity: Patient Declined (1/16/2025)     Hunger Vital Sign      Worried About Running Out of Food in the Last Year: Patient declined      Ran Out of Food in the Last Year: Patient declined   Transportation Needs: Patient Declined (1/16/2025)     TRANSPORTATION NEEDS      Transportation : Patient declined   Physical Activity: Unknown (10/10/2022)     Exercise Vital Sign      Days of Exercise per Week: 0 days   Stress: Patient Declined (1/16/2025)     [employer] [place] [employer] - Occupational Stress Questionnaire      Feeling of Stress : Patient declined   Housing Stability: Patient Declined (1/16/2025)     Housing Stability Vital Sign      Unable to Pay for Housing in the Last Year: Patient declined      Homeless in the Last Year: Patient declined        Review of Systems: ROS negative other than pertinent findings documented in history.     Objective:      Physical Exam:       General: Alert; No acute distress  Cardiovascular: Regular rate   Respiratory: Normal respiratory effort. Chest rise symmetric.   Abdomen: Soft, nontender, nondistended  Extremity: Moves all extremities equally.  Neurologic: No focal deficit. Speech normal  Large grade 3 ptotic breasts. Masses felt on right breast           Assessment:       1. Malignant neoplasm of right female breast, unspecified estrogen receptor status, unspecified site of breast          Plan:   70 y.o. female with Right breast cancer  - pt is electing for mastectomy given multicentric dx. Electing for implant based reconstruction  - interested in DTI, explained TE and delay with 2nd stage depending on flap perfusion intraoperatively  - will also need left mastopexy at time of surgery. If risk reducing mastectomy performed ,then TE on left as well  Discussed with patient and/or family the risks and benefits of surgical intervention.  Conservative  measures have been exhausted and patient would like to proceed with surgery.      We have discussed risks, which include but are not limited to blood clots in the legs that can travel to the lungs (pulmonary embolism). Pulmonary embolism can cause shortness of breath, chest pain, and even shock. Other risks include urinary tract infection, nausea and vomiting (usually related to pain medication), chronic pain, bleeding, nerve damage, blood vessel injury, scarring and infection, which can require re-operation. Furthermore, the risks of anesthesia include potential heart, lung, kidney, and liver damage.  Informed consent was obtained.  The patient understands and would like to proceed with surgery in the near future.

## 2025-04-23 LAB
ABSOLUTE EOSINOPHIL (OHS): 0 K/UL
ABSOLUTE MONOCYTE (OHS): 0.95 K/UL (ref 0.3–1)
ABSOLUTE NEUTROPHIL COUNT (OHS): 18.13 K/UL (ref 1.8–7.7)
ALBUMIN SERPL BCP-MCNC: 2.9 G/DL (ref 3.5–5.2)
ALP SERPL-CCNC: 148 UNIT/L (ref 40–150)
ALT SERPL W/O P-5'-P-CCNC: 6 UNIT/L (ref 10–44)
ANION GAP (OHS): 9 MMOL/L (ref 8–16)
AST SERPL-CCNC: 7 UNIT/L (ref 11–45)
BASOPHILS # BLD AUTO: 0.04 K/UL
BASOPHILS NFR BLD AUTO: 0.2 %
BILIRUB SERPL-MCNC: 0.4 MG/DL (ref 0.1–1)
BUN SERPL-MCNC: 26 MG/DL (ref 8–23)
CALCIUM SERPL-MCNC: 9.1 MG/DL (ref 8.7–10.5)
CHLORIDE SERPL-SCNC: 111 MMOL/L (ref 95–110)
CO2 SERPL-SCNC: 21 MMOL/L (ref 23–29)
CREAT SERPL-MCNC: 1.8 MG/DL (ref 0.5–1.4)
EAG (OHS): 120 MG/DL (ref 68–131)
ERYTHROCYTE [DISTWIDTH] IN BLOOD BY AUTOMATED COUNT: 12.8 % (ref 11.5–14.5)
GFR SERPLBLD CREATININE-BSD FMLA CKD-EPI: 30 ML/MIN/1.73/M2
GLUCOSE SERPL-MCNC: 168 MG/DL (ref 70–110)
HBA1C MFR BLD: 5.8 % (ref 4–5.6)
HCT VFR BLD AUTO: 31.2 % (ref 37–48.5)
HGB BLD-MCNC: 10 GM/DL (ref 12–16)
IMM GRANULOCYTES # BLD AUTO: 0.13 K/UL (ref 0–0.04)
IMM GRANULOCYTES NFR BLD AUTO: 0.6 % (ref 0–0.5)
LYMPHOCYTES # BLD AUTO: 1.31 K/UL (ref 1–4.8)
MCH RBC QN AUTO: 28.8 PG (ref 27–31)
MCHC RBC AUTO-ENTMCNC: 32.1 G/DL (ref 32–36)
MCV RBC AUTO: 90 FL (ref 82–98)
NUCLEATED RBC (/100WBC) (OHS): 0 /100 WBC
PLATELET # BLD AUTO: 330 K/UL (ref 150–450)
PMV BLD AUTO: 10.4 FL (ref 9.2–12.9)
POCT GLUCOSE: 146 MG/DL (ref 70–110)
POCT GLUCOSE: 164 MG/DL (ref 70–110)
POCT GLUCOSE: 165 MG/DL (ref 70–110)
POCT GLUCOSE: 214 MG/DL (ref 70–110)
POTASSIUM SERPL-SCNC: 5 MMOL/L (ref 3.5–5.1)
PROT SERPL-MCNC: 6.6 GM/DL (ref 6–8.4)
RBC # BLD AUTO: 3.47 M/UL (ref 4–5.4)
RELATIVE EOSINOPHIL (OHS): 0 %
RELATIVE LYMPHOCYTE (OHS): 6.4 % (ref 18–48)
RELATIVE MONOCYTE (OHS): 4.6 % (ref 4–15)
RELATIVE NEUTROPHIL (OHS): 88.2 % (ref 38–73)
SODIUM SERPL-SCNC: 141 MMOL/L (ref 136–145)
WBC # BLD AUTO: 20.56 K/UL (ref 3.9–12.7)

## 2025-04-23 PROCEDURE — 85025 COMPLETE CBC W/AUTO DIFF WBC: CPT

## 2025-04-23 PROCEDURE — 25000003 PHARM REV CODE 250

## 2025-04-23 PROCEDURE — 51798 US URINE CAPACITY MEASURE: CPT

## 2025-04-23 PROCEDURE — 63600175 PHARM REV CODE 636 W HCPCS: Performed by: SURGERY

## 2025-04-23 PROCEDURE — 83036 HEMOGLOBIN GLYCOSYLATED A1C: CPT

## 2025-04-23 PROCEDURE — 94761 N-INVAS EAR/PLS OXIMETRY MLT: CPT

## 2025-04-23 PROCEDURE — 63600175 PHARM REV CODE 636 W HCPCS: Performed by: STUDENT IN AN ORGANIZED HEALTH CARE EDUCATION/TRAINING PROGRAM

## 2025-04-23 PROCEDURE — G0378 HOSPITAL OBSERVATION PER HR: HCPCS

## 2025-04-23 PROCEDURE — 63600175 PHARM REV CODE 636 W HCPCS

## 2025-04-23 PROCEDURE — 80053 COMPREHEN METABOLIC PANEL: CPT

## 2025-04-23 RX ORDER — HYDROMORPHONE HYDROCHLORIDE 1 MG/ML
0.5 INJECTION, SOLUTION INTRAMUSCULAR; INTRAVENOUS; SUBCUTANEOUS EVERY 6 HOURS PRN
Status: DISCONTINUED | OUTPATIENT
Start: 2025-04-23 | End: 2025-04-24 | Stop reason: HOSPADM

## 2025-04-23 RX ORDER — CEFAZOLIN 2 G/1
2 INJECTION, POWDER, FOR SOLUTION INTRAMUSCULAR; INTRAVENOUS
Status: DISCONTINUED | OUTPATIENT
Start: 2025-04-23 | End: 2025-04-23

## 2025-04-23 RX ORDER — OXYCODONE HYDROCHLORIDE 10 MG/1
10 TABLET ORAL EVERY 4 HOURS PRN
Refills: 0 | Status: DISCONTINUED | OUTPATIENT
Start: 2025-04-23 | End: 2025-04-24 | Stop reason: HOSPADM

## 2025-04-23 RX ORDER — BACITRACIN 500 [USP'U]/G
OINTMENT TOPICAL DAILY
Status: DISCONTINUED | OUTPATIENT
Start: 2025-04-23 | End: 2025-04-24 | Stop reason: HOSPADM

## 2025-04-23 RX ORDER — CEFAZOLIN 2 G/1
2 INJECTION, POWDER, FOR SOLUTION INTRAMUSCULAR; INTRAVENOUS
Status: COMPLETED | OUTPATIENT
Start: 2025-04-23 | End: 2025-04-24

## 2025-04-23 RX ORDER — HYDROXYZINE PAMOATE 25 MG/1
50 CAPSULE ORAL EVERY 6 HOURS PRN
Status: DISCONTINUED | OUTPATIENT
Start: 2025-04-23 | End: 2025-04-23

## 2025-04-23 RX ORDER — HEPARIN SODIUM 5000 [USP'U]/ML
5000 INJECTION, SOLUTION INTRAVENOUS; SUBCUTANEOUS EVERY 8 HOURS
Status: DISCONTINUED | OUTPATIENT
Start: 2025-04-23 | End: 2025-04-24 | Stop reason: HOSPADM

## 2025-04-23 RX ORDER — DIPHENHYDRAMINE HYDROCHLORIDE 50 MG/ML
25 INJECTION, SOLUTION INTRAMUSCULAR; INTRAVENOUS EVERY 6 HOURS PRN
Status: DISCONTINUED | OUTPATIENT
Start: 2025-04-23 | End: 2025-04-24 | Stop reason: HOSPADM

## 2025-04-23 RX ADMIN — GABAPENTIN 300 MG: 300 CAPSULE ORAL at 09:04

## 2025-04-23 RX ADMIN — HYDROXYZINE PAMOATE 50 MG: 25 CAPSULE ORAL at 07:04

## 2025-04-23 RX ADMIN — OXYCODONE HYDROCHLORIDE 10 MG: 10 TABLET ORAL at 05:04

## 2025-04-23 RX ADMIN — HEPARIN SODIUM 5000 UNITS: 5000 INJECTION INTRAVENOUS; SUBCUTANEOUS at 10:04

## 2025-04-23 RX ADMIN — HYDROMORPHONE HYDROCHLORIDE 0.5 MG: 0.5 INJECTION, SOLUTION INTRAMUSCULAR; INTRAVENOUS; SUBCUTANEOUS at 11:04

## 2025-04-23 RX ADMIN — CEFAZOLIN 2 G: 2 INJECTION, POWDER, FOR SOLUTION INTRAMUSCULAR; INTRAVENOUS at 09:04

## 2025-04-23 RX ADMIN — ACETAMINOPHEN 1000 MG: 500 TABLET ORAL at 02:04

## 2025-04-23 RX ADMIN — CEFAZOLIN 2 G: 2 INJECTION, POWDER, FOR SOLUTION INTRAMUSCULAR; INTRAVENOUS at 04:04

## 2025-04-23 RX ADMIN — GABAPENTIN 300 MG: 300 CAPSULE ORAL at 02:04

## 2025-04-23 RX ADMIN — OXYCODONE HYDROCHLORIDE 10 MG: 10 TABLET ORAL at 04:04

## 2025-04-23 RX ADMIN — OXYCODONE HYDROCHLORIDE 10 MG: 10 TABLET ORAL at 09:04

## 2025-04-23 RX ADMIN — GABAPENTIN 300 MG: 300 CAPSULE ORAL at 08:04

## 2025-04-23 RX ADMIN — DIPHENHYDRAMINE HYDROCHLORIDE 25 MG: 50 INJECTION INTRAMUSCULAR; INTRAVENOUS at 11:04

## 2025-04-23 RX ADMIN — ACETAMINOPHEN 1000 MG: 500 TABLET ORAL at 09:04

## 2025-04-23 RX ADMIN — BACITRACIN: 500 OINTMENT TOPICAL at 02:04

## 2025-04-23 RX ADMIN — HEPARIN SODIUM 5000 UNITS: 5000 INJECTION INTRAVENOUS; SUBCUTANEOUS at 02:04

## 2025-04-23 RX ADMIN — CYCLOBENZAPRINE 10 MG: 10 TABLET, FILM COATED ORAL at 07:04

## 2025-04-23 RX ADMIN — ACETAMINOPHEN 1000 MG: 500 TABLET ORAL at 05:04

## 2025-04-23 NOTE — SUBJECTIVE & OBJECTIVE
Interval History: NAOE, pain well controlled on prn's. R skin erythematous this am. HENRY drains SS. Cr at baseline.     Medications:  Continuous Infusions:  Scheduled Meds:   acetaminophen  1,000 mg Oral Q8H    ceFAZolin (Ancef) IV (PEDS and ADULTS)  2 g Intravenous Q8H    enoxparin  40 mg Subcutaneous Q24H (prophylaxis, 1700)    gabapentin  300 mg Oral TID     PRN Meds:  Current Facility-Administered Medications:     cyclobenzaprine, 10 mg, Oral, TID PRN    dextrose 50%, 12.5 g, Intravenous, PRN    dextrose 50%, 12.5 g, Intravenous, PRN    dextrose 50%, 25 g, Intravenous, PRN    glucagon (human recombinant), 1 mg, Intramuscular, PRN    glucagon (human recombinant), 1 mg, Intramuscular, PRN    glucose, 16 g, Oral, PRN    glucose, 24 g, Oral, PRN    insulin aspart U-100, 0-5 Units, Subcutaneous, QID (AC + HS) PRN    LIDOcaine (PF) 10 mg/ml (1%), 1 mL, Intradermal, Once PRN    melatonin, 6 mg, Oral, Nightly PRN    ondansetron, 8 mg, Oral, Q8H PRN    oxyCODONE, 5 mg, Oral, Q4H PRN    oxyCODONE, 10 mg, Oral, Q4H PRN    sodium chloride 0.9%, 10 mL, Intravenous, PRN     Review of patient's allergies indicates:   Allergen Reactions    Ondansetron hcl (pf) Hives and Itching    Ketorolac Itching     Objective:     Vital Signs (Most Recent):  Temp: 97.5 °F (36.4 °C) (04/23/25 0610)  Pulse: 84 (04/23/25 1110)  Resp: 18 (04/23/25 1110)  BP: (!) 158/67 (04/23/25 1110)  SpO2: 97 % (04/23/25 1110) Vital Signs (24h Range):  Temp:  [97.5 °F (36.4 °C)-98.5 °F (36.9 °C)] 97.5 °F (36.4 °C)  Pulse:  [71-85] 84  Resp:  [10-20] 18  SpO2:  [93 %-98 %] 97 %  BP: (107-159)/(53-82) 158/67     Weight: 108.9 kg (240 lb)  Body mass index is 37.59 kg/m².    Intake/Output - Last 3 Shifts         04/21 0700 04/22 0659 04/22 0700 04/23 0659 04/23 0700 04/24 0659    IV Piggyback  3000     Total Intake(mL/kg)  3000 (27.5)     Urine (mL/kg/hr)  450     Drains  160     Blood  200     Total Output  810     Net  +2190                     Physical  Exam  Vitals and nursing note reviewed.   Constitutional:       Appearance: Normal appearance. She is not ill-appearing.   HENT:      Head: Normocephalic.      Mouth/Throat:      Mouth: Mucous membranes are moist.      Pharynx: Oropharynx is clear.   Eyes:      General: No scleral icterus.     Extraocular Movements: Extraocular movements intact.      Conjunctiva/sclera: Conjunctivae normal.   Cardiovascular:      Rate and Rhythm: Normal rate.      Pulses: Normal pulses.   Pulmonary:      Effort: Pulmonary effort is normal. No respiratory distress.      Breath sounds: No wheezing.   Abdominal:      General: Abdomen is flat. There is no distension.      Palpations: Abdomen is soft.   Musculoskeletal:         General: No swelling or tenderness. Normal range of motion.      Cervical back: Normal range of motion.   Skin:     General: Skin is warm and dry.      Coloration: Skin is not jaundiced or pale.      Comments: Right breast w non blanchable erythema over superior mastectomy flap. Incisions cdi with overlying tape. 2xJP w ss drainages   Neurological:      General: No focal deficit present.      Mental Status: She is alert and oriented to person, place, and time.   Psychiatric:         Mood and Affect: Mood normal.          Significant Labs:  I have reviewed all pertinent lab results within the past 24 hours.  CBC:   Recent Labs   Lab 04/23/25  0550   WBC 20.56*   RBC 3.47*   HGB 10.0*   HCT 31.2*      MCV 90   MCH 28.8   MCHC 32.1     CMP:   Recent Labs   Lab 04/23/25  0550   CALCIUM 9.1   ALBUMIN 2.9*      K 5.0   CO2 21*   *   BUN 26*   CREATININE 1.8*   ALKPHOS 148   ALT 6*   AST 7*   BILITOT 0.4       Significant Diagnostics:  I have reviewed all pertinent imaging results/findings within the past 24 hours.

## 2025-04-23 NOTE — ANESTHESIA PROCEDURE NOTES
Intubation    Date/Time: 4/22/2025 6:22 PM    Performed by: Ese Sims CRNA  Authorized by: Reggie Barfield Jr., MD    Intubation:     Induction:  Intravenous    Intubated:  Postinduction    Mask Ventilation:  Easy mask    Attempts:  1    Attempted By:  CRNA    Method of Intubation:  Video laryngoscopy    Blade:  Brooke 3    Laryngeal View Grade: Grade I - full view of cords      Difficult Airway Encountered?: No      Complications:  None    Airway Device:  Oral endotracheal tube    Airway Device Size:  7.0    Style/Cuff Inflation:  Cuffed (inflated to minimal occlusive pressure)    Tube secured:  23    Secured at:  The lips    Placement Verified By:  Capnometry    Complicating Factors:  None    Findings Post-Intubation:  BS equal bilateral and atraumatic/condition of teeth unchanged

## 2025-04-23 NOTE — PROGRESS NOTES
Non-blanchable redness noted to right breast and left nipple edges appearing dark. Dr. Baer notified. MD states he is on the way to hospital now and will come to bedside when he arrives.

## 2025-04-23 NOTE — TRANSFER OF CARE
Anesthesia Transfer of Care Note    Patient: Gwendolyn Fournier    Procedure(s) Performed: Procedure(s) (LRB):  MASTECTOMY, MODIFIED RADICAL, WITH SENTINEL LYMPH NODE BIOPSY, RIGHT BREAST (Right)  INSERTION, BREAST IMPLANT (Right)  INSERTION, TISSUE EXPANDER, BREAST (Right)  MAMMOPLASTY, REDUCTION (Left)  RECONSTRUCTION, BREAST (Right)    Patient location: PACU    Anesthesia Type: general    Transport from OR: Transported from OR on 6-10 L/min O2 by face mask with adequate spontaneous ventilation    Post pain: adequate analgesia    Post assessment: no apparent anesthetic complications and tolerated procedure well    Post vital signs: stable    Level of consciousness: awake and alert    Nausea/Vomiting: no nausea/vomiting    Complications: none    Transfer of care protocol was followed      Last vitals: Visit Vitals  BP (!) 156/71 (BP Location: Left arm, Patient Position: Lying)   Pulse 85   Temp 36.4 °C (97.5 °F) (Temporal)   Resp 18   Wt 108.9 kg (240 lb)   SpO2 96%   Breastfeeding No   BMI 37.59 kg/m²

## 2025-04-23 NOTE — PROGRESS NOTES
Plastic and Reconstructive Surgery   Progress Note    Subjective:    Concerns for non blanchable erythema of Right breast, Left Nipple discoloration  Pin pricked at bedside w/ bright red bleeding  Having from left breast pain  HENRY x2 serosang    Objective:  Vital signs in last 24 hours:  Temp:  [97.5 °F (36.4 °C)-98.5 °F (36.9 °C)] 97.5 °F (36.4 °C)  Pulse:  [71-85] 74  Resp:  [10-20] 20  SpO2:  [93 %-98 %] 93 %  BP: (107-159)/(53-82) 142/69    Intake/Output last 3 shifts:  I/O last 3 completed shifts:  In: 3000 [IV Piggyback:3000]  Out: 810 [Urine:450; Drains:160; Blood:200]    Intake/Output this shift:  No intake/output data recorded.        Physical Exam:  VITAL SIGNS:   Vitals:    25 0710 25 0720 25 0910 25 0932   BP: (!) 159/70  (!) 142/69    BP Location:   Left arm    Patient Position:   Lying    Pulse: 76 84 74    Resp: 20  12 20   Temp:       TempSrc:       SpO2: (!) 94%  (!) 93%    Weight:         TMAX: Temp (24hrs), Av.9 °F (36.6 °C), Min:97.5 °F (36.4 °C), Max:98.5 °F (36.9 °C)    General: Alert; No acute distress  Cardiovascular: Regular rate   Respiratory: Normal respiratory effort. Chest rise symmetric.   Abdomen: Soft, nontender, nondistended  Extremity: Moves all extremities equally.  Neurologic: No focal deficit. Speech normal      Right breast non blanchable erythema over superior mastectomy flap, however soft, TE palpated  Left nipple pin pricked w/bright red bleeding, no congestion, +TTP, soft, incisions intact    Scheduled Medications acetaminophen, 1,000 mg, Q8H  ceFAZolin (Ancef) IV (PEDS and ADULTS), 2 g, Q8H  enoxparin, 40 mg, Q24H (prophylaxis, 1700)  gabapentin, 300 mg, TID      PRN Medications   Current Facility-Administered Medications:     cyclobenzaprine, 10 mg, Oral, TID PRN    dextrose 50%, 12.5 g, Intravenous, PRN    dextrose 50%, 12.5 g, Intravenous, PRN    dextrose 50%, 25 g, Intravenous, PRN    glucagon (human recombinant), 1 mg, Intramuscular, PRN     glucagon (human recombinant), 1 mg, Intramuscular, PRN    glucose, 16 g, Oral, PRN    glucose, 24 g, Oral, PRN    insulin aspart U-100, 0-5 Units, Subcutaneous, QID (AC + HS) PRN    LIDOcaine (PF) 10 mg/ml (1%), 1 mL, Intradermal, Once PRN    melatonin, 6 mg, Oral, Nightly PRN    ondansetron, 8 mg, Oral, Q8H PRN    oxyCODONE, 5 mg, Oral, Q4H PRN    oxyCODONE, 10 mg, Oral, Q4H PRN    sodium chloride 0.9%, 10 mL, Intravenous, PRN    Recent Labs:   Lab Results   Component Value Date    WBC 20.56 (H) 04/23/2025    HGB 10.0 (L) 04/23/2025    HCT 31.2 (L) 04/23/2025    MCV 90 04/23/2025     04/23/2025     Lab Results   Component Value Date    GLU 89 02/05/2025     04/23/2025    K 5.0 04/23/2025     (H) 04/23/2025    BUN 26 (H) 04/23/2025         Assessment: 70 y.o. y/o female 1 Day Post-Op s/p Procedure(s):  MASTECTOMY, MODIFIED RADICAL, WITH SENTINEL LYMPH NODE BIOPSY, RIGHT BREAST  INSERTION, TISSUE EXPANDER, BREAST  MAMMOPLASTY, REDUCTION  RECONSTRUCTION, BREAST     Plan  Will continue to monitor erythema of right breast and consider removal of TE air   Left nipple healthy and viable  Will leave bra off for now   Monitor HENRY outputs  Rec to keep one more day to monitor  Added x2 doses Lavonne Baer MD  Plastic Surgery Fellow  04/23/2025

## 2025-04-23 NOTE — ANESTHESIA POSTPROCEDURE EVALUATION
Anesthesia Post Evaluation    Patient: Gwendolyn Fournier    Procedure(s) Performed: Procedure(s) (LRB):  MASTECTOMY, MODIFIED RADICAL, WITH SENTINEL LYMPH NODE BIOPSY, RIGHT BREAST (Right)  INSERTION, TISSUE EXPANDER, BREAST (Right)  MAMMOPLASTY, REDUCTION (Left)  RECONSTRUCTION, BREAST (Right)    Final Anesthesia Type: general      Patient location during evaluation: PACU  Patient participation: Yes- Able to Participate  Level of consciousness: awake and alert  Post-procedure vital signs: reviewed and stable  Pain management: adequate  Airway patency: patent  YUE mitigation strategies: Multimodal analgesia, Preoperative use of mandibular advancement devices or oral appliances and Intraoperative administration of CPAP, nasopharyngeal airway, or oral appliance during sedation  PONV status at discharge: No PONV  Anesthetic complications: no      Cardiovascular status: blood pressure returned to baseline, hemodynamically stable and stable  Respiratory status: unassisted and spontaneous ventilation  Hydration status: euvolemic  Follow-up not needed.              Vitals Value Taken Time   /78 04/22/25 23:32   Temp  04/22/25 23:38   Pulse 78 04/22/25 23:38   Resp 15 04/22/25 23:38   SpO2 91 % 04/22/25 23:38   Vitals shown include unfiled device data.      No case tracking events are documented in the log.      Pain/William Score: Pain Rating Prior to Med Admin: 9 (4/22/2025 10:46 PM)

## 2025-04-23 NOTE — PROGRESS NOTES
[facility] - Surgery ([facility])  General Surgery  Progress Note    Subjective:       Post-Op Info:  Procedure(s) (LRB):  MASTECTOMY, MODIFIED RADICAL, WITH SENTINEL LYMPH NODE BIOPSY, RIGHT BREAST (Right)  INSERTION, TISSUE EXPANDER, BREAST (Right)  MAMMOPLASTY, REDUCTION (Left)  RECONSTRUCTION, BREAST (Right)   1 Day Post-Op     Interval History: NAOE, pain well controlled on prn's. R skin erythematous this am. HENRY drains SS. Cr at baseline.     Medications:  Continuous Infusions:  Scheduled Meds:   acetaminophen  1,000 mg Oral Q8H    ceFAZolin (Ancef) IV (PEDS and ADULTS)  2 g Intravenous Q8H    enoxparin  40 mg Subcutaneous Q24H (prophylaxis, 1700)    gabapentin  300 mg Oral TID     PRN Meds:  Current Facility-Administered Medications:     cyclobenzaprine, 10 mg, Oral, TID PRN    dextrose 50%, 12.5 g, Intravenous, PRN    dextrose 50%, 12.5 g, Intravenous, PRN    dextrose 50%, 25 g, Intravenous, PRN    glucagon (human recombinant), 1 mg, Intramuscular, PRN    glucagon (human recombinant), 1 mg, Intramuscular, PRN    glucose, 16 g, Oral, PRN    glucose, 24 g, Oral, PRN    insulin aspart U-100, 0-5 Units, Subcutaneous, QID (AC + HS) PRN    LIDOcaine (PF) 10 mg/ml (1%), 1 mL, Intradermal, Once PRN    melatonin, 6 mg, Oral, Nightly PRN    ondansetron, 8 mg, Oral, Q8H PRN    oxyCODONE, 5 mg, Oral, Q4H PRN    oxyCODONE, 10 mg, Oral, Q4H PRN    sodium chloride 0.9%, 10 mL, Intravenous, PRN     Review of patient's allergies indicates:   Allergen Reactions    Ondansetron hcl (pf) Hives and Itching    Ketorolac Itching     Objective:     Vital Signs (Most Recent):  Temp: 97.5 °F (36.4 °C) (04/23/25 0610)  Pulse: 84 (04/23/25 1110)  Resp: 18 (04/23/25 1110)  BP: (!) 158/67 (04/23/25 1110)  SpO2: 97 % (04/23/25 1110) Vital Signs (24h Range):  Temp:  [97.5 °F (36.4 °C)-98.5 °F (36.9 °C)] 97.5 °F (36.4 °C)  Pulse:  [71-85] 84  Resp:  [10-20] 18  SpO2:  [93 %-98 %] 97 %  BP: (107-159)/(53-82) 158/67     Weight: 108.9 kg (240  lb)  Body mass index is 37.59 kg/m².    Intake/Output - Last 3 Shifts         04/21 0700  04/22 0659 04/22 0700 04/23 0659 04/23 0700 04/24 0659    IV Piggyback  3000     Total Intake(mL/kg)  3000 (27.5)     Urine (mL/kg/hr)  450     Drains  160     Blood  200     Total Output  810     Net  +2190                     Physical Exam  Vitals and nursing note reviewed.   Constitutional:       Appearance: Normal appearance. She is not ill-appearing.   HENT:      Head: Normocephalic.      Mouth/Throat:      Mouth: Mucous membranes are moist.      Pharynx: Oropharynx is clear.   Eyes:      General: No scleral icterus.     Extraocular Movements: Extraocular movements intact.      Conjunctiva/sclera: Conjunctivae normal.   Cardiovascular:      Rate and Rhythm: Normal rate.      Pulses: Normal pulses.   Pulmonary:      Effort: Pulmonary effort is normal. No respiratory distress.      Breath sounds: No wheezing.   Abdominal:      General: Abdomen is flat. There is no distension.      Palpations: Abdomen is soft.   Musculoskeletal:         General: No swelling or tenderness. Normal range of motion.      Cervical back: Normal range of motion.   Skin:     General: Skin is warm and dry.      Coloration: Skin is not jaundiced or pale.      Comments: Right breast w non blanchable erythema over superior mastectomy flap. Incisions cdi with overlying tape. 2xJP w ss drainages   Neurological:      General: No focal deficit present.      Mental Status: She is alert and oriented to person, place, and time.   Psychiatric:         Mood and Affect: Mood normal.          Significant Labs:  I have reviewed all pertinent lab results within the past 24 hours.  CBC:   Recent Labs   Lab 04/23/25  0550   WBC 20.56*   RBC 3.47*   HGB 10.0*   HCT 31.2*      MCV 90   MCH 28.8   MCHC 32.1     CMP:   Recent Labs   Lab 04/23/25  0550   CALCIUM 9.1   ALBUMIN 2.9*      K 5.0   CO2 21*   *   BUN 26*   CREATININE 1.8*   ALKPHOS 148   ALT  6*   AST 7*   BILITOT 0.4       Significant Diagnostics:  I have reviewed all pertinent imaging results/findings within the past 24 hours.  Assessment/Plan:     Malignant neoplasm of right female breast   70 y.o. female with multicentric right breast cancer now sp R mastectomy w SLNB + tissue expander placement; L breast reduction+ mastopexy.     - reg diet  - pain control  - OOB/ambulate  - drain care  - ancef x 2 per plastics    Dispo: watching the right superior flap-- will plan to observe today with hopeful dc tomorrow          Tere Carrera MD  General Surgery  [facility] - Surgery (2nd Fl)

## 2025-04-23 NOTE — ASSESSMENT & PLAN NOTE
70 y.o. female with multicentric right breast cancer now sp R mastectomy w SLNB + tissue expander placement; L breast reduction+ mastopexy.     - reg diet  - pain control  - OOB/ambulate  - drain care  - ancef x 2 per plastics    Dispo: watching the right superior flap-- will plan to observe today with hopeful dc tomorrow

## 2025-04-23 NOTE — PROGRESS NOTES
Dr. Baer and Dr. Barlow @ bedside. Needle prick test performed by MD on left nipple. Bright red blood noted at prick site. Right breast still red, non-blanchable and soft. MD wants to keep surgical bra off for now and will order IV antibiotics.

## 2025-04-23 NOTE — PROGRESS NOTES
Spoke with md john in regards to pt not being able to urinate currently 6 hours after loyola removal. Pt was bladder scanned pt had less than 100 on bladder.

## 2025-04-23 NOTE — PROGRESS NOTES
Spoke with md pura due to pt still being unable to void. pt bladder scanned and it's less than 200ml on bladder. Per md order give more time.

## 2025-04-23 NOTE — NURSING TRANSFER
Nursing Transfer Note      4/23/2025   12:32 PM    Nurse giving handoff: KEIKO Bradley PACU  Nurse receiving handoff:KEIKO Guerra POSS    Reason patient is being transferred: post anesthesia    Transfer From: PACU to 511    Transfer via bed    Transfer with n/a    Transported by PCT x 2    Medicines sent: none    Any special needs or follow-up needed: none    Patient belongings transferred with patient: No, all belongings with family    Chart send with patient: Yes    Notified: daughter    Patient reassessed at: 1100

## 2025-04-23 NOTE — OP NOTE
Date of surgery 04/22/2025  Preoperative diagnosis breast cancer   Postoperative diagnosis is same  Procedure performed   1. Immediate right breast reconstruction using a tissue expander  2. Left breast reduction  Surgeon Babycos  Anesthesia general   Complications none   Blood loss 100 cc   Drains x 3    The patient was evaluated in the preoperative holding area in the standing position markings were made for the modified Ayon pattern breast reduction.  This would be an inferior pedicle reduction.  Markings were also made for the inframammary fold on the mastectomy side.  The risks and possible complications including but not limited to infection, bleeding, scarring, infection or exposure requiring removal of the implant, loss of the nipple.  Loss of sensation to the nipple.  Breast asymmetry breast deformity were all explained to the patient.  Patient signed informed consent.    As the general surgical team was performing the mastectomy on the right side we began performing the left breasts reduction.  The new nipple-areolar complex was marked measuring 42 mm the inferior pedicle was marked measuring 11 cm at its base the inferior pedicle was then de-epithelialized.  Good punctate bleeding was noted the superomedial and superolateral flaps were elevated to the clavicle superiorly to the midaxillary line laterally in the lateral border of the sternum medially.  The medial and lateral skin wedges were excised.  The inferior pedicle was debulked.  A drain was placed.  The incisions were then closed using interrupted 3-0 Monocryl followed by running 4-0 Monocryl subcuticular suture for the inframammary incision the vertical limb and nipple-areolar complex were closed using interrupted 4-0 Monocryl followed by running 4-0 Monocryl subcuticular suture.  Should be noted that the patient's skin was very thin and very tenuous.  This was true especially along the inframammary fold were additional sutures had to be placed  due to tearing.    After completion of the mastectomy.  The flaps were noted to be thick and viable.  Hemostasis was checked and achieved using the Bovie as well as surgical clips.  Two drains were placed.  The patient was then re-prepped redraped gloves were changed.  A 650 cc mentor tissue expander was opened on the back table it was soaked in Vashe solution.  The pocket was irrigated with Vashe solution.  The tissue expander was then placed and sutured in place using 2-0 PDS sutures.  Proximally 400 cc of air were placed in the expander there was absolutely no tension on the skin at all.  Next, the incision was closed using interrupted 3-0 Monocryl as well as the dermal stapler.  This was followed by a running 3-0 Stratafix suture.  There were no complications with this procedure end of dictation

## 2025-04-23 NOTE — OP NOTE
[facility]  General Surgery  Operative Note    DATE: 4/22/2025    PREOPERATIVE DIAGNOSIS: Malignant neoplasm of right female breast, unspecified estrogen receptor status, unspecified site of breast [C50.911]     POSTOPERATIVE DIAGNOSIS: Malignant neoplasm of right female breast, unspecified estrogen receptor status, unspecified site of breast [C50.911]     Procedure(s):  MASTECTOMY, MODIFIED RADICAL, WITH SENTINEL LYMPH NODE BIOPSY, RIGHT BREAST  INSERTION, BREAST IMPLANT  INSERTION, TISSUE EXPANDER, BREAST     Surgeons and Role:  Panel 1:     * Casper Grimes MD - Primary     * Tere Carrera MD - Resident - Assisting  Panel 2:     * Dariel Barlow MD - Primary     * Krystian Kebede MD - Resident - Assisting    ANESTHESIA: General endotracheal anesthesia    FINDINGS: Right mastectomy with SLNB.    INDICATION: Gwendolyn Fournier is a 70 y.o. female brought to the operating room for definitive surgery of multicentric cancer of the right breast.  The patient has elected to undergo a mastectomy with sentinel lymph node biopsy for sourav assessment with a reconstruction by plastic surgery. The patient was informed of the possible risks and complications of the procedure, including but not limited to anesthetic risks, bleeding, infection, and need for additional surgery.  The patient concurred with the proposed plan, and has given informed consent.  The site of surgery was properly noted/marked in the preoperative holding area.    PROCEDURE IN DETAIL:     The patient was then brought to the operating room and placed in the supine position with both upper extremities extended.  general anesthesia was administered. The patient's right breast was injected with technetium to facilitate sentinel lymph node identification. We also injected lymphazurine for the same purpose. The breast was then massaged for five minutes to promote lymphatic uptake.Perioperative antibiotics were  administered consisting of Ancef and a time out was performed confirming the patient, site, and procedure.  The bilateral chest and axilla was then prepped and draped in the usual sterile fashion.    We turned our attention to the right breast where an elliptical incision was fashioned to incorporate the nipple areolar complex.  The incision was made with a 10-blade and extended through the subcutaneous tissues with Bovie electrocautery.  Skin flaps were raised to the clavicle superiorly, to the lateral border of the sternum medially, to the inframammary fold inferiorly, and to the anterior border of the latissimus dorsi muscle laterally. The breast tissue was sharply excised off the chest wall taking care to incorporate the pectoralis fascia while leaving the serratus fascia behind. We used an Krystian retractor to help with our dissection. The resulting mastectomy specimen was marked using a short stitch superiorly, a long stitch laterally, and a double stitch deep.  The breast was sent to pathology for permanent evaluation.   The operative field was irrigated with normal saline and all bleeding points were secured with a combination of electrocautery, clips, and suture.    We then  turned our attention to the right axilla.  The gamma probe was used to identify an area of increased radioactivity within the lower axilla. The clavipectoral sheath was sharply incised to reveal the level I axillary lymph nodes. The probe was used to identify a single node with increased radioactivity.  This node was brought into the operative field and carefully dissected free of the surrounding lymphovascular structures.  The highest ex vivo count of the node was 1321.  The node was then sent to pathology labeled as sentinel node #1, hot and blue 1321.  A total of 1 axillary sentinel nodes was identified, excised and submitted to pathology.  Bed counts were obtained to confirm that the 10% rule had not been violated.  The wound was  irrigated with normal saline, and all bleeding points were secured with Bovie electrocautery or suture.     At the end of our portion of the operation, all sponge, instrument, and needle counts x 2 were correct.    The operating room was then turned over to Dr. Barlow's team for reconstruction.      EBL: 200 mL    COMPLICATIONS: none apparent.    SPECIMEN:   R breast- long lateral, short superior, double deep  Jenison node #1: Hot and blue 1321    DISPOSITION: intraoperative transfer to plastic surgery service    ATTESTATION:  Dr Grimes was present and scrubbed for the entire procedure including all critical portions of the procedure.      NURA Carrera MD  General Surgery, PGY-3  [phone]   [facility]  4/22/2025    Jenison Node Biopsy for Breast Cancer  Operation performed with curative intent Yes   Tracer(s) used to identify sentinel nodes in the upfront surgery (non-neoadjuvant) setting Dye and Radioactive tracer   Tracer(s) used to identify sentinel nodes in the neoadjuvant setting N/A   All nodes (colored or non-colored) present at the end of a dye-filled lymphatic channel were removed Yes   All significantly radioactive nodes were removed Yes   All palpably suspicious nodes were removed N/A   Biopsy-proven positive nodes marked with clips prior to chemotherapy were identified and removed N/A       I was present and scrubbed during the time of this procedure.     Casper Grimes MD, ORIN, FACS  Acute Care Surgery and Surgical Critical Care  [facility]

## 2025-04-24 VITALS
HEIGHT: 67 IN | DIASTOLIC BLOOD PRESSURE: 65 MMHG | HEART RATE: 82 BPM | SYSTOLIC BLOOD PRESSURE: 143 MMHG | OXYGEN SATURATION: 99 % | TEMPERATURE: 99 F | BODY MASS INDEX: 37.67 KG/M2 | WEIGHT: 240 LBS | RESPIRATION RATE: 18 BRPM

## 2025-04-24 LAB
POCT GLUCOSE: 140 MG/DL (ref 70–110)
POCT GLUCOSE: 207 MG/DL (ref 70–110)

## 2025-04-24 PROCEDURE — 25000003 PHARM REV CODE 250

## 2025-04-24 PROCEDURE — 63600175 PHARM REV CODE 636 W HCPCS: Performed by: SURGERY

## 2025-04-24 PROCEDURE — 63600175 PHARM REV CODE 636 W HCPCS: Performed by: STUDENT IN AN ORGANIZED HEALTH CARE EDUCATION/TRAINING PROGRAM

## 2025-04-24 RX ORDER — OXYCODONE HYDROCHLORIDE 5 MG/1
5 TABLET ORAL EVERY 6 HOURS PRN
Qty: 8 TABLET | Refills: 0 | Status: SHIPPED | OUTPATIENT
Start: 2025-04-24 | End: 2025-04-30 | Stop reason: SDUPTHER

## 2025-04-24 RX ADMIN — BACITRACIN: 500 OINTMENT TOPICAL at 09:04

## 2025-04-24 RX ADMIN — GABAPENTIN 300 MG: 300 CAPSULE ORAL at 09:04

## 2025-04-24 RX ADMIN — CYCLOBENZAPRINE 10 MG: 10 TABLET, FILM COATED ORAL at 01:04

## 2025-04-24 RX ADMIN — CEFAZOLIN 2 G: 2 INJECTION, POWDER, FOR SOLUTION INTRAMUSCULAR; INTRAVENOUS at 09:04

## 2025-04-24 RX ADMIN — CEFAZOLIN 2 G: 2 INJECTION, POWDER, FOR SOLUTION INTRAMUSCULAR; INTRAVENOUS at 01:04

## 2025-04-24 RX ADMIN — ACETAMINOPHEN 1000 MG: 500 TABLET ORAL at 07:04

## 2025-04-24 RX ADMIN — CYCLOBENZAPRINE 10 MG: 10 TABLET, FILM COATED ORAL at 07:04

## 2025-04-24 RX ADMIN — OXYCODONE HYDROCHLORIDE 10 MG: 10 TABLET ORAL at 03:04

## 2025-04-24 RX ADMIN — HEPARIN SODIUM 5000 UNITS: 5000 INJECTION INTRAVENOUS; SUBCUTANEOUS at 07:04

## 2025-04-24 NOTE — PLAN OF CARE
[facility] - Surgery  Initial Discharge Assessment       Primary Care Provider: Yolette Abebe MD    Admission Diagnosis: Malignant neoplasm of right female breast, unspecified estrogen receptor status, unspecified site of breast [C50.911]    Admission Date: 4/22/2025  Expected Discharge Date: 4/24/2025    Transition of Care Barriers: None    Payor: [email or web address] [facility] / Plan: [employer] / Product Type: Medicare Advantage /     Extended Emergency Contact Information  Primary Emergency Contact: Easton Fournier   [facility]  Home Phone: [phone]  Relation: Son  Secondary Emergency Contact: Carmen Fournier  Mobile Phone: [phone]  Relation: Daughter  Preferred language: English   needed? No    Discharge Plan A: Home with family  Discharge Plan B: Home with family, [facility]      [id number] - [address] AT [facility] OF [facility] & [address]  [place] [address]  Phone: [phone] Fax: [phone]    [facility] [place] [facility] [address] [facility]  [address] [id number]  Phone: [phone] Fax: [phone]    [facility] [address] [facility]  [address] [facility]  [address]  [facility] [address]  Phone: [phone] Fax: [phone]    [facility] [address]  [address]  [facility] [address]  [facility] [address]  Phone: [phone] Fax: [phone]    [facility] [place]  [address]  [address]   [place] [address]  Phone: [phone] Fax: [phone]      Initial Assessment (most recent)       Adult Discharge Assessment - 04/24/25 0857          Discharge Assessment    Assessment Type Discharge Planning Assessment     Confirmed/corrected address, phone number and insurance Yes     Confirmed Demographics Correct on Facesheet     Source of Information patient;family     Communicated SELAM with  patient/caregiver Yes     People in Home child(margoth), adult     Do you expect to return to your current living situation? Yes     Do you have help at home or someone to help you manage your care at home? Yes     Prior to hospitilization cognitive status: Alert/Oriented     Current cognitive status: Alert/Oriented     Walking or Climbing Stairs Difficulty no     Dressing/Bathing Difficulty no     Home Accessibility stairs to enter home     Number of Stairs, Main Entrance five     Stair Railings, Main Entrance railings safe and in good condition     Home Layout Able to live on 1st floor     Readmission within 30 days? No     Patient currently being followed by outpatient case management? No     Do you currently have service(s) that help you manage your care at home? No     Do you take prescription medications? Yes     Do you have prescription coverage? Yes     Do you have any problems affording any of your prescribed medications? No     Is the patient taking medications as prescribed? yes     How do you get to doctors appointments? car, drives self;family or friend will provide     Are you on dialysis? No     Do you take coumadin? No     Discharge Plan A Home with family     Discharge Plan B Home with family;Home Health     DME Needed Upon Discharge  none     Discharge Plan discussed with: Patient;Adult children     Transition of Care Barriers None                       MURPHY completed discharge planning assessment with the patient and pt's daughter (Carmen) at bedside. SW verified demographic information listed on the pt.'s Face sheet. Pt reports living with her daughter (Carmen) whom she plans to help manage her care and provide transport for upon discharge. Pt feels that she has a great support system.MURPHY is following this Pt for DC planning needs. There are no identified needs at this time.    Discharge Plan A and Plan B have been determined by review of patient's clinical status, future medical and therapeutic  needs, and coverage/benefits for post-acute care in coordination with multidisciplinary team members.    Yany Whittaker LCSW  Case Management   [facility]   Ext. [phone]

## 2025-04-24 NOTE — DISCHARGE INSTRUCTIONS
WOUND CARE  You have skin glue over your incision(s); this will slowly flake away over the next few weeks.  -- Ok to shower; however, no baths or submerging in water (I.e. swimming, submerging in water) for at least two weeks.  -- Please keep the incision clean with soap and water, pat your incision dry, do not scrub hard over your incisions.  -- Please continue to apply bacitracin to nipple daily until we see you in clinic    HOW TO CARE FOR YOUR DRAIN(S):  Wash hands--STRIP or milk the drainage tube as it comes out of your body toward the bulb.   Beginning where the drain comes out of your body, hold drainage tubing with one hand and with the other, stretch and release tubing an inch at time while moving downward with both hands toward the bulb.  Do this 2-3 times before emptying the bulb.  Remove the stopper from the bulbs port  (drainage port)  Pour the drainage in the measuring cup provided by the nurse  Flatten/squeeze the bulb to create a vacuum and replace the stopper before letting go of the bulb.  Record the date, time and amount of drainage in ccs (not ounces) each time bulb is emptied. If you have more than one drain, record each separately.  Discard the drainage into the toilet after measuring and then wash hands.  Empty bulbs 2-3 times/day or as needed if it fills up before 8 hours.  Remember to bring the output record with you to your doctors appointment.     MEDICATIONS AND PAIN CONTROL  -- Please resume all home medications as instructed and take any newly prescribed medications.  -- Most people find that over-the-counter pain medications (Tylenol combined with Ibuprofen) will be sufficient for most pain control.  -- If you're taking prescription narcotics, do not drive or operate heavy machinery. Do not drive if your pain is not controlled enough for you to react quickly safely.  -- Take a stool softener with narcotics medications to prevent constipation.    OTHER INSTRUCTIONS  -- Monitor  for temp > 101 F, bleeding, redness, purulent drainage, or any sudden, new extreme pain. If any occur, please call our clinic or go to the emergency department if after normal business hours.  -- You may resume your regular diet as tolerated.   -- No heavy lifting (anything >10 lbs or = to a gallon of milk) or strenuous exercise until cleared by a physician.  -- Follow up with Dr. Barlow in 1 week in clinic for a post-op check. If no appointment is made within the week, please call the clinic to schedule. Please follow up with Dr Grimes in 2 weeks.

## 2025-04-24 NOTE — HOSPITAL COURSE
Sp R mastectomy w SLNB, tissue expander placement + L breast reduction  on 4/22/25  Uncomplicated surgery  Plastics reduced size of expander during hospital stay  Tolerating diet, having bowel movements, pain controlled on oral pain meds, ambulating at baseline  Fu in 2 weeks in clinic w Dr Grimes, 1 week w Dr Barlow  Meets all criteria for discharge

## 2025-04-24 NOTE — PLAN OF CARE
04/24/25 1253   Final Note   Assessment Type Final Discharge Note   Anticipated Discharge Disposition Home     Patient discharged Home for self-care on 04/24/2025.    Future Appointments   Date Time Provider Department Center   5/5/2025  2:15 PM Casper Grimes MD [facility] KALPESH DARY [facility]   5/7/2025  1:20 PM Mazin Shankar MD [facility] CARDIO [facility]   5/20/2025  3:00 PM Bakari Marley MD [facility] NEUSUR [facility]   5/21/2025  3:30 PM Noe Starr MD [facility] [address]   5/28/2025  1:40 PM Yolette Abebe MD [facility] MED [place] [facility]   7/3/2025 11:30 AM Jenn Cifuentes NP [facility] SLEEP [facility] Zulema Whittaker LCSW  Case Management   [facility]   Ext. [phone]

## 2025-04-24 NOTE — PROGRESS NOTES
Plastic and Reconstructive Surgery   Progress Note    Subjective:    No issues  R breast erythema improved w/ removal of 180cc air yesterday from TE  L nipple still viable  HENRY x2 serosang    Objective:  Vital signs in last 24 hours:  Temp:  [97.8 °F (36.6 °C)-98.9 °F (37.2 °C)] 98.6 °F (37 °C)  Pulse:  [81-99] 82  Resp:  [16-18] 18  SpO2:  [91 %-99 %] 99 %  BP: (124-197)/(65-91) 143/65    Intake/Output last 3 shifts:  I/O last 3 completed shifts:  In: 3000.1 [P.O.:0.1; IV Piggyback:3000]  Out: 1425 [Urine:950; Drains:275; Blood:200]    Intake/Output this shift:  No intake/output data recorded.        Physical Exam:  VITAL SIGNS:   Vitals:    25 2329 25 0327 25 0730 25 0932   BP: (!) 143/70  (!) 197/91 (!) 143/65   BP Location: Left arm  Right arm    Patient Position: Lying  Lying    Pulse: 86  86 82   Resp: 16 17 18    Temp: 98 °F (36.7 °C)  98.6 °F (37 °C)    TempSrc: Oral  Oral    SpO2: 95%  99%    Weight:       Height:         TMAX: Temp (24hrs), Av.3 °F (36.8 °C), Min:97.8 °F (36.6 °C), Max:98.9 °F (37.2 °C)    General: Alert; No acute distress  Cardiovascular: Regular rate   Respiratory: Normal respiratory effort. Chest rise symmetric.   Abdomen: Soft, nontender, nondistended  Extremity: Moves all extremities equally.  Neurologic: No focal deficit. Speech normal      Right breast improved erythema over superior mastectomy flap, however soft, TE palpated  Left nipple , no congestion, +TTP, soft, incisions intact    Scheduled Medications acetaminophen, 1,000 mg, Q8H  bacitracin, , Daily  gabapentin, 300 mg, TID  heparin (porcine), 5,000 Units, Q8H      PRN Medications   Current Facility-Administered Medications:     cyclobenzaprine, 10 mg, Oral, TID PRN    dextrose 50%, 12.5 g, Intravenous, PRN    dextrose 50%, 25 g, Intravenous, PRN    diphenhydrAMINE, 25 mg, Intravenous, Q6H PRN    glucagon (human recombinant), 1 mg, Intramuscular, PRN    glucose, 16 g, Oral, PRN    glucose, 24 g,  Oral, PRN    HYDROmorphone, 0.5 mg, Intravenous, Q6H PRN    insulin aspart U-100, 0-5 Units, Subcutaneous, QID (AC + HS) PRN    LIDOcaine (PF) 10 mg/ml (1%), 1 mL, Intradermal, Once PRN    melatonin, 6 mg, Oral, Nightly PRN    ondansetron, 8 mg, Oral, Q8H PRN    oxyCODONE, 5 mg, Oral, Q4H PRN    oxyCODONE, 10 mg, Oral, Q4H PRN    sodium chloride 0.9%, 10 mL, Intravenous, PRN    Recent Labs:   Lab Results   Component Value Date    WBC 20.56 (H) 04/23/2025    HGB 10.0 (L) 04/23/2025    HCT 31.2 (L) 04/23/2025    MCV 90 04/23/2025     04/23/2025     Lab Results   Component Value Date    GLU 89 02/05/2025     04/23/2025    K 5.0 04/23/2025     (H) 04/23/2025    BUN 26 (H) 04/23/2025         Assessment: 70 y.o. y/o female 2 Days Post-Op s/p Procedure(s):  MASTECTOMY, MODIFIED RADICAL, WITH SENTINEL LYMPH NODE BIOPSY, RIGHT BREAST  INSERTION, TISSUE EXPANDER, BREAST  MAMMOPLASTY, REDUCTION  RECONSTRUCTION, BREAST     Plan  Removed 180cc yesterday from R breast TE w/improvement of erythema  Will leave bra off for now - she can place for ride home  Monitor HENRY outputs  Ok to DC from Plastics standpoint    Bakari Baer MD  Plastic Surgery Fellow  04/24/2025

## 2025-04-24 NOTE — DISCHARGE SUMMARY
[facility] - Surgery  General Surgery  Discharge Summary      Patient Name: Gwendolyn Fournier  MRN: [id number]  Admission Date: 4/22/2025  Hospital Length of Stay: 0 days  Discharge Date and Time: 04/24/2025 7:49 AM  Attending Physician: Casper Grimes MD   Discharging Provider: Tere Carrera MD  Primary Care Provider: Yolette Abebe MD    HPI:   Gwendolyn Fournier is a 70 y.o. female who presents for evaluation of a newly diagnosed right breast cancer. She was in her usual state of health and this was found on screening mammogram. This was her first screening mammogram in 10 years. She subsequently mikel on to have diagnostic mammo, ultrasound, and ultrasound guided biopsy of two suspicious areas. Of note, there is a 3rd area of calcifications that still needs a stereotactic biopsy.     Findings at [facility]:     Specimen to Pathology, Radiology Breast, needle biopsy  Order: [id number]  Status: Edited Result - FINAL     Visible to patient: Yes (seen)     Next appt: 02/12/2025 at 01:00 PM in Radiology ([facility] MAMMO[facility])     Dx: Mass of right breast, unspecified judie...     1 Result Note        Component 1 mo ago   Final Pathologic Diagnosis       1. 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  Results will be issued in a supplemental report.     Comment:  The biopsy reveals a papillary neoplasm with low to intermediate nuclear features with abundant hemosiderin.  Immunostains for p63 and CK5/6 are negative within the neoplastic papillae.  The differential diagnosis includes encapsulated   papillary carcinoma (pTis) and invasive carcinoma with papillary features.  The distinction between  these is made by surgical excision after examination of a fibrous capsule (if a capsule exists) and examination of the periphery of the entire lesion.     2. 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 Christian Duckworth MD, who concurs with the diagnosis.  VC      Comment: Interp By Tali Dumas D.O., Signed on 01/16/2025 at 10      Presents for mastectomy w recon.      Procedure(s) (LRB):  MASTECTOMY, MODIFIED RADICAL, WITH SENTINEL LYMPH NODE BIOPSY, RIGHT BREAST (Right)  INSERTION, TISSUE EXPANDER, BREAST (Right)  MAMMOPLASTY, REDUCTION (Left)  RECONSTRUCTION, BREAST (Right)      Indwelling Lines/Drains at time of discharge:   Lines/Drains/Airways       Drain  Duration                  Closed/Suction Drain 04/22/25 2130 Tube - 1 Right Chest Bulb 19 Fr. 1 day         Closed/Suction Drain 04/22/25 2130 Tube - 3 Left Chest Bulb 19 Fr. 1 day         Closed/Suction Drain 04/22/25 2145 Tube - 2 Right Chest Bulb 19 Fr. 1 day                  Hospital Course: Sp R mastectomy w SLNB, tissue expander placement + L breast reduction  on 4/22/25  Uncomplicated surgery  Plastics reduced size of expander during hospital stay  Tolerating diet, having bowel movements, pain controlled on oral pain meds, ambulating at baseline  Fu in 2 weeks in clinic w Dr Grimes, 1 week w Dr Barlow  Meets all criteria for discharge     Goals of Care Treatment Preferences:  Code Status: Full Code    Living Will: Yes            Physical Exam  Vitals and nursing note reviewed.   Constitutional:       Appearance: Normal appearance. She is not ill-appearing.   HENT:      Head: Normocephalic.      Mouth/Throat:       Mouth: Mucous membranes are moist.      Pharynx: Oropharynx is clear.   Eyes:      General: No scleral icterus.     Extraocular Movements: Extraocular movements intact.      Conjunctiva/sclera: Conjunctivae normal.   Cardiovascular:      Rate and Rhythm: Normal rate.      Pulses: Normal pulses.   Pulmonary:      Effort: Pulmonary effort is normal. No respiratory distress.      Breath sounds: No wheezing.   Abdominal:      General: Abdomen is flat. There is no distension.      Palpations: Abdomen is soft.   Musculoskeletal:         General: No swelling or tenderness. Normal range of motion.      Cervical back: Normal range of motion.   Skin:     General: Skin is warm and dry.      Coloration: Skin is not jaundiced or pale.      Comments: Right breast w  improved erythema over superior mastectomy flap. Incisions cdi with overlying tape. 2xJP w ss drainages   Neurological:      General: No focal deficit present.      Mental Status: She is alert and oriented to person, place, and time.   Psychiatric:         Mood and Affect: Mood normal.       Consults:     Significant Diagnostic Studies: Labs: CMP   Recent Labs   Lab 04/23/25  0550      K 5.0   *   CO2 21*   BUN 26*   CREATININE 1.8*   CALCIUM 9.1   ALBUMIN 2.9*   BILITOT 0.4   ALKPHOS 148   AST 7*   ALT 6*   ANIONGAP 9    and CBC   Recent Labs   Lab 04/23/25  0550   WBC 20.56*   HGB 10.0*   HCT 31.2*          Pending Diagnostic Studies:       Procedure Component Value Units Date/Time    Specimen to Pathology Breast [[id number]] Collected: 04/22/25 2241    Order Status: Sent Lab Status: In process Updated: 04/23/25 1020    Specimen: Tissue from Breast, Left     Specimen to Pathology Breast [[id number]] Collected: 04/22/25 2241    Order Status: Sent Lab Status: In process Updated: 04/23/25 1020    Specimen: Tissue from Lymph Node(s), [place]           Final Active Diagnoses:    Diagnosis Date Noted POA    PRINCIPAL PROBLEM:  Malignant neoplasm of  right female breast [C50.911] 01/02/2025 Yes      Problems Resolved During this Admission:      Discharged Condition: good    Disposition: Home or Self Care    Follow Up:   Follow-up Information       Dariel Barlow MD Follow up in 1 week(s).    Specialty: Plastic Surgery  Contact information:  [address]  [facility] [address]  [phone]               Casper Grimes MD Follow up in 2 week(s).    Specialty: General Surgery  Contact information:  [address]  [address]  [facility] [address]  [phone]                           Patient Instructions:      Diet Adult Regular     Lifting restrictions   Order Comments: No lifting greater than 10 pounds for 6 weeks from day of surgery.  No pushing/pulling such as vacuuming or raking.  No straining, avoid constipation and take stool softeners as described and laxatives as needed.  No driving while on narcotics and until you can react quickly without pain.     Notify your health care provider if you experience any of the following:  temperature >100.4     Notify your health care provider if you experience any of the following:  severe uncontrolled pain     Notify your health care provider if you experience any of the following:  redness, tenderness, or signs of infection (pain, swelling, redness, odor or green/yellow discharge around incision site)     Medications:  Reconciled Home Medications:      Medication List        START taking these medications      oxyCODONE 5 MG immediate release tablet  Commonly known as: ROXICODONE  Take 1 tablet (5 mg total) by mouth every 6 (six) hours as needed.            CONTINUE taking these medications      acetaminophen 500 MG tablet  Commonly known as: TYLENOL  Take 1 tablet (500 mg total) by mouth every 6 (six) hours as needed for Pain.     amLODIPine 5 MG tablet  Commonly known as: NORVASC  Take 1 tablet (5 mg total) by mouth once daily.     atorvastatin 40 MG tablet  Commonly known as: LIPITOR  Take 1  tablet (40 mg total) by mouth every evening.     blood pressure monitor Kit  1 each by Misc.(Non-Drug; Combo Route) route every 6 (six) months.     blood sugar diagnostic Strp  To check BG two times daily, freestyle lite meter     blood-glucose meter kit  To check BG two times daily, freestyle lite meter     cyclobenzaprine 10 MG tablet  Commonly known as: FLEXERIL  Take 1 tablet (10 mg total) by mouth 3 (three) times daily as needed for Muscle spasms.     docusate sodium 100 MG capsule  Commonly known as: COLACE  Take 1 capsule (100 mg total) by mouth as needed for Constipation.     DULoxetine 60 MG capsule  Commonly known as: CYMBALTA  TAKE 1 CAPSULE(60 MG) BY MOUTH EVERY DAY     EScitalopram oxalate 10 MG tablet  Commonly known as: LEXAPRO  Take 10 mg by mouth once daily.     FREESTYLE HAI 2 READER [facility]  Generic drug: flash glucose scanning reader  1 each by Misc.(Non-Drug; Combo Route) route 4 (four) times daily with meals and nightly.     FREESTYLE HAI 2 SENSOR Kit  Generic drug: flash glucose sensor  Use as directed to check blood sugar. Change every 14 days.     furosemide 80 MG tablet  Commonly known as: LASIX  TAKE 1 TABLET BY MOUTH EVERY DAY WITH 5 POUNDS INCREASE WITHIN 24-72 HOURS     gabapentin 300 MG capsule  Commonly known as: NEURONTIN  Take 300 mg by mouth 3 (three) times daily.     HYDROcodone-acetaminophen 5-325 mg per tablet  Commonly known as: NORCO  Take 1 tablet by mouth 2 (two) times daily as needed.     lancets [facility]  To check BG two times daily, to use with insurance preferred meter     linaCLOtide 145 mcg Cap capsule  Commonly known as: LINZESS  Take 1 capsule (145 mcg total) by mouth daily as needed (constipation).     metoprolol succinate 100 MG 24 hr tablet  Commonly known as: TOPROL-XL  Take 1 tablet (100 mg total) by mouth once daily.     MOUNJARO 5 mg/0.5 mL Pnij  Generic drug: tirzepatide  Inject 5 mg into the skin every 7 days.     MOVANTIK 25 mg tablet  Generic drug:  naloxegoL  Take 25 mg by mouth once daily.     pregabalin 25 MG capsule  Commonly known as: LYRICA  Take 1 capsule (25 mg total) by mouth 2 (two) times daily.     sodium bicarbonate 650 MG tablet  Take 1 tablet (650 mg total) by mouth 3 (three) times daily.     spironolactone 25 MG tablet  Commonly known as: ALDACTONE  Take 25 mg by mouth once daily.     tiZANidine 4 MG tablet  Commonly known as: ZANAFLEX  Take 4 mg by mouth 2 (two) times daily.     zolpidem 5 MG Tab  Commonly known as: AMBIEN  Take 5 mg by mouth nightly as needed (insomnia).            ASK your doctor about these medications      LIDOcaine 5 %  Commonly known as: LIDODERM  Place 1 patch onto the skin once daily. Apply patch for 12 hours and then leave off for 12 hours            Time spent on the discharge of patient: 15 minutes    Tere Carrera MD  General Surgery  [facility] - Surgery

## 2025-04-25 ENCOUNTER — TELEPHONE (OUTPATIENT)
Dept: FAMILY MEDICINE | Facility: CLINIC | Age: 70
End: 2025-04-25
Payer: MEDICARE

## 2025-04-30 ENCOUNTER — TELEPHONE (OUTPATIENT)
Dept: HEMATOLOGY/ONCOLOGY | Facility: CLINIC | Age: 70
End: 2025-04-30
Payer: MEDICARE

## 2025-04-30 ENCOUNTER — OFFICE VISIT (OUTPATIENT)
Dept: PLASTIC SURGERY | Facility: CLINIC | Age: 70
End: 2025-04-30
Payer: MEDICARE

## 2025-04-30 DIAGNOSIS — Z09 SURGERY FOLLOW-UP EXAMINATION: Primary | ICD-10-CM

## 2025-04-30 PROCEDURE — 3044F HG A1C LEVEL LT 7.0%: CPT | Mod: CPTII,S$GLB,,

## 2025-04-30 PROCEDURE — 99024 POSTOP FOLLOW-UP VISIT: CPT | Mod: S$GLB,,,

## 2025-04-30 PROCEDURE — 1159F MED LIST DOCD IN RCRD: CPT | Mod: CPTII,S$GLB,,

## 2025-04-30 PROCEDURE — 1157F ADVNC CARE PLAN IN RCRD: CPT | Mod: CPTII,S$GLB,,

## 2025-04-30 PROCEDURE — 3288F FALL RISK ASSESSMENT DOCD: CPT | Mod: CPTII,S$GLB,,

## 2025-04-30 PROCEDURE — 1125F AMNT PAIN NOTED PAIN PRSNT: CPT | Mod: CPTII,S$GLB,,

## 2025-04-30 PROCEDURE — 1101F PT FALLS ASSESS-DOCD LE1/YR: CPT | Mod: CPTII,S$GLB,,

## 2025-04-30 PROCEDURE — 99999 PR PBB SHADOW E&M-EST. PATIENT-LVL III: CPT | Mod: PBBFAC,,,

## 2025-04-30 RX ORDER — OXYCODONE AND ACETAMINOPHEN 5; 325 MG/1; MG/1
1 TABLET ORAL EVERY 8 HOURS PRN
Qty: 14 TABLET | Refills: 0 | Status: SHIPPED | OUTPATIENT
Start: 2025-04-30

## 2025-04-30 NOTE — PROGRESS NOTES
Plastic Surgery Clinic Postop Visit  Gwendolyn Fournier is a 70 y.o. year old female who presents to the Plastic Surgery Clinic for follow up visit status post R TE placement, L reduction on 4/22/2025 by Dr. Dariel Barlow. Left breast drain has an output of roughly 20 ccs in a 24 hour period. Right breast has 2 drains: anterior drainage is roughly 40 ccs, posterior drainage is roughly 10 ccs.   Denies fever, chills, nausea, vomiting, or other systemic signs of infection.    REVIEW OF SYSTEMS:   Negative unless otherwise stated above in HPI    PHYSICAL EXAM:  There were no vitals filed for this visit.  WD WN NAD  VSS  Normal resp effort  Left breast: tape along incision, no erythema or drainage, no signs of infection, + nipple sensation, nipple viable, drain w/ SS output  Right breast: tape along incision, TE in place, no erythema or drainage, no signs of infection, + nipple sensation, surgically absent nipple, drains w/ SS output    ASSESSMENT/PLAN:  70 y.o. female status post bilateral breast reconstruction.   - Doing well, no issues  - Left breast drain removed today, no complications. Pt tolerated well.   - Right posterior drain removed today, no complications. Approximately 240 ccs of air filled the R TE today, pt tolerated well. R anterior drain to remain.   - Rx percocet. Advised pt this will be the last prescription of narcotics. Pt and daughter verbalized understanding.   - Return to clinic in 1-2 weeks. Staff to schedule.    All questions were answered. The patient was advised to call the clinic with any questions or concerns prior to their next visit.     Anastasiia Gee  Plastic and Reconstructive Surgery  [phone]

## 2025-05-05 ENCOUNTER — OFFICE VISIT (OUTPATIENT)
Dept: SURGERY | Facility: CLINIC | Age: 70
End: 2025-05-05
Payer: MEDICARE

## 2025-05-05 VITALS
BODY MASS INDEX: 37.59 KG/M2 | HEART RATE: 82 BPM | TEMPERATURE: 98 F | HEIGHT: 67 IN | SYSTOLIC BLOOD PRESSURE: 145 MMHG | DIASTOLIC BLOOD PRESSURE: 84 MMHG

## 2025-05-05 DIAGNOSIS — C50.911 MALIGNANT NEOPLASM OF RIGHT FEMALE BREAST, UNSPECIFIED ESTROGEN RECEPTOR STATUS, UNSPECIFIED SITE OF BREAST: Primary | ICD-10-CM

## 2025-05-05 PROCEDURE — 99999 PR PBB SHADOW E&M-EST. PATIENT-LVL II: CPT | Mod: PBBFAC,,, | Performed by: SURGERY

## 2025-05-05 PROCEDURE — 1125F AMNT PAIN NOTED PAIN PRSNT: CPT | Mod: CPTII,S$GLB,, | Performed by: SURGERY

## 2025-05-05 PROCEDURE — 1157F ADVNC CARE PLAN IN RCRD: CPT | Mod: CPTII,S$GLB,, | Performed by: SURGERY

## 2025-05-05 PROCEDURE — 3044F HG A1C LEVEL LT 7.0%: CPT | Mod: CPTII,S$GLB,, | Performed by: SURGERY

## 2025-05-05 PROCEDURE — 3079F DIAST BP 80-89 MM HG: CPT | Mod: CPTII,S$GLB,, | Performed by: SURGERY

## 2025-05-05 PROCEDURE — 3077F SYST BP >= 140 MM HG: CPT | Mod: CPTII,S$GLB,, | Performed by: SURGERY

## 2025-05-05 PROCEDURE — 99024 POSTOP FOLLOW-UP VISIT: CPT | Mod: S$GLB,,, | Performed by: SURGERY

## 2025-05-05 RX ORDER — OXYCODONE HYDROCHLORIDE 5 MG/1
5 TABLET ORAL EVERY 6 HOURS PRN
Qty: 28 TABLET | Refills: 0 | Status: SHIPPED | OUTPATIENT
Start: 2025-05-05 | End: 2025-05-17 | Stop reason: SDUPTHER

## 2025-05-05 RX ORDER — GABAPENTIN 300 MG/1
300 CAPSULE ORAL 3 TIMES DAILY PRN
Qty: 90 CAPSULE | Refills: 3 | Status: SHIPPED | OUTPATIENT
Start: 2025-05-05

## 2025-05-05 NOTE — TELEPHONE ENCOUNTER
No care due was identified.  [facility] Embedded Care Due Messages. Reference number: [id number].   5/05/2025 11:33:28 AM CDT

## 2025-05-05 NOTE — TELEPHONE ENCOUNTER
----- Message from Sera sent at 5/5/2025 11:18 AM CDT -----  .Type: RX Refill RequestWho Called: Self Have you contacted your pharmacy: Yes Refill or New Rx:Refill RX Name and Strength:gabapentin (NEURONTIN) 300 MG capsulePreferred Pharmacy with phone number:.[id number] [address] [facility] [address]: [phone] Fax: [phone] or Mail Order: LocalOrdering Provider: Yolette Louis the patient rather a call back or a response via [facility]? Call Back Best Call Back Number: .[phone] (home) Additional Information:

## 2025-05-06 RX ORDER — AMLODIPINE BESYLATE 5 MG/1
5 TABLET ORAL DAILY
Qty: 90 TABLET | Refills: 3 | Status: SHIPPED | OUTPATIENT
Start: 2025-05-06

## 2025-05-07 DIAGNOSIS — N18.4 ANEMIA IN STAGE 4 CHRONIC KIDNEY DISEASE: Primary | ICD-10-CM

## 2025-05-07 DIAGNOSIS — E66.01 SEVERE OBESITY (BMI 35.0-39.9) WITH COMORBIDITY: ICD-10-CM

## 2025-05-07 DIAGNOSIS — D63.1 ANEMIA IN STAGE 4 CHRONIC KIDNEY DISEASE: Primary | ICD-10-CM

## 2025-05-07 DIAGNOSIS — N18.32 TYPE 2 DIABETES MELLITUS WITH STAGE 3B CHRONIC KIDNEY DISEASE, WITHOUT LONG-TERM CURRENT USE OF INSULIN: ICD-10-CM

## 2025-05-07 DIAGNOSIS — E11.22 TYPE 2 DIABETES MELLITUS WITH STAGE 3B CHRONIC KIDNEY DISEASE, WITHOUT LONG-TERM CURRENT USE OF INSULIN: ICD-10-CM

## 2025-05-07 RX ORDER — TIRZEPATIDE 5 MG/.5ML
5 INJECTION, SOLUTION SUBCUTANEOUS
Qty: 6 ML | Refills: 0 | Status: SHIPPED | OUTPATIENT
Start: 2025-05-07 | End: 2025-08-05

## 2025-05-07 RX ORDER — TIZANIDINE 4 MG/1
4 TABLET ORAL 2 TIMES DAILY
Qty: 90 TABLET | Refills: 0 | Status: SHIPPED | OUTPATIENT
Start: 2025-05-07

## 2025-05-07 NOTE — TELEPHONE ENCOUNTER
No care due was identified.  [facility] Embedded Care Due Messages. Reference number: [id number].   5/07/2025 1:30:11 PM CDT

## 2025-05-07 NOTE — TELEPHONE ENCOUNTER
----- Message from Diane sent at 5/7/2025  1:24 PM CDT -----  Regarding: self  Who Called: selfHave you contacted your pharmacy:Refill tirzepatide (MOUNJARO) 5 mg/0.5 mL PnIjtiZANidine (ZANAFLEX) 4 MG tabletRX Name and Strength:Preferred Pharmacy with phone number: [facility] [place] [address] [address]Zliid: [phone] Fax: [phone] or Mail Order: localWould the patient rather a call back or a response via [facility]?Best Call Back Number:  [phone] Information:Thank you.

## 2025-05-08 LAB
DHEA SERPL-MCNC: ABNORMAL
ESTRIOL SERPL-MCNC: ABNORMAL NG/ML
ESTROGEN SERPL-MCNC: ABNORMAL PG/ML
INSULIN SERPL-ACNC: ABNORMAL U[IU]/ML
LAB AP DIAGNOSIS CATEGORY: ABNORMAL
LAB AP GROSS DESCRIPTION: ABNORMAL
LAB AP PERFORMING LOCATION(S): ABNORMAL
LAB AP REPORT FOOTNOTES: ABNORMAL
LAB AP SYNOPTIC CHECKLIST: ABNORMAL

## 2025-05-09 ENCOUNTER — TELEPHONE (OUTPATIENT)
Dept: HEMATOLOGY/ONCOLOGY | Facility: CLINIC | Age: 70
End: 2025-05-09

## 2025-05-09 ENCOUNTER — OFFICE VISIT (OUTPATIENT)
Dept: HEMATOLOGY/ONCOLOGY | Facility: CLINIC | Age: 70
End: 2025-05-09
Payer: MEDICARE

## 2025-05-09 VITALS
OXYGEN SATURATION: 96 % | DIASTOLIC BLOOD PRESSURE: 75 MMHG | TEMPERATURE: 99 F | BODY MASS INDEX: 40.24 KG/M2 | HEART RATE: 85 BPM | WEIGHT: 256.38 LBS | SYSTOLIC BLOOD PRESSURE: 143 MMHG | HEIGHT: 67 IN

## 2025-05-09 DIAGNOSIS — Z17.0 MALIGNANT NEOPLASM OF RIGHT BREAST IN FEMALE, ESTROGEN RECEPTOR POSITIVE, UNSPECIFIED SITE OF BREAST: Primary | ICD-10-CM

## 2025-05-09 DIAGNOSIS — E66.01 SEVERE OBESITY (BMI 35.0-39.9) WITH COMORBIDITY: ICD-10-CM

## 2025-05-09 DIAGNOSIS — D64.9 ANEMIA, UNSPECIFIED TYPE: ICD-10-CM

## 2025-05-09 DIAGNOSIS — F32.A DEPRESSION, UNSPECIFIED DEPRESSION TYPE: ICD-10-CM

## 2025-05-09 DIAGNOSIS — C50.911 MALIGNANT NEOPLASM OF RIGHT BREAST IN FEMALE, ESTROGEN RECEPTOR POSITIVE, UNSPECIFIED SITE OF BREAST: Primary | ICD-10-CM

## 2025-05-09 DIAGNOSIS — I10 ESSENTIAL HYPERTENSION: ICD-10-CM

## 2025-05-09 PROCEDURE — 3008F BODY MASS INDEX DOCD: CPT | Mod: CPTII,S$GLB,, | Performed by: INTERNAL MEDICINE

## 2025-05-09 PROCEDURE — 3077F SYST BP >= 140 MM HG: CPT | Mod: CPTII,S$GLB,, | Performed by: INTERNAL MEDICINE

## 2025-05-09 PROCEDURE — 1101F PT FALLS ASSESS-DOCD LE1/YR: CPT | Mod: CPTII,S$GLB,, | Performed by: INTERNAL MEDICINE

## 2025-05-09 PROCEDURE — 99999 PR PBB SHADOW E&M-EST. PATIENT-LVL III: CPT | Mod: PBBFAC,,, | Performed by: INTERNAL MEDICINE

## 2025-05-09 PROCEDURE — 3288F FALL RISK ASSESSMENT DOCD: CPT | Mod: CPTII,S$GLB,, | Performed by: INTERNAL MEDICINE

## 2025-05-09 PROCEDURE — 1126F AMNT PAIN NOTED NONE PRSNT: CPT | Mod: CPTII,S$GLB,, | Performed by: INTERNAL MEDICINE

## 2025-05-09 PROCEDURE — 3078F DIAST BP <80 MM HG: CPT | Mod: CPTII,S$GLB,, | Performed by: INTERNAL MEDICINE

## 2025-05-09 PROCEDURE — G2211 COMPLEX E/M VISIT ADD ON: HCPCS | Mod: S$GLB,,, | Performed by: INTERNAL MEDICINE

## 2025-05-09 PROCEDURE — 3044F HG A1C LEVEL LT 7.0%: CPT | Mod: CPTII,S$GLB,, | Performed by: INTERNAL MEDICINE

## 2025-05-09 PROCEDURE — 1157F ADVNC CARE PLAN IN RCRD: CPT | Mod: CPTII,S$GLB,, | Performed by: INTERNAL MEDICINE

## 2025-05-09 PROCEDURE — 99215 OFFICE O/P EST HI 40 MIN: CPT | Mod: S$GLB,,, | Performed by: INTERNAL MEDICINE

## 2025-05-09 RX ORDER — ANASTROZOLE 1 MG/1
1 TABLET ORAL DAILY
Qty: 90 TABLET | Refills: 1 | Status: SHIPPED | OUTPATIENT
Start: 2025-05-09 | End: 2026-05-09

## 2025-05-14 ENCOUNTER — OFFICE VISIT (OUTPATIENT)
Dept: PLASTIC SURGERY | Facility: CLINIC | Age: 70
End: 2025-05-14
Payer: MEDICARE

## 2025-05-14 DIAGNOSIS — Z09 SURGERY FOLLOW-UP EXAMINATION: Primary | ICD-10-CM

## 2025-05-14 PROCEDURE — 1125F AMNT PAIN NOTED PAIN PRSNT: CPT | Mod: CPTII,S$GLB,, | Performed by: SURGERY

## 2025-05-14 PROCEDURE — 3044F HG A1C LEVEL LT 7.0%: CPT | Mod: CPTII,S$GLB,, | Performed by: SURGERY

## 2025-05-14 PROCEDURE — 99024 POSTOP FOLLOW-UP VISIT: CPT | Mod: S$GLB,,, | Performed by: SURGERY

## 2025-05-14 PROCEDURE — 1159F MED LIST DOCD IN RCRD: CPT | Mod: CPTII,S$GLB,, | Performed by: SURGERY

## 2025-05-14 PROCEDURE — 99999 PR PBB SHADOW E&M-EST. PATIENT-LVL III: CPT | Mod: PBBFAC,,, | Performed by: SURGERY

## 2025-05-14 PROCEDURE — 1101F PT FALLS ASSESS-DOCD LE1/YR: CPT | Mod: CPTII,S$GLB,, | Performed by: SURGERY

## 2025-05-14 PROCEDURE — 1157F ADVNC CARE PLAN IN RCRD: CPT | Mod: CPTII,S$GLB,, | Performed by: SURGERY

## 2025-05-14 PROCEDURE — 3288F FALL RISK ASSESSMENT DOCD: CPT | Mod: CPTII,S$GLB,, | Performed by: SURGERY

## 2025-05-14 NOTE — PROGRESS NOTES
Plastic Surgery Clinic Postop Visit  Gwendolyn Fournier is a 70 y.o. year old female who presents to the Plastic Surgery Clinic for follow up visit status post R TE placement, L reduction on 4/22/2025 by Dr. Dariel Barlow. Left breast drain has an output of roughly 20 ccs in a 24 hour period. Right breast has 2 drains: anterior drainage is roughly 40 ccs, posterior drainage is roughly 10 ccs.     Interval History 05/14/2025   Patient presents today for follow-up. Overall she is doing well. She reports mild pain at her left nipple and right lateral breast. Has run out of percocet but has not need to take OTC meds. Remaining drain putting out > 50cc SS fluid over 24 hrs. Denies fever, chills, nausea, vomiting, or other systemic signs of infection.    REVIEW OF SYSTEMS:   Negative unless otherwise stated above in HPI    PHYSICAL EXAM:  There were no vitals filed for this visit.  WD WN NAD  VSS  Normal resp effort  Left breast: tape along incision, no erythema or drainage, no signs of infection, + nipple sensation, nipple viable  Right breast: tape along incision, TE in place, no erythema or drainage, no signs of infection, surgically absent nipple, drain w/ SS output    ASSESSMENT/PLAN:  70 y.o. female status post bilateral breast reconstruction.   - Doing well, no issues    The fill port on the right tissue expander was marked using the magnet and a pen.  The skin was then prepped using chloroprep.  Using sterile technique the right port was accessed with a 21G butterfly needle. Saline was added to the expander.  The skin was checked for tension and cap refill.    Right total volume: 450 cc     - Incisional tape removed from both breasts. R breast drain dressing replaced with sterile dressing.  - Return to clinic in 1 week for possible drain removal. Pt will call with drain outputs early next week.    All questions were answered. The patient was advised to call the clinic with any questions or concerns prior to  their next visit.    Dorian Pruitt MD  Plastic and Reconstructive Surgery

## 2025-05-15 ENCOUNTER — LAB VISIT (OUTPATIENT)
Dept: LAB | Facility: HOSPITAL | Age: 70
End: 2025-05-15
Attending: NURSE PRACTITIONER
Payer: MEDICARE

## 2025-05-15 DIAGNOSIS — C50.911 MALIGNANT NEOPLASM OF RIGHT BREAST IN FEMALE, ESTROGEN RECEPTOR POSITIVE, UNSPECIFIED SITE OF BREAST: ICD-10-CM

## 2025-05-15 DIAGNOSIS — Z17.0 MALIGNANT NEOPLASM OF RIGHT BREAST IN FEMALE, ESTROGEN RECEPTOR POSITIVE, UNSPECIFIED SITE OF BREAST: ICD-10-CM

## 2025-05-15 PROCEDURE — 36415 COLL VENOUS BLD VENIPUNCTURE: CPT

## 2025-05-17 DIAGNOSIS — C50.911 MALIGNANT NEOPLASM OF RIGHT FEMALE BREAST, UNSPECIFIED ESTROGEN RECEPTOR STATUS, UNSPECIFIED SITE OF BREAST: ICD-10-CM

## 2025-05-17 PROBLEM — E66.01 SEVERE OBESITY (BMI 35.0-39.9) WITH COMORBIDITY: Status: ACTIVE | Noted: 2025-05-17

## 2025-05-17 NOTE — PROGRESS NOTES
PATIENT: Gwendolyn Fournier  MRN: [id number]  DATE: 5/17/2025      Diagnosis:   1. Malignant neoplasm of right breast in female, estrogen receptor positive, unspecified site of breast    2. Essential hypertension    3. Anemia, unspecified type    4. Depression, unspecified depression type        Chief Complaint: new patient  (Breast )        Subjective:    Initial History: Ms. Fournier is a 70 y.o. female with breast CA    History of Present Illness    CHIEF COMPLAINT:  Patient presents today for multicentric right  breast cancer. Patient accompanied by daughter.        INTERVAL HISTORY: 69 y/o with medical diagnoses as listed seen today in consultation for malignant neoplasm of right breast. Pt was found to have multicentric right breast cancer She was in her usual state of health and this was found on screening mammogram. This was her first screening mammogram in 10 years. She subsequently mikel ton to have diagnostic mammo, ultrasound, and ultrasound guided biopsy of two suspicious areas. 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 Dariel Barlow ;Pathology confirmed multifocal disease with small, early-stage tumors near the nipple and chest wall. [place] lymph node biopsy was negative. Biomarkers show  ER+/VA+, HER2-She currently has one remaining drain at surgical site.  She is scheduled for genetic testing next week.She denies palpable breast abnormalities prior to the mammogram and has no history of CVA, VTE, or previous breast biopsies.Accompanied by family member.She is here for further evaluation.    OB/GYN HISTORY:  - Age at menarche: 13  -  Menopause: Yes  - Age at menopause: 50 in   - Hysterectomy: Partial, ovaries retained  -   - Lactation History: Yes      ROS:  General: -fever, -chills, -fatigue, -weight gain, -weight loss  Eyes: -vision changes, -redness, -discharge  ENT: -ear pain, -nasal congestion, -sore throat  Cardiovascular: -chest pain, -palpitations, -lower extremity edema  Respiratory: -cough, -shortness of breath  Gastrointestinal: -abdominal pain, -nausea, -vomiting, -diarrhea, -constipation, -blood in stool  Genitourinary: -dysuria, -hematuria, -frequency  Musculoskeletal: -joint pain, -muscle pain  Skin: -rash, -lesion  Neurological: -headache, -dizziness, -numbness, -tingling  Psychiatric: -anxiety, -depression, -sleep difficulty          Past Medical History:   Past Medical History:   Diagnosis Date    Arthritis     Cancer     Diabetes mellitus     Gout attack     History of tuberculosis exposure 2021    Hx of psychiatric care     Hyperlipidemia     Hypertension     Opioid dependence, uncomplicated 2023    Psychiatric problem     Renal disorder     Sciatic nerve pain     Therapy     used to follow with psychiatrist but doesn't recall whom,     Tuberculosis     Unspecified cataract 2023    Mild on both eyes       Past Surgical HIstory:   Past Surgical History:   Procedure Laterality Date    BREAST RECONSTRUCTION Right 2025    Procedure: RECONSTRUCTION, BREAST;  Surgeon: Dariel Barlow MD;  Location: [address];  Service: Plastics;  Laterality: Right;    COLONOSCOPY N/A 2019    Procedure: COLONOSCOPY;  Surgeon: Shanna Jose MD;  Location: [facility] ENDO;  Service: Endoscopy;  Laterality: N/A;    HYSTERECTOMY      INJECTION OF JOINT Bilateral 2019    Procedure: INJECTION, JOINT BILATERAL SI JOINT;  Surgeon: Evan Coreas MD;  Location: [facility] PAIN MGT;  Service: Pain Management;  Laterality: Bilateral;  BILATERAL SI JOINT INJECTION    INSERTION OF BREAST TISSUE EXPANDER  "Right 4/22/2025    Procedure: INSERTION, TISSUE EXPANDER, BREAST;  Surgeon: Dariel Barlow MD;  Location: [facility] OR [address] [place] [address];  Service: Plastics;  Laterality: Right;    KNEE SURGERY  01/01/2014    left knee surgery     MASTECTOMY, MODIFIED RADICAL, WITH SENTINEL LYMPH NODE BIOPSY Right 4/22/2025    Procedure: MASTECTOMY, MODIFIED RADICAL, WITH SENTINEL LYMPH NODE BIOPSY, RIGHT BREAST;  Surgeon: Casper Grimes MD;  Location: [facility] OR [address] [place] [address];  Service: General;  Laterality: Right;    TOTAL REDUCTION MAMMOPLASTY Left 4/22/2025    Procedure: MAMMOPLASTY, REDUCTION;  Surgeon: Dariel Barlow MD;  Location: [facility] OR [address] [place] [address];  Service: Plastics;  Laterality: Left;       Family History:   Family History   Problem Relation Name Age of Onset    Tuberculosis Mother      Crohn's disease Father      Stroke Father      Arthritis Maternal Grandfather      Depression Daughter Aracelis     Bipolar disorder Daughter Aracelis     Suicide Daughter Aracelis     Depression Daughter Zehra     Colonic polyp Sister Irma     Crohn's disease Sister Irma        Social History:  reports that she quit smoking about 48 years ago. Her smoking use included cigarettes. She has been exposed to tobacco smoke. She has quit using smokeless tobacco. She reports that she does not currently use alcohol. She reports that she does not use drugs.    Allergies:  Review of patient's allergies indicates:   Allergen Reactions    Ondansetron hcl (pf) Hives and Itching    Ketorolac Itching         ECOG Performance Status: 0   Objective:      Vitals:   Vitals:    05/09/25 1306   BP: (!) 143/75   Pulse: 85   Temp: 99.4 °F (37.4 °C)   SpO2: 96%   Weight: 116.3 kg (256 lb 6.3 oz)   Height: 5' 7" (1.702 m)         Physical Exam    General: No acute distress. Well-developed. Well-nourished.  Eyes: EOMI. Sclerae anicteric.  HENT: Normocephalic. Atraumatic. Nares patent. Moist oral mucosa.  Cardiovascular: Regular rate. Regular rhythm. No murmurs. No " rubs. Normal S1, S2.  Respiratory: Normal respiratory effort. Clear to auscultation bilaterally. No rales. No rhonchi. No wheezing.  Left breast reconstructed -no signs of infection, well healed surg site  Right breast reconstructed- no signs of infection, well healed surg site , drain intact w SS fluid  Abdomen: Soft. Non-tender. Non-distended. Normoactive bowel sounds.  Musculoskeletal: No  obvious deformity.  Extremities: No lower extremity edema.  Neurological: Alert & oriented x3. No slurred speech. Normal gait.  Psychiatric: Normal mood. Normal affect. Good insight. Good judgment.  Skin: Warm. Dry. No rash.              Laboratory Data:  No visits with results within 1 Week(s) from this visit.   Latest known visit with results is:   Admission on 04/22/2025, Discharged on 04/24/2025   Component Date Value Ref Range Status    Hemoglobin A1c 04/23/2025 5.8 (H)  4.0 - 5.6 % Final    ADA Screening Guidelines:  5.7-6.4%  Consistent with prediabetes  >=6.5%  Consistent with diabetes    High levels of fetal hemoglobin interfere with the HbA1C  assay. Heterozygous hemoglobin variants (HbS, HgC, etc)do  not significantly interfere with this assay.   However, presence of multiple variants may affect accuracy.    Estimated Average Glucose 04/23/2025 120  68 - 131 mg/dL Final    Case Report 04/22/2025    Final                    Value:[facility] - [facility]                           Case: [id number]                                Authorizing Provider:  Casper Grimes MD       Collected:           04/22/2025 10:41 PM          Ordering Location:     [facility] - Surgery (2nd    Received:            04/23/2025 08:40 AM                                 Fl)                                                                          Pathologist:           Jaycee Greenwood MD                                                          Specimens:   1) - Breast, Right, Right Breast                                                                     2) - Breast, Left, Left Breast                                                                      3) - Lymph Node(s), [place], [place] Node                                                Supplemental Report 04/22/2025    Final                    Value:An addendum is issued to include findings from breast biomarkers performed on the smaller focus of invasive carcinoma with more papillary features in slide 1B as well as to report the findings from [id number] on the larger tumor focus.    BREAST BIOMARKERS (smaller tumor focus):  ER:  Positive (100% of tumor cells demonstrate strong nuclear immunoreactivity).  MI:  Positive (99% of tumor cells demonstrate strong nuclear immunoreactivity).  [id number]:  Equivocal (2+).  HER2 ELYSSA: No residual tumor definitively identified.  Ki-67 proliferative index:  10%.    HER2 ELYSSA FOR LARGER TUMOR:   HER2 by in situ Hybridization    Result:  Negative (not amplified); Group 5 (See Group Definitions and comments below)    Number of Observers :  1  Number of Invasive Tumor Cells Counted  20 cells  Method:  Dual probe assay    Average Number of HER2 Signals per Cell:  2.8   Average Number of CEP17 Signals per Cell:  1.75  HER2 / CEP17 Ratio:  1.6  Test Type:  Food and Drug Administration (FDA) cleared                           (specify test / vendor):  Her2 DUAL ELYSSA Assay; Roche    Grade Group Definitions and Comments:  Dual Probe ELYSSA Group Definitions:  Group 1 = HER2/CEP17 ratio >=2.0;  >=4.0 HER2 signals/cell  Group 2 = HER2/CEP17 ratio >=2.0;  <4.0 HER2 signals/cell  Group 3 = HER2/CEP17 ratio <2.0;  >=6.0 HER2 signals/cell  Group 4 = HER2/CEP17 ratio <2.0;  >=4.0 and <6.0 HER2 signals/cell  Group 5 = HER2/CEP17 ratio <2.0;  <4.0 HER2 signals/cell      Result Criteria (dual-probe assay)  Negative        Group 5    Negative*  (see comment)    Group 2 and concurrent IHC 0-1+ or 2+     Group 3 and concurrent IHC 0-1+    Group 4 and concurrent IHC 0-1+ or 2+      Positive*     Group 2 and concurrent IHC 3+     Group 3 and concurrent IHC 2+ or 3+    Group 4 and concurrent IHC 3+      *For Groups 2-4 final ELYSSA results are based on concurrent review of IHC, with recounting of the ELYSSA test by a second reviewer if IHC is 2+ (per 2018 CAP/ASCO Update recommendations).    Comment for Group 2 Negative result:                           Evidence is limited on the efficacy of HER2-targeted therapy in the small subset of cases with HER2/CEP17 ratio >=2.0 and an average HER2 copy number <4.0/cell. In the first generation of adjuvant trastuzumab trials, patients in this subgroup who were randomized to the trastuzumab arm did not appear to derive an improvement in disease free or overall survival, but there were too few such cases to draw definitive conclusions. IHC expression for HER2 should be used to complement ELYSSA and define HER2 status. If IHC result is not 3+ positive, it is recommended that the specimen be considered HER2 negative because of the low HER2 copy number by ELYSSA and lack of protein overexpression.    Comment for Group 3 Negative result: There are insufficient data on the efficacy of HER2-targeted therapy in cases with HER2 ratio <2.0 in the absence of protein overexpression because such patients were not eligible for the first generation of adjuvant trastuzumab clinical trials. When concurrent IHC results                           are negative (0-1+), it is recommended that the specimen be considered HER2 negative.    Comment for Group 4 Negative result: It is uncertain whether patients with >=4.0 and <6.0 average HER2 signals/cell and HER2/CEP17 ratio <2.0 benefit from HER2 targeted therapy in the absence of protein overexpression (IHC 3+). If the specimen test result is close to the ELYSSA ratio threshold for positive, there is a high likelihood that repeat testing will result in different results by chance alone. Therefore, when IHC results are not 3+ positive, it is recommended  that the sample be considered HER2 negative without additional testing on the same specimen.            Immunohistochemical staining is interpreted in the setting of appropriate positive and negative controls.    Disclaimer  HER-2/rajni IHC (4B5) clone, DAB detection method) is done on 10% buffered formalin-fixed (for 6-72 hrs), paraffin embedded tissue sections. The scoring is completed on a 4-tiered scoring system of membrane staining                           using the 2014 ASCO/CAP scoring guidelines. It has been cleared by the U.S[place] FDA for use as an IVD test. This assay has not been validated on decalcified tissues. Results should be interpreted with caution given the likelihood of false negativity on decalcified specimens.     Disclaimer  PgR immunohistochemical staining (clone 1E2, DAB detection method) is performed on formalin-fixed, paraffin embedded tissue sections. The percentage of cell nuclei stained and the strength of staining is report (weak, moderate, strong), using 2010 ASCO/CAP scoring guidelines. Tumors used for determining predictive markers are fixed in 10% neutral buffered formalin for 6-72 hours. It has been cleared by the [place].S. FDA for use as an IVD test. This assay has not been validated on decalcified tissues. Results should be interpreted with caution given the likelihood of false negativity on decalcified specimens.    Disclaimer  ER immunohistochemical staining (clone SP1, DAB detection method) is performed on                           formalin-fixed, paraffin embedded tissue sections. The percentage of cell nuclei stained and the strength of staining is reported (weak, moderate, strong), using the 2010 ACSO/CAP scoring guidelines. Tumors used for determining predictive markers are fixed in 10% neutral buffered formalin for 6-72 hours. It has been cleared by the U.S. FDA for use as an IVD test. This assay has not been validated on decalcified tissues. Results should be interpreted with  caution given the likelihood of false negativity on decalcified specimens.       Final Diagnosis 2025    Final                    Value:1. BREAST, RIGHT, MASTECTOMY:  - Invasive mammary carcinoma of breast, two foci, mucinous type, and with papillary features, see Tumor Synoptic.  - Size of larger focus of invasive carcinoma (mucinous type): 14 MM.  - Size of smaller focus of invasive carcinoma (papillary features): 3.5 MM.   - Stephanie Histologic Score: Grade 2 of 3.                    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  - Size of DCIS: at least 15 MM (estimated, see comment).  - Twirl, X-shaped, and ribbon biopsy clips are present.  - Margins:  All margins are greater than 10 MM for invasive and in-situ carcinoma.  - Benign skin, negative for carcinoma.  - Benign nipple ducts and skin, negative for carcinoma.  - Pathologic staging: p(m)T1c (sn)N0    2. BREAST, LEFT, MASTECTOMY:  -                           Negative for atypia or malignancy.  - Benign breast tissue weighing 1124.5 grams demonstrating predominantly fatty stroma.  - Benign skin with no significant histopathologic alterations.    3. AXILLARY SENTINEL LYMPH NODE, PRESUMED RIGHT, EXCISION:  - One benign axillary sentinel lymph node, negative for metastatic carcinoma (0/1).  - Immunohistochemical stains for CK WSK, CAM 5.2, and CK AE/AE3 are negative, supporting the diagnosis.      Comments 2025    Final                    Value:TUMOR-CONTAINING BLOCKS FOR ADDITIONAL TESTINE, 1F (larger focus); 1B (smaller focus)    Histologic evaluation reveals a papillary tumor with thin, and frequently absent, fibroepithelial cores lined by cells with intermediate-grade nuclear morphology and lacking intralesional  myoepithelial cells on p63 immunohistochemical staining, supporting diagnoses of in-situ carcinoma.  Some areas lack peripheral p63 staining and are compatible with encapsulated papillary carcinoma subtype of DCIS.  Other areas demonstrate intact peripheral p63 staining, compatible with papillary and micropapillary DCIS in slides 1A-1C. Within the invasive carcinoma foci in 1B and 1F, there is variable p63 staining with total absence of staining in the invasive carcinoma in 1E-1G, and tumor cell (non-myoepithelial cell) staining in 1B.    DCIS is present prominently around lesion 1 and around lesion 2. Intervening tissue between these two lesions also demonstrates DCIS.  These lesions are 14 MM apart. The                           larger area of DCIS measures 15 MM and therefore the volume of DCIS is presumed to be at least 15 MM but likely involves the entire spanning dimension of lesions 1 and 2 (which was not provided at the time of grossing).    Due to the increased volume of tumor of mucinous carcinoma in the current specimen in slides 1E and 1F when compared to the original biopsy ([id number]), biomarker testing was repeated and demonstrated equivocal HER2 IHC. Because of this, [id number] is pending and will be reported in an addendum report.    No lymphovascular invasion is identified using CD31 staining on block 1I.    Biomarker testing for the smaller focus of invasive carcinoma in 1B is pending and will also be included in an addendum report.    Immunohistochemical staining is interpreted in the setting of appropriate positive and negative controls.      Synoptic Checklist 04/22/2025    Final                    Value:INVASIVE CARCINOMA OF THE BREAST: Resection                            INVASIVE CARCINOMA OF THE BREAST: RESECTION - 1, 3                            8th Edition - Protocol posted: 6/19/2024                                                        SPECIMEN                               Procedure:     "Total mastectomy                                Specimen Laterality:    Right                                                         TUMOR                               Tumor Site:    Central                                Histologic Type:    Mucinous carcinoma                                Histologic Grade ([place] Histologic Score):                                     Glandular (Acinar) / Tubular Differentiation:    Score 3                                  Nuclear Pleomorphism:    Score 2                                  Mitotic Rate:    Score 1                                  Overall Grade:    Grade 2 (scores of 6 or 7)                                Tumor Size:    Greatest dimension of largest invasive focus (Millimeters): 14 mm                               Tumor Focality:    Multiple foci of invasive carcinoma                                  Number of Foci:    2                                  Sizes of Individual Foci in Millimeters (mm):    14; 3.5                                Ductal Carcinoma In Situ (DCIS):    Present                                  :    Positive for extensive intraductal component (EIC)                                  Size (Extent) of DCIS:    Estimated size (extent) of DCIS is at least (Millimeters): 15 mm                                 Architectural Patterns:    Cribriform                                  Architectural Patterns:    Micropapillary                                  Architectural Patterns:    Papillary                                  Nuclear Grade:    Grade II (intermediate)                                  Necrosis:    Present, central (expansive "comedo" necrosis)                                  Number of Blocks with DCIS:    8                                  Number of Blocks Examined:    19                                Lobular Carcinoma In Situ (LCIS):    Not identified                                Lymphatic and / or Vascular Invasion:    Not " identified                                Dermal Lymphatic and / or Vascular Invasion:    No skin present                                Microcalcifications:    Not identified                                Treatment Effect in the Breast:    No known presurgical therapy                                                         MARGINS                               Margin Status for Invasive Carcinoma:    All margins negative for invasive carcinoma                                  Distance from Invasive Carcinoma to Closest Margin:    Greater than: 10 mm                                 Closest Margin(s) to Invasive Carcinoma:    Posterior                                Margin Status for DCIS:    All margins negative for DCIS                                  Distance from DCIS to Closest Margin:    Greater than: 10 mm                                 Closest Margin(s) to DCIS:    Posterior                                                         REGIONAL LYMPH NODES                               Regional Lymph Node Status:                                     :    All regional lymph nodes negative for tumor                                  Total Number of Lymph Nodes Examined (sentinel and non-sentinel):    1                                  Number of [place] Nodes Examined:    1                                                         pTNM CLASSIFICATION (AJCC 8th Edition)                               Reporting of pT, pN, and (when applicable) pM categories is based on information available to the pathologist at the time the report is issued. As per the AJCC (Chapter 1, 8th Ed.) it is the managing physician's responsibility to establish the final pathologic stage based upon all pertinent information, including but potentially not limited to this pathology report.                               pT Category:    pT1c                                T Suffix:    (m)                                pN Category:    pN0                                 N Suffix:    (sn)                                                         SPECIAL STUDIES                               Estrogen Receptor (ER) Status:    Positive (greater than 10% of cells demonstrate nuclear positivity)                                  Percentage of Cells with Nuclear Positivity:    95 %                               Progesterone Receptor (PgR) Status:    Positive                                  Percentage of Cells with Nuclear Positivity:    90 %                               HER2 (by immunohistochemistry):    Equivocal (Score 2+)                                Ki-67 Percentage of Positive Nuclei:    10 %                               Testing Performed on Case Number:    current specimen [id number]       Gross Description 04/22/2025    Final                    Value:1. Breast, Right: Right Breast  MRN # on EpicLabel:  [id number]  MRN # on Pathology Label:  [id number]    Received in formalin and labeled right is an oriented simple mastectomy specimen (26.0 cm medial to lateral x 21.0 cm superior to inferior x 11.5 cm anterior to posterior; 2163.0 g) with tan-brown skin (22.5 x 18.0 cm) overlying the superficial aspect.  Centered on the skin surface is an everted nipple (1.5 x 1.5 x 0.5 cm) surrounded by a dark brown, pigmented area/areola (10.0 x 9.0 cm).  The specimen is oriented per the requisition with a short suture designating the superior aspect, a long suture designating the lateral aspect, and a double suture designating the deep aspect.  Also received in same specimen container are multiple additional pieces of yellow-tan fibroadipose tissue and skin (aggregating 12.0 x 11.0 x 3.5 cm; 140.0 g).    The breast is serially sectioned from lateral to medial into 17 slices (average slice thickness = 1.5 cm) with the nipple located in slice 11.  Three                           lesions are identified within the breast parenchyma as follows:     Lesion #1  Spanning slices 7-8 at  the 9 o'clock position, outer central, anterior depth is a brown-tan, firm, gritty mass (1.4 x 0.8 x 0.7 cm).  This mass is located over 8 cm from the posterior margin, 5.0 cm from the anterior margin, 2.3 cm from the skin, 9.5 cm from the nipple, 1.4 cm from lesion # 2, and over 10 cm from lesion #3.  Identified within this mass is a twirl-shaped biopsy clip.    Lesion #2  Located in slice 9, outer central, at the 9 o'clock position, mid depth is a pink-tan, firm, nodular mass (1.5 x 1.3 x 1.1 cm).  This mass is located over 6 cm from all margins, 3.7 cm from the skin, 8.0 cm from the nipple, and 7.5 cm from lesion #3. Identified within this area is a ribbon-shaped biopsy clip.    Lesion #3  Spanning slices 7-9, outer central, 09:00, posterior depth is a white, stellate, fibrous area (1.1 x 0.9 x 0.4 cm).  This area is located 1.8 cm from the posterior margin (slice 8), 1.4 cm from                           the anterior (inferior) margin (slice 8), and over 10 cm from the nipple and skin.  Identified within this area is an X-shaped biopsy clip.    The remaining breast tissue consists of approximately 98% yellow lobulated adipose tissue and the remaining 2%, interspersed, delicate fibrous tissue.  No additional masses or lesions are grossly identified.  The skin surface is tan-brown, smooth and unremarkable.    Ink code:  Blue - Anterior superior margin  Green - Anterior inferior margin  Black - Posterior margin    Representative sections are submitted as follows (cassettes A-B and E-G contain thinned sections):  1A:  Slice 7, outer central, anterior depth with greatest dimension of lesion #1, devoid of margins  1B:  Slice 7, outer central, anterior depth, with lesion #1 and site of twirl clip, devoid of margins  1C:  Slice 8, outer central, anterior depth, with remainder of lesion #1, devoid of margins  1D:  Slice 7, outer central, anterior depth with nearest skin, devoid of margins  1E:                             Slice 9, outer central, mid depth, with greatest dimension of the lesion #2, devoid of margins  1F:  Slice 9, outer central, mid depth, with lesion #2 and site of ribbon clip, devoid of margins  1G:  Slice 9, outer central, mid depth, with remainder of lesion # 2, devoid of margins  1H:  Slice 9, outer central, anterior depth, with nearest skin, devoid of margins  1I:  Slice 8, outer central, anterior/mid depth, uninvolved tissue between lesion #1 and lesion #2  1J:  Slice 8, outer central, posterior depth, with greatest dimension of lesion #3, devoid of margins  1K:  Slice 8, outer central, posterior depth with nearest posterior margin  1L:  Slice 8, outer central, posterior depth, with nearest anterior (inferior) margin  1M:  Sub 7, outer central, posterior depth, with lesion #3 and site of X clip, thinned sections, devoid of margins  1N:  Slice 9, posterior depth, outer central, with remainder of lesion #3, devoid of margins  1O:  Slice 11, nipple, perpendicular, devoid of                           margin  1P:  Slice 11, upper central, mid depth, devoid of margins  1Q:  Slice 5, lower outer, mid depth, devoid of margins  1R:  Slice 13, upper inner, mid depth, devoid of margins  1S:  Slice 15, lower inner, mid depth, devoid of margins      Specimen collection time:  Not provided  Time into formalin:  Not provided  Cold ischemic time:  Unable to determine  Total fixation time for the specimen was greater than 6 hours but less than 72 hours.    Grossing was completed by Juan Dumont MS, PA([facility]) on 4/24/2025.    2. Breast, Left: Left Breast  MRN # on EpicLabel:  [id number]  MRN # on Pathology Label:  [id number]    Received in formalin and labeled left breast are multiple, unoriented pieces of yellow-tan fibroadipose tissue (aggregating 25.0 x 21.0 x specimen specimen done 5.5 cm; 1124.5 g) overlaid by pieces of tan, smooth, unremarkable skin (up to 14.7 cm).  The cut surface of the breast parenchyma consists of  "approximately 98% yellow adipose tissue and the remaining 2%, interspersed,                           delicate, white fibrous tissue.  No focal masses or lesions are grossly identified.      Representative sections are submitted in 2A.    Grossing was completed by Juan Dumont MS, PA([facility]) on 4/24/2025.    3. Lymph Node(s), [place]: [place] Node  MRN # on Epic Label:  [id number]  MRN # on Pathology Label:  [id number]    Received in formalin and labeled as sentinel node #1 is a single, pale-yellow, firm, possible lymph node (3.7 x 1.3 x 1.0 cm) surrounded by scant amounts of yellow adipose tissue.  The cut surface is pale yellow to tan and partially obscured by blue dye.  The specimen is serially sectioned and entirely submitted as follows:     3A-3D:  1 possible lymph node, serially sectioned  3E:  Remainder of tissue     Grossing was completed by Juan Dumont MS, PA([facility]) on 4/24/2025.        Report Footnotes 04/22/2025    Final                    Value:Unless the case is a "gross only" or additional testing only, the final diagnosis for each specimen is based on a microscopic examination of appropriate tissue sections.    ER immunohistochemical staining (clone SP1, DAB detection method) is performed on formalin-fixed, paraffin embedded tissue sections. The percentage of cell nuclei stained and the strength of staining is reported (weak, moderate, strong), using the 2010 ACSO/CAP scoring guidelines. Tumors used for determining predictive markers are fixed in 10% neutral buffered formalin for 6-72 hours. It has been cleared by the [place] FDA for use as an IVD test. This assay has not been validated on decalcified tissues. Results should be interpreted with caution given the likelihood of false negativity on decalcified specimens.     HER-2/rajni IHC (4B5) clone, DAB detection method) is done on 10% buffered formalin-fixed (for 6-72 hrs), paraffin embedded tissue sections. The scoring is completed on a 4-tiered scoring " system of membrane                           staining using the 2014 ASCO/CAP scoring guidelines. It has been cleared by the [place] FDA for use as an IVD test. This assay has not been validated on decalcified tissues. Results should be interpreted with caution given the likelihood of false negativity on decalcified specimens.     PgR immunohistochemical staining (clone 1E2, DAB detection method) is performed on formalin-fixed, paraffin embedded tissue sections. The percentage of cell nuclei stained and the strength of staining is report (weak, moderate, strong), using 2010 ASCO/CAP scoring guidelines. Tumors used for determining predictive markers are fixed in 10% neutral buffered formalin for 6-72 hours. It has been cleared by the [place] FDA for use as an IVD test. This assay has not been validated on decalcified tissues. Results should be interpreted with caution given the likelihood of false negativity on decalcified specimens.      Performing Location 04/22/2025    Final                    Value:Grossing performed at [facility], [address] [place], [address]    Sign out performed at [facility], [address] [place] [address]        Dx Category 04/22/2025 Malignant/neoplasm (A)    Final    POCT Glucose 04/22/2025 164 (H)  70 - 110 mg/dL Final    Sodium 04/23/2025 141  136 - 145 mmol/L Final    Potassium 04/23/2025 5.0  3.5 - 5.1 mmol/L Final    Chloride 04/23/2025 111 (H)  95 - 110 mmol/L Final    CO2 04/23/2025 21 (L)  23 - 29 mmol/L Final    Glucose 04/23/2025 168 (H)  70 - 110 mg/dL Final    BUN 04/23/2025 26 (H)  8 - 23 mg/dL Final    Creatinine 04/23/2025 1.8 (H)  0.5 - 1.4 mg/dL Final    Calcium 04/23/2025 9.1  8.7 - 10.5 mg/dL Final    Protein Total 04/23/2025 6.6  6.0 - 8.4 gm/dL Final    Albumin 04/23/2025 2.9 (L)  3.5 - 5.2 g/dL Final    Bilirubin Total 04/23/2025 0.4  0.1 - 1.0 mg/dL Final    For infants and newborns,  interpretation of results should be based   on gestational age, weight and in agreement with clinical   observations.    Premature Infant recommended reference ranges:   0-24 hours:  <8.0 mg/dL   24-48 hours: <12.0 mg/dL   3-5 days:    <15.0 mg/dL   6-29 days:   <15.0 mg/dL    ALP 04/23/2025 148  40 - 150 unit/L Final    AST 04/23/2025 7 (L)  11 - 45 unit/L Final    ALT 04/23/2025 6 (L)  10 - 44 unit/L Final    Anion Gap 04/23/2025 9  8 - 16 mmol/L Final    eGFR 04/23/2025 30 (L)  >60 mL/min/1.73/m2 Final    Estimated GFR calculated using the CKD-EPI creatinine (2021) equation.    WBC 04/23/2025 20.56 (H)  3.90 - 12.70 K/uL Final    RBC 04/23/2025 3.47 (L)  4.00 - 5.40 M/uL Final    HGB 04/23/2025 10.0 (L)  12.0 - 16.0 gm/dL Final    HCT 04/23/2025 31.2 (L)  37.0 - 48.5 % Final    MCV 04/23/2025 90  82 - 98 fL Final    MCH 04/23/2025 28.8  27.0 - 31.0 pg Final    MCHC 04/23/2025 32.1  32.0 - 36.0 g/dL Final    RDW 04/23/2025 12.8  11.5 - 14.5 % Final    Platelet Count 04/23/2025 330  150 - 450 K/uL Final    MPV 04/23/2025 10.4  9.2 - 12.9 fL Final    Nucleated RBC 04/23/2025 0  <=0 /100 WBC Final    Neut % 04/23/2025 88.2 (H)  38 - 73 % Final    Lymph % 04/23/2025 6.4 (L)  18 - 48 % Final    Mono % 04/23/2025 4.6  4 - 15 % Final    Eos % 04/23/2025 0.0  <=8 % Final    Basophil % 04/23/2025 0.2  <=1.9 % Final    Imm Grans % 04/23/2025 0.6 (H)  0.0 - 0.5 % Final    Neut # 04/23/2025 18.13 (H)  1.8 - 7.7 K/uL Final    Lymph # 04/23/2025 1.31  1 - 4.8 K/uL Final    Mono # 04/23/2025 0.95  0.3 - 1 K/uL Final    Eos # 04/23/2025 0.00  <=0.5 K/uL Final    Baso # 04/23/2025 0.04  <=0.2 K/uL Final    Imm Grans # 04/23/2025 0.13 (H)  0.00 - 0.04 K/uL Final    Mild elevation in immature granulocytes is non specific and can be seen in a variety of conditions including stress response, acute inflammation, trauma and pregnancy. Correlation with other laboratory and clinical findings is essential.    POCT Glucose 04/23/2025 165  (H)  70 - 110 mg/dL Final    POCT Glucose 04/23/2025 146 (H)  70 - 110 mg/dL Final    POCT Glucose 04/23/2025 164 (H)  70 - 110 mg/dL Final    POCT Glucose 04/23/2025 214 (H)  70 - 110 mg/dL Final    POCT Glucose 04/24/2025 207 (H)  70 - 110 mg/dL Final    POCT Glucose 04/24/2025 140 (H)  70 - 110 mg/dL Final     Pathology     Component 1 mo ago   Final Pathologic Diagnosis       1. 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  Results will be issued in a supplemental report.     Comment:  The biopsy reveals a papillary neoplasm with low to intermediate nuclear features with abundant hemosiderin.  Immunostains for p63 and CK5/6 are negative within the neoplastic papillae.  The differential diagnosis includes encapsulated   papillary carcinoma (pTis) and invasive carcinoma with papillary features.  The distinction between these is made by surgical excision after examination of a fibrous capsule (if a capsule exists) and examination of the periphery of the entire lesion.     2. 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 BREAST, RIGHT, MASTECTOMY:  - Invasive mammary carcinoma of breast, two foci, mucinous type, and with papillary features, see Tumor Synoptic.  - Size of larger focus of invasive carcinoma (mucinous type): 14 MM.  - Size of smaller focus of invasive  carcinoma (papillary features): 3.5 MM.   - Stephanie Histologic Score: Grade 2 of 3.                    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  - Size of DCIS: at least 15 MM (estimated, see comment).  - Twirl, X-shaped, and ribbon biopsy clips are present.  - Margins:  All margins are greater than 10 MM for invasive and in-situ carcinoma.  - Benign skin, negative for carcinoma.  - Benign nipple ducts and skin, negative for carcinoma.  - Pathologic staging: p(m)T1c (sn)N0     2. BREAST, LEFT, MASTECTOMY:  - Negative for atypia or malignancy.  - Benign breast tissue weighing 1124.5 grams demonstrating predominantly fatty stroma.  - Benign skin with no significant histopathologic alterations.     3. AXILLARY SENTINEL LYMPH NODE, PRESUMED RIGHT, EXCISION:  - One benign axillary sentinel lymph node, negative for metastatic carcinoma (0/1).  - Immunohistochemical stains for CK WSK, CAM 5.2, and CK AE/AE3 are negative, supporting the diagnosis.         Component  Ref Range & Units (hover)  Resulting Agency   Case Report   [facility]oor - [facility]                           Case: [id number]                                 Authorizing Provider:  Casper Grimes MD       Collected:           04/22/2025 10:41 PM           Ordering Location:     [facility] - Surgery (2nd    Received:            04/23/2025 08:40 AM                                  [place])                                                                           Pathologist:           Jaycee Greenwood MD                                                           Specimens:   1) - Breast, Right, Right Breast                                                                    2) - Breast, Left, Left Breast                                                                       3) - Lymph Node(s), [place], [place] Node                                                Supplemental Report   An addendum is issued to include findings from breast biomarkers performed on the smaller focus of invasive carcinoma with more papillary features in slide 1B as well as to report the findings from TYM2JRIM on the larger tumor focus.     BREAST BIOMARKERS (smaller tumor focus):  ER:  Positive (100% of tumor cells demonstrate strong nuclear immunoreactivity).  VA:  Positive (99% of tumor cells demonstrate strong nuclear immunoreactivity).  [id number]:  Equivocal (2+).  HER2 ELYSSA: No residual tumor definitively identified.  Ki-67 proliferative index:  10%.     HER2 ELYSSA FOR LARGER TUMOR:   HER2 by in situ Hybridization    Result:  Negative (not amplified); Group 5 (See Group Definitions and comments below)         Imaging:     Cardiac event monitor    Negative event monitor with no sustained clinical arrhythmias.    Symptoms corresponding with normal sinus rhythm and occasional PACs and   PVCs.       Assessment:       1. Malignant neoplasm of right breast in female, estrogen receptor positive, unspecified site of breast  anastrozole (ARIMIDEX) 1 mg Tab      2. Essential hypertension        3. Anemia, unspecified type        4. Depression, unspecified depression type             Assessment & Plan    IMPRESSION:  BREAST CANCER  -  71y/o female with Stage 1  multicentric breast cancer p(m)T1c (sn)N0 with multiple small cancer sites. ER positive, VA positive, HER2 negative. SLN neg   - Patient underwent bilateral mastectomy with reconstruction on April 22, 2025. Right side had mastectomy with implant, left side had reduction for symmetry.  -Pt is a candidate for adjuvant endocrine therapy for reduction in recurrence risk . Discussed pros vs cons of therapy.   The pros and cons of such an approach were explained to her in detail. Patient was informed of the 30% risk of hot flashes, 30%  risk of diffuse joint pains and 1% per year risk of thrombosis and effects on bone health.  Pt was provided with handout on planned therapy   - Rx provided for  anastrozole (Arimidex) daily for 5 years.  We discussed that Aromatase Inhibitors are the optimal treatment for estrogen positive breast cancer. We did discuss that one of the possible side effects while on an AI is bone loss or osteoporosis. To help prevent this possible side effect, we advised that she continue taking daily Calcium and Vitamin D supplements. The daily recommended doses are 1000 IU of Vitamin D and 1200mg of Calcium. She can buy these supplements, such as Oscal-D® or Caltrate®, at most local stores. If she has osteopenia or osteoporosis on bone density, we will discuss Prolia in the near future.   Joint pain is a common side effect of an AI. Pain and aching in the bones and joints can range from mild discomfort that goes away by itself, to severe achiness that may require medication. We have suggested using over-the-counter, non-steroidal anti-inflammatory medications like Ibuprofen if she were to experience this side effect.   - Oncotype DX testing on tumor to confirm no chemo benefit . Pt declines chemo   - Plan bone density scan to establish baseline before starting hormone therapy.    Essential HTN  Stable  Cont BP meds  Follow up with PCP/Cardiology for mgmt     Anemia   Mild anemia Likely from kidney impairment related and the hemoglobin is improving with improvement of kidney function  Noted that she is a [employer]-    Depression  Stable   Cont cymbalta    Severe obesity  Counseled on lifestyle modification     FOLLOW-UP:  - Follow up in 1 month to ensure tolerance     I spent a total of 70  minutes on the day of the visit.This includes face to face time and non-face to face time preparing to see the patient (eg, review of tests), obtaining and/or reviewing separately obtained history, documenting clinical information in the  electronic or other health record, independently interpreting results and communicating results to the patient/family/caregiver, and coordinating care.      Visit today included increased complexity associated with the care of the episodic problem anemia  addressed and managing the longitudinal care of the patient due to the serious and/or complex managed problem(s) multicentric right breast CA     Aviva Garrison MD  [facility]ology [facility]  [address] [facility] , [facility] [address]  [place] [address]  O: [phone] F: [phone]    This note was generated with the assistance of ambient listening technology. Verbal consent was obtained by the patient and accompanying visitor(s) for the recording of patient appointment to facilitate this note. I attest to having reviewed and edited the generated note for accuracy, though some syntax or spelling errors may persist. Please contact the author of this note for any clarification.

## 2025-05-19 ENCOUNTER — PATIENT MESSAGE (OUTPATIENT)
Dept: PLASTIC SURGERY | Facility: CLINIC | Age: 70
End: 2025-05-19
Payer: MEDICARE

## 2025-05-20 ENCOUNTER — OFFICE VISIT (OUTPATIENT)
Dept: NEUROSURGERY | Facility: CLINIC | Age: 70
End: 2025-05-20
Attending: STUDENT IN AN ORGANIZED HEALTH CARE EDUCATION/TRAINING PROGRAM
Payer: MEDICARE

## 2025-05-20 VITALS
HEIGHT: 67 IN | DIASTOLIC BLOOD PRESSURE: 73 MMHG | BODY MASS INDEX: 38.92 KG/M2 | WEIGHT: 248 LBS | OXYGEN SATURATION: 97 % | SYSTOLIC BLOOD PRESSURE: 153 MMHG | HEART RATE: 87 BPM

## 2025-05-20 DIAGNOSIS — M48.07 SPINAL STENOSIS, LUMBOSACRAL REGION: Primary | ICD-10-CM

## 2025-05-20 PROCEDURE — 1101F PT FALLS ASSESS-DOCD LE1/YR: CPT | Mod: CPTII,S$GLB,, | Performed by: STUDENT IN AN ORGANIZED HEALTH CARE EDUCATION/TRAINING PROGRAM

## 2025-05-20 PROCEDURE — 1125F AMNT PAIN NOTED PAIN PRSNT: CPT | Mod: CPTII,S$GLB,, | Performed by: STUDENT IN AN ORGANIZED HEALTH CARE EDUCATION/TRAINING PROGRAM

## 2025-05-20 PROCEDURE — 1159F MED LIST DOCD IN RCRD: CPT | Mod: CPTII,S$GLB,, | Performed by: STUDENT IN AN ORGANIZED HEALTH CARE EDUCATION/TRAINING PROGRAM

## 2025-05-20 PROCEDURE — 3008F BODY MASS INDEX DOCD: CPT | Mod: CPTII,S$GLB,, | Performed by: STUDENT IN AN ORGANIZED HEALTH CARE EDUCATION/TRAINING PROGRAM

## 2025-05-20 PROCEDURE — 99214 OFFICE O/P EST MOD 30 MIN: CPT | Mod: S$GLB,,, | Performed by: STUDENT IN AN ORGANIZED HEALTH CARE EDUCATION/TRAINING PROGRAM

## 2025-05-20 PROCEDURE — 3044F HG A1C LEVEL LT 7.0%: CPT | Mod: CPTII,S$GLB,, | Performed by: STUDENT IN AN ORGANIZED HEALTH CARE EDUCATION/TRAINING PROGRAM

## 2025-05-20 PROCEDURE — 3077F SYST BP >= 140 MM HG: CPT | Mod: CPTII,S$GLB,, | Performed by: STUDENT IN AN ORGANIZED HEALTH CARE EDUCATION/TRAINING PROGRAM

## 2025-05-20 PROCEDURE — 3288F FALL RISK ASSESSMENT DOCD: CPT | Mod: CPTII,S$GLB,, | Performed by: STUDENT IN AN ORGANIZED HEALTH CARE EDUCATION/TRAINING PROGRAM

## 2025-05-20 PROCEDURE — 1157F ADVNC CARE PLAN IN RCRD: CPT | Mod: CPTII,S$GLB,, | Performed by: STUDENT IN AN ORGANIZED HEALTH CARE EDUCATION/TRAINING PROGRAM

## 2025-05-20 PROCEDURE — 3078F DIAST BP <80 MM HG: CPT | Mod: CPTII,S$GLB,, | Performed by: STUDENT IN AN ORGANIZED HEALTH CARE EDUCATION/TRAINING PROGRAM

## 2025-05-20 RX ORDER — CYCLOBENZAPRINE HCL 10 MG
10 TABLET ORAL 3 TIMES DAILY PRN
Qty: 90 TABLET | Refills: 2 | Status: SHIPPED | OUTPATIENT
Start: 2025-05-20 | End: 2025-08-18

## 2025-05-20 RX ORDER — OXYCODONE HYDROCHLORIDE 5 MG/1
5 TABLET ORAL EVERY 6 HOURS PRN
Qty: 28 TABLET | Refills: 0 | Status: SHIPPED | OUTPATIENT
Start: 2025-05-20 | End: 2025-05-27

## 2025-05-20 RX ORDER — LORAZEPAM 1 MG/1
1 TABLET ORAL
Qty: 2 TABLET | Refills: 0 | Status: SHIPPED | OUTPATIENT
Start: 2025-05-20 | End: 2025-06-19

## 2025-05-21 ENCOUNTER — OFFICE VISIT (OUTPATIENT)
Dept: PLASTIC SURGERY | Facility: CLINIC | Age: 70
End: 2025-05-21
Payer: MEDICARE

## 2025-05-21 DIAGNOSIS — Z09 SURGERY FOLLOW-UP EXAMINATION: Primary | ICD-10-CM

## 2025-05-21 PROCEDURE — 3288F FALL RISK ASSESSMENT DOCD: CPT | Mod: CPTII,S$GLB,, | Performed by: SURGERY

## 2025-05-21 PROCEDURE — 1101F PT FALLS ASSESS-DOCD LE1/YR: CPT | Mod: CPTII,S$GLB,, | Performed by: SURGERY

## 2025-05-21 PROCEDURE — 99024 POSTOP FOLLOW-UP VISIT: CPT | Mod: S$GLB,,, | Performed by: SURGERY

## 2025-05-21 PROCEDURE — 1159F MED LIST DOCD IN RCRD: CPT | Mod: CPTII,S$GLB,, | Performed by: SURGERY

## 2025-05-21 PROCEDURE — 99999 PR PBB SHADOW E&M-EST. PATIENT-LVL III: CPT | Mod: PBBFAC,,, | Performed by: SURGERY

## 2025-05-21 PROCEDURE — 3044F HG A1C LEVEL LT 7.0%: CPT | Mod: CPTII,S$GLB,, | Performed by: SURGERY

## 2025-05-21 PROCEDURE — 1157F ADVNC CARE PLAN IN RCRD: CPT | Mod: CPTII,S$GLB,, | Performed by: SURGERY

## 2025-05-21 NOTE — PROGRESS NOTES
[facility]  Neurosurgery    SUBJECTIVE:     Interval history 05/20/2025:  Patient returns to the clinic.  In the interim, she has undergone right-sided mastectomy.  She is on hormone treatment.  She continues with drains in this area    She is still endorsing right-sided flank pain.  She is quite tender to palpation in this area.  She does not understand where the pain is coming from.  She does have pain which shoots across the low back as well but it doesn't go down the legs.    Interval history 03/10/2025:  Patient returns to the clinic for evaluation of possible arachnoid web T3-4.  She was very somnolent at her last clinic visit and blames this on medications that she was taking at the time.  She is much more alert today.  She is able to provide better history.    Today, she is telling me that she has a pain that is squarely located on the right flank area.  This does not seem to radiate anywhere but is very focal.  This is in the lumbar region.  She tells me she has bad knees in his already had 1 knee replacement.  She blames any balance issues on that.  She denies any numbness or tingling in her legs.  She denies overt myelopathy symptoms.    Chief complaint is right flank pain.    History of Present Illness:  Gwendolyn Fournier is a 70 y.o. female with CKD-IV, dm, obesity, YUE, and recently diagnosed breast cancer who presents with acute thoracic back pain with recent MRI showing possible arachnoid webbing at T3-4.  She presents with a family member who assists with the history. Patient states the pain in her lower thoracic spine began 1 week ago with no known trauma or inciting event.  She has never had these types of symptoms before.  The pain is severe in her mid lower thoracic spine and radiates to the right side of her back.  She was started on gabapentin and Norco which helps somewhat with the pain but make her very sedated.  She nearly fell asleep several times during our visit today.      She  presents in a wheelchair and reports significant difficulty getting to her visit due to the severe pain.  Prior to the onset of this pain, she was independent and ambulated with a cane for mobility.  She was recently diagnosed with breast cancer; biopsy results were obtained 10 days ago.  Surgery is forthcoming.      (Not in a hospital admission)      Review of patient's allergies indicates:   Allergen Reactions    Ondansetron hcl (pf) Hives and Itching    Ketorolac Itching       Past Medical History:   Diagnosis Date    Arthritis     Cancer     Diabetes mellitus     Gout attack     History of tuberculosis exposure 12/29/2021    Hx of psychiatric care     Hyperlipidemia     Hypertension     Opioid dependence, uncomplicated 02/03/2023    Psychiatric problem     Renal disorder     Sciatic nerve pain 2013    Therapy     used to follow with psychiatrist but doesn't recall whom, 2012    Tuberculosis     Unspecified cataract 07/27/2023    Mild on both eyes     Past Surgical History:   Procedure Laterality Date    BREAST RECONSTRUCTION Right 4/22/2025    Procedure: RECONSTRUCTION, BREAST;  Surgeon: Dariel Barlow MD;  Location: [facility] OR [address] [place] [address];  Service: Plastics;  Laterality: Right;    COLONOSCOPY N/A 01/21/2019    Procedure: COLONOSCOPY;  Surgeon: Shanna Jose MD;  Location: [facility] ENDO;  Service: Endoscopy;  Laterality: N/A;    HYSTERECTOMY      INJECTION OF JOINT Bilateral 03/13/2019    Procedure: INJECTION, JOINT BILATERAL SI JOINT;  Surgeon: Evan Coreas MD;  [place] [facility] [place] [facility] [place]  Service: Pain Management;  Laterality: Bilateral;  BILATERAL SI JOINT INJECTION    INSERTION OF BREAST TISSUE EXPANDER Right 4/22/2025    Procedure: INSERTION, TISSUE EXPANDER, BREAST;  Surgeon: Dariel Barlow MD;  Location: [address];  Service: Plastics;  Laterality: Right;    KNEE SURGERY  01/01/2014    left knee surgery     MASTECTOMY, MODIFIED RADICAL, WITH SENTINEL LYMPH NODE BIOPSY Right 4/22/2025     "Procedure: MASTECTOMY, MODIFIED RADICAL, WITH SENTINEL LYMPH NODE BIOPSY, RIGHT BREAST;  Surgeon: Casper Grimes MD;  Location: [facility] OR [address] [place] [address];  Service: General;  Laterality: Right;    TOTAL REDUCTION MAMMOPLASTY Left 2025    Procedure: MAMMOPLASTY, REDUCTION;  Surgeon: Dariel Barlow MD;  Location: [facility] OR [address] [place] [address];  Service: Plastics;  Laterality: Left;       Social History     Tobacco Use    Smoking status: Former     Current packs/day: 0.00     Types: Cigarettes     Quit date: 1976     Years since quittin.4     Passive exposure: Past    Smokeless tobacco: Former    Tobacco comments:     · Former cigarette smoker for 7 years, 1-5 cigarettes every 5 days; quit at age 24 (0.07-0.35 pack-years)   Substance Use Topics    Alcohol use: Not Currently     Alcohol/week: 0.0 standard drinks of alcohol     Comment: red wine    Drug use: No        Review of Systems:  As noted in HPI    OBJECTIVE:     Vital Signs (Most Recent):  Vitals:    25 1455   BP: (!) 153/73   Pulse: 87   SpO2: 97%   Weight: 112.5 kg (248 lb 0.3 oz)   Height: 5' 7" (1.702 m)   PainSc:   6   PainLoc: Back     Body mass index is 38.85 kg/m².      Physical Exam:  General: well developed, well nourished  Head: normocephalic, atraumatic  Neurologic: Alert and oriented. Thought content appropriate  GCS: Motor: 6/Verbal: 5/Eyes: 4 GCS Total: 15  Language: No aphasia  Speech: No dysarthria  Cranial nerves: face symmetric, tongue midline, CN II-XII grossly intact.   Eyes: pupils equal, round, reactive to light with accommodation, EOMI.   Pulmonary: normal respirations, not labored, no accessory muscles used    No thoracic sensory level identified  Extremely TTP in right lumbar flank region    Motor Strength: Moves all extremities spontaneously     Strength  Deltoids Triceps Biceps   Hand    Upper: R 5/5 5/5 5/5   5/5    L 5/5 5/5 5/5   5/5     Iliopsoas Quadriceps  Tibialis  anterior Gastro- cnemius    Lower: R 5/5 5/5  " 5/5 5/5     L 5/5 5/5  5/5 5/5      Hayden: absent  Clonus: absent    Gait:  Presents in a wheelchair    Midline Bony Tenderness:  Present in lower thoracic spine    Diagnostic Results:  I have personally reviewed imaging and agree with the findings.     MRI thoracic spine, 01/10/2025:  Examination suggestive of an arachnoid web at T3-4 level with anterior displacement of the cord focally at T4, no associated cord myelomalacia.    Flexion-extension cervical spine x-rays were done 03/10/2025.  Radiology made note of pre dental interval increase with flexion.  This appears subtle in my opinion    ASSESSMENT/PLAN:     Gwendolyn Fournier is a 70 y.o. 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  -I would like to obtain MRI lumbar spine to evaluate for any lumbar radiculopathy or nerve compression  -I am prescribing the patient Flexeril  -return to clinic when MRI lumbar spine is complete  -patient seems convincingly asymptomatic from her possible C1-2 instability as well as her possible thoracic arachnoid pathology; she is not myelopathic or endorsing severe neck pain  -to date, I do not have an explanation for her right flank pain.  We should consider soft tissue causes as she is quite tender to palpation in this area    Please feel free to call with any further questions      Bakari Marley  Neurosurgery      Disclaimer: This note was dictated by speech recognition. Minor errors in transcription may be present.  Please call with any questions.

## 2025-05-21 NOTE — PROGRESS NOTES
Patient presents Plastic surgery Clinic status post a left breast reduction and an immediate right breast reconstruction using a tissue expander and an AlloDerm sling.  She was expanded today with 150 cc of saline in the right tissue expander.  Her right drain was removed.  It was draining proximally 10 cc.  All incisions are healing nicely.

## 2025-05-28 ENCOUNTER — OFFICE VISIT (OUTPATIENT)
Dept: FAMILY MEDICINE | Facility: CLINIC | Age: 70
End: 2025-05-28
Payer: MEDICARE

## 2025-05-28 VITALS — WEIGHT: 242 LBS | BODY MASS INDEX: 37.9 KG/M2

## 2025-05-28 DIAGNOSIS — T40.2X5A OPIOID-INDUCED CONSTIPATION: Primary | ICD-10-CM

## 2025-05-28 DIAGNOSIS — I10 ESSENTIAL HYPERTENSION: ICD-10-CM

## 2025-05-28 DIAGNOSIS — K59.03 OPIOID-INDUCED CONSTIPATION: Primary | ICD-10-CM

## 2025-05-28 PROCEDURE — 1157F ADVNC CARE PLAN IN RCRD: CPT | Mod: CPTII,95,, | Performed by: INTERNAL MEDICINE

## 2025-05-28 PROCEDURE — 98005 SYNCH AUDIO-VIDEO EST LOW 20: CPT | Mod: 95,,, | Performed by: INTERNAL MEDICINE

## 2025-05-28 PROCEDURE — 1159F MED LIST DOCD IN RCRD: CPT | Mod: CPTII,95,, | Performed by: INTERNAL MEDICINE

## 2025-05-28 PROCEDURE — 1160F RVW MEDS BY RX/DR IN RCRD: CPT | Mod: CPTII,95,, | Performed by: INTERNAL MEDICINE

## 2025-05-28 PROCEDURE — 3044F HG A1C LEVEL LT 7.0%: CPT | Mod: CPTII,95,, | Performed by: INTERNAL MEDICINE

## 2025-05-28 RX ORDER — AMLODIPINE BESYLATE 10 MG/1
10 TABLET ORAL DAILY
Start: 2025-05-28 | End: 2026-05-28

## 2025-05-28 RX ORDER — AMOXICILLIN 250 MG
1 CAPSULE ORAL DAILY PRN
Qty: 90 TABLET | Refills: 0 | Status: SHIPPED | OUTPATIENT
Start: 2025-05-28

## 2025-05-28 NOTE — PROGRESS NOTES
The patient location is: [place]  The chief complaint leading to consultation is: follow up    Visit type: audiovisual    Face to Face time with patient: 15    20 minutes of total time spent on the encounter, which includes face to face time and non-face to face time preparing to see the patient (eg, review of tests), Obtaining and/or reviewing separately obtained history, Documenting clinical information in the electronic or other health record, Independently interpreting results (not separately reported) and communicating results to the patient/family/caregiver, or Care coordination (not separately reported).         Each patient to whom he or she provides medical services by telemedicine is:  (1) informed of the relationship between the physician and patient and the respective role of any other health care provider with respect to management of the patient; and (2) notified that he or she may decline to receive medical services by telemedicine and may withdraw from such care at any time.    Notes:     Has been getting saline shots in the right breast to equalize the breast sizes.   Mastectomy completed and all cancer removed. Plan is to complete reconstruction. Patient now only on arimidex.     Breast cancer:   - undergoing reconstruction   - cont arimidex  - cont f/up breast surg and heme onc     Chronic pain:   - of thoracic back and breasts  - patient has been taking oxycodone as prescribed. Warned patient on harmful effects of opioids    Opioid induced constipation  - changed colace to senokot  - cont linzess and movantik PRN    MDD:  - cont cymbalta and lexapro    Arachoid cyst and thoracic pain:  - has MRI scheduled   - cont gabapentin PRN  - cont f/up with neurosurg    BMI 37.9  - weight improving   - cont mounjaro     DM2:  - cont mounjaro     CKD stage 4  - improving   - daughter scheduled f/up with nephro   - advised pt to f/up with nephro about restarting farxiga     HTN:   - uncontrolled   -  increase amlodipine to 10mg   - cont metoprolol

## 2025-05-29 NOTE — PROGRESS NOTES
REFERRING PHYSICIAN:  No referring provider defined for this encounter.       Yolette Abebe MD    DIAGNOSIS:    This is a 70 y.o. female with a Stage 1A pT1 N0 M0 grade 2 ER + AR + HER2 + Ki67 10% invasive mammary cancer of the right breast.    TREATMENT SUMMARY:  The patient is status post right  mastectomy and sentinel node biopsy with immediate reconstruction and left mammoplasty reduction on 4/22/2025.      Final pathology showed   Final Diagnosis   1. BREAST, RIGHT, MASTECTOMY:  - Invasive mammary carcinoma of breast, two foci, mucinous type, and with papillary features, see Tumor Synoptic.  - Size of larger focus of invasive carcinoma (mucinous type): 14 MM.  - Size of smaller focus of invasive carcinoma (papillary features): 3.5 MM.   - Stephnaie Histologic Score: Grade 2 of 3.                    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  - Size of DCIS: at least 15 MM (estimated, see comment).  - Twirl, X-shaped, and ribbon biopsy clips are present.  - Margins:  All margins are greater than 10 MM for invasive and in-situ carcinoma.  - Benign skin, negative for carcinoma.  - Benign nipple ducts and skin, negative for carcinoma.  - Pathologic staging: p(m)T1c (sn)N0     2. BREAST, LEFT, MASTECTOMY:  - Negative for atypia or malignancy.  - Benign breast tissue weighing 1124.5 grams demonstrating predominantly fatty stroma.  - Benign skin with no significant histopathologic alterations.     3. AXILLARY SENTINEL LYMPH NODE, PRESUMED RIGHT, EXCISION:  - One benign axillary sentinel lymph node, negative for metastatic carcinoma (0/1).  - Immunohistochemical stains for CK WSK, CAM 5.2, and CK AE/AE3 are negative, supporting the diagnosis.         INTERVAL HISTORY:   Gwendolyn SONU Fournier comes in for  "a post-op check.  She denies fever, chills, chest pain or shortness of breath.  Her pain is well controlled except for some left nipple pain she describes as "pin pricks and burning". This keeps her up at night. Oxycodone was helping with this pain but she has run out of the meds.      PHYSICAL EXAMINATION:   General:  This is a well appearing female with appropriate speech, affect and gait.       Physical Exam   Vitals reviewed.  Pulmonary/Chest:           IMPRESSION:   The patient has had an uneventful postoperative course.    PLAN:   -path reviewed  -She has been instructed to meet with med onc   -Follow up with Plastics as scheduled  -The patient is advised in continued exam of the breast chest wall and to report to this office sooner should she note any areas of abnormality or concern.   -return in 6 months for a follow up office visit and breast exam      Casper Grimes MD, ORIN, FACS  Breast Surgery  [facility]        "

## 2025-06-05 DIAGNOSIS — M48.07 SPINAL STENOSIS, LUMBOSACRAL REGION: ICD-10-CM

## 2025-06-05 RX ORDER — LORAZEPAM 1 MG/1
1 TABLET ORAL
Qty: 2 TABLET | Refills: 0 | Status: SHIPPED | OUTPATIENT
Start: 2025-06-05 | End: 2025-07-05

## 2025-06-06 ENCOUNTER — TELEPHONE (OUTPATIENT)
Dept: HEMATOLOGY/ONCOLOGY | Facility: CLINIC | Age: 70
End: 2025-06-06
Payer: MEDICARE

## 2025-06-06 ENCOUNTER — RESULTS FOLLOW-UP (OUTPATIENT)
Dept: HEMATOLOGY/ONCOLOGY | Facility: CLINIC | Age: 70
End: 2025-06-06

## 2025-06-09 RX ORDER — TIZANIDINE 4 MG/1
4 TABLET ORAL 2 TIMES DAILY
Qty: 90 TABLET | Refills: 3 | Status: SHIPPED | OUTPATIENT
Start: 2025-06-09

## 2025-06-09 NOTE — TELEPHONE ENCOUNTER
No care due was identified.  [facility] Embedded Care Due Messages. Reference number: [id number].   6/09/2025 8:56:06 AM CDT

## 2025-06-09 NOTE — TELEPHONE ENCOUNTER
Refill Routing Note   Medication(s) are not appropriate for processing by [facility] for the following reason(s):        Outside of protocol    [facility] action(s):  Route               Appointments  past 12m or future 3m with PCP    Date Provider   Last Visit   5/28/2025 Yolette Abebe MD   Next Visit   8/28/2025 Yolette Abebe MD   ED visits in past 90 days: 0        Note composed:8:59 AM 06/09/2025

## 2025-06-11 ENCOUNTER — PATIENT OUTREACH (OUTPATIENT)
Dept: ADMINISTRATIVE | Facility: HOSPITAL | Age: 70
End: 2025-06-11
Payer: MEDICARE

## 2025-06-11 ENCOUNTER — TELEPHONE (OUTPATIENT)
Dept: ADMINISTRATIVE | Facility: HOSPITAL | Age: 70
End: 2025-06-11
Payer: MEDICARE

## 2025-06-11 ENCOUNTER — OFFICE VISIT (OUTPATIENT)
Dept: PLASTIC SURGERY | Facility: CLINIC | Age: 70
End: 2025-06-11
Payer: MEDICARE

## 2025-06-11 VITALS
BODY MASS INDEX: 37.96 KG/M2 | HEIGHT: 67 IN | WEIGHT: 241.88 LBS | DIASTOLIC BLOOD PRESSURE: 70 MMHG | SYSTOLIC BLOOD PRESSURE: 150 MMHG | HEART RATE: 85 BPM

## 2025-06-11 DIAGNOSIS — Z09 SURGERY FOLLOW-UP EXAMINATION: Primary | ICD-10-CM

## 2025-06-11 PROCEDURE — 99999 PR PBB SHADOW E&M-EST. PATIENT-LVL IV: CPT | Mod: PBBFAC,,,

## 2025-06-11 PROCEDURE — 1101F PT FALLS ASSESS-DOCD LE1/YR: CPT | Mod: CPTII,S$GLB,,

## 2025-06-11 PROCEDURE — 3044F HG A1C LEVEL LT 7.0%: CPT | Mod: CPTII,S$GLB,,

## 2025-06-11 PROCEDURE — 3077F SYST BP >= 140 MM HG: CPT | Mod: CPTII,S$GLB,,

## 2025-06-11 PROCEDURE — 1157F ADVNC CARE PLAN IN RCRD: CPT | Mod: CPTII,S$GLB,,

## 2025-06-11 PROCEDURE — 1159F MED LIST DOCD IN RCRD: CPT | Mod: CPTII,S$GLB,,

## 2025-06-11 PROCEDURE — 99024 POSTOP FOLLOW-UP VISIT: CPT | Mod: S$GLB,,,

## 2025-06-11 PROCEDURE — 1126F AMNT PAIN NOTED NONE PRSNT: CPT | Mod: CPTII,S$GLB,,

## 2025-06-11 PROCEDURE — 3078F DIAST BP <80 MM HG: CPT | Mod: CPTII,S$GLB,,

## 2025-06-11 PROCEDURE — 3288F FALL RISK ASSESSMENT DOCD: CPT | Mod: CPTII,S$GLB,,

## 2025-06-11 NOTE — PROGRESS NOTES
HM and immunization's reviewed and updated.  LM and sent portal message to get remote blood pressure reading.

## 2025-06-11 NOTE — PROGRESS NOTES
Plastic Surgery Clinic Postop Visit  Gwendolyn Fournier is a 70 y.o. year old female who presents to the Plastic Surgery Clinic for follow up visit status post R TE placement, L reduction on 4/22/2025 by Dr. Dariel Barlow. Left breast drain has an output of roughly 20 ccs in a 24 hour period. Right breast has 2 drains: anterior drainage is roughly 40 ccs, posterior drainage is roughly 10 ccs.     Interval History:  Patient presents today for follow-up. Overall she is doing well. She reports mild pain at her left nipple and right lateral breast. Has run out of percocet but has not need to take OTC meds. Remaining drain putting out > 50cc SS fluid over 24 hrs. Denies fever, chills, nausea, vomiting, or other systemic signs of infection.    Interval History 6/11/2025:  Pt reports she is very pleased with her expansion. She would like to discuss scheduling surgery.     REVIEW OF SYSTEMS:   Negative unless otherwise stated above in HPI    PHYSICAL EXAM:  Vitals:    06/11/25 1426   BP: (!) 150/70   Pulse: 85     WD WN NAD  VSS  Normal resp effort  Left breast: incision CDI, no erythema or drainage, no signs of infection, + nipple sensation, nipple viable  Right breast: incision CDI, TE in place, no erythema or drainage, no signs of infection, surgically absent nipple    ASSESSMENT/PLAN:  70 y.o. female status post bilateral breast reconstruction.   - Doing well, no issues  Right total volume: 500 cc   - Pt was seen and evaluated by myself and Dr. Dariel Barlow.   - Plan for R implant exchange + B axillary breast excision. Risks, benefits, alternatives explained to pt. Pt would like to proceed with surgery.    All questions were answered. The patient was advised to call the clinic with any questions or concerns prior to their next visit.    Anastasiia Gee PA-C  Plastic and Reconstructive Surgery   [phone]    Procedure: R implant exchange + B axillary breast excision  CPT codes: 43370 +  95050  Orders/Clearance: PAT Anesthesia Triage  Photos: Breast

## 2025-06-11 NOTE — TELEPHONE ENCOUNTER
----- Message from Yolette Abebe MD sent at 5/28/2025  2:06 PM CDT -----  Rafael Dominguez, Please follow up with patient's home blood pressure readings. Thank you! Best,Dr. Abebe

## 2025-06-12 ENCOUNTER — TELEPHONE (OUTPATIENT)
Dept: ADMINISTRATIVE | Facility: HOSPITAL | Age: 70
End: 2025-06-12
Payer: MEDICARE

## 2025-06-12 NOTE — TELEPHONE ENCOUNTER
Left message for patient to return call and sent portal message. Place 2 weeks reminder to check update as well.

## 2025-06-20 DIAGNOSIS — C50.911 MALIGNANT NEOPLASM OF RIGHT FEMALE BREAST, UNSPECIFIED ESTROGEN RECEPTOR STATUS, UNSPECIFIED SITE OF BREAST: ICD-10-CM

## 2025-06-20 RX ORDER — OXYCODONE HYDROCHLORIDE 5 MG/1
5 TABLET ORAL EVERY 6 HOURS PRN
Qty: 28 TABLET | Refills: 0 | Status: CANCELLED | OUTPATIENT
Start: 2025-06-20 | End: 2025-06-27

## 2025-06-20 RX ORDER — QUETIAPINE FUMARATE 100 MG/1
TABLET, FILM COATED ORAL
Qty: 30 TABLET | Refills: 2 | Status: SHIPPED | OUTPATIENT
Start: 2025-06-20

## 2025-06-20 NOTE — TELEPHONE ENCOUNTER
No care due was identified.  [facility] Embedded Care Due Messages. Reference number: [id number].   6/20/2025 3:31:35 PM CDT

## 2025-06-23 ENCOUNTER — PATIENT MESSAGE (OUTPATIENT)
Dept: PLASTIC SURGERY | Facility: CLINIC | Age: 70
End: 2025-06-23
Payer: MEDICARE

## 2025-06-23 ENCOUNTER — OFFICE VISIT (OUTPATIENT)
Dept: SURGERY | Facility: CLINIC | Age: 70
End: 2025-06-23
Payer: MEDICARE

## 2025-06-23 DIAGNOSIS — C50.911 MALIGNANT NEOPLASM OF RIGHT FEMALE BREAST, UNSPECIFIED ESTROGEN RECEPTOR STATUS, UNSPECIFIED SITE OF BREAST: Primary | ICD-10-CM

## 2025-06-23 PROCEDURE — 1101F PT FALLS ASSESS-DOCD LE1/YR: CPT | Mod: CPTII,S$GLB,, | Performed by: SURGERY

## 2025-06-23 PROCEDURE — 1157F ADVNC CARE PLAN IN RCRD: CPT | Mod: CPTII,S$GLB,, | Performed by: SURGERY

## 2025-06-23 PROCEDURE — 3288F FALL RISK ASSESSMENT DOCD: CPT | Mod: CPTII,S$GLB,, | Performed by: SURGERY

## 2025-06-23 PROCEDURE — 3044F HG A1C LEVEL LT 7.0%: CPT | Mod: CPTII,S$GLB,, | Performed by: SURGERY

## 2025-06-23 PROCEDURE — 99024 POSTOP FOLLOW-UP VISIT: CPT | Mod: S$GLB,,, | Performed by: SURGERY

## 2025-06-23 PROCEDURE — 99999 PR PBB SHADOW E&M-EST. PATIENT-LVL III: CPT | Mod: PBBFAC,,, | Performed by: SURGERY

## 2025-06-23 RX ORDER — OXYCODONE HYDROCHLORIDE 5 MG/1
5 TABLET ORAL EVERY 6 HOURS PRN
Qty: 15 TABLET | Refills: 0 | Status: SHIPPED | OUTPATIENT
Start: 2025-06-23 | End: 2025-07-08

## 2025-06-25 ENCOUNTER — PATIENT OUTREACH (OUTPATIENT)
Dept: ADMINISTRATIVE | Facility: HOSPITAL | Age: 70
End: 2025-06-25
Payer: MEDICARE

## 2025-06-25 NOTE — PROGRESS NOTES
HM and immunization's reviewed and updated.  Need remote home blood pressure reading.  Left message for patient to return call.Sent [employer] message.

## 2025-06-27 NOTE — PROGRESS NOTES
REFERRING PHYSICIAN:  No referring provider defined for this encounter.       Yolette Abebe MD    DIAGNOSIS:    This is a 70 y.o. female with a Stage 1A pT1 N0 M0 grade 2 ER + TX + HER2 + Ki67 10% invasive mammary cancer of the right breast.     TREATMENT SUMMARY:  The patient is status post right  mastectomy and sentinel node biopsy with immediate reconstruction and left mammoplasty reduction on 4/22/2025.        INTERVAL HISTORY:   Gwendolyn Calvotiste her for follow up. She complains of right inner incisional pain and left nipple pain. Also, she does not want implant exchange surgery. She want to keep the implants in and is fearful of further surgery      PHYSICAL EXAMINATION:   General:  This is a well appearing female with appropriate speech, affect and gait.       Physical Exam   Pulmonary/Chest:   Incisions without signs of infection             IMPRESSION:   Bilateral breast incisional pain    PLAN:   -gave short course of additional oxycodone (15 pills total)  -we discussed the need for implant exchange and that tissue expanders are not designed to be permanent. She is adamant she does not want the expanders exchanged for implants. She should discuss this with Plastic surgery.      Casper Grimes MD, ORIN, FACS  Breast Surgery  [facility] I spent a total of 20 minutes on the day of the visit.This includes face to face time and non-face to face time preparing to see the patient (eg, review of tests), obtaining and/or reviewing separately obtained history, documenting clinical information in the electronic or other health record, independently interpreting results and communicating results to the patient/family/caregiver, or care coordinator.

## 2025-06-30 ENCOUNTER — OFFICE VISIT (OUTPATIENT)
Dept: HEMATOLOGY/ONCOLOGY | Facility: CLINIC | Age: 70
End: 2025-06-30
Payer: MEDICARE

## 2025-06-30 VITALS
DIASTOLIC BLOOD PRESSURE: 71 MMHG | SYSTOLIC BLOOD PRESSURE: 138 MMHG | OXYGEN SATURATION: 98 % | TEMPERATURE: 99 F | WEIGHT: 240.31 LBS | HEIGHT: 67 IN | BODY MASS INDEX: 37.72 KG/M2

## 2025-06-30 DIAGNOSIS — C50.911 MALIGNANT NEOPLASM OF RIGHT BREAST IN FEMALE, ESTROGEN RECEPTOR POSITIVE, UNSPECIFIED SITE OF BREAST: Primary | ICD-10-CM

## 2025-06-30 DIAGNOSIS — Z17.0 MALIGNANT NEOPLASM OF RIGHT BREAST IN FEMALE, ESTROGEN RECEPTOR POSITIVE, UNSPECIFIED SITE OF BREAST: Primary | ICD-10-CM

## 2025-06-30 DIAGNOSIS — I10 ESSENTIAL HYPERTENSION: ICD-10-CM

## 2025-06-30 DIAGNOSIS — D64.9 ANEMIA, UNSPECIFIED TYPE: ICD-10-CM

## 2025-06-30 DIAGNOSIS — Z79.811 LONG TERM (CURRENT) USE OF AROMATASE INHIBITORS: ICD-10-CM

## 2025-06-30 DIAGNOSIS — F32.A DEPRESSION, UNSPECIFIED DEPRESSION TYPE: ICD-10-CM

## 2025-06-30 DIAGNOSIS — E66.01 SEVERE OBESITY (BMI 35.0-39.9) WITH COMORBIDITY: ICD-10-CM

## 2025-06-30 PROCEDURE — 1159F MED LIST DOCD IN RCRD: CPT | Mod: CPTII,S$GLB,, | Performed by: INTERNAL MEDICINE

## 2025-06-30 PROCEDURE — 3288F FALL RISK ASSESSMENT DOCD: CPT | Mod: CPTII,S$GLB,, | Performed by: INTERNAL MEDICINE

## 2025-06-30 PROCEDURE — 99999 PR PBB SHADOW E&M-EST. PATIENT-LVL IV: CPT | Mod: PBBFAC,,, | Performed by: INTERNAL MEDICINE

## 2025-06-30 PROCEDURE — 99214 OFFICE O/P EST MOD 30 MIN: CPT | Mod: S$GLB,,, | Performed by: INTERNAL MEDICINE

## 2025-06-30 PROCEDURE — 1126F AMNT PAIN NOTED NONE PRSNT: CPT | Mod: CPTII,S$GLB,, | Performed by: INTERNAL MEDICINE

## 2025-06-30 PROCEDURE — 3008F BODY MASS INDEX DOCD: CPT | Mod: CPTII,S$GLB,, | Performed by: INTERNAL MEDICINE

## 2025-06-30 PROCEDURE — G2211 COMPLEX E/M VISIT ADD ON: HCPCS | Mod: S$GLB,,, | Performed by: INTERNAL MEDICINE

## 2025-06-30 PROCEDURE — 3075F SYST BP GE 130 - 139MM HG: CPT | Mod: CPTII,S$GLB,, | Performed by: INTERNAL MEDICINE

## 2025-06-30 PROCEDURE — 3044F HG A1C LEVEL LT 7.0%: CPT | Mod: CPTII,S$GLB,, | Performed by: INTERNAL MEDICINE

## 2025-06-30 PROCEDURE — 3078F DIAST BP <80 MM HG: CPT | Mod: CPTII,S$GLB,, | Performed by: INTERNAL MEDICINE

## 2025-06-30 PROCEDURE — 1101F PT FALLS ASSESS-DOCD LE1/YR: CPT | Mod: CPTII,S$GLB,, | Performed by: INTERNAL MEDICINE

## 2025-06-30 PROCEDURE — 1157F ADVNC CARE PLAN IN RCRD: CPT | Mod: CPTII,S$GLB,, | Performed by: INTERNAL MEDICINE

## 2025-06-30 NOTE — PROGRESS NOTES
PATIENT: Gwendolyn Fournier  MRN: [id number]  DATE: 6/30/2025      Diagnosis:   No diagnosis found.      Chief Complaint: No chief complaint on file.        Subjective:    Initial History: Ms. Fournier is a 70 y.o. female with breast CA    History of Present Illness    CHIEF COMPLAINT:  Patient presents today for multicentric right  breast cancer. Patient accompanied by daughter.        INTERVAL HISTORY: 69 y/o with medical diagnoses as listed seen today in consultation for malignant neoplasm of right breast. Pt was found to have multicentric right breast cancer She was in her usual state of health and this was found on screening mammogram. This was her first screening mammogram in 10 years. She subsequently mikel ton to have diagnostic mammo, ultrasound, and ultrasound guided biopsy of two suspicious areas. 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 Dariel Barlow ;Pathology confirmed multifocal disease with small, early-stage tumors near the nipple and chest wall. [place] lymph node biopsy was negative. Biomarkers show  ER+/VA+, HER2-She currently has one remaining drain at surgical site.  She is scheduled for genetic testing next week.She denies palpable breast abnormalities prior to the mammogram and has no history of CVA, VTE, or previous breast biopsies.Accompanied by family member.      Interval Hx: Accompanied by dtr  Started AI since last f/u  Tolerating well  No hot flashes  No arthralgias  Recon surg planned in aug    OB/GYN HISTORY:  - Age at menarche: 13  - Menopause: Yes  - Age at menopause: 50 in 2005  - Hysterectomy: Partial, ovaries  retained  -   - Lactation History: Yes      ROS:  General: -fever, -chills, -fatigue, -weight gain, -weight loss  Eyes: -vision changes, -redness, -discharge  ENT: -ear pain, -nasal congestion, -sore throat  Cardiovascular: -chest pain, -palpitations, -lower extremity edema  Respiratory: -cough, -shortness of breath  Gastrointestinal: -abdominal pain, -nausea, -vomiting, -diarrhea, -constipation, -blood in stool  Genitourinary: -dysuria, -hematuria, -frequency  Musculoskeletal: -joint pain, -muscle pain  Skin: -rash, -lesion  Neurological: -headache, -dizziness, -numbness, -tingling  Psychiatric: -anxiety, -depression, -sleep difficulty          Past Medical History:   Past Medical History:   Diagnosis Date    Arthritis     Cancer     Diabetes mellitus     Gout attack     History of tuberculosis exposure 2021    Hx of psychiatric care     Hyperlipidemia     Hypertension     Opioid dependence, uncomplicated 2023    Psychiatric problem     Renal disorder     Sciatic nerve pain     Therapy     used to follow with psychiatrist but doesn't recall whom,     Tuberculosis     Unspecified cataract 2023    Mild on both eyes       Past Surgical HIstory:   Past Surgical History:   Procedure Laterality Date    BREAST RECONSTRUCTION Right 2025    Procedure: RECONSTRUCTION, BREAST;  Surgeon: Dariel Barlow MD;  Location: [facility] OR [address] [place] [address];  Service: Plastics;  Laterality: Right;    COLONOSCOPY N/A 2019    Procedure: COLONOSCOPY;  Surgeon: Shanna Jose MD;  Location: [facility] ENDO;  Service: Endoscopy;  Laterality: N/A;    HYSTERECTOMY      INJECTION OF JOINT Bilateral 2019    Procedure: INJECTION, JOINT BILATERAL SI JOINT;  Surgeon: Evan Coreas MD;  Location: [facility] PAIN MGT;  Service: Pain Management;  Laterality: Bilateral;  BILATERAL SI JOINT INJECTION    INSERTION OF BREAST TISSUE EXPANDER Right 2025    Procedure: INSERTION, TISSUE EXPANDER, BREAST;  Surgeon:  "Dariel Barlow MD;  Location: [address];  Service: Plastics;  Laterality: Right;    KNEE SURGERY  01/01/2014    left knee surgery     MASTECTOMY, MODIFIED RADICAL, WITH SENTINEL LYMPH NODE BIOPSY Right 4/22/2025    Procedure: MASTECTOMY, MODIFIED RADICAL, WITH SENTINEL LYMPH NODE BIOPSY, RIGHT BREAST;  Surgeon: Casper Grimes MD;  Location: [address];  Service: General;  Laterality: Right;    TOTAL REDUCTION MAMMOPLASTY Left 4/22/2025    Procedure: MAMMOPLASTY, REDUCTION;  Surgeon: Dariel Barlow MD;  Location: [address];  Service: Plastics;  Laterality: Left;       Family History:   Family History   Problem Relation Name Age of Onset    Tuberculosis Mother      Crohn's disease Father      Stroke Father      Arthritis Maternal Grandfather      Depression Daughter Aracelis     Bipolar disorder Daughter Aracelis     Suicide Daughter Aracelis     Depression Daughter Zehra     Colonic polyp Sister Irma     Crohn's disease Sister Irma        Social History:  reports that she quit smoking about 48 years ago. Her smoking use included cigarettes. She has been exposed to tobacco smoke. She has quit using smokeless tobacco. She reports that she does not currently use alcohol. She reports that she does not use drugs.    Allergies:  Review of patient's allergies indicates:   Allergen Reactions    Ondansetron hcl (pf) Hives and Itching    Ketorolac Itching         ECOG Performance Status: 0   Objective:      Vitals:   Vitals:    06/30/25 1439   BP: 138/71   BP Location: Left arm   Patient Position: Sitting   Pulse: (P) 81   Temp: 98.8 °F (37.1 °C)   SpO2: 98%   Weight: 109 kg (240 lb 4.8 oz)   Height: 5' 7" (1.702 m)         Physical Exam    General: No acute distress. Well-developed. Well-nourished.  Eyes: EOMI. Sclerae anicteric.  HENT: Normocephalic. Atraumatic. Nares patent. Moist oral mucosa.  Cardiovascular: Regular rate. Regular rhythm. No murmurs. No rubs. Normal S1, " S2.  Respiratory: Normal respiratory effort. Clear to auscultation bilaterally. No rales. No rhonchi. No wheezing.  Abdomen: Soft. Non-tender. Non-distended. Normoactive bowel sounds.  Musculoskeletal: No  obvious deformity.  Extremities: No lower extremity edema.  Neurological: Alert & oriented x3. No slurred speech. Normal gait.  Psychiatric: Normal mood. Normal affect. Good insight. Good judgment.  Skin: Warm. Dry. No rash.              Laboratory Data:  No visits with results within 1 Week(s) from this visit.   Latest known visit with results is:   Lab Visit on 05/15/2025   Component Date Value Ref Range Status    [facility] Portal 05/15/2025 [id number]   Final    [facility] Portal link    Interpretation Report 05/15/2025 See Notes   Final    Comment: No pathogenic mutations, variants of unknown significance, or gross deletions or duplications were detected.  Risk Estimate: low likelihood of variants in the genes analyzed contributing to this individual's clinical history.  Genetic counseling is a recommended option for all individuals undergoing genetic testing.  Genes Analyzed (77 total): AIP, ALK, APC, MARCO A, BAP1, BARD1, BMPR1A, BRCA1, BRCA2, BRIP1, CDC73, CDH1, CDK4, CDKN1B, CDKN2A, CEBPA, CHEK2, DICER1, ETV6, FH, FLCN, GATA2, LZTR1, MAX, MEN1, MET, MLH1, MSH2, MSH6, MUTYH, NF1, NF2, NTHL1, PALB2, PHOX2B, PMS2, POT1, IKLIG2S, PTCH1, PTEN, RAD51C, RAD51D, RB1, RET, RPS20, RUNX1, SDHA, SDHAF2, SDHB, SDHC, SDHD, SMAD4, SMARCA4, SMARCB1, SMARCE1, STK11, SUFU, YUGF488, TP53, TSC1, TSC2, VHL and WT1 (sequencing and deletion/duplication); AXIN2, CTNNA1, DDX41, EGFR, HOXB13, KIT, MBD4, MITF, MSH3, PDGFRA, POLD1 and POLE (sequencing only); EPCAM and GREM1 (deletion/duplication only). RNA data is routinely analyzed for use in variant                            interpretation for all genes.    List of Genes  Analyzed 05/15/2025    Final                    Value:APC, MARCO A, BARD1, BMPR1A, BRIP1, CDH1, CDKN2A, CHEK2, DICER1, EPCAM, MAX, MEN1,MLH1, MSH2, MSH6, MUTYH, PALB2, PHOX2B, PMS2, PTCH1, PTEN, RAD51C, RAD51D, RB1,RET, SDHA, SDHAF2, SDHB, SDHC, SDHD, SMAD4, STK11, TTDT580, TP53, VHL, FLCN,CDK4, NF1, BRCA1, BRCA2, FH, TSC1, TSC2, ALK, RUNX1, CDKN1B, CEBPA, ETV6,GATA2, MET, NF2, NTHL1, BYFEX4C, RPS20, SMARCA4, SMARCB1, SMARCE1, WT1, LZTR1,BAP1, AIP, POT1, GREM1, SUFU, CDC73, CTNNA1, EGFR, KIT, MITF, MSH3, PDGFRA,POLD1, POLE, AXIN2, MBD4, HOXB13, DDX41    Overall Interpretation 05/15/2025 NEGATIVE: No Clinically Significant Variants Detected   Final     Pathology     Component 1 mo ago   Final Pathologic Diagnosis       1. 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  Results will be issued in a supplemental report.     Comment:  The biopsy reveals a papillary neoplasm with low to intermediate nuclear features with abundant hemosiderin.  Immunostains for p63 and CK5/6 are negative within the neoplastic papillae.  The differential diagnosis includes encapsulated   papillary carcinoma (pTis) and invasive carcinoma with papillary features.  The distinction between these is made by surgical excision after examination of a fibrous capsule (if a capsule exists) and examination of the periphery of the entire lesion.     2. Right breast, 0900, N + 8, biopsy:   Mucinous carcinoma, Grade 2, (tubules=3, nuclei=2, mitoses=1)   Invasive carcinoma is present in multiple cores and measures 4 mm in greatest linear dimension within the core biopsy   Breast biomarkers:   ER: Positive (>95%, strong)    MS: Positive (>90%, strong)   Her2: Negative (score 0)   Ki67: 8%                1. BREAST, RIGHT, MASTECTOMY:  - Invasive mammary carcinoma of breast, two foci, mucinous type, and with papillary features, see Tumor Synoptic.  - Size of larger focus of invasive carcinoma (mucinous type): 14 MM.  - Size of smaller focus of invasive carcinoma (papillary features): 3.5 MM.   - Hawley Histologic Score: Grade 2 of 3.                    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  - Size of DCIS: at least 15 MM (estimated, see comment).  - Twirl, X-shaped, and ribbon biopsy clips are present.  - Margins:  All margins are greater than 10 MM for invasive and in-situ carcinoma.  - Benign skin, negative for carcinoma.  - Benign nipple ducts and skin, negative for carcinoma.  - Pathologic staging: p(m)T1c (sn)N0     2. BREAST, LEFT, MASTECTOMY:  - Negative for atypia or malignancy.  - Benign breast tissue weighing 1124.5 grams demonstrating predominantly fatty stroma.  - Benign skin with no significant histopathologic alterations.     3. AXILLARY SENTINEL LYMPH NODE, PRESUMED RIGHT, EXCISION:  - One benign axillary sentinel lymph node, negative for metastatic carcinoma (0/1).  - Immunohistochemical stains for CK WSK, CAM 5.2, and CK AE/AE3 are negative, supporting the diagnosis.         Component  Ref Range & Units (hover)  Resulting Agency   Case Report   [facility] - [facility] [place] [facility]                           Case: [id number]                                 Authorizing Provider:  Casper Grimes MD       Collected:           04/22/2025 10:41 PM           Ordering Location:     [facility] - Surgery (2nd    Received:            04/23/2025 08:40 AM                                  [place])                                                                            Pathologist:           Jaycee Greenwood MD                                                           Specimens:   1) - Breast, Right, Right Breast                                                                    2) - Breast, Left, Left Breast                                                                      3) - Lymph Node(s), [place], [place] Node                                                Supplemental Report   An addendum is issued to include findings from breast biomarkers performed on the smaller focus of invasive carcinoma with more papillary features in slide 1B as well as to report the findings from [id number] on the larger tumor focus.     BREAST BIOMARKERS (smaller tumor focus):  ER:  Positive (100% of tumor cells demonstrate strong nuclear immunoreactivity).  OR:  Positive (99% of tumor cells demonstrate strong nuclear immunoreactivity).  BMH3GHV:  Equivocal (2+).  HER2 ELYSSA: No residual tumor definitively identified.  Ki-67 proliferative index:  10%.     HER2 ELYSSA FOR LARGER TUMOR:   HER2 by in situ Hybridization    Result:  Negative (not amplified); Group 5 (See Group Definitions and comments below)         Imaging:     Cardiac event monitor    Negative event monitor with no sustained clinical arrhythmias.    Symptoms corresponding with normal sinus rhythm and occasional PACs and   PVCs.       Assessment:       No diagnosis found.       Assessment & Plan    IMPRESSION:  BREAST CANCER  -  69y/o female with Stage 1  multicentric breast cancer p(m)T1c (sn)N0 with multiple small cancer sites. ER positive, OR positive, HER2 negative. SLN neg   - Patient underwent bilateral mastectomy with reconstruction on April 22, 2025. Right side had mastectomy with implant, left side had reduction for symmetry.  -To help prevent this possible side effect, we advised that she continue taking daily Calcium and Vitamin D supplements. The daily recommended doses are  1000 IU of Vitamin D and 1200mg of Calcium. She can buy these supplements, such as Oscal-D® or Caltrate®, at most local stores. If she has osteopenia or osteoporosis on bone density, we will discuss Prolia in the near future.   Joint pain is a common side effect of an AI. Pain and aching in the bones and joints can range from mild discomfort that goes away by itself, to severe achiness that may require medication. We have suggested using over-the-counter, non-steroidal anti-inflammatory medications like Ibuprofen if she were to experience this side effect.   - Oncotype DX testing on tumor to confirm no chemo benefit . Pt declines chemo   Cont AI -tolerating well  Pt doing well clinically   Follow up 3 mos with labs     Long term use of aromatase inhibitor  Cont AI -tolerating well    Essential HTN  Stable  Cont BP meds  Follow up with PCP/Cardiology for mgmt     Anemia   Mild anemia Likely from kidney impairment related and the hemoglobin is improving with improvement of kidney function  Noted that she is a Jehov[employer] Witness-    Depression  Stable   Cont cymbalta    Severe obesity  Counseled on lifestyle modification     FOLLOW-UP:  - Follow up 3mos with labs     \Visit today included increased complexity associated with the care of the episodic problem anemia  addressed and managing the longitudinal care of the patient due to the serious and/or complex managed problem(s) multicentric right breast CA     Aviva Garrison MD  [facility]-  Hematology and Oncology  [address] , [facility] [[email or web address]] [address]  [place] [address]  O: [phone] F: [phone]    This note was generated with the assistance of ambient listening technology. Verbal consent was obtained by the patient and accompanying visitor(s) for the recording of patient appointment to facilitate this note. I attest to having reviewed and edited the generated note for accuracy, though some syntax or spelling errors may persist. Please contact the  author of this note for any clarification.

## 2025-07-11 ENCOUNTER — LAB VISIT (OUTPATIENT)
Dept: LAB | Facility: HOSPITAL | Age: 70
End: 2025-07-11
Attending: INTERNAL MEDICINE
Payer: MEDICARE

## 2025-07-11 ENCOUNTER — OFFICE VISIT (OUTPATIENT)
Dept: CARDIOLOGY | Facility: CLINIC | Age: 70
End: 2025-07-11
Payer: MEDICARE

## 2025-07-11 VITALS
HEART RATE: 88 BPM | OXYGEN SATURATION: 95 % | BODY MASS INDEX: 37.72 KG/M2 | RESPIRATION RATE: 15 BRPM | SYSTOLIC BLOOD PRESSURE: 153 MMHG | WEIGHT: 240.31 LBS | HEIGHT: 67 IN | DIASTOLIC BLOOD PRESSURE: 83 MMHG

## 2025-07-11 DIAGNOSIS — R60.9 EDEMA, UNSPECIFIED TYPE: ICD-10-CM

## 2025-07-11 DIAGNOSIS — E66.01 SEVERE OBESITY (BMI 35.0-39.9) WITH COMORBIDITY: ICD-10-CM

## 2025-07-11 DIAGNOSIS — I42.1 HOCM (HYPERTROPHIC OBSTRUCTIVE CARDIOMYOPATHY): ICD-10-CM

## 2025-07-11 DIAGNOSIS — R06.02 SOB (SHORTNESS OF BREATH): ICD-10-CM

## 2025-07-11 DIAGNOSIS — N18.1 CKD (CHRONIC KIDNEY DISEASE) STAGE 1, GFR 90 ML/MIN OR GREATER: Primary | ICD-10-CM

## 2025-07-11 DIAGNOSIS — I70.0 AORTIC ATHEROSCLEROSIS: ICD-10-CM

## 2025-07-11 DIAGNOSIS — N18.1 CKD (CHRONIC KIDNEY DISEASE) STAGE 1, GFR 90 ML/MIN OR GREATER: ICD-10-CM

## 2025-07-11 DIAGNOSIS — I42.1 HYPERTROPHIC OBSTRUCTIVE CARDIOMYOPATHY (HOCM): ICD-10-CM

## 2025-07-11 DIAGNOSIS — R00.2 HEART PALPITATIONS: Primary | ICD-10-CM

## 2025-07-11 DIAGNOSIS — I47.10 SVT (SUPRAVENTRICULAR TACHYCARDIA): ICD-10-CM

## 2025-07-11 LAB
BACTERIA #/AREA URNS AUTO: ABNORMAL /HPF
BILIRUB UR QL STRIP.AUTO: NEGATIVE
CLARITY UR: CLEAR
COLOR UR AUTO: YELLOW
CREAT UR-MCNC: 120.1 MG/DL (ref 15–325)
GLUCOSE UR QL STRIP: NEGATIVE
HGB UR QL STRIP: NEGATIVE
HYALINE CASTS UR QL AUTO: 0 /LPF (ref 0–1)
KETONES UR QL STRIP: NEGATIVE
LEUKOCYTE ESTERASE UR QL STRIP: ABNORMAL
MICROSCOPIC COMMENT: ABNORMAL
NITRITE UR QL STRIP: NEGATIVE
PH UR STRIP: 7 [PH]
PROT UR QL STRIP: ABNORMAL
PROT UR-MCNC: 651 MG/DL
PROT/CREAT UR: 5.42 MG/G{CREAT}
RBC #/AREA URNS AUTO: 0 /HPF (ref 0–4)
SP GR UR STRIP: 1.02
SQUAMOUS #/AREA URNS AUTO: 1 /HPF
UROBILINOGEN UR STRIP-ACNC: NEGATIVE EU/DL
WBC #/AREA URNS AUTO: 16 /HPF (ref 0–5)

## 2025-07-11 PROCEDURE — 1159F MED LIST DOCD IN RCRD: CPT | Mod: CPTII,S$GLB,, | Performed by: INTERNAL MEDICINE

## 2025-07-11 PROCEDURE — 81003 URINALYSIS AUTO W/O SCOPE: CPT

## 2025-07-11 PROCEDURE — 1100F PTFALLS ASSESS-DOCD GE2>/YR: CPT | Mod: CPTII,S$GLB,, | Performed by: INTERNAL MEDICINE

## 2025-07-11 PROCEDURE — 3044F HG A1C LEVEL LT 7.0%: CPT | Mod: CPTII,S$GLB,, | Performed by: INTERNAL MEDICINE

## 2025-07-11 PROCEDURE — 99999 PR PBB SHADOW E&M-EST. PATIENT-LVL III: CPT | Mod: PBBFAC,,, | Performed by: INTERNAL MEDICINE

## 2025-07-11 PROCEDURE — 3077F SYST BP >= 140 MM HG: CPT | Mod: CPTII,S$GLB,, | Performed by: INTERNAL MEDICINE

## 2025-07-11 PROCEDURE — 99214 OFFICE O/P EST MOD 30 MIN: CPT | Mod: S$GLB,,, | Performed by: INTERNAL MEDICINE

## 2025-07-11 PROCEDURE — 1126F AMNT PAIN NOTED NONE PRSNT: CPT | Mod: CPTII,S$GLB,, | Performed by: INTERNAL MEDICINE

## 2025-07-11 PROCEDURE — 1157F ADVNC CARE PLAN IN RCRD: CPT | Mod: CPTII,S$GLB,, | Performed by: INTERNAL MEDICINE

## 2025-07-11 PROCEDURE — G2211 COMPLEX E/M VISIT ADD ON: HCPCS | Mod: S$GLB,,, | Performed by: INTERNAL MEDICINE

## 2025-07-11 PROCEDURE — 93000 ELECTROCARDIOGRAM COMPLETE: CPT | Mod: S$GLB,,, | Performed by: INTERNAL MEDICINE

## 2025-07-11 PROCEDURE — 3008F BODY MASS INDEX DOCD: CPT | Mod: CPTII,S$GLB,, | Performed by: INTERNAL MEDICINE

## 2025-07-11 PROCEDURE — 3079F DIAST BP 80-89 MM HG: CPT | Mod: CPTII,S$GLB,, | Performed by: INTERNAL MEDICINE

## 2025-07-11 PROCEDURE — 3288F FALL RISK ASSESSMENT DOCD: CPT | Mod: CPTII,S$GLB,, | Performed by: INTERNAL MEDICINE

## 2025-07-11 PROCEDURE — 84156 ASSAY OF PROTEIN URINE: CPT

## 2025-07-11 RX ORDER — METOPROLOL SUCCINATE 50 MG/1
50 TABLET, EXTENDED RELEASE ORAL DAILY
Qty: 90 TABLET | Refills: 3 | Status: SHIPPED | OUTPATIENT
Start: 2025-07-11

## 2025-07-11 NOTE — PROGRESS NOTES
CARDIOVASCULAR CONSULTATION    REASON FOR CONSULT:   Gwendolyn Fournier is a 70 y.o. female who presents for evaluation.      HISTORY OF PRESENT ILLNESS:     Patient is a pleasant 66-year-old lady.  For the past few weeks has been experiencing worsening dyspnea on exertion as well as pedal edema.  Can hardly walk 10 ft and then has to stop because of significant shortness of breath.  Also bilateral leg swelling.  BNP was checked which was elevated at 182. Got an echocardiogram done earlier today the results of which are not available yet.  Denies chest pains at rest on exertion however exercise tolerance severely limited to 10 ft because of shortness of breath.  EKG done the clinic today was personally reviewed and shows sinus tachycardia left axis deviation, poor R-wave progression.  Has severe dyslipidemia, however has not been taking Lipitor.  She states that she misplaced that medication and hence has not been taking it.      Nov 21: Patient keeps on having frequent palpitations depite being on toprol xl. States that these symptoms go away after she puts ice towels on her face or takes deep breath. Holter showed SVT      The left ventricle is normal in size with concentric hypertrophy and hyperdynamic systolic function.  The estimated ejection fraction is 75%.  Normal right ventricular size with normal right ventricular systolic function.  Normal left ventricular diastolic function.  Mild left atrial enlargement.  Normal central venous pressure (3 mmHg).  TDS        Normal myocardial perfusion scan. There is no evidence of myocardial ischemia or infarction.    There is a mild intensity defect in the anteroapical wall of the left ventricle, secondary to breast attenuation.    The gated perfusion images showed an ejection fraction of 84% at rest.    There is normal wall motion at rest and post stress.    The EKG portion of this study is negative for ischemia.    The patient reported no chest pain during the stress  "test.    There were no arrhythmias during stress.      Sinus rhythm with heart rates varying between 82 and 197 BPM with an average of 101 BPM  There were rare PVCs totalling 378 and averaging 7.88 per hour. There were 4 couplets. There were 24 bigeminal cycles. There were 1 triplets.  There were very frequent PACs  SVT with heart rate upwards of 200. Some episodes appear to atrial tachycardia.      Notes from February 23:  Patient here for follow-up after a long hiatus.  Main complaint is dyspnea on exertion.  Gets short of breath after walking only 10 ft.  Has not been using her sleep apnea machine.    Notes from May 2024: Patient here for follow-up.  After a long hiatus.  Main complaint is bilateral pedal edema and worsening dyspnea on exertion.      Results for orders placed during the hospital encounter of 08/22/23    Echo    Interpretation Summary    Left Ventricle: Normal wall motion. There is hyperdynamic systolic function with a visually estimated ejection fraction of greater than 70%. Grade I diastolic dysfunction. LVOT obstruction .  56 mm of gradient across LVOT with Valsalva    Left Atrium: Left atrium is mildly dilated.    Right Ventricle: Mild right ventricular enlargement. Systolic function is normal.    Right Atrium: Right atrium is mildly dilated.    Pulmonary Artery: The estimated pulmonary artery systolic pressure is 38 mmHg.    IVC/SVC: Intermediate venous pressure at 8 mmHg.    TDS      Feb 25:    CHIEF COMPLAINT:  Gwendolyn presents today for follow up of cardiac condition.    CARDIOVASCULAR:  She experiences palpitations in the epigastric area, describing a "bumping" sensation particularly when lying down. These episodes occur approximately monthly without clear triggers. Holter monitor in 2021 showed 378 PVCs, some bigeminy, and SVTs. She reports home blood pressure readings of 135/70. During summer of last year, she experienced perspiration and stress with exertion when walking to her vehicle. " "She denies current chest pain or tightness.    MEDICATIONS:  She takes Amlodipine 5 mg, Atorvastatin 40 mg, Lasix PRN for fluid overload, and Metoprolol 50 mg daily.    MEDICAL HISTORY:  She has a history of left knee replacement with titanium and claustrophobia.       July 25:    History of Present Illness    CHIEF COMPLAINT:  Gwendolyn presents today for pre-operative clearance for breast implant surgery.    CARDIOPULMONARY SYMPTOMS:  She reports progressive dyspnea since early 2024, experiencing significant respiratory distress with minimal exertion. She describes feeling extremely fatigued, as if she has "run a marathon" just from walking short distances, particularly to her car. Symptoms have become more pronounced recently, significantly impacting her daily mobility. She also experiences regular but non-constant palpitations throughout the week, most prominent during movement. She denies associated chest pain or chest tightness.    CARDIAC HISTORY:  Stress test last year was normal. Holter monitor in 2021 showed clinically insignificant PVCs. Cardiac MRI was previously ordered but not completed.    CURRENT MEDICATIONS:  She takes Metoprolol 100 mg.         PAST MEDICAL HISTORY:     Past Medical History:   Diagnosis Date    Arthritis     Cancer     Diabetes mellitus     Gout attack     History of tuberculosis exposure 12/29/2021    Hx of psychiatric care     Hyperlipidemia     Hypertension     Opioid dependence, uncomplicated 02/03/2023    Psychiatric problem     Renal disorder     Sciatic nerve pain 2013    Therapy     used to follow with psychiatrist but doesn't recall whom, 2012    Tuberculosis     Unspecified cataract 07/27/2023    Mild on both eyes       PAST SURGICAL HISTORY:     Past Surgical History:   Procedure Laterality Date    BREAST RECONSTRUCTION Right 4/22/2025    Procedure: RECONSTRUCTION, BREAST;  Surgeon: Dariel Barlow MD;  Location: [facility] OR [address] [place] [address];  Service: Plastics;  Laterality: " Right;    COLONOSCOPY N/A 01/21/2019    Procedure: COLONOSCOPY;  Surgeon: Shanna Jose MD;  Location: [facility] ENDO;  Service: Endoscopy;  Laterality: N/A;    HYSTERECTOMY      INJECTION OF JOINT Bilateral 03/13/2019    Procedure: INJECTION, JOINT BILATERAL SI JOINT;  Surgeon: Evan Coreas MD;  Location[place] [facility] [place] by [facility];  Service: Pain Management;  Laterality: Bilateral;  BILATERAL SI JOINT INJECTION    INSERTION OF BREAST TISSUE EXPANDER Right 4/22/2025    Procedure: INSERTION, TISSUE EXPANDER, BREAST;  Surgeon: Dariel Barlow MD;  Location: [address];  Service: Plastics;  Laterality: Right;    KNEE SURGERY  01/01/2014    left knee surgery     MASTECTOMY, MODIFIED RADICAL, WITH SENTINEL LYMPH NODE BIOPSY Right 4/22/2025    Procedure: MASTECTOMY, MODIFIED RADICAL, WITH SENTINEL LYMPH NODE BIOPSY, RIGHT BREAST;  Surgeon: Casper Grimes MD;  Location: [facility] OR [address] [place] [address];  Service: General;  Laterality: Right;    TOTAL REDUCTION MAMMOPLASTY Left 4/22/2025    Procedure: MAMMOPLASTY, REDUCTION;  Surgeon: Dariel Barlow MD;  Location: [address];  Service: Plastics;  Laterality: Left;       ALLERGIES AND MEDICATION:     Review of patient's allergies indicates:   Allergen Reactions    Ondansetron hcl (pf) Hives and Itching    Ketorolac Itching        Medication List            Accurate as of July 11, 2025 11:59 PM. If you have any questions, ask your nurse or doctor.                CHANGE how you take these medications      * metoprolol succinate 100 MG 24 hr tablet  Commonly known as: TOPROL-XL  Take 1 tablet (100 mg total) by mouth once daily.  What changed: Another medication with the same name was added. Make sure you understand how and when to take each.  Changed by: Mazin Shankar MD     * metoprolol succinate 50 MG 24 hr tablet  Commonly known as: TOPROL-XL  Take 1 tablet (50 mg total) by mouth once daily.  What changed: You were already taking a medication with the same name, and this  prescription was added. Make sure you understand how and when to take each.  Changed by: Mazin Shankar MD           * This list has 2 medication(s) that are the same as other medications prescribed for you. Read the directions carefully, and ask your doctor or other care provider to review them with you.                CONTINUE taking these medications      amLODIPine 10 MG tablet  Commonly known as: NORVASC  Take 1 tablet (10 mg total) by mouth once daily.     anastrozole 1 mg Tab  Commonly known as: ARIMIDEX  Take 1 tablet (1 mg total) by mouth once daily.     atorvastatin 40 MG tablet  Commonly known as: LIPITOR  Take 1 tablet (40 mg total) by mouth every evening.     blood pressure monitor Kit  1 each by Misc.(Non-Drug; Combo Route) route every 6 (six) months.     blood sugar diagnostic Strp  To check BG two times daily, freestyle lite meter     blood-glucose meter kit  To check BG two times daily, freestyle lite meter     * DULoxetine 60 MG capsule  Commonly known as: CYMBALTA  TAKE 1 CAPSULE(60 MG) BY MOUTH EVERY DAY     * DULoxetine 60 MG capsule  Commonly known as: CYMBALTA  Take 1 capsule by mouth once daily     * EScitalopram oxalate 10 MG tablet  Commonly known as: LEXAPRO     * EScitalopram oxalate 10 MG tablet  Commonly known as: LEXAPRO  Take 1 tablet by mouth once daily     FREESTYLE HAI 2 READER [facility]  Generic drug: flash glucose scanning reader  1 each by Misc.(Non-Drug; Combo Route) route 4 (four) times daily with meals and nightly.     FREESTYLE HAI 2 SENSOR Kit  Generic drug: flash glucose sensor  Use as directed to check blood sugar. Change every 14 days.     furosemide 80 MG tablet  Commonly known as: LASIX  TAKE 1 TABLET BY MOUTH EVERY DAY WITH 5 POUNDS INCREASE WITHIN 24-72 HOURS     gabapentin 300 MG capsule  Commonly known as: NEURONTIN  Take 1 capsule (300 mg total) by mouth 3 (three) times daily as needed (nerve pain (neuropathy)).     lancets [facility]  To check BG two times daily, to use  with insurance preferred meter     LIDOcaine 5 %  Commonly known as: LIDODERM  Place 1 patch onto the skin once daily. Apply patch for 12 hours and then leave off for 12 hours     linaCLOtide 145 mcg Cap capsule  Commonly known as: LINZESS  Take 1 capsule (145 mcg total) by mouth daily as needed (constipation).     LORazepam 1 MG tablet  Commonly known as: ATIVAN  Take 1 tablet (1 mg total) by mouth On call Procedure for Anxiety. Take one tablet 30 minutes prior to procedure and additional tablet if needed at start of procedure.     MOUNJARO 5 mg/0.5 mL Pnij  Generic drug: tirzepatide  Inject 5 mg (one pen) into the skin every 7 days.     MOVANTIK 25 mg tablet  Generic drug: naloxegoL     oxyCODONE-acetaminophen 5-325 mg per tablet  Commonly known as: PERCOCET  Take 1 tablet by mouth every 8 (eight) hours as needed for Pain.     * QUEtiapine 100 MG Tab  Commonly known as: SEROQUEL  TAKE 1 TABLET BY MOUTH NIGHTLY AT BEDTIME     * QUEtiapine 100 MG Tab  Commonly known as: SEROQUEL  Take 1 tablet by mouth at bedtime     SENEXON-S 8.6-50 mg per tablet  Generic drug: senna-docusate  Take 1 tablet by mouth daily as needed for Constipation.     tiZANidine 4 MG tablet  Commonly known as: ZANAFLEX  Take 1 tablet (4 mg total) by mouth 2 (two) times daily.     zolpidem 10 mg Tab  Commonly known as: AMBIEN  Take 1 tablet by mouth at bedtime           * This list has 6 medication(s) that are the same as other medications prescribed for you. Read the directions carefully, and ask your doctor or other care provider to review them with you.                   Where to Get Your Medications        These medications were sent to [facility] [id number] [address]      Hours: Mon-Fri, 8a-5:30p Phone: [phone]   metoprolol succinate 50 MG 24 hr tablet         SOCIAL HISTORY:     Social History     Socioeconomic History    Marital status:     Number of children: 5   Occupational  History    Occupation: homemaker   Tobacco Use    Smoking status: Former     Current packs/day: 0.00     Types: Cigarettes     Quit date: 1976     Years since quittin.6     Passive exposure: Past    Smokeless tobacco: Former    Tobacco comments:     · Former cigarette smoker for 7 years, 1-5 cigarettes every 5 days; quit at age 24 (0.07-0.35 pack-years)   Substance and Sexual Activity    Alcohol use: Not Currently     Alcohol/week: 0.0 standard drinks of alcohol     Comment: red wine    Drug use: No    Sexual activity: Not Currently     Partners: Male     Social Drivers of Health     Financial Resource Strain: Low Risk  (2025)    Overall Financial Resource Strain (CARDIA)     Difficulty of Paying Living Expenses: Not hard at all   Food Insecurity: No Food Insecurity (2025)    Hunger Vital Sign     Worried About Running Out of Food in the Last Year: Never true     Ran Out of Food in the Last Year: Never true   Transportation Needs: Patient Declined (2025)    TRANSPORTATION NEEDS     Transportation : Patient declined   Physical Activity: Unknown (10/10/2022)    Exercise Vital Sign     Days of Exercise per Week: 0 days   Stress: No Stress Concern Present (2025)    [employer] [place] [employer] - Occupational Stress Questionnaire     Feeling of Stress : Not at all   Housing Stability: Low Risk  (2025)    Housing Stability Vital Sign     Unable to Pay for Housing in the Last Year: No     Homeless in the Last Year: No       FAMILY HISTORY:     Family History   Problem Relation Name Age of Onset    Tuberculosis Mother      Crohn's disease Father      Stroke Father      Arthritis Maternal Grandfather      Depression Daughter Aracelis     Bipolar disorder Daughter Aracelis     Suicide Daughter Aracelis     Depression Daughter Zehra     Colonic polyp Sister Irma     Crohn's disease Sister Irma        REVIEW OF SYSTEMS:   Review of Systems   Constitutional: Negative.   HENT:  "Negative.     Eyes: Negative.    Cardiovascular:  Positive for dyspnea on exertion, leg swelling and palpitations.   Respiratory:  Positive for shortness of breath.    Endocrine: Negative.    Hematologic/Lymphatic: Negative.    Skin: Negative.    Musculoskeletal: Negative.    Gastrointestinal: Negative.    Genitourinary: Negative.    Neurological: Negative.    Psychiatric/Behavioral: Negative.     Allergic/Immunologic: Negative.        A 10 point review of systems was performed and all the pertinent positives have been mentioned. Rest of review of systems was negative.        PHYSICAL EXAM:     Vitals:    07/11/25 1350   BP: (!) 153/83   Pulse: 88   Resp: 15    Body mass index is 37.64 kg/m².  Weight: 109 kg (240 lb 4.8 oz)   Height: 5' 7" (170.2 cm)     Physical Exam  Constitutional:       Appearance: Normal appearance. She is well-developed.   HENT:      Head: Normocephalic.   Eyes:      Pupils: Pupils are equal, round, and reactive to light.   Cardiovascular:      Rate and Rhythm: Normal rate and regular rhythm.   Pulmonary:      Effort: Pulmonary effort is normal.      Breath sounds: Normal breath sounds.   Abdominal:      General: Bowel sounds are normal.      Palpations: Abdomen is soft.      Tenderness: There is no abdominal tenderness.   Musculoskeletal:         General: Normal range of motion.      Cervical back: Normal range of motion and neck supple.   Skin:     General: Skin is warm.   Neurological:      Mental Status: She is alert and oriented to person, place, and time.           DATA:     Laboratory:  CBC:  Recent Labs   Lab 02/05/25  1638 04/23/25  0550 07/11/25  1450   WBC 12.92 H 20.56 H 10.22   Hemoglobin 10.5 L  --   --    HGB  --  10.0 L 10.3 L   Hematocrit 32.2 L  --   --    HCT  --  31.2 L 33.1 L   Platelet Count  --  330 314   Platelets 326  --   --        CHEMISTRIES:  Recent Labs   Lab 10/05/22  0714 12/09/22  1527 02/05/25  1638 04/23/25  0550 07/11/25  1450   Glucose 77   < > 89 168 H 150 " H   Sodium 138   < > 143 141 143   Potassium 3.0 L   < > 3.7 5.0 4.4   BUN 26 H   < > 17 26 H 14   Creatinine 1.5 H   < > 1.6 H 1.8 H 1.6 H   Calcium 10.6 H   < > 9.3 9.1 9.3   Magnesium 1.9  --   --   --   --     < > = values in this interval not displayed.       CARDIAC BIOMARKERS:  Recent Labs   Lab 03/07/24  2245   Troponin I 0.007       COAGS:        LIPIDS/LFTS:  Recent Labs   Lab 10/05/22  0714 04/17/23  1325 09/14/23  0906 11/28/23  1356 12/31/24  0924 01/16/25  1152 01/16/25  1557 04/23/25  0550   Cholesterol 198  --  192  --  124  --   --   --    Triglycerides 245 H  --  249 H  --  100  --   --   --    HDL 36 L  --  34 L  --  34 L  --   --   --    LDL Cholesterol 113.0  --  108.2  --  70.0  --   --   --    Non-HDL Cholesterol 162  --  158  --  90  --   --   --    AST 10   < > 14   < > 11 17 19 7 L   ALT 15   < > 15   < > 13 12 12 6 L    < > = values in this interval not displayed.       Hemoglobin A1C   Date Value Ref Range Status   12/31/2024 5.1 4.0 - 5.6 % Final     Comment:     ADA Screening Guidelines:  5.7-6.4%  Consistent with prediabetes  >or=6.5%  Consistent with diabetes    High levels of fetal hemoglobin interfere with the HbA1C  assay. Heterozygous hemoglobin variants (HbS, HgC, etc)do  not significantly interfere with this assay.   However, presence of multiple variants may affect accuracy.     05/21/2024 5.7 (H) 4.0 - 5.6 % Final     Comment:     ADA Screening Guidelines:  5.7-6.4%  Consistent with prediabetes  >or=6.5%  Consistent with diabetes    High levels of fetal hemoglobin interfere with the HbA1C  assay. Heterozygous hemoglobin variants (HbS, HgC, etc)do  not significantly interfere with this assay.   However, presence of multiple variants may affect accuracy.     09/14/2023 5.4 4.0 - 5.6 % Final     Comment:     ADA Screening Guidelines:  5.7-6.4%  Consistent with prediabetes  >or=6.5%  Consistent with diabetes    High levels of fetal hemoglobin interfere with the HbA1C  assay.  Heterozygous hemoglobin variants (HbS, HgC, etc)do  not significantly interfere with this assay.   However, presence of multiple variants may affect accuracy.       Hemoglobin A1c   Date Value Ref Range Status   04/23/2025 5.8 (H) 4.0 - 5.6 % Final     Comment:     ADA Screening Guidelines:  5.7-6.4%  Consistent with prediabetes  >=6.5%  Consistent with diabetes    High levels of fetal hemoglobin interfere with the HbA1C  assay. Heterozygous hemoglobin variants (HbS, HgC, etc)do  not significantly interfere with this assay.   However, presence of multiple variants may affect accuracy.       TSH  Recent Labs   Lab 05/18/23  1422 09/14/23  0906 12/31/24  0924   TSH 2.465 1.708 2.957       The ASCVD Risk score (Melanie BURKETT, et al., 2019) failed to calculate for the following reasons:    The valid total cholesterol range is 130 to 320 mg/dL           ASSESSMENT AND PLAN     Patient Active Problem List   Diagnosis    Essential hypertension    Primary osteoarthritis involving multiple joints    History of gout    History of open reduction and internal fixation (ORIF) procedure    Chronic pain    BMI 39.0-39.9,adult    Obstructive sleep apnea    Sickle cell trait    Sacroiliitis, not elsewhere classified    Type 2 diabetes mellitus with stage 3b chronic kidney disease, without long-term current use of insulin    Decreased range of motion of trunk and back    Decreased strength of trunk and back    SVT (supraventricular tachycardia)    Aortic atherosclerosis    CKD (chronic kidney disease) stage 3, GFR 30-59 ml/min    Opioid-induced constipation    Malignant neoplasm of right female breast    Pulmonary hypertension    Obstructive hypertrophic cardiomyopathy    Pain in thoracic spine    Arachnoid cyst    Anemia    Current severe episode of major depressive disorder without psychotic features without prior episode    Psychophysiological insomnia    History of COVID-19    Edema    H/O: hysterectomy    Crohn disease    Severe  obesity (BMI 35.0-39.9) with comorbidity     Orders Placed This Encounter   Procedures    Holter monitor - 48 hour    IN OFFICE EKG 12-LEAD (to Muse)     Assessment & Plan    IMPRESSION:  Determined metoprolol dose increase necessary due to elevated blood pressure  Plan gradual titration of metoprolol to achieve target heart rate of around 60 bpm.    PLAN SUMMARY:  - Increase metoprolol from 100 mg to 150 mg daily  - Further increase to 200 mg daily after one week if tolerated  - Ordered 2-day Holter monitor for palpitation evaluation  - Ordered cardiac MRI to assess obstruction severity and treatment options  - Office to assist with rescheduling cardiac MRI  - Follow up after completing cardiac MRI    VENTRICULAR PREMATURE DEPOLARIZATION / PALPITATIONS:  - Ordered 2-day Holter monitor to evaluate reported palpitations.  - Ordered cardiac MRI to evaluate severity of obstruction and potential treatment options (to be rescheduled).  - Office to assist with rescheduling cardiac MRI.    CIRCULATORY SYSTEM MANAGEMENT:  - Increased metoprolol from 100 mg to 150 mg daily (start additional 50 mg tablet with current 100 mg dose); after one week, if blood pressure tolerates and heart rate remains above target, increase to 200 mg total daily (either two 50 mg tablets with 100 mg, or two 100 mg tablets).  - Advised not to increase metoprolol to 200 mg if blood pressure becomes low or heart rate becomes too low; instructed not to increase if pulse rate is around 60 bpm.  - Gwendolyn to monitor blood pressure and heart rate at home.    FOLLOW-UP:  - Follow up after completing cardiac MRI.       Hypertrophic obstructive cardiomyopathy (HCM) present based on previous echo  Increased metoprolol to lower HR, crucial for cardiac condition  Cardiac MRI needed to further evaluate HCM    OBSTRUCTIVE HYPERTROPHIC CARDIOMYOPATHY:  - Explained importance of completing ordered tests for proper management of cardiac condition.  - Discussed  potential serious consequences of untreated hypertrophic obstructive cardiomyopathy, including risk of death.  - Cardiac MRI ordered.    HYPERTENSION:      HYPERLIPIDEMIA:  - Continued atorvastatin 40 mg daily.    FLUID OVERLOAD:  - Continued Lasix as needed for fluid overload.    FOLLOW-UP:  - Follow up after completing cardiac MRI       SVT: Continue beta blockers.               Thank you very much for involving me in the care of your patient.  Please do not hesitate to contact me if there are any questions.      Mazin Shankar MD, FAC, [facility]  Interventional Cardiologist, [facility].     Visit today included increased complexity associated with the care of the episodic problem hypertension, dyslipidemia, hypertrophic obstructive cardiomyopathy addressed and managing the longitudinal care of the patient due to the serious and/or complex managed problem(s) Problem List[1]  .      This note was dictated with the help of speech recognition software.  There might be un-intended errors and/or substitutions.                               [1]   Patient Active Problem List  Diagnosis    Essential hypertension    Primary osteoarthritis involving multiple joints    History of gout    History of open reduction and internal fixation (ORIF) procedure    Chronic pain    BMI 39.0-39.9,adult    Obstructive sleep apnea    Sickle cell trait    Sacroiliitis, not elsewhere classified    Type 2 diabetes mellitus with stage 3b chronic kidney disease, without long-term current use of insulin    Decreased range of motion of trunk and back    Decreased strength of trunk and back    SVT (supraventricular tachycardia)    Aortic atherosclerosis    CKD (chronic kidney disease) stage 3, GFR 30-59 ml/min    Opioid-induced constipation    Malignant neoplasm of right female breast    Pulmonary hypertension    Obstructive hypertrophic cardiomyopathy    Pain in thoracic spine    Arachnoid cyst    Anemia    Current severe episode of major  depressive disorder without psychotic features without prior episode    Psychophysiological insomnia    History of COVID-19    Edema    H/O: hysterectomy    Crohn disease    Severe obesity (BMI 35.0-39.9) with comorbidity

## 2025-07-12 LAB
OHS QRS DURATION: 84 MS
OHS QTC CALCULATION: 455 MS

## 2025-07-15 ENCOUNTER — TELEPHONE (OUTPATIENT)
Dept: HEMATOLOGY/ONCOLOGY | Facility: CLINIC | Age: 70
End: 2025-07-15
Payer: MEDICARE

## 2025-07-27 DIAGNOSIS — K59.03 DRUG-INDUCED CONSTIPATION: ICD-10-CM

## 2025-07-27 NOTE — TELEPHONE ENCOUNTER
Care Due:                  Date            Visit Type   Department     Provider  --------------------------------------------------------------------------------                                ESTABLISHED                              PATIENT -    [facility]  Last Visit: 05-      [facility]      MEDICINE       Yolette Abebe                              ESTABLISHED                              PATIENT -    [facility]  Next Visit: 08-      VIRTUAL      MEDICINE       Yolette  Andrae                                                            Last  Test          Frequency    Reason                     Performed    Due Date  --------------------------------------------------------------------------------    HBA1C.......  6 months...  tirzepatide..............  04-   10-    [facility] Embedded Care Due Messages. Reference number: [id number].   7/27/2025 10:11:37 AM CDT

## 2025-07-28 NOTE — TELEPHONE ENCOUNTER
Refill Routing Note   Medication(s) are not appropriate for processing by [facility] for the following reason(s):        Outside of protocol    [facility] action(s):  Route     Requires labs : Yes             Appointments  past 12m or future 3m with PCP    Date Provider   Last Visit   5/28/2025 Yolette Abebe MD   Next Visit   8/28/2025 Yolette Abebe MD   ED visits in past 90 days: 0        Note composed:12:29 PM 07/28/2025

## 2025-07-31 DIAGNOSIS — E66.01 SEVERE OBESITY (BMI 35.0-39.9) WITH COMORBIDITY: ICD-10-CM

## 2025-07-31 DIAGNOSIS — N18.32 TYPE 2 DIABETES MELLITUS WITH STAGE 3B CHRONIC KIDNEY DISEASE, WITHOUT LONG-TERM CURRENT USE OF INSULIN: ICD-10-CM

## 2025-07-31 DIAGNOSIS — E11.22 TYPE 2 DIABETES MELLITUS WITH STAGE 3B CHRONIC KIDNEY DISEASE, WITHOUT LONG-TERM CURRENT USE OF INSULIN: ICD-10-CM

## 2025-07-31 RX ORDER — TIRZEPATIDE 5 MG/.5ML
5 INJECTION, SOLUTION SUBCUTANEOUS
Qty: 6 ML | Refills: 0 | Status: SHIPPED | OUTPATIENT
Start: 2025-07-31 | End: 2025-10-29

## 2025-07-31 NOTE — TELEPHONE ENCOUNTER
No care due was identified.  [facility] Embedded Care Due Messages. Reference number: [id number].   7/31/2025 9:55:52 AM CDT

## 2025-07-31 NOTE — TELEPHONE ENCOUNTER
Refill Decision Note   Gwendolyn Fournier  is requesting a refill authorization.  Brief Assessment and Rationale for Refill:  Approve     Medication Therapy Plan:         Comments:     Note composed:12:18 PM 07/31/2025

## 2025-08-05 ENCOUNTER — HOSPITAL ENCOUNTER (OUTPATIENT)
Dept: CARDIOLOGY | Facility: HOSPITAL | Age: 70
Discharge: HOME | End: 2025-08-05
Attending: ANESTHESIOLOGY
Payer: MEDICARE

## 2025-08-05 DIAGNOSIS — Z01.818 PREOPERATIVE TESTING: ICD-10-CM

## 2025-08-05 LAB
OHS QRS DURATION: 94 MS
OHS QTC CALCULATION: 441 MS

## 2025-08-05 PROCEDURE — 93010 ELECTROCARDIOGRAM REPORT: CPT | Mod: ,,, | Performed by: INTERNAL MEDICINE

## 2025-08-05 PROCEDURE — 93005 ELECTROCARDIOGRAM TRACING: CPT

## 2025-08-07 ENCOUNTER — HOSPITAL ENCOUNTER (OUTPATIENT)
Dept: CARDIOLOGY | Facility: HOSPITAL | Age: 70
Discharge: HOME | End: 2025-08-07
Attending: INTERNAL MEDICINE
Payer: MEDICARE

## 2025-08-07 DIAGNOSIS — R00.2 HEART PALPITATIONS: ICD-10-CM

## 2025-08-07 PROCEDURE — 93225 XTRNL ECG REC<48 HRS REC: CPT

## 2025-08-08 ENCOUNTER — E-CONSULT (OUTPATIENT)
Dept: CARDIOLOGY | Facility: HOSPITAL | Age: 70
End: 2025-08-08
Payer: MEDICARE

## 2025-08-08 DIAGNOSIS — Z01.810 PREOP CARDIOVASCULAR EXAM: Primary | ICD-10-CM

## 2025-08-08 NOTE — CONSULTS
[facility]  Response for E-Consult     Patient Name: Gwendolyn Fournier  MRN: [id number]  Primary Care Provider: Yolette Abebe MD   Requesting Provider: Latisha Ruth MD  E-Consult to General Cardiology  Consult performed by: Mazin Shankar MD  Consult ordered by: Latisha Ruth MD          Recommendation: patient may proceed at moderate risk. She has potential HOCM (Cardiac MRI has been ordered but patient has not completed it). Please keep heart rate around 60 bpm and monitor hemodynamics closely during surgery.    Results for orders placed or performed during the hospital encounter of 08/05/25   EKG 12-lead    Collection Time: 08/05/25 10:31 AM   Result Value Ref Range    QRS Duration 94 ms    [facility] QTC Calculation 441 ms    Narrative    Test Reason : Z01.818,    Vent. Rate :  82 BPM     Atrial Rate :  82 BPM     P-R Int : 150 ms          QRS Dur :  94 ms      QT Int : 378 ms       P-R-T Axes :  68  10  58 degrees    QTcB Int : 441 ms    Normal sinus rhythm  Incomplete right bundle branch block  Borderline Abnormal ECG  When compared with ECG of 11-Jul-2025 13:52,  Significant changes have occurred  Confirmed by Tito Nguyen ([id number]) on 8/5/2025 10:52:55 AM    Referred By: YOLNADA MC           Confirmed By: Tito Nguyen       Results for orders placed during the hospital encounter of 08/22/23    Echo    Interpretation Summary    Left Ventricle: Normal wall motion. There is hyperdynamic systolic function with a visually estimated ejection fraction of greater than 70%. Grade I diastolic dysfunction. LVOT obstruction .  56 mm of gradient across LVOT with Valsalva    Left Atrium: Left atrium is mildly dilated.    Right Ventricle: Mild right ventricular enlargement. Systolic function is normal.    Right Atrium: Right atrium is mildly dilated.    Pulmonary Artery: The estimated pulmonary artery systolic pressure is 38 mmHg.    IVC/SVC: Intermediate venous pressure at 8 mmHg.    TDS      Results for orders  placed during the hospital encounter of 06/19/24    Nuclear Stress - Cardiology Interpreted    Interpretation Summary    Normal myocardial perfusion scan. There is no evidence of myocardial ischemia or infarction.    The visually estimated ejection fraction is normal during stress.    There is normal wall motion post stress.      No results found for this or any previous visit.      Cardiac event monitor  Result Date: 4/29/2025    Negative event monitor with no sustained clinical arrhythmias.    Symptoms corresponding with normal sinus rhythm and occasional PACs and   PVCs.                Additional future steps to consider: as above    Total time of Consultation: 5 minute    I did not speak to the requesting provider verbally about this.     *This eConsult is based on the clinical data available to me and is furnished without benefit of a physical examination. The eConsult will need to be interpreted in light of any clinical issues or changes in patient status not available to me at the time of filing this eConsults. Significant changes in patient condition or level of acuity should result in immediate formal consultation and reevaluation. Please alert me if you have further questions.    Thank you for this eConsult referral.     Mazin Shankar MD  [facility]

## 2025-08-11 ENCOUNTER — PATIENT MESSAGE (OUTPATIENT)
Dept: PLASTIC SURGERY | Facility: CLINIC | Age: 70
End: 2025-08-11
Payer: MEDICARE

## 2025-08-11 ENCOUNTER — TELEPHONE (OUTPATIENT)
Dept: PLASTIC SURGERY | Facility: CLINIC | Age: 70
End: 2025-08-11
Payer: MEDICARE

## 2025-08-12 ENCOUNTER — ANESTHESIA (OUTPATIENT)
Dept: SURGERY | Facility: HOSPITAL | Age: 70
End: 2025-08-12
Payer: MEDICARE

## 2025-08-12 ENCOUNTER — ANESTHESIA EVENT (OUTPATIENT)
Dept: SURGERY | Facility: HOSPITAL | Age: 70
End: 2025-08-12
Payer: MEDICARE

## 2025-08-12 ENCOUNTER — HOSPITAL ENCOUNTER (OUTPATIENT)
Facility: HOSPITAL | Age: 70
Discharge: HOME | End: 2025-08-12
Attending: SURGERY | Admitting: SURGERY
Payer: MEDICARE

## 2025-08-12 VITALS
HEART RATE: 88 BPM | WEIGHT: 250.88 LBS | BODY MASS INDEX: 39.29 KG/M2 | TEMPERATURE: 98 F | DIASTOLIC BLOOD PRESSURE: 74 MMHG | OXYGEN SATURATION: 96 % | SYSTOLIC BLOOD PRESSURE: 164 MMHG | RESPIRATION RATE: 20 BRPM

## 2025-08-12 DIAGNOSIS — Z01.818 PREOPERATIVE TESTING: ICD-10-CM

## 2025-08-12 DIAGNOSIS — Z85.3 PERSONAL HISTORY OF BREAST CANCER: Primary | ICD-10-CM

## 2025-08-12 DIAGNOSIS — C50.911 MALIGNANT NEOPLASM OF RIGHT FEMALE BREAST, UNSPECIFIED ESTROGEN RECEPTOR STATUS, UNSPECIFIED SITE OF BREAST: ICD-10-CM

## 2025-08-12 LAB — POCT GLUCOSE: 104 MG/DL (ref 70–110)

## 2025-08-12 PROCEDURE — 15839 EXC EXCESSIVE SKN OTHER AREA: CPT | Mod: 51,,, | Performed by: SURGERY

## 2025-08-12 PROCEDURE — 71000045 HC DOSC ROUTINE RECOVERY EA ADD'L HR: Performed by: SURGERY

## 2025-08-12 PROCEDURE — 25000003 PHARM REV CODE 250

## 2025-08-12 PROCEDURE — 36000707: Performed by: SURGERY

## 2025-08-12 PROCEDURE — 63600175 PHARM REV CODE 636 W HCPCS

## 2025-08-12 PROCEDURE — 71000044 HC DOSC ROUTINE RECOVERY FIRST HOUR: Performed by: SURGERY

## 2025-08-12 PROCEDURE — 27201423 OPTIME MED/SURG SUP & DEVICES STERILE SUPPLY: Performed by: SURGERY

## 2025-08-12 PROCEDURE — 71000015 HC POSTOP RECOV 1ST HR: Performed by: SURGERY

## 2025-08-12 PROCEDURE — C1789 PROSTHESIS, BREAST, IMP: HCPCS | Performed by: SURGERY

## 2025-08-12 PROCEDURE — 88331 PATH CONSLTJ SURG 1 BLK 1SPC: CPT | Mod: TC,59 | Performed by: SURGERY

## 2025-08-12 PROCEDURE — 36000706: Performed by: SURGERY

## 2025-08-12 PROCEDURE — 63600175 PHARM REV CODE 636 W HCPCS: Performed by: ANESTHESIOLOGY

## 2025-08-12 PROCEDURE — 37000009 HC ANESTHESIA EA ADD 15 MINS: Performed by: SURGERY

## 2025-08-12 PROCEDURE — 37000008 HC ANESTHESIA 1ST 15 MINUTES: Performed by: SURGERY

## 2025-08-12 PROCEDURE — 11970 RPLCMT TISS XPNDR PERM IMPLT: CPT | Mod: RT,,, | Performed by: SURGERY

## 2025-08-12 DEVICE — IMPLANTABLE DEVICE: Type: IMPLANTABLE DEVICE | Site: BREAST | Status: FUNCTIONAL

## 2025-08-12 RX ORDER — ROCURONIUM BROMIDE 10 MG/ML
INJECTION, SOLUTION INTRAVENOUS
Status: DISCONTINUED | OUTPATIENT
Start: 2025-08-12 | End: 2025-08-12

## 2025-08-12 RX ORDER — HEPARIN SODIUM 1000 [USP'U]/ML
INJECTION, SOLUTION INTRAVENOUS; SUBCUTANEOUS
Status: DISCONTINUED | OUTPATIENT
Start: 2025-08-12 | End: 2025-08-12

## 2025-08-12 RX ORDER — HALOPERIDOL LACTATE 5 MG/ML
0.5 INJECTION, SOLUTION INTRAMUSCULAR EVERY 10 MIN PRN
Status: DISCONTINUED | OUTPATIENT
Start: 2025-08-12 | End: 2025-08-12 | Stop reason: HOSPADM

## 2025-08-12 RX ORDER — PROPOFOL 10 MG/ML
VIAL (ML) INTRAVENOUS
Status: DISCONTINUED | OUTPATIENT
Start: 2025-08-12 | End: 2025-08-12

## 2025-08-12 RX ORDER — MIDAZOLAM HYDROCHLORIDE 1 MG/ML
INJECTION INTRAMUSCULAR; INTRAVENOUS
Status: DISCONTINUED | OUTPATIENT
Start: 2025-08-12 | End: 2025-08-12

## 2025-08-12 RX ORDER — HYDROMORPHONE HYDROCHLORIDE 1 MG/ML
0.2 INJECTION, SOLUTION INTRAMUSCULAR; INTRAVENOUS; SUBCUTANEOUS EVERY 5 MIN PRN
Status: DISCONTINUED | OUTPATIENT
Start: 2025-08-12 | End: 2025-08-12 | Stop reason: HOSPADM

## 2025-08-12 RX ORDER — CEPHALEXIN 500 MG/1
500 CAPSULE ORAL EVERY 12 HOURS
Qty: 14 CAPSULE | Refills: 0 | Status: SHIPPED | OUTPATIENT
Start: 2025-08-12 | End: 2025-08-19

## 2025-08-12 RX ORDER — MUPIROCIN 20 MG/G
OINTMENT TOPICAL
Status: DISCONTINUED | OUTPATIENT
Start: 2025-08-12 | End: 2025-08-12 | Stop reason: HOSPADM

## 2025-08-12 RX ORDER — HALOPERIDOL LACTATE 5 MG/ML
INJECTION, SOLUTION INTRAMUSCULAR
Status: DISCONTINUED | OUTPATIENT
Start: 2025-08-12 | End: 2025-08-12

## 2025-08-12 RX ORDER — LIDOCAINE HYDROCHLORIDE 20 MG/ML
INJECTION INTRAVENOUS
Status: DISCONTINUED | OUTPATIENT
Start: 2025-08-12 | End: 2025-08-12

## 2025-08-12 RX ORDER — GLUCAGON 1 MG
1 KIT INJECTION
Status: DISCONTINUED | OUTPATIENT
Start: 2025-08-12 | End: 2025-08-12 | Stop reason: HOSPADM

## 2025-08-12 RX ORDER — DEXAMETHASONE SODIUM PHOSPHATE 4 MG/ML
INJECTION, SOLUTION INTRA-ARTICULAR; INTRALESIONAL; INTRAMUSCULAR; INTRAVENOUS; SOFT TISSUE
Status: DISCONTINUED | OUTPATIENT
Start: 2025-08-12 | End: 2025-08-12

## 2025-08-12 RX ORDER — FENTANYL CITRATE 50 UG/ML
INJECTION, SOLUTION INTRAMUSCULAR; INTRAVENOUS
Status: DISCONTINUED | OUTPATIENT
Start: 2025-08-12 | End: 2025-08-12

## 2025-08-12 RX ORDER — LIDOCAINE HYDROCHLORIDE 10 MG/ML
1 INJECTION, SOLUTION EPIDURAL; INFILTRATION; INTRACAUDAL; PERINEURAL ONCE
Status: DISCONTINUED | OUTPATIENT
Start: 2025-08-12 | End: 2025-08-12 | Stop reason: HOSPADM

## 2025-08-12 RX ORDER — OXYCODONE AND ACETAMINOPHEN 5; 325 MG/1; MG/1
1 TABLET ORAL EVERY 6 HOURS PRN
Qty: 28 TABLET | Refills: 0 | Status: SHIPPED | OUTPATIENT
Start: 2025-08-12

## 2025-08-12 RX ORDER — SODIUM CHLORIDE 0.9 % (FLUSH) 0.9 %
10 SYRINGE (ML) INJECTION
Status: DISCONTINUED | OUTPATIENT
Start: 2025-08-12 | End: 2025-08-12 | Stop reason: HOSPADM

## 2025-08-12 RX ORDER — CLINDAMYCIN PHOSPHATE 900 MG/50ML
INJECTION, SOLUTION INTRAVENOUS
Status: DISCONTINUED | OUTPATIENT
Start: 2025-08-12 | End: 2025-08-12

## 2025-08-12 RX ORDER — METOCLOPRAMIDE 10 MG/1
10 TABLET ORAL EVERY 6 HOURS PRN
Status: DISCONTINUED | OUTPATIENT
Start: 2025-08-12 | End: 2025-08-12 | Stop reason: HOSPADM

## 2025-08-12 RX ORDER — PHENYLEPHRINE HYDROCHLORIDE 10 MG/ML
INJECTION INTRAVENOUS
Status: DISCONTINUED | OUTPATIENT
Start: 2025-08-12 | End: 2025-08-12

## 2025-08-12 RX ADMIN — HYDROMORPHONE HYDROCHLORIDE 0.2 MG: 1 INJECTION, SOLUTION INTRAMUSCULAR; INTRAVENOUS; SUBCUTANEOUS at 11:08

## 2025-08-12 RX ADMIN — PHENYLEPHRINE HYDROCHLORIDE 100 MCG: 10 INJECTION INTRAVENOUS at 08:08

## 2025-08-12 RX ADMIN — SODIUM CHLORIDE: 9 INJECTION, SOLUTION INTRAVENOUS at 07:08

## 2025-08-12 RX ADMIN — SUGAMMADEX 400 MG: 100 INJECTION, SOLUTION INTRAVENOUS at 10:08

## 2025-08-12 RX ADMIN — PHENYLEPHRINE HYDROCHLORIDE 200 MCG: 10 INJECTION INTRAVENOUS at 09:08

## 2025-08-12 RX ADMIN — PHENYLEPHRINE HYDROCHLORIDE 200 MCG: 10 INJECTION INTRAVENOUS at 08:08

## 2025-08-12 RX ADMIN — ROCURONIUM BROMIDE 50 MG: 10 INJECTION, SOLUTION INTRAVENOUS at 07:08

## 2025-08-12 RX ADMIN — FENTANYL CITRATE 50 MCG: 50 INJECTION, SOLUTION INTRAMUSCULAR; INTRAVENOUS at 08:08

## 2025-08-12 RX ADMIN — ROCURONIUM BROMIDE 20 MG: 10 INJECTION, SOLUTION INTRAVENOUS at 08:08

## 2025-08-12 RX ADMIN — HYDROMORPHONE HYDROCHLORIDE 0.2 MG: 1 INJECTION, SOLUTION INTRAMUSCULAR; INTRAVENOUS; SUBCUTANEOUS at 12:08

## 2025-08-12 RX ADMIN — HALOPERIDOL LACTATE 1 MG: 5 INJECTION, SOLUTION INTRAMUSCULAR at 08:08

## 2025-08-12 RX ADMIN — TACROLIMUS 900 MG: 0.5 CAPSULE ORAL at 08:08

## 2025-08-12 RX ADMIN — MIDAZOLAM HYDROCHLORIDE 2 MG: 2 INJECTION, SOLUTION INTRAMUSCULAR; INTRAVENOUS at 07:08

## 2025-08-12 RX ADMIN — PROPOFOL 200 MG: 10 INJECTION, EMULSION INTRAVENOUS at 07:08

## 2025-08-12 RX ADMIN — HEPARIN SODIUM 5000 UNITS: 1000 INJECTION INTRAVENOUS; SUBCUTANEOUS at 08:08

## 2025-08-12 RX ADMIN — DEXAMETHASONE SODIUM PHOSPHATE 4 MG: 4 INJECTION, SOLUTION INTRAMUSCULAR; INTRAVENOUS at 08:08

## 2025-08-12 RX ADMIN — ROCURONIUM BROMIDE 10 MG: 10 INJECTION, SOLUTION INTRAVENOUS at 09:08

## 2025-08-12 RX ADMIN — LIDOCAINE HYDROCHLORIDE 100 MG: 20 INJECTION INTRAVENOUS at 07:08

## 2025-08-15 LAB
ESTROGEN SERPL-MCNC: NORMAL PG/ML
INSULIN SERPL-ACNC: NORMAL U[IU]/ML
LAB AP CLINICAL INFORMATION: NORMAL
LAB AP DIAGNOSIS CATEGORY: NORMAL
LAB AP GROSS DESCRIPTION: NORMAL
LAB AP INTRA OP: NORMAL
LAB AP PERFORMING LOCATION(S): NORMAL
LAB AP REPORT FOOTNOTES: NORMAL

## 2025-08-16 ENCOUNTER — HOSPITAL ENCOUNTER (EMERGENCY)
Facility: HOSPITAL | Age: 70
Discharge: HOME | End: 2025-08-16
Attending: EMERGENCY MEDICINE
Payer: MEDICARE

## 2025-08-16 VITALS
SYSTOLIC BLOOD PRESSURE: 187 MMHG | TEMPERATURE: 98 F | WEIGHT: 240.06 LBS | HEIGHT: 67 IN | RESPIRATION RATE: 22 BRPM | HEART RATE: 80 BPM | BODY MASS INDEX: 37.68 KG/M2 | OXYGEN SATURATION: 98 % | DIASTOLIC BLOOD PRESSURE: 90 MMHG

## 2025-08-16 DIAGNOSIS — B83.9 WORMS IN STOOL: ICD-10-CM

## 2025-08-16 DIAGNOSIS — I10 HTN (HYPERTENSION): Primary | ICD-10-CM

## 2025-08-16 LAB
ABSOLUTE EOSINOPHIL (OHS): 0.09 K/UL
ABSOLUTE MONOCYTE (OHS): 0.99 K/UL (ref 0.3–1)
ABSOLUTE NEUTROPHIL COUNT (OHS): 8.37 K/UL (ref 1.8–7.7)
ALBUMIN SERPL BCP-MCNC: 3.2 G/DL (ref 3.5–5.2)
ALP SERPL-CCNC: 110 UNIT/L (ref 40–150)
ALT SERPL W/O P-5'-P-CCNC: 12 UNIT/L (ref 10–44)
ANION GAP (OHS): 10 MMOL/L (ref 8–16)
AST SERPL-CCNC: 17 UNIT/L (ref 11–45)
BASOPHILS # BLD AUTO: 0.07 K/UL
BASOPHILS NFR BLD AUTO: 0.6 %
BILIRUB SERPL-MCNC: 0.4 MG/DL (ref 0.1–1)
BUN SERPL-MCNC: 15 MG/DL (ref 8–23)
CALCIUM SERPL-MCNC: 10.1 MG/DL (ref 8.7–10.5)
CHLORIDE SERPL-SCNC: 104 MMOL/L (ref 95–110)
CO2 SERPL-SCNC: 29 MMOL/L (ref 23–29)
CREAT SERPL-MCNC: 1.6 MG/DL (ref 0.5–1.4)
ERYTHROCYTE [DISTWIDTH] IN BLOOD BY AUTOMATED COUNT: 14.1 % (ref 11.5–14.5)
GFR SERPLBLD CREATININE-BSD FMLA CKD-EPI: 34 ML/MIN/1.73/M2
GLUCOSE SERPL-MCNC: 99 MG/DL (ref 70–110)
HCT VFR BLD AUTO: 31.5 % (ref 37–48.5)
HGB BLD-MCNC: 10 GM/DL (ref 12–16)
IMM GRANULOCYTES # BLD AUTO: 0.06 K/UL (ref 0–0.04)
IMM GRANULOCYTES NFR BLD AUTO: 0.5 % (ref 0–0.5)
LYMPHOCYTES # BLD AUTO: 2.92 K/UL (ref 1–4.8)
MCH RBC QN AUTO: 27.9 PG (ref 27–31)
MCHC RBC AUTO-ENTMCNC: 31.7 G/DL (ref 32–36)
MCV RBC AUTO: 88 FL (ref 82–98)
NT-PROBNP SERPL-MCNC: 2818 PG/ML
NUCLEATED RBC (/100WBC) (OHS): 0 /100 WBC
PLATELET # BLD AUTO: 311 K/UL (ref 150–450)
PMV BLD AUTO: 10 FL (ref 9.2–12.9)
POTASSIUM SERPL-SCNC: 4.6 MMOL/L (ref 3.5–5.1)
PROT SERPL-MCNC: 7.1 GM/DL (ref 6–8.4)
RBC # BLD AUTO: 3.59 M/UL (ref 4–5.4)
RELATIVE EOSINOPHIL (OHS): 0.7 %
RELATIVE LYMPHOCYTE (OHS): 23.4 % (ref 18–48)
RELATIVE MONOCYTE (OHS): 7.9 % (ref 4–15)
RELATIVE NEUTROPHIL (OHS): 66.9 % (ref 38–73)
SODIUM SERPL-SCNC: 143 MMOL/L (ref 136–145)
TROPONIN I SERPL HS-MCNC: 10 NG/L
WBC # BLD AUTO: 12.5 K/UL (ref 3.9–12.7)

## 2025-08-16 PROCEDURE — 87046 STOOL CULTR AEROBIC BACT EA: CPT | Performed by: STUDENT IN AN ORGANIZED HEALTH CARE EDUCATION/TRAINING PROGRAM

## 2025-08-16 PROCEDURE — 25000003 PHARM REV CODE 250: Performed by: STUDENT IN AN ORGANIZED HEALTH CARE EDUCATION/TRAINING PROGRAM

## 2025-08-16 PROCEDURE — 83880 ASSAY OF NATRIURETIC PEPTIDE: CPT | Performed by: STUDENT IN AN ORGANIZED HEALTH CARE EDUCATION/TRAINING PROGRAM

## 2025-08-16 PROCEDURE — 99284 EMERGENCY DEPT VISIT MOD MDM: CPT

## 2025-08-16 PROCEDURE — 84484 ASSAY OF TROPONIN QUANT: CPT | Performed by: STUDENT IN AN ORGANIZED HEALTH CARE EDUCATION/TRAINING PROGRAM

## 2025-08-16 PROCEDURE — 87427 SHIGA-LIKE TOXIN AG IA: CPT | Performed by: STUDENT IN AN ORGANIZED HEALTH CARE EDUCATION/TRAINING PROGRAM

## 2025-08-16 PROCEDURE — 87177 OVA AND PARASITES SMEARS: CPT | Performed by: STUDENT IN AN ORGANIZED HEALTH CARE EDUCATION/TRAINING PROGRAM

## 2025-08-16 PROCEDURE — 80053 COMPREHEN METABOLIC PANEL: CPT | Performed by: STUDENT IN AN ORGANIZED HEALTH CARE EDUCATION/TRAINING PROGRAM

## 2025-08-16 PROCEDURE — 85025 COMPLETE CBC W/AUTO DIFF WBC: CPT | Performed by: STUDENT IN AN ORGANIZED HEALTH CARE EDUCATION/TRAINING PROGRAM

## 2025-08-16 RX ORDER — ACETAMINOPHEN 500 MG
1000 TABLET ORAL
Status: COMPLETED | OUTPATIENT
Start: 2025-08-16 | End: 2025-08-16

## 2025-08-16 RX ORDER — METOPROLOL SUCCINATE 50 MG/1
50 TABLET, EXTENDED RELEASE ORAL DAILY
Status: DISCONTINUED | OUTPATIENT
Start: 2025-08-16 | End: 2025-08-16 | Stop reason: HOSPADM

## 2025-08-16 RX ADMIN — ACETAMINOPHEN 1000 MG: 500 TABLET ORAL at 12:08

## 2025-08-16 RX ADMIN — METOPROLOL SUCCINATE 50 MG: 50 TABLET, EXTENDED RELEASE ORAL at 12:08

## 2025-08-17 LAB
E COLI SXT1 STL QL IA: NEGATIVE
E COLI SXT2 STL QL IA: NEGATIVE

## 2025-08-18 LAB — BACTERIA STL CULT: NORMAL

## 2025-08-19 ENCOUNTER — OFFICE VISIT (OUTPATIENT)
Dept: CARDIOLOGY | Facility: CLINIC | Age: 70
End: 2025-08-19
Payer: MEDICARE

## 2025-08-19 VITALS
HEART RATE: 73 BPM | HEIGHT: 67 IN | WEIGHT: 238.31 LBS | BODY MASS INDEX: 37.4 KG/M2 | DIASTOLIC BLOOD PRESSURE: 84 MMHG | OXYGEN SATURATION: 94 % | RESPIRATION RATE: 15 BRPM | SYSTOLIC BLOOD PRESSURE: 167 MMHG

## 2025-08-19 DIAGNOSIS — I42.1 HYPERTROPHIC OBSTRUCTIVE CARDIOMYOPATHY (HOCM): ICD-10-CM

## 2025-08-19 DIAGNOSIS — F40.240 CLAUSTROPHOBIA: ICD-10-CM

## 2025-08-19 DIAGNOSIS — I42.1 HOCM (HYPERTROPHIC OBSTRUCTIVE CARDIOMYOPATHY): Primary | ICD-10-CM

## 2025-08-19 DIAGNOSIS — I70.0 AORTIC ATHEROSCLEROSIS: ICD-10-CM

## 2025-08-19 DIAGNOSIS — R06.02 SOB (SHORTNESS OF BREATH): ICD-10-CM

## 2025-08-19 DIAGNOSIS — I10 ESSENTIAL HYPERTENSION: ICD-10-CM

## 2025-08-19 PROCEDURE — 1157F ADVNC CARE PLAN IN RCRD: CPT | Mod: CPTII,S$GLB,, | Performed by: INTERNAL MEDICINE

## 2025-08-19 PROCEDURE — 3079F DIAST BP 80-89 MM HG: CPT | Mod: CPTII,S$GLB,, | Performed by: INTERNAL MEDICINE

## 2025-08-19 PROCEDURE — 1101F PT FALLS ASSESS-DOCD LE1/YR: CPT | Mod: CPTII,S$GLB,, | Performed by: INTERNAL MEDICINE

## 2025-08-19 PROCEDURE — 99999 PR PBB SHADOW E&M-EST. PATIENT-LVL V: CPT | Mod: PBBFAC,,, | Performed by: INTERNAL MEDICINE

## 2025-08-19 PROCEDURE — 1126F AMNT PAIN NOTED NONE PRSNT: CPT | Mod: CPTII,S$GLB,, | Performed by: INTERNAL MEDICINE

## 2025-08-19 PROCEDURE — 3008F BODY MASS INDEX DOCD: CPT | Mod: CPTII,S$GLB,, | Performed by: INTERNAL MEDICINE

## 2025-08-19 PROCEDURE — 3044F HG A1C LEVEL LT 7.0%: CPT | Mod: CPTII,S$GLB,, | Performed by: INTERNAL MEDICINE

## 2025-08-19 PROCEDURE — 4010F ACE/ARB THERAPY RXD/TAKEN: CPT | Mod: CPTII,S$GLB,, | Performed by: INTERNAL MEDICINE

## 2025-08-19 PROCEDURE — 99214 OFFICE O/P EST MOD 30 MIN: CPT | Mod: S$GLB,,, | Performed by: INTERNAL MEDICINE

## 2025-08-19 PROCEDURE — G2211 COMPLEX E/M VISIT ADD ON: HCPCS | Mod: S$GLB,,, | Performed by: INTERNAL MEDICINE

## 2025-08-19 PROCEDURE — 1159F MED LIST DOCD IN RCRD: CPT | Mod: CPTII,S$GLB,, | Performed by: INTERNAL MEDICINE

## 2025-08-19 PROCEDURE — 3288F FALL RISK ASSESSMENT DOCD: CPT | Mod: CPTII,S$GLB,, | Performed by: INTERNAL MEDICINE

## 2025-08-19 PROCEDURE — 3077F SYST BP >= 140 MM HG: CPT | Mod: CPTII,S$GLB,, | Performed by: INTERNAL MEDICINE

## 2025-08-19 RX ORDER — VALSARTAN 160 MG/1
160 TABLET ORAL DAILY
Qty: 90 TABLET | Refills: 3 | Status: SHIPPED | OUTPATIENT
Start: 2025-08-19 | End: 2026-08-19

## 2025-08-19 RX ORDER — LORAZEPAM 0.5 MG/1
0.5 TABLET ORAL EVERY 6 HOURS PRN
Qty: 2 TABLET | Refills: 0 | Status: SHIPPED | OUTPATIENT
Start: 2025-08-19 | End: 2025-09-18

## 2025-08-19 RX ORDER — METOPROLOL SUCCINATE 200 MG/1
200 TABLET, EXTENDED RELEASE ORAL DAILY
Qty: 90 TABLET | Refills: 3 | Status: SHIPPED | OUTPATIENT
Start: 2025-08-19

## 2025-08-20 ENCOUNTER — OFFICE VISIT (OUTPATIENT)
Dept: PLASTIC SURGERY | Facility: CLINIC | Age: 70
End: 2025-08-20
Payer: MEDICARE

## 2025-08-20 VITALS — DIASTOLIC BLOOD PRESSURE: 87 MMHG | HEART RATE: 125 BPM | SYSTOLIC BLOOD PRESSURE: 192 MMHG

## 2025-08-20 DIAGNOSIS — Z09 SURGERY FOLLOW-UP EXAMINATION: Primary | ICD-10-CM

## 2025-08-20 PROCEDURE — 3077F SYST BP >= 140 MM HG: CPT | Mod: CPTII,S$GLB,, | Performed by: SURGERY

## 2025-08-20 PROCEDURE — 3079F DIAST BP 80-89 MM HG: CPT | Mod: CPTII,S$GLB,, | Performed by: SURGERY

## 2025-08-20 PROCEDURE — 4010F ACE/ARB THERAPY RXD/TAKEN: CPT | Mod: CPTII,S$GLB,, | Performed by: SURGERY

## 2025-08-20 PROCEDURE — 1159F MED LIST DOCD IN RCRD: CPT | Mod: CPTII,S$GLB,, | Performed by: SURGERY

## 2025-08-20 PROCEDURE — 1101F PT FALLS ASSESS-DOCD LE1/YR: CPT | Mod: CPTII,S$GLB,, | Performed by: SURGERY

## 2025-08-20 PROCEDURE — 3044F HG A1C LEVEL LT 7.0%: CPT | Mod: CPTII,S$GLB,, | Performed by: SURGERY

## 2025-08-20 PROCEDURE — 99999 PR PBB SHADOW E&M-EST. PATIENT-LVL III: CPT | Mod: PBBFAC,,, | Performed by: SURGERY

## 2025-08-20 PROCEDURE — 1157F ADVNC CARE PLAN IN RCRD: CPT | Mod: CPTII,S$GLB,, | Performed by: SURGERY

## 2025-08-20 PROCEDURE — 1126F AMNT PAIN NOTED NONE PRSNT: CPT | Mod: CPTII,S$GLB,, | Performed by: SURGERY

## 2025-08-20 PROCEDURE — 3288F FALL RISK ASSESSMENT DOCD: CPT | Mod: CPTII,S$GLB,, | Performed by: SURGERY

## 2025-08-20 PROCEDURE — 99024 POSTOP FOLLOW-UP VISIT: CPT | Mod: S$GLB,,, | Performed by: SURGERY

## 2025-08-21 ENCOUNTER — OFFICE VISIT (OUTPATIENT)
Dept: HEMATOLOGY/ONCOLOGY | Facility: CLINIC | Age: 70
End: 2025-08-21
Payer: MEDICARE

## 2025-08-21 DIAGNOSIS — Z00.00 GENERAL MEDICAL EXAM: ICD-10-CM

## 2025-08-21 DIAGNOSIS — C50.911 MALIGNANT NEOPLASM OF RIGHT BREAST IN FEMALE, ESTROGEN RECEPTOR POSITIVE, UNSPECIFIED SITE OF BREAST: ICD-10-CM

## 2025-08-21 DIAGNOSIS — Z17.0 MALIGNANT NEOPLASM OF RIGHT BREAST IN FEMALE, ESTROGEN RECEPTOR POSITIVE, UNSPECIFIED SITE OF BREAST: ICD-10-CM

## 2025-08-21 DIAGNOSIS — Z71.83 ENCOUNTER FOR NONPROCREATIVE GENETIC COUNSELING: Primary | ICD-10-CM

## 2025-08-22 ENCOUNTER — PATIENT MESSAGE (OUTPATIENT)
Dept: OBSTETRICS AND GYNECOLOGY | Facility: CLINIC | Age: 70
End: 2025-08-22
Payer: MEDICARE

## 2025-08-22 ENCOUNTER — TELEPHONE (OUTPATIENT)
Dept: OBSTETRICS AND GYNECOLOGY | Facility: CLINIC | Age: 70
End: 2025-08-22
Payer: MEDICARE

## 2025-08-22 LAB — O+P STL MICRO: NORMAL

## 2025-08-26 RX ORDER — GABAPENTIN 300 MG/1
300 CAPSULE ORAL 3 TIMES DAILY PRN
Qty: 90 CAPSULE | Refills: 3 | Status: SHIPPED | OUTPATIENT
Start: 2025-08-26

## 2025-08-28 ENCOUNTER — OFFICE VISIT (OUTPATIENT)
Dept: FAMILY MEDICINE | Facility: CLINIC | Age: 70
End: 2025-08-28
Payer: MEDICARE

## 2025-08-28 DIAGNOSIS — Z85.3 PERSONAL HISTORY OF BREAST CANCER: ICD-10-CM

## 2025-08-28 DIAGNOSIS — R11.0 NAUSEA: Primary | ICD-10-CM

## 2025-08-28 RX ORDER — ONDANSETRON 4 MG/1
4 TABLET, ORALLY DISINTEGRATING ORAL EVERY 8 HOURS PRN
Qty: 30 TABLET | Refills: 0 | Status: SHIPPED | OUTPATIENT
Start: 2025-08-28

## 2025-08-28 RX ORDER — OXYCODONE AND ACETAMINOPHEN 5; 325 MG/1; MG/1
1 TABLET ORAL
Start: 2025-08-28

## 2025-08-29 ENCOUNTER — PATIENT MESSAGE (OUTPATIENT)
Dept: FAMILY MEDICINE | Facility: CLINIC | Age: 70
End: 2025-08-29
Payer: MEDICARE

## 2025-08-29 DIAGNOSIS — I10 ESSENTIAL HYPERTENSION: Primary | ICD-10-CM

## 2025-08-29 RX ORDER — VALSARTAN 320 MG/1
320 TABLET ORAL DAILY
Qty: 90 TABLET | Refills: 3 | Status: SHIPPED | OUTPATIENT
Start: 2025-08-29 | End: 2026-08-29

## 2025-09-03 ENCOUNTER — OFFICE VISIT (OUTPATIENT)
Dept: PLASTIC SURGERY | Facility: CLINIC | Age: 70
End: 2025-09-03
Payer: MEDICARE

## 2025-09-03 VITALS
BODY MASS INDEX: 37.4 KG/M2 | WEIGHT: 238.31 LBS | HEART RATE: 80 BPM | HEIGHT: 67 IN | DIASTOLIC BLOOD PRESSURE: 87 MMHG | SYSTOLIC BLOOD PRESSURE: 192 MMHG

## 2025-09-03 DIAGNOSIS — Z09 SURGERY FOLLOW-UP EXAMINATION: Primary | ICD-10-CM

## 2025-09-03 PROCEDURE — 3288F FALL RISK ASSESSMENT DOCD: CPT | Mod: CPTII,S$GLB,,

## 2025-09-03 PROCEDURE — 4010F ACE/ARB THERAPY RXD/TAKEN: CPT | Mod: CPTII,S$GLB,,

## 2025-09-03 PROCEDURE — 3077F SYST BP >= 140 MM HG: CPT | Mod: CPTII,S$GLB,,

## 2025-09-03 PROCEDURE — 99999 PR PBB SHADOW E&M-EST. PATIENT-LVL III: CPT | Mod: PBBFAC,,,

## 2025-09-03 PROCEDURE — 3079F DIAST BP 80-89 MM HG: CPT | Mod: CPTII,S$GLB,,

## 2025-09-03 PROCEDURE — 1157F ADVNC CARE PLAN IN RCRD: CPT | Mod: CPTII,S$GLB,,

## 2025-09-03 PROCEDURE — 1126F AMNT PAIN NOTED NONE PRSNT: CPT | Mod: CPTII,S$GLB,,

## 2025-09-03 PROCEDURE — 1101F PT FALLS ASSESS-DOCD LE1/YR: CPT | Mod: CPTII,S$GLB,,

## 2025-09-03 PROCEDURE — 1159F MED LIST DOCD IN RCRD: CPT | Mod: CPTII,S$GLB,,

## 2025-09-03 PROCEDURE — 3044F HG A1C LEVEL LT 7.0%: CPT | Mod: CPTII,S$GLB,,

## 2025-09-03 PROCEDURE — 99024 POSTOP FOLLOW-UP VISIT: CPT | Mod: S$GLB,,,

## (undated) DEVICE — SUT MCRYL PLUS 4-0 PS2 27IN

## (undated) DEVICE — CUP MEDICINE STERILE 2OZ

## (undated) DEVICE — SYR 10CC LUER LOCK

## (undated) DEVICE — GOWN SURGICAL X-LARGE

## (undated) DEVICE — ELECTRODE REM PLYHSV RETURN 9

## (undated) DEVICE — GOWN AERO CHROME W/ TOWEL XL

## (undated) DEVICE — NDL HYPO REG 25G X 1 1/2

## (undated) DEVICE — STAPLER SKIN ROTATING HEAD

## (undated) DEVICE — DRAPE STERI INSTRUMENT 1018

## (undated) DEVICE — BANDAGE ROLL COTTN 4.5INX4.1YD

## (undated) DEVICE — DRAPE HALF SURGICAL 40X58IN

## (undated) DEVICE — ELECTRODE MEGADYNE RETURN DUAL

## (undated) DEVICE — Device

## (undated) DEVICE — SUT MONOCRYL 3-0 PS-2 UND

## (undated) DEVICE — DRESSING XEROFORM NONADH 1X8IN

## (undated) DEVICE — COVER PROBE NL STRL 3.6X96IN

## (undated) DEVICE — REMOVER STAPLE SKIN STERILE

## (undated) DEVICE — TRAY MINOR GEN SURG OMC

## (undated) DEVICE — MANIFOLD 4 PORT

## (undated) DEVICE — TOWEL OR DISP STRL BLUE 4/PK

## (undated) DEVICE — IMPLANTABLE DEVICE
Type: IMPLANTABLE DEVICE | Site: BREAST | Status: NON-FUNCTIONAL
Removed: 2025-08-12

## (undated) DEVICE — SYR 50CC LL

## (undated) DEVICE — STAPLER SKIN SUBCUTICULAR

## (undated) DEVICE — GAUZE FLUFF XXLG 36X36 2 PLY

## (undated) DEVICE — SUT PDS II 2-0 CT1

## (undated) DEVICE — SUT ETHILON 2-0 PSLX 30IN

## (undated) DEVICE — ELECTRODE EXTENDED BLADE

## (undated) DEVICE — SET BLD COLL SAFETY 21G X 3/4

## (undated) DEVICE — RETRACTOR RADIALUX LIGHTED

## (undated) DEVICE — MARKER FN REG DUAL UTIL RULER

## (undated) DEVICE — PAD ABDOMINAL STERILE 8X10IN

## (undated) DEVICE — SUT VICRYL 3-0 27 SH

## (undated) DEVICE — SPONGE LAP 18X18 PREWASHED

## (undated) DEVICE — EVACUATOR WOUND BULB 100CC

## (undated) DEVICE — SUT 2-0 VICRYL / SH (J417)

## (undated) DEVICE — KIT IRR SUCTION HND PIECE

## (undated) DEVICE — HOOK STAY ELAS 5MM 8EA/PK

## (undated) DEVICE — SET FLUID TRANSFER ASEPTIC

## (undated) DEVICE — SOL NACL 0.9% IV INJ 1000ML

## (undated) DEVICE — APPLIER CLIP LIAGCLIP 9.375IN

## (undated) DEVICE — STAPLER SKIN REGULAR

## (undated) DEVICE — DRESSING LEUKOPLAST FLEX 1X3IN

## (undated) DEVICE — BOVIE SUCTION

## (undated) DEVICE — PENCIL ROCKER SWITCH 10FT CORD

## (undated) DEVICE — SYS PRINEO SKIN CLOSURE

## (undated) DEVICE — SUT 2-0 VICRYL / CT-1

## (undated) DEVICE — TIP YANKAUERS BULB NO VENT

## (undated) DEVICE — SOL VASHE INSTILLATION WND 16

## (undated) DEVICE — ELECTRODE BLADE INSULATED 1 IN

## (undated) DEVICE — SPONGE COTTON TRAY 4X4IN

## (undated) DEVICE — PENCIL SMK EVAC CONNECTOR 10FT

## (undated) DEVICE — DRAIN CHANNEL ROUND 15FR

## (undated) DEVICE — SYR DISP LL 5CC